# Patient Record
Sex: MALE | Race: WHITE | NOT HISPANIC OR LATINO | Employment: FULL TIME | URBAN - NONMETROPOLITAN AREA
[De-identification: names, ages, dates, MRNs, and addresses within clinical notes are randomized per-mention and may not be internally consistent; named-entity substitution may affect disease eponyms.]

---

## 2023-05-13 ENCOUNTER — HOSPITAL ENCOUNTER (INPATIENT)
Facility: HOSPITAL | Age: 58
LOS: 2 days | Discharge: HOME | DRG: 638 | End: 2023-05-15
Attending: INTERNAL MEDICINE | Admitting: INTERNAL MEDICINE
Payer: OTHER MISCELLANEOUS

## 2023-05-13 ENCOUNTER — APPOINTMENT (OUTPATIENT)
Dept: GENERAL RADIOLOGY | Facility: HOSPITAL | Age: 58
DRG: 638 | End: 2023-05-13
Payer: OTHER MISCELLANEOUS

## 2023-05-13 ENCOUNTER — APPOINTMENT (OUTPATIENT)
Dept: CT IMAGING | Facility: HOSPITAL | Age: 58
DRG: 638 | End: 2023-05-13
Payer: OTHER MISCELLANEOUS

## 2023-05-13 DIAGNOSIS — E16.2 HYPOGLYCEMIA: Primary | ICD-10-CM

## 2023-05-13 DIAGNOSIS — M86.9 OSTEOMYELITIS OF RIGHT ANKLE, UNSPECIFIED TYPE: ICD-10-CM

## 2023-05-13 LAB
ACETONE BLD QL: NEGATIVE
ALBUMIN SERPL-MCNC: 4.2 G/DL (ref 3.5–5.2)
ALBUMIN/GLOB SERPL: 1.2 G/DL
ALP SERPL-CCNC: 182 U/L (ref 39–117)
ALT SERPL W P-5'-P-CCNC: 19 U/L (ref 1–41)
AMPHET+METHAMPHET UR QL: NEGATIVE
AMPHETAMINES UR QL: NEGATIVE
ANION GAP SERPL CALCULATED.3IONS-SCNC: 14 MMOL/L (ref 5–15)
APAP SERPL-MCNC: <5 MCG/ML (ref 0–30)
ARTERIAL PATENCY WRIST A: ABNORMAL
AST SERPL-CCNC: 19 U/L (ref 1–40)
ATMOSPHERIC PRESS: 752 MMHG
BACTERIA UR QL AUTO: ABNORMAL /HPF
BARBITURATES UR QL SCN: NEGATIVE
BASE EXCESS BLDA CALC-SCNC: -5.7 MMOL/L (ref 0–2)
BASOPHILS # BLD AUTO: 0.05 10*3/MM3 (ref 0–0.2)
BASOPHILS NFR BLD AUTO: 0.5 % (ref 0–1.5)
BDY SITE: ABNORMAL
BENZODIAZ UR QL SCN: NEGATIVE
BILIRUB SERPL-MCNC: 0.4 MG/DL (ref 0–1.2)
BILIRUB UR QL STRIP: NEGATIVE
BODY TEMPERATURE: 37 C
BUN SERPL-MCNC: 39 MG/DL (ref 6–20)
BUN/CREAT SERPL: 34.8 (ref 7–25)
BUPRENORPHINE SERPL-MCNC: NEGATIVE NG/ML
CALCIUM SPEC-SCNC: 9.4 MG/DL (ref 8.6–10.5)
CANNABINOIDS SERPL QL: NEGATIVE
CHLORIDE SERPL-SCNC: 107 MMOL/L (ref 98–107)
CK SERPL-CCNC: 179 U/L (ref 20–200)
CLARITY UR: CLEAR
CO2 SERPL-SCNC: 21 MMOL/L (ref 22–29)
COCAINE UR QL: NEGATIVE
COLOR UR: YELLOW
CREAT SERPL-MCNC: 1.12 MG/DL (ref 0.76–1.27)
CRP SERPL-MCNC: 9.79 MG/DL (ref 0–0.5)
D-LACTATE SERPL-SCNC: 0.7 MMOL/L (ref 0.5–2)
DEPRECATED RDW RBC AUTO: 48.5 FL (ref 37–54)
EGFRCR SERPLBLD CKD-EPI 2021: 76.6 ML/MIN/1.73
EOSINOPHIL # BLD AUTO: 0.03 10*3/MM3 (ref 0–0.4)
EOSINOPHIL NFR BLD AUTO: 0.3 % (ref 0.3–6.2)
ERYTHROCYTE [DISTWIDTH] IN BLOOD BY AUTOMATED COUNT: 14.6 % (ref 12.3–15.4)
ERYTHROCYTE [SEDIMENTATION RATE] IN BLOOD: 55 MM/HR (ref 0–20)
ETHANOL UR QL: <0.01 %
FENTANYL UR-MCNC: NEGATIVE NG/ML
FLUAV RNA RESP QL NAA+PROBE: NOT DETECTED
FLUBV RNA RESP QL NAA+PROBE: NOT DETECTED
GLOBULIN UR ELPH-MCNC: 3.6 GM/DL
GLUCOSE BLDC GLUCOMTR-MCNC: 151 MG/DL (ref 70–130)
GLUCOSE BLDC GLUCOMTR-MCNC: 182 MG/DL (ref 70–130)
GLUCOSE BLDC GLUCOMTR-MCNC: 184 MG/DL (ref 70–130)
GLUCOSE BLDC GLUCOMTR-MCNC: 189 MG/DL (ref 70–130)
GLUCOSE BLDC GLUCOMTR-MCNC: 40 MG/DL (ref 70–130)
GLUCOSE SERPL-MCNC: 37 MG/DL (ref 65–99)
GLUCOSE UR STRIP-MCNC: ABNORMAL MG/DL
HBA1C MFR BLD: 5.3 % (ref 4.8–5.6)
HCO3 BLDA-SCNC: 19.8 MMOL/L (ref 20–26)
HCT VFR BLD AUTO: 29.2 % (ref 37.5–51)
HGB BLD-MCNC: 9.1 G/DL (ref 13–17.7)
HGB UR QL STRIP.AUTO: NEGATIVE
HYALINE CASTS UR QL AUTO: ABNORMAL /LPF
IMM GRANULOCYTES # BLD AUTO: 0.03 10*3/MM3 (ref 0–0.05)
IMM GRANULOCYTES NFR BLD AUTO: 0.3 % (ref 0–0.5)
KETONES UR QL STRIP: ABNORMAL
LEUKOCYTE ESTERASE UR QL STRIP.AUTO: NEGATIVE
LIPASE SERPL-CCNC: 20 U/L (ref 13–60)
LYMPHOCYTES # BLD AUTO: 1.16 10*3/MM3 (ref 0.7–3.1)
LYMPHOCYTES NFR BLD AUTO: 10.5 % (ref 19.6–45.3)
Lab: ABNORMAL
MAGNESIUM SERPL-MCNC: 2.3 MG/DL (ref 1.6–2.6)
MCH RBC QN AUTO: 28.3 PG (ref 26.6–33)
MCHC RBC AUTO-ENTMCNC: 31.2 G/DL (ref 31.5–35.7)
MCV RBC AUTO: 91 FL (ref 79–97)
METHADONE UR QL SCN: NEGATIVE
MODALITY: ABNORMAL
MONOCYTES # BLD AUTO: 0.74 10*3/MM3 (ref 0.1–0.9)
MONOCYTES NFR BLD AUTO: 6.7 % (ref 5–12)
NEUTROPHILS NFR BLD AUTO: 81.7 % (ref 42.7–76)
NEUTROPHILS NFR BLD AUTO: 9.09 10*3/MM3 (ref 1.7–7)
NITRITE UR QL STRIP: NEGATIVE
NRBC BLD AUTO-RTO: 0 /100 WBC (ref 0–0.2)
OPIATES UR QL: POSITIVE
OXYCODONE UR QL SCN: NEGATIVE
PCO2 BLDA: 37.8 MM HG (ref 35–45)
PCO2 TEMP ADJ BLD: 37.8 MM HG (ref 35–45)
PCP UR QL SCN: NEGATIVE
PH BLDA: 7.33 PH UNITS (ref 7.35–7.45)
PH UR STRIP.AUTO: <=5 [PH] (ref 5–8)
PH, TEMP CORRECTED: 7.33 PH UNITS (ref 7.35–7.45)
PLATELET # BLD AUTO: 270 10*3/MM3 (ref 140–450)
PMV BLD AUTO: 9.1 FL (ref 6–12)
PO2 BLDA: 88.4 MM HG (ref 83–108)
PO2 TEMP ADJ BLD: 88.4 MM HG (ref 83–108)
POTASSIUM SERPL-SCNC: 4.5 MMOL/L (ref 3.5–5.2)
PROCALCITONIN SERPL-MCNC: 0.06 NG/ML (ref 0–0.25)
PROPOXYPH UR QL: NEGATIVE
PROT SERPL-MCNC: 7.8 G/DL (ref 6–8.5)
PROT UR QL STRIP: ABNORMAL
RBC # BLD AUTO: 3.21 10*6/MM3 (ref 4.14–5.8)
RBC # UR STRIP: ABNORMAL /HPF
REF LAB TEST METHOD: ABNORMAL
RSV RNA NPH QL NAA+NON-PROBE: NOT DETECTED
SALICYLATES SERPL-MCNC: <0.3 MG/DL
SAO2 % BLDCOA: 94.7 % (ref 94–99)
SARS-COV-2 RNA RESP QL NAA+PROBE: NOT DETECTED
SODIUM SERPL-SCNC: 142 MMOL/L (ref 136–145)
SP GR UR STRIP: 1.03 (ref 1–1.03)
SQUAMOUS #/AREA URNS HPF: ABNORMAL /HPF
T4 FREE SERPL-MCNC: 1.06 NG/DL (ref 0.93–1.7)
TRICYCLICS UR QL SCN: NEGATIVE
TSH SERPL DL<=0.05 MIU/L-ACNC: 1.71 UIU/ML (ref 0.27–4.2)
UROBILINOGEN UR QL STRIP: ABNORMAL
VENTILATOR MODE: ABNORMAL
WBC # UR STRIP: ABNORMAL /HPF
WBC NRBC COR # BLD: 11.1 10*3/MM3 (ref 3.4–10.8)

## 2023-05-13 PROCEDURE — 80050 GENERAL HEALTH PANEL: CPT | Performed by: PHYSICIAN ASSISTANT

## 2023-05-13 PROCEDURE — 85652 RBC SED RATE AUTOMATED: CPT | Performed by: PHYSICIAN ASSISTANT

## 2023-05-13 PROCEDURE — 83036 HEMOGLOBIN GLYCOSYLATED A1C: CPT | Performed by: PHYSICIAN ASSISTANT

## 2023-05-13 PROCEDURE — 73630 X-RAY EXAM OF FOOT: CPT

## 2023-05-13 PROCEDURE — 99285 EMERGENCY DEPT VISIT HI MDM: CPT

## 2023-05-13 PROCEDURE — 80179 DRUG ASSAY SALICYLATE: CPT | Performed by: PHYSICIAN ASSISTANT

## 2023-05-13 PROCEDURE — 80307 DRUG TEST PRSMV CHEM ANLYZR: CPT | Performed by: PHYSICIAN ASSISTANT

## 2023-05-13 PROCEDURE — 71045 X-RAY EXAM CHEST 1 VIEW: CPT

## 2023-05-13 PROCEDURE — 84439 ASSAY OF FREE THYROXINE: CPT | Performed by: PHYSICIAN ASSISTANT

## 2023-05-13 PROCEDURE — 80143 DRUG ASSAY ACETAMINOPHEN: CPT | Performed by: PHYSICIAN ASSISTANT

## 2023-05-13 PROCEDURE — 36600 WITHDRAWAL OF ARTERIAL BLOOD: CPT

## 2023-05-13 PROCEDURE — 36415 COLL VENOUS BLD VENIPUNCTURE: CPT

## 2023-05-13 PROCEDURE — 82550 ASSAY OF CK (CPK): CPT | Performed by: PHYSICIAN ASSISTANT

## 2023-05-13 PROCEDURE — 83605 ASSAY OF LACTIC ACID: CPT | Performed by: PHYSICIAN ASSISTANT

## 2023-05-13 PROCEDURE — 87040 BLOOD CULTURE FOR BACTERIA: CPT | Performed by: PHYSICIAN ASSISTANT

## 2023-05-13 PROCEDURE — 93005 ELECTROCARDIOGRAM TRACING: CPT | Performed by: PHYSICIAN ASSISTANT

## 2023-05-13 PROCEDURE — 70450 CT HEAD/BRAIN W/O DYE: CPT

## 2023-05-13 PROCEDURE — 82948 REAGENT STRIP/BLOOD GLUCOSE: CPT

## 2023-05-13 PROCEDURE — 82009 KETONE BODYS QUAL: CPT | Performed by: PHYSICIAN ASSISTANT

## 2023-05-13 PROCEDURE — 82803 BLOOD GASES ANY COMBINATION: CPT

## 2023-05-13 PROCEDURE — 86140 C-REACTIVE PROTEIN: CPT | Performed by: PHYSICIAN ASSISTANT

## 2023-05-13 PROCEDURE — 83690 ASSAY OF LIPASE: CPT | Performed by: PHYSICIAN ASSISTANT

## 2023-05-13 PROCEDURE — 82077 ASSAY SPEC XCP UR&BREATH IA: CPT | Performed by: PHYSICIAN ASSISTANT

## 2023-05-13 PROCEDURE — 83735 ASSAY OF MAGNESIUM: CPT | Performed by: PHYSICIAN ASSISTANT

## 2023-05-13 PROCEDURE — 81001 URINALYSIS AUTO W/SCOPE: CPT | Performed by: PHYSICIAN ASSISTANT

## 2023-05-13 PROCEDURE — 87637 SARSCOV2&INF A&B&RSV AMP PRB: CPT | Performed by: PHYSICIAN ASSISTANT

## 2023-05-13 PROCEDURE — 84145 PROCALCITONIN (PCT): CPT | Performed by: PHYSICIAN ASSISTANT

## 2023-05-13 RX ORDER — DEXTROSE MONOHYDRATE 25 G/50ML
INJECTION, SOLUTION INTRAVENOUS
Status: COMPLETED
Start: 2023-05-13 | End: 2023-05-13

## 2023-05-13 RX ORDER — SODIUM CHLORIDE 0.9 % (FLUSH) 0.9 %
10 SYRINGE (ML) INJECTION AS NEEDED
Status: DISCONTINUED | OUTPATIENT
Start: 2023-05-13 | End: 2023-05-15 | Stop reason: HOSPADM

## 2023-05-13 RX ORDER — ONDANSETRON 2 MG/ML
4 INJECTION INTRAMUSCULAR; INTRAVENOUS ONCE
Status: DISCONTINUED | OUTPATIENT
Start: 2023-05-13 | End: 2023-05-15 | Stop reason: HOSPADM

## 2023-05-13 RX ADMIN — DEXTROSE MONOHYDRATE 50 ML: 25 INJECTION, SOLUTION INTRAVENOUS at 18:40

## 2023-05-13 NOTE — ED PROVIDER NOTES
"Subjective   History of Present Illness    Patient is a 58-year-old male presenting to ED with altered mental status.  PMH significant for non-insulin-dependent diabetes. Wife at bedside to provide additional history.  Wife states that at 4 PM yesterday patient had a sudden change in his mentation where he seemed very confused, zoning out, and had tremors.  Wife states that patient's children thought he may have accidentally overdosed on his Lortab as he has been taking more due to pain and discomfort with a right foot wound for which he is supposed to have surgery once insurance improves for a wound cleanout and likely amputation due to poorly controlled diabetes.  Wife states however that today she went and counted patient's Lortabs and noted that the appropriate amount of tablets was missing and she had low concern for an overdose but describes that all day today patient has been like a \"zombie just staring out\" acting as though \"he is still a little bit high.\"  Wife states that patient has also been complaining of a sudden headache for which she became concerned he might have a septic foot.  Wife notes that they have not checked patient's blood sugar all day today and she does not believe he is taking any other medications intentionally or on purpose at which time they present for further evaluation.  Patient is a poor historian as he states that his head hurts however he is unwilling to answer other questions.    Wife did state that patient sustained an ankle fracture due to [employer]. for which she was initially following with podiatrist Dr. Morrison.  Wife states that they were advised \"there was nothing left they could do and he just had to tolerate the pain until the leg developed complications so we went to get a second opinion.\"  Wife states that they have since been following with Dr. Reddy through the orthopedic [place] who reports that he is scheduled to have hardware removal on 6/13/2023.  Wife " states she is concerned because patient has been having increased pain to the foot and they are not sure if he will be able to wait until 6/13/2023 for intervention.    Records reviewed show patient last seen in the ED on 11/28/2014.  Patient with no outpatient visits since pain management on 8/2/2016 for lumbar postlaminectomy syndrome, lumbar radiculopathy, sacroiliac joint guided joint dysfunction, trochanteric bursitis, peripheral neuropathy pain.    Review of Systems   Constitutional: Negative.  Negative for fever.   HENT: Negative.    Eyes: Negative.    Respiratory: Negative.    Cardiovascular: Negative.    Gastrointestinal: Negative.  Negative for nausea and vomiting.   Genitourinary: Negative.    Musculoskeletal: Positive for arthralgias (Right foot) and joint swelling (Right foot).   Skin: Positive for wound (Right foot).   Allergic/Immunologic: Positive for immunocompromised state (DM).   Neurological: Negative.         Denies head injury   Psychiatric/Behavioral: Positive for confusion.   All other systems reviewed and are negative.      History reviewed. No pertinent past medical history.    No Known Allergies    History reviewed. No pertinent surgical history.    History reviewed. No pertinent family history.    Social History     Socioeconomic History   • Marital status:    Tobacco Use   • Smoking status: Never   • Smokeless tobacco: Never   Vaping Use   • Vaping Use: Never used   Substance and Sexual Activity   • Alcohol use: Never   • Drug use: Never   • Sexual activity: Defer           Objective   Physical Exam  Vitals and nursing note reviewed.   Constitutional:       General: He is in acute distress.      Appearance: Normal appearance. He is well-developed and well-groomed. He is not ill-appearing, toxic-appearing or diaphoretic.   HENT:      Head: Normocephalic and atraumatic.      Mouth/Throat:      Mouth: Mucous membranes are moist.      Pharynx: Oropharynx is clear.   Eyes:       Extraocular Movements: Extraocular movements intact.      Conjunctiva/sclera: Conjunctivae normal.      Pupils: Pupils are equal, round, and reactive to light.   Cardiovascular:      Rate and Rhythm: Regular rhythm. Tachycardia present.   Pulmonary:      Effort: Pulmonary effort is normal.      Breath sounds: Normal breath sounds.   Abdominal:      General: Bowel sounds are normal.      Palpations: Abdomen is soft.   Musculoskeletal:         General: Tenderness present.      Cervical back: Neck supple.      Comments: Orthopedic brace in place to the right lower extremity.  Left foot with very superficial abrasion on the top aspect however no evidence of overlying cellulitis or other acute injury/abnormalities.   Skin:     General: Skin is warm and dry.      Findings: Wound (Right foot) present.   Neurological:      General: No focal deficit present.      Mental Status: He is alert and oriented to person, place, and time.   Psychiatric:         Mood and Affect: Mood normal.         Behavior: Behavior normal. Behavior is cooperative.         Procedures           ED Course                                           Medical Decision Making  Hypoglycemia: acute illness or injury  Osteomyelitis of right ankle, unspecified type: acute illness or injury  Amount and/or Complexity of Data Reviewed  Independent Historian: spouse     Details: Wife  External Data Reviewed: labs, radiology and notes.  Labs: ordered. Decision-making details documented in ED Course.  Radiology: ordered. Decision-making details documented in ED Course.  ECG/medicine tests: ordered. Decision-making details documented in ED Course.  Discussion of management or test interpretation with external provider(s): Dr. Casper Nogueira (attending)  Dr. Chase (hospitalist)    Risk  Prescription drug management.  Decision regarding hospitalization.            Patient is a 58-year-old male presenting to ED with altered mental status.  PMH significant for  non-insulin-dependent diabetes.  Upon arrival to ED patient noted to have a POC BGL of 40 for which she was given an amp of D50 and subsequently was able to tolerate p.o. fluids and food and maintain his blood sugars in the 180s.  Lab work otherwise revealed Leukocytosis 11.1, H&H 9.1/29.2 with normal platelets and no further CBC abnormalities.  CMP with alk phos 182, BUN 39.  No further electrolyte disturbances, normal renal and hepatic function otherwise.  Thyroid hormones WNL.  Inflammatory markers elevated with CRP 9.79, sed rate 55 however normal lactic acid as well as normal procalcitonin.  ABG revealed acidotic pH 7.328 with HCO3 of 19.8 and no further acute abnormalities.  CK WNL at 179.  Salicylate and acetaminophen is negative.  Alcohol negative.  UDS positive for opiates consistent with patient's medication history and otherwise unremarkable.  Urinalysis with greater than thousand glucosurea as well as greater than 300 proteinuria but no evidence of infection.  COVID, influenza, RSV testing negative.  Further low concern for DKA with negative ketones.  Hemoglobin A1c 5.3.  Head CT without contrast showed: No acute intracranial process.  Chest x-ray showed: No radiographic evidence of acute cardiopulmonary process.  Right foot x-ray showed: Previous ankle fracture with open reduction internal fixation, radiographs concerning for osteomyelitis with septic hardware loosening.  Patient was started on IV vancomycin.  Throughout evaluation patient remained ANO x3 and hemodynamically stable.  Discussed with patient need for admission for further evaluation and treatment of his osteomyelitis as both wife and patient states he has not been on any antibiotics.  Discussed ability to trend blood sugars during admission for which they are amenable with no further questions, concerns, or needs.  Case discussed with Dr. Chase, hospitalist, who will kindly accept patient for admission under her services.    Final  diagnoses:   Hypoglycemia   Osteomyelitis of right ankle, unspecified type       ED Disposition  ED Disposition     ED Disposition   Decision to Admit    Condition   --    Comment   Level of Care: Med/Surg [1]   Diagnosis: Osteomyelitis of right foot [868692]   Admitting Physician: REDD VALDEZ [[id number]]   Certification: I Certify That Inpatient Hospital Services Are Medically Necessary For Greater Than 2 Midnights               No follow-up provider specified.       Medication List      No changes were made to your prescriptions during this visit.          Donn Lui PA-C  05/14/23 0039

## 2023-05-14 LAB
ALBUMIN SERPL-MCNC: 3.1 G/DL (ref 3.5–5.2)
ALBUMIN/GLOB SERPL: 1 G/DL
ALP SERPL-CCNC: 138 U/L (ref 39–117)
ALT SERPL W P-5'-P-CCNC: 15 U/L (ref 1–41)
ANION GAP SERPL CALCULATED.3IONS-SCNC: 13 MMOL/L (ref 5–15)
AST SERPL-CCNC: 13 U/L (ref 1–40)
BASOPHILS # BLD AUTO: 0.04 10*3/MM3 (ref 0–0.2)
BASOPHILS NFR BLD AUTO: 0.4 % (ref 0–1.5)
BILIRUB SERPL-MCNC: 0.2 MG/DL (ref 0–1.2)
BUN SERPL-MCNC: 37 MG/DL (ref 6–20)
BUN/CREAT SERPL: 40.7 (ref 7–25)
CALCIUM SPEC-SCNC: 8.2 MG/DL (ref 8.6–10.5)
CHLORIDE SERPL-SCNC: 109 MMOL/L (ref 98–107)
CO2 SERPL-SCNC: 20 MMOL/L (ref 22–29)
CREAT SERPL-MCNC: 0.91 MG/DL (ref 0.76–1.27)
DEPRECATED RDW RBC AUTO: 48.4 FL (ref 37–54)
EGFRCR SERPLBLD CKD-EPI 2021: 98.3 ML/MIN/1.73
EOSINOPHIL # BLD AUTO: 0.08 10*3/MM3 (ref 0–0.4)
EOSINOPHIL NFR BLD AUTO: 0.7 % (ref 0.3–6.2)
ERYTHROCYTE [DISTWIDTH] IN BLOOD BY AUTOMATED COUNT: 14.8 % (ref 12.3–15.4)
GLOBULIN UR ELPH-MCNC: 3 GM/DL
GLUCOSE BLDC GLUCOMTR-MCNC: 104 MG/DL (ref 70–130)
GLUCOSE BLDC GLUCOMTR-MCNC: 106 MG/DL (ref 70–130)
GLUCOSE BLDC GLUCOMTR-MCNC: 184 MG/DL (ref 70–130)
GLUCOSE BLDC GLUCOMTR-MCNC: 271 MG/DL (ref 70–130)
GLUCOSE BLDC GLUCOMTR-MCNC: 289 MG/DL (ref 70–130)
GLUCOSE SERPL-MCNC: 98 MG/DL (ref 65–99)
HCT VFR BLD AUTO: 27.7 % (ref 37.5–51)
HGB BLD-MCNC: 8.6 G/DL (ref 13–17.7)
IMM GRANULOCYTES # BLD AUTO: 0.05 10*3/MM3 (ref 0–0.05)
IMM GRANULOCYTES NFR BLD AUTO: 0.5 % (ref 0–0.5)
LYMPHOCYTES # BLD AUTO: 2.3 10*3/MM3 (ref 0.7–3.1)
LYMPHOCYTES NFR BLD AUTO: 21.3 % (ref 19.6–45.3)
MCH RBC QN AUTO: 27.7 PG (ref 26.6–33)
MCHC RBC AUTO-ENTMCNC: 31 G/DL (ref 31.5–35.7)
MCV RBC AUTO: 89.4 FL (ref 79–97)
MONOCYTES # BLD AUTO: 0.75 10*3/MM3 (ref 0.1–0.9)
MONOCYTES NFR BLD AUTO: 7 % (ref 5–12)
NEUTROPHILS NFR BLD AUTO: 7.57 10*3/MM3 (ref 1.7–7)
NEUTROPHILS NFR BLD AUTO: 70.1 % (ref 42.7–76)
NRBC BLD AUTO-RTO: 0 /100 WBC (ref 0–0.2)
PLATELET # BLD AUTO: 282 10*3/MM3 (ref 140–450)
PMV BLD AUTO: 9.3 FL (ref 6–12)
POTASSIUM SERPL-SCNC: 4.7 MMOL/L (ref 3.5–5.2)
PROT SERPL-MCNC: 6.1 G/DL (ref 6–8.5)
QT INTERVAL: 452 MS
QTC INTERVAL: 511 MS
RBC # BLD AUTO: 3.1 10*6/MM3 (ref 4.14–5.8)
SODIUM SERPL-SCNC: 142 MMOL/L (ref 136–145)
WBC NRBC COR # BLD: 10.79 10*3/MM3 (ref 3.4–10.8)

## 2023-05-14 PROCEDURE — 25010000002 VANCOMYCIN 10 G RECONSTITUTED SOLUTION: Performed by: PHYSICIAN ASSISTANT

## 2023-05-14 PROCEDURE — 25010000002 VANCOMYCIN 1 G RECONSTITUTED SOLUTION 1 EACH VIAL: Performed by: INTERNAL MEDICINE

## 2023-05-14 PROCEDURE — 80053 COMPREHEN METABOLIC PANEL: CPT | Performed by: INTERNAL MEDICINE

## 2023-05-14 PROCEDURE — 63710000001 INSULIN LISPRO (HUMAN) PER 5 UNITS: Performed by: INTERNAL MEDICINE

## 2023-05-14 PROCEDURE — 85025 COMPLETE CBC W/AUTO DIFF WBC: CPT | Performed by: INTERNAL MEDICINE

## 2023-05-14 PROCEDURE — 82948 REAGENT STRIP/BLOOD GLUCOSE: CPT

## 2023-05-14 PROCEDURE — 25010000002 ONDANSETRON PER 1 MG: Performed by: INTERNAL MEDICINE

## 2023-05-14 PROCEDURE — 25010000002 HYDROMORPHONE PER 4 MG: Performed by: INTERNAL MEDICINE

## 2023-05-14 RX ORDER — SODIUM CHLORIDE 9 MG/ML
40 INJECTION, SOLUTION INTRAVENOUS AS NEEDED
Status: DISCONTINUED | OUTPATIENT
Start: 2023-05-14 | End: 2023-05-15 | Stop reason: HOSPADM

## 2023-05-14 RX ORDER — NICOTINE POLACRILEX 4 MG
15 LOZENGE BUCCAL
Status: DISCONTINUED | OUTPATIENT
Start: 2023-05-14 | End: 2023-05-15 | Stop reason: HOSPADM

## 2023-05-14 RX ORDER — OXYCODONE HYDROCHLORIDE AND ACETAMINOPHEN 5; 325 MG/1; MG/1
1 TABLET ORAL EVERY 4 HOURS PRN
Status: DISCONTINUED | OUTPATIENT
Start: 2023-05-14 | End: 2023-05-14

## 2023-05-14 RX ORDER — ONDANSETRON 2 MG/ML
4 INJECTION INTRAMUSCULAR; INTRAVENOUS EVERY 6 HOURS PRN
Status: DISCONTINUED | OUTPATIENT
Start: 2023-05-14 | End: 2023-05-15 | Stop reason: HOSPADM

## 2023-05-14 RX ORDER — PREGABALIN 50 MG/1
50 CAPSULE ORAL 3 TIMES DAILY
COMMUNITY

## 2023-05-14 RX ORDER — PREGABALIN 50 MG/1
50 CAPSULE ORAL 3 TIMES DAILY
Status: DISCONTINUED | OUTPATIENT
Start: 2023-05-14 | End: 2023-05-15 | Stop reason: HOSPADM

## 2023-05-14 RX ORDER — ACETAMINOPHEN 325 MG/1
650 TABLET ORAL EVERY 4 HOURS PRN
Status: DISCONTINUED | OUTPATIENT
Start: 2023-05-14 | End: 2023-05-15 | Stop reason: HOSPADM

## 2023-05-14 RX ORDER — SODIUM CHLORIDE 0.9 % (FLUSH) 0.9 %
10 SYRINGE (ML) INJECTION EVERY 12 HOURS SCHEDULED
Status: DISCONTINUED | OUTPATIENT
Start: 2023-05-14 | End: 2023-05-15 | Stop reason: HOSPADM

## 2023-05-14 RX ORDER — DEXTROSE MONOHYDRATE 25 G/50ML
25 INJECTION, SOLUTION INTRAVENOUS
Status: DISCONTINUED | OUTPATIENT
Start: 2023-05-14 | End: 2023-05-15 | Stop reason: HOSPADM

## 2023-05-14 RX ORDER — HYDROCODONE BITARTRATE AND ACETAMINOPHEN 10; 325 MG/1; MG/1
1 TABLET ORAL EVERY 6 HOURS PRN
Status: DISCONTINUED | OUTPATIENT
Start: 2023-05-14 | End: 2023-05-15 | Stop reason: HOSPADM

## 2023-05-14 RX ORDER — HYDROMORPHONE HYDROCHLORIDE 1 MG/ML
0.5 INJECTION, SOLUTION INTRAMUSCULAR; INTRAVENOUS; SUBCUTANEOUS
Status: DISCONTINUED | OUTPATIENT
Start: 2023-05-14 | End: 2023-05-15 | Stop reason: HOSPADM

## 2023-05-14 RX ORDER — SODIUM CHLORIDE 0.9 % (FLUSH) 0.9 %
10 SYRINGE (ML) INJECTION AS NEEDED
Status: DISCONTINUED | OUTPATIENT
Start: 2023-05-14 | End: 2023-05-15 | Stop reason: HOSPADM

## 2023-05-14 RX ORDER — GLIMEPIRIDE 2 MG/1
2 TABLET ORAL 2 TIMES DAILY
COMMUNITY
End: 2023-05-15 | Stop reason: HOSPADM

## 2023-05-14 RX ORDER — INSULIN LISPRO 100 [IU]/ML
2-7 INJECTION, SOLUTION INTRAVENOUS; SUBCUTANEOUS
Status: DISCONTINUED | OUTPATIENT
Start: 2023-05-14 | End: 2023-05-15 | Stop reason: HOSPADM

## 2023-05-14 RX ORDER — HYDROCODONE BITARTRATE AND ACETAMINOPHEN 10; 325 MG/1; MG/1
1 TABLET ORAL EVERY 8 HOURS PRN
COMMUNITY

## 2023-05-14 RX ADMIN — VANCOMYCIN HYDROCHLORIDE 1000 MG: 1 INJECTION, POWDER, LYOPHILIZED, FOR SOLUTION INTRAVENOUS at 12:17

## 2023-05-14 RX ADMIN — Medication 10 ML: at 20:53

## 2023-05-14 RX ADMIN — ONDANSETRON 4 MG: 2 INJECTION INTRAMUSCULAR; INTRAVENOUS at 00:59

## 2023-05-14 RX ADMIN — Medication 10 ML: at 01:06

## 2023-05-14 RX ADMIN — OXYCODONE HYDROCHLORIDE AND ACETAMINOPHEN 1 TABLET: 5; 325 TABLET ORAL at 02:26

## 2023-05-14 RX ADMIN — HYDROMORPHONE HYDROCHLORIDE 0.5 MG: 1 INJECTION, SOLUTION INTRAMUSCULAR; INTRAVENOUS; SUBCUTANEOUS at 03:42

## 2023-05-14 RX ADMIN — HYDROMORPHONE HYDROCHLORIDE 0.5 MG: 1 INJECTION, SOLUTION INTRAMUSCULAR; INTRAVENOUS; SUBCUTANEOUS at 00:58

## 2023-05-14 RX ADMIN — HYDROCODONE BITARTRATE AND ACETAMINOPHEN 1 TABLET: 10; 325 TABLET ORAL at 10:07

## 2023-05-14 RX ADMIN — INSULIN LISPRO 2 UNITS: 100 INJECTION, SOLUTION INTRAVENOUS; SUBCUTANEOUS at 17:31

## 2023-05-14 RX ADMIN — PREGABALIN 50 MG: 50 CAPSULE ORAL at 20:53

## 2023-05-14 RX ADMIN — HYDROCODONE BITARTRATE AND ACETAMINOPHEN 1 TABLET: 10; 325 TABLET ORAL at 16:47

## 2023-05-14 RX ADMIN — INSULIN LISPRO 2 UNITS: 100 INJECTION, SOLUTION INTRAVENOUS; SUBCUTANEOUS at 12:18

## 2023-05-14 RX ADMIN — Medication 10 ML: at 09:03

## 2023-05-14 RX ADMIN — Medication 1750 MG: at 00:05

## 2023-05-14 NOTE — PROGRESS NOTES
"Pharmacy Dosing Service  Pharmacokinetics  Vancomycin Initial Evaluation  Assessment/Action/Plan:  Loading dose?: none  Current Order: Vancomycin 1000 mg IVPB every 12 hours  Current end date:05/19/2023  Levels: none  Additional antimicrobial agent(s): none    Vancomycin dosage initiated based on population pharmacokinetic parameters. Pharmacy will continue to follow daily and adjust dose accordingly.     Subjective:  Garrett Rodriguez is a 58 y.o. male with a Vancomycin \"Pharmacy to Dose\" consult for the treatment of bone and/or joint infection , day 1 of 5 of treatment.    AUC Model Data:  Loading dose: N/A  Regimen: 1000 mg IV every 12 hours.  Start time: 13:05 on 05/14/2023  Exposure target: AUC24 (range)400-600 mg/L.hr   AUC24,ss: 553 mg/L.hr  PAUC*: 82 %  Ctrough,ss: 18.2 mg/L  Pconc*: 41 %  Tox.: 14 %    Objective:  Ht: 180.3 cm (71\"); Wt: 86 kg (189 lb 9.6 oz)  Estimated Creatinine Clearance: 88.5 mL/min (by C-G formula based on SCr of 1.12 mg/dL).   Creatinine   Date Value Ref Range Status   05/13/2023 1.12 0.76 - 1.27 mg/dL Final      Lab Results   Component Value Date    WBC 11.10 (H) 05/13/2023      Baseline culture results:  Microbiology Results (last 10 days)       Procedure Component Value - Date/Time    COVID-19, FLU A/B, RSV PCR - Swab, Nasopharynx [[id number]]  (Normal) Collected: 05/13/23 1927    Lab Status: Final result Specimen: Swab from Nasopharynx Updated: 05/13/23 2017     COVID19 Not Detected     Influenza A PCR Not Detected     Influenza B PCR Not Detected     RSV, PCR Not Detected    Narrative:      Fact sheet for providers: https://www.fda.gov/media/502125/download    Fact sheet for patients: https://www.fda.gov/media/611260/download    Test performed by PCR.            Pawel Musa PharmD  05/14/23 00:52 CDT    "

## 2023-05-14 NOTE — PROGRESS NOTES
Admitted by Dr. Chase after midnight of May 13.  Chief complaint of presentation was confusion  Vitals:    05/14/23 1150   BP: 149/82   Pulse: 79   Resp: 18   Temp: 98.2 °F (36.8 °C)   SpO2: 94%     Hemodynamically stable  Had blood sugar as low as 37, severe glucosuria; A1c 5.30  Urine drug screen positive for opiates  Normocytic anemia  Mildly acidotic at 7.33, PaCO2 of 38, PaO2 of 88 on room air (acidotic-metabolic) negative acetone    Impression on admission includes:  Impression:  1.  Osteomyelitis of the right foot/ankle with septic hardware  2.  Elevated inflammatory markers   3.  Hypoglycemia in a noninsulin-dependent diabetic; presented with confusion likely metabolic encephalopathy from hypoglycemia  4.  Right lower extremity pain  5.  Anemia    Podiatry consulted  As needed hypoglycemic protocol  Oral location for diabetes on hold  Continue present management  insulin lispro, 2-7 Units, Subcutaneous, TID With Meals  ondansetron, 4 mg, Intravenous, Once  sodium chloride, 10 mL, Intravenous, Q12H  vancomycin, 1,000 mg, Intravenous, Q12H

## 2023-05-14 NOTE — PLAN OF CARE
Goal Outcome Evaluation:  Plan of Care Reviewed With: patient        Progress: no change  Outcome Evaluation: Patient tolerating PO pain medication for left foot/ankle infection/edema. SSI given as indicated. Alert and Oriented x3- SOLIS- up to BR, safety maintained. VSS at this time. Wife in room and supportive. Awaiting suregery information- resting comfortably at this time.

## 2023-05-14 NOTE — H&P
"    [facility] Medicine Services  HISTORY AND PHYSICAL    Date of Admission: 5/13/2023  Primary Care Physician: Provider, No Known    Subjective   Primary Historian: Patient    Chief Complaint: Confusion    History of Present Illness  58-year-old male who became confused yesterday around 4 PM.  His family thought that he was \"high.\"  From his pain medication.  Patient states that he was not, that he was taking it the way he was supposed to.  He ate a little bit and the family notes that he was better.  When he got back home he took his diabetic medication again, and became confused again.  It was later discovered that the patient was becoming hypoglycemic.  On arrival to the emergency department, the patient notes that his glucose was 40.  The patient also has been having some difficulty with his right lower extremity.  It appears that he has osteomyelitis with septic hardware.  He has seen Dr. Reddy, and has surgery scheduled next month, but after reviewing the x-ray, I am very concerned that the patient may need antibiotics and surgical procedure sooner than that.  The patient does complain of ankle pain and deformity.  He ambulates with an assist device and a brace.  He has no chest pain, no shortness of breath.  He has no acute bowel or kidney dysfunction.        Review of Systems   Otherwise complete ROS reviewed and negative except as mentioned in the HPI.    Past Medical History:   • Diabetes mellitus (HCC)   • Hypertension     Past Surgical History:  • Cardiac surgery   hole in heart as a child   • Back surgery   x2   • Fracture surgery   left arm   • Rotator cuff repair Right   x2    Right ankle surgery    Social History:  reports that he has never smoked. He has never used smokeless tobacco. He reports that he does not drink alcohol and does not use drugs.    Family History: None    Allergies:  No Known Allergies    Medications:  canagliflozin (INVOKANA) 100 MG TABS tablet  " " Take 100 mg by mouth every morning (before breakfast)   0       lisinopril (PRINIVIL;ZESTRIL) 10 MG tablet   Take 10 mg by mouth daily   0       glimepiride (AMARYL) 2 MG tablet   Take 2 mg by mouth every morning (before breakfast)   0       pregabalin (LYRICA) 75 MG capsule   Take 1 capsule by mouth 2 times daily for 14 days 28 capsule   0 08/02/2016     pregabalin (LYRICA) 75 MG capsule   Take 1 capsule by mouth 2 times daily 60 capsule   3 08/02/2016     HYDROcodone-acetaminophen (NORCO) 7.5-325 MG per tablet   Take 1 tablet by mouth every 6 hours as needed for Pain 120 tablet   0 08/02/2016     [employer] CREA   Apply 1-2 pumps to affected area 3-4 times per day 360 g   3 08/02/2016     cyclobenzaprine (FLEXERIL) 10 MG tablet   Take 1 tablet by mouth every 8 hours as needed for Muscle spasms 90 tablet   0 08/02/2016 08/12/2016       Prior to Admission medications    Not on File     I have utilized all available immediate resources to obtain, update, or review the patient's current medications (including all prescriptions, over-the-counter products, herbals, cannabis/cannabidiol products, and vitamin/mineral/dietary (nutritional) supplements).    Objective     Vital Signs: /70   Pulse 75   Temp 97.4 °F (36.3 °C) (Temporal)   Resp 18   Ht 182.9 cm (72\")   Wt 90.7 kg (200 lb)   SpO2 92%   BMI 27.12 kg/m²   Physical Exam  Vitals reviewed.   Constitutional:       Appearance: Normal appearance.   HENT:      Head: Normocephalic and atraumatic.      Right Ear: External ear normal.      Left Ear: External ear normal.      Nose: Nose normal.      Mouth/Throat:      Mouth: Mucous membranes are moist.      Pharynx: No oropharyngeal exudate.   Eyes:      General: No scleral icterus.     Conjunctiva/sclera: Conjunctivae normal.   Cardiovascular:      Rate and Rhythm: Normal rate and regular rhythm.      Heart sounds: Normal heart sounds.   Pulmonary:      Effort: Pulmonary effort is normal.      Breath sounds: " Normal breath sounds.   Abdominal:      General: Bowel sounds are normal.      Palpations: Abdomen is soft.   Musculoskeletal:         General: Swelling present. No tenderness.      Cervical back: Normal range of motion and neck supple.      Right lower leg: Edema present.      Comments: Right lower extremity ankle deformity   Skin:     General: Skin is warm and dry.   Neurological:      Mental Status: He is alert and oriented to person, place, and time.      Cranial Nerves: No cranial nerve deficit.   Psychiatric:         Mood and Affect: Mood normal.         Behavior: Behavior normal.        Results Reviewed:  Lab Results (last 24 hours)     Procedure Component Value Units Date/Time    POC Glucose Once [[id number]]  (Abnormal) Collected: 05/13/23 2248    Specimen: Blood Updated: 05/13/23 2259     Glucose 184 mg/dL      Comment: : hbany Moore HunterMeter ID: [id number]       POC Glucose Once [[id number]]  (Abnormal) Collected: 05/13/23 2128    Specimen: Blood Updated: 05/13/23 2139     Glucose 182 mg/dL      Comment: : hbany Mortonan HunterMeter ID: [id number]       POC Glucose Once [[id number]]  (Abnormal) Collected: 05/13/23 2026    Specimen: Blood Updated: 05/13/23 2037     Glucose 151 mg/dL      Comment: : elia Moore HunterMeter ID: [id number]       COVID-19, FLU A/B, RSV PCR - Swab, Nasopharynx [[id number]]  (Normal) Collected: 05/13/23 1927    Specimen: Swab from Nasopharynx Updated: 05/13/23 2017     COVID19 Not Detected     Influenza A PCR Not Detected     Influenza B PCR Not Detected     RSV, PCR Not Detected    Narrative:      Fact sheet for providers: https://www.fda.gov/media/617429/download    Fact sheet for patients: https://www.fda.gov/media/909860/download    Test performed by PCR.    Fentanyl, Urine - Urine, Clean Catch [[id number]]  (Normal) Collected: 05/13/23 1933    Specimen: Urine, Clean Catch Updated: 05/13/23 2008     Fentanyl, Urine Negative    Narrative:       Negative Threshold:      Fentanyl 5 ng/mL     The normal value for the drug tested is negative. This report includes final unconfirmed screening results to be used for medical treatment purposes only. Unconfirmed results must not be used for non-medical purposes such as employment or legal testing. Clinical consideration should be applied to any drug of abuse test, particularly when unconfirmed results are used.           Comprehensive Metabolic Panel [[id number]]  (Abnormal) Collected: 05/13/23 1845    Specimen: Blood Updated: 05/13/23 2007     Glucose 37 mg/dL      BUN 39 mg/dL      Creatinine 1.12 mg/dL      Sodium 142 mmol/L      Potassium 4.5 mmol/L      Chloride 107 mmol/L      CO2 21.0 mmol/L      Calcium 9.4 mg/dL      Total Protein 7.8 g/dL      Albumin 4.2 g/dL      ALT (SGPT) 19 U/L      AST (SGOT) 19 U/L      Alkaline Phosphatase 182 U/L      Total Bilirubin 0.4 mg/dL      Globulin 3.6 gm/dL      A/G Ratio 1.2 g/dL      BUN/Creatinine Ratio 34.8     Anion Gap 14.0 mmol/L      eGFR 76.6 mL/min/1.73     Narrative:      GFR Normal >60  Chronic Kidney Disease <60  Kidney Failure <15      Urine Drug Screen - Urine, Clean Catch [[id number]]  (Abnormal) Collected: 05/13/23 1933    Specimen: Urine, Clean Catch Updated: 05/13/23 2001     THC, Screen, Urine Negative     Phencyclidine (PCP), Urine Negative     Cocaine Screen, Urine Negative     Methamphetamine, Ur Negative     Opiate Screen Positive     Amphetamine Screen, Urine Negative     Benzodiazepine Screen, Urine Negative     Tricyclic Antidepressants Screen Negative     Methadone Screen, Urine Negative     Barbiturates Screen, Urine Negative     Oxycodone Screen, Urine Negative     Propoxyphene Screen Negative     Buprenorphine, Screen, Urine Negative    Narrative:      Cutoff For Drugs Screened:    Amphetamines               500 ng/ml  Barbiturates               200 ng/ml  Benzodiazepines            150 ng/ml  Cocaine                    150  ng/ml  Methadone                  200 ng/ml  Opiates                    100 ng/ml  Phencyclidine               25 ng/ml  THC                            50 ng/ml  Methamphetamine            500 ng/ml  Tricyclic Antidepressants  300 ng/ml  Oxycodone                  100 ng/ml  Propoxyphene               300 ng/ml  Buprenorphine               10 ng/ml    The normal value for all drugs tested is negative. This report includes unconfirmed screening results, with the cutoff values listed, to be used for medical treatment purposes only.  Unconfirmed results must not be used for non-medical purposes such as employment or legal testing.  Clinical consideration should be applied to any drug of abuse test, particularly when unconfirmed results are used.      Urinalysis, Microscopic Only - Urine, Clean Catch [[id number]]  (Abnormal) Collected: 05/13/23 1935    Specimen: Urine, Clean Catch Updated: 05/13/23 1955     RBC, UA 3-5 /HPF      WBC, UA 0-2 /HPF      Comment: Urine culture not indicated.        Bacteria, UA None Seen /HPF      Squamous Epithelial Cells, UA None Seen /HPF      Hyaline Casts, UA 3-6 /LPF      Methodology Automated Microscopy    Urinalysis With Culture If Indicated - Urine, Clean Catch [[id number]]  (Abnormal) Collected: 05/13/23 1935    Specimen: Urine, Clean Catch Updated: 05/13/23 1955     Color, UA Yellow     Appearance, UA Clear     pH, UA <=5.0     Specific Gravity, UA 1.027     Glucose, UA >=1000 mg/dL (3+)     Ketones, UA Trace     Bilirubin, UA Negative     Blood, UA Negative     Protein, UA >=300 mg/dL (3+)     Leuk Esterase, UA Negative     Nitrite, UA Negative     Urobilinogen, UA 1.0 E.U./dL    Narrative:      In absence of clinical symptoms, the presence of pyuria, bacteria, and/or nitrites on the urinalysis result does not correlate with infection.    POC Glucose Once [[id number]]  (Abnormal) Collected: 05/13/23 1929    Specimen: Blood Updated: 05/13/23 1940     Glucose 189 mg/dL      Comment:  ": [id number] Casper AngelaMeter ID: [id number]       TSH [[id number]]  (Normal) Collected: 05/13/23 1845    Specimen: Blood Updated: 05/13/23 1932     TSH 1.710 uIU/mL     Procalcitonin [[id number]]  (Normal) Collected: 05/13/23 1845    Specimen: Blood Updated: 05/13/23 1931     Procalcitonin 0.06 ng/mL     Narrative:      As a Marker for Sepsis (Non-Neonates):    1. <0.5 ng/mL represents a low risk of severe sepsis and/or septic shock.  2. >2 ng/mL represents a high risk of severe sepsis and/or septic shock.    As a Marker for Lower Respiratory Tract Infections that require antibiotic therapy:    PCT on Admission    Antibiotic Therapy       6-12 Hrs later    >0.5                Strongly Recommended  >0.25 - <0.5        Recommended   0.1 - 0.25          Discouraged              Remeasure/reassess PCT  <0.1                Strongly Discouraged     Remeasure/reassess PCT    As 28 day mortality risk marker: \"Change in Procalcitonin Result\" (>80% or <=80%) if Day 0 (or Day 1) and Day 4 values are available. Refer to http://www.[email or web address]-pct-calculator.com    Change in PCT <=80%  A decrease of PCT levels below or equal to 80% defines a positive change in PCT test result representing a higher risk for 28-day all-cause mortality of patients diagnosed with severe sepsis for septic shock.    Change in PCT >80%  A decrease of PCT levels of more than 80% defines a negative change in PCT result representing a lower risk for 28-day all-cause mortality of patients diagnosed with severe sepsis or septic shock.       T4, Free [[id number]]  (Normal) Collected: 05/13/23 1845    Specimen: Blood Updated: 05/13/23 1930     Free T4 1.06 ng/dL     Narrative:      Results may be falsely increased if patient taking Biotin.      Salicylate Level [[id number]]  (Normal) Collected: 05/13/23 1845    Specimen: Blood Updated: 05/13/23 1930     Salicylate <0.3 mg/dL     C-reactive Protein [[id number]]  (Abnormal) Collected: 05/13/23 1845    Specimen: " Blood Updated: 05/13/23 1929     C-Reactive Protein 9.79 mg/dL     Acetaminophen Level [[id number]]  (Normal) Collected: 05/13/23 1845    Specimen: Blood Updated: 05/13/23 1929     Acetaminophen <5.0 mcg/mL     CK [[id number]]  (Normal) Collected: 05/13/23 1845    Specimen: Blood Updated: 05/13/23 1927     Creatine Kinase 179 U/L     Magnesium [[id number]]  (Normal) Collected: 05/13/23 1845    Specimen: Blood Updated: 05/13/23 1924     Magnesium 2.3 mg/dL     Lipase [[id number]]  (Normal) Collected: 05/13/23 1845    Specimen: Blood Updated: 05/13/23 1922     Lipase 20 U/L     Ethanol [[id number]] Collected: 05/13/23 1845    Specimen: Blood Updated: 05/13/23 1922     Ethanol % <0.010 %     Narrative:      Not for legal purposes. Chain of Custody not followed.     Lactic Acid, Plasma [[id number]]  (Normal) Collected: 05/13/23 1845    Specimen: Blood Updated: 05/13/23 1915     Lactate 0.7 mmol/L     Hemoglobin A1c [[id number]]  (Normal) Collected: 05/13/23 1845    Specimen: Blood Updated: 05/13/23 1914     Hemoglobin A1C 5.30 %     Narrative:      Hemoglobin A1C Ranges:    Increased Risk for Diabetes  5.7% to 6.4%  Diabetes                     >= 6.5%  Diabetic Goal                < 7.0%    Ketone Bodies, Serum (Not performed at [facility]) [[id number]]  (Normal) Collected: 05/13/23 1845    Specimen: Blood Updated: 05/13/23 1909    Narrative:      The following orders were created for panel order Ketone Bodies, Serum (Not performed at [facility]).  Procedure                               Abnormality         Status                     ---------                               -----------         ------                     Acetone[[id number]]                      Normal              Final result                 Please view results for these tests on the individual orders.    Acetone [[id number]]  (Normal) Collected: 05/13/23 1845    Specimen: Blood Updated: 05/13/23 1909     Acetone Negative    Sedimentation Rate [[id number]]  (Abnormal)  Collected: 05/13/23 1845    Specimen: Blood Updated: 05/13/23 1908     Sed Rate 55 mm/hr     CBC & Differential [[id number]]  (Abnormal) Collected: 05/13/23 1845    Specimen: Blood Updated: 05/13/23 1901    Narrative:      The following orders were created for panel order CBC & Differential.  Procedure                               Abnormality         Status                     ---------                               -----------         ------                     CBC Auto Differential[[id number]]        Abnormal            Final result                 Please view results for these tests on the individual orders.    CBC Auto Differential [[id number]]  (Abnormal) Collected: 05/13/23 1845    Specimen: Blood Updated: 05/13/23 1901     WBC 11.10 10*3/mm3      RBC 3.21 10*6/mm3      Hemoglobin 9.1 g/dL      Hematocrit 29.2 %      MCV 91.0 fL      MCH 28.3 pg      MCHC 31.2 g/dL      RDW 14.6 %      RDW-SD 48.5 fl      MPV 9.1 fL      Platelets 270 10*3/mm3      Neutrophil % 81.7 %      Lymphocyte % 10.5 %      Monocyte % 6.7 %      Eosinophil % 0.3 %      Basophil % 0.5 %      Immature Grans % 0.3 %      Neutrophils, Absolute 9.09 10*3/mm3      Lymphocytes, Absolute 1.16 10*3/mm3      Monocytes, Absolute 0.74 10*3/mm3      Eosinophils, Absolute 0.03 10*3/mm3      Basophils, Absolute 0.05 10*3/mm3      Immature Grans, Absolute 0.03 10*3/mm3      nRBC 0.0 /100 WBC     Blood Culture - Blood, Wrist, Right [[id number]] Collected: 05/13/23 1845    Specimen: Blood from Wrist, Right Updated: 05/13/23 1901    Blood Culture - Blood, Arm, Left [[id number]] Collected: 05/13/23 1845    Specimen: Blood from Arm, Left Updated: 05/13/23 1901    Blood Gas, Arterial - [[id number]]  (Abnormal) Collected: 05/13/23 1854    Specimen: Arterial Blood Updated: 05/13/23 1852     Site Right Brachial     Lopez's Test N/A     pH, Arterial 7.328 pH units      Comment: 84 Value below reference range        pCO2, Arterial 37.8 mm Hg      pO2, Arterial 88.4 mm  Hg      HCO3, Arterial 19.8 mmol/L      Comment: 84 Value below reference range        Base Excess, Arterial -5.7 mmol/L      Comment: 84 Value below reference range        O2 Saturation, Arterial 94.7 %      Temperature 37.0 C      Barometric Pressure for Blood Gas 752 mmHg      Modality Room Air     Ventilator Mode NA     Collected by [id number]     Comment: Meter: [id number]     :  [id number]        pCO2, Temperature Corrected 37.8 mm Hg      pH, Temp Corrected 7.328 pH Units      pO2, Temperature Corrected 88.4 mm Hg     POC Glucose Once [[id number]]  (Abnormal) Collected: 05/13/23 1818    Specimen: Blood Updated: 05/13/23 1829     Glucose 40 mg/dL      Comment: : [id number] Evangelista ChannMeter ID: [id number]           Imaging Results (Last 24 Hours)     Procedure Component Value Units Date/Time    XR Foot 3+ View Right [[id number]] Collected: 05/13/23 1947     Updated: 05/13/23 1957    Narrative:      XR FOOT 3+ VW RIGHT- 5/13/2023 7:05 PM CDT     HISTORY: increasing pain, hx diabetic wound     COMPARISON: None      FINDINGS:     Frontal, lateral and oblique radiographs of the right foot were provided  for review.      No acute fracture or malalignment in the forefoot or midfoot. Previous  ankle fracture open reduction internal fixation. Notable soft tissue  swelling around the ankle and there is loosening of both the medial and  lateral sided hardware. There is also an exuberant periostitis  surrounding the distal fibula and tibia, all of which are concerning for  osteomyelitis and septic loosening.       Impression:      1. Previous ankle fracture with open reduction internal fixation.  Radiographs are concerning for osteomyelitis with septic hardware  loosening.  This report was finalized on 05/13/2023 19:54 by Dr Filiberto Rogers, .    XR Chest 1 View [[id number]] Collected: 05/13/23 1945     Updated: 05/13/23 1950    Narrative:      Frontal upright radiograph of the chest 5/13/2023 7:05 PM CDT      HISTORY: Altered metal status     COMPARISON: None.     FINDINGS:   The lungs are clear. The cardiomediastinal silhouette and pulmonary  vascularity are within normal limits.      Right shoulder rotator cuff arthropathy. No acute bony abnormality.       Impression:      1. No radiographic evidence of acute cardiopulmonary process.        This report was finalized on 05/13/2023 19:47 by Dr Filiberto Rogers, .    CT Head Without Contrast [[id number]] Collected: 05/13/23 1934     Updated: 05/13/23 1941    Narrative:      CT HEAD WO CONTRAST- 5/13/2023 7:02 PM CDT     HISTORY: sudden AMS at 1600 yesterday, headache       DOSE LENGTH PRODUCT: 679 mGy cm. Automated exposure control was also  utilized to decrease patient radiation dose.     Technique:   Axial CT of the brain without IV contrast. Sagittal and coronal  reformations are also provided for review. Soft tissue and bone kernels  are available for interpretation.     Comparison: None.     Findings:      There is no evidence of acute large vascular territory infarct. No  intra-axial or extra-axial hemorrhage. No visualized mass lesion or mass  effect. The ventricles, cortical sulci and basal cisterns are symmetric  and age appropriate.  Posterior fossa structures are unremarkable. The  scalp and calvarium are intact. Chronic paranasal sinus disease.       Impression:      Impression:    1. No acute intracranial process.  This report was finalized on 05/13/2023 19:38 by Dr Filiberto Rogers, .        I have personally reviewed and interpreted the radiology studies and ECG obtained at time of admission.     Assessment / Plan   Assessment:   Active Hospital Problems    Diagnosis    • **Osteomyelitis of right foot      Impression:  1.  Osteomyelitis of the right foot/ankle with septic hardware  2.  Elevated inflammatory markers   3.  Hypoglycemia in a noninsulin-dependent diabetic  4.  Right lower extremity pain  5.  Anemia    Treatment Plan  1.  Admit to the hospital  2.   Consult podiatry, Dr. Reddy  3.  Sliding scale insulin with Accu-Cheks   4.  Hold oral diabetic medications  5.  Pain control  6.  Vancomycin was started in the emergency department, will continue   7.  Follow-up labs in the morning      The patient will be admitted to my service here at [facility].  Primary team to take over in the morning    Medical Decision Making  Number and Complexity of problems: 4, complex  Differential Diagnosis: Altered mental status    Conditions and Status        Condition is improving.     [facility] Data  External documents reviewed: None  Cardiac tracing (EKG, telemetry) interpretation: None  Radiology interpretation: None  Labs reviewed: Reviewed  Any tests that were considered but not ordered: None     Decision rules/scores evaluated (example [id number], Wells, etc): None     Discussed with: Patient, wife, and son at bedside     Care Planning  Shared decision making: As above and ED staff  Code status and discussions: Full    Disposition  Social Determinants of Health that impact treatment or disposition: None  Estimated length of stay is 2 to 3 days.     I confirmed that the patient's advanced care plan is present, code status is documented, and a surrogate decision maker is listed in the patient's medical record.     The patient's surrogate decision maker is family.     The patient was seen and examined by me on 5/14/2023 at seen after midnight.    Electronically signed by Colin Patrick DO, 05/14/23, 00:41 CDT.

## 2023-05-15 VITALS
OXYGEN SATURATION: 95 % | HEIGHT: 71 IN | DIASTOLIC BLOOD PRESSURE: 72 MMHG | TEMPERATURE: 97.8 F | HEART RATE: 72 BPM | BODY MASS INDEX: 26.54 KG/M2 | SYSTOLIC BLOOD PRESSURE: 133 MMHG | WEIGHT: 189.6 LBS | RESPIRATION RATE: 18 BRPM

## 2023-05-15 PROBLEM — I10 HTN (HYPERTENSION): Status: ACTIVE | Noted: 2023-05-15

## 2023-05-15 PROBLEM — E16.2 HYPOGLYCEMIA: Status: ACTIVE | Noted: 2023-05-15

## 2023-05-15 PROBLEM — Z96.9 RETAINED ORTHOPEDIC HARDWARE: Status: ACTIVE | Noted: 2023-05-15

## 2023-05-15 PROBLEM — E11.9 DM2 (DIABETES MELLITUS, TYPE 2): Status: ACTIVE | Noted: 2023-05-15

## 2023-05-15 LAB
GLUCOSE BLDC GLUCOMTR-MCNC: 133 MG/DL (ref 70–130)
GLUCOSE BLDC GLUCOMTR-MCNC: 188 MG/DL (ref 70–130)
GLUCOSE BLDC GLUCOMTR-MCNC: 226 MG/DL (ref 70–130)
VANCOMYCIN TROUGH SERPL-MCNC: 17.7 MCG/ML (ref 5–20)

## 2023-05-15 PROCEDURE — 63710000001 INSULIN LISPRO (HUMAN) PER 5 UNITS: Performed by: INTERNAL MEDICINE

## 2023-05-15 PROCEDURE — 25010000002 VANCOMYCIN 1 G RECONSTITUTED SOLUTION 1 EACH VIAL: Performed by: INTERNAL MEDICINE

## 2023-05-15 PROCEDURE — 82948 REAGENT STRIP/BLOOD GLUCOSE: CPT

## 2023-05-15 PROCEDURE — 80202 ASSAY OF VANCOMYCIN: CPT | Performed by: INTERNAL MEDICINE

## 2023-05-15 RX ORDER — LISINOPRIL 5 MG/1
5 TABLET ORAL
Status: DISCONTINUED | OUTPATIENT
Start: 2023-05-15 | End: 2023-05-15 | Stop reason: HOSPADM

## 2023-05-15 RX ORDER — LISINOPRIL 5 MG/1
5 TABLET ORAL
Qty: 30 TABLET | Refills: 0 | Status: SHIPPED | OUTPATIENT
Start: 2023-05-15

## 2023-05-15 RX ADMIN — HYDROCODONE BITARTRATE AND ACETAMINOPHEN 1 TABLET: 10; 325 TABLET ORAL at 11:02

## 2023-05-15 RX ADMIN — INSULIN LISPRO 3 UNITS: 100 INJECTION, SOLUTION INTRAVENOUS; SUBCUTANEOUS at 12:25

## 2023-05-15 RX ADMIN — LISINOPRIL 5 MG: 5 TABLET ORAL at 19:48

## 2023-05-15 RX ADMIN — PREGABALIN 50 MG: 50 CAPSULE ORAL at 16:50

## 2023-05-15 RX ADMIN — Medication 10 ML: at 08:52

## 2023-05-15 RX ADMIN — HYDROCODONE BITARTRATE AND ACETAMINOPHEN 1 TABLET: 10; 325 TABLET ORAL at 16:49

## 2023-05-15 RX ADMIN — VANCOMYCIN HYDROCHLORIDE 1000 MG: 1 INJECTION, POWDER, LYOPHILIZED, FOR SOLUTION INTRAVENOUS at 12:24

## 2023-05-15 RX ADMIN — PREGABALIN 50 MG: 50 CAPSULE ORAL at 08:52

## 2023-05-15 RX ADMIN — HYDROCODONE BITARTRATE AND ACETAMINOPHEN 1 TABLET: 10; 325 TABLET ORAL at 05:17

## 2023-05-15 RX ADMIN — VANCOMYCIN HYDROCHLORIDE 1000 MG: 1 INJECTION, POWDER, LYOPHILIZED, FOR SOLUTION INTRAVENOUS at 00:37

## 2023-05-15 RX ADMIN — INSULIN LISPRO 2 UNITS: 100 INJECTION, SOLUTION INTRAVENOUS; SUBCUTANEOUS at 08:52

## 2023-05-15 NOTE — PAYOR COMM NOTE
"Garrett Coates (58 y.o. Male) [id number]  Admit  5/13  [facility] phone    Fax        Date of Birth   1965    Social Security Number       Address   [address] [place] [address]    Home Phone   [phone]    MRN   [id number]       Spiritism   [employer]    Marital Status                               Admission Date   5/13/23    Admission Type   Emergency    Admitting Provider   Moy Trujillo DO    Attending Provider   Moy Trujillo DO    Department, Room/Bed   [facility] 3A, 338/1       Discharge Date       Discharge Disposition       Discharge Destination                               Attending Provider: Moy Trujillo DO    Allergies: No Known Allergies    Isolation: None   Infection: None   Code Status: CPR    Ht: 180.3 cm (71\")   Wt: 86 kg (189 lb 9.6 oz)    Admission Cmt: None   Principal Problem: Osteomyelitis of right foot [M86.9]                 Active Insurance as of 5/13/2023     Primary Coverage     Payor Plan Insurance Group Employer/Plan Group    [email or web address] [id number] [id number]     Payor Plan Address Payor Plan Phone Number Payor Plan Fax Number Effective Dates    [address] [phone]  5/1/2022 - None Entered    [place] [id number]       Subscriber Name Subscriber Birth Date Member ID       GARRETT COATES 1965 [id number]                 Emergency Contacts      (Rel.) Home Phone Work Phone Mobile Phone    Cole,tammy (Spouse) -- -- [phone]               History & Physical      Colin Patrick DO at 05/14/23 0041              [facility] Medicine Services  HISTORY AND PHYSICAL    Date of Admission: 5/13/2023  Primary Care Physician: Provider, No Known    Subjective   Primary Historian: Patient    Chief Complaint: Confusion    History of Present Illness  58-year-old male who became confused yesterday around 4 PM.  His " "family thought that he was \"high.\"  From his pain medication.  Patient states that he was not, that he was taking it the way he was supposed to.  He ate a little bit and the family notes that he was better.  When he got back home he took his diabetic medication again, and became confused again.  It was later discovered that the patient was becoming hypoglycemic.  On arrival to the emergency department, the patient notes that his glucose was 40.  The patient also has been having some difficulty with his right lower extremity.  It appears that he has osteomyelitis with septic hardware.  He has seen Dr. Reddy, and has surgery scheduled next month, but after reviewing the x-ray, I am very concerned that the patient may need antibiotics and surgical procedure sooner than that.  The patient does complain of ankle pain and deformity.  He ambulates with an assist device and a brace.  He has no chest pain, no shortness of breath.  He has no acute bowel or kidney dysfunction.        Review of Systems   Otherwise complete ROS reviewed and negative except as mentioned in the HPI.    Past Medical History:   • Diabetes mellitus (HCC)   • Hypertension     Past Surgical History:  • Cardiac surgery   hole in heart as a child   • Back surgery   x2   • Fracture surgery   left arm   • Rotator cuff repair Right   x2    Right ankle surgery    Social History:  reports that he has never smoked. He has never used smokeless tobacco. He reports that he does not drink alcohol and does not use drugs.    Family History: None    Allergies:  No Known Allergies    Medications:  canagliflozin (INVOKANA) 100 MG TABS tablet   Take 100 mg by mouth every morning (before breakfast)   0       lisinopril (PRINIVIL;ZESTRIL) 10 MG tablet   Take 10 mg by mouth daily   0       glimepiride (AMARYL) 2 MG tablet   Take 2 mg by mouth every morning (before breakfast)   0       pregabalin (LYRICA) 75 MG capsule   Take 1 capsule by mouth 2 times daily for 14 days 28 " "capsule   0 08/02/2016     pregabalin (LYRICA) 75 MG capsule   Take 1 capsule by mouth 2 times daily 60 capsule   3 08/02/2016     HYDROcodone-acetaminophen (NORCO) 7.5-325 MG per tablet   Take 1 tablet by mouth every 6 hours as needed for Pain 120 tablet   0 08/02/2016     C[employer] CREA   Apply 1-2 pumps to affected area 3-4 times per day 360 g   3 08/02/2016     cyclobenzaprine (FLEXERIL) 10 MG tablet   Take 1 tablet by mouth every 8 hours as needed for Muscle spasms 90 tablet   0 08/02/2016 08/12/2016       Prior to Admission medications    Not on File     I have utilized all available immediate resources to obtain, update, or review the patient's current medications (including all prescriptions, over-the-counter products, herbals, cannabis/cannabidiol products, and vitamin/mineral/dietary (nutritional) supplements).    Objective     Vital Signs: /70   Pulse 75   Temp 97.4 °F (36.3 °C) (Temporal)   Resp 18   Ht 182.9 cm (72\")   Wt 90.7 kg (200 lb)   SpO2 92%   BMI 27.12 kg/m²   Physical Exam  Vitals reviewed.   Constitutional:       Appearance: Normal appearance.   HENT:      Head: Normocephalic and atraumatic.      Right Ear: External ear normal.      Left Ear: External ear normal.      Nose: Nose normal.      Mouth/Throat:      Mouth: Mucous membranes are moist.      Pharynx: No oropharyngeal exudate.   Eyes:      General: No scleral icterus.     Conjunctiva/sclera: Conjunctivae normal.   Cardiovascular:      Rate and Rhythm: Normal rate and regular rhythm.      Heart sounds: Normal heart sounds.   Pulmonary:      Effort: Pulmonary effort is normal.      Breath sounds: Normal breath sounds.   Abdominal:      General: Bowel sounds are normal.      Palpations: Abdomen is soft.   Musculoskeletal:         General: Swelling present. No tenderness.      Cervical back: Normal range of motion and neck supple.      Right lower leg: Edema present.      Comments: Right lower extremity ankle deformity "   Skin:     General: Skin is warm and dry.   Neurological:      Mental Status: He is alert and oriented to person, place, and time.      Cranial Nerves: No cranial nerve deficit.   Psychiatric:         Mood and Affect: Mood normal.         Behavior: Behavior normal.        Results Reviewed:  Lab Results (last 24 hours)     Procedure Component Value Units Date/Time    POC Glucose Once [[id number]]  (Abnormal) Collected: 05/13/23 2248    Specimen: Blood Updated: 05/13/23 2259     Glucose 184 mg/dL      Comment: : elia Moore HunterMeter ID: [id number]       POC Glucose Once [[id number]]  (Abnormal) Collected: 05/13/23 2128    Specimen: Blood Updated: 05/13/23 2139     Glucose 182 mg/dL      Comment: : elia Moore HunterMeter ID: [id number]       POC Glucose Once [[id number]]  (Abnormal) Collected: 05/13/23 2026    Specimen: Blood Updated: 05/13/23 2037     Glucose 151 mg/dL      Comment: : elia Moore HunterMeter ID: [id number]       COVID-19, FLU A/B, RSV PCR - Swab, Nasopharynx [[id number]]  (Normal) Collected: 05/13/23 1927    Specimen: Swab from Nasopharynx Updated: 05/13/23 2017     COVID19 Not Detected     Influenza A PCR Not Detected     Influenza B PCR Not Detected     RSV, PCR Not Detected    Narrative:      Fact sheet for providers: https://www.fda.gov/media/500521/download    Fact sheet for patients: https://www.fda.gov/media/320703/download    Test performed by PCR.    Fentanyl, Urine - Urine, Clean Catch [[id number]]  (Normal) Collected: 05/13/23 1933    Specimen: Urine, Clean Catch Updated: 05/13/23 2008     Fentanyl, Urine Negative    Narrative:      Negative Threshold:      Fentanyl 5 ng/mL     The normal value for the drug tested is negative. This report includes final unconfirmed screening results to be used for medical treatment purposes only. Unconfirmed results must not be used for non-medical purposes such as employment or legal testing. Clinical consideration  should be applied to any drug of abuse test, particularly when unconfirmed results are used.           Comprehensive Metabolic Panel [[id number]]  (Abnormal) Collected: 05/13/23 1845    Specimen: Blood Updated: 05/13/23 2007     Glucose 37 mg/dL      BUN 39 mg/dL      Creatinine 1.12 mg/dL      Sodium 142 mmol/L      Potassium 4.5 mmol/L      Chloride 107 mmol/L      CO2 21.0 mmol/L      Calcium 9.4 mg/dL      Total Protein 7.8 g/dL      Albumin 4.2 g/dL      ALT (SGPT) 19 U/L      AST (SGOT) 19 U/L      Alkaline Phosphatase 182 U/L      Total Bilirubin 0.4 mg/dL      Globulin 3.6 gm/dL      A/G Ratio 1.2 g/dL      BUN/Creatinine Ratio 34.8     Anion Gap 14.0 mmol/L      eGFR 76.6 mL/min/1.73     Narrative:      GFR Normal >60  Chronic Kidney Disease <60  Kidney Failure <15      Urine Drug Screen - Urine, Clean Catch [[id number]]  (Abnormal) Collected: 05/13/23 1933    Specimen: Urine, Clean Catch Updated: 05/13/23 2001     THC, Screen, Urine Negative     Phencyclidine (PCP), Urine Negative     Cocaine Screen, Urine Negative     Methamphetamine, Ur Negative     Opiate Screen Positive     Amphetamine Screen, Urine Negative     Benzodiazepine Screen, Urine Negative     Tricyclic Antidepressants Screen Negative     Methadone Screen, Urine Negative     Barbiturates Screen, Urine Negative     Oxycodone Screen, Urine Negative     Propoxyphene Screen Negative     Buprenorphine, Screen, Urine Negative    Narrative:      Cutoff For Drugs Screened:    Amphetamines               500 ng/ml  Barbiturates               200 ng/ml  Benzodiazepines            150 ng/ml  Cocaine                    150 ng/ml  Methadone                  200 ng/ml  Opiates                    100 ng/ml  Phencyclidine               25 ng/ml  THC                            50 ng/ml  Methamphetamine            500 ng/ml  Tricyclic Antidepressants  300 ng/ml  Oxycodone                  100 ng/ml  Propoxyphene               300 ng/ml  Buprenorphine                10 ng/ml    The normal value for all drugs tested is negative. This report includes unconfirmed screening results, with the cutoff values listed, to be used for medical treatment purposes only.  Unconfirmed results must not be used for non-medical purposes such as employment or legal testing.  Clinical consideration should be applied to any drug of abuse test, particularly when unconfirmed results are used.      Urinalysis, Microscopic Only - Urine, Clean Catch [[id number]]  (Abnormal) Collected: 05/13/23 1935    Specimen: Urine, Clean Catch Updated: 05/13/23 1955     RBC, UA 3-5 /HPF      WBC, UA 0-2 /HPF      Comment: Urine culture not indicated.        Bacteria, UA None Seen /HPF      Squamous Epithelial Cells, UA None Seen /HPF      Hyaline Casts, UA 3-6 /LPF      Methodology Automated Microscopy    Urinalysis With Culture If Indicated - Urine, Clean Catch [[id number]]  (Abnormal) Collected: 05/13/23 1935    Specimen: Urine, Clean Catch Updated: 05/13/23 1955     Color, UA Yellow     Appearance, UA Clear     pH, UA <=5.0     Specific Gravity, UA 1.027     Glucose, UA >=1000 mg/dL (3+)     Ketones, UA Trace     Bilirubin, UA Negative     Blood, UA Negative     Protein, UA >=300 mg/dL (3+)     Leuk Esterase, UA Negative     Nitrite, UA Negative     Urobilinogen, UA 1.0 E.U./dL    Narrative:      In absence of clinical symptoms, the presence of pyuria, bacteria, and/or nitrites on the urinalysis result does not correlate with infection.    POC Glucose Once [[id number]]  (Abnormal) Collected: 05/13/23 1929    Specimen: Blood Updated: 05/13/23 1940     Glucose 189 mg/dL      Comment: : [id number] Casper KiddaMeter ID: [id number]       TSH [[id number]]  (Normal) Collected: 05/13/23 1845    Specimen: Blood Updated: 05/13/23 1932     TSH 1.710 uIU/mL     Procalcitonin [[id number]]  (Normal) Collected: 05/13/23 1845    Specimen: Blood Updated: 05/13/23 1931     Procalcitonin 0.06 ng/mL     Narrative:      As a Marker  "for Sepsis (Non-Neonates):    1. <0.5 ng/mL represents a low risk of severe sepsis and/or septic shock.  2. >2 ng/mL represents a high risk of severe sepsis and/or septic shock.    As a Marker for Lower Respiratory Tract Infections that require antibiotic therapy:    PCT on Admission    Antibiotic Therapy       6-12 Hrs later    >0.5                Strongly Recommended  >0.25 - <0.5        Recommended   0.1 - 0.25          Discouraged              Remeasure/reassess PCT  <0.1                Strongly Discouraged     Remeasure/reassess PCT    As 28 day mortality risk marker: \"Change in Procalcitonin Result\" (>80% or <=80%) if Day 0 (or Day 1) and Day 4 values are available. Refer to http://www.[facility] [place]cal[place]    Change in PCT <=80%  A decrease of PCT levels below or equal to 80% defines a positive change in PCT test result representing a higher risk for 28-day all-cause mortality of patients diagnosed with severe sepsis for septic shock.    Change in PCT >80%  A decrease of PCT levels of more than 80% defines a negative change in PCT result representing a lower risk for 28-day all-cause mortality of patients diagnosed with severe sepsis or septic shock.       T4, Free [[id number]]  (Normal) Collected: 05/13/23 1845    Specimen: Blood Updated: 05/13/23 1930     Free T4 1.06 ng/dL     Narrative:      Results may be falsely increased if patient taking Biotin.      Salicylate Level [[id number]]  (Normal) Collected: 05/13/23 1845    Specimen: Blood Updated: 05/13/23 1930     Salicylate <0.3 mg/dL     C-reactive Protein [[id number]]  (Abnormal) Collected: 05/13/23 1845    Specimen: Blood Updated: 05/13/23 1929     C-Reactive Protein 9.79 mg/dL     Acetaminophen Level [[id number]]  (Normal) Collected: 05/13/23 1845    Specimen: Blood Updated: 05/13/23 1929     Acetaminophen <5.0 mcg/mL     CK [[id number]]  (Normal) Collected: 05/13/23 1845    Specimen: Blood Updated: 05/13/23 1927     Creatine Kinase 179 U/L     " Magnesium [[id number]]  (Normal) Collected: 05/13/23 1845    Specimen: Blood Updated: 05/13/23 1924     Magnesium 2.3 mg/dL     Lipase [[id number]]  (Normal) Collected: 05/13/23 1845    Specimen: Blood Updated: 05/13/23 1922     Lipase 20 U/L     Ethanol [[id number]] Collected: 05/13/23 1845    Specimen: Blood Updated: 05/13/23 1922     Ethanol % <0.010 %     Narrative:      Not for legal purposes. Chain of Custody not followed.     Lactic Acid, Plasma [[id number]]  (Normal) Collected: 05/13/23 1845    Specimen: Blood Updated: 05/13/23 1915     Lactate 0.7 mmol/L     Hemoglobin A1c [[id number]]  (Normal) Collected: 05/13/23 1845    Specimen: Blood Updated: 05/13/23 1914     Hemoglobin A1C 5.30 %     Narrative:      Hemoglobin A1C Ranges:    Increased Risk for Diabetes  5.7% to 6.4%  Diabetes                     >= 6.5%  Diabetic Goal                < 7.0%    Ketone Bodies, Serum (Not performed at [place]) [[id number]]  (Normal) Collected: 05/13/23 1845    Specimen: Blood Updated: 05/13/23 1909    Narrative:      The following orders were created for panel order Ketone Bodies, Serum (Not performed at [place]).  Procedure                               Abnormality         Status                     ---------                               -----------         ------                     Acetone[[id number]]                      Normal              Final result                 Please view results for these tests on the individual orders.    Acetone [[id number]]  (Normal) Collected: 05/13/23 1845    Specimen: Blood Updated: 05/13/23 1909     Acetone Negative    Sedimentation Rate [[id number]]  (Abnormal) Collected: 05/13/23 1845    Specimen: Blood Updated: 05/13/23 1908     Sed Rate 55 mm/hr     CBC & Differential [[id number]]  (Abnormal) Collected: 05/13/23 1845    Specimen: Blood Updated: 05/13/23 1901    Narrative:      The following orders were created for panel order CBC & Differential.  Procedure                                Abnormality         Status                     ---------                               -----------         ------                     CBC Auto Differential[[id number]]        Abnormal            Final result                 Please view results for these tests on the individual orders.    CBC Auto Differential [[id number]]  (Abnormal) Collected: 05/13/23 1845    Specimen: Blood Updated: 05/13/23 1901     WBC 11.10 10*3/mm3      RBC 3.21 10*6/mm3      Hemoglobin 9.1 g/dL      Hematocrit 29.2 %      MCV 91.0 fL      MCH 28.3 pg      MCHC 31.2 g/dL      RDW 14.6 %      RDW-SD 48.5 fl      MPV 9.1 fL      Platelets 270 10*3/mm3      Neutrophil % 81.7 %      Lymphocyte % 10.5 %      Monocyte % 6.7 %      Eosinophil % 0.3 %      Basophil % 0.5 %      Immature Grans % 0.3 %      Neutrophils, Absolute 9.09 10*3/mm3      Lymphocytes, Absolute 1.16 10*3/mm3      Monocytes, Absolute 0.74 10*3/mm3      Eosinophils, Absolute 0.03 10*3/mm3      Basophils, Absolute 0.05 10*3/mm3      Immature Grans, Absolute 0.03 10*3/mm3      nRBC 0.0 /100 WBC     Blood Culture - Blood, Wrist, Right [[id number]] Collected: 05/13/23 1845    Specimen: Blood from Wrist, Right Updated: 05/13/23 1901    Blood Culture - Blood, Arm, Left [[id number]] Collected: 05/13/23 1845    Specimen: Blood from Arm, Left Updated: 05/13/23 1901    Blood Gas, Arterial - [[id number]]  (Abnormal) Collected: 05/13/23 1854    Specimen: Arterial Blood Updated: 05/13/23 1852     Site Right Brachial     Lopez's Test N/A     pH, Arterial 7.328 pH units      Comment: 84 Value below reference range        pCO2, Arterial 37.8 mm Hg      pO2, Arterial 88.4 mm Hg      HCO3, Arterial 19.8 mmol/L      Comment: 84 Value below reference range        Base Excess, Arterial -5.7 mmol/L      Comment: 84 Value below reference range        O2 Saturation, Arterial 94.7 %      Temperature 37.0 C      Barometric Pressure for Blood Gas 752 mmHg      Modality Room Air     Ventilator Mode NA      Collected by [id number]     Comment: Meter: [id number]     :  [id number]        pCO2, Temperature Corrected 37.8 mm Hg      pH, Temp Corrected 7.328 pH Units      pO2, Temperature Corrected 88.4 mm Hg     POC Glucose Once [[id number]]  (Abnormal) Collected: 05/13/23 1818    Specimen: Blood Updated: 05/13/23 1829     Glucose 40 mg/dL      Comment: : [id number] Evangelista ChannMeter ID: [id number]           Imaging Results (Last 24 Hours)     Procedure Component Value Units Date/Time    XR Foot 3+ View Right [[id number]] Collected: 05/13/23 1947     Updated: 05/13/23 1957    Narrative:      XR FOOT 3+ VW RIGHT- 5/13/2023 7:05 PM CDT     HISTORY: increasing pain, hx diabetic wound     COMPARISON: None      FINDINGS:     Frontal, lateral and oblique radiographs of the right foot were provided  for review.      No acute fracture or malalignment in the forefoot or midfoot. Previous  ankle fracture open reduction internal fixation. Notable soft tissue  swelling around the ankle and there is loosening of both the medial and  lateral sided hardware. There is also an exuberant periostitis  surrounding the distal fibula and tibia, all of which are concerning for  osteomyelitis and septic loosening.       Impression:      1. Previous ankle fracture with open reduction internal fixation.  Radiographs are concerning for osteomyelitis with septic hardware  loosening.  This report was finalized on 05/13/2023 19:54 by Dr Filiberto Rogers, .    XR Chest 1 View [[id number]] Collected: 05/13/23 1945     Updated: 05/13/23 1950    Narrative:      Frontal upright radiograph of the chest 5/13/2023 7:05 PM CDT     HISTORY: Altered metal status     COMPARISON: None.     FINDINGS:   The lungs are clear. The cardiomediastinal silhouette and pulmonary  vascularity are within normal limits.      Right shoulder rotator cuff arthropathy. No acute bony abnormality.       Impression:      1. No radiographic evidence of acute cardiopulmonary  process.        This report was finalized on 05/13/2023 19:47 by Dr Filiberto Rogers, .    CT Head Without Contrast [[id number]] Collected: 05/13/23 1934     Updated: 05/13/23 1941    Narrative:      CT HEAD WO CONTRAST- 5/13/2023 7:02 PM CDT     HISTORY: sudden AMS at 1600 yesterday, headache       DOSE LENGTH PRODUCT: 679 mGy cm. Automated exposure control was also  utilized to decrease patient radiation dose.     Technique:   Axial CT of the brain without IV contrast. Sagittal and coronal  reformations are also provided for review. Soft tissue and bone kernels  are available for interpretation.     Comparison: None.     Findings:      There is no evidence of acute large vascular territory infarct. No  intra-axial or extra-axial hemorrhage. No visualized mass lesion or mass  effect. The ventricles, cortical sulci and basal cisterns are symmetric  and age appropriate.  Posterior fossa structures are unremarkable. The  scalp and calvarium are intact. Chronic paranasal sinus disease.       Impression:      Impression:    1. No acute intracranial process.  This report was finalized on 05/13/2023 19:38 by Dr Filiberto Rogers, .        I have personally reviewed and interpreted the radiology studies and ECG obtained at time of admission.     Assessment / Plan   Assessment:   Active Hospital Problems    Diagnosis    • **Osteomyelitis of right foot      Impression:  1.  Osteomyelitis of the right foot/ankle with septic hardware  2.  Elevated inflammatory markers   3.  Hypoglycemia in a noninsulin-dependent diabetic  4.  Right lower extremity pain  5.  Anemia    Treatment Plan  1.  Admit to the hospital  2.  Consult podiatry, Dr. Reddy  3.  Sliding scale insulin with Accu-Cheks   4.  Hold oral diabetic medications  5.  Pain control  6.  Vancomycin was started in the emergency department, will continue   7.  Follow-up labs in the morning      The patient will be admitted to my service here at [facility].  Primary team  to take over in the morning    Medical Decision Making  Number and Complexity of problems: 4, complex  Differential Diagnosis: Altered mental status    Conditions and Status        Condition is improving.     [facility] Data  External documents reviewed: None  Cardiac tracing (EKG, telemetry) interpretation: None  Radiology interpretation: None  Labs reviewed: Reviewed  Any tests that were considered but not ordered: None     Decision rules/scores evaluated (example [id number], Wells, etc): None     Discussed with: Patient, wife, and son at bedside     Care Planning  Shared decision making: As above and ED staff  Code status and discussions: Full    Disposition  Social Determinants of Health that impact treatment or disposition: None  Estimated length of stay is 2 to 3 days.     I confirmed that the patient's advanced care plan is present, code status is documented, and a surrogate decision maker is listed in the patient's medical record.     The patient's surrogate decision maker is family.     The patient was seen and examined by me on 5/14/2023 at seen after midnight.    Electronically signed by Colin Patrick DO, 05/14/23, 00:41 CDT.                Electronically signed by Colin Patrick DO at 05/14/23 0048          Emergency Department Notes      Donn Lui PA-C at 05/13/23 1832          Subjective   History of Present Illness    Patient is a 58-year-old male presenting to ED with altered mental status.  PMH significant for non-insulin-dependent diabetes. Wife at bedside to provide additional history.  Wife states that at 4 PM yesterday patient had a sudden change in his mentation where he seemed very confused, zoning out, and had tremors.  Wife states that patient's children thought he may have accidentally overdosed on his Lortab as he has been taking more due to pain and discomfort with a right foot wound for which he is supposed to have surgery once insurance improves for a wound cleanout and  "likely amputation due to poorly controlled diabetes.  Wife states however that today she went and counted patient's Lortabs and noted that the appropriate amount of tablets was missing and she had low concern for an overdose but describes that all day today patient has been like a \"zombie just staring out\" acting as though \"he is still a little bit high.\"  Wife states that patient has also been complaining of a sudden headache for which she became concerned he might have a septic foot.  Wife notes that they have not checked patient's blood sugar all day today and she does not believe he is taking any other medications intentionally or on purpose at which time they present for further evaluation.  Patient is a poor historian as he states that his head hurts however he is unwilling to answer other questions.    Wife did state that patient sustained an ankle fracture due to [employer]'s [employer]. for which she was initially following with podiatrist Dr. Morrison.  Wife states that they were advised \"there was nothing left they could do and he just had to tolerate the pain until the leg developed complications so we went to get a second opinion.\"  Wife states that they have since been following with Dr. Reddy through the orthopedic [facility] who reports that he is scheduled to have hardware removal on 6/13/2023.  Wife states she is concerned because patient has been having increased pain to the foot and they are not sure if he will be able to wait until 6/13/2023 for intervention.    Records reviewed show patient last seen in the ED on 11/28/2014.  Patient with no outpatient visits since pain management on 8/2/2016 for lumbar postlaminectomy syndrome, lumbar radiculopathy, sacroiliac joint guided joint dysfunction, trochanteric bursitis, peripheral neuropathy pain.    Review of Systems   Constitutional: Negative.  Negative for fever.   HENT: Negative.    Eyes: Negative.    Respiratory: Negative.    Cardiovascular: Negative.  "   Gastrointestinal: Negative.  Negative for nausea and vomiting.   Genitourinary: Negative.    Musculoskeletal: Positive for arthralgias (Right foot) and joint swelling (Right foot).   Skin: Positive for wound (Right foot).   Allergic/Immunologic: Positive for immunocompromised state (DM).   Neurological: Negative.         Denies head injury   Psychiatric/Behavioral: Positive for confusion.   All other systems reviewed and are negative.      History reviewed. No pertinent past medical history.    No Known Allergies    History reviewed. No pertinent surgical history.    History reviewed. No pertinent family history.    Social History     Socioeconomic History   • Marital status:    Tobacco Use   • Smoking status: Never   • Smokeless tobacco: Never   Vaping Use   • Vaping Use: Never used   Substance and Sexual Activity   • Alcohol use: Never   • Drug use: Never   • Sexual activity: Defer           Objective   Physical Exam  Vitals and nursing note reviewed.   Constitutional:       General: He is in acute distress.      Appearance: Normal appearance. He is well-developed and well-groomed. He is not ill-appearing, toxic-appearing or diaphoretic.   HENT:      Head: Normocephalic and atraumatic.      Mouth/Throat:      Mouth: Mucous membranes are moist.      Pharynx: Oropharynx is clear.   Eyes:      Extraocular Movements: Extraocular movements intact.      Conjunctiva/sclera: Conjunctivae normal.      Pupils: Pupils are equal, round, and reactive to light.   Cardiovascular:      Rate and Rhythm: Regular rhythm. Tachycardia present.   Pulmonary:      Effort: Pulmonary effort is normal.      Breath sounds: Normal breath sounds.   Abdominal:      General: Bowel sounds are normal.      Palpations: Abdomen is soft.   Musculoskeletal:         General: Tenderness present.      Cervical back: Neck supple.      Comments: Orthopedic brace in place to the right lower extremity.  Left foot with very superficial abrasion on  the top aspect however no evidence of overlying cellulitis or other acute injury/abnormalities.   Skin:     General: Skin is warm and dry.      Findings: Wound (Right foot) present.   Neurological:      General: No focal deficit present.      Mental Status: He is alert and oriented to person, place, and time.   Psychiatric:         Mood and Affect: Mood normal.         Behavior: Behavior normal. Behavior is cooperative.         Procedures          ED Course                                           Medical Decision Making  Hypoglycemia: acute illness or injury  Osteomyelitis of right ankle, unspecified type: acute illness or injury  Amount and/or Complexity of Data Reviewed  Independent Historian: spouse     Details: Wife  External Data Reviewed: labs, radiology and notes.  Labs: ordered. Decision-making details documented in ED Course.  Radiology: ordered. Decision-making details documented in ED Course.  ECG/medicine tests: ordered. Decision-making details documented in ED Course.  Discussion of management or test interpretation with external provider(s): Dr. Casper Nogueira (attending)  Dr. Chase (hospitalist)    Risk  Prescription drug management.  Decision regarding hospitalization.            Patient is a 58-year-old male presenting to ED with altered mental status.  PMH significant for non-insulin-dependent diabetes.  Upon arrival to ED patient noted to have a POC BGL of 40 for which she was given an amp of D50 and subsequently was able to tolerate p.o. fluids and food and maintain his blood sugars in the 180s.  Lab work otherwise revealed Leukocytosis 11.1, H&H 9.1/29.2 with normal platelets and no further CBC abnormalities.  CMP with alk phos 182, BUN 39.  No further electrolyte disturbances, normal renal and hepatic function otherwise.  Thyroid hormones WNL.  Inflammatory markers elevated with CRP 9.79, sed rate 55 however normal lactic acid as well as normal procalcitonin.  ABG revealed acidotic pH 7.328  with HCO3 of 19.8 and no further acute abnormalities.  CK WNL at 179.  Salicylate and acetaminophen is negative.  Alcohol negative.  UDS positive for opiates consistent with patient's medication history and otherwise unremarkable.  Urinalysis with greater than thousand glucosurea as well as greater than 300 proteinuria but no evidence of infection.  COVID, influenza, RSV testing negative.  Further low concern for DKA with negative ketones.  Hemoglobin A1c 5.3.  Head CT without contrast showed: No acute intracranial process.  Chest x-ray showed: No radiographic evidence of acute cardiopulmonary process.  Right foot x-ray showed: Previous ankle fracture with open reduction internal fixation, radiographs concerning for osteomyelitis with septic hardware loosening.  Patient was started on IV vancomycin.  Throughout evaluation patient remained ANO x3 and hemodynamically stable.  Discussed with patient need for admission for further evaluation and treatment of his osteomyelitis as both wife and patient states he has not been on any antibiotics.  Discussed ability to trend blood sugars during admission for which they are amenable with no further questions, concerns, or needs.  Case discussed with Dr. Chase, hospitalist, who will kindly accept patient for admission under her services.    Final diagnoses:   Hypoglycemia   Osteomyelitis of right ankle, unspecified type       ED Disposition  ED Disposition     ED Disposition   Decision to Admit    Condition   --    Comment   Level of Care: Med/Surg [1]   Diagnosis: Osteomyelitis of right foot [600044]   Admitting Physician: REDD VALDEZ [[id number]]   Certification: I Certify That Inpatient Hospital Services Are Medically Necessary For Greater Than 2 Midnights               No follow-up provider specified.       Medication List      No changes were made to your prescriptions during this visit.          Donn Lui PA-C  05/14/23 0039      Electronically signed by  Donn Lui PA-C at 05/14/23 0039       Vital Signs (last 2 days)     Date/Time Temp Temp src Pulse Resp BP Patient Position SpO2    05/15/23 0748 98.1 (36.7) Oral 79 18 160/85 Lying 95    05/15/23 0435 98.4 (36.9) Oral 75 16 153/72 Lying 95    05/14/23 2356 98.5 (36.9) Oral 78 16 143/76 Lying 96    05/14/23 1911 98.2 (36.8) Oral 79 18 145/73 Lying 96    05/14/23 1602 98.6 (37) Oral 81 18 142/75 Lying 94    05/14/23 1150 98.2 (36.8) Oral 79 18 149/82 Lying 94    05/14/23 0748 98.2 (36.8) Oral 79 18 156/83 Lying 97    05/14/23 0342 98.7 (37.1) Oral 82 18 119/58 Lying 93    05/14/23 0042 98.3 (36.8) Oral 86 18 172/84 Lying 95    05/14/23 0000 -- -- 75 -- 136/70 -- --    05/13/23 2345 -- -- 76 -- 141/69 -- 92    05/13/23 2330 -- -- 81 -- 161/88 -- 96    05/13/23 2315 -- -- 74 -- 137/72 -- 92    05/13/23 2300 -- -- 74 -- 139/73 -- 91    05/13/23 2245 -- -- 73 -- 131/68 -- 93    05/13/23 2230 -- -- 79 -- 148/78 -- 95    05/13/23 2215 -- -- 76 -- 144/56 -- 96    05/13/23 2200 -- -- 78 -- 141/75 -- 94    05/13/23 2145 -- -- 82 -- 150/81 -- 94    05/13/23 2130 -- -- 84 -- 138/82 -- 98    05/13/23 2115 -- -- 75 -- 137/77 -- 93    05/13/23 2100 -- -- 77 -- 151/82 -- 97    05/13/23 2045 -- -- 85 -- 154/87 -- 93    05/13/23 2031 -- -- 73 -- 148/78 -- --    05/13/23 2029 -- -- -- -- -- -- 97    05/13/23 2015 -- -- 72 -- 149/98 -- 95    05/13/23 1742 97.4 (36.3) Temporal 73 18 143/70 Sitting 96          Current Facility-Administered Medications   Medication Dose Route Frequency Provider Last Rate Last Admin   • acetaminophen (TYLENOL) tablet 650 mg  650 mg Oral Q4H PRN Colin Patrick, DO       • dextrose (D50W) (25 g/50 mL) IV injection 25 g  25 g Intravenous Q15 Min PRN Colin Patrick, DO       • dextrose (GLUTOSE) oral gel 15 g  15 g Oral Q15 Min PRN Colin Patrick, DO       • glucagon (human recombinant) (GLUCAGEN DIAGNOSTIC) injection 1 mg  1 mg Intramuscular Q15 Min PRN Colin Patrick,        •  HYDROcodone-acetaminophen (NORCO)  MG per tablet 1 tablet  1 tablet Oral Q6H PRN Owen Cruz MD   1 tablet at 05/15/23 1102   • HYDROmorphone (DILAUDID) injection 0.5 mg  0.5 mg Intravenous Q2H PRN Colin Patrick DO   0.5 mg at 05/14/23 0342   • Insulin Lispro (humaLOG) injection 2-7 Units  2-7 Units Subcutaneous TID With Meals Colin Patrick DO   2 Units at 05/15/23 0852   • ondansetron (ZOFRAN) injection 4 mg  4 mg Intravenous Once Donn Lui PA-C       • ondansetron (ZOFRAN) injection 4 mg  4 mg Intravenous Q6H PRN Colin Patrick DO   4 mg at 05/14/23 0059   • pregabalin (LYRICA) capsule 50 mg  50 mg Oral TID Owen Cruz MD   50 mg at 05/15/23 0852   • sodium chloride 0.9 % flush 10 mL  10 mL Intravenous PRN Donn Lui PA-C       • sodium chloride 0.9 % flush 10 mL  10 mL Intravenous Q12H Colin Patrick DO   10 mL at 05/15/23 0852   • sodium chloride 0.9 % flush 10 mL  10 mL Intravenous PRN Colin Patrick DO       • sodium chloride 0.9 % infusion 40 mL  40 mL Intravenous PRN Colin Patrick DO       • vancomycin (VANCOCIN) 1,000 mg in sodium chloride 0.9 % 250 mL IVPB-VTB  1,000 mg Intravenous Q12H Colin Patrick DO   1,000 mg at 05/15/23 0037        Physician Progress Notes (last 48 hours)      Owen Cruz MD at 05/14/23 1601          Admitted by Dr. Chase after midnight of May 13.  Chief complaint of presentation was confusion  Vitals:    05/14/23 1150   BP: 149/82   Pulse: 79   Resp: 18   Temp: 98.2 °F (36.8 °C)   SpO2: 94%     Hemodynamically stable  Had blood sugar as low as 37, severe glucosuria; A1c 5.30  Urine drug screen positive for opiates  Normocytic anemia  Mildly acidotic at 7.33, PaCO2 of 38, PaO2 of 88 on room air (acidotic-metabolic) negative acetone    Impression on admission includes:  Impression:  1.  Osteomyelitis of the right foot/ankle with septic hardware  2.  Elevated inflammatory markers   3.   Hypoglycemia in a noninsulin-dependent diabetic; presented with confusion likely metabolic encephalopathy from hypoglycemia  4.  Right lower extremity pain  5.  Anemia    Podiatry consulted  As needed hypoglycemic protocol  Oral location for diabetes on hold  Continue present management  insulin lispro, 2-7 Units, Subcutaneous, TID With Meals  ondansetron, 4 mg, Intravenous, Once  sodium chloride, 10 mL, Intravenous, Q12H  vancomycin, 1,000 mg, Intravenous, Q12H          Electronically signed by Owen Cruz MD at 05/14/23 1605        podietry consult pending  5/14 dilaudid iv x2 prn   zofran iv x1

## 2023-05-15 NOTE — PLAN OF CARE
Goal Outcome Evaluation:  Plan of Care Reviewed With: patient, spouse        Progress: improving  Outcome Evaluation: a/ox4, tele monitoring, RA, TID accuchecks, tolerating PO intake well, oob indt, small scab to L top of foot, oseto to RLE, pain controlled with PRN Norco, SL IV, podietry consult pending. potential for surgery to RLE, plans to DC home with family.

## 2023-05-15 NOTE — PAYOR COMM NOTE
"Cole Garrett (58 y.o. Male) [id number]   Admit 5/13    [facility] phone   Fax        Date of Birth   1965    Social Security Number       Address   [address] [place] [address]    Home Phone   [phone]    MRN   [id number]       Congregational   Buddhist    Marital Status                               Admission Date   5/13/23    Admission Type   Emergency    Admitting Provider   Moy Trujillo DO    Attending Provider   Moy Trujillo DO    Department, Room/Bed   [facility] 3A, 338/1       Discharge Date       Discharge Disposition       Discharge Destination                               Attending Provider: Moy Trujillo DO    Allergies: No Known Allergies    Isolation: None   Infection: None   Code Status: CPR    Ht: 180.3 cm (71\")   Wt: 86 kg (189 lb 9.6 oz)    Admission Cmt: None   Principal Problem: Osteomyelitis of right foot [M86.9]                 Active Insurance as of 5/13/2023     Primary Coverage     Payor Plan Insurance Group Employer/Plan Group    WORKERS COMPENSATION MISC WORKERS COMPENSATION      Coverage Address Coverage Phone Number Coverage Fax Number Effective Dates    [address] [phone]  2/4/2022 - None Entered    [place] [id number]       Subscriber Name Subscriber Birth Date Member ID       GARRETT COATES [birth year] [id number]           Secondary Coverage     Payor Plan Insurance Group Employer/Plan Group    [employer] HMO [id number]     Payor Plan Address Payor Plan Phone Number Payor Plan Fax Number Effective Dates    [address] [phone]  5/1/2022 - None Entered    [facility] [id number]       Subscriber Name Subscriber Birth Date Member ID       GARRETT COATES 1965 [id number]                 Emergency Contacts      (Rel.) Home Phone Work Phone Mobile Phone    Coledann (Spouse) -- -- [phone]               History & Physical    " "  Colin Patrick, DO at 05/14/23 0041              [facility] Medicine Services  HISTORY AND PHYSICAL    Date of Admission: 5/13/2023  Primary Care Physician: Provider, No Known    Subjective   Primary Historian: Patient    Chief Complaint: Confusion    History of Present Illness  58-year-old male who became confused yesterday around 4 PM.  His family thought that he was \"high.\"  From his pain medication.  Patient states that he was not, that he was taking it the way he was supposed to.  He ate a little bit and the family notes that he was better.  When he got back home he took his diabetic medication again, and became confused again.  It was later discovered that the patient was becoming hypoglycemic.  On arrival to the emergency department, the patient notes that his glucose was 40.  The patient also has been having some difficulty with his right lower extremity.  It appears that he has osteomyelitis with septic hardware.  He has seen Dr. Reddy, and has surgery scheduled next month, but after reviewing the x-ray, I am very concerned that the patient may need antibiotics and surgical procedure sooner than that.  The patient does complain of ankle pain and deformity.  He ambulates with an assist device and a brace.  He has no chest pain, no shortness of breath.  He has no acute bowel or kidney dysfunction.        Review of Systems   Otherwise complete ROS reviewed and negative except as mentioned in the HPI.    Past Medical History:   • Diabetes mellitus (HCC)   • Hypertension     Past Surgical History:  • Cardiac surgery   hole in heart as a child   • Back surgery   x2   • Fracture surgery   left arm   • Rotator cuff repair Right   x2    Right ankle surgery    Social History:  reports that he has never smoked. He has never used smokeless tobacco. He reports that he does not drink alcohol and does not use drugs.    Family History: None    Allergies:  No Known " "Allergies    Medications:  canagliflozin (INVOKANA) 100 MG TABS tablet   Take 100 mg by mouth every morning (before breakfast)   0       lisinopril (PRINIVIL;ZESTRIL) 10 MG tablet   Take 10 mg by mouth daily   0       glimepiride (AMARYL) 2 MG tablet   Take 2 mg by mouth every morning (before breakfast)   0       pregabalin (LYRICA) 75 MG capsule   Take 1 capsule by mouth 2 times daily for 14 days 28 capsule   0 08/02/2016     pregabalin (LYRICA) 75 MG capsule   Take 1 capsule by mouth 2 times daily 60 capsule   3 08/02/2016     HYDROcodone-acetaminophen (NORCO) 7.5-325 MG per tablet   Take 1 tablet by mouth every 6 hours as needed for Pain 120 tablet   0 08/02/2016     [employer] CREA   Apply 1-2 pumps to affected area 3-4 times per day 360 g   3 08/02/2016     cyclobenzaprine (FLEXERIL) 10 MG tablet   Take 1 tablet by mouth every 8 hours as needed for Muscle spasms 90 tablet   0 08/02/2016 08/12/2016       Prior to Admission medications    Not on File     I have utilized all available immediate resources to obtain, update, or review the patient's current medications (including all prescriptions, over-the-counter products, herbals, cannabis/cannabidiol products, and vitamin/mineral/dietary (nutritional) supplements).    Objective     Vital Signs: /70   Pulse 75   Temp 97.4 °F (36.3 °C) (Temporal)   Resp 18   Ht 182.9 cm (72\")   Wt 90.7 kg (200 lb)   SpO2 92%   BMI 27.12 kg/m²   Physical Exam  Vitals reviewed.   Constitutional:       Appearance: Normal appearance.   HENT:      Head: Normocephalic and atraumatic.      Right Ear: External ear normal.      Left Ear: External ear normal.      Nose: Nose normal.      Mouth/Throat:      Mouth: Mucous membranes are moist.      Pharynx: No oropharyngeal exudate.   Eyes:      General: No scleral icterus.     Conjunctiva/sclera: Conjunctivae normal.   Cardiovascular:      Rate and Rhythm: Normal rate and regular rhythm.      Heart sounds: Normal heart sounds. "   Pulmonary:      Effort: Pulmonary effort is normal.      Breath sounds: Normal breath sounds.   Abdominal:      General: Bowel sounds are normal.      Palpations: Abdomen is soft.   Musculoskeletal:         General: Swelling present. No tenderness.      Cervical back: Normal range of motion and neck supple.      Right lower leg: Edema present.      Comments: Right lower extremity ankle deformity   Skin:     General: Skin is warm and dry.   Neurological:      Mental Status: He is alert and oriented to person, place, and time.      Cranial Nerves: No cranial nerve deficit.   Psychiatric:         Mood and Affect: Mood normal.         Behavior: Behavior normal.        Results Reviewed:  Lab Results (last 24 hours)     Procedure Component Value Units Date/Time    POC Glucose Once [[id number]]  (Abnormal) Collected: 05/13/23 2248    Specimen: Blood Updated: 05/13/23 2259     Glucose 184 mg/dL      Comment: : elia Moore HunterMeter ID: [id number]       POC Glucose Once [[id number]]  (Abnormal) Collected: 05/13/23 2128    Specimen: Blood Updated: 05/13/23 2139     Glucose 182 mg/dL      Comment: : elia Moore HunterMeter ID: [id number]       POC Glucose Once [[id number]]  (Abnormal) Collected: 05/13/23 2026    Specimen: Blood Updated: 05/13/23 2037     Glucose 151 mg/dL      Comment: : elia Moore HunterMeter ID: [id number]       COVID-19, FLU A/B, RSV PCR - Swab, Nasopharynx [[id number]]  (Normal) Collected: 05/13/23 1927    Specimen: Swab from Nasopharynx Updated: 05/13/23 2017     COVID19 Not Detected     Influenza A PCR Not Detected     Influenza B PCR Not Detected     RSV, PCR Not Detected    Narrative:      Fact sheet for providers: https://www.fda.gov/media/541724/download    Fact sheet for patients: https://www.fda.gov/media/319236/download    Test performed by PCR.    Fentanyl, Urine - Urine, Clean Catch [[id number]]  (Normal) Collected: 05/13/23 1933    Specimen: Urine, Clean  Catch Updated: 05/13/23 2008     Fentanyl, Urine Negative    Narrative:      Negative Threshold:      Fentanyl 5 ng/mL     The normal value for the drug tested is negative. This report includes final unconfirmed screening results to be used for medical treatment purposes only. Unconfirmed results must not be used for non-medical purposes such as employment or legal testing. Clinical consideration should be applied to any drug of abuse test, particularly when unconfirmed results are used.           Comprehensive Metabolic Panel [[id number]]  (Abnormal) Collected: 05/13/23 1845    Specimen: Blood Updated: 05/13/23 2007     Glucose 37 mg/dL      BUN 39 mg/dL      Creatinine 1.12 mg/dL      Sodium 142 mmol/L      Potassium 4.5 mmol/L      Chloride 107 mmol/L      CO2 21.0 mmol/L      Calcium 9.4 mg/dL      Total Protein 7.8 g/dL      Albumin 4.2 g/dL      ALT (SGPT) 19 U/L      AST (SGOT) 19 U/L      Alkaline Phosphatase 182 U/L      Total Bilirubin 0.4 mg/dL      Globulin 3.6 gm/dL      A/G Ratio 1.2 g/dL      BUN/Creatinine Ratio 34.8     Anion Gap 14.0 mmol/L      eGFR 76.6 mL/min/1.73     Narrative:      GFR Normal >60  Chronic Kidney Disease <60  Kidney Failure <15      Urine Drug Screen - Urine, Clean Catch [[id number]]  (Abnormal) Collected: 05/13/23 1933    Specimen: Urine, Clean Catch Updated: 05/13/23 2001     THC, Screen, Urine Negative     Phencyclidine (PCP), Urine Negative     Cocaine Screen, Urine Negative     Methamphetamine, Ur Negative     Opiate Screen Positive     Amphetamine Screen, Urine Negative     Benzodiazepine Screen, Urine Negative     Tricyclic Antidepressants Screen Negative     Methadone Screen, Urine Negative     Barbiturates Screen, Urine Negative     Oxycodone Screen, Urine Negative     Propoxyphene Screen Negative     Buprenorphine, Screen, Urine Negative    Narrative:      Cutoff For Drugs Screened:    Amphetamines               500 ng/ml  Barbiturates               200  ng/ml  Benzodiazepines            150 ng/ml  Cocaine                    150 ng/ml  Methadone                  200 ng/ml  Opiates                    100 ng/ml  Phencyclidine               25 ng/ml  THC                            50 ng/ml  Methamphetamine            500 ng/ml  Tricyclic Antidepressants  300 ng/ml  Oxycodone                  100 ng/ml  Propoxyphene               300 ng/ml  Buprenorphine               10 ng/ml    The normal value for all drugs tested is negative. This report includes unconfirmed screening results, with the cutoff values listed, to be used for medical treatment purposes only.  Unconfirmed results must not be used for non-medical purposes such as employment or legal testing.  Clinical consideration should be applied to any drug of abuse test, particularly when unconfirmed results are used.      Urinalysis, Microscopic Only - Urine, Clean Catch [[id number]]  (Abnormal) Collected: 05/13/23 1935    Specimen: Urine, Clean Catch Updated: 05/13/23 1955     RBC, UA 3-5 /HPF      WBC, UA 0-2 /HPF      Comment: Urine culture not indicated.        Bacteria, UA None Seen /HPF      Squamous Epithelial Cells, UA None Seen /HPF      Hyaline Casts, UA 3-6 /LPF      Methodology Automated Microscopy    Urinalysis With Culture If Indicated - Urine, Clean Catch [[id number]]  (Abnormal) Collected: 05/13/23 1935    Specimen: Urine, Clean Catch Updated: 05/13/23 1955     Color, UA Yellow     Appearance, UA Clear     pH, UA <=5.0     Specific Gravity, UA 1.027     Glucose, UA >=1000 mg/dL (3+)     Ketones, UA Trace     Bilirubin, UA Negative     Blood, UA Negative     Protein, UA >=300 mg/dL (3+)     Leuk Esterase, UA Negative     Nitrite, UA Negative     Urobilinogen, UA 1.0 E.U./dL    Narrative:      In absence of clinical symptoms, the presence of pyuria, bacteria, and/or nitrites on the urinalysis result does not correlate with infection.    POC Glucose Once [[id number]]  (Abnormal) Collected: 05/13/23 1929     "Specimen: Blood Updated: 05/13/23 1940     Glucose 189 mg/dL      Comment: : [id number] Casper AngelaMeter ID: [id number]       TSH [[id number]]  (Normal) Collected: 05/13/23 1845    Specimen: Blood Updated: 05/13/23 1932     TSH 1.710 uIU/mL     Procalcitonin [[id number]]  (Normal) Collected: 05/13/23 1845    Specimen: Blood Updated: 05/13/23 1931     Procalcitonin 0.06 ng/mL     Narrative:      As a Marker for Sepsis (Non-Neonates):    1. <0.5 ng/mL represents a low risk of severe sepsis and/or septic shock.  2. >2 ng/mL represents a high risk of severe sepsis and/or septic shock.    As a Marker for Lower Respiratory Tract Infections that require antibiotic therapy:    PCT on Admission    Antibiotic Therapy       6-12 Hrs later    >0.5                Strongly Recommended  >0.25 - <0.5        Recommended   0.1 - 0.25          Discouraged              Remeasure/reassess PCT  <0.1                Strongly Discouraged     Remeasure/reassess PCT    As 28 day mortality risk marker: \"Change in Procalcitonin Result\" (>80% or <=80%) if Day 0 (or Day 1) and Day 4 values are available. Refer to http://www.[facility]-pct-calculator.com    Change in PCT <=80%  A decrease of PCT levels below or equal to 80% defines a positive change in PCT test result representing a higher risk for 28-day all-cause mortality of patients diagnosed with severe sepsis for septic shock.    Change in PCT >80%  A decrease of PCT levels of more than 80% defines a negative change in PCT result representing a lower risk for 28-day all-cause mortality of patients diagnosed with severe sepsis or septic shock.       T4, Free [[id number]]  (Normal) Collected: 05/13/23 1845    Specimen: Blood Updated: 05/13/23 1930     Free T4 1.06 ng/dL     Narrative:      Results may be falsely increased if patient taking Biotin.      Salicylate Level [[id number]]  (Normal) Collected: 05/13/23 1845    Specimen: Blood Updated: 05/13/23 1930     Salicylate <0.3 mg/dL     " C-reactive Protein [[id number]]  (Abnormal) Collected: 05/13/23 1845    Specimen: Blood Updated: 05/13/23 1929     C-Reactive Protein 9.79 mg/dL     Acetaminophen Level [[id number]]  (Normal) Collected: 05/13/23 1845    Specimen: Blood Updated: 05/13/23 1929     Acetaminophen <5.0 mcg/mL     CK [[id number]]  (Normal) Collected: 05/13/23 1845    Specimen: Blood Updated: 05/13/23 1927     Creatine Kinase 179 U/L     Magnesium [[id number]]  (Normal) Collected: 05/13/23 1845    Specimen: Blood Updated: 05/13/23 1924     Magnesium 2.3 mg/dL     Lipase [[id number]]  (Normal) Collected: 05/13/23 1845    Specimen: Blood Updated: 05/13/23 1922     Lipase 20 U/L     Ethanol [[id number]] Collected: 05/13/23 1845    Specimen: Blood Updated: 05/13/23 1922     Ethanol % <0.010 %     Narrative:      Not for legal purposes. Chain of Custody not followed.     Lactic Acid, Plasma [[id number]]  (Normal) Collected: 05/13/23 1845    Specimen: Blood Updated: 05/13/23 1915     Lactate 0.7 mmol/L     Hemoglobin A1c [[id number]]  (Normal) Collected: 05/13/23 1845    Specimen: Blood Updated: 05/13/23 1914     Hemoglobin A1C 5.30 %     Narrative:      Hemoglobin A1C Ranges:    Increased Risk for Diabetes  5.7% to 6.4%  Diabetes                     >= 6.5%  Diabetic Goal                < 7.0%    Ketone Bodies, Serum (Not performed at [place]) [[id number]]  (Normal) Collected: 05/13/23 1845    Specimen: Blood Updated: 05/13/23 1909    Narrative:      The following orders were created for panel order Ketone Bodies, Serum (Not performed at [place]).  Procedure                               Abnormality         Status                     ---------                               -----------         ------                     Acetone[[id number]]                      Normal              Final result                 Please view results for these tests on the individual orders.    Acetone [[id number]]  (Normal) Collected: 05/13/23 1845    Specimen: Blood Updated:  05/13/23 1909     Acetone Negative    Sedimentation Rate [[id number]]  (Abnormal) Collected: 05/13/23 1845    Specimen: Blood Updated: 05/13/23 1908     Sed Rate 55 mm/hr     CBC & Differential [[id number]]  (Abnormal) Collected: 05/13/23 1845    Specimen: Blood Updated: 05/13/23 1901    Narrative:      The following orders were created for panel order CBC & Differential.  Procedure                               Abnormality         Status                     ---------                               -----------         ------                     CBC Auto Differential[[id number]]        Abnormal            Final result                 Please view results for these tests on the individual orders.    CBC Auto Differential [[id number]]  (Abnormal) Collected: 05/13/23 1845    Specimen: Blood Updated: 05/13/23 1901     WBC 11.10 10*3/mm3      RBC 3.21 10*6/mm3      Hemoglobin 9.1 g/dL      Hematocrit 29.2 %      MCV 91.0 fL      MCH 28.3 pg      MCHC 31.2 g/dL      RDW 14.6 %      RDW-SD 48.5 fl      MPV 9.1 fL      Platelets 270 10*3/mm3      Neutrophil % 81.7 %      Lymphocyte % 10.5 %      Monocyte % 6.7 %      Eosinophil % 0.3 %      Basophil % 0.5 %      Immature Grans % 0.3 %      Neutrophils, Absolute 9.09 10*3/mm3      Lymphocytes, Absolute 1.16 10*3/mm3      Monocytes, Absolute 0.74 10*3/mm3      Eosinophils, Absolute 0.03 10*3/mm3      Basophils, Absolute 0.05 10*3/mm3      Immature Grans, Absolute 0.03 10*3/mm3      nRBC 0.0 /100 WBC     Blood Culture - Blood, Wrist, Right [[id number]] Collected: 05/13/23 1845    Specimen: Blood from Wrist, Right Updated: 05/13/23 1901    Blood Culture - Blood, Arm, Left [[id number]] Collected: 05/13/23 1845    Specimen: Blood from Arm, Left Updated: 05/13/23 1901    Blood Gas, Arterial - [[id number]]  (Abnormal) Collected: 05/13/23 1854    Specimen: Arterial Blood Updated: 05/13/23 1852     Site Right Brachial     Lopez's Test N/A     pH, Arterial 7.328 pH units      Comment: 84 Value  below reference range        pCO2, Arterial 37.8 mm Hg      pO2, Arterial 88.4 mm Hg      HCO3, Arterial 19.8 mmol/L      Comment: 84 Value below reference range        Base Excess, Arterial -5.7 mmol/L      Comment: 84 Value below reference range        O2 Saturation, Arterial 94.7 %      Temperature 37.0 C      Barometric Pressure for Blood Gas 752 mmHg      Modality Room Air     Ventilator Mode NA     Collected by [id number]     Comment: Meter: [id number]     :  [id number]        pCO2, Temperature Corrected 37.8 mm Hg      pH, Temp Corrected 7.328 pH Units      pO2, Temperature Corrected 88.4 mm Hg     POC Glucose Once [[id number]]  (Abnormal) Collected: 05/13/23 1818    Specimen: Blood Updated: 05/13/23 1829     Glucose 40 mg/dL      Comment: : [id number] Evangelista ChannMeter ID: [id number]           Imaging Results (Last 24 Hours)     Procedure Component Value Units Date/Time    XR Foot 3+ View Right [[id number]] Collected: 05/13/23 1947     Updated: 05/13/23 1957    Narrative:      XR FOOT 3+ VW RIGHT- 5/13/2023 7:05 PM CDT     HISTORY: increasing pain, hx diabetic wound     COMPARISON: None      FINDINGS:     Frontal, lateral and oblique radiographs of the right foot were provided  for review.      No acute fracture or malalignment in the forefoot or midfoot. Previous  ankle fracture open reduction internal fixation. Notable soft tissue  swelling around the ankle and there is loosening of both the medial and  lateral sided hardware. There is also an exuberant periostitis  surrounding the distal fibula and tibia, all of which are concerning for  osteomyelitis and septic loosening.       Impression:      1. Previous ankle fracture with open reduction internal fixation.  Radiographs are concerning for osteomyelitis with septic hardware  loosening.  This report was finalized on 05/13/2023 19:54 by Dr Filiberto Rogers, .    XR Chest 1 View [[id number]] Collected: 05/13/23 1945     Updated: 05/13/23 1950     Narrative:      Frontal upright radiograph of the chest 5/13/2023 7:05 PM CDT     HISTORY: Altered metal status     COMPARISON: None.     FINDINGS:   The lungs are clear. The cardiomediastinal silhouette and pulmonary  vascularity are within normal limits.      Right shoulder rotator cuff arthropathy. No acute bony abnormality.       Impression:      1. No radiographic evidence of acute cardiopulmonary process.        This report was finalized on 05/13/2023 19:47 by Dr Filiberto Rogers, .    CT Head Without Contrast [[id number]] Collected: 05/13/23 1934     Updated: 05/13/23 1941    Narrative:      CT HEAD WO CONTRAST- 5/13/2023 7:02 PM CDT     HISTORY: sudden AMS at 1600 yesterday, headache       DOSE LENGTH PRODUCT: 679 mGy cm. Automated exposure control was also  utilized to decrease patient radiation dose.     Technique:   Axial CT of the brain without IV contrast. Sagittal and coronal  reformations are also provided for review. Soft tissue and bone kernels  are available for interpretation.     Comparison: None.     Findings:      There is no evidence of acute large vascular territory infarct. No  intra-axial or extra-axial hemorrhage. No visualized mass lesion or mass  effect. The ventricles, cortical sulci and basal cisterns are symmetric  and age appropriate.  Posterior fossa structures are unremarkable. The  scalp and calvarium are intact. Chronic paranasal sinus disease.       Impression:      Impression:    1. No acute intracranial process.  This report was finalized on 05/13/2023 19:38 by Dr Filiberto Rogers, .        I have personally reviewed and interpreted the radiology studies and ECG obtained at time of admission.     Assessment / Plan   Assessment:   Active Hospital Problems    Diagnosis    • **Osteomyelitis of right foot      Impression:  1.  Osteomyelitis of the right foot/ankle with septic hardware  2.  Elevated inflammatory markers   3.  Hypoglycemia in a noninsulin-dependent diabetic  4.  Right  lower extremity pain  5.  Anemia    Treatment Plan  1.  Admit to the hospital  2.  Consult podiatry, Dr. Reddy  3.  Sliding scale insulin with Accu-Cheks   4.  Hold oral diabetic medications  5.  Pain control  6.  Vancomycin was started in the emergency department, will continue   7.  Follow-up labs in the morning      The patient will be admitted to my service here at [facility].  Primary team to take over in the morning    Medical Decision Making  Number and Complexity of problems: 4, complex  Differential Diagnosis: Altered mental status    Conditions and Status        Condition is improving.     [facility] Data  External documents reviewed: None  Cardiac tracing (EKG, telemetry) interpretation: None  Radiology interpretation: None  Labs reviewed: Reviewed  Any tests that were considered but not ordered: None     Decision rules/scores evaluated (example [id number], [place], etc): None     Discussed with: Patient, wife, and son at bedside     Care Planning  Shared decision making: As above and ED staff  Code status and discussions: Full    Disposition  Social Determinants of Health that impact treatment or disposition: None  Estimated length of stay is 2 to 3 days.     I confirmed that the patient's advanced care plan is present, code status is documented, and a surrogate decision maker is listed in the patient's medical record.     The patient's surrogate decision maker is family.     The patient was seen and examined by me on 5/14/2023 at seen after midnight.    Electronically signed by Colin Patrick DO, 05/14/23, 00:41 CDT.                Electronically signed by Colin Patrick DO at 05/14/23 0048          Emergency Department Notes      Donn Lui PA-C at 05/13/23 1832          Subjective   History of Present Illness    Patient is a 58-year-old male presenting to ED with altered mental status.  PMH significant for non-insulin-dependent diabetes. Wife at bedside to provide additional  "history.  Wife states that at 4 PM yesterday patient had a sudden change in his mentation where he seemed very confused, zoning out, and had tremors.  Wife states that patient's children thought he may have accidentally overdosed on his Lortab as he has been taking more due to pain and discomfort with a right foot wound for which he is supposed to have surgery once insurance improves for a wound cleanout and likely amputation due to poorly controlled diabetes.  Wife states however that today she went and counted patient's Lortabs and noted that the appropriate amount of tablets was missing and she had low concern for an overdose but describes that all day today patient has been like a \"zombie just staring out\" acting as though \"he is still a little bit high.\"  Wife states that patient has also been complaining of a sudden headache for which she became concerned he might have a septic foot.  Wife notes that they have not checked patient's blood sugar all day today and she does not believe he is taking any other medications intentionally or on purpose at which time they present for further evaluation.  Patient is a poor historian as he states that his head hurts however he is unwilling to answer other questions.    Wife did state that patient sustained an ankle fracture due to Workmen's Comp. for which she was initially following with podiatrist Dr. Morrison.  Wife states that they were advised \"there was nothing left they could do and he just had to tolerate the pain until the leg developed complications so we went to get a second opinion.\"  Wife states that they have since been following with Dr. Reddy through the orthopedic [place] who reports that he is scheduled to have hardware removal on 6/13/2023.  Wife states she is concerned because patient has been having increased pain to the foot and they are not sure if he will be able to wait until 6/13/2023 for intervention.    Records reviewed show patient last seen " in the ED on 11/28/2014.  Patient with no outpatient visits since pain management on 8/2/2016 for lumbar postlaminectomy syndrome, lumbar radiculopathy, sacroiliac joint guided joint dysfunction, trochanteric bursitis, peripheral neuropathy pain.    Review of Systems   Constitutional: Negative.  Negative for fever.   HENT: Negative.    Eyes: Negative.    Respiratory: Negative.    Cardiovascular: Negative.    Gastrointestinal: Negative.  Negative for nausea and vomiting.   Genitourinary: Negative.    Musculoskeletal: Positive for arthralgias (Right foot) and joint swelling (Right foot).   Skin: Positive for wound (Right foot).   Allergic/Immunologic: Positive for immunocompromised state (DM).   Neurological: Negative.         Denies head injury   Psychiatric/Behavioral: Positive for confusion.   All other systems reviewed and are negative.      History reviewed. No pertinent past medical history.    No Known Allergies    History reviewed. No pertinent surgical history.    History reviewed. No pertinent family history.    Social History     Socioeconomic History   • Marital status:    Tobacco Use   • Smoking status: Never   • Smokeless tobacco: Never   Vaping Use   • Vaping Use: Never used   Substance and Sexual Activity   • Alcohol use: Never   • Drug use: Never   • Sexual activity: Defer           Objective   Physical Exam  Vitals and nursing note reviewed.   Constitutional:       General: He is in acute distress.      Appearance: Normal appearance. He is well-developed and well-groomed. He is not ill-appearing, toxic-appearing or diaphoretic.   HENT:      Head: Normocephalic and atraumatic.      Mouth/Throat:      Mouth: Mucous membranes are moist.      Pharynx: Oropharynx is clear.   Eyes:      Extraocular Movements: Extraocular movements intact.      Conjunctiva/sclera: Conjunctivae normal.      Pupils: Pupils are equal, round, and reactive to light.   Cardiovascular:      Rate and Rhythm: Regular rhythm.  Tachycardia present.   Pulmonary:      Effort: Pulmonary effort is normal.      Breath sounds: Normal breath sounds.   Abdominal:      General: Bowel sounds are normal.      Palpations: Abdomen is soft.   Musculoskeletal:         General: Tenderness present.      Cervical back: Neck supple.      Comments: Orthopedic brace in place to the right lower extremity.  Left foot with very superficial abrasion on the top aspect however no evidence of overlying cellulitis or other acute injury/abnormalities.   Skin:     General: Skin is warm and dry.      Findings: Wound (Right foot) present.   Neurological:      General: No focal deficit present.      Mental Status: He is alert and oriented to person, place, and time.   Psychiatric:         Mood and Affect: Mood normal.         Behavior: Behavior normal. Behavior is cooperative.         Procedures          ED Course                                           Medical Decision Making  Hypoglycemia: acute illness or injury  Osteomyelitis of right ankle, unspecified type: acute illness or injury  Amount and/or Complexity of Data Reviewed  Independent Historian: spouse     Details: Wife  External Data Reviewed: labs, radiology and notes.  Labs: ordered. Decision-making details documented in ED Course.  Radiology: ordered. Decision-making details documented in ED Course.  ECG/medicine tests: ordered. Decision-making details documented in ED Course.  Discussion of management or test interpretation with external provider(s): Dr. Casper Nogueira (attending)  Dr. Chase (hospitalist)    Risk  Prescription drug management.  Decision regarding hospitalization.            Patient is a 58-year-old male presenting to ED with altered mental status.  PMH significant for non-insulin-dependent diabetes.  Upon arrival to ED patient noted to have a POC BGL of 40 for which she was given an amp of D50 and subsequently was able to tolerate p.o. fluids and food and maintain his blood sugars in the  180s.  Lab work otherwise revealed Leukocytosis 11.1, H&H 9.1/29.2 with normal platelets and no further CBC abnormalities.  CMP with alk phos 182, BUN 39.  No further electrolyte disturbances, normal renal and hepatic function otherwise.  Thyroid hormones WNL.  Inflammatory markers elevated with CRP 9.79, sed rate 55 however normal lactic acid as well as normal procalcitonin.  ABG revealed acidotic pH 7.328 with HCO3 of 19.8 and no further acute abnormalities.  CK WNL at 179.  Salicylate and acetaminophen is negative.  Alcohol negative.  UDS positive for opiates consistent with patient's medication history and otherwise unremarkable.  Urinalysis with greater than thousand glucosurea as well as greater than 300 proteinuria but no evidence of infection.  COVID, influenza, RSV testing negative.  Further low concern for DKA with negative ketones.  Hemoglobin A1c 5.3.  Head CT without contrast showed: No acute intracranial process.  Chest x-ray showed: No radiographic evidence of acute cardiopulmonary process.  Right foot x-ray showed: Previous ankle fracture with open reduction internal fixation, radiographs concerning for osteomyelitis with septic hardware loosening.  Patient was started on IV vancomycin.  Throughout evaluation patient remained ANO x3 and hemodynamically stable.  Discussed with patient need for admission for further evaluation and treatment of his osteomyelitis as both wife and patient states he has not been on any antibiotics.  Discussed ability to trend blood sugars during admission for which they are amenable with no further questions, concerns, or needs.  Case discussed with Dr. Chase, hospitalist, who will kindly accept patient for admission under her services.    Final diagnoses:   Hypoglycemia   Osteomyelitis of right ankle, unspecified type       ED Disposition  ED Disposition     ED Disposition   Decision to Admit    Condition   --    Comment   Level of Care: Med/Surg [1]   Diagnosis:  Osteomyelitis of right foot [050491]   Admitting Physician: REDD PATRICK [[id number]]   Certification: I Certify That Inpatient Hospital Services Are Medically Necessary For Greater Than 2 Midnights               No follow-up provider specified.       Medication List      No changes were made to your prescriptions during this visit.          Donn Lui PA-C  05/14/23 0039      Electronically signed by Donn Lui PA-C at 05/14/23 0039         Current Facility-Administered Medications   Medication Dose Route Frequency Provider Last Rate Last Admin   • acetaminophen (TYLENOL) tablet 650 mg  650 mg Oral Q4H PRN Redd Patrick DO       • dextrose (D50W) (25 g/50 mL) IV injection 25 g  25 g Intravenous Q15 Min PRN Redd Patrick DO       • dextrose (GLUTOSE) oral gel 15 g  15 g Oral Q15 Min PRN Redd Patrick DO       • glucagon (human recombinant) (GLUCAGEN DIAGNOSTIC) injection 1 mg  1 mg Intramuscular Q15 Min PRN Redd Patrick DO       • HYDROcodone-acetaminophen (NORCO)  MG per tablet 1 tablet  1 tablet Oral Q6H PRN Owen Cruz MD   1 tablet at 05/15/23 1102   • HYDROmorphone (DILAUDID) injection 0.5 mg  0.5 mg Intravenous Q2H PRN Redd Patrick DO   0.5 mg at 05/14/23 0342   • Insulin Lispro (humaLOG) injection 2-7 Units  2-7 Units Subcutaneous TID With Meals Redd Patrick DO   3 Units at 05/15/23 1225   • ondansetron (ZOFRAN) injection 4 mg  4 mg Intravenous Once Donn Lui PA-C       • ondansetron (ZOFRAN) injection 4 mg  4 mg Intravenous Q6H PRN Redd Patrick DO   4 mg at 05/14/23 0059   • pregabalin (LYRICA) capsule 50 mg  50 mg Oral TID Owen Cruz MD   50 mg at 05/15/23 0852   • sodium chloride 0.9 % flush 10 mL  10 mL Intravenous PRN Donn Lui PA-C       • sodium chloride 0.9 % flush 10 mL  10 mL Intravenous Q12H Redd Patrick DO   10 mL at 05/15/23 0852   • sodium chloride 0.9 % flush 10 mL  10 mL  Intravenous PRN Colin Patrick DO       • sodium chloride 0.9 % infusion 40 mL  40 mL Intravenous PRN Colin Patrick DO       • vancomycin (VANCOCIN) 1,000 mg in sodium chloride 0.9 % 250 mL IVPB-VTB  1,000 mg Intravenous Q12H Colin Patrick DO   1,000 mg at 05/15/23 1224        Physician Progress Notes (last 48 hours)      Owen Cruz MD at 05/14/23 1601          Admitted by Dr. Chase after midnight of May 13.  Chief complaint of presentation was confusion  Vitals:    05/14/23 1150   BP: 149/82   Pulse: 79   Resp: 18   Temp: 98.2 °F (36.8 °C)   SpO2: 94%     Hemodynamically stable  Had blood sugar as low as 37, severe glucosuria; A1c 5.30  Urine drug screen positive for opiates  Normocytic anemia  Mildly acidotic at 7.33, PaCO2 of 38, PaO2 of 88 on room air (acidotic-metabolic) negative acetone    Impression on admission includes:  Impression:  1.  Osteomyelitis of the right foot/ankle with septic hardware  2.  Elevated inflammatory markers   3.  Hypoglycemia in a noninsulin-dependent diabetic; presented with confusion likely metabolic encephalopathy from hypoglycemia  4.  Right lower extremity pain  5.  Anemia    Podiatry consulted  As needed hypoglycemic protocol  Oral location for diabetes on hold  Continue present management  insulin lispro, 2-7 Units, Subcutaneous, TID With Meals  ondansetron, 4 mg, Intravenous, Once  sodium chloride, 10 mL, Intravenous, Q12H  vancomycin, 1,000 mg, Intravenous, Q12H          Electronically signed by Owen Cruz MD at 05/14/23 1605          Consult Notes (last 48 hours)      Rubi Shelby APRN at 05/15/23 1213      Consult Orders    1. Inpatient Podiatry Consult [[id number]] ordered by Colin Patrick DO at 05/14/23 0041               [facility]     Referring Provider: No ref. provider found    Reason for Consultation:     Patient Care Team:  Provider, No Known as PCP -  General      Subjective .     Chief complaint/History of present illness:     Patient is a 58-year-old male who was admitted through the emergency room service due to low blood glucose.  He was noted to have a glucose of 40.  He is a type II diabetic.  He states he has never had issues with hypoglycemia in the past.  He states that he began to have seizures at home.  His wife thought that he had taken too much pain medication.    He did have x-rays of the foot that did reveal hardware loosening concern for osteomyelitis.  He was admitted due to his x-ray changes  and for IV antibiotic therapy.  He is currently on IV vancomycin therapy.    He does have a longstanding history of right lower extremity pain and trauma due to injury that occurred on February 4, 2022.  He did have an injury where he slipped off a porch due to ice.  Did result in ankle fracture dislocation treated with open reduction internal fixation.  Ankle fracture reduction did fail and progressed to ankle valgus deformity.  Therefore an ankle arthrodesis was then performed.  This did result in nonunion.  He relates continued pain, swelling, discomfort, and difficulty weightbearing.  He was recently seen by Dr. Reddy in the office on 5/1/2023.  At that time x-rays were reviewed.  At that time they did discuss below the knee amputation versus ankle and subtalar joint arthrodesis with removal hardware.  This is a workers comp injury.  We are currently waiting on Worker's Comp. approval for this surgical procedure.    Patient is seen at the bedside.  His wife is at the bedside.  He denies any current fever, chills, nausea, vomiting, shortness of breath.  He denies any increase in pain to the lower extremity.  Denies any wounds or open areas.  Denies any increase in pain.  He does state he does have chronic pain to the lower extremity.  He states that this is controlled with oral pain medication.    White blood cell count on admission 11.1, blood cultures  negative, CRP 9.7, sed rate 55, hemoglobin A1c 5.3.    Review of Systems  Review of Systems   Constitutional: Negative for appetite change, chills, diaphoresis, fatigue and fever.   HENT: Negative.    Eyes: Negative.    Respiratory: Negative.    Cardiovascular: Negative.    Gastrointestinal: Negative.    Endocrine: Negative.    Genitourinary: Negative.    Musculoskeletal: Positive for gait problem and joint swelling.   Skin: Negative.    Allergic/Immunologic: Negative.    Hematological: Negative.    Psychiatric/Behavioral: Negative.        History  History reviewed. No pertinent past medical history., History reviewed. No pertinent surgical history., History reviewed. No pertinent family history.,   Social History     Tobacco Use   • Smoking status: Never   • Smokeless tobacco: Never   Vaping Use   • Vaping Use: Never used   Substance Use Topics   • Alcohol use: Never   • Drug use: Never   ,   Medications Prior to Admission   Medication Sig Dispense Refill Last Dose   • dapagliflozin Propanediol 10 MG tablet Take 10 mg by mouth Daily.   5/13/2023   • glimepiride (AMARYL) 2 MG tablet Take 1 tablet by mouth 2 (Two) Times a Day.   5/13/2023   • HYDROcodone-acetaminophen (NORCO)  MG per tablet Take 1 tablet by mouth Every 8 (Eight) Hours As Needed for Moderate Pain.   5/13/2023   • metFORMIN (GLUCOPHAGE) 1000 MG tablet Take 1 tablet by mouth 2 (Two) Times a Day With Meals.   5/13/2023   • pregabalin (LYRICA) 50 MG capsule Take 1 capsule by mouth 3 (Three) Times a Day.   5/13/2023    and Allergies:  Patient has no known allergies.    Objective     Vital Signs   Temp:  [98.1 °F (36.7 °C)-98.6 °F (37 °C)] 98.1 °F (36.7 °C)  Heart Rate:  [75-81] 79  Resp:  [16-18] 18  BP: (142-160)/(72-85) 160/85    Physical Exam:  Physical Exam  Vitals and nursing note reviewed.   Constitutional:       Appearance: Normal appearance.   HENT:      Head: Normocephalic and atraumatic.   Eyes:      Extraocular Movements: Extraocular  movements intact.      Pupils: Pupils are equal, round, and reactive to light.   Cardiovascular:      Pulses: Normal pulses.   Pulmonary:      Effort: Pulmonary effort is normal.   Neurological:      General: No focal deficit present.      Mental Status: He is alert.   Psychiatric:         Mood and Affect: Mood normal.     Ankle  in valgus deformity. Edema at the level of the ankle. Deformity noted at the ankle.  Dorsalis pedis posterior pulses 2+.  Negative Homans' sign, right.    Adequate sharp dull and proprioception.  No open lesions no increasing warmth.  Gait examination antalgic.    Results Review:  Lab Results (last 24 hours)     Procedure Component Value Units Date/Time    POC Glucose Once [[id number]]  (Abnormal) Collected: 05/15/23 1120    Specimen: Blood Updated: 05/15/23 1131     Glucose 226 mg/dL      Comment: : [id number] Janet CisnerosyMeter ID: [id number]       Vancomycin, Trough Please draw 30-60 minutes prior to 1200 dose. [[id number]]  (Normal) Collected: 05/15/23 1043    Specimen: Blood Updated: 05/15/23 1108     Vancomycin Trough 17.70 mcg/mL     POC Glucose Once [[id number]]  (Abnormal) Collected: 05/15/23 0727    Specimen: Blood Updated: 05/15/23 0744     Glucose 188 mg/dL      Comment: : [id number] Tyrone MorganMeter ID: [id number]       Blood Culture - Blood, Arm, Left [[id number]]  (Normal) Collected: 05/13/23 1845    Specimen: Blood from Arm, Left Updated: 05/14/23 1915     Blood Culture No growth at 24 hours    Blood Culture - Blood, Wrist, Right [[id number]]  (Normal) Collected: 05/13/23 1845    Specimen: Blood from Wrist, Right Updated: 05/14/23 1915     Blood Culture No growth at 24 hours    POC Glucose Once [[id number]]  (Abnormal) Collected: 05/14/23 1647    Specimen: Blood Updated: 05/14/23 1659     Glucose 184 mg/dL      Comment: : [id number] Babatunde Knpap ID: [id number]       POC Glucose Once [[id number]]  (Abnormal) Collected: 05/14/23 1307    Specimen: Blood Updated:  05/14/23 1318     Glucose 271 mg/dL      Comment: : [id number] Mian Christianson ID: [id number]               Assessment & Plan       Nonunion after arthrodesis    Retained orthopedic hardware    Chronic right ankle pain    History of trauma injury February 4, 2022 involving ankle fracture, right    Patient's condition was discussed.  His x-rays were reviewed.  Treatment options reviewed.  Patient's case reviewed with Dr. Reddy.  Patients recent x-rays reviewed.  Patient clinically has no evidence of infection.  There is no open wound, no increasing warmth.  Edema and deformity are due to patients history of nonunion.  X-ray changes consistent with chronic micro motion due to ankle nonunion.  Dr. Reddy to follow on outpatient basis.  Awaiting workers comp to approve planned surgical procedure.  Ok from Dr. Reddy standpoint to discharge and follow-up on outpatient basis in regards to his ankle.     We have previously discussed below the knee amputation as primary treatment allowing him to proceed with the prosthesis and return to relatively normal life in 3 to 6 months.  We also discussed removal hardware and ankle and subtalar joint arthrodesis.  Risk and benefits were discussed and reviewed.  We did discuss intraoperatively if there was any evidence of infection or significant deterioration of the talus debriding nonviable bone and placing a cement spacer with an external fixator. This would  then require return back to the OR for a second procedure with plan for custom cage packed with bone graft with intramedullary fixation.  Risk benefits were discussed and reviewed.  Does understand that there is still a chance for nonunion with continued pain, infection need for further surgery including below the knee amputation.        MEGHANN Brunson  05/15/23  12:13 CDT      Electronically signed by Rubi Shelby APRN at 05/15/23 0887

## 2023-05-15 NOTE — CONSULTS
[facility] [place] [facility]     Referring Provider: No ref. provider found    Reason for Consultation:     Patient Care Team:  Provider, No Known as PCP - General      Subjective .     Chief complaint/History of present illness:     Patient is a 58-year-old male who was admitted through the emergency room service due to low blood glucose.  He was noted to have a glucose of 40.  He is a type II diabetic.  He states he has never had issues with hypoglycemia in the past.  He states that he began to have seizures at home.  His wife thought that he had taken too much pain medication.    He did have x-rays of the foot that did reveal hardware loosening concern for osteomyelitis.  He was admitted due to his x-ray changes  and for IV antibiotic therapy.  He is currently on IV vancomycin therapy.    He does have a longstanding history of right lower extremity pain and trauma due to injury that occurred on February 4, 2022.  He did have an injury where he slipped off a porch due to ice.  Did result in ankle fracture dislocation treated with open reduction internal fixation.  Ankle fracture reduction did fail and progressed to ankle valgus deformity.  Therefore an ankle arthrodesis was then performed.  This did result in nonunion.  He relates continued pain, swelling, discomfort, and difficulty weightbearing.  He was recently seen by Dr. Reddy in the office on 5/1/2023.  At that time x-rays were reviewed.  At that time they did discuss below the knee amputation versus ankle and subtalar joint arthrodesis with removal hardware.  This is a workers comp injury.  We are currently waiting on Worker's Comp. approval for this surgical procedure.    Patient is seen at the bedside.  His wife is at the bedside.  He denies any current fever, chills, nausea, vomiting, shortness of breath.  He denies any increase in pain to the lower extremity.  Denies any wounds or open areas.  Denies any increase in pain.  He does state he  does have chronic pain to the lower extremity.  He states that this is controlled with oral pain medication.    White blood cell count on admission 11.1, blood cultures negative, CRP 9.7, sed rate 55, hemoglobin A1c 5.3.    Review of Systems  Review of Systems   Constitutional: Negative for appetite change, chills, diaphoresis, fatigue and fever.   HENT: Negative.    Eyes: Negative.    Respiratory: Negative.    Cardiovascular: Negative.    Gastrointestinal: Negative.    Endocrine: Negative.    Genitourinary: Negative.    Musculoskeletal: Positive for gait problem and joint swelling.   Skin: Negative.    Allergic/Immunologic: Negative.    Hematological: Negative.    Psychiatric/Behavioral: Negative.        History  History reviewed. No pertinent past medical history., History reviewed. No pertinent surgical history., History reviewed. No pertinent family history.,   Social History     Tobacco Use   • Smoking status: Never   • Smokeless tobacco: Never   Vaping Use   • Vaping Use: Never used   Substance Use Topics   • Alcohol use: Never   • Drug use: Never   ,   Medications Prior to Admission   Medication Sig Dispense Refill Last Dose   • dapagliflozin Propanediol 10 MG tablet Take 10 mg by mouth Daily.   5/13/2023   • glimepiride (AMARYL) 2 MG tablet Take 1 tablet by mouth 2 (Two) Times a Day.   5/13/2023   • HYDROcodone-acetaminophen (NORCO)  MG per tablet Take 1 tablet by mouth Every 8 (Eight) Hours As Needed for Moderate Pain.   5/13/2023   • metFORMIN (GLUCOPHAGE) 1000 MG tablet Take 1 tablet by mouth 2 (Two) Times a Day With Meals.   5/13/2023   • pregabalin (LYRICA) 50 MG capsule Take 1 capsule by mouth 3 (Three) Times a Day.   5/13/2023    and Allergies:  Patient has no known allergies.    Objective     Vital Signs   Temp:  [98.1 °F (36.7 °C)-98.6 °F (37 °C)] 98.1 °F (36.7 °C)  Heart Rate:  [75-81] 79  Resp:  [16-18] 18  BP: (142-160)/(72-85) 160/85    Physical Exam:  Physical Exam  Vitals and nursing  note reviewed.   Constitutional:       Appearance: Normal appearance.   HENT:      Head: Normocephalic and atraumatic.   Eyes:      Extraocular Movements: Extraocular movements intact.      Pupils: Pupils are equal, round, and reactive to light.   Cardiovascular:      Pulses: Normal pulses.   Pulmonary:      Effort: Pulmonary effort is normal.   Neurological:      General: No focal deficit present.      Mental Status: He is alert.   Psychiatric:         Mood and Affect: Mood normal.     Ankle  in valgus deformity. Edema at the level of the ankle. Deformity noted at the ankle.  Dorsalis pedis posterior pulses 2+.  Negative Homans' sign, right.    Adequate sharp dull and proprioception.  No open lesions no increasing warmth.  Gait examination antalgic.    Results Review:  Lab Results (last 24 hours)     Procedure Component Value Units Date/Time    POC Glucose Once [[id number]]  (Abnormal) Collected: 05/15/23 1120    Specimen: Blood Updated: 05/15/23 1131     Glucose 226 mg/dL      Comment: : [id number] Janet Shift NetworkyMeter ID: [id number]       Vancomycin, Trough Please draw 30-60 minutes prior to 1200 dose. [[id number]]  (Normal) Collected: 05/15/23 1043    Specimen: Blood Updated: 05/15/23 1108     Vancomycin Trough 17.70 mcg/mL     POC Glucose Once [[id number]]  (Abnormal) Collected: 05/15/23 0727    Specimen: Blood Updated: 05/15/23 0744     Glucose 188 mg/dL      Comment: : [id number] Tyrone MorganMeter ID: [id number]       Blood Culture - Blood, Arm, Left [[id number]]  (Normal) Collected: 05/13/23 1845    Specimen: Blood from Arm, Left Updated: 05/14/23 1915     Blood Culture No growth at 24 hours    Blood Culture - Blood, Wrist, Right [[id number]]  (Normal) Collected: 05/13/23 1845    Specimen: Blood from Wrist, Right Updated: 05/14/23 1915     Blood Culture No growth at 24 hours    POC Glucose Once [[id number]]  (Abnormal) Collected: 05/14/23 1647    Specimen: Blood Updated: 05/14/23 1659     Glucose 184 mg/dL       Comment: : [id number] Babatunde Knapp ID: [id number]       POC Glucose Once [[id number]]  (Abnormal) Collected: 05/14/23 1307    Specimen: Blood Updated: 05/14/23 1318     Glucose 271 mg/dL      Comment: : [id number] Mian Christianson ID: [id number]               Assessment & Plan       Nonunion after arthrodesis    Retained orthopedic hardware    Chronic right ankle pain    History of trauma injury February 4, 2022 involving ankle fracture, right    Patient's condition was discussed.  His x-rays were reviewed.  Treatment options reviewed.  Patient's case reviewed with Dr. Reddy.  Patients recent x-rays reviewed.  Patient clinically has no evidence of infection.  There is no open wound, no increasing warmth.  Edema and deformity are due to patients history of nonunion.  X-ray changes consistent with chronic micro motion due to ankle nonunion.  Dr. Reddy to follow on outpatient basis.  Awaiting workers comp to approve planned surgical procedure.  Ok from Dr. Reddy standpoint to discharge and follow-up on outpatient basis in regards to his ankle.     We have previously discussed below the knee amputation as primary treatment allowing him to proceed with the prosthesis and return to relatively normal life in 3 to 6 months.  We also discussed removal hardware and ankle and subtalar joint arthrodesis.  Risk and benefits were discussed and reviewed.  We did discuss intraoperatively if there was any evidence of infection or significant deterioration of the talus debriding nonviable bone and placing a cement spacer with an external fixator. This would  then require return back to the OR for a second procedure with plan for custom cage packed with bone graft with intramedullary fixation.  Risk benefits were discussed and reviewed.  Does understand that there is still a chance for nonunion with continued pain, infection need for further surgery including below the knee amputation.        Rubi Shelby,  MEGHANN  05/15/23  12:13 CDT

## 2023-05-15 NOTE — DISCHARGE SUMMARY
[facility]  DISCHARGE SUMMARY       Date of Admission: 5/13/2023  Date of Discharge:  5/15/2023  Primary Care Physician: Provider, No Known    Presenting Problem/History of Present Illness:  Confusion    Final Discharge Diagnoses:  Active Hospital Problems    Diagnosis    • **Hypoglycemia    • DM2 (diabetes mellitus, type 2)    • HTN (hypertension)    • Retained orthopedic hardware        Consults:   #1 Dr. Reddy, podiatry     Procedures Performed: none    Pertinent Test Results:       Imaging Results (All)     Procedure Component Value Units Date/Time    XR Foot 3+ View Right [[id number]] Collected: 05/13/23 1947     Updated: 05/13/23 1957    Narrative:      XR FOOT 3+ VW RIGHT- 5/13/2023 7:05 PM CDT     HISTORY: increasing pain, hx diabetic wound     COMPARISON: None      FINDINGS:     Frontal, lateral and oblique radiographs of the right foot were provided  for review.      No acute fracture or malalignment in the forefoot or midfoot. Previous  ankle fracture open reduction internal fixation. Notable soft tissue  swelling around the ankle and there is loosening of both the medial and  lateral sided hardware. There is also an exuberant periostitis  surrounding the distal fibula and tibia, all of which are concerning for  osteomyelitis and septic loosening.       Impression:      1. Previous ankle fracture with open reduction internal fixation.  Radiographs are concerning for osteomyelitis with septic hardware  loosening.  This report was finalized on 05/13/2023 19:54 by Dr Filiberto Rogers, .    XR Chest 1 View [[id number]] Collected: 05/13/23 1945     Updated: 05/13/23 1950    Narrative:      Frontal upright radiograph of the chest 5/13/2023 7:05 PM CDT     HISTORY: Altered metal status     COMPARISON: None.     FINDINGS:   The lungs are clear. The cardiomediastinal silhouette and pulmonary  vascularity are within normal limits.      Right shoulder rotator cuff  arthropathy. No acute bony abnormality.       Impression:      1. No radiographic evidence of acute cardiopulmonary process.        This report was finalized on 05/13/2023 19:47 by Dr Filiberto Rogers, .    CT Head Without Contrast [[id number]] Collected: 05/13/23 1934     Updated: 05/13/23 1941    Narrative:      CT HEAD WO CONTRAST- 5/13/2023 7:02 PM CDT     HISTORY: sudden AMS at 1600 yesterday, headache       DOSE LENGTH PRODUCT: 679 mGy cm. Automated exposure control was also  utilized to decrease patient radiation dose.     Technique:   Axial CT of the brain without IV contrast. Sagittal and coronal  reformations are also provided for review. Soft tissue and bone kernels  are available for interpretation.     Comparison: None.     Findings:      There is no evidence of acute large vascular territory infarct. No  intra-axial or extra-axial hemorrhage. No visualized mass lesion or mass  effect. The ventricles, cortical sulci and basal cisterns are symmetric  and age appropriate.  Posterior fossa structures are unremarkable. The  scalp and calvarium are intact. Chronic paranasal sinus disease.       Impression:      Impression:    1. No acute intracranial process.  This report was finalized on 05/13/2023 19:38 by Dr Filiberto Rogers, .        LAB RESULTS:      Lab 05/14/23  0324 05/13/23  1845   WBC 10.79 11.10*   HEMOGLOBIN 8.6* 9.1*   HEMATOCRIT 27.7* 29.2*   PLATELETS 282 270   NEUTROS ABS 7.57* 9.09*   IMMATURE GRANS (ABS) 0.05 0.03   LYMPHS ABS 2.30 1.16   MONOS ABS 0.75 0.74   EOS ABS 0.08 0.03   MCV 89.4 91.0   SED RATE  --  55*   CRP  --  9.79*   PROCALCITONIN  --  0.06   LACTATE  --  0.7         Lab 05/14/23  0324 05/13/23  1845   SODIUM 142 142   POTASSIUM 4.7 4.5   CHLORIDE 109* 107   CO2 20.0* 21.0*   ANION GAP 13.0 14.0   BUN 37* 39*   CREATININE 0.91 1.12   EGFR 98.3 76.6   GLUCOSE 98 37*   CALCIUM 8.2* 9.4   MAGNESIUM  --  2.3   HEMOGLOBIN A1C  --  5.30   TSH  --  1.710         Lab 05/14/23 0324  05/13/23 1845   TOTAL PROTEIN 6.1 7.8   ALBUMIN 3.1* 4.2   GLOBULIN 3.0 3.6   ALT (SGPT) 15 19   AST (SGOT) 13 19   BILIRUBIN 0.2 0.4   ALK PHOS 138* 182*   LIPASE  --  20                     Lab 05/13/23  1854   PH, ARTERIAL 7.328*   PCO2, ARTERIAL 37.8   PO2 ART 88.4   O2 SATURATION ART 94.7   HCO3 ART 19.8*   BASE EXCESS ART -5.7*     Brief Urine Lab Results  (Last result in the past 365 days)      Color   Clarity   Blood   Leuk Est   Nitrite   Protein   CREAT   Urine HCG        05/13/23 1935 Yellow   Clear   Negative   Negative   Negative   >=300 mg/dL (3+)               Microbiology Results (last 10 days)     Procedure Component Value - Date/Time    COVID-19, FLU A/B, RSV PCR - Swab, Nasopharynx [[id number]]  (Normal) Collected: 05/13/23 1927    Lab Status: Final result Specimen: Swab from Nasopharynx Updated: 05/13/23 2017     COVID19 Not Detected     Influenza A PCR Not Detected     Influenza B PCR Not Detected     RSV, PCR Not Detected    Narrative:      Fact sheet for providers: https://www.fda.gov/media/293664/download    Fact sheet for patients: https://www.fda.gov/media/268749/download    Test performed by PCR.    Blood Culture - Blood, Arm, Left [[id number]]  (Normal) Collected: 05/13/23 1845    Lab Status: Preliminary result Specimen: Blood from Arm, Left Updated: 05/14/23 1915     Blood Culture No growth at 24 hours    Blood Culture - Blood, Wrist, Right [[id number]]  (Normal) Collected: 05/13/23 1845    Lab Status: Preliminary result Specimen: Blood from Wrist, Right Updated: 05/14/23 1915     Blood Culture No growth at 24 hours          Hospital Course:   Patient is a 58-year-old male with a history of hypertension and diabetes currently on 3 oral diabetes medications and not on blood pressure meds.  He also has a history of right ankle injury post surgical intervention which has failed with malunion of hardware.  He follows with podiatry.  He presents to the ER on 5/14 for confusion.  Initially  "family thought maybe he was acting off from his pain medications but then they found him to be hypoglycemic with a sugar of 40.  He ate got little better but then became confused again.  Presented here to the ER but still hypoglycemic.  Metabolically there is no signs of significant infection but there was some concern on imaging of his ankle and out the hardware potentially had infection or bone infection.  He was admitted to the hospital overnight for observation and started on some antibiotics.  Patient has not had any further hypoglycemia.  He states he does not typically have problems with this at home but also admits he does not keep a close eye on his sugar.  Of note his A1c was only 5.3.  Medically he has done well and recovered from the hypoglycemia standpoint.  His blood pressure has been running a little bit elevated in the 150s here.  He states he used to be on meds but no longer is taking any.  He is agreeable to starting a low-dose lisinopril and following up with his primary doctor for this.  We will give him lisinopril 5.  Otherwise he was seen by podiatry today who feel all of his imaging is consistent with known malunion and failure of his hardware.  There is no suspect infection or osteomyelitis at this time.  From a podiatry/orthopedic standpoint has been cleared for discharge home with outpatient follow-up.  He is ready on the schedule for intervention [facility]  We will discharge him home with follow-up by PCP and orthopedics.      Physical Exam on Discharge:  /81 (BP Location: Right arm, Patient Position: Lying)   Pulse 81   Temp 97.6 °F (36.4 °C) (Oral)   Resp 16   Ht 180.3 cm (71\")   Wt 86 kg (189 lb 9.6 oz)   SpO2 96%   BMI 26.44 kg/m²   Physical Exam  GEN: Awake, alert, interactive, in NAD  HEENT: PERRLA, EOMI, Anicteric, Trachea midline  Lungs: no wheezing/rales/rhonchi  Heart: RRR, +S1/s2, no rub  ABD: soft, nt/nd, +BS, no " guarding/rebound  Extremities: R ankle deformity,  No pitting edema  Skin: healed incisions R ankle, no erythema, no open wound, no drainage  Neuro: AAOx3, no focal deficits  Psych: normal mood & affect      Condition on Discharge: stable/improved    Discharge Disposition:  Home or Self Care    Discharge Medications:     Discharge Medications      New Medications      Instructions Start Date   lisinopril 5 MG tablet  Commonly known as: PRINIVIL,ZESTRIL   5 mg, Oral, Every 24 Hours Scheduled         Continue These Medications      Instructions Start Date   HYDROcodone-acetaminophen  MG per tablet  Commonly known as: NORCO   1 tablet, Oral, Every 8 Hours PRN      metFORMIN 1000 MG tablet  Commonly known as: GLUCOPHAGE   1,000 mg, Oral, 2 Times Daily With Meals      pregabalin 50 MG capsule  Commonly known as: LYRICA   50 mg, Oral, 3 Times Daily         Stop These Medications    dapagliflozin Propanediol 10 MG tablet     glimepiride 2 MG tablet  Commonly known as: AMARYL            Discharge Diet:    Dietary Orders (From admission, onward)     Start     Ordered    05/14/23 0040  Diet: Cardiac Diets, Diabetic Diets; Healthy Heart (2-3 Na+); Consistent Carbohydrate; Texture: Regular Texture (IDDSI 7); Fluid Consistency: Thin (IDDSI 0)  Diet Effective Now        References:    Diet Order [place]   Question Answer Comment   Diets: Cardiac Diets    Diets: Diabetic Diets    Cardiac Diet: Healthy Heart (2-3 Na+)    Diabetic Diet: Consistent Carbohydrate    Texture: Regular Texture (IDDSI 7)    Fluid Consistency: Thin (IDDSI 0)        05/14/23 0041                  Activity at Discharge:    As prior    Follow-up Appointments:   No future appointments.    Test Results Pending at Discharge: none    Electronically signed by Moy Trujillo DO, 05/15/23, 18:28 CDT.    Time: 35 minutes.

## 2023-05-15 NOTE — PLAN OF CARE
Goal Outcome Evaluation:  Plan of Care Reviewed With: patient        Progress: improving  Outcome Evaluation: Pt has had no c/o pain this shift.  Scheduled lyrica given.  Pt sleeping between care.  Right ankle much larger than left.  PPP.  Wound on anterior left foot RIKKI.  Pt self turning.

## 2023-05-16 NOTE — PAYOR COMM NOTE
"REF:    [id number]     [facility]  [place],  [phone]  OR  FAX   [phone]    Garrett Coates (58 y.o. Male)       Date of Birth   1965    Social Security Number       Address   [address] [place] [address]    Home Phone   [phone]    MRN   [id number]       Jainism   Synagogue    Marital Status                               Admission Date   5/13/23    Admission Type   Emergency    Admitting Provider   Moy Trujillo DO    Attending Provider       Department, Room/Bed   [facility] 3A, 338/1       Discharge Date   5/15/2023    Discharge Disposition   Home or Self Care    Discharge Destination                                 Attending Provider: (none)   Allergies: No Known Allergies    Isolation: None   Infection: None   Code Status: Prior    Ht: 180.3 cm (71\")   Wt: 86 kg (189 lb 9.6 oz)    Admission Cmt: None   Principal Problem: Hypoglycemia [E16.2]                   Active Insurance as of 5/13/2023       Primary Coverage       Payor Plan Insurance Group Employer/Plan Group    WORKERS COMPENSATION MISC WORKERS COMPENSATION        Coverage Address Coverage Phone Number Coverage Fax Number Effective Dates    [address] [phone]  2/4/2022 - None Entered    [place] [address]         Subscriber Name Subscriber Birth Date Member ID       GARRETT COATES [birth year] [id number]               Secondary Coverage       Payor Plan Insurance Group Employer/Plan Group    [employer] HMO [id number]       Payor Plan Address Payor Plan Phone Number Payor Plan Fax Number Effective Dates    [address] [phone]  5/1/2022 - None Entered    [facility] [id number]         Subscriber Name Subscriber Birth Date Member ID       JAXONGARRETT 1965 [id number]                     Emergency Contacts        (Rel.) Home Phone Work Phone Mobile Phone    Jaxondann (Spouse) -- -- [phone]                 Discharge Summary    "     Moy Trujillo, DO at 05/15/23 1828                [facility]  DISCHARGE SUMMARY       Date of Admission: 5/13/2023  Date of Discharge:  5/15/2023  Primary Care Physician: Provider, No Known    Presenting Problem/History of Present Illness:  Confusion    Final Discharge Diagnoses:  Active Hospital Problems    Diagnosis     **Hypoglycemia     DM2 (diabetes mellitus, type 2)     HTN (hypertension)     Retained orthopedic hardware        Consults:   #1 Dr. Reddy, podiatry     Procedures Performed: none    Pertinent Test Results:       Imaging Results (All)       Procedure Component Value Units Date/Time    XR Foot 3+ View Right [[id number]] Collected: 05/13/23 1947     Updated: 05/13/23 1957    Narrative:      XR FOOT 3+ VW RIGHT- 5/13/2023 7:05 PM CDT     HISTORY: increasing pain, hx diabetic wound     COMPARISON: None      FINDINGS:     Frontal, lateral and oblique radiographs of the right foot were provided  for review.      No acute fracture or malalignment in the forefoot or midfoot. Previous  ankle fracture open reduction internal fixation. Notable soft tissue  swelling around the ankle and there is loosening of both the medial and  lateral sided hardware. There is also an exuberant periostitis  surrounding the distal fibula and tibia, all of which are concerning for  osteomyelitis and septic loosening.       Impression:      1. Previous ankle fracture with open reduction internal fixation.  Radiographs are concerning for osteomyelitis with septic hardware  loosening.  This report was finalized on 05/13/2023 19:54 by Dr Filiberto Rogers, .    XR Chest 1 View [[id number]] Collected: 05/13/23 1945     Updated: 05/13/23 1950    Narrative:      Frontal upright radiograph of the chest 5/13/2023 7:05 PM CDT     HISTORY: Altered metal status     COMPARISON: None.     FINDINGS:   The lungs are clear. The cardiomediastinal silhouette and pulmonary  vascularity are within  normal limits.      Right shoulder rotator cuff arthropathy. No acute bony abnormality.       Impression:      1. No radiographic evidence of acute cardiopulmonary process.        This report was finalized on 05/13/2023 19:47 by Dr Filiberto Rogers, .    CT Head Without Contrast [[id number]] Collected: 05/13/23 1934     Updated: 05/13/23 1941    Narrative:      CT HEAD WO CONTRAST- 5/13/2023 7:02 PM CDT     HISTORY: sudden AMS at 1600 yesterday, headache       DOSE LENGTH PRODUCT: 679 mGy cm. Automated exposure control was also  utilized to decrease patient radiation dose.     Technique:   Axial CT of the brain without IV contrast. Sagittal and coronal  reformations are also provided for review. Soft tissue and bone kernels  are available for interpretation.     Comparison: None.     Findings:      There is no evidence of acute large vascular territory infarct. No  intra-axial or extra-axial hemorrhage. No visualized mass lesion or mass  effect. The ventricles, cortical sulci and basal cisterns are symmetric  and age appropriate.  Posterior fossa structures are unremarkable. The  scalp and calvarium are intact. Chronic paranasal sinus disease.       Impression:      Impression:    1. No acute intracranial process.  This report was finalized on 05/13/2023 19:38 by Dr Filiberto Rogers, .          LAB RESULTS:      Lab 05/14/23  0324 05/13/23  1845   WBC 10.79 11.10*   HEMOGLOBIN 8.6* 9.1*   HEMATOCRIT 27.7* 29.2*   PLATELETS 282 270   NEUTROS ABS 7.57* 9.09*   IMMATURE GRANS (ABS) 0.05 0.03   LYMPHS ABS 2.30 1.16   MONOS ABS 0.75 0.74   EOS ABS 0.08 0.03   MCV 89.4 91.0   SED RATE  --  55*   CRP  --  9.79*   PROCALCITONIN  --  0.06   LACTATE  --  0.7         Lab 05/14/23  0324 05/13/23  1845   SODIUM 142 142   POTASSIUM 4.7 4.5   CHLORIDE 109* 107   CO2 20.0* 21.0*   ANION GAP 13.0 14.0   BUN 37* 39*   CREATININE 0.91 1.12   EGFR 98.3 76.6   GLUCOSE 98 37*   CALCIUM 8.2* 9.4   MAGNESIUM  --  2.3   HEMOGLOBIN A1C  --  5.30    TSH  --  1.710         Lab 05/14/23  0324 05/13/23 1845   TOTAL PROTEIN 6.1 7.8   ALBUMIN 3.1* 4.2   GLOBULIN 3.0 3.6   ALT (SGPT) 15 19   AST (SGOT) 13 19   BILIRUBIN 0.2 0.4   ALK PHOS 138* 182*   LIPASE  --  20                     Lab 05/13/23  1854   PH, ARTERIAL 7.328*   PCO2, ARTERIAL 37.8   PO2 ART 88.4   O2 SATURATION ART 94.7   HCO3 ART 19.8*   BASE EXCESS ART -5.7*     Brief Urine Lab Results  (Last result in the past 365 days)        Color   Clarity   Blood   Leuk Est   Nitrite   Protein   CREAT   Urine HCG        05/13/23 1935 Yellow   Clear   Negative   Negative   Negative   >=300 mg/dL (3+)                 Microbiology Results (last 10 days)       Procedure Component Value - Date/Time    COVID-19, FLU A/B, RSV PCR - Swab, Nasopharynx [[id number]]  (Normal) Collected: 05/13/23 1927    Lab Status: Final result Specimen: Swab from Nasopharynx Updated: 05/13/23 2017     COVID19 Not Detected     Influenza A PCR Not Detected     Influenza B PCR Not Detected     RSV, PCR Not Detected    Narrative:      Fact sheet for providers: https://www.fda.gov/media/412533/download    Fact sheet for patients: https://www.fda.gov/media/749251/download    Test performed by PCR.    Blood Culture - Blood, Arm, Left [[id number]]  (Normal) Collected: 05/13/23 1845    Lab Status: Preliminary result Specimen: Blood from Arm, Left Updated: 05/14/23 1915     Blood Culture No growth at 24 hours    Blood Culture - Blood, Wrist, Right [[id number]]  (Normal) Collected: 05/13/23 1845    Lab Status: Preliminary result Specimen: Blood from Wrist, Right Updated: 05/14/23 1915     Blood Culture No growth at 24 hours            Hospital Course:   Patient is a 58-year-old male with a history of hypertension and diabetes currently on 3 oral diabetes medications and not on blood pressure meds.  He also has a history of right ankle injury post surgical intervention which has failed with malunion of hardware.  He follows with podiatry.  He  "presents to the ER on 5/14 for confusion.  Initially family thought maybe he was acting off from his pain medications but then they found him to be hypoglycemic with a sugar of 40.  He ate got little better but then became confused again.  Presented here to the ER but still hypoglycemic.  Metabolically there is no signs of significant infection but there was some concern on imaging of his ankle and out the hardware potentially had infection or bone infection.  He was admitted to the hospital overnight for observation and started on some antibiotics.  Patient has not had any further hypoglycemia.  He states he does not typically have problems with this at home but also admits he does not keep a close eye on his sugar.  Of note his A1c was only 5.3.  Medically he has done well and recovered from the hypoglycemia standpoint.  His blood pressure has been running a little bit elevated in the 150s here.  He states he used to be on meds but no longer is taking any.  He is agreeable to starting a low-dose lisinopril and following up with his primary doctor for this.  We will give him lisinopril 5.  Otherwise he was seen by podiatry today who feel all of his imaging is consistent with known malunion and failure of his hardware.  There is no suspect infection or osteomyelitis at this time.  From a podiatry/orthopedic standpoint has been cleared for discharge home with outpatient follow-up.  He is ready on the schedule for intervention once [facility].  We will discharge him home with follow-up by PCP and orthopedics.      Physical Exam on Discharge:  /81 (BP Location: Right arm, Patient Position: Lying)   Pulse 81   Temp 97.6 °F (36.4 °C) (Oral)   Resp 16   Ht 180.3 cm (71\")   Wt 86 kg (189 lb 9.6 oz)   SpO2 96%   BMI 26.44 kg/m²   Physical Exam  GEN: Awake, alert, interactive, in NAD  HEENT: PERRLA, EOMI, Anicteric, Trachea midline  Lungs: no wheezing/rales/rhonchi  Heart: RRR, +S1/s2, no " rub  ABD: soft, nt/nd, +BS, no guarding/rebound  Extremities: R ankle deformity,  No pitting edema  Skin: healed incisions R ankle, no erythema, no open wound, no drainage  Neuro: AAOx3, no focal deficits  Psych: normal mood & affect      Condition on Discharge: stable/improved    Discharge Disposition:  Home or Self Care    Discharge Medications:     Discharge Medications        New Medications        Instructions Start Date   lisinopril 5 MG tablet  Commonly known as: PRINIVIL,ZESTRIL   5 mg, Oral, Every 24 Hours Scheduled             Continue These Medications        Instructions Start Date   HYDROcodone-acetaminophen  MG per tablet  Commonly known as: NORCO   1 tablet, Oral, Every 8 Hours PRN      metFORMIN 1000 MG tablet  Commonly known as: GLUCOPHAGE   1,000 mg, Oral, 2 Times Daily With Meals      pregabalin 50 MG capsule  Commonly known as: LYRICA   50 mg, Oral, 3 Times Daily             Stop These Medications      dapagliflozin Propanediol 10 MG tablet     glimepiride 2 MG tablet  Commonly known as: AMARYL              Discharge Diet:    Dietary Orders (From admission, onward)       Start     Ordered    05/14/23 0040  Diet: Cardiac Diets, Diabetic Diets; Healthy Heart (2-3 Na+); Consistent Carbohydrate; Texture: Regular Texture (IDDSI 7); Fluid Consistency: Thin (IDDSI 0)  Diet Effective Now        References:    Diet Order [place]   Question Answer Comment   Diets: Cardiac Diets    Diets: Diabetic Diets    Cardiac Diet: Healthy Heart (2-3 Na+)    Diabetic Diet: Consistent Carbohydrate    Texture: Regular Texture (IDDSI 7)    Fluid Consistency: Thin (IDDSI 0)        05/14/23 0041                      Activity at Discharge:    As prior    Follow-up Appointments:   No future appointments.    Test Results Pending at Discharge: none    Electronically signed by Moy Trujillo DO, 05/15/23, 18:28 CDT.    Time: 35 minutes.           Electronically signed by Moy Trujillo DO at 05/15/23 7250        Discharge Order (From admission, onward)       Start     Ordered    05/15/23 1823  Discharge patient  Once        Expected Discharge Date: 05/15/23    Discharge Disposition: Home or Self Care    Physician of Record for Attribution - Please select from Treatment Team: TERESA CLAYTON [[id number]]    Review needed by CMO to determine Physician of Record: No       Question Answer Comment   Physician of Record for Attribution - Please select from Treatment Team TERESA CLAYTON    Review needed by CMO to determine Physician of Record No        05/15/23 1827

## 2023-05-16 NOTE — PAYOR COMM NOTE
"REF:     claim [id number]     [facility]  [place],   [phone]  OR  FAX   [phone]    Garrett Coates (58 y.o. Male)       Date of Birth   1965    Social Security Number       Address   [address] [place] [address]    Home Phone   [phone]    MRN   [id number]       Hoahaoism   Adventism    Marital Status                               Admission Date   5/13/23    Admission Type   Emergency    Admitting Provider   Moy Trujillo DO    Attending Provider       Department, Room/Bed   [facility] 3A, 338/1       Discharge Date   5/15/2023    Discharge Disposition   Home or Self Care    Discharge Destination                                 Attending Provider: (none)   Allergies: No Known Allergies    Isolation: None   Infection: None   Code Status: Prior    Ht: 180.3 cm (71\")   Wt: 86 kg (189 lb 9.6 oz)    Admission Cmt: None   Principal Problem: Hypoglycemia [E16.2]                   Active Insurance as of 5/13/2023       Primary Coverage       Payor Plan Insurance Group Employer/Plan Group    WORKERS COMPENSATION MISC WORKERS COMPENSATION        Coverage Address Coverage Phone Number Coverage Fax Number Effective Dates    [address] [phone]  2/4/2022 - None Entered    [place] [id number]         Subscriber Name Subscriber Birth Date Member ID       GARRETT COATES [birth year] [id number]               Secondary Coverage       Payor Plan Insurance Group Employer/Plan Group    [employer] HMO [id number]       Payor Plan Address Payor Plan Phone Number Payor Plan Fax Number Effective Dates    [phone]  5/1/2022 - None Entered    [facility] [id number]         Subscriber Name Subscriber Birth Date Member ID       JAXONGARRETT 1965 [id number]                     Emergency Contacts        (Rel.) Home Phone Work Phone Mobile Phone    dann Coates (Spouse) -- -- [phone]                 Discharge " Summary        Moy Trujillo, DO at 05/15/23 1828                [facility] Medicine Services  DISCHARGE SUMMARY       Date of Admission: 5/13/2023  Date of Discharge:  5/15/2023  Primary Care Physician: Provider, No Known    Presenting Problem/History of Present Illness:  Confusion    Final Discharge Diagnoses:  Active Hospital Problems    Diagnosis     **Hypoglycemia     DM2 (diabetes mellitus, type 2)     HTN (hypertension)     Retained orthopedic hardware        Consults:   #1 Dr. Reddy, podiatry     Procedures Performed: none    Pertinent Test Results:       Imaging Results (All)       Procedure Component Value Units Date/Time    XR Foot 3+ View Right [[id number]] Collected: 05/13/23 1947     Updated: 05/13/23 1957    Narrative:      XR FOOT 3+ VW RIGHT- 5/13/2023 7:05 PM CDT     HISTORY: increasing pain, hx diabetic wound     COMPARISON: None      FINDINGS:     Frontal, lateral and oblique radiographs of the right foot were provided  for review.      No acute fracture or malalignment in the forefoot or midfoot. Previous  ankle fracture open reduction internal fixation. Notable soft tissue  swelling around the ankle and there is loosening of both the medial and  lateral sided hardware. There is also an exuberant periostitis  surrounding the distal fibula and tibia, all of which are concerning for  osteomyelitis and septic loosening.       Impression:      1. Previous ankle fracture with open reduction internal fixation.  Radiographs are concerning for osteomyelitis with septic hardware  loosening.  This report was finalized on 05/13/2023 19:54 by Dr Filiberto Rogers, .    XR Chest 1 View [[id number]] Collected: 05/13/23 1945     Updated: 05/13/23 1950    Narrative:      Frontal upright radiograph of the chest 5/13/2023 7:05 PM CDT     HISTORY: Altered metal status     COMPARISON: None.     FINDINGS:   The lungs are clear. The cardiomediastinal silhouette and pulmonary  vascularity  are within normal limits.      Right shoulder rotator cuff arthropathy. No acute bony abnormality.       Impression:      1. No radiographic evidence of acute cardiopulmonary process.        This report was finalized on 05/13/2023 19:47 by Dr Filiberto Rogers, .    CT Head Without Contrast [[id number]] Collected: 05/13/23 1934     Updated: 05/13/23 1941    Narrative:      CT HEAD WO CONTRAST- 5/13/2023 7:02 PM CDT     HISTORY: sudden AMS at 1600 yesterday, headache       DOSE LENGTH PRODUCT: 679 mGy cm. Automated exposure control was also  utilized to decrease patient radiation dose.     Technique:   Axial CT of the brain without IV contrast. Sagittal and coronal  reformations are also provided for review. Soft tissue and bone kernels  are available for interpretation.     Comparison: None.     Findings:      There is no evidence of acute large vascular territory infarct. No  intra-axial or extra-axial hemorrhage. No visualized mass lesion or mass  effect. The ventricles, cortical sulci and basal cisterns are symmetric  and age appropriate.  Posterior fossa structures are unremarkable. The  scalp and calvarium are intact. Chronic paranasal sinus disease.       Impression:      Impression:    1. No acute intracranial process.  This report was finalized on 05/13/2023 19:38 by Dr Filiberto Roegrs, .          LAB RESULTS:      Lab 05/14/23  0324 05/13/23  1845   WBC 10.79 11.10*   HEMOGLOBIN 8.6* 9.1*   HEMATOCRIT 27.7* 29.2*   PLATELETS 282 270   NEUTROS ABS 7.57* 9.09*   IMMATURE GRANS (ABS) 0.05 0.03   LYMPHS ABS 2.30 1.16   MONOS ABS 0.75 0.74   EOS ABS 0.08 0.03   MCV 89.4 91.0   SED RATE  --  55*   CRP  --  9.79*   PROCALCITONIN  --  0.06   LACTATE  --  0.7         Lab 05/14/23  0324 05/13/23  1845   SODIUM 142 142   POTASSIUM 4.7 4.5   CHLORIDE 109* 107   CO2 20.0* 21.0*   ANION GAP 13.0 14.0   BUN 37* 39*   CREATININE 0.91 1.12   EGFR 98.3 76.6   GLUCOSE 98 37*   CALCIUM 8.2* 9.4   MAGNESIUM  --  2.3   HEMOGLOBIN A1C   --  5.30   TSH  --  1.710         Lab 05/14/23  0324 05/13/23 1845   TOTAL PROTEIN 6.1 7.8   ALBUMIN 3.1* 4.2   GLOBULIN 3.0 3.6   ALT (SGPT) 15 19   AST (SGOT) 13 19   BILIRUBIN 0.2 0.4   ALK PHOS 138* 182*   LIPASE  --  20                     Lab 05/13/23  1854   PH, ARTERIAL 7.328*   PCO2, ARTERIAL 37.8   PO2 ART 88.4   O2 SATURATION ART 94.7   HCO3 ART 19.8*   BASE EXCESS ART -5.7*     Brief Urine Lab Results  (Last result in the past 365 days)        Color   Clarity   Blood   Leuk Est   Nitrite   Protein   CREAT   Urine HCG        05/13/23 1935 Yellow   Clear   Negative   Negative   Negative   >=300 mg/dL (3+)                 Microbiology Results (last 10 days)       Procedure Component Value - Date/Time    COVID-19, FLU A/B, RSV PCR - Swab, Nasopharynx [[id number]]  (Normal) Collected: 05/13/23 1927    Lab Status: Final result Specimen: Swab from Nasopharynx Updated: 05/13/23 2017     COVID19 Not Detected     Influenza A PCR Not Detected     Influenza B PCR Not Detected     RSV, PCR Not Detected    Narrative:      Fact sheet for providers: https://www.fda.gov/media/311490/download    Fact sheet for patients: https://www.fda.gov/media/049923/download    Test performed by PCR.    Blood Culture - Blood, Arm, Left [[id number]]  (Normal) Collected: 05/13/23 1845    Lab Status: Preliminary result Specimen: Blood from Arm, Left Updated: 05/14/23 1915     Blood Culture No growth at 24 hours    Blood Culture - Blood, Wrist, Right [[id number]]  (Normal) Collected: 05/13/23 1845    Lab Status: Preliminary result Specimen: Blood from Wrist, Right Updated: 05/14/23 1915     Blood Culture No growth at 24 hours            Hospital Course:   Patient is a 58-year-old male with a history of hypertension and diabetes currently on 3 oral diabetes medications and not on blood pressure meds.  He also has a history of right ankle injury post surgical intervention which has failed with malunion of hardware.  He follows with podiatry.   "He presents to the ER on 5/14 for confusion.  Initially family thought maybe he was acting off from his pain medications but then they found him to be hypoglycemic with a sugar of 40.  He ate got little better but then became confused again.  Presented here to the ER but still hypoglycemic.  Metabolically there is no signs of significant infection but there was some concern on imaging of his ankle and out the hardware potentially had infection or bone infection.  He was admitted to the hospital overnight for observation and started on some antibiotics.  Patient has not had any further hypoglycemia.  He states he does not typically have problems with this at home but also admits he does not keep a close eye on his sugar.  Of note his A1c was only 5.3.  Medically he has done well and recovered from the hypoglycemia standpoint.  His blood pressure has been running a little bit elevated in the 150s here.  He states he used to be on meds but no longer is taking any.  He is agreeable to starting a low-dose lisinopril and following up with his primary doctor for this.  We will give him lisinopril 5.  Otherwise he was seen by podiatry today who feel all of his imaging is consistent with known malunion and failure of his hardware.  There is no suspect infection or osteomyelitis at this time.  From a podiatry/orthopedic standpoint has been cleared for discharge home with outpatient follow-up.  He is ready on the schedule for intervention once approved by Workmen's Comp.  We will discharge him home with follow-up by PCP and orthopedics.      Physical Exam on Discharge:  /81 (BP Location: Right arm, Patient Position: Lying)   Pulse 81   Temp 97.6 °F (36.4 °C) (Oral)   Resp 16   Ht 180.3 cm (71\")   Wt 86 kg (189 lb 9.6 oz)   SpO2 96%   BMI 26.44 kg/m²   Physical Exam  GEN: Awake, alert, interactive, in NAD  HEENT: PERRLA, EOMI, Anicteric, Trachea midline  Lungs: no wheezing/rales/rhonchi  Heart: RRR, +S1/s2, no " rub  ABD: soft, nt/nd, +BS, no guarding/rebound  Extremities: R ankle deformity,  No pitting edema  Skin: healed incisions R ankle, no erythema, no open wound, no drainage  Neuro: AAOx3, no focal deficits  Psych: normal mood & affect      Condition on Discharge: stable/improved    Discharge Disposition:  Home or Self Care    Discharge Medications:     Discharge Medications        New Medications        Instructions Start Date   lisinopril 5 MG tablet  Commonly known as: PRINIVIL,ZESTRIL   5 mg, Oral, Every 24 Hours Scheduled             Continue These Medications        Instructions Start Date   HYDROcodone-acetaminophen  MG per tablet  Commonly known as: NORCO   1 tablet, Oral, Every 8 Hours PRN      metFORMIN 1000 MG tablet  Commonly known as: GLUCOPHAGE   1,000 mg, Oral, 2 Times Daily With Meals      pregabalin 50 MG capsule  Commonly known as: LYRICA   50 mg, Oral, 3 Times Daily             Stop These Medications      dapagliflozin Propanediol 10 MG tablet     glimepiride 2 MG tablet  Commonly known as: AMARYL              Discharge Diet:    Dietary Orders (From admission, onward)       Start     Ordered    05/14/23 0040  Diet: Cardiac Diets, Diabetic Diets; Healthy Heart (2-3 Na+); Consistent Carbohydrate; Texture: Regular Texture (IDDSI 7); Fluid Consistency: Thin (IDDSI 0)  Diet Effective Now        References:    Diet Order [facility]   Question Answer Comment   Diets: Cardiac Diets    Diets: Diabetic Diets    Cardiac Diet: Healthy Heart (2-3 Na+)    Diabetic Diet: Consistent Carbohydrate    Texture: Regular Texture (IDDSI 7)    Fluid Consistency: Thin (IDDSI 0)        05/14/23 0041                      Activity at Discharge:    As prior    Follow-up Appointments:   No future appointments.    Test Results Pending at Discharge: none    Electronically signed by Moy Trujillo DO, 05/15/23, 18:28 CDT.    Time: 35 minutes.           Electronically signed by Moy Trujillo DO at 05/15/23 6349        Discharge Order (From admission, onward)       Start     Ordered    05/15/23 1823  Discharge patient  Once        Expected Discharge Date: 05/15/23    Discharge Disposition: Home or Self Care    Physician of Record for Attribution - Please select from Treatment Team: TERESA CLAYTON [[id number]]    Review needed by CMO to determine Physician of Record: No       Question Answer Comment   Physician of Record for Attribution - Please select from Treatment Team TERESA CLAYTON    Review needed by CMO to determine Physician of Record No        05/15/23 1827

## 2023-05-18 LAB
BACTERIA SPEC AEROBE CULT: NORMAL
BACTERIA SPEC AEROBE CULT: NORMAL

## 2024-03-11 ENCOUNTER — APPOINTMENT (OUTPATIENT)
Dept: CARDIOLOGY | Facility: HOSPITAL | Age: 59
DRG: 641 | End: 2024-03-11
Payer: COMMERCIAL

## 2024-03-11 ENCOUNTER — APPOINTMENT (OUTPATIENT)
Dept: GENERAL RADIOLOGY | Facility: HOSPITAL | Age: 59
DRG: 641 | End: 2024-03-11
Payer: COMMERCIAL

## 2024-03-11 ENCOUNTER — APPOINTMENT (OUTPATIENT)
Dept: CT IMAGING | Facility: HOSPITAL | Age: 59
DRG: 641 | End: 2024-03-11
Payer: COMMERCIAL

## 2024-03-11 ENCOUNTER — HOSPITAL ENCOUNTER (INPATIENT)
Facility: HOSPITAL | Age: 59
LOS: 4 days | Discharge: HOME | DRG: 641 | End: 2024-03-15
Attending: FAMILY MEDICINE | Admitting: FAMILY MEDICINE
Payer: COMMERCIAL

## 2024-03-11 DIAGNOSIS — E87.70 HYPERVOLEMIA, UNSPECIFIED HYPERVOLEMIA TYPE: Primary | ICD-10-CM

## 2024-03-11 DIAGNOSIS — I48.0 PAF (PAROXYSMAL ATRIAL FIBRILLATION): ICD-10-CM

## 2024-03-11 DIAGNOSIS — I48.91 RATE CONTROLLED ATRIAL FIBRILLATION: ICD-10-CM

## 2024-03-11 DIAGNOSIS — R91.1 LEFT LOWER LOBE PULMONARY NODULE: ICD-10-CM

## 2024-03-11 DIAGNOSIS — R18.8 OTHER ASCITES: ICD-10-CM

## 2024-03-11 PROBLEM — R53.1 WEAKNESS: Status: ACTIVE | Noted: 2024-03-11

## 2024-03-11 PROBLEM — N50.89 SCROTAL EDEMA: Status: ACTIVE | Noted: 2024-03-11

## 2024-03-11 PROBLEM — D64.9 SYMPTOMATIC ANEMIA: Status: ACTIVE | Noted: 2024-03-11

## 2024-03-11 PROBLEM — R26.81 GAIT INSTABILITY: Status: ACTIVE | Noted: 2024-03-11

## 2024-03-11 PROBLEM — N17.9 AKI (ACUTE KIDNEY INJURY): Status: ACTIVE | Noted: 2024-03-11

## 2024-03-11 PROBLEM — E11.65 TYPE 2 DIABETES MELLITUS WITH HYPERGLYCEMIA, WITHOUT LONG-TERM CURRENT USE OF INSULIN: Status: ACTIVE | Noted: 2023-05-15

## 2024-03-11 PROBLEM — D50.9 IRON DEFICIENCY ANEMIA: Status: ACTIVE | Noted: 2024-03-11

## 2024-03-11 PROBLEM — R60.1 ANASARCA: Status: ACTIVE | Noted: 2024-03-11

## 2024-03-11 PROBLEM — R79.89 ELEVATED BRAIN NATRIURETIC PEPTIDE (BNP) LEVEL: Status: ACTIVE | Noted: 2024-03-11

## 2024-03-11 LAB
ALBUMIN SERPL-MCNC: 3.2 G/DL (ref 3.5–5.2)
ALBUMIN/GLOB SERPL: 1 G/DL
ALP SERPL-CCNC: 192 U/L (ref 39–117)
ALT SERPL W P-5'-P-CCNC: 24 U/L (ref 1–41)
ANION GAP SERPL CALCULATED.3IONS-SCNC: 10 MMOL/L (ref 5–15)
AST SERPL-CCNC: 32 U/L (ref 1–40)
BASOPHILS # BLD AUTO: 0.05 10*3/MM3 (ref 0–0.2)
BASOPHILS NFR BLD AUTO: 0.5 % (ref 0–1.5)
BH CV ECHO LEFT VENTRICLE GLOBAL LONGITUDINAL STRAIN: -8.2 %
BH CV ECHO MEAS - AO MAX PG: 5.5 MMHG
BH CV ECHO MEAS - AO MEAN PG: 3 MMHG
BH CV ECHO MEAS - AO V2 MAX: 117 CM/SEC
BH CV ECHO MEAS - AO V2 VTI: 23.7 CM
BH CV ECHO MEAS - AVA(I,D): 3.1 CM2
BH CV ECHO MEAS - EDV(CUBED): 103.8 ML
BH CV ECHO MEAS - EDV(MOD-SP4): 82.4 ML
BH CV ECHO MEAS - EF(MOD-SP4): 63.8 %
BH CV ECHO MEAS - ESV(MOD-SP4): 29.8 ML
BH CV ECHO MEAS - IVS/LVPW: 1.25 CM
BH CV ECHO MEAS - IVSD: 1.5 CM
BH CV ECHO MEAS - LA DIMENSION: 5.1 CM
BH CV ECHO MEAS - LAT PEAK E' VEL: 17.9 CM/SEC
BH CV ECHO MEAS - LV MASS(C)D: 251.4 GRAMS
BH CV ECHO MEAS - LV MAX PG: 3.3 MMHG
BH CV ECHO MEAS - LV MEAN PG: 2 MMHG
BH CV ECHO MEAS - LV V1 MAX: 90.6 CM/SEC
BH CV ECHO MEAS - LV V1 VTI: 19.4 CM
BH CV ECHO MEAS - LVIDD: 4.7 CM
BH CV ECHO MEAS - LVOT AREA: 3.8 CM2
BH CV ECHO MEAS - LVOT DIAM: 2.2 CM
BH CV ECHO MEAS - LVPWD: 1.2 CM
BH CV ECHO MEAS - MED PEAK E' VEL: 9.7 CM/SEC
BH CV ECHO MEAS - MR MAX PG: 29.4 MMHG
BH CV ECHO MEAS - MR MAX VEL: 271 CM/SEC
BH CV ECHO MEAS - MV A MAX VEL: 70.6 CM/SEC
BH CV ECHO MEAS - MV DEC SLOPE: 465 CM/SEC2
BH CV ECHO MEAS - MV E MAX VEL: 90.9 CM/SEC
BH CV ECHO MEAS - MV E/A: 1.29
BH CV ECHO MEAS - MV P1/2T: 69.3 MSEC
BH CV ECHO MEAS - MVA(P1/2T): 3.2 CM2
BH CV ECHO MEAS - PA V2 MAX: 136 CM/SEC
BH CV ECHO MEAS - RAP SYSTOLE: 10 MMHG
BH CV ECHO MEAS - RV MAX PG: 4.8 MMHG
BH CV ECHO MEAS - RV V1 MAX: 109 CM/SEC
BH CV ECHO MEAS - RVDD: 3.3 CM
BH CV ECHO MEAS - RVSP: 51.5 MMHG
BH CV ECHO MEAS - SV(LVOT): 73.7 ML
BH CV ECHO MEAS - SV(MOD-SP4): 52.6 ML
BH CV ECHO MEAS - TR MAX PG: 41.5 MMHG
BH CV ECHO MEAS - TR MAX VEL: 322 CM/SEC
BH CV ECHO MEASUREMENTS AVERAGE E/E' RATIO: 6.59
BH CV XLRA - RV BASE: 4.5 CM
BILIRUB SERPL-MCNC: 0.5 MG/DL (ref 0–1.2)
BUN SERPL-MCNC: 44 MG/DL (ref 6–20)
BUN/CREAT SERPL: 32.8 (ref 7–25)
CALCIUM SPEC-SCNC: 8.3 MG/DL (ref 8.6–10.5)
CHLORIDE SERPL-SCNC: 104 MMOL/L (ref 98–107)
CO2 SERPL-SCNC: 29 MMOL/L (ref 22–29)
CREAT SERPL-MCNC: 1.34 MG/DL (ref 0.76–1.27)
D DIMER PPP FEU-MCNC: 10.22 MCGFEU/ML (ref 0–0.58)
DEPRECATED RDW RBC AUTO: 57.7 FL (ref 37–54)
EGFRCR SERPLBLD CKD-EPI 2021: 61.4 ML/MIN/1.73
EOSINOPHIL # BLD AUTO: 0.08 10*3/MM3 (ref 0–0.4)
EOSINOPHIL NFR BLD AUTO: 0.9 % (ref 0.3–6.2)
ERYTHROCYTE [DISTWIDTH] IN BLOOD BY AUTOMATED COUNT: 17.6 % (ref 12.3–15.4)
FERRITIN SERPL-MCNC: 74.74 NG/ML (ref 30–400)
GEN 5 2HR TROPONIN T REFLEX: 121 NG/L
GLOBULIN UR ELPH-MCNC: 3.3 GM/DL
GLUCOSE BLDC GLUCOMTR-MCNC: 125 MG/DL (ref 70–130)
GLUCOSE BLDC GLUCOMTR-MCNC: 216 MG/DL (ref 70–130)
GLUCOSE SERPL-MCNC: 146 MG/DL (ref 65–99)
HCT VFR BLD AUTO: 28.8 % (ref 37.5–51)
HGB BLD-MCNC: 8.6 G/DL (ref 13–17.7)
IMM GRANULOCYTES # BLD AUTO: 0.03 10*3/MM3 (ref 0–0.05)
IMM GRANULOCYTES NFR BLD AUTO: 0.3 % (ref 0–0.5)
IRON 24H UR-MRATE: 20 MCG/DL (ref 59–158)
IRON SATN MFR SERPL: 6 % (ref 20–50)
LEFT ATRIUM VOLUME INDEX: 50.5 ML/M2
LEFT ATRIUM VOLUME: 112 ML
LYMPHOCYTES # BLD AUTO: 1.79 10*3/MM3 (ref 0.7–3.1)
LYMPHOCYTES NFR BLD AUTO: 19.6 % (ref 19.6–45.3)
MAGNESIUM SERPL-MCNC: 1.8 MG/DL (ref 1.6–2.6)
MCH RBC QN AUTO: 26.6 PG (ref 26.6–33)
MCHC RBC AUTO-ENTMCNC: 29.9 G/DL (ref 31.5–35.7)
MCV RBC AUTO: 89.2 FL (ref 79–97)
MONOCYTES # BLD AUTO: 0.8 10*3/MM3 (ref 0.1–0.9)
MONOCYTES NFR BLD AUTO: 8.8 % (ref 5–12)
NEUTROPHILS NFR BLD AUTO: 6.37 10*3/MM3 (ref 1.7–7)
NEUTROPHILS NFR BLD AUTO: 69.9 % (ref 42.7–76)
NRBC BLD AUTO-RTO: 0 /100 WBC (ref 0–0.2)
NT-PROBNP SERPL-MCNC: 4906 PG/ML (ref 0–900)
PLATELET # BLD AUTO: 268 10*3/MM3 (ref 140–450)
PMV BLD AUTO: 9.5 FL (ref 6–12)
POTASSIUM SERPL-SCNC: 4.3 MMOL/L (ref 3.5–5.2)
PROT SERPL-MCNC: 6.5 G/DL (ref 6–8.5)
RBC # BLD AUTO: 3.23 10*6/MM3 (ref 4.14–5.8)
RETICS # AUTO: 0.06 10*6/MM3 (ref 0.02–0.13)
RETICS/RBC NFR AUTO: 1.92 % (ref 0.7–1.9)
SODIUM SERPL-SCNC: 143 MMOL/L (ref 136–145)
T3FREE SERPL-MCNC: 1.7 PG/ML (ref 2–4.4)
T4 FREE SERPL-MCNC: 0.83 NG/DL (ref 0.93–1.7)
TIBC SERPL-MCNC: 340 MCG/DL (ref 298–536)
TRANSFERRIN SERPL-MCNC: 228 MG/DL (ref 200–360)
TROPONIN T DELTA: -32 NG/L
TROPONIN T SERPL HS-MCNC: 153 NG/L
TSH SERPL DL<=0.05 MIU/L-ACNC: 1.89 UIU/ML (ref 0.27–4.2)
WBC NRBC COR # BLD AUTO: 9.12 10*3/MM3 (ref 3.4–10.8)

## 2024-03-11 PROCEDURE — 63710000001 INSULIN LISPRO (HUMAN) PER 5 UNITS: Performed by: NURSE PRACTITIONER

## 2024-03-11 PROCEDURE — 84466 ASSAY OF TRANSFERRIN: CPT | Performed by: NURSE PRACTITIONER

## 2024-03-11 PROCEDURE — 85379 FIBRIN DEGRADATION QUANT: CPT | Performed by: PHYSICIAN ASSISTANT

## 2024-03-11 PROCEDURE — 82948 REAGENT STRIP/BLOOD GLUCOSE: CPT

## 2024-03-11 PROCEDURE — 71275 CT ANGIOGRAPHY CHEST: CPT

## 2024-03-11 PROCEDURE — 84484 ASSAY OF TROPONIN QUANT: CPT | Performed by: PHYSICIAN ASSISTANT

## 2024-03-11 PROCEDURE — 84439 ASSAY OF FREE THYROXINE: CPT | Performed by: PHYSICIAN ASSISTANT

## 2024-03-11 PROCEDURE — 93010 ELECTROCARDIOGRAM REPORT: CPT | Performed by: HOSPITALIST

## 2024-03-11 PROCEDURE — 25010000002 BUMETANIDE PER 0.5 MG: Performed by: NURSE PRACTITIONER

## 2024-03-11 PROCEDURE — 83540 ASSAY OF IRON: CPT | Performed by: NURSE PRACTITIONER

## 2024-03-11 PROCEDURE — 36415 COLL VENOUS BLD VENIPUNCTURE: CPT

## 2024-03-11 PROCEDURE — 25010000002 BUMETANIDE PER 0.5 MG: Performed by: PHYSICIAN ASSISTANT

## 2024-03-11 PROCEDURE — 71045 X-RAY EXAM CHEST 1 VIEW: CPT

## 2024-03-11 PROCEDURE — 93005 ELECTROCARDIOGRAM TRACING: CPT | Performed by: PHYSICIAN ASSISTANT

## 2024-03-11 PROCEDURE — 25010000002 ENOXAPARIN PER 10 MG: Performed by: NURSE PRACTITIONER

## 2024-03-11 PROCEDURE — 25510000001 IOPAMIDOL PER 1 ML: Performed by: PHYSICIAN ASSISTANT

## 2024-03-11 PROCEDURE — 85045 AUTOMATED RETICULOCYTE COUNT: CPT | Performed by: NURSE PRACTITIONER

## 2024-03-11 PROCEDURE — 84481 FREE ASSAY (FT-3): CPT | Performed by: PHYSICIAN ASSISTANT

## 2024-03-11 PROCEDURE — 80050 GENERAL HEALTH PANEL: CPT | Performed by: PHYSICIAN ASSISTANT

## 2024-03-11 PROCEDURE — 83880 ASSAY OF NATRIURETIC PEPTIDE: CPT | Performed by: PHYSICIAN ASSISTANT

## 2024-03-11 PROCEDURE — 99285 EMERGENCY DEPT VISIT HI MDM: CPT

## 2024-03-11 PROCEDURE — 82728 ASSAY OF FERRITIN: CPT | Performed by: NURSE PRACTITIONER

## 2024-03-11 PROCEDURE — 93356 MYOCRD STRAIN IMG SPCKL TRCK: CPT | Performed by: INTERNAL MEDICINE

## 2024-03-11 PROCEDURE — 25810000003 SODIUM CHLORIDE 0.9 % SOLUTION 250 ML FLEX CONT: Performed by: NURSE PRACTITIONER

## 2024-03-11 PROCEDURE — 25010000002 NA FERRIC GLUC CPLX PER 12.5 MG: Performed by: NURSE PRACTITIONER

## 2024-03-11 PROCEDURE — 93356 MYOCRD STRAIN IMG SPCKL TRCK: CPT

## 2024-03-11 PROCEDURE — 83735 ASSAY OF MAGNESIUM: CPT | Performed by: PHYSICIAN ASSISTANT

## 2024-03-11 PROCEDURE — 93306 TTE W/DOPPLER COMPLETE: CPT

## 2024-03-11 PROCEDURE — 93306 TTE W/DOPPLER COMPLETE: CPT | Performed by: INTERNAL MEDICINE

## 2024-03-11 RX ORDER — NITROGLYCERIN 0.4 MG/1
0.4 TABLET SUBLINGUAL
Status: DISCONTINUED | OUTPATIENT
Start: 2024-03-11 | End: 2024-03-15 | Stop reason: HOSPADM

## 2024-03-11 RX ORDER — ACETAMINOPHEN 160 MG/5ML
650 SOLUTION ORAL EVERY 4 HOURS PRN
Status: DISCONTINUED | OUTPATIENT
Start: 2024-03-11 | End: 2024-03-15 | Stop reason: HOSPADM

## 2024-03-11 RX ORDER — NICOTINE POLACRILEX 4 MG
15 LOZENGE BUCCAL
Status: DISCONTINUED | OUTPATIENT
Start: 2024-03-11 | End: 2024-03-15 | Stop reason: HOSPADM

## 2024-03-11 RX ORDER — SODIUM CHLORIDE 0.9 % (FLUSH) 0.9 %
10 SYRINGE (ML) INJECTION EVERY 12 HOURS SCHEDULED
Status: DISCONTINUED | OUTPATIENT
Start: 2024-03-11 | End: 2024-03-15 | Stop reason: HOSPADM

## 2024-03-11 RX ORDER — BISACODYL 10 MG
10 SUPPOSITORY, RECTAL RECTAL DAILY PRN
Status: DISCONTINUED | OUTPATIENT
Start: 2024-03-11 | End: 2024-03-15 | Stop reason: HOSPADM

## 2024-03-11 RX ORDER — SODIUM CHLORIDE 0.9 % (FLUSH) 0.9 %
10 SYRINGE (ML) INJECTION AS NEEDED
Status: DISCONTINUED | OUTPATIENT
Start: 2024-03-11 | End: 2024-03-14 | Stop reason: SDUPTHER

## 2024-03-11 RX ORDER — AMOXICILLIN 250 MG
2 CAPSULE ORAL 2 TIMES DAILY PRN
Status: DISCONTINUED | OUTPATIENT
Start: 2024-03-11 | End: 2024-03-15 | Stop reason: HOSPADM

## 2024-03-11 RX ORDER — BISACODYL 5 MG/1
5 TABLET, DELAYED RELEASE ORAL DAILY PRN
Status: DISCONTINUED | OUTPATIENT
Start: 2024-03-11 | End: 2024-03-15 | Stop reason: HOSPADM

## 2024-03-11 RX ORDER — ACETAMINOPHEN 650 MG/1
650 SUPPOSITORY RECTAL EVERY 4 HOURS PRN
Status: DISCONTINUED | OUTPATIENT
Start: 2024-03-11 | End: 2024-03-15 | Stop reason: HOSPADM

## 2024-03-11 RX ORDER — SODIUM CHLORIDE 9 MG/ML
40 INJECTION, SOLUTION INTRAVENOUS AS NEEDED
Status: DISCONTINUED | OUTPATIENT
Start: 2024-03-11 | End: 2024-03-15 | Stop reason: HOSPADM

## 2024-03-11 RX ORDER — ACETAMINOPHEN 325 MG/1
650 TABLET ORAL EVERY 4 HOURS PRN
Status: DISCONTINUED | OUTPATIENT
Start: 2024-03-11 | End: 2024-03-15 | Stop reason: HOSPADM

## 2024-03-11 RX ORDER — BUMETANIDE 0.25 MG/ML
0.5 INJECTION INTRAMUSCULAR; INTRAVENOUS ONCE
Status: DISCONTINUED | OUTPATIENT
Start: 2024-03-11 | End: 2024-03-11

## 2024-03-11 RX ORDER — ONDANSETRON 2 MG/ML
4 INJECTION INTRAMUSCULAR; INTRAVENOUS EVERY 6 HOURS PRN
Status: DISCONTINUED | OUTPATIENT
Start: 2024-03-11 | End: 2024-03-15 | Stop reason: HOSPADM

## 2024-03-11 RX ORDER — HYDROCODONE BITARTRATE AND ACETAMINOPHEN 10; 325 MG/1; MG/1
1 TABLET ORAL EVERY 8 HOURS PRN
Status: DISCONTINUED | OUTPATIENT
Start: 2024-03-11 | End: 2024-03-15 | Stop reason: HOSPADM

## 2024-03-11 RX ORDER — ONDANSETRON 4 MG/1
4 TABLET, ORALLY DISINTEGRATING ORAL EVERY 6 HOURS PRN
Status: DISCONTINUED | OUTPATIENT
Start: 2024-03-11 | End: 2024-03-15 | Stop reason: HOSPADM

## 2024-03-11 RX ORDER — ENOXAPARIN SODIUM 100 MG/ML
1 INJECTION SUBCUTANEOUS EVERY 12 HOURS SCHEDULED
Status: DISCONTINUED | OUTPATIENT
Start: 2024-03-11 | End: 2024-03-13

## 2024-03-11 RX ORDER — HYDROCODONE BITARTRATE AND ACETAMINOPHEN 10; 325 MG/1; MG/1
1 TABLET ORAL EVERY 8 HOURS PRN
COMMUNITY

## 2024-03-11 RX ORDER — BUMETANIDE 0.25 MG/ML
1 INJECTION INTRAMUSCULAR; INTRAVENOUS ONCE
Status: COMPLETED | OUTPATIENT
Start: 2024-03-11 | End: 2024-03-11

## 2024-03-11 RX ORDER — IBUPROFEN 600 MG/1
1 TABLET ORAL
Status: DISCONTINUED | OUTPATIENT
Start: 2024-03-11 | End: 2024-03-15 | Stop reason: HOSPADM

## 2024-03-11 RX ORDER — DEXTROSE MONOHYDRATE 25 G/50ML
25 INJECTION, SOLUTION INTRAVENOUS
Status: DISCONTINUED | OUTPATIENT
Start: 2024-03-11 | End: 2024-03-15 | Stop reason: HOSPADM

## 2024-03-11 RX ORDER — SODIUM CHLORIDE 0.9 % (FLUSH) 0.9 %
10 SYRINGE (ML) INJECTION AS NEEDED
Status: DISCONTINUED | OUTPATIENT
Start: 2024-03-11 | End: 2024-03-15 | Stop reason: HOSPADM

## 2024-03-11 RX ORDER — INSULIN LISPRO 100 [IU]/ML
2-7 INJECTION, SOLUTION INTRAVENOUS; SUBCUTANEOUS
Status: DISCONTINUED | OUTPATIENT
Start: 2024-03-11 | End: 2024-03-15 | Stop reason: HOSPADM

## 2024-03-11 RX ORDER — POLYETHYLENE GLYCOL 3350 17 G/17G
17 POWDER, FOR SOLUTION ORAL DAILY PRN
Status: DISCONTINUED | OUTPATIENT
Start: 2024-03-11 | End: 2024-03-15 | Stop reason: HOSPADM

## 2024-03-11 RX ORDER — PREGABALIN 50 MG/1
50 CAPSULE ORAL 3 TIMES DAILY
Status: DISCONTINUED | OUTPATIENT
Start: 2024-03-11 | End: 2024-03-15 | Stop reason: HOSPADM

## 2024-03-11 RX ORDER — FUROSEMIDE 40 MG/1
40 TABLET ORAL DAILY
COMMUNITY
End: 2024-03-15 | Stop reason: HOSPADM

## 2024-03-11 RX ORDER — LISINOPRIL 5 MG/1
5 TABLET ORAL
Status: DISCONTINUED | OUTPATIENT
Start: 2024-03-11 | End: 2024-03-14

## 2024-03-11 RX ADMIN — LISINOPRIL 5 MG: 5 TABLET ORAL at 18:37

## 2024-03-11 RX ADMIN — IOPAMIDOL 100 ML: 755 INJECTION, SOLUTION INTRAVENOUS at 13:39

## 2024-03-11 RX ADMIN — PREGABALIN 50 MG: 50 CAPSULE ORAL at 21:30

## 2024-03-11 RX ADMIN — HYDROCODONE BITARTRATE AND ACETAMINOPHEN 1 TABLET: 10; 325 TABLET ORAL at 21:30

## 2024-03-11 RX ADMIN — BUMETANIDE 0.5 MG/HR: 0.25 INJECTION INTRAMUSCULAR; INTRAVENOUS at 18:37

## 2024-03-11 RX ADMIN — ENOXAPARIN SODIUM 100 MG: 100 INJECTION SUBCUTANEOUS at 18:37

## 2024-03-11 RX ADMIN — Medication 10 ML: at 21:31

## 2024-03-11 RX ADMIN — SODIUM CHLORIDE 250 MG: 9 INJECTION, SOLUTION INTRAVENOUS at 22:33

## 2024-03-11 RX ADMIN — INSULIN LISPRO 3 UNITS: 100 INJECTION, SOLUTION INTRAVENOUS; SUBCUTANEOUS at 21:30

## 2024-03-11 RX ADMIN — BUMETANIDE 1 MG: 0.25 INJECTION INTRAMUSCULAR; INTRAVENOUS at 15:05

## 2024-03-11 NOTE — PROGRESS NOTES
"Pharmacy Dosing Service  Anticoagulant  Enoxaparin    Assessment/Action/Plan:  Start Enoxaparin 1 mg/kg SQ every 12 hours for atrial fibrillation. Pharmacy will continue to monitor daily and adjust accordingly.     Subjective:  Garrett Rodriguez is a 59 y.o. male on Enoxaparin 100 mg SQ every 12 hours for indication of atrial fibrillation.  Objective:  [Ht: 177.8 cm (70\"); Wt: 102 kg (224 lb); BMI: Body mass index is 32.14 kg/m².]  Estimated Creatinine Clearance: 71.9 mL/min (A) (by C-G formula based on SCr of 1.34 mg/dL (H)). No results found for: \"DDIMER\" No results found for: \"INR\", \"PROTIME\"   Lab Results   Component Value Date    HGB 8.6 (L) 03/11/2024    HGB 9.8 (L) 06/30/2023    HGB 8.6 (L) 05/14/2023      Lab Results   Component Value Date     03/11/2024     06/30/2023     05/14/2023       Mihaela Pagan, PharmD  03/11/24 16:59 CDT     "

## 2024-03-11 NOTE — ED PROVIDER NOTES
Subjective   History of Present Illness    Patient is a pleasant 59-year-old gentleman who presents to ED with wife.  Both are historians.  Chief complaint is worsening shortness of breath and significant swelling.    Patient does have documented history diabetes mellitus type 2, hypertension, patient believes renal insufficiency, chronic back pain, chronic right lower extremity pain postop.    Patient noted for 6 weeks, he had increased swelling to first his right lower extremity than left lower extremity progressing into his scrotum and then abdomen.  Wife noted in his upper extremities as well.  He already had swelling in his right lower extremity around his ankle after he had 5 ankle surgeries with the last being in June 2023.  He continues to wear brace.  But the swelling has worsened and his extremities are started feel hard.  With the wife noted he had what looks like a tie around his abdomen, the patient sought medical attention with his primary care provider, Dr. Andersen.  They describe completing a chest x-ray which did show fluid in his lungs.  He was prescribed Lasix 40 mg daily and had laboratory data completed.  They believe he said that he had stage I kidney issues but it was significant enough to cause all the swelling.  He is scheduled for some type of cardiac evaluation on March 18 as well as a colonoscopy for this on March 13.  But with the continued swelling, he came to the ER to be further evaluated.    Patient believes he is gained about 50 to 60 pounds in the past 6 weeks when this began but since he started Lasix, he think he is dropped about 10 pounds.  He denies any associated chest pain, pressure, or tightness.  He denies any palpitations.  He had noticed worsening exertional shortness of breath.  He normally sleeps with 3 pillows already and that has been unchanged since the edema began.  He attempted to lay flat but could not lay flat and felt smothered.    Patient denies any cough or  palpitations.  He denies any fever.  He denies any known liver or kidney issues.  He denies any use of NSAIDs.  He denies any changes in medications except for the Lasix in the past few months.    Review of Systems   Constitutional:  Positive for activity change.   HENT: Negative.     Respiratory:  Positive for shortness of breath. Negative for stridor.    Cardiovascular:  Positive for leg swelling. Negative for chest pain and palpitations.   Gastrointestinal: Negative.  Negative for abdominal pain.   Genitourinary:  Positive for penile swelling and scrotal swelling. Negative for decreased urine volume, hematuria, penile discharge, penile pain and testicular pain.   Musculoskeletal: Negative.    Neurological: Negative.    Psychiatric/Behavioral: Negative.     All other systems reviewed and are negative.      Past Medical History:   Diagnosis Date    Diabetes mellitus        No Known Allergies    Past Surgical History:   Procedure Laterality Date    ANKLE FUSION Right 7/11/2023    Procedure: ANKLE ARTHRODESIS;  Surgeon: Shahbaz Reddy DPM;  Location:  [facility];  Service: Podiatry;  Laterality: Right;    BACK SURGERY      CARDIAC SURGERY      EXTERNAL FIXATION ANKLE FRACTURE Right 7/11/2023    Procedure: POSSIBLE EXTERNAL FIXATION, RIGHT ANKLE;  Surgeon: Shahbaz Reddy DPM;  Location:  [facility];  Service: Podiatry;  Laterality: Right;    HARDWARE REMOVAL Right 7/11/2023    Procedure: REMOVAL OF HARDWARE, DEEP; ANKLE ARTHRODESIS; SUBTALAR ARTHRODESIS; POSSIBLE EXTERNAL FIXATION, RIGHT ANKLE;  Surgeon: Shahbaz Reddy DPM;  Location:  [facility];  Service: Podiatry;  Laterality: Right;    PATENT FORAMEN OVALE CLOSURE      ROTATOR CUFF REPAIR Right     SUBTALAR ARTHRODESIS Right 7/11/2023    Procedure: SUBTALAR ARTHRODESIS;  Surgeon: Shahbaz Reddy DPM;  Location:  [facility];  Service: Podiatry;  Laterality: Right;       History reviewed. No pertinent family history.    Social History     Socioeconomic  "History    Marital status:    Tobacco Use    Smoking status: Never    Smokeless tobacco: Never   Vaping Use    Vaping status: Never Used   Substance and Sexual Activity    Alcohol use: Never    Drug use: Never    Sexual activity: Defer       Prior to Admission medications    Medication Sig Start Date End Date Taking? Authorizing Provider   docusate sodium (COLACE) 100 MG capsule Take 1 capsule by mouth 2 (Two) Times a Day. 7/11/23   Shahbaz Reddy DPM   furosemide (LASIX) 40 MG tablet Take 1 tablet by mouth 2 (Two) Times a Day.    ProviderLula MD   lisinopril (PRINIVIL,ZESTRIL) 5 MG tablet Take 1 tablet by mouth Daily. 5/15/23   Moy Trujillo,    metFORMIN (GLUCOPHAGE) 1000 MG tablet Take 1 tablet by mouth 2 (Two) Times a Day With Meals.    ProviderLula MD   naloxone (NARCAN) 4 MG/0.1ML nasal spray Call 911. Don't prime. [place] in 1 nostril for overdose. Repeat in 2-3 minutes in other nostril if no or minimal breathing/responsiveness. 7/11/23   Shahbaz Reddy DPM   ondansetron (Zofran) 4 MG tablet Take 1 tablet by mouth Every 4 (Four) Hours. 7/11/23   Shahbaz Reddy DPM   oxyCODONE-acetaminophen (PERCOCET)  MG per tablet Take 1 tablet by mouth Every 4 (Four) Hours As Needed for Moderate Pain. Take one tablet every four hours first 24 hours 7/11/23   Shahbaz Reddy DPM   pregabalin (LYRICA) 50 MG capsule Take 1 capsule by mouth 3 (Three) Times a Day.    ProviderLula MD       Medications   sodium chloride 0.9 % flush 10 mL (has no administration in time range)   bumetanide (BUMEX) injection 1 mg (1 mg Intravenous Given 3/11/24 1505)   iopamidol (ISOVUE-370) 76 % injection 100 mL (100 mL Intravenous Given 3/11/24 1339)       /88   Pulse 79   Temp 97.6 °F (36.4 °C) (Oral)   Resp 20   Ht 177.8 cm (70\")   Wt 102 kg (224 lb)   SpO2 96%   BMI 32.14 kg/m²       Objective   Physical Exam  Constitutional:       Appearance: He is well-developed.   HENT:    "   Head: Normocephalic and atraumatic.      Right Ear: External ear normal.      Left Ear: External ear normal.      Nose: Nose normal.   Eyes:      Conjunctiva/sclera: Conjunctivae normal.      Pupils: Pupils are equal, round, and reactive to light.   Neck:      Trachea: No tracheal deviation.   Cardiovascular:      Rate and Rhythm: Normal rate. Rhythm irregular.      Heart sounds: Normal heart sounds. No murmur heard.     No friction rub. No gallop.   Pulmonary:      Effort: Pulmonary effort is normal. No respiratory distress.      Breath sounds: Normal breath sounds. No wheezing or rales.   Chest:      Chest wall: No tenderness.   Abdominal:      General: Bowel sounds are normal. There is no distension.      Palpations: Abdomen is soft. There is no mass.      Tenderness: There is no abdominal tenderness. There is no guarding or rebound.   Musculoskeletal:         General: Swelling and tenderness present. No deformity. Normal range of motion.      Cervical back: Normal range of motion and neck supple.      Right upper leg: Swelling and edema present.      Left upper leg: Swelling and edema present.      Right knee: Swelling present.      Left knee: Swelling present.      Right lower leg: Swelling present. 4+ Pitting Edema present.      Left lower leg: Swelling present. Pitting Edema present.      Right ankle: Swelling present. Normal pulse.      Left ankle: Swelling present. Normal pulse.      Comments: 3-4+ pitting edema all the way up into his abdomen   Skin:     General: Skin is warm and dry.      Capillary Refill: Capillary refill takes less than 2 seconds.      Coloration: Skin is not pale.      Findings: No erythema or rash.   Neurological:      General: No focal deficit present.      Mental Status: He is alert and oriented to person, place, and time.      Gait: Gait is intact.   Psychiatric:         Mood and Affect: Mood normal.         Behavior: Behavior normal.         Thought Content: Thought content  normal.         Judgment: Judgment normal.         Procedures         Lab Results (last 24 hours)       Procedure Component Value Units Date/Time    CBC & Differential [[id number]]  (Abnormal) Collected: 03/11/24 1147    Specimen: Blood Updated: 03/11/24 1156    Narrative:      The following orders were created for panel order CBC & Differential.  Procedure                               Abnormality         Status                     ---------                               -----------         ------                     CBC Auto Differential[[id number]]        Abnormal            Final result                 Please view results for these tests on the individual orders.    Comprehensive Metabolic Panel [[id number]]  (Abnormal) Collected: 03/11/24 1147    Specimen: Blood Updated: 03/11/24 1219     Glucose 146 mg/dL      BUN 44 mg/dL      Creatinine 1.34 mg/dL      Sodium 143 mmol/L      Potassium 4.3 mmol/L      Chloride 104 mmol/L      CO2 29.0 mmol/L      Calcium 8.3 mg/dL      Total Protein 6.5 g/dL      Albumin 3.2 g/dL      ALT (SGPT) 24 U/L      AST (SGOT) 32 U/L      Alkaline Phosphatase 192 U/L      Total Bilirubin 0.5 mg/dL      Globulin 3.3 gm/dL      A/G Ratio 1.0 g/dL      BUN/Creatinine Ratio 32.8     Anion Gap 10.0 mmol/L      eGFR 61.4 mL/min/1.73     Narrative:      GFR Normal >60  Chronic Kidney Disease <60  Kidney Failure <15      High Sensitivity Troponin T [[id number]]  (Abnormal) Collected: 03/11/24 1147    Specimen: Blood Updated: 03/11/24 1222     HS Troponin T 153 ng/L     Narrative:      High Sensitive Troponin T Reference Range:  <14.0 ng/L- Negative Female for AMI  <22.0 ng/L- Negative Male for AMI  >=14 - Abnormal Female indicating possible myocardial injury.  >=22 - Abnormal Male indicating possible myocardial injury.   Clinicians would have to utilize clinical acumen, EKG, Troponin, and serial changes to determine if it is an Acute Myocardial Infarction or myocardial injury due to an underlying  "chronic condition.         BNP [[id number]]  (Abnormal) Collected: 03/11/24 1147    Specimen: Blood Updated: 03/11/24 1215     proBNP 4,906.0 pg/mL     Narrative:      This assay is used as an aid in the diagnosis of individuals suspected of having heart failure. It can be used as an aid in the diagnosis of acute decompensated heart failure (ADHF) in patients presenting with signs and symptoms of ADHF to the emergency department (ED). In addition, NT-proBNP of <300 pg/mL indicates ADHF is not likely.    Age Range Result Interpretation  NT-proBNP Concentration (pg/mL:      <50             Positive            >450                   Gray                 300-450                    Negative             <300    50-75           Positive            >900                  Gray                300-900                  Negative            <300      >75             Positive            >1800                  Gray                300-1800                  Negative            <300    D-dimer, Quantitative [[id number]]  (Abnormal) Collected: 03/11/24 1147    Specimen: Blood Updated: 03/11/24 1218     D-Dimer, Quantitative 10.22 MCGFEU/mL     Narrative:      According to the assay 's published package insert, a normal (<0.50 MCGFEU/mL) D-dimer result in conjunction with a non-high clinical probability assessment, excludes deep vein thrombosis (DVT) and pulmonary embolism (PE) with high sensitivity.    D-dimer values increase with age and this can make VTE exclusion of an older population difficult. To address this, the [employer], based on best available evidence and recent guidelines, recommends that clinicians use age-adjusted D-dimer thresholds in patients greater than 50 years of age with: a) a low probability of PE who do not meet all Pulmonary Embolism Rule Out Criteria, or b) in those with intermediate probability of PE.   The formula for an age-adjusted D-dimer cut-off is \"age/100\".  For example, a " 60 year old patient would have an age-adjusted cut-off of 0.60 MCGFEU/mL and an 80 year old 0.80 MCGFEU/mL.    TSH [[id number]]  (Normal) Collected: 03/11/24 1147    Specimen: Blood Updated: 03/11/24 1222     TSH 1.890 uIU/mL     T4, Free [[id number]]  (Abnormal) Collected: 03/11/24 1147    Specimen: Blood Updated: 03/11/24 1222     Free T4 0.83 ng/dL     Narrative:      Results may be falsely increased if patient taking Biotin.      T3, Free [[id number]] Collected: 03/11/24 1147    Specimen: Blood Updated: 03/11/24 1153    Magnesium [[id number]]  (Normal) Collected: 03/11/24 1147    Specimen: Blood Updated: 03/11/24 1214     Magnesium 1.8 mg/dL     CBC Auto Differential [[id number]]  (Abnormal) Collected: 03/11/24 1147    Specimen: Blood Updated: 03/11/24 1156     WBC 9.12 10*3/mm3      RBC 3.23 10*6/mm3      Hemoglobin 8.6 g/dL      Hematocrit 28.8 %      MCV 89.2 fL      MCH 26.6 pg      MCHC 29.9 g/dL      RDW 17.6 %      RDW-SD 57.7 fl      MPV 9.5 fL      Platelets 268 10*3/mm3      Neutrophil % 69.9 %      Lymphocyte % 19.6 %      Monocyte % 8.8 %      Eosinophil % 0.9 %      Basophil % 0.5 %      Immature Grans % 0.3 %      Neutrophils, Absolute 6.37 10*3/mm3      Lymphocytes, Absolute 1.79 10*3/mm3      Monocytes, Absolute 0.80 10*3/mm3      Eosinophils, Absolute 0.08 10*3/mm3      Basophils, Absolute 0.05 10*3/mm3      Immature Grans, Absolute 0.03 10*3/mm3      nRBC 0.0 /100 WBC     High Sensitivity Troponin T 2Hr [[id number]]  (Abnormal) Collected: 03/11/24 1438    Specimen: Blood Updated: 03/11/24 1516     HS Troponin T 121 ng/L      Troponin T Delta -32 ng/L     Narrative:      High Sensitive Troponin T Reference Range:  <14.0 ng/L- Negative Female for AMI  <22.0 ng/L- Negative Male for AMI  >=14 - Abnormal Female indicating possible myocardial injury.  >=22 - Abnormal Male indicating possible myocardial injury.   Clinicians would have to utilize clinical acumen, EKG, Troponin, and serial changes to  determine if it is an Acute Myocardial Infarction or myocardial injury due to an underlying chronic condition.                 CT Angiogram Chest    Result Date: 3/11/2024  Narrative: EXAMINATION: CT ANGIOGRAM CHEST-   3/11/2024 12:34 PM  HISTORY: sob  In order to have a CT radiation dose as low as reasonably achievable Automated Exposure Control was utilized for adjustment of the mA and/or KV according to patient size.  Total DLP = 254.25 mGy.cm  CT angiography of the chest performed after intravenous contrast enhancement.  The images are acquired in axial plane and subsequent 2D reconstruction in coronal and sagittal planes and 3D maximum intensity projection reconstruction.  There is no previous similar study for comparison. The correlation made with chest radiograph obtained earlier today.  There is normal opacification of the pulmonary arteries and branches bilaterally. There are no filling defects in the opacified pulmonary arterial bed.  RV/LV ratio is 40 years/48 which may suggest a mild right heart strain.  Atheromatous change of thoracic aorta seen. Moderate ectasia of the sinus of Valsalva is noted which measures 4.1 cm. The ascending aorta measures 3.5 cm in maximum diameter. No dissection.  Severe atheromatous changes of coronary arteries are noted. There is evidence of prior CABG.  There is no evidence of mediastinal or hilar mass or lymphadenopathy.  Limited visualized soft tissue of the neck are unremarkable.  There is no axillary there are nonspecific moderately enlarged axillary lymph nodes, left larger than the right. The largest lymph node in the left axilla, image #46 and series 6, measures 1.1 cm in short axis.  There are atelectatic changes in bilateral lower lobar posterior segment consolidation with adjacent small bibasilar pleural effusion, left more than the right.  There is no evidence of infiltrate.  There is a tiny nodule in the left lower lobe, image #109 and series 7, measuring 3 mm  in maximum dimension.  The central airway is patent. No intrinsic abnormality.  There is moderate circumferential thickening of the esophagus more pronounced in the distal esophagus.  The limited visualized abdomen is unremarkable.  There is diffuse subcutaneous fat infiltration/edema suggesting fluid overload/anasarca.  The images reviewed in bone window show no acute bony abnormality. Chronic degenerative changes of the thoracic spine are seen.      Impression: 1. No evidence of pulmonary embolism. No aortic aneurysm or dissection. 2. Severe atheromatous changes of coronary arteries. A prior CABG. 3. Small bibasilar pleural effusion left more than the right. Atelectatic changes in the lower lungs. No acute infiltrate. 4. Edema of the chest and abdominal wall suggesting fluid overload/anasarca. 5. Nonspecific left axillary lymphadenopathy. The etiology and clinical significance is not certain. 6. A 3 mm nodule in the left lower lobe may represent a small noncalcified granuloma. However, since this is a baseline study, a follow-up examination in 6 months is recommended to ensure stability or resolution.              This report was signed and finalized on 3/11/2024 2:01 PM by Dr. Wendy Hicks MD.      XR Chest 1 View    Result Date: 3/11/2024  Narrative: EXAM: XR CHEST 1 VW- 3/11/2024 11:05 AM  HISTORY: Shortness of breath.  COMPARISON: 5/13/2023.  TECHNIQUE: Single frontal radiograph of the chest was obtained.  FINDINGS:  Support Devices: None.  Cardiac and Mediastinal Silhouettes: Normal.  Lungs/Pleura: No focal consolidation. No sizable pleural effusion. No visible pneumothorax.  Osseous structures: No acute osseous finding.  Other: None.      Impression:  No acute cardiopulmonary abnormality.    This report was signed and finalized on 3/11/2024 12:18 PM by Law Izquierdo.       ED Course  ED Course as of 03/11/24 1600   Mon Mar 11, 2024   [id number] I did speak with Dr. Guevara, hospitalist on-call, who is  gracious to admit the patient under their services. [TK]      ED Course User Index  [TK] Arlin Lyons PA          MDM     Amount and/or Complexity of Data Reviewed  Decide to obtain previous medical records or to obtain history from someone other than the patient: yes        Final diagnoses:   Hypervolemia, unspecified hypervolemia type   Other ascites   Rate controlled atrial fibrillation   Left lower lobe pulmonary nodule     Disposition: Patient will be admitted under hospitalist services.     Arlin Lyons PA  03/11/24 1600

## 2024-03-11 NOTE — H&P
[facility] Medicine Services  HISTORY AND PHYSICAL    Date of Admission: 3/11/2024  Primary Care Physician: Mehul Andersen MD    Subjective   Primary Historian: Patient and his wife Gwen    Chief Complaint: Shortness of breath, edema    History of Present Illness  Garrett Rodriguez is a 59-year-old male with a history of chronic pain in the right foot, diabetes mellitus insulin dependence, hypertension, repair of PFO in childhood.  Ankle fusion in 7/2023 with chronic pain.  Patient presented today after an outpatient appointment with Dr. Reddy for reevaluation of his right ankle.  Apparently the PA taking care of him saw his edema and immediately told him to present to the ED.    Patient states that for approximately 6 weeks he has noticed increased swelling which began in his right lower extremity then to his left lower extremity progressing to his scrotum and then abdomen and now up to the middle of his flank.  Patient states in addition to the weight he has had increasing shortness of breath, orthopnea, and chest tightness.  He states that for the last weeks he has had to use a walker because of extreme shortness of breath.  Patient states he has gained approximately 50 to 60 pounds over this period.  The patient initially went to his PCP Dr. Andersen, approximately 2 weeks ago.  Who prescribed him Lasix 40 mg p.o. twice daily with minimal results.  The patient states he lost approximately 10 pounds with the Lasix but no significant change in his shortness of breath.  Patient denies any palpitations, pressure, diaphoresis, nausea or vomiting, cough, respiratory issues, or dizziness.  He is currently scheduled for a (cardiac evaluation) March 18 as well as a colonoscopy on March 13.  On admission to the ED patient presented in A-fib without RVR, patient additionally states no history of arrhythmia to his knowledge.  ETA revealed severe atheromatous changes of coronary artery,  small bibasilar pleural effusions L>R, with significant edema of the chest and abdominal wall suggesting anasarca, nonspecific left axillary lymphadenopathy.    Upon assessment patient is sitting up in bed with his wife, able to communicate without shortness of breath.  He is hypertensive at 159/87, oxygen saturation of 94% on room air, and heart rate of 80 with a rhythm of atrial fibrillation.  Patient has 4+ edema from the middle aspect of bilateral flank all the way down to the bottom of his feet.  No weeping present, chronic wound to the right ankle which is healed, Doppler pulse present.  Initiated patient on Bumex drip 0.5 mg/hour, with stat echocardiogram if possible.  The other patient complaint is a chronic headache which he has had since his LASEK surgery  approximately 1 months ago.  Patient was told to notify staff if the headache became worse otherwise for him to follow-up with his ophthalmologist postdischarge.  These are to be admitted while patient is being held in the ED, cardiology and Heart failure navigator consult initiated.  Patient will be for further evaluation and treatment.      Review of Systems   Otherwise complete ROS reviewed and negative except as mentioned in the HPI.    Past Medical History:   Past Medical History:   Diagnosis Date    Chronic pain in right foot     Diabetes mellitus     Hypertension     PFO (patent foramen ovale)     RP at age 6     Past Surgical History:  Past Surgical History:   Procedure Laterality Date    ANKLE FUSION Right 7/11/2023    Procedure: ANKLE ARTHRODESIS;  Surgeon: Shahbaz Reddy DPM;  Location:  [facility] OR;  Service: Podiatry;  Laterality: Right;    BACK SURGERY      CARDIAC SURGERY      EXTERNAL FIXATION ANKLE FRACTURE Right 7/11/2023    Procedure: POSSIBLE EXTERNAL FIXATION, RIGHT ANKLE;  Surgeon: Shahbaz Reddy DPM;  Location:  [facility] OR;  Service: Podiatry;  Laterality: Right;    HARDWARE REMOVAL Right 7/11/2023    Procedure: REMOVAL OF  HARDWARE, DEEP; ANKLE ARTHRODESIS; SUBTALAR ARTHRODESIS; POSSIBLE EXTERNAL FIXATION, RIGHT ANKLE;  Surgeon: Shahbaz Reddy DPM;  Location:  [facility];  Service: Podiatry;  Laterality: Right;    PATENT FORAMEN OVALE CLOSURE      ROTATOR CUFF REPAIR Right     SUBTALAR ARTHRODESIS Right 7/11/2023    Procedure: SUBTALAR ARTHRODESIS;  Surgeon: Shahbaz Reddy DPM;  Location:  [facility];  Service: Podiatry;  Laterality: Right;     Social History:  reports that he has never smoked. He has never used smokeless tobacco. He reports that he does not drink alcohol and does not use drugs.    Family History: family history is not on file.     Allergies:  No Known Allergies    Medications:  No current facility-administered medications on file prior to encounter.     Current Outpatient Medications on File Prior to Encounter   Medication Sig Dispense Refill    furosemide (LASIX) 40 MG tablet Take 1 tablet by mouth 2 (Two) Times a Day.      HYDROcodone-acetaminophen (NORCO)  MG per tablet Take 1 tablet by mouth Every 8 (Eight) Hours As Needed for Moderate Pain.      lisinopril (PRINIVIL,ZESTRIL) 5 MG tablet Take 1 tablet by mouth Daily. 30 tablet 0    metFORMIN (GLUCOPHAGE) 1000 MG tablet Take 1 tablet by mouth 2 (Two) Times a Day With Meals.      naloxone (NARCAN) 4 MG/0.1ML nasal spray Call 911. Don't prime. [place] in 1 nostril for overdose. Repeat in 2-3 minutes in other nostril if no or minimal breathing/responsiveness. 2 each 0    pregabalin (LYRICA) 50 MG capsule Take 1 capsule by mouth 3 (Three) Times a Day.        I have utilized all available immediate resources to obtain, update, or review the patient's current medications (including all prescriptions, over-the-counter products, herbals, cannabis/cannabidiol products, and vitamin/mineral/dietary (nutritional) supplements).    Objective     Vital Signs: /92 (BP Location: Right arm, Patient Position: Lying)   Pulse 75   Temp 98 °F (36.7 °C) (Oral)   Resp 20  "  Ht 177.8 cm (70\")   Wt 104 kg (230 lb 4.8 oz)   SpO2 95%   BMI 33.04 kg/m²   Physical Exam  Constitutional:       Appearance: He is obese. He is ill-appearing.      Comments: Appears older than stated age.   HENT:      Head: Normocephalic and atraumatic.      Nose: No congestion or rhinorrhea.      Mouth/Throat:      Mouth: Mucous membranes are moist.      Pharynx: Oropharynx is clear.   Eyes:      Extraocular Movements: Extraocular movements intact.      Pupils: Pupils are equal, round, and reactive to light.   Cardiovascular:      Rate and Rhythm: Normal rate. Rhythm irregular.      Pulses: Decreased pulses.           Dorsalis pedis pulses are 1+ on the right side and 1+ on the left side.      Heart sounds:      No friction rub.      Comments: Extreme edema 4+, BLE, thighs, abdomen, flank, and chest wall.  Pulmonary:      Effort: Accessory muscle usage present. No respiratory distress.      Breath sounds: Examination of the right-upper field reveals rhonchi. Examination of the left-upper field reveals rhonchi. Examination of the right-middle field reveals rhonchi. Examination of the left-middle field reveals rhonchi. Examination of the right-lower field reveals decreased breath sounds. Examination of the left-lower field reveals decreased breath sounds. Decreased breath sounds present.   Musculoskeletal:      Cervical back: Normal range of motion and neck supple.      Right lower le+ Edema present.      Left lower le+ Edema present.   Skin:     General: Skin is cool.      Coloration: Skin is pale.      Findings: Erythema present.   Neurological:      General: No focal deficit present.      Mental Status: He is alert and oriented to person, place, and time.   Psychiatric:         Mood and Affect: Mood normal.         Behavior: Behavior normal.        Results Reviewed:  Lab Results (last 24 hours)       Procedure Component Value Units Date/Time    High Sensitivity Troponin T 2Hr [[id number]]  (Abnormal) " Collected: 03/11/24 1438    Specimen: Blood Updated: 03/11/24 1516     HS Troponin T 121 ng/L      Troponin T Delta -32 ng/L     T4, Free [[id number]]  (Abnormal) Collected: 03/11/24 1147    Specimen: Blood Updated: 03/11/24 1222     Free T4 0.83 ng/dL     Narrative:      Results may be falsely increased if patient taking Biotin.      TSH [[id number]]  (Normal) Collected: 03/11/24 1147    Specimen: Blood Updated: 03/11/24 1222     TSH 1.890 uIU/mL     High Sensitivity Troponin T [[id number]]  (Abnormal) Collected: 03/11/24 1147    Specimen: Blood Updated: 03/11/24 1222     HS Troponin T 153 ng/L     Comprehensive Metabolic Panel [[id number]]  (Abnormal) Collected: 03/11/24 1147    Specimen: Blood Updated: 03/11/24 1219     Glucose 146 mg/dL      BUN 44 mg/dL      Creatinine 1.34 mg/dL      Sodium 143 mmol/L      Potassium 4.3 mmol/L      Chloride 104 mmol/L      CO2 29.0 mmol/L      Calcium 8.3 mg/dL      Total Protein 6.5 g/dL      Albumin 3.2 g/dL      ALT (SGPT) 24 U/L      AST (SGOT) 32 U/L      Alkaline Phosphatase 192 U/L      Total Bilirubin 0.5 mg/dL      Globulin 3.3 gm/dL      A/G Ratio 1.0 g/dL      BUN/Creatinine Ratio 32.8     Anion Gap 10.0 mmol/L      eGFR 61.4 mL/min/1.73     Narrative:      GFR Normal >60  Chronic Kidney Disease <60  Kidney Failure <15      D-dimer, Quantitative [[id number]]  (Abnormal) Collected: 03/11/24 1147    Specimen: Blood Updated: 03/11/24 1218     D-Dimer, Quantitative 10.22 MCGFEU/mL     Narrative:      BNP [[id number]]  (Abnormal) Collected: 03/11/24 1147    Specimen: Blood Updated: 03/11/24 1215     proBNP 4,906.0 pg/mL     Narrative:       Magnesium 1.8 mg/dL     CBC & Differential [[id number]]  (Abnormal) Collected: 03/11/24 1147    Specimen: Blood Updated: 03/11/24 1156    Narrative:      CBC Auto Differential [[id number]]  (Abnormal) Collected: 03/11/24 1147    Specimen: Blood Updated: 03/11/24 1156     WBC 9.12 10*3/mm3      RBC 3.23 10*6/mm3      Hemoglobin 8.6 g/dL       Hematocrit 28.8 %      MCV 89.2 fL      MCH 26.6 pg      MCHC 29.9 g/dL      RDW 17.6 %      RDW-SD 57.7 fl      MPV 9.5 fL      Platelets 268 10*3/mm3      Neutrophil % 69.9 %      Lymphocyte % 19.6 %      Monocyte % 8.8 %      Eosinophil % 0.9 %      Basophil % 0.5 %      Immature Grans % 0.3 %      Neutrophils, Absolute 6.37 10*3/mm3      Lymphocytes, Absolute 1.79 10*3/mm3      Monocytes, Absolute 0.80 10*3/mm3      Eosinophils, Absolute 0.08 10*3/mm3      Basophils, Absolute 0.05 10*3/mm3      Immature Grans, Absolute 0.03 10*3/mm3      nRBC 0.0 /100 WBC     T3, Free [[id number]] Collected: 03/11/24 1147    Specimen: Blood Updated: 03/11/24 1153          Imaging Results (Last 24 Hours)       Procedure Component Value Units Date/Time    CT Angiogram Chest [[id number]] Collected: 03/11/24 1346     Updated: 03/11/24 1404    Narrative:      EXAMINATION: CT ANGIOGRAM CHEST-      3/11/2024 12:34 PM     HISTORY: sob     In order to have a CT radiation dose as low as reasonably achievable  Automated Exposure Control was utilized for adjustment of the mA and/or  KV according to patient size.     Total DLP = 254.25 mGy.cm     CT angiography of the chest performed after intravenous contrast  enhancement.     The images are acquired in axial plane and subsequent 2D reconstruction  in coronal and sagittal planes and 3D maximum intensity projection  reconstruction.     There is no previous similar study for comparison. The correlation made  with chest radiograph obtained earlier today.     There is normal opacification of the pulmonary arteries and branches  bilaterally. There are no filling defects in the opacified pulmonary  arterial bed.     RV/LV ratio is 40 years/48 which may suggest a mild right heart strain.     Atheromatous change of thoracic aorta seen. Moderate ectasia of the  sinus of Valsalva is noted which measures 4.1 cm. The ascending aorta  measures 3.5 cm in maximum diameter. No dissection.     Severe  atheromatous changes of coronary arteries are noted. There is  evidence of prior CABG.     There is no evidence of mediastinal or hilar mass or lymphadenopathy.     Limited visualized soft tissue of the neck are unremarkable.     There is no axillary there are nonspecific moderately enlarged axillary  lymph nodes, left larger than the right. The largest lymph node in the  left axilla, image #46 and series 6, measures 1.1 cm in short axis.     There are atelectatic changes in bilateral lower lobar posterior segment  consolidation with adjacent small bibasilar pleural effusion, left more  than the right.     There is no evidence of infiltrate.     There is a tiny nodule in the left lower lobe, image #109 and series 7,  measuring 3 mm in maximum dimension.     The central airway is patent. No intrinsic abnormality.     There is moderate circumferential thickening of the esophagus more  pronounced in the distal esophagus.     The limited visualized abdomen is unremarkable.     There is diffuse subcutaneous fat infiltration/edema suggesting fluid  overload/anasarca.     The images reviewed in bone window show no acute bony abnormality.  Chronic degenerative changes of the thoracic spine are seen.       Impression:      1. No evidence of pulmonary embolism. No aortic aneurysm or dissection.  2. Severe atheromatous changes of coronary arteries. A prior CABG.  3. Small bibasilar pleural effusion left more than the right.  Atelectatic changes in the lower lungs. No acute infiltrate.  4. Edema of the chest and abdominal wall suggesting fluid  overload/anasarca.  5. Nonspecific left axillary lymphadenopathy. The etiology and clinical  significance is not certain.  6. A 3 mm nodule in the left lower lobe may represent a small  noncalcified granuloma. However, since this is a baseline study, a  follow-up examination in 6 months is recommended to ensure stability or  resolution.                                         This report  was signed and finalized on 3/11/2024 2:01 PM by Dr. Wendy Hicks MD.       XR Chest 1 View [[id number]] Collected: 03/11/24 1217     Updated: 03/11/24 1221    Narrative:      EXAM: XR CHEST 1 VW- 3/11/2024 11:05 AM     HISTORY: Shortness of breath.     COMPARISON: 5/13/2023.     TECHNIQUE: Single frontal radiograph of the chest was obtained.     FINDINGS:     Support Devices: None.     Cardiac and Mediastinal Silhouettes: Normal.     Lungs/Pleura: No focal consolidation. No sizable pleural effusion. No  visible pneumothorax.     Osseous structures: No acute osseous finding.     Other: None.       Impression:         No acute cardiopulmonary abnormality.           This report was signed and finalized on 3/11/2024 12:18 PM by Law Izquierdo.                 Assessment / Plan   Assessment:   Active Hospital Problems    Diagnosis     **Hypervolemia     Anasarca     Iron deficiency anemia     New onset a-fib     Scrotal edema     Weakness     Gait instability     TARSHA (acute kidney injury)     Elevated brain natriuretic peptide (BNP) level     Type 2 diabetes mellitus with hyperglycemia, without long-term current use of insulin     HTN (hypertension)        Treatment Plan  1.The patient will be admitted to Dr. Cosme's service here at [facility].   2.  Hypervolemia/probable heart failure-heart failure pathway initiated, stat echocardiogram, Bumex IV 0.5 mg/hr, cardiology consult.  Daily weights, strict I/O. ReDs vest ordred  3.  Iron deficiency anemia-anemia studies initiated, occult blood stool if possible, patient's iron level was 20 and a saturation of 6, replaced iron, monitoring of daily CBC.  4.  Anasarca/scrotal edema-close monitoring of urine output, elevation of scrotum and bilateral lower extremities at all times.  Monitor for skin breakdown.  5.  Weakness/gait instability-up with assistance, PT/ OT consult, patient to wear right ankle brace at all times when ambulating.  6.  TARSHA-strict  intake and output, electrolyte protocol in place, daily BMP.  7.  Diabetes mellitus type 2 without insulin dependence-A1c in process, sliding scale action insulin initiated, metformin on hold.  Accu-Cheks before meals and at bedtime.  8.  Primary hypertension-resumption of home lisinopril, vital signs every 4.  9.  New onset atrial fibrillation-currently patient is rate controlled and has been since admission.  As needed EKG for any change or transition to RVR.  Nursing to contact MD for any elevation in heart rate.  Lovenox full dose per pharmacy dosing due to TARSHA.  10.  Home medications reviewed and resumed as appropriate.  11.  Labs in a.m.       Medical Decision Making  Number and Complexity of problems: 12  Differential Diagnosis: None    Conditions and Status        Condition is unchanged.     [facility] Data  External documents reviewed: None available  Cardiac tracing (EKG, telemetry) interpretation: Reviewed  Radiology interpretation: Reviewed  Labs reviewed: Reviewed  Any tests that were considered but not ordered: None     Decision rules/scores evaluated (example [id number], [place], etc): [id number] points     Discussed with: Dr. Cosme, patient, and his wife Gwen     Care Planning  Shared decision making: Ba, patient, and his wife Gwen  Code status and discussions: Full code per patient    Disposition  Social Determinants of Health that impact treatment or disposition:  consult for possible home health needs, and possible financial constraints with initiation of heart failure medications.  Estimated length of stay is 2+ days.     I confirmed that the patient's advanced care plan is present, code status is documented, and a surrogate decision maker is listed in the patient's medical record.     The patient's surrogate decision maker is iwona Hidalgo.     The patient was seen and examined by me on 3/11/2024 at 5:30 PM.    Electronically signed by MEGHANN Parrish, 03/11/24, 17:53  CDT.

## 2024-03-12 ENCOUNTER — APPOINTMENT (OUTPATIENT)
Dept: NUCLEAR MEDICINE | Facility: HOSPITAL | Age: 59
DRG: 641 | End: 2024-03-12
Payer: COMMERCIAL

## 2024-03-12 ENCOUNTER — APPOINTMENT (OUTPATIENT)
Dept: ULTRASOUND IMAGING | Facility: HOSPITAL | Age: 59
DRG: 641 | End: 2024-03-12
Payer: COMMERCIAL

## 2024-03-12 PROBLEM — I27.20 PULMONARY HTN: Status: ACTIVE | Noted: 2024-03-12

## 2024-03-12 LAB
ABSOLUTE LUNG FLUID CONTENT: 38 % (ref 20–35)
ANION GAP SERPL CALCULATED.3IONS-SCNC: 9 MMOL/L (ref 5–15)
ANION GAP SERPL CALCULATED.3IONS-SCNC: 9 MMOL/L (ref 5–15)
BACTERIA UR QL AUTO: NORMAL /HPF
BILIRUB UR QL STRIP: NEGATIVE
BUN SERPL-MCNC: 38 MG/DL (ref 6–20)
BUN SERPL-MCNC: 40 MG/DL (ref 6–20)
BUN/CREAT SERPL: 28.4 (ref 7–25)
BUN/CREAT SERPL: 29 (ref 7–25)
CALCIUM SPEC-SCNC: 8.2 MG/DL (ref 8.6–10.5)
CALCIUM SPEC-SCNC: 8.4 MG/DL (ref 8.6–10.5)
CHLORIDE SERPL-SCNC: 101 MMOL/L (ref 98–107)
CHLORIDE SERPL-SCNC: 103 MMOL/L (ref 98–107)
CHOLEST SERPL-MCNC: 180 MG/DL (ref 0–200)
CLARITY UR: CLEAR
CO2 SERPL-SCNC: 31 MMOL/L (ref 22–29)
CO2 SERPL-SCNC: 31 MMOL/L (ref 22–29)
COLOR UR: YELLOW
CREAT SERPL-MCNC: 1.31 MG/DL (ref 0.76–1.27)
CREAT SERPL-MCNC: 1.41 MG/DL (ref 0.76–1.27)
EGFRCR SERPLBLD CKD-EPI 2021: 57.8 ML/MIN/1.73
EGFRCR SERPLBLD CKD-EPI 2021: 63.1 ML/MIN/1.73
GLUCOSE BLDC GLUCOMTR-MCNC: 134 MG/DL (ref 70–130)
GLUCOSE BLDC GLUCOMTR-MCNC: 246 MG/DL (ref 70–130)
GLUCOSE BLDC GLUCOMTR-MCNC: 284 MG/DL (ref 70–130)
GLUCOSE BLDC GLUCOMTR-MCNC: 290 MG/DL (ref 70–130)
GLUCOSE SERPL-MCNC: 170 MG/DL (ref 65–99)
GLUCOSE SERPL-MCNC: 226 MG/DL (ref 65–99)
GLUCOSE UR STRIP-MCNC: NEGATIVE MG/DL
HBA1C MFR BLD: 6.7 % (ref 4.8–5.6)
HDLC SERPL-MCNC: 54 MG/DL (ref 40–60)
HGB UR QL STRIP.AUTO: ABNORMAL
KETONES UR QL STRIP: NEGATIVE
LDLC SERPL CALC-MCNC: 109 MG/DL (ref 0–100)
LDLC/HDLC SERPL: 2 {RATIO}
LEUKOCYTE ESTERASE UR QL STRIP.AUTO: NEGATIVE
NITRITE UR QL STRIP: NEGATIVE
PH UR STRIP.AUTO: 6.5 [PH] (ref 5–8)
PHOSPHATE SERPL-MCNC: 3.8 MG/DL (ref 2.5–4.5)
POTASSIUM SERPL-SCNC: 3.8 MMOL/L (ref 3.5–5.2)
POTASSIUM SERPL-SCNC: 4 MMOL/L (ref 3.5–5.2)
PROT UR QL STRIP: ABNORMAL
QT INTERVAL: 416 MS
QT INTERVAL: 428 MS
QT INTERVAL: 440 MS
QTC INTERVAL: 488 MS
QTC INTERVAL: 493 MS
QTC INTERVAL: 501 MS
RBC # UR STRIP: NORMAL /HPF
REF LAB TEST METHOD: NORMAL
SODIUM SERPL-SCNC: 141 MMOL/L (ref 136–145)
SODIUM SERPL-SCNC: 143 MMOL/L (ref 136–145)
SP GR UR STRIP: 1.01 (ref 1–1.03)
SQUAMOUS #/AREA URNS HPF: NORMAL /HPF
TRIGL SERPL-MCNC: 91 MG/DL (ref 0–150)
UROBILINOGEN UR QL STRIP: ABNORMAL
VLDLC SERPL-MCNC: 17 MG/DL (ref 5–40)
WBC # UR STRIP: NORMAL /HPF

## 2024-03-12 PROCEDURE — 0 TECHNETIUM ALBUMIN AGGREGATED: Performed by: FAMILY MEDICINE

## 2024-03-12 PROCEDURE — 93970 EXTREMITY STUDY: CPT | Performed by: SURGERY

## 2024-03-12 PROCEDURE — 80061 LIPID PANEL: CPT | Performed by: NURSE PRACTITIONER

## 2024-03-12 PROCEDURE — 80048 BASIC METABOLIC PNL TOTAL CA: CPT | Performed by: NURSE PRACTITIONER

## 2024-03-12 PROCEDURE — 99222 1ST HOSP IP/OBS MODERATE 55: CPT | Performed by: INTERNAL MEDICINE

## 2024-03-12 PROCEDURE — 25010000002 ENOXAPARIN PER 10 MG: Performed by: NURSE PRACTITIONER

## 2024-03-12 PROCEDURE — 93005 ELECTROCARDIOGRAM TRACING: CPT | Performed by: NURSE PRACTITIONER

## 2024-03-12 PROCEDURE — 63710000001 INSULIN LISPRO (HUMAN) PER 5 UNITS: Performed by: NURSE PRACTITIONER

## 2024-03-12 PROCEDURE — A9540 TC99M MAA: HCPCS | Performed by: FAMILY MEDICINE

## 2024-03-12 PROCEDURE — 81001 URINALYSIS AUTO W/SCOPE: CPT | Performed by: NURSE PRACTITIONER

## 2024-03-12 PROCEDURE — 93010 ELECTROCARDIOGRAM REPORT: CPT | Performed by: HOSPITALIST

## 2024-03-12 PROCEDURE — 94726 PLETHYSMOGRAPHY LUNG VOLUMES: CPT | Performed by: HOSPITALIST

## 2024-03-12 PROCEDURE — 93970 EXTREMITY STUDY: CPT

## 2024-03-12 PROCEDURE — 78580 LUNG PERFUSION IMAGING: CPT

## 2024-03-12 PROCEDURE — 25810000003 SODIUM CHLORIDE 0.9 % SOLUTION 250 ML FLEX CONT: Performed by: NURSE PRACTITIONER

## 2024-03-12 PROCEDURE — 84100 ASSAY OF PHOSPHORUS: CPT | Performed by: NURSE PRACTITIONER

## 2024-03-12 PROCEDURE — 83036 HEMOGLOBIN GLYCOSYLATED A1C: CPT | Performed by: NURSE PRACTITIONER

## 2024-03-12 PROCEDURE — 80048 BASIC METABOLIC PNL TOTAL CA: CPT | Performed by: FAMILY MEDICINE

## 2024-03-12 PROCEDURE — 25010000002 NA FERRIC GLUC CPLX PER 12.5 MG: Performed by: NURSE PRACTITIONER

## 2024-03-12 PROCEDURE — 97165 OT EVAL LOW COMPLEX 30 MIN: CPT | Performed by: OCCUPATIONAL THERAPIST

## 2024-03-12 PROCEDURE — 36415 COLL VENOUS BLD VENIPUNCTURE: CPT | Performed by: NURSE PRACTITIONER

## 2024-03-12 PROCEDURE — 82948 REAGENT STRIP/BLOOD GLUCOSE: CPT

## 2024-03-12 PROCEDURE — 97161 PT EVAL LOW COMPLEX 20 MIN: CPT

## 2024-03-12 RX ORDER — POTASSIUM CHLORIDE 750 MG/1
20 CAPSULE, EXTENDED RELEASE ORAL ONCE
Status: COMPLETED | OUTPATIENT
Start: 2024-03-12 | End: 2024-03-12

## 2024-03-12 RX ADMIN — Medication 10 ML: at 20:52

## 2024-03-12 RX ADMIN — LISINOPRIL 5 MG: 5 TABLET ORAL at 09:20

## 2024-03-12 RX ADMIN — Medication 10 ML: at 09:21

## 2024-03-12 RX ADMIN — INSULIN LISPRO 4 UNITS: 100 INJECTION, SOLUTION INTRAVENOUS; SUBCUTANEOUS at 17:36

## 2024-03-12 RX ADMIN — INSULIN LISPRO 3 UNITS: 100 INJECTION, SOLUTION INTRAVENOUS; SUBCUTANEOUS at 11:22

## 2024-03-12 RX ADMIN — ENOXAPARIN SODIUM 100 MG: 100 INJECTION SUBCUTANEOUS at 17:36

## 2024-03-12 RX ADMIN — HYDROCODONE BITARTRATE AND ACETAMINOPHEN 1 TABLET: 10; 325 TABLET ORAL at 18:03

## 2024-03-12 RX ADMIN — POTASSIUM CHLORIDE 20 MEQ: 10 CAPSULE, COATED, EXTENDED RELEASE ORAL at 18:55

## 2024-03-12 RX ADMIN — PREGABALIN 50 MG: 50 CAPSULE ORAL at 09:20

## 2024-03-12 RX ADMIN — KIT FOR THE PREPARATION OF TECHNETIUM TC 99M ALBUMIN AGGREGATED 1 DOSE: 2.5 INJECTION, POWDER, FOR SOLUTION INTRAVENOUS at 12:02

## 2024-03-12 RX ADMIN — SODIUM CHLORIDE 250 MG: 9 INJECTION, SOLUTION INTRAVENOUS at 17:36

## 2024-03-12 RX ADMIN — INSULIN LISPRO 4 UNITS: 100 INJECTION, SOLUTION INTRAVENOUS; SUBCUTANEOUS at 20:51

## 2024-03-12 RX ADMIN — ENOXAPARIN SODIUM 100 MG: 100 INJECTION SUBCUTANEOUS at 05:42

## 2024-03-12 RX ADMIN — PREGABALIN 50 MG: 50 CAPSULE ORAL at 15:29

## 2024-03-12 RX ADMIN — PREGABALIN 50 MG: 50 CAPSULE ORAL at 20:52

## 2024-03-12 NOTE — CONSULTS
LOS: 1 day   Patient Care Team:  Mehul Andersen MD as PCP - General (Family Medicine)    Chief Complaint: Weight gain     Subjective    Garrett Rodriguez is a 59 y.o. male who is being seen in consultation.  Patient has presented with complaints of predominantly weight    Denies any chest pain palpitation presyncope syncope  No orthopnea  No paroxysmal nocturnal dyspnea  Echocardiogram as referenced below  Elevated D-dimer with no CT evidence of pulmonary embolism  He is diuresed well and currently on IV Bumex drip  Feels much better  Hemodynamically stable  Overnight has rested well  Labs as referenced below  Troponin was elevated to 153 with repeat down to 121    Telemetry: no malignant arrhythmia. No significant pauses.    Review of Systems   Constitutional: No chills   Has fatigue   No fever.   HENT: Negative.    Eyes: Negative.    Respiratory: Negative for cough,   No chest wall soreness,   Shortness of breath,   no wheezing, no stridor.    Cardiovascular: As above  Gastrointestinal: Negative for abdominal distention,  No abdominal pain,   No blood in stool,   No constipation,   No diarrhea,   No nausea   No vomiting.   Endocrine: Negative.    Genitourinary: Negative for difficulty urinating, dysuria, flank pain and hematuria.   Musculoskeletal: Negative.    Skin: Negative for rash and wound.   Allergic/Immunologic: Negative.    Neurological: Negative for dizziness, syncope, weakness,   No light-headedness  No  headaches.   Hematological: Does not bruise/bleed easily.   Psychiatric/Behavioral: Negative for agitation or behavioral problems,   No confusion,   the patient is  nervous/anxious.       History:   Past Medical History:   Diagnosis Date    Chronic pain in right foot     Diabetes mellitus     Hypertension     PFO (patent foramen ovale)     RP at age 6     Past Surgical History:   Procedure Laterality Date    ANKLE FUSION Right 7/11/2023    Procedure: ANKLE ARTHRODESIS;  Surgeon: Shahbaz Reddy  RONY PRAKASH;  Location:  [facility];  Service: Podiatry;  Laterality: Right;    BACK SURGERY      CARDIAC SURGERY      EXTERNAL FIXATION ANKLE FRACTURE Right 7/11/2023    Procedure: POSSIBLE EXTERNAL FIXATION, RIGHT ANKLE;  Surgeon: Shahbaz Reddy DPM;  Location:  [facility];  Service: Podiatry;  Laterality: Right;    HARDWARE REMOVAL Right 7/11/2023    Procedure: REMOVAL OF HARDWARE, DEEP; ANKLE ARTHRODESIS; SUBTALAR ARTHRODESIS; POSSIBLE EXTERNAL FIXATION, RIGHT ANKLE;  Surgeon: Shahbaz Reddy DPM;  Location:  [facility];  Service: Podiatry;  Laterality: Right;    PATENT FORAMEN OVALE CLOSURE      ROTATOR CUFF REPAIR Right     SUBTALAR ARTHRODESIS Right 7/11/2023    Procedure: SUBTALAR ARTHRODESIS;  Surgeon: Shahbaz Reddy DPM;  Location:  [facility];  Service: Podiatry;  Laterality: Right;     Social History     Socioeconomic History    Marital status:    Tobacco Use    Smoking status: Never    Smokeless tobacco: Never   Vaping Use    Vaping status: Never Used   Substance and Sexual Activity    Alcohol use: Never    Drug use: Never    Sexual activity: Defer     History reviewed. No pertinent family history.    Labs:  WBC WBC   Date Value Ref Range Status   03/11/2024 9.12 3.40 - 10.80 10*3/mm3 Final      HGB Hemoglobin   Date Value Ref Range Status   03/11/2024 8.6 (L) 13.0 - 17.7 g/dL Final      HCT Hematocrit   Date Value Ref Range Status   03/11/2024 28.8 (L) 37.5 - 51.0 % Final      Platelets Platelets   Date Value Ref Range Status   03/11/2024 268 140 - 450 10*3/mm3 Final      MCV MCV   Date Value Ref Range Status   03/11/2024 89.2 79.0 - 97.0 fL Final        Results from last 7 days   Lab Units 03/12/24  0153 03/11/24  1147   SODIUM mmol/L 143 143   POTASSIUM mmol/L 4.0 4.3   CHLORIDE mmol/L 103 104   CO2 mmol/L 31.0* 29.0   BUN mg/dL 40* 44*   CREATININE mg/dL 1.41* 1.34*   CALCIUM mg/dL 8.2* 8.3*   BILIRUBIN mg/dL  --  0.5   ALK PHOS U/L  --  192*   ALT (SGPT) U/L  --  24   AST (SGOT) U/L  --  32  "  GLUCOSE mg/dL 170* 146*     Lab Results   Component Value Date    CKTOTAL 179 05/13/2023    TROPONINT 121 (C) 03/11/2024     PT/INR:  No results found for: \"PROTIME\"/No results found for: \"INR\"    Imaging Results (Last 72 Hours)       Procedure Component Value Units Date/Time    CT Angiogram Chest [[id number]] Collected: 03/11/24 1346     Updated: 03/11/24 1404    Narrative:      EXAMINATION: CT ANGIOGRAM CHEST-      3/11/2024 12:34 PM     HISTORY: sob     In order to have a CT radiation dose as low as reasonably achievable  Automated Exposure Control was utilized for adjustment of the mA and/or  KV according to patient size.     Total DLP = 254.25 mGy.cm     CT angiography of the chest performed after intravenous contrast  enhancement.     The images are acquired in axial plane and subsequent 2D reconstruction  in coronal and sagittal planes and 3D maximum intensity projection  reconstruction.     There is no previous similar study for comparison. The correlation made  with chest radiograph obtained earlier today.     There is normal opacification of the pulmonary arteries and branches  bilaterally. There are no filling defects in the opacified pulmonary  arterial bed.     RV/LV ratio is 40 years/48 which may suggest a mild right heart strain.     Atheromatous change of thoracic aorta seen. Moderate ectasia of the  sinus of Valsalva is noted which measures 4.1 cm. The ascending aorta  measures 3.5 cm in maximum diameter. No dissection.     Severe atheromatous changes of coronary arteries are noted. There is  evidence of prior CABG.     There is no evidence of mediastinal or hilar mass or lymphadenopathy.     Limited visualized soft tissue of the neck are unremarkable.     There is no axillary there are nonspecific moderately enlarged axillary  lymph nodes, left larger than the right. The largest lymph node in the  left axilla, image #46 and series 6, measures 1.1 cm in short axis.     There are atelectatic changes " in bilateral lower lobar posterior segment  consolidation with adjacent small bibasilar pleural effusion, left more  than the right.     There is no evidence of infiltrate.     There is a tiny nodule in the left lower lobe, image #109 and series 7,  measuring 3 mm in maximum dimension.     The central airway is patent. No intrinsic abnormality.     There is moderate circumferential thickening of the esophagus more  pronounced in the distal esophagus.     The limited visualized abdomen is unremarkable.     There is diffuse subcutaneous fat infiltration/edema suggesting fluid  overload/anasarca.     The images reviewed in bone window show no acute bony abnormality.  Chronic degenerative changes of the thoracic spine are seen.       Impression:      1. No evidence of pulmonary embolism. No aortic aneurysm or dissection.  2. Severe atheromatous changes of coronary arteries. A prior CABG.  3. Small bibasilar pleural effusion left more than the right.  Atelectatic changes in the lower lungs. No acute infiltrate.  4. Edema of the chest and abdominal wall suggesting fluid  overload/anasarca.  5. Nonspecific left axillary lymphadenopathy. The etiology and clinical  significance is not certain.  6. A 3 mm nodule in the left lower lobe may represent a small  noncalcified granuloma. However, since this is a baseline study, a  follow-up examination in 6 months is recommended to ensure stability or  resolution.                                         This report was signed and finalized on 3/11/2024 2:01 PM by Dr. Wendy Hicks MD.       XR Chest 1 View [[id number]] Collected: 03/11/24 1217     Updated: 03/11/24 1221    Narrative:      EXAM: XR CHEST 1 VW- 3/11/2024 11:05 AM     HISTORY: Shortness of breath.     COMPARISON: 5/13/2023.     TECHNIQUE: Single frontal radiograph of the chest was obtained.     FINDINGS:     Support Devices: None.     Cardiac and Mediastinal Silhouettes: Normal.     Lungs/Pleura: No focal  consolidation. No sizable pleural effusion. No  visible pneumothorax.     Osseous structures: No acute osseous finding.     Other: None.       Impression:         No acute cardiopulmonary abnormality.           This report was signed and finalized on 3/11/2024 12:18 PM by Law Izquierdo.               Objective     No Known Allergies    Medication Review: Performed  Current Facility-Administered Medications   Medication Dose Route Frequency Provider Last Rate Last Admin    acetaminophen (TYLENOL) tablet 650 mg  650 mg Oral Q4H PRN Roz Salinas APRN        Or    acetaminophen (TYLENOL) 160 MG/5ML oral solution 650 mg  650 mg Oral Q4H PRN Roz Salinas APRN        Or    acetaminophen (TYLENOL) suppository 650 mg  650 mg Rectal Q4H PRN Roz Salinas APRN        sennosides-docusate (PERICOLACE) 8.6-50 MG per tablet 2 tablet  2 tablet Oral BID PRN Roz Salinas APRN        And    polyethylene glycol (MIRALAX) packet 17 g  17 g Oral Daily PRN Roz Salinas APRN        And    bisacodyl (DULCOLAX) EC tablet 5 mg  5 mg Oral Daily PRN Roz Salinas APRN        And    bisacodyl (DULCOLAX) suppository 10 mg  10 mg Rectal Daily PRN Roz Salinas APRN        bumetanide (BUMEX) 25 mg/100mL (0.25 mg/mL) infusion  0.5 mg/hr Intravenous Continuous Roz Salinas APRN 2 mL/hr at 03/11/24 1837 0.5 mg/hr at 03/11/24 1837    dextrose (D50W) (25 g/50 mL) IV injection 25 g  25 g Intravenous Q15 Min PRN Roz Salinas APRN        dextrose (GLUTOSE) oral gel 15 g  15 g Oral Q15 Min PRN Roz Salinas APRN        Enoxaparin Sodium (LOVENOX) syringe 100 mg  1 mg/kg Subcutaneous Q12H Roz Salinas APRN   100 mg at 03/12/24 0542    ferric gluconate (FERRLECIT) 250 mg in sodium chloride 0.9 % 250 mL IVPB  250 mg Intravenous Q24H Roz Salinas APRN 125 mL/hr at 03/11/24 2233 250 mg at 03/11/24 2233    glucagon (GLUCAGEN) injection 1 mg  1 mg Intramuscular Q15 Min PRN Roz Salinas,  "APROLGA        HYDROcodone-acetaminophen (NORCO)  MG per tablet 1 tablet  1 tablet Oral Q8H PRN Roz Salinas APRN   1 tablet at 03/11/24 2130    Insulin Lispro (humaLOG) injection 2-7 Units  2-7 Units Subcutaneous 4x Daily AC & at Bedtime Roz Salinas APRN   3 Units at 03/11/24 2130    lisinopril (PRINIVIL,ZESTRIL) tablet 5 mg  5 mg Oral Q24H Roz Salinas APRN   5 mg at 03/11/24 1837    Magnesium Cardiology Dose Replacement - Follow Nurse / BPA Driven Protocol   Does not apply PRN Roz Salinas APRN        nitroglycerin (NITROSTAT) SL tablet 0.4 mg  0.4 mg Sublingual Q5 Min PRN Roz Salinas APRN        ondansetron ODT (ZOFRAN-ODT) disintegrating tablet 4 mg  4 mg Oral Q6H PRN Roz Salinas APRN        Or    ondansetron (ZOFRAN) injection 4 mg  4 mg Intravenous Q6H PRN Roz Salinas APRN        Pharmacy to Dose enoxaparin (LOVENOX)   Does not apply Continuous PRN Roz Salinas APRN        Potassium Replacement - Follow Nurse / BPA Driven Protocol   Does not apply PRN Roz Salinas APRN        pregabalin (LYRICA) capsule 50 mg  50 mg Oral TID Roz Salinas APRN   50 mg at 03/11/24 2130    sodium chloride 0.9 % flush 10 mL  10 mL Intravenous PRN Arlin Lyons PA        sodium chloride 0.9 % flush 10 mL  10 mL Intravenous Q12H Roz Salinas APRN   10 mL at 03/11/24 2131    sodium chloride 0.9 % flush 10 mL  10 mL Intravenous PRN Roz Salinas APRN        sodium chloride 0.9 % infusion 40 mL  40 mL Intravenous PRN Roz Salinas APRN           Vital Sign Min/Max for last 24 hours  Temp  Min: 97.3 °F (36.3 °C)  Max: 98.5 °F (36.9 °C)   BP  Min: 126/68  Max: 161/88   Pulse  Min: 71  Max: 86   Resp  Min: 18  Max: 20   SpO2  Min: 90 %  Max: 96 %   No data recorded   Weight  Min: 97 kg (213 lb 12.8 oz)  Max: 104 kg (230 lb 4.8 oz)     Flowsheet Rows      Flowsheet Row First Filed Value   Admission Height 177.8 cm (70\") Documented at 03/11/2024 " 1118   Admission Weight 102 kg (224 lb) Documented at 03/11/2024 1118            Results for orders placed during the hospital encounter of 03/11/24    Adult Transthoracic Echo Complete w/ Color, Spectral and Contrast if Necessary Per Protocol    Interpretation Summary    Left ventricular ejection fraction appears to be 56 - 60%.    Left ventricular wall thickness is consistent with mild concentric hypertrophy.    The left atrial cavity is moderately dilated.    Left atrial volume is severely increased.    Moderate pulmonary hypertension is present.    Apical RV free wall hypokinesis      Physical Exam:    General Appearance: Awake, alert, in no acute distress  Eyes: Pupils equal and reactive    Ears: Appear intact with no abnormalities noted  Nose: Nares normal, no drainage  Neck: supple, trachea midline, no carotid bruit and no JVD  Back: no kyphosis present,    Lungs: respirations regular, respirations even and respirations unlabored  Heart: normal S1, S2, no significant murmurs   No gallops or rubs  no rub and no click  Abdomen: normal bowel sounds, no tenderness   Skin: no bleeding, bruising or rash  Extremities: no cyanosis, 2+ lower extremity pitting edema  Psychiatric/Behavioral: Negative for agitation, behavioral problems, confusion, the patient does  appear to be nervous/anxious.       Results Review:   I reviewed the patient's new clinical results.  I reviewed the patient's new imaging results and agree with the interpretation.  I reviewed the patient's other test results and agree with the interpretation  I personally viewed and interpreted the patient's EKG/Telemetry data    Discussed with patient  Updated patient regarding any new or relevant abnormalities on review of records or any new findings on physical exam.   Mentioned to patient about purpose of visit and desirable health short and long term goals and objectives.     Reviewed available prior notes, consults, prior visits, laboratory findings,  radiology and cardiology relevant reports.   Updated chart as applicable.   I have reviewed the patient's medical history in detail and updated the computerized patient record as relevant.          Assessment & Plan       Hypervolemia    Type 2 diabetes mellitus with hyperglycemia, without long-term current use of insulin    HTN (hypertension)    Anasarca    Iron deficiency anemia    New onset a-fib    Scrotal edema    Weakness    Gait instability    TARSHA (acute kidney injury)    Elevated brain natriuretic peptide (BNP) level  2+ lower extremity pitting edema    Plan    Care plan discussed with patient along with the results of echocardiogram  Recommend VQ scan  In future will require nuclear perfusion scan which can be done 2 days after VQ scan  Monitor kidney functions  Currently on diuresis and close attention to renal function and electrolytes monitor  Telemetry  Deep vein thrombosis prophylaxis/precautions  Appropriate diet, fluid, sodium, caffeine, stimulants intake   Questions were encouraged, asked and answered to the patient's  understanding and satisfaction.  Compliance to diet and medications       Rigo Kathlene MD  03/12/24  08:48 CDT    EMR Dragon/Transcription was used to dictate part of this note

## 2024-03-12 NOTE — PLAN OF CARE
Goal Outcome Evaluation:  Plan of Care Reviewed With: patient        Progress: improving  Outcome Evaluation: OT joanna complete. Pt. is AxO x 4 & pleasant.  Mr. Rodriguez reports diffuse edema that is improving and that he has been up ad mumtaz to the toilet.  His spouse is present and she assisted with ankle brace 2' increased swelling.  The pt was able to mobilize in room at S. He endorses leg length disrepancy & it is evident with his gait pattern. OTR offered rwx for fxl mob to reduce imbalance & promote safety during fxl mob.  NO further OT Tx required at this time.  Pt feels his is at his fxl baseline with ADLs. OTR edu'd in edema mgmt, falls risk safety, & benefits of activity.  Pt confirms edu.      Anticipated Discharge Disposition (OT): home with assist

## 2024-03-12 NOTE — PLAN OF CARE
Goal Outcome Evaluation:           Progress: improving  Outcome Evaluation: Pt came from 3A to 4B yesterday evening. Pt on bumex drip going 2ml/hr. has had good urine output. pt has lost 6lbs this shift. pt got pain pill for chronic ankle pain. Pt has 3+ edema. sinus 75-82.

## 2024-03-12 NOTE — PLAN OF CARE
Goal Outcome Evaluation:  Plan of Care Reviewed With: patient        Progress: no change   Pt admitted to 3A from ED this shift. Pt a/ox4. BLE kept elevated- see orders. Strict I/O monitoring per orders. Room air. Tele on. Accu checks ACHS. SOLIS PPP. Safety maintained. Call light within reach

## 2024-03-12 NOTE — CONSULTS
"Inpatient Nutrition Consult: CHF Education  Patient Name:  Garrett Rodriguez  YOB: 1965  MRN: [id number]  Admit Date:  3/11/2024  Assessment Date:  3/12/2024     Reason for Assessment       Row Name 03/12/24 1559          Reason for Assessment    Reason For Assessment physician consult     Diagnosis cardiac disease  new dx CHF     Identified At Risk by Screening Criteria need for education                    Nutrition/Diet History       Row Name 03/12/24 1550          Nutrition/Diet History    Typical Intake (Food/Fluid/EN/PN) Pt and pt spouse in room. Pt new dx of CHF and with PMH DM. Pt and RD reviewed A1c. Pt does not check blood sugar at home, takes Metformin per PCP orders, and has A1c checked on a regular basis. Pt reported hx of having hypoglycemia and A1c as low as 5.6%. Pt has observed strict DM and CHO restrictions for about 2 years to 'save his foot' and reports no issues with diet compliance. RD encouraged pt to consider less restrictions and complete omission of CHO-containing food groups as this has lead to burnout of foods and consuming other items high in sodium. Introduced low sodium MNT and encouraged learning curve to include 1) identification of items that contain sodium in current meal pattern, and 2) understanding rationale for low sodium MNT. Pt was provided with handout \"Heart Healthy Consistent Carbohydrate Nutrition Therapy\" and inpatient menu. Reviewed current inpatient meal options and encouraged learning with focus on reducing sodium and consistent CHO intake. Recommended pt and pt spouse to follow RD in an outpatient setting. Will monitor while inpatient.     Food Preferences Avoided carbohydrates, except for keto bread, low CHO pasta, and canned beans. Snacks on salted peanuts, cottage cheese.     Food Intolerance(s) None          Labs/Tests/Procedures/Meds       Row Name 03/12/24 1626          Labs/Procedures/Meds    Lab Results Comments A1c 6.7%, BNP 4906, glu " 125-246         Problem/Interventions:   Problem 1       Row Name 03/12/24 1628          Nutrition Diagnoses Problem 1    Problem 1 Knowledge Deficit     Etiology (related to) Medical Diagnosis     Cardiac CHF     Signs/Symptoms (evidenced by) Reported  Information Deficit;Demonstrated Information Deficit                          Intervention Goal       Row Name 03/12/24 1628          Intervention Goal    General Provide information regarding MNT for treatment/condition;Improved nutrition related lab(s)                    Nutrition Intervention       Row Name 03/12/24 1628          Nutrition Intervention    RD/Tech Action Follow Tx progress;Care plan reviewd                      Education/Evaluation       Row Name 03/12/24 1628          Education    Education Provided education regarding;Education topics     Provided education regarding Diet rationale;Nutrition related factor;Avoidance of associated complications;Avoidance/improvement of symptoms;Key food habit change     Education Topics Cardiac heart health;Cardiac diabetic;CHF;Diabetes        Monitor/Evaluation    Monitor Per protocol     Education Follow-up Reinforce PRN                     Electronically signed by:  Sho Courtney RDN, LD  03/12/24 16:30 CDT

## 2024-03-12 NOTE — PLAN OF CARE
Goal Outcome Evaluation:  Plan of Care Reviewed With: (P) patient        Progress: (P) no change  Outcome Evaluation: (P) PT eval complete. Pt in fowlers position with LE elevated on arrival. Pt is A&Ox4 and reports no current pain and reports PLOF as ind with all activities. Pt has been seeing outpatient PT and states he is supposed to start aquatic PT for R ankle; however, LE edema and new medical findings are being prioritized. Pt has plans to return to outpatient PT once released from hospital. Today, pt performs supine to sit with modified independence. In seated position, there is a notable leg length discrepency (R leg shorter v L) and swelling (R>L) Pt performs sit to stand and ambulated 48ft within room. Gait deviations include decreased stride length and decreased speed, increased BEN, and decreased R heelstrike and toe off. Pt reports this has been baseline since ankle surgery. Pt does have RW in room but reports he has not been utilizing it to ambulate to and from bathroom. Pt reports he will utilize RW with longer distance ambulation. Skilled therapy not necessary at this setting at this time. Recommended DC home with assist and continued outpatient PT for R ankle once medically stable. PT to sign off.      Anticipated Discharge Disposition (PT): (P) home with assist, home with outpatient therapy services

## 2024-03-12 NOTE — PAYOR COMM NOTE
"3/12/24 [facility] [phone]  [phone]      ER ADMIT TO INPATIENT ON 3/11/24. FAXING FOR INPATIENT REVIEW.                    Jennifer Garrett Shirley (59 y.o. Male)       Date of Birth   1965    Social Security Number       Address   [facility] [address] [place] [address]    Home Phone   [phone]    MRN   [id number]       Yazdanism   Holiness    Marital Status                               Admission Date   3/11/24    Admission Type   Emergency    Admitting Provider   Ba Cosme DO    Attending Provider   Ba Cosme DO    Department, Room/Bed   [address], 432/1       Discharge Date       Discharge Disposition       Discharge Destination                                 Attending Provider: Ba Cosme DO    Allergies: No Known Allergies    Isolation: None   Infection: None   Code Status: CPR    Ht: 177.8 cm (70\")   Wt: 97 kg (213 lb 12.8 oz)    Admission Cmt: None   Principal Problem: Hypervolemia [E87.70]                   Active Insurance as of 3/11/2024       Primary Coverage       Payor Plan Insurance Group Employer/Plan Group    [employer] HMO [id number]       Payor Plan Address Payor Plan Phone Number Payor Plan Fax Number Effective Dates    [address] [phone]  6/1/2022 - None Entered    [place] [address]         Subscriber Name Subscriber Birth Date Member ID       GARRETT COATES 1965 [id number]                     Emergency Contacts        (Rel.) Home Phone Work Phone Mobile Phone    JenniferGwen (Spouse) -- -- [phone]             [facility] Encounter Date/Time: 3/11/2024 [facility]   Hospital Account: [id number]    MRN: [id number]   Patient:  Garrett Coates   Contact Serial #: [id number]   SSN:          ENCOUNTER             Patient Class: Inpatient   Unit: [address] Service: Medicine     Bed: 432/1 "   Admitting Provider: Ba Cosme DO   Referring Physician:     Attending Provider: Ba Cosme DO   Adm Diagnosis: Hypervolemia [E87.70]               PATIENT             Name: Garrett Coates : 1965 (59 yrs)   Address: [address] [place] [address] Sex: Male   City: [place] [address]   County: [facility]   Marital Status:  Ethnicity: NOT                                                                         Race: WHITE   Primary Care Provider: Mehul Andersen MD Patients Phone: Home Phone: [phone]           EMERGENCY CONTACT   Contact Name Legal Guardian? Relationship to Patient Home Phone Work Phone Mobile Phone   1. Gwen Coates  2. *No Contact Specified* No    Spouse              [phone]      GUARANTOR             Guarantor: Garrett Coates     : 1965   Address: [address] [place] [address] Sex: [place] [address]     Relation to Patient: Self       Home Phone: [phone]   Guarantor ID: [id number]       Work Phone:     GUARANTOR EMPLOYER   Employer: [employer]         Status: FULL TIME   COVERAGE          PRIMARY INSURANCE   Payor: [employer] Plan: ANTHEM PATHWAY HMO   Group Number: [id number] Insurance Type: INDEMNITY   Subscriber Name: GARRETT COATES AN* Subscriber : 1965   Subscriber ID: [id number] Coverage Address: [facility] [address]  [place] [address]   Pat. Rel. to Subscriber: Self Coverage Phone: [phone]   SECONDARY INSURANCE   Payor: N/A Plan: N/A   Group Number:   Insurance Type:     Subscriber Name:   Subscriber :     Subscriber ID:   Coverage Address:     Pat. Rel. to Subscriber:   Coverage Phone:        Contact Serial # ([id number])         2024    Chart ID ([id number])         Arlin Lyons PA   Physician Assistant  Emergency Medicine     ED Provider Notes      Attested     Date of Service: 24 1136  Creation Time: 24 113     Attested           Attestation  signed by Christophe Musa Jr., MD at 03/11/24 1604           SUPERVISE: For this patient encounter, I reviewed the APC's documentation, treatment plan, and medical decision making.  Christophe Musa Jr, MD 3/11/2024 16:03 CDT                                   Expand All Collapse All       Subjective  History of Present Illness     Patient is a pleasant 59-year-old gentleman who presents to ED with wife.  Both are historians.  Chief complaint is worsening shortness of breath and significant swelling.     Patient does have documented history diabetes mellitus type 2, hypertension, patient believes renal insufficiency, chronic back pain, chronic right lower extremity pain postop.     Patient noted for 6 weeks, he had increased swelling to first his right lower extremity than left lower extremity progressing into his scrotum and then abdomen.  Wife noted in his upper extremities as well.  He already had swelling in his right lower extremity around his ankle after he had 5 ankle surgeries with the last being in June 2023.  He continues to wear brace.  But the swelling has worsened and his extremities are started feel hard.  With the wife noted he had what looks like a tie around his abdomen, the patient sought medical attention with his primary care provider, Dr. Andersen.  They describe completing a chest x-ray which did show fluid in his lungs.  He was prescribed Lasix 40 mg daily and had laboratory data completed.  They believe he said that he had stage I kidney issues but it was significant enough to cause all the swelling.  He is scheduled for some type of cardiac evaluation on March 18 as well as a colonoscopy for this on March 13.  But with the continued swelling, he came to the ER to be further evaluated.     Patient believes he is gained about 50 to 60 pounds in the past 6 weeks when this began but since he started Lasix, he think he is dropped about 10 pounds.  He denies any associated chest pain, pressure, or  tightness.  He denies any palpitations.  He had noticed worsening exertional shortness of breath.  He normally sleeps with 3 pillows already and that has been unchanged since the edema began.  He attempted to lay flat but could not lay flat and felt smothered.     Patient denies any cough or palpitations.  He denies any fever.  He denies any known liver or kidney issues.  He denies any use of NSAIDs.  He denies any changes in medications except for the Lasix in the past few months.     Review of Systems   Constitutional:  Positive for activity change.   HENT: Negative.     Respiratory:  Positive for shortness of breath. Negative for stridor.    Cardiovascular:  Positive for leg swelling. Negative for chest pain and palpitations.   Gastrointestinal: Negative.  Negative for abdominal pain.   Genitourinary:  Positive for penile swelling and scrotal swelling. Negative for decreased urine volume, hematuria, penile discharge, penile pain and testicular pain.   Musculoskeletal: Negative.    Neurological: Negative.    Psychiatric/Behavioral: Negative.     All other systems reviewed and are negative.        Medical History        Past Medical History:   Diagnosis Date    Diabetes mellitus              Allergies   No Known Allergies        Surgical History         Past Surgical History:   Procedure Laterality Date    ANKLE FUSION Right 7/11/2023     Procedure: ANKLE ARTHRODESIS;  Surgeon: Shahbaz Reddy DPM;  Location: [facility] OR;  Service: Podiatry;  Laterality: Right;    BACK SURGERY        CARDIAC SURGERY        EXTERNAL FIXATION ANKLE FRACTURE Right 7/11/2023     Procedure: POSSIBLE EXTERNAL FIXATION, RIGHT ANKLE;  Surgeon: Shahbaz Reddy DPM;  Location:  [facility] OR;  Service: Podiatry;  Laterality: Right;    HARDWARE REMOVAL Right 7/11/2023     Procedure: REMOVAL OF HARDWARE, DEEP; ANKLE ARTHRODESIS; SUBTALAR ARTHRODESIS; POSSIBLE EXTERNAL FIXATION, RIGHT ANKLE;  Surgeon: Shahbaz Reddy DPM;  Location:  [facility]  OR;  Service: Podiatry;  Laterality: Right;    PATENT FORAMEN OVALE CLOSURE        ROTATOR CUFF REPAIR Right      SUBTALAR ARTHRODESIS Right 7/11/2023     Procedure: SUBTALAR ARTHRODESIS;  Surgeon: Shahbaz Reddy DPM;  Location: [facility] OR;  Service: Podiatry;  Laterality: Right;            History reviewed. No pertinent family history.     Social History   Social History           Socioeconomic History    Marital status:    Tobacco Use    Smoking status: Never    Smokeless tobacco: Never   Vaping Use    Vaping status: Never Used   Substance and Sexual Activity    Alcohol use: Never    Drug use: Never    Sexual activity: Defer                    Prior to Admission medications    Medication Sig Start Date End Date Taking? Authorizing Provider   docusate sodium (COLACE) 100 MG capsule Take 1 capsule by mouth 2 (Two) Times a Day. 7/11/23     Shahbaz Reddy DPM   furosemide (LASIX) 40 MG tablet Take 1 tablet by mouth 2 (Two) Times a Day.       Provider, MD Lula   lisinopril (PRINIVIL,ZESTRIL) 5 MG tablet Take 1 tablet by mouth Daily. 5/15/23     Moy Trujillo DO   metFORMIN (GLUCOPHAGE) 1000 MG tablet Take 1 tablet by mouth 2 (Two) Times a Day With Meals.       Provider, MD Lula   naloxone (NARCAN) 4 MG/0.1ML nasal spray Call 911. Don't prime. [place] in 1 nostril for overdose. Repeat in 2-3 minutes in other nostril if no or minimal breathing/responsiveness. 7/11/23     Shahbaz Reddy DPM   ondansetron (Zofran) 4 MG tablet Take 1 tablet by mouth Every 4 (Four) Hours. 7/11/23     Shahbaz Reddy DPM   oxyCODONE-acetaminophen (PERCOCET)  MG per tablet Take 1 tablet by mouth Every 4 (Four) Hours As Needed for Moderate Pain. Take one tablet every four hours first 24 hours 7/11/23     Shahbaz Reddy DPM   pregabalin (LYRICA) 50 MG capsule Take 1 capsule by mouth 3 (Three) Times a Day.       ProviderLula MD         Medications   sodium chloride 0.9 % flush 10 mL (has no  "administration in time range)   bumetanide (BUMEX) injection 1 mg (1 mg Intravenous Given 3/11/24 1505)   iopamidol (ISOVUE-370) 76 % injection 100 mL (100 mL Intravenous Given 3/11/24 1339)         /88   Pulse 79   Temp 97.6 °F (36.4 °C) (Oral)   Resp 20   Ht 177.8 cm (70\")   Wt 102 kg (224 lb)   SpO2 96%   BMI 32.14 kg/m²               Objective[]Expand by Default  Physical Exam  Constitutional:       Appearance: He is well-developed.   HENT:      Head: Normocephalic and atraumatic.      Right Ear: External ear normal.      Left Ear: External ear normal.      Nose: Nose normal.   Eyes:      Conjunctiva/sclera: Conjunctivae normal.      Pupils: Pupils are equal, round, and reactive to light.   Neck:      Trachea: No tracheal deviation.   Cardiovascular:      Rate and Rhythm: Normal rate. Rhythm irregular.      Heart sounds: Normal heart sounds. No murmur heard.     No friction rub. No gallop.   Pulmonary:      Effort: Pulmonary effort is normal. No respiratory distress.      Breath sounds: Normal breath sounds. No wheezing or rales.   Chest:      Chest wall: No tenderness.   Abdominal:      General: Bowel sounds are normal. There is no distension.      Palpations: Abdomen is soft. There is no mass.      Tenderness: There is no abdominal tenderness. There is no guarding or rebound.   Musculoskeletal:         General: Swelling and tenderness present. No deformity. Normal range of motion.      Cervical back: Normal range of motion and neck supple.      Right upper leg: Swelling and edema present.      Left upper leg: Swelling and edema present.      Right knee: Swelling present.      Left knee: Swelling present.      Right lower leg: Swelling present. 4+ Pitting Edema present.      Left lower leg: Swelling present. Pitting Edema present.      Right ankle: Swelling present. Normal pulse.      Left ankle: Swelling present. Normal pulse.      Comments: 3-4+ pitting edema all the way up into his abdomen "   Skin:     General: Skin is warm and dry.      Capillary Refill: Capillary refill takes less than 2 seconds.      Coloration: Skin is not pale.      Findings: No erythema or rash.   Neurological:      General: No focal deficit present.      Mental Status: He is alert and oriented to person, place, and time.      Gait: Gait is intact.   Psychiatric:         Mood and Affect: Mood normal.         Behavior: Behavior normal.         Thought Content: Thought content normal.         Judgment: Judgment normal.            Procedures              Lab Results (last 24 hours)         Procedure Component Value Units Date/Time     CBC & Differential [[id number]]  (Abnormal) Collected: 03/11/24 1147     Specimen: Blood Updated: 03/11/24 1156     Narrative:       The following orders were created for panel order CBC & Differential.  Procedure                               Abnormality         Status                     ---------                               -----------         ------                     CBC Auto Differential[[id number]]        Abnormal            Final result                  Please view results for these tests on the individual orders.     Comprehensive Metabolic Panel [[id number]]  (Abnormal) Collected: 03/11/24 1147     Specimen: Blood Updated: 03/11/24 1219       Glucose 146 mg/dL         BUN 44 mg/dL         Creatinine 1.34 mg/dL         Sodium 143 mmol/L         Potassium 4.3 mmol/L         Chloride 104 mmol/L         CO2 29.0 mmol/L         Calcium 8.3 mg/dL         Total Protein 6.5 g/dL         Albumin 3.2 g/dL         ALT (SGPT) 24 U/L         AST (SGOT) 32 U/L         Alkaline Phosphatase 192 U/L         Total Bilirubin 0.5 mg/dL         Globulin 3.3 gm/dL         A/G Ratio 1.0 g/dL         BUN/Creatinine Ratio 32.8       Anion Gap 10.0 mmol/L         eGFR 61.4 mL/min/1.73       Narrative:       GFR Normal >60  Chronic Kidney Disease <60  Kidney Failure <15        High Sensitivity Troponin T [[id number]]   (Abnormal) Collected: 03/11/24 1147     Specimen: Blood Updated: 03/11/24 1222       HS Troponin T 153 ng/L       Narrative:       High Sensitive Troponin T Reference Range:  <14.0 ng/L- Negative Female for AMI  <22.0 ng/L- Negative Male for AMI  >=14 - Abnormal Female indicating possible myocardial injury.  >=22 - Abnormal Male indicating possible myocardial injury.   Clinicians would have to utilize clinical acumen, EKG, Troponin, and serial changes to determine if it is an Acute Myocardial Infarction or myocardial injury due to an underlying chronic condition.           BNP [[id number]]  (Abnormal) Collected: 03/11/24 1147     Specimen: Blood Updated: 03/11/24 1215       proBNP 4,906.0 pg/mL       Narrative:       This assay is used as an aid in the diagnosis of individuals suspected of having heart failure. It can be used as an aid in the diagnosis of acute decompensated heart failure (ADHF) in patients presenting with signs and symptoms of ADHF to the emergency department (ED). In addition, NT-proBNP of <300 pg/mL indicates ADHF is not likely.     Age Range         Result Interpretation  NT-proBNP Concentration (pg/mL:        <50             Positive            >450                         Castillo                           300-450                           Negative               <300     50-75           Positive            >900                  Gray                300-900                  Negative            <300        >75             Positive            >1800                  Gray                300-1800                  Negative            <300     D-dimer, Quantitative [[id number]]  (Abnormal) Collected: 03/11/24 1147     Specimen: Blood Updated: 03/11/24 1218       D-Dimer, Quantitative 10.22 MCGFEU/mL       Narrative:       According to the assay 's published package insert, a normal (<0.50 MCGFEU/mL) D-dimer result in conjunction with a non-high clinical probability assessment, excludes deep vein  "thrombosis (DVT) and pulmonary embolism (PE) with high sensitivity.     D-dimer values increase with age and this can make VTE exclusion of an older population difficult. To address this, the American College of Physicians, based on best available evidence and recent guidelines, recommends that clinicians use age-adjusted D-dimer thresholds in patients greater than 50 years of age with: a) a low probability of PE who do not meet all Pulmonary Embolism Rule Out Criteria, or b) in those with intermediate probability of PE.   The formula for an age-adjusted D-dimer cut-off is \"age/100\".  For example, a 60 year old patient would have an age-adjusted cut-off of 0.60 MCGFEU/mL and an 80 year old 0.80 MCGFEU/mL.     TSH [[id number]]  (Normal) Collected: 03/11/24 1147     Specimen: Blood Updated: 03/11/24 1222       TSH 1.890 uIU/mL       T4, Free [[id number]]  (Abnormal) Collected: 03/11/24 1147     Specimen: Blood Updated: 03/11/24 1222       Free T4 0.83 ng/dL       Narrative:       Results may be falsely increased if patient taking Biotin.        T3, Free [[id number]] Collected: 03/11/24 1147     Specimen: Blood Updated: 03/11/24 1153     Magnesium [[id number]]  (Normal) Collected: 03/11/24 1147     Specimen: Blood Updated: 03/11/24 1214       Magnesium 1.8 mg/dL       CBC Auto Differential [[id number]]  (Abnormal) Collected: 03/11/24 1147     Specimen: Blood Updated: 03/11/24 1156       WBC 9.12 10*3/mm3         RBC 3.23 10*6/mm3         Hemoglobin 8.6 g/dL         Hematocrit 28.8 %         MCV 89.2 fL         MCH 26.6 pg         MCHC 29.9 g/dL         RDW 17.6 %         RDW-SD 57.7 fl         MPV 9.5 fL         Platelets 268 10*3/mm3         Neutrophil % 69.9 %         Lymphocyte % 19.6 %         Monocyte % 8.8 %         Eosinophil % 0.9 %         Basophil % 0.5 %         Immature Grans % 0.3 %         Neutrophils, Absolute 6.37 10*3/mm3         Lymphocytes, Absolute 1.79 10*3/mm3         Monocytes, Absolute 0.80 10*3/mm3  "        Eosinophils, Absolute 0.08 10*3/mm3         Basophils, Absolute 0.05 10*3/mm3         Immature Grans, Absolute 0.03 10*3/mm3         nRBC 0.0 /100 WBC       High Sensitivity Troponin T 2Hr [[id number]]  (Abnormal) Collected: 03/11/24 1438     Specimen: Blood Updated: 03/11/24 1516       HS Troponin T 121 ng/L         Troponin T Delta -32 ng/L       Narrative:       High Sensitive Troponin T Reference Range:  <14.0 ng/L- Negative Female for AMI  <22.0 ng/L- Negative Male for AMI  >=14 - Abnormal Female indicating possible myocardial injury.  >=22 - Abnormal Male indicating possible myocardial injury.   Clinicians would have to utilize clinical acumen, EKG, Troponin, and serial changes to determine if it is an Acute Myocardial Infarction or myocardial injury due to an underlying chronic condition.                      CT Angiogram Chest     Result Date: 3/11/2024  Narrative: EXAMINATION: CT ANGIOGRAM CHEST-   3/11/2024 12:34 PM  HISTORY: sob  In order to have a CT radiation dose as low as reasonably achievable Automated Exposure Control was utilized for adjustment of the mA and/or KV according to patient size.  Total DLP = 254.25 mGy.cm  CT angiography of the chest performed after intravenous contrast enhancement.  The images are acquired in axial plane and subsequent 2D reconstruction in coronal and sagittal planes and 3D maximum intensity projection reconstruction.  There is no previous similar study for comparison. The correlation made with chest radiograph obtained earlier today.  There is normal opacification of the pulmonary arteries and branches bilaterally. There are no filling defects in the opacified pulmonary arterial bed.  RV/LV ratio is 40 years/48 which may suggest a mild right heart strain.  Atheromatous change of thoracic aorta seen. Moderate ectasia of the sinus of Valsalva is noted which measures 4.1 cm. The ascending aorta measures 3.5 cm in maximum diameter. No dissection.  Severe  atheromatous changes of coronary arteries are noted. There is evidence of prior CABG.  There is no evidence of mediastinal or hilar mass or lymphadenopathy.  Limited visualized soft tissue of the neck are unremarkable.  There is no axillary there are nonspecific moderately enlarged axillary lymph nodes, left larger than the right. The largest lymph node in the left axilla, image #46 and series 6, measures 1.1 cm in short axis.  There are atelectatic changes in bilateral lower lobar posterior segment consolidation with adjacent small bibasilar pleural effusion, left more than the right.  There is no evidence of infiltrate.  There is a tiny nodule in the left lower lobe, image #109 and series 7, measuring 3 mm in maximum dimension.  The central airway is patent. No intrinsic abnormality.  There is moderate circumferential thickening of the esophagus more pronounced in the distal esophagus.  The limited visualized abdomen is unremarkable.  There is diffuse subcutaneous fat infiltration/edema suggesting fluid overload/anasarca.  The images reviewed in bone window show no acute bony abnormality. Chronic degenerative changes of the thoracic spine are seen.       Impression: 1. No evidence of pulmonary embolism. No aortic aneurysm or dissection. 2. Severe atheromatous changes of coronary arteries. A prior CABG. 3. Small bibasilar pleural effusion left more than the right. Atelectatic changes in the lower lungs. No acute infiltrate. 4. Edema of the chest and abdominal wall suggesting fluid overload/anasarca. 5. Nonspecific left axillary lymphadenopathy. The etiology and clinical significance is not certain. 6. A 3 mm nodule in the left lower lobe may represent a small noncalcified granuloma. However, since this is a baseline study, a follow-up examination in 6 months is recommended to ensure stability or resolution.              This report was signed and finalized on 3/11/2024 2:01 PM by Dr. Wendy Hicks MD.       XR  Chest 1 View     Result Date: 3/11/2024  Narrative: EXAM: XR CHEST 1 VW- 3/11/2024 11:05 AM  HISTORY: Shortness of breath.  COMPARISON: 5/13/2023.  TECHNIQUE: Single frontal radiograph of the chest was obtained.  FINDINGS:  Support Devices: None.  Cardiac and Mediastinal Silhouettes: Normal.  Lungs/Pleura: No focal consolidation. No sizable pleural effusion. No visible pneumothorax.  Osseous structures: No acute osseous finding.  Other: None.       Impression:  No acute cardiopulmonary abnormality.    This report was signed and finalized on 3/11/2024 12:18 PM by Law Izquierdo.        ED Course      ED Course as of 03/11/24 1600   Mon Mar 11, 2024   1559 I did speak with Dr. Guevara, hospitalist on-call, who is gracious to admit the patient under their services. [TK]       ED Course User Index  [TK] Arlin Lyons PA            MDM     Amount and/or Complexity of Data Reviewed  Decide to obtain previous medical records or to obtain history from someone other than the patient: yes           Final diagnoses:   Hypervolemia, unspecified hypervolemia type   Other ascites   Rate controlled atrial fibrillation   Left lower lobe pulmonary nodule      Disposition: Patient will be admitted under hospitalist services.     Arlin Lyons PA  03/11/24 1600               Cosigned by: Christophe Musa Jr., MD at 03/11/24 1604         Roz Salinas APRN   Nurse Practitioner  Hospitalist     H&P      Attested     Date of Service: 03/11/24 1632  Creation Time: 03/11/24 1632     Attested           Attestation signed by Ba Cosme DO at 03/11/24 1841     This visit was performed by both a physician and an APC. I personally evaluated and examined the patient. I performed all aspects of the MDM as documented.     Admission treatment plan developed in conjunction with MEGHANN.     H irregular rate and rhythm  L a few basilar rales noted bilaterally  ABD benign     Electronically signed by Ba TADEO  DO Ba, 3/11/2024, 18:41 CDT.                  Expand All Collapse All         [facility] Medicine Services  HISTORY AND PHYSICAL     Date of Admission: 3/11/2024  Primary Care Physician: Mehul Andersen MD     Subjective   Primary Historian: Patient and his wife Gwen     Chief Complaint: Shortness of breath, edema     History of Present Illness  Garrett Rodriguez is a 59-year-old male with a history of chronic pain in the right foot, diabetes mellitus insulin dependence, hypertension, repair of PFO in childhood.  Ankle fusion in 7/2023 with chronic pain.  Patient presented today after an outpatient appointment with Dr. Reddy for reevaluation of his right ankle.  Apparently the PA taking care of him saw his edema and immediately told him to present to the ED.    Patient states that for approximately 6 weeks he has noticed increased swelling which began in his right lower extremity then to his left lower extremity progressing to his scrotum and then abdomen and now up to the middle of his flank.  Patient states in addition to the weight he has had increasing shortness of breath, orthopnea, and chest tightness.  He states that for the last weeks he has had to use a walker because of extreme shortness of breath.  Patient states he has gained approximately 50 to 60 pounds over this period.  The patient initially went to his PCP Dr. Andersen, approximately 2 weeks ago.  Who prescribed him Lasix 40 mg p.o. twice daily with minimal results.  The patient states he lost approximately 10 pounds with the Lasix but no significant change in his shortness of breath.  Patient denies any palpitations, pressure, diaphoresis, nausea or vomiting, cough, respiratory issues, or dizziness.  He is currently scheduled for a (cardiac evaluation) March 18 as well as a colonoscopy on March 13.  On admission to the ED patient presented in A-fib without RVR, patient additionally states no history of  arrhythmia to his knowledge.  ETA revealed severe atheromatous changes of coronary artery, small bibasilar pleural effusions L>R, with significant edema of the chest and abdominal wall suggesting anasarca, nonspecific left axillary lymphadenopathy.     Upon assessment patient is sitting up in bed with his wife, able to communicate without shortness of breath.  He is hypertensive at 159/87, oxygen saturation of 94% on room air, and heart rate of 80 with a rhythm of atrial fibrillation.  Patient has 4+ edema from the middle aspect of bilateral flank all the way down to the bottom of his feet.  No weeping present, chronic wound to the right ankle which is healed, Doppler pulse present.  Initiated patient on Bumex drip 0.5 mg/hour, with stat echocardiogram if possible.  The other patient complaint is a chronic headache which he has had since his LASEK surgery  approximately 1 months ago.  Patient was told to notify staff if the headache became worse otherwise for him to follow-up with his ophthalmologist postdischarge.  These are to be admitted while patient is being held in the ED, cardiology and Heart failure navigator consult initiated.  Patient will be for further evaluation and treatment.        Review of Systems   Otherwise complete ROS reviewed and negative except as mentioned in the HPI.     Past Medical History:   Medical History[]Expand by Default        Past Medical History:   Diagnosis Date    Chronic pain in right foot      Diabetes mellitus      Hypertension      PFO (patent foramen ovale)       RP at age 6         Past Surgical History:  Surgical History         Past Surgical History:   Procedure Laterality Date    ANKLE FUSION Right 7/11/2023     Procedure: ANKLE ARTHRODESIS;  Surgeon: Shahbaz Reddy DPM;  Location: [facility] OR;  Service: Podiatry;  Laterality: Right;    BACK SURGERY        CARDIAC SURGERY        EXTERNAL FIXATION ANKLE FRACTURE Right 7/11/2023     Procedure: POSSIBLE EXTERNAL  FIXATION, RIGHT ANKLE;  Surgeon: Shahbaz Reddy DPM;  Location:  [facility] OR;  Service: Podiatry;  Laterality: Right;    HARDWARE REMOVAL Right 7/11/2023     Procedure: REMOVAL OF HARDWARE, DEEP; ANKLE ARTHRODESIS; SUBTALAR ARTHRODESIS; POSSIBLE EXTERNAL FIXATION, RIGHT ANKLE;  Surgeon: Shahbaz Reddy DPM;  Location:  [facility] OR;  Service: Podiatry;  Laterality: Right;    PATENT FORAMEN OVALE CLOSURE        ROTATOR CUFF REPAIR Right      SUBTALAR ARTHRODESIS Right 7/11/2023     Procedure: SUBTALAR ARTHRODESIS;  Surgeon: Shahbaz Reddy DPM;  Location:  [facility] OR;  Service: Podiatry;  Laterality: Right;         Social History:  reports that he has never smoked. He has never used smokeless tobacco. He reports that he does not drink alcohol and does not use drugs.     Family History: family history is not on file.     Allergies:  Allergies   No Known Allergies        Medications:  No current facility-administered medications on file prior to encounter.             Current Outpatient Medications on File Prior to Encounter   Medication Sig Dispense Refill    furosemide (LASIX) 40 MG tablet Take 1 tablet by mouth 2 (Two) Times a Day.        HYDROcodone-acetaminophen (NORCO)  MG per tablet Take 1 tablet by mouth Every 8 (Eight) Hours As Needed for Moderate Pain.        lisinopril (PRINIVIL,ZESTRIL) 5 MG tablet Take 1 tablet by mouth Daily. 30 tablet 0    metFORMIN (GLUCOPHAGE) 1000 MG tablet Take 1 tablet by mouth 2 (Two) Times a Day With Meals.        naloxone (NARCAN) 4 MG/0.1ML nasal spray Call 911. Don't prime. [place] in 1 nostril for overdose. Repeat in 2-3 minutes in other nostril if no or minimal breathing/responsiveness. 2 each 0    pregabalin (LYRICA) 50 MG capsule Take 1 capsule by mouth 3 (Three) Times a Day.          I have utilized all available immediate resources to obtain, update, or review the patient's current medications (including all prescriptions, over-the-counter products, herbals,  "cannabis/cannabidiol products, and vitamin/mineral/dietary (nutritional) supplements).     Objective      Vital Signs: /92 (BP Location: Right arm, Patient Position: Lying)   Pulse 75   Temp 98 °F (36.7 °C) (Oral)   Resp 20   Ht 177.8 cm (70\")   Wt 104 kg (230 lb 4.8 oz)   SpO2 95%   BMI 33.04 kg/m²   Physical Exam  Constitutional:       Appearance: He is obese. He is ill-appearing.      Comments: Appears older than stated age.   HENT:      Head: Normocephalic and atraumatic.      Nose: No congestion or rhinorrhea.      Mouth/Throat:      Mouth: Mucous membranes are moist.      Pharynx: Oropharynx is clear.   Eyes:      Extraocular Movements: Extraocular movements intact.      Pupils: Pupils are equal, round, and reactive to light.   Cardiovascular:      Rate and Rhythm: Normal rate. Rhythm irregular.      Pulses: Decreased pulses.           Dorsalis pedis pulses are 1+ on the right side and 1+ on the left side.      Heart sounds:      No friction rub.      Comments: Extreme edema 4+, BLE, thighs, abdomen, flank, and chest wall.  Pulmonary:      Effort: Accessory muscle usage present. No respiratory distress.      Breath sounds: Examination of the right-upper field reveals rhonchi. Examination of the left-upper field reveals rhonchi. Examination of the right-middle field reveals rhonchi. Examination of the left-middle field reveals rhonchi. Examination of the right-lower field reveals decreased breath sounds. Examination of the left-lower field reveals decreased breath sounds. Decreased breath sounds present.   Musculoskeletal:      Cervical back: Normal range of motion and neck supple.      Right lower le+ Edema present.      Left lower le+ Edema present.   Skin:     General: Skin is cool.      Coloration: Skin is pale.      Findings: Erythema present.   Neurological:      General: No focal deficit present.      Mental Status: He is alert and oriented to person, place, and time.   Psychiatric:  "        Mood and Affect: Mood normal.         Behavior: Behavior normal.         Results Reviewed:  Lab Results (last 24 hours)         Procedure Component Value Units Date/Time     High Sensitivity Troponin T 2Hr [[id number]]  (Abnormal) Collected: 03/11/24 1438     Specimen: Blood Updated: 03/11/24 1516       HS Troponin T 121 ng/L         Troponin T Delta -32 ng/L       T4, Free [[id number]]  (Abnormal) Collected: 03/11/24 1147     Specimen: Blood Updated: 03/11/24 1222       Free T4 0.83 ng/dL       Narrative:       Results may be falsely increased if patient taking Biotin.        TSH [[id number]]  (Normal) Collected: 03/11/24 1147     Specimen: Blood Updated: 03/11/24 1222       TSH 1.890 uIU/mL       High Sensitivity Troponin T [[id number]]  (Abnormal) Collected: 03/11/24 1147     Specimen: Blood Updated: 03/11/24 1222       HS Troponin T 153 ng/L       Comprehensive Metabolic Panel [[id number]]  (Abnormal) Collected: 03/11/24 1147     Specimen: Blood Updated: 03/11/24 1219       Glucose 146 mg/dL         BUN 44 mg/dL         Creatinine 1.34 mg/dL         Sodium 143 mmol/L         Potassium 4.3 mmol/L         Chloride 104 mmol/L         CO2 29.0 mmol/L         Calcium 8.3 mg/dL         Total Protein 6.5 g/dL         Albumin 3.2 g/dL         ALT (SGPT) 24 U/L         AST (SGOT) 32 U/L         Alkaline Phosphatase 192 U/L         Total Bilirubin 0.5 mg/dL         Globulin 3.3 gm/dL         A/G Ratio 1.0 g/dL         BUN/Creatinine Ratio 32.8       Anion Gap 10.0 mmol/L         eGFR 61.4 mL/min/1.73       Narrative:       GFR Normal >60  Chronic Kidney Disease <60  Kidney Failure <15        D-dimer, Quantitative [[id number]]  (Abnormal) Collected: 03/11/24 1147     Specimen: Blood Updated: 03/11/24 1218       D-Dimer, Quantitative 10.22 MCGFEU/mL       Narrative:       BNP [[id number]]  (Abnormal) Collected: 03/11/24 1147     Specimen: Blood Updated: 03/11/24 1215       proBNP 4,906.0 pg/mL       Narrative:          Magnesium 1.8 mg/dL       CBC & Differential [[id number]]  (Abnormal) Collected: 03/11/24 1147     Specimen: Blood Updated: 03/11/24 1156     Narrative:       CBC Auto Differential [[id number]]  (Abnormal) Collected: 03/11/24 1147     Specimen: Blood Updated: 03/11/24 1156       WBC 9.12 10*3/mm3         RBC 3.23 10*6/mm3         Hemoglobin 8.6 g/dL         Hematocrit 28.8 %         MCV 89.2 fL         MCH 26.6 pg         MCHC 29.9 g/dL         RDW 17.6 %         RDW-SD 57.7 fl         MPV 9.5 fL         Platelets 268 10*3/mm3         Neutrophil % 69.9 %         Lymphocyte % 19.6 %         Monocyte % 8.8 %         Eosinophil % 0.9 %         Basophil % 0.5 %         Immature Grans % 0.3 %         Neutrophils, Absolute 6.37 10*3/mm3         Lymphocytes, Absolute 1.79 10*3/mm3         Monocytes, Absolute 0.80 10*3/mm3         Eosinophils, Absolute 0.08 10*3/mm3         Basophils, Absolute 0.05 10*3/mm3         Immature Grans, Absolute 0.03 10*3/mm3         nRBC 0.0 /100 WBC       T3, Free [[id number]] Collected: 03/11/24 1147     Specimen: Blood Updated: 03/11/24 1153             Imaging Results (Last 24 Hours)         Procedure Component Value Units Date/Time     CT Angiogram Chest [[id number]] Collected: 03/11/24 1346       Updated: 03/11/24 1404     Narrative:       EXAMINATION: CT ANGIOGRAM CHEST-      3/11/2024 12:34 PM     HISTORY: sob     In order to have a CT radiation dose as low as reasonably achievable  Automated Exposure Control was utilized for adjustment of the mA and/or  KV according to patient size.     Total DLP = 254.25 mGy.cm     CT angiography of the chest performed after intravenous contrast  enhancement.     The images are acquired in axial plane and subsequent 2D reconstruction  in coronal and sagittal planes and 3D maximum intensity projection  reconstruction.     There is no previous similar study for comparison. The correlation made  with chest radiograph obtained earlier today.     There is  normal opacification of the pulmonary arteries and branches  bilaterally. There are no filling defects in the opacified pulmonary  arterial bed.     RV/LV ratio is 40 years/48 which may suggest a mild right heart strain.     Atheromatous change of thoracic aorta seen. Moderate ectasia of the  sinus of Valsalva is noted which measures 4.1 cm. The ascending aorta  measures 3.5 cm in maximum diameter. No dissection.     Severe atheromatous changes of coronary arteries are noted. There is  evidence of prior CABG.     There is no evidence of mediastinal or hilar mass or lymphadenopathy.     Limited visualized soft tissue of the neck are unremarkable.     There is no axillary there are nonspecific moderately enlarged axillary  lymph nodes, left larger than the right. The largest lymph node in the  left axilla, image #46 and series 6, measures 1.1 cm in short axis.     There are atelectatic changes in bilateral lower lobar posterior segment  consolidation with adjacent small bibasilar pleural effusion, left more  than the right.     There is no evidence of infiltrate.     There is a tiny nodule in the left lower lobe, image #109 and series 7,  measuring 3 mm in maximum dimension.     The central airway is patent. No intrinsic abnormality.     There is moderate circumferential thickening of the esophagus more  pronounced in the distal esophagus.     The limited visualized abdomen is unremarkable.     There is diffuse subcutaneous fat infiltration/edema suggesting fluid  overload/anasarca.     The images reviewed in bone window show no acute bony abnormality.  Chronic degenerative changes of the thoracic spine are seen.        Impression:       1. No evidence of pulmonary embolism. No aortic aneurysm or dissection.  2. Severe atheromatous changes of coronary arteries. A prior CABG.  3. Small bibasilar pleural effusion left more than the right.  Atelectatic changes in the lower lungs. No acute infiltrate.  4. Edema of the  chest and abdominal wall suggesting fluid  overload/anasarca.  5. Nonspecific left axillary lymphadenopathy. The etiology and clinical  significance is not certain.  6. A 3 mm nodule in the left lower lobe may represent a small  noncalcified granuloma. However, since this is a baseline study, a  follow-up examination in 6 months is recommended to ensure stability or  resolution.                                         This report was signed and finalized on 3/11/2024 2:01 PM by Dr. Wendy Hicks MD.        XR Chest 1 View [[id number]] Collected: 03/11/24 1217       Updated: 03/11/24 1221     Narrative:       EXAM: XR CHEST 1 VW- 3/11/2024 11:05 AM     HISTORY: Shortness of breath.     COMPARISON: 5/13/2023.     TECHNIQUE: Single frontal radiograph of the chest was obtained.     FINDINGS:     Support Devices: None.     Cardiac and Mediastinal Silhouettes: Normal.     Lungs/Pleura: No focal consolidation. No sizable pleural effusion. No  visible pneumothorax.     Osseous structures: No acute osseous finding.     Other: None.        Impression:          No acute cardiopulmonary abnormality.           This report was signed and finalized on 3/11/2024 12:18 PM by Law Izquierdo.                      Assessment / Plan   Assessment:        Active Hospital Problems     Diagnosis      **Hypervolemia      Anasarca      Iron deficiency anemia      New onset a-fib      Scrotal edema      Weakness      Gait instability      TARSHA (acute kidney injury)      Elevated brain natriuretic peptide (BNP) level      Type 2 diabetes mellitus with hyperglycemia, without long-term current use of insulin      HTN (hypertension)           Treatment Plan  1.The patient will be admitted to Dr. Cosme's service here at [facility].   2.  Hypervolemia/probable heart failure-heart failure pathway initiated, stat echocardiogram, Bumex IV 0.5 mg/hr, cardiology consult.  Daily weights, strict I/O. ReDs vest ordred  3.  Iron deficiency  anemia-anemia studies initiated, occult blood stool if possible, patient's iron level was 20 and a saturation of 6, replaced iron, monitoring of daily CBC.  4.  Anasarca/scrotal edema-close monitoring of urine output, elevation of scrotum and bilateral lower extremities at all times.  Monitor for skin breakdown.  5.  Weakness/gait instability-up with assistance, PT/ OT consult, patient to wear right ankle brace at all times when ambulating.  6.  TARSHA-strict intake and output, electrolyte protocol in place, daily BMP.  7.  Diabetes mellitus type 2 without insulin dependence-A1c in process, sliding scale action insulin initiated, metformin on hold.  Accu-Cheks before meals and at bedtime.  8.  Primary hypertension-resumption of home lisinopril, vital signs every 4.  9.  New onset atrial fibrillation-currently patient is rate controlled and has been since admission.  As needed EKG for any change or transition to RVR.  Nursing to contact MD for any elevation in heart rate.  Lovenox full dose per pharmacy dosing due to TARSHA.  10.  Home medications reviewed and resumed as appropriate.  11.  Labs in a.m.         Medical Decision Making  Number and Complexity of problems: 12  Differential Diagnosis: None     Conditions and Status        Condition is unchanged.     [facility] Data  External documents reviewed: None available  Cardiac tracing (EKG, telemetry) interpretation: Reviewed  Radiology interpretation: Reviewed  Labs reviewed: Reviewed  Any tests that were considered but not ordered: None     Decision rules/scores evaluated (example [id number], Wells, etc): [id number] points     Discussed with: Dr. Cosme, patient, and his wife Gwen     Care Planning  Shared decision making: Ba, patient, and his wife Gwen  Code status and discussions: Full code per patient     Disposition  Social Determinants of Health that impact treatment or disposition:  consult for possible home health needs, and possible  financial constraints with initiation of heart failure medications.  Estimated length of stay is 2+ days.      I confirmed that the patient's advanced care plan is present, code status is documented, and a surrogate decision maker is listed in the patient's medical record.      The patient's surrogate decision maker is wife Gwen.      The patient was seen and examined by me on 3/11/2024 at 5:30 PM.     Electronically signed by MEGHANN Parrish, 03/11/24, 17:53 CDT.                           Cosigned by: Ba Cosme DO at 03/11/24 1841          Rigo Kathleen MD   Physician  Cardiology     Consults      Signed     Date of Service: 03/12/24 0848  Creation Time: 03/12/24 0848  Consult Orders   Inpatient Cardiology Consult [[id number]] ordered by Roz Salinas APRN at 03/11/24 1721          Signed       Expand All Collapse All        LOS: 1 day   Patient Care Team:  Mehul Andersen MD as PCP - General (Family Medicine)     Chief Complaint: Weight gain     Subjective     Garrett Rodriguez is a 59 y.o. male who is being seen in consultation.  Patient has presented with complaints of predominantly weight     Denies any chest pain palpitation presyncope syncope  No orthopnea  No paroxysmal nocturnal dyspnea  Echocardiogram as referenced below  Elevated D-dimer with no CT evidence of pulmonary embolism  He is diuresed well and currently on IV Bumex drip  Feels much better  Hemodynamically stable  Overnight has rested well  Labs as referenced below  Troponin was elevated to 153 with repeat down to 121     Telemetry: no malignant arrhythmia. No significant pauses.     Review of Systems   Constitutional: No chills   Has fatigue   No fever.   HENT: Negative.    Eyes: Negative.    Respiratory: Negative for cough,   No chest wall soreness,   Shortness of breath,   no wheezing, no stridor.    Cardiovascular: As above  Gastrointestinal: Negative for abdominal distention,  No abdominal pain,   No blood in  stool,   No constipation,   No diarrhea,   No nausea   No vomiting.   Endocrine: Negative.    Genitourinary: Negative for difficulty urinating, dysuria, flank pain and hematuria.   Musculoskeletal: Negative.    Skin: Negative for rash and wound.   Allergic/Immunologic: Negative.    Neurological: Negative for dizziness, syncope, weakness,   No light-headedness  No  headaches.   Hematological: Does not bruise/bleed easily.   Psychiatric/Behavioral: Negative for agitation or behavioral problems,   No confusion,   the patient is  nervous/anxious.        History:   Medical History        Past Medical History:   Diagnosis Date    Chronic pain in right foot      Diabetes mellitus      Hypertension      PFO (patent foramen ovale)       RP at age 6         Surgical History         Past Surgical History:   Procedure Laterality Date    ANKLE FUSION Right 7/11/2023     Procedure: ANKLE ARTHRODESIS;  Surgeon: Shahbaz Reddy DPM;  Location:  [facility] OR;  Service: Podiatry;  Laterality: Right;    BACK SURGERY        CARDIAC SURGERY        EXTERNAL FIXATION ANKLE FRACTURE Right 7/11/2023     Procedure: POSSIBLE EXTERNAL FIXATION, RIGHT ANKLE;  Surgeon: Shahbaz Reddy DPM;  Location:  [facility] OR;  Service: Podiatry;  Laterality: Right;    HARDWARE REMOVAL Right 7/11/2023     Procedure: REMOVAL OF HARDWARE, DEEP; ANKLE ARTHRODESIS; SUBTALAR ARTHRODESIS; POSSIBLE EXTERNAL FIXATION, RIGHT ANKLE;  Surgeon: Shahbaz Reddy DPM;  Location:  [facility] OR;  Service: Podiatry;  Laterality: Right;    PATENT FORAMEN OVALE CLOSURE        ROTATOR CUFF REPAIR Right      SUBTALAR ARTHRODESIS Right 7/11/2023     Procedure: SUBTALAR ARTHRODESIS;  Surgeon: Shahbaz Reddy DPM;  Location:  [facility] OR;  Service: Podiatry;  Laterality: Right;         Social History   Social History           Socioeconomic History    Marital status:    Tobacco Use    Smoking status: Never    Smokeless tobacco: Never   Vaping Use    Vaping status: Never Used  "  Substance and Sexual Activity    Alcohol use: Never    Drug use: Never    Sexual activity: Defer         History reviewed. No pertinent family history.     Labs:  WBC       WBC   Date Value Ref Range Status   03/11/2024 9.12 3.40 - 10.80 10*3/mm3 Final      HGB       Hemoglobin   Date Value Ref Range Status   03/11/2024 8.6 (L) 13.0 - 17.7 g/dL Final      HCT       Hematocrit   Date Value Ref Range Status   03/11/2024 28.8 (L) 37.5 - 51.0 % Final      Platelets       Platelets   Date Value Ref Range Status   03/11/2024 268 140 - 450 10*3/mm3 Final      MCV       MCV   Date Value Ref Range Status   03/11/2024 89.2 79.0 - 97.0 fL Final               Results from last 7 days   Lab Units 03/12/24  0153 03/11/24  1147   SODIUM mmol/L 143 143   POTASSIUM mmol/L 4.0 4.3   CHLORIDE mmol/L 103 104   CO2 mmol/L 31.0* 29.0   BUN mg/dL 40* 44*   CREATININE mg/dL 1.41* 1.34*   CALCIUM mg/dL 8.2* 8.3*   BILIRUBIN mg/dL  --  0.5   ALK PHOS U/L  --  192*   ALT (SGPT) U/L  --  24   AST (SGOT) U/L  --  32   GLUCOSE mg/dL 170* 146*            Lab Results   Component Value Date     CKTOTAL 179 05/13/2023     TROPONINT 121 (C) 03/11/2024      PT/INR:  No results found for: \"PROTIME\"/No results found for: \"INR\"     Imaging Results (Last 72 Hours)         Procedure Component Value Units Date/Time     CT Angiogram Chest [[id number]] Collected: 03/11/24 1346       Updated: 03/11/24 1404     Narrative:       EXAMINATION: CT ANGIOGRAM CHEST-      3/11/2024 12:34 PM     HISTORY: sob     In order to have a CT radiation dose as low as reasonably achievable  Automated Exposure Control was utilized for adjustment of the mA and/or  KV according to patient size.     Total DLP = 254.25 mGy.cm     CT angiography of the chest performed after intravenous contrast  enhancement.     The images are acquired in axial plane and subsequent 2D reconstruction  in coronal and sagittal planes and 3D maximum intensity projection  reconstruction.     There is no " previous similar study for comparison. The correlation made  with chest radiograph obtained earlier today.     There is normal opacification of the pulmonary arteries and branches  bilaterally. There are no filling defects in the opacified pulmonary  arterial bed.     RV/LV ratio is 40 years/48 which may suggest a mild right heart strain.     Atheromatous change of thoracic aorta seen. Moderate ectasia of the  sinus of Valsalva is noted which measures 4.1 cm. The ascending aorta  measures 3.5 cm in maximum diameter. No dissection.     Severe atheromatous changes of coronary arteries are noted. There is  evidence of prior CABG.     There is no evidence of mediastinal or hilar mass or lymphadenopathy.     Limited visualized soft tissue of the neck are unremarkable.     There is no axillary there are nonspecific moderately enlarged axillary  lymph nodes, left larger than the right. The largest lymph node in the  left axilla, image #46 and series 6, measures 1.1 cm in short axis.     There are atelectatic changes in bilateral lower lobar posterior segment  consolidation with adjacent small bibasilar pleural effusion, left more  than the right.     There is no evidence of infiltrate.     There is a tiny nodule in the left lower lobe, image #109 and series 7,  measuring 3 mm in maximum dimension.     The central airway is patent. No intrinsic abnormality.     There is moderate circumferential thickening of the esophagus more  pronounced in the distal esophagus.     The limited visualized abdomen is unremarkable.     There is diffuse subcutaneous fat infiltration/edema suggesting fluid  overload/anasarca.     The images reviewed in bone window show no acute bony abnormality.  Chronic degenerative changes of the thoracic spine are seen.        Impression:       1. No evidence of pulmonary embolism. No aortic aneurysm or dissection.  2. Severe atheromatous changes of coronary arteries. A prior CABG.  3. Small bibasilar  pleural effusion left more than the right.  Atelectatic changes in the lower lungs. No acute infiltrate.  4. Edema of the chest and abdominal wall suggesting fluid  overload/anasarca.  5. Nonspecific left axillary lymphadenopathy. The etiology and clinical  significance is not certain.  6. A 3 mm nodule in the left lower lobe may represent a small  noncalcified granuloma. However, since this is a baseline study, a  follow-up examination in 6 months is recommended to ensure stability or  resolution.                                         This report was signed and finalized on 3/11/2024 2:01 PM by Dr. Wendy Hicks MD.        XR Chest 1 View [[id number]] Collected: 03/11/24 1217       Updated: 03/11/24 1221     Narrative:       EXAM: XR CHEST 1 VW- 3/11/2024 11:05 AM     HISTORY: Shortness of breath.     COMPARISON: 5/13/2023.     TECHNIQUE: Single frontal radiograph of the chest was obtained.     FINDINGS:     Support Devices: None.     Cardiac and Mediastinal Silhouettes: Normal.     Lungs/Pleura: No focal consolidation. No sizable pleural effusion. No  visible pneumothorax.     Osseous structures: No acute osseous finding.     Other: None.        Impression:          No acute cardiopulmonary abnormality.           This report was signed and finalized on 3/11/2024 12:18 PM by Law Izquierdo.                      Objective[]Expand by Default  Allergies   No Known Allergies        Medication Review: Performed  Current Medications             Current Facility-Administered Medications   Medication Dose Route Frequency Provider Last Rate Last Admin    acetaminophen (TYLENOL) tablet 650 mg  650 mg Oral Q4H PRN Roz Salinas APRN         Or    acetaminophen (TYLENOL) 160 MG/5ML oral solution 650 mg  650 mg Oral Q4H PRN Roz Salinas APRN         Or    acetaminophen (TYLENOL) suppository 650 mg  650 mg Rectal Q4H PRN Roz Salinas APRN        sennosides-docusate (PERICOLACE) 8.6-50 MG per tablet 2  tablet  2 tablet Oral BID PRN Roz Salinas APRN         And    polyethylene glycol (MIRALAX) packet 17 g  17 g Oral Daily PRN Roz Salinas APRN         And    bisacodyl (DULCOLAX) EC tablet 5 mg  5 mg Oral Daily PRN Roz Salinas APRN         And    bisacodyl (DULCOLAX) suppository 10 mg  10 mg Rectal Daily PRN Roz Salinas APRN        bumetanide (BUMEX) 25 mg/100mL (0.25 mg/mL) infusion  0.5 mg/hr Intravenous Continuous Roz Salinas APRN 2 mL/hr at 03/11/24 1837 0.5 mg/hr at 03/11/24 1837    dextrose (D50W) (25 g/50 mL) IV injection 25 g  25 g Intravenous Q15 Min PRN Roz Salinas APRN        dextrose (GLUTOSE) oral gel 15 g  15 g Oral Q15 Min PRN Roz Salinas APRN        Enoxaparin Sodium (LOVENOX) syringe 100 mg  1 mg/kg Subcutaneous Q12H Roz Salinas APRN   100 mg at 03/12/24 0542    ferric gluconate (FERRLECIT) 250 mg in sodium chloride 0.9 % 250 mL IVPB  250 mg Intravenous Q24H Roz Salinas APRN 125 mL/hr at 03/11/24 2233 250 mg at 03/11/24 2233    glucagon (GLUCAGEN) injection 1 mg  1 mg Intramuscular Q15 Min PRN Roz Salinas APRN        HYDROcodone-acetaminophen (NORCO)  MG per tablet 1 tablet  1 tablet Oral Q8H PRN Roz Salinas APRN   1 tablet at 03/11/24 2130    Insulin Lispro (humaLOG) injection 2-7 Units  2-7 Units Subcutaneous 4x Daily AC & at Bedtime Roz Salinas APRN   3 Units at 03/11/24 2130    lisinopril (PRINIVIL,ZESTRIL) tablet 5 mg  5 mg Oral Q24H Roz Salinas APRN   5 mg at 03/11/24 1837    Magnesium Cardiology Dose Replacement - Follow Nurse / BPA Driven Protocol   Does not apply PRN Roz Salinas APRN        nitroglycerin (NITROSTAT) SL tablet 0.4 mg  0.4 mg Sublingual Q5 Min PRN Roz Salinas, APRN        ondansetron ODT (ZOFRAN-ODT) disintegrating tablet 4 mg  4 mg Oral Q6H PRN Roz Salinas, MEGHANN         Or    ondansetron (ZOFRAN) injection 4 mg  4 mg Intravenous Q6H PRN Roz Salinas, APRN    "     Pharmacy to Dose enoxaparin (LOVENOX)   Does not apply Continuous PRN Roz Salinas APRN        Potassium Replacement - Follow Nurse / BPA Driven Protocol   Does not apply PRN Roz Salinas APRN        pregabalin (LYRICA) capsule 50 mg  50 mg Oral TID Roz Salinas APRN   50 mg at 03/11/24 2130    sodium chloride 0.9 % flush 10 mL  10 mL Intravenous PRN Arlin Lyons, PA        sodium chloride 0.9 % flush 10 mL  10 mL Intravenous Q12H Roz Salinas APRN   10 mL at 03/11/24 2131    sodium chloride 0.9 % flush 10 mL  10 mL Intravenous PRN Roz Salinas APRN        sodium chloride 0.9 % infusion 40 mL  40 mL Intravenous PRN Roz Salinas APRN                Vital Sign Min/Max for last 24 hours  Temp  Min: 97.3 °F (36.3 °C)  Max: 98.5 °F (36.9 °C)   BP  Min: 126/68  Max: 161/88   Pulse  Min: 71  Max: 86   Resp  Min: 18  Max: 20   SpO2  Min: 90 %  Max: 96 %   No data recorded   Weight  Min: 97 kg (213 lb 12.8 oz)  Max: 104 kg (230 lb 4.8 oz)      Flowsheet Rows       Flowsheet Row First Filed Value   Admission Height 177.8 cm (70\") Documented at 03/11/2024 1118   Admission Weight 102 kg (224 lb) Documented at 03/11/2024 1118                Results for orders placed during the hospital encounter of 03/11/24     Adult Transthoracic Echo Complete w/ Color, Spectral and Contrast if Necessary Per Protocol     Interpretation Summary    Left ventricular ejection fraction appears to be 56 - 60%.    Left ventricular wall thickness is consistent with mild concentric hypertrophy.    The left atrial cavity is moderately dilated.    Left atrial volume is severely increased.    Moderate pulmonary hypertension is present.    Apical RV free wall hypokinesis        Physical Exam:     General Appearance: Awake, alert, in no acute distress  Eyes: Pupils equal and reactive    Ears: Appear intact with no abnormalities noted  Nose: Nares normal, no drainage  Neck: supple, trachea midline, no " carotid bruit and no JVD  Back: no kyphosis present,    Lungs: respirations regular, respirations even and respirations unlabored  Heart: normal S1, S2, no significant murmurs   No gallops or rubs  no rub and no click  Abdomen: normal bowel sounds, no tenderness   Skin: no bleeding, bruising or rash  Extremities: no cyanosis, 2+ lower extremity pitting edema  Psychiatric/Behavioral: Negative for agitation, behavioral problems, confusion, the patient does  appear to be nervous/anxious.        Results Review:   I reviewed the patient's new clinical results.  I reviewed the patient's new imaging results and agree with the interpretation.  I reviewed the patient's other test results and agree with the interpretation  I personally viewed and interpreted the patient's EKG/Telemetry data     Discussed with patient  Updated patient regarding any new or relevant abnormalities on review of records or any new findings on physical exam.   Mentioned to patient about purpose of visit and desirable health short and long term goals and objectives.      Reviewed available prior notes, consults, prior visits, laboratory findings, radiology and cardiology relevant reports.   Updated chart as applicable.   I have reviewed the patient's medical history in detail and updated the computerized patient record as relevant.                   Assessment & Plan    Hypervolemia    Type 2 diabetes mellitus with hyperglycemia, without long-term current use of insulin    HTN (hypertension)    Anasarca    Iron deficiency anemia    New onset a-fib    Scrotal edema    Weakness    Gait instability    TARSHA (acute kidney injury)    Elevated brain natriuretic peptide (BNP) level  2+ lower extremity pitting edema     Plan     Care plan discussed with patient along with the results of echocardiogram  Recommend VQ scan  In future will require nuclear perfusion scan which can be done 2 days after VQ scan  Monitor kidney functions  Currently on diuresis and  close attention to renal function and electrolytes monitor  Telemetry  Deep vein thrombosis prophylaxis/precautions  Appropriate diet, fluid, sodium, caffeine, stimulants intake   Questions were encouraged, asked and answered to the patient's  understanding and satisfaction.  Compliance to diet and medications         Rigo Kathleen MD  03/12/24  08:48 CDT     EMR Dragon/Transcription was used to dictate part of this note                     bolic Panel  Order: [id number]  Status: Final result       Visible to patient: No (not released)       Next appt: Today at 12:00 PM in Radiology (PAD NM SPECT)    Specimen Information: Blood   0 Result Notes            Component  Ref Range & Units 01:53  (3/12/24) 1 d ago  (3/11/24) 1 d ago  (3/11/24) 1 d ago  (3/11/24) 8 mo ago  (7/11/23) 8 mo ago  (7/11/23) 8 mo ago  (7/11/23)   Glucose  65 - 99 mg/dL 170 High  216 High  R,  R,  High  157 High  R,  High  123 R, CM   BUN  6 - 20 mg/dL 40 High    44 High   42 High     Creatinine  0.76 - 1.27 mg/dL 1.41 High    1.34 High   1.12    Sodium  136 - 145 mmol/L 143   143  140    Potassium  3.5 - 5.2 mmol/L 4.0   4.3  5.3 High     Chloride  98 - 107 mmol/L 103   104  107    CO2  22.0 - 29.0 mmol/L 31.0 High    29.0  23.0    Calcium  8.6 - 10.5 mg/dL 8.2 Low    8.3 Low   8.7    BUN/Creatinine Ratio  7.0 - 25.0 28.4 High    32.8 High   37.5 High     Anion Gap  5.0 - 15.0 mmol/L 9.0   10.0  10.0    eGFR  >60.0 mL/min/1.73 57.                    BNP  Order: [id number]  Status: Final result       Visible to patient: No (not released)       Next appt: Today at 12:00 PM in Radiology (PAD NM SPECT)    Specimen Information: Blood   0 Result Notes      Component  Ref Range & Units 1 d ago   proBNP  0.0 - 900.0 pg/mL 4,906.0 High                 Status: Final result       Visible to patient: No (not released)       Next appt: Today at 12:00 PM in Radiology (PAD NM SPECT)    Specimen Information: Blood   0 Result Notes       Component  Ref Range & Units 1 d ago   Iron  59 - 158 mcg/dL 20 Low    Iron Saturation (TSAT)  20 - 50 % 6 Low               Current Facility-Administered Medications   Medication Dose Route Frequency Provider Last Rate Last Admin    acetaminophen (TYLENOL) tablet 650 mg  650 mg Oral Q4H PRN Roz Salinas APRN        Or    acetaminophen (TYLENOL) 160 MG/5ML oral solution 650 mg  650 mg Oral Q4H PRN Roz Salinas APRN        Or    acetaminophen (TYLENOL) suppository 650 mg  650 mg Rectal Q4H PRN Roz Salinas APRN        sennosides-docusate (PERICOLACE) 8.6-50 MG per tablet 2 tablet  2 tablet Oral BID PRN Roz Salinas APRN        And    polyethylene glycol (MIRALAX) packet 17 g  17 g Oral Daily PRN Roz Salinas APRN        And    bisacodyl (DULCOLAX) EC tablet 5 mg  5 mg Oral Daily PRN Roz Salinas APRN        And    bisacodyl (DULCOLAX) suppository 10 mg  10 mg Rectal Daily PRN Roz Salinas APRN        bumetanide (BUMEX) 25 mg/100mL (0.25 mg/mL) infusion  0.5 mg/hr Intravenous Continuous Roz Salinas APRN 2 mL/hr at 03/11/24 1837 0.5 mg/hr at 03/11/24 1837    dextrose (D50W) (25 g/50 mL) IV injection 25 g  25 g Intravenous Q15 Min PRN Roz Salinas APRN        dextrose (GLUTOSE) oral gel 15 g  15 g Oral Q15 Min PRN Roz Salinas APRN        Enoxaparin Sodium (LOVENOX) syringe 100 mg  1 mg/kg Subcutaneous Q12H Roz Salinas APRN   100 mg at 03/12/24 0542    ferric gluconate (FERRLECIT) 250 mg in sodium chloride 0.9 % 250 mL IVPB  250 mg Intravenous Q24H Roz Salinas APRN 125 mL/hr at 03/11/24 2233 250 mg at 03/11/24 2233    glucagon (GLUCAGEN) injection 1 mg  1 mg Intramuscular Q15 Min PRN Roz Salinas APRN        HYDROcodone-acetaminophen (NORCO)  MG per tablet 1 tablet  1 tablet Oral Q8H PRN Roz Salinas, APRN   1 tablet at 03/11/24 2130    Insulin Lispro (humaLOG) injection 2-7 Units  2-7 Units Subcutaneous 4x Daily AC & at  Bedtime Roz Salinas APRN   3 Units at 03/12/24 1122    lisinopril (PRINIVIL,ZESTRIL) tablet 5 mg  5 mg Oral Q24H Roz Salinas APRN   5 mg at 03/12/24 0920    Magnesium Cardiology Dose Replacement - Follow Nurse / BPA Driven Protocol   Does not apply PRN Roz Salinas APRN        nitroglycerin (NITROSTAT) SL tablet 0.4 mg  0.4 mg Sublingual Q5 Min PRN Roz Salinas APRN        ondansetron ODT (ZOFRAN-ODT) disintegrating tablet 4 mg  4 mg Oral Q6H PRN Roz Salinas APRN        Or    ondansetron (ZOFRAN) injection 4 mg  4 mg Intravenous Q6H PRN Roz Salinas APRN        Pharmacy to Dose enoxaparin (LOVENOX)   Does not apply Continuous PRN Roz Salinas APRN        Potassium Replacement - Follow Nurse / BPA Driven Protocol   Does not apply PRN Roz Salinas APRN        pregabalin (LYRICA) capsule 50 mg  50 mg Oral TID Roz Salinas APRN   50 mg at 03/12/24 0920    sodium chloride 0.9 % flush 10 mL  10 mL Intravenous PRN Arlin Lyons, PA        sodium chloride 0.9 % flush 10 mL  10 mL Intravenous Q12H Roz Salinas APRN   10 mL at 03/12/24 0921    sodium chloride 0.9 % flush 10 mL  10 mL Intravenous PRN Roz Salinas APRN        sodium chloride 0.9 % infusion 40 mL  40 mL Intravenous PRN Roz Salinas APRN

## 2024-03-12 NOTE — PROGRESS NOTES
[facility] Medicine Services  INPATIENT PROGRESS NOTE    Patient Name: Garrett Rodriguez  Date of Admission: 3/11/2024  Today's Date: 03/12/24  Length of Stay: 1  Primary Care Physician: Mehul Andersen MD    Subjective   Chief Complaint: Shortness of breath, edema  HPI     The patient's shortness of breath is significantly improved with aggressive diuresis.  Lower extremity edema has also improved.  Body wall edema is better as well.  He has had 6100 cc out greater than in so far.  Echocardiogram reveals pulmonary hypertension with no evidence of left heart failure and no diastolic dysfunction.  I suspect untreated sleep apnea is the origin of his moderate-severe pulmonary hypertension.  I have recommended an outpatient sleep study be performed after discharge.  CT angiogram of the chest shows no evidence of pulmonary embolus.  Bilateral venous Dopplers show no DVT.  BMP today shows creatinine improved to 1.31 with BUN 38 and glucose 226.  Hemoglobin A1c 6.7%.  Urinalysis is unremarkable.  Lipid panel was also unremarkable except .    Review of Systems   All pertinent negatives and positives are as above. All other systems have been reviewed and are negative unless otherwise stated.     Objective    Temp:  [97.3 °F (36.3 °C)-98.5 °F (36.9 °C)] 98.5 °F (36.9 °C)  Heart Rate:  [71-89] 89  Resp:  [18-20] 18  BP: (139-160)/(68-92) 157/68  Physical Exam  Constitutional:       Appearance: Normal appearance. He is normal weight.   HENT:      Head: Normocephalic and atraumatic.      Right Ear: External ear normal.      Left Ear: External ear normal.      Nose: Nose normal.      Mouth/Throat:      Mouth: Mucous membranes are moist.      Pharynx: Oropharynx is clear.   Eyes:      General: No scleral icterus.     Conjunctiva/sclera: Conjunctivae normal.   Cardiovascular:      Rate and Rhythm: Normal rate and regular rhythm.      Pulses: Normal pulses.      Heart sounds:  "Normal heart sounds. No murmur heard.  Pulmonary:      Effort: Pulmonary effort is normal.      Breath sounds: Decreased breath sounds present. No rhonchi or rales.   Abdominal:      General: Bowel sounds are normal.      Palpations: Abdomen is soft. There is no mass.      Tenderness: There is no abdominal tenderness.      Comments: Lower abdominal wall edema noted.   Musculoskeletal:         General: Swelling present. Normal range of motion.      Right lower leg: Edema (2/4) present.      Left lower leg: Edema (2/4) present.   Skin:     General: Skin is warm and dry.      Coloration: Skin is not pale.   Neurological:      General: No focal deficit present.      Mental Status: He is alert and oriented to person, place, and time. Mental status is at baseline.      Cranial Nerves: No cranial nerve deficit.   Psychiatric:         Mood and Affect: Mood normal.         Judgment: Judgment normal.       Results Review:  I have reviewed the labs, radiology results, and diagnostic studies.    Laboratory Data:   Results from last 7 days   Lab Units 03/11/24  1147   WBC 10*3/mm3 9.12   HEMOGLOBIN g/dL 8.6*   HEMATOCRIT % 28.8*   PLATELETS 10*3/mm3 268        Results from last 7 days   Lab Units 03/12/24  1557 03/12/24  0153 03/11/24  1147   SODIUM mmol/L 141 143 143   POTASSIUM mmol/L 3.8 4.0 4.3   CHLORIDE mmol/L 101 103 104   CO2 mmol/L 31.0* 31.0* 29.0   BUN mg/dL 38* 40* 44*   CREATININE mg/dL 1.31* 1.41* 1.34*   CALCIUM mg/dL 8.4* 8.2* 8.3*   BILIRUBIN mg/dL  --   --  0.5   ALK PHOS U/L  --   --  192*   ALT (SGPT) U/L  --   --  24   AST (SGOT) U/L  --   --  32   GLUCOSE mg/dL 226* 170* 146*       Culture Data:   No results found for: \"BLOODCX\", \"URINECX\", \"WOUNDCX\", \"MRSACX\", \"RESPCX\", \"STOOLCX\"    Radiology Data:   Imaging Results (Last 24 Hours)       Procedure Component Value Units Date/Time    US Venous Doppler Lower Extremity Bilateral (duplex) [[id number]] Collected: 03/12/24 1509     Updated: 03/12/24 1512    " Narrative:      History: Swelling       Impression:      Impression: There is no evidence of deep venous thrombosis or  superficial thrombophlebitis of right or left lower extremities.     Comments: Bilateral lower extremity venous duplex exam was performed  using color Doppler flow, Doppler waveform analysis, and grayscale  imaging, with and without compression. There is no evidence of deep  venous thrombosis in the common femoral, superficial femoral, popliteal,  peroneal, anterior tibial, and posterior tibial veins bilaterally. No  thrombus is identified in the saphenofemoral junctions and greater  saphenous veins bilaterally.            This report was signed and finalized on 3/12/2024 3:09 PM by Dr. Ricardo Hawk MD.       NM Lung Scan Perfusion Particulate [[id number]] Collected: 03/12/24 1239     Updated: 03/12/24 1245    Narrative:      EXAMINATION: NM LUNG SCAN PERFUSION PARTICULATE- 3/12/2024 12:39 PM     HISTORY: suspect pulmonary embolis, negative CTA, pulmonary HTN;  E87.70-Fluid overload, unspecified; R18.8-Other ascites;  I48.91-Unspecified atrial fibrillation; R91.1-Solitary pulmonary nodule.     Dose: 5.4 mCi technetium 99m MAA intravenously.     REPORT: Perfusion-only scintigraphic images of the lungs were obtained  in the anterior, posterior and oblique dimensions following  administration of the radiotracer.     COMPARISON: CT angio chest 3/11/2024.     Distribution of activity within the lungs is homogeneous, there are no  focal segmental or subsegmental defects.       Impression:      No evidence of pulmonary thromboembolic disease, low  probability perfusion lung scan.     This report was signed and finalized on 3/12/2024 12:42 PM by Dr. Brandin Seymour MD.               I have reviewed the patient's current medications.     Assessment/Plan   Assessment  Active Hospital Problems    Diagnosis     **Hypervolemia     Pulmonary HTN     Anasarca     Iron deficiency anemia     New onset a-fib      Scrotal edema     Weakness     Gait instability     Elevated brain natriuretic peptide (BNP) level     Type 2 diabetes mellitus with hyperglycemia, without long-term current use of insulin     HTN (hypertension)        Treatment Plan  Continue Bumex drip  Continue serial BMP values  Discussed the need for outpatient sleep study with the patient and his wife    Medical Decision Making  Number and Complexity of problems:   Pulmonary hypertension, chronic, high complexity  Anasarca, chronic, high complexity  Atrial fibrillation, chronic, moderate complexity  T2DM, chronic, moderate complexity  Essential hypertension, chronic complexity    Differential Diagnosis: None others considered    Conditions and Status        Condition is improving.     [facility] Data  External documents reviewed:.  Care Everywhere documentation  Cardiac tracing (EKG, telemetry) interpretation: See HPI  Radiology interpretation: See HPI  Labs reviewed: See HPI  Any tests that were considered but not ordered: None     Decision rules/scores evaluated (example [id number], Wells, etc): [id number]     Discussed with: The patient and his wife     Care Planning  Shared decision making: The patient and his wife  Code status and discussions: Full code    Disposition  Social Determinants of Health that impact treatment or disposition: None noted  I expect the patient to be discharged to home in 3-4 days.     Electronically signed by Ba Cosme DO, 03/12/24, 17:11 CDT.

## 2024-03-12 NOTE — THERAPY DISCHARGE NOTE
Patient Name: Garrett Rodriguez  : 1965    MRN: [id number]                              Today's Date: 3/12/2024       Admit Date: 3/11/2024    Visit Dx:     ICD-10-CM ICD-9-CM   1. Hypervolemia, unspecified hypervolemia type  E87.70 276.69   2. Other ascites  R18.8 789.59   3. Rate controlled atrial fibrillation  I48.91 427.31   4. Left lower lobe pulmonary nodule  R91.1 793.11     Patient Active Problem List   Diagnosis    Type 2 diabetes mellitus with hyperglycemia, without long-term current use of insulin    HTN (hypertension)    Hypoglycemia    Retained orthopedic hardware    Hypervolemia    Anasarca    Iron deficiency anemia    New onset a-fib    Scrotal edema    Weakness    Gait instability    Elevated brain natriuretic peptide (BNP) level    Pulmonary HTN     Past Medical History:   Diagnosis Date    Chronic pain in right foot     Diabetes mellitus     Hypertension     PFO (patent foramen ovale)     RP at age 6     Past Surgical History:   Procedure Laterality Date    ANKLE FUSION Right 2023    Procedure: ANKLE ARTHRODESIS;  Surgeon: Shahbaz Reddy DPM;  Location:  [facility];  Service: Podiatry;  Laterality: Right;    BACK SURGERY      CARDIAC SURGERY      EXTERNAL FIXATION ANKLE FRACTURE Right 2023    Procedure: POSSIBLE EXTERNAL FIXATION, RIGHT ANKLE;  Surgeon: Shahbaz Reddy DPM;  Location:  [facility];  Service: Podiatry;  Laterality: Right;    HARDWARE REMOVAL Right 2023    Procedure: REMOVAL OF HARDWARE, DEEP; ANKLE ARTHRODESIS; SUBTALAR ARTHRODESIS; POSSIBLE EXTERNAL FIXATION, RIGHT ANKLE;  Surgeon: Shahbaz Reddy DPM;  Location:  [facility];  Service: Podiatry;  Laterality: Right;    PATENT FORAMEN OVALE CLOSURE      ROTATOR CUFF REPAIR Right     SUBTALAR ARTHRODESIS Right 2023    Procedure: SUBTALAR ARTHRODESIS;  Surgeon: Shahbaz Reddy DPM;  Location:  [facility];  Service: Podiatry;  Laterality: Right;      General Information       Row Name 24 [id number]           Physical Therapy Time and Intention    Document Type evaluation  Pt admit with SOA and significant edema. Medical dx of hypervolemia. SUggestive anasarca, nonspecific L axillary lymphadenopathy. PMH of chronic pain in R foot (sp surgery and hardware placement)DM, HTN, and childhood PFO repair.  -DONTE     Mode of Treatment physical therapy  -DONTE (r) JT (t) DONTE (c)       Row Name 03/12/24 1302          General Information    Patient Profile Reviewed yes  -DONTE (r) JT (t) DONTE (c)     Prior Level of Function independent:;all household mobility;community mobility  -DOTNE (r) JT (t) DONTE (c)     Existing Precautions/Restrictions fall  -DONTE (r) JT (t) DONTE (c)     Barriers to Rehab medically complex;physical barrier  -DONTE (r) JT (t) DONTE (c)       Row Name 03/12/24 1302          Living Environment    People in Home spouse;child(mateusz), dependent  foster children  -DONTE (r) JT (t) DONTE (c)       Row Name 03/12/24 1302          Home Main Entrance    Number of Stairs, Main Entrance other (see comments)  ramp  -DONTE (r) JT (t) DONTE (c)     Stair Railings, Main Entrance railings on both sides of stairs  -DONTE (r) JT (t) DONTE (c)       Row Name 03/12/24 1302          Stairs Within Home, Primary    Number of Stairs, Within Home, Primary none  -DONTE (r) JT (t) DONTE (c)     Stair Railings, Within Home, Primary none  -DONTE (r) JT (t) DONTE (c)       Row Name 03/12/24 1302          Cognition    Orientation Status (Cognition) oriented x 4  -DONTE (r) JT (t) DONTE (c)       Row Name 03/12/24 1302          Safety Issues, Functional Mobility    Safety Issues Affecting Function (Mobility) friction/shear risk  -DONTE (r) JT (t) DONTE (c)     Impairments Affecting Function (Mobility) range of motion (ROM);balance;other (see comments)  leg length discrepancy  -DONTE (r) JT (t) DONTE (c)               User Key  (r) = Recorded By, (t) = Taken By, (c) = Cosigned By      Initials Name Provider Type    Salvatore Barnes, PT DPT Physical Therapist    Anna Staples, PT Student PT Student                    Mobility       [facility] Name 03/12/24 1302          Bed Mobility    Bed Mobility supine-sit;sit-supine  -DONTE (r) JT (t) DONTE (c)     Supine-Sit [place] (Bed Mobility) modified independence  -DONTE (r) JT (t) DONTE (c)     Sit-Supine [place] (Bed Mobility) modified independence  -DONTE (r) JT (t) DONTE (c)     Assistive Device (Bed Mobility) head of bed elevated  -DONTE (r) JT (t) DONTE (c)       Row Name 03/12/24 1302          Sit-Stand Transfer    Sit-Stand [place] (Transfers) supervision  -DONTE (r) JT (t) DONTE (c)       Row Name 03/12/24 1302          Gait/Stairs (Locomotion)    Gait/Stairs Locomotion gait/ambulation independence;distance ambulated;gait deviations  -DONTE (r) JT (t) DONTE (c)     [place] Level (Gait) standby assist  -DONTE (r) JT (t) DONTE (c)     Distance in Feet (Gait) 48  -DONTE (r) JT (t) DONTE (c)     Deviations/Abnormal Patterns (Gait) base of support, wide;gait speed decreased;stride length decreased  -DONTE (r) JT (t) DONTE (c)               User Key  (r) = Recorded By, (t) = Taken By, (c) = Cosigned By      Initials Name Provider Type    Salvatore Barnes, PT DPT Physical Therapist    Anna Staples PT Student PT Student                   Obj/Interventions       Row Name 03/12/24 1302          Range of Motion Comprehensive    Comment, General Range of Motion RLE ankle ROM limited d/t 5 ankle surgeries and hardware in place  -DONTE (r) JT (t) DONTE (c)       Row Name 03/12/24 1302          Strength Comprehensive (MMT)    General Manual Muscle Testing (MMT) Assessment no strength deficits identified  -DONTE (r) JT (t) DONTE (c)     Comment, General Manual Muscle Testing (MMT) Assessment BLE strength assessed functionally  -DONTE (r) JT (t) DONTE (c)       Row Name 03/12/24 1302          Balance    Balance Assessment sitting static balance;sitting dynamic balance;standing static balance;standing dynamic balance  -DONTE (r) JT (t) DONTE (c)     Static Sitting Balance independent  -DONTE (r) JT (t) DONTE (c)     Dynamic Sitting  Balance independent  -DONTE (r) JT (t) DONTE (c)     Position, Sitting Balance unsupported  -DONTE (r) JT (t) DONTE (c)     Static Standing Balance supervision  -DONTE (r) JT (t) DONTE (c)     Dynamic Standing Balance supervision  -DONTE (r) JT (t) DONTE (c)     Position/Device Used, Standing Balance unsupported  -DONTE (r) JT (t) DONTE (c)       Row Name 03/12/24 1302          Sensory Assessment (Somatosensory)    Sensory Assessment (Somatosensory) LE sensation intact  -DONTE (r) JT (t) DONTE (c)               User Key  (r) = Recorded By, (t) = Taken By, (c) = Cosigned By      Initials Name Provider Type    Salvatore Barnes, PT DPT Physical Therapist    Anna Staples, PT Student PT Student                   Goals/Plan    No documentation.                  Clinical Impression       Row Name 03/12/24 1302          Pain    Pretreatment Pain Rating 0/10 - no pain  -DONTE (r) JT (t) DONTE (c)     Posttreatment Pain Rating 0/10 - no pain  -DONTE (r) JT (t) DONTE (c)       Row Name 03/12/24 1302          Plan of Care Review    Plan of Care Reviewed With patient  -DONTE (r) JT (t) DONTE (c)     Progress no change  -DONTE (r) JT (t) DONTE (c)     Outcome Evaluation PT eval complete. Pt in fowlers position with LE elevated on arrival. Pt is A&Ox4 and reports no current pain and reports PLOF as ind with all activities. Pt has been seeing outpatient PT and states he is supposed to start aquatic PT for R ankle; however, LE edema and new medical findings are being prioritized. Pt has plans to return to outpatient PT once released from hospital. Today, pt performs supine to sit with modified independence. In seated position, there is a notable leg length discrepency (R leg shorter v L) and swelling (R>L) Pt performs sit to stand and ambulated 48ft within room. Gait deviations include decreased stride length and decreased speed, increased BEN, and decreased R heelstrike and toe off. Pt reports this has been baseline since ankle surgery. Pt does have RW in room but reports he has  not been utilizing it to ambulate to and from bathroom. Pt reports he will utilize RW with longer distance ambulation. Skilled therapy not necessary at this setting at this time. Recommended DC home with assist and continued outpatient PT for R ankle once medically stable. PT to sign off.  -DONTE (r) JT (t) DONTE (c)       Row Name 03/12/24 1302          Therapy Assessment/Plan (PT)    Patient/Family Therapy Goals Statement (PT) none stated  -DONTE (r) JT (t) DONTE (c)     Criteria for Skilled Interventions Met (PT) no;no problems identified which require skilled intervention;does not meet criteria for skilled intervention  -DONTE     Therapy Frequency (PT) evaluation only  -DONTE (r) JT (t) DONTE (c)       Row Name 03/12/24 1302          Vital Signs    O2 Delivery Pre Treatment room air  -DONTE (r) JT (t) DONTE (c)     O2 Delivery Intra Treatment room air  -DONTE (r) JT (t) DONTE (c)     O2 Delivery Post Treatment room air  -DONTE (r) JT (t) DONTE (c)     Pre Patient Position Supine  -DONTE (r) JT (t) DONTE (c)     Intra Patient Position Standing  -DONTE (r) JT (t) DONTE (c)     Post Patient Position Supine  -DONTE (r) JT (t) DONTE (c)       Row Name 03/12/24 1302          Positioning and Restraints    Pre-Treatment Position in bed  -DONTE (r) JT (t) DONTE (c)     Post Treatment Position bed  -DONTE (r) JT (t) DONTE (c)     In Bed fowlers;call light within reach;encouraged to call for assist;side rails up x2;with family/caregiver;legs elevated  -DONTE (r) JT (t) DONTE (c)               User Key  (r) = Recorded By, (t) = Taken By, (c) = Cosigned By      Initials Name Provider Type    Salvatore Barnes, PT DPT Physical Therapist    Anna Staples, CARIDAD Student PT Student                   Outcome Measures       Row Name 03/12/24 1302 03/12/24 0741       How much help from another person do you currently need...    Turning from your back to your side while in flat bed without using bedrails? 4  -DONTE (r) JT (t) DONTE (c) 3  -MW    Moving from lying on back to sitting on the side of a flat bed  without bedrails? 4  -DONTE (r) JT (t) DONTE (c) 3  -MW    Moving to and from a bed to a chair (including a wheelchair)? 4  -DONTE (r) JT (t) DONTE (c) 3  -MW    Standing up from a chair using your arms (e.g., wheelchair, bedside chair)? 4  -DONTE (r) JT (t) DONTE (c) 3  -MW    Climbing 3-5 steps with a railing? 3  -DONTE (r) JT (t) DONTE (c) 3  -MW    To walk in hospital room? 4  -DONTE (r) JT (t) DONTE (c) 3  -MW    AM-PAC 6 Clicks Score (PT) 23  -DONTE (r) JT (t) 18  -MW    Highest Level of Mobility Goal 7 --> Walk 25 feet or more  -DONTE (r) JT (t) 6 --> Walk 10 steps or more  -MW      Row Name 03/12/24 1302 03/12/24 0754       Functional Assessment    Outcome Measure Options AM-PAC 6 Clicks Basic Mobility (PT)  -DONTE (r) JT (t) DONTE (c) AM-PAC 6 Clicks Daily Activity (OT)  -              User Key  (r) = Recorded By, (t) = Taken By, (c) = Cosigned By      Initials Name Provider Type     Holly Holman, OTR/L Occupational Therapist    Salvatore Barnes, PT DPT Physical Therapist    Anna Staples, PT Student PT Student    Mckenna Palencia, RN Registered Nurse                  Physical Therapy Education       Title: PT OT SLP Therapies (Resolved)       Topic: Physical Therapy (Resolved)       Point: Mobility training (Resolved)       Learning Progress Summary             Patient Acceptance, E, VU by JT at 3/12/2024 1302    Comment: Pt educated on PT purpose and POC/DC. Pt educated on ways to decrease fall risk, importance of ambulation during hospital stay, and use of AD for balance when necessary                         Point: Home exercise program (Resolved)       Learner Progress:  Not documented in this visit.              Point: Body mechanics (Resolved)       Learner Progress:  Not documented in this visit.              Point: Precautions (Resolved)       Learning Progress Summary             Patient Acceptance, E, VU by JT at 3/12/2024 1302    Comment: Pt educated on PT purpose and POC/DC. Pt educated on ways to decrease fall risk,  importance of ambulation during hospital stay, and use of AD for balance when necessary                                         User Key       Initials Effective Dates Name Provider Type Discipline    JT 11/30/23 -  Anna Javier PT Student PT Student PT                  PT Recommendation and Plan     Plan of Care Reviewed With: patient  Progress: no change  Outcome Evaluation: PT eval complete. Pt in fowlers position with LE elevated on arrival. Pt is A&Ox4 and reports no current pain and reports PLOF as ind with all activities. Pt has been seeing outpatient PT and states he is supposed to start aquatic PT for R ankle; however, LE edema and new medical findings are being prioritized. Pt has plans to return to outpatient PT once released from hospital. Today, pt performs supine to sit with modified independence. In seated position, there is a notable leg length discrepency (R leg shorter v L) and swelling (R>L) Pt performs sit to stand and ambulated 48ft within room. Gait deviations include decreased stride length and decreased speed, increased BEN, and decreased R heelstrike and toe off. Pt reports this has been baseline since ankle surgery. Pt does have RW in room but reports he has not been utilizing it to ambulate to and from bathroom. Pt reports he will utilize RW with longer distance ambulation. Skilled therapy not necessary at this setting at this time. Recommended DC home with assist and continued outpatient PT for R ankle once medically stable. PT to sign off.     Time Calculation:         PT Charges       Row Name 03/12/24 1302             Time Calculation    Start Time 1302  -DONTE (r) JT (t) DONTE (c)      Stop Time 1336  -DONTE (r) JT (t) DONTE (c)      Time Calculation (min) 34 min  -DONTE (r) JT (t)      PT Received On 03/12/24  -DONTE (r) JT (t) DONTE (c)                User Key  (r) = Recorded By, (t) = Taken By, (c) = Cosigned By      Initials Name Provider Type    Salvatore Barnes, PT DPT Physical Therapist     Anna Staples, PT Student PT Student                      PT G-Codes  Outcome Measure Options: AM-PAC 6 Clicks Basic Mobility (PT)  AM-PAC 6 Clicks Score (PT): 23  AM-PAC 6 Clicks Score (OT): 23    PT Discharge Summary  Anticipated Discharge Disposition (PT): home with assist, home with outpatient therapy services    Anna Javier, PT Student  3/12/2024

## 2024-03-12 NOTE — NURSING NOTE
[facility]    Date: 03/12/24     Vitals:    03/12/24 0900   BP: 157/68   Pulse: 89   Resp: 18   Temp: 98.5 °F (36.9 °C)   SpO2: 93%        Indication:  Heart Failure    Procedure:  ReDS device sensor unit applied to right side of chest and right side of back.  Appropriate positioning confirmed based off of the unit's calculation.  Chest measurement obtained with the chest size ruler.  Measurement session performed over 45 seconds.      Results: ReDS Value=38    Interpretation:  36-41 is borderline elevated and consistent with possible excess pulmonary fluid and hypervolemia    Nina Schneider RN 03/12/24 15:30 CDT

## 2024-03-12 NOTE — THERAPY DISCHARGE NOTE
Acute Care - Occupational Therapy Discharge  [facility]    Patient Name: Garrett Rodriguez  : 1965    MRN: [id number]                              Today's Date: 3/12/2024       Admit Date: 3/11/2024    Visit Dx:     ICD-10-CM ICD-9-CM   1. Hypervolemia, unspecified hypervolemia type  E87.70 276.69   2. Other ascites  R18.8 789.59   3. Rate controlled atrial fibrillation  I48.91 427.31   4. Left lower lobe pulmonary nodule  R91.1 793.11     Patient Active Problem List   Diagnosis    Type 2 diabetes mellitus with hyperglycemia, without long-term current use of insulin    HTN (hypertension)    Hypoglycemia    Retained orthopedic hardware    Hypervolemia    Anasarca    Iron deficiency anemia    New onset a-fib    Scrotal edema    Weakness    Gait instability    TARSHA (acute kidney injury)    Elevated brain natriuretic peptide (BNP) level     Past Medical History:   Diagnosis Date    Chronic pain in right foot     Diabetes mellitus     Hypertension     PFO (patent foramen ovale)     RP at age 6     Past Surgical History:   Procedure Laterality Date    ANKLE FUSION Right 2023    Procedure: ANKLE ARTHRODESIS;  Surgeon: Shahbaz Reddy DPM;  Location: [facility] OR;  Service: Podiatry;  Laterality: Right;    BACK SURGERY      CARDIAC SURGERY      EXTERNAL FIXATION ANKLE FRACTURE Right 2023    Procedure: POSSIBLE EXTERNAL FIXATION, RIGHT ANKLE;  Surgeon: Shahbaz Reddy DPM;  Location: [facility] OR;  Service: Podiatry;  Laterality: Right;    HARDWARE REMOVAL Right 2023    Procedure: REMOVAL OF HARDWARE, DEEP; ANKLE ARTHRODESIS; SUBTALAR ARTHRODESIS; POSSIBLE EXTERNAL FIXATION, RIGHT ANKLE;  Surgeon: Shahbaz Reddy DPM;  Location: [facility] OR;  Service: Podiatry;  Laterality: Right;    PATENT FORAMEN OVALE CLOSURE      ROTATOR CUFF REPAIR Right     SUBTALAR ARTHRODESIS Right 2023    Procedure: SUBTALAR ARTHRODESIS;  Surgeon: Shahbaz Reddy DPM;  Location:  [facility] OR;  Service: Podiatry;   Laterality: Right;      General Information       Row Name 03/12/24 0755          OT Time and Intention    Document Type evaluation  -     Mode of Treatment occupational therapy  -       Row Name 03/12/24 0755          General Information    Patient Profile Reviewed yes  -     Prior Level of Function independent:;ADL's;all household mobility;community mobility  -     Existing Precautions/Restrictions fall  -     Barriers to Rehab medically complex  -       Row Name 03/12/24 0755          Occupational Profile    Reason for Services/Referral (Occupational Profile) Hypervolemia/probable heart failure, Anasarca  -     Occupational History/Life Experiences (Occupational Profile) Ankle fusion in 7/2023 with chronic pain  -       Row Name 03/12/24 0755          Living Environment    People in Home spouse  -       Row Name 03/12/24 0755          Home Main Entrance    Number of Stairs, Main Entrance none  -     Stair Railings, Main Entrance none  -       Row Name 03/12/24 0755          Stairs Within Home, Primary    Number of Stairs, Within Home, Primary none  -     Stair Railings, Within Home, Primary none  -       Row Name 03/12/24 0755          Cognition    Orientation Status (Cognition) oriented x 4  -       Row Name 03/12/24 0755          Safety Issues, Functional Mobility    Safety Issues Affecting Function (Mobility) friction/shear risk  -     Impairments Affecting Function (Mobility) balance  -               User Key  (r) = Recorded By, (t) = Taken By, (c) = Cosigned By      Initials Name Provider Type     Holly Holman, OTR/L Occupational Therapist                   Mobility/ADL's       Row Name 03/12/24 0755          Bed Mobility    Bed Mobility supine-sit  -     Assistive Device (Bed Mobility) head of bed elevated  -       Row Name 03/12/24 0755          Transfers    Transfers sit-stand transfer;stand-sit transfer  -       Row Name 03/12/24 0755          Sit-Stand Transfer     Sit-Stand Ocean (Transfers) supervision  -       Row Name 03/12/24 0755          Stand-Sit Transfer    Stand-Sit Ocean (Transfers) supervision  -[facility] Name 03/12/24 0755          Functional Mobility    Functional Mobility- Ind. Level supervision required  -     Functional Mobility- Comment offered pt rwx to use PRN in times of increased pain at ankle  -[facility] Name 03/12/24 0755          Activities of Daily Living    BADL Assessment/Intervention lower body dressing;upper body dressing;toileting  -[facility] Name 03/12/24 0755          Lower Body Dressing Assessment/Training    Ocean Level (Lower Body Dressing) don;shoes/slippers;doff;set up  -     Position (Lower Body Dressing) edge of bed sitting  -[facility] Name 03/12/24 0755          Upper Body Dressing Assessment/Training    [place] Level (Upper Body Dressing) set up;upper body dressing skills;don  -     Comment, (Upper Body Dressing) Hospital gown  -[facility] Name 03/12/24 0755          Toileting Assessment/Training    Comment, (Toileting) pt reports toileting self PRN  -               User Key  (r) = Recorded By, (t) = Taken By, (c) = Cosigned By      Initials Name Provider Type     Holly Holman, OTR/L Occupational Therapist                   Obj/Interventions       [facility] Name 03/12/24 0755          Sensory Assessment (Somatosensory)    Sensory Assessment (Somatosensory) UE sensation intact  -[facility] Name 03/12/24 0755          Vision Assessment/Intervention    Visual Impairment/Limitations corrective lenses full-time  -[facility] Name 03/12/24 0755          Range of Motion Comprehensive    General Range of Motion no range of motion deficits identified  -[facility] Name 03/12/24 0755          Strength Comprehensive (MMT)    General Manual Muscle Testing (MMT) Assessment no strength deficits identified  -[facility] Name 03/12/24 0755          Balance    Balance Assessment sitting static  balance;sitting dynamic balance;sit to stand dynamic balance;standing static balance;standing dynamic balance  -     Static Sitting Balance independent  -CH     Dynamic Sitting Balance independent  -     Position, Sitting Balance unsupported  -CH     Sit to Stand Dynamic Balance supervision  -CH     Static Standing Balance supervision  -CH     Dynamic Standing Balance supervision  -CH     Position/Device Used, Standing Balance unsupported  -               User Key  (r) = Recorded By, (t) = Taken By, (c) = Cosigned By      Initials Name Provider Type     Holly Holman, OTR/L Occupational Therapist                   Goals/Plan    No documentation.                  Clinical Impression       Row Name 03/12/24 0755          Pain Assessment    Additional Documentation Pain Scale: FACES Pre/Post-Treatment (Group)  -       Row Name 03/12/24 0755          Pain Scale: FACES Pre/Post-Treatment    Pain: FACES Scale, Pretreatment 0-->no hurt  -     Posttreatment Pain Rating 2-->hurts little bit  -     Pain Location - Side/Orientation Right  -     Pain Location - ankle  -       Row Name 03/12/24 0755          Plan of Care Review    Plan of Care Reviewed With patient  -     Progress improving  -     Outcome Evaluation OT eval complete. Pt. is AxO x 4 & pleasant.  Mr. Rodriguez reports diffuse edema that is improving and that he has been up ad mumtaz to the toilet.  His spouse is present and she assisted with ankle brace 2' increased swelling.  The pt was able to mobilize in room at S. He endorses leg length disrepancy & it is evident with his gait pattern. OTR offered rwx for fxl mob to reduce imbalance & promote safety during fxl mob.  NO further OT Tx required at this time.  Pt feels his is at his fxl baseline with ADLs. OTR edu'd in edema mgmt, falls risk safety, & benefits of activity.  Pt confirms edu.  -CH       Row Name 03/12/24 0755          Therapy Assessment/Plan (OT)    Patient/Family Therapy Goal  Statement (OT) reduce swelling  -     Criteria for Skilled Therapeutic Interventions Met (OT) no;skilled treatment is necessary  -     Therapy Frequency (OT) evaluation only  -       Row Name 03/12/24 0755          Therapy Plan Review/Discharge Plan (OT)    Anticipated Discharge Disposition (OT) home with assist  -       Row Name 03/12/24 0755          Positioning and Restraints    Pre-Treatment Position in bed  -     Post Treatment Position bed  -     In Bed fowlers;call light within reach;encouraged to call for assist;side rails up x2;legs elevated;with family/caregiver;[place] [facility] [place]  -               User Key  (r) = Recorded By, (t) = Taken By, (c) = Cosigned By      Initials Name Provider Type     Holly Holman, OTR/L Occupational Therapist                   Outcome Measures       Row Name 03/12/24 0755          How much help from another is currently needed...    Putting on and taking off regular lower body clothing? 3  -CH     Bathing (including washing, rinsing, and drying) 4  -CH     Toileting (which includes using toilet bed pan or urinal) 4  -CH     Putting on and taking off regular upper body clothing 4  -CH     Taking care of personal grooming (such as brushing teeth) 4  -CH     Eating meals 4  -CH     AM-PAC 6 Clicks Score (OT) 23  -CH       Row Name 03/12/24 2829          How much help from another person do you currently need...    Turning from your back to your side while in flat bed without using bedrails? 3  -MW     Moving from lying on back to sitting on the side of a flat bed without bedrails? 3  -MW     Moving to and from a bed to a chair (including a wheelchair)? 3  -MW     Standing up from a chair using your arms (e.g., wheelchair, bedside chair)? 3  -MW     Climbing 3-5 steps with a railing? 3  -MW     To walk in hospital room? 3  -MW     AM-PAC 6 Clicks Score (PT) 18  -MW     Highest Level of Mobility Goal 6 --> Walk 10 steps or more  -MW       Row Name 03/12/24 0750           Functional Assessment    Outcome Measure Options AM-PAC 6 Clicks Daily Activity (OT)  -               User Key  (r) = Recorded By, (t) = Taken By, (c) = Cosigned By      Initials Name Provider Type    [place]olly [place], OTR/L Occupational Therapist    Mckenna Palencia RN Registered Nurse                    OT Recommendation and Plan  Therapy Frequency (OT): evaluation only  Plan of Care Review  Plan of Care Reviewed With: patient  Progress: improving  Outcome Evaluation: OT eval complete. Pt. is AxO x 4 & pleasant.  Mr. Rodriguez reports diffuse edema that is improving and that he has been up ad mumtaz to the toilet.  His spouse is present and she assisted with ankle brace 2' increased swelling.  The pt was able to mobilize in room at S. He endorses leg length disrepancy & it is evident with his gait pattern. OTR offered rwx for fxl mob to reduce imbalance & promote safety during fxl mob.  NO further OT Tx required at this time.  Pt feels his is at his fxl baseline with ADLs. OTR edu'd in edema mgmt, falls risk safety, & benefits of activity.  Pt confirms edu.  Plan of Care Reviewed With: patient  Outcome Evaluation: OT eval complete. Pt. is AxO x 4 & pleasant.  Mr. Rodriguez reports diffuse edema that is improving and that he has been up ad mumtaz to the toilet.  His spouse is present and she assisted with ankle brace 2' increased swelling.  The pt was able to mobilize in room at S. He endorses leg length disrepancy & it is evident with his gait pattern. OTR offered rwx for fxl mob to reduce imbalance & promote safety during fxl mob.  NO further OT Tx required at this time.  Pt feels his is at his fxl baseline with ADLs. OTR edu'd in edema mgmt, falls risk safety, & benefits of activity.  Pt confirms edu.     Time Calculation:         Time Calculation- OT       Row Name 03/12/24 0859             Time Calculation- OT    OT Start Time 0755  -      OT Stop Time 0850  -      OT Time Calculation (min) 55 min  -       OT Received On 03/12/24  -CH         Untimed Charges    OT Eval/Re-eval Minutes 55  -CH         Total Minutes    Untimed Charges Total Minutes 55  -CH       Total Minutes 55  -CH                User Key  (r) = Recorded By, (t) = Taken By, (c) = Cosigned By      Initials Name Provider Type    CH Holly Holman OTR/L Occupational Therapist                  Therapy Charges for Today       Code Description Service Date Service Provider Modifiers Qty    [id number]  OT EVAL LOW COMPLEXITY 4 3/12/2024 Holly Holman OTR/L GO 1               OT Discharge Summary  Anticipated Discharge Disposition (OT): home with assist  Reason for Discharge: At baseline function  Outcomes Achieved: Refer to plan of care for updates on goals achieved  Discharge Destination: Home with assist    ZARINA Chacon/REBECA  3/12/2024

## 2024-03-13 LAB
ANION GAP SERPL CALCULATED.3IONS-SCNC: 7 MMOL/L (ref 5–15)
BUN SERPL-MCNC: 35 MG/DL (ref 6–20)
BUN/CREAT SERPL: 28 (ref 7–25)
CALCIUM SPEC-SCNC: 8.5 MG/DL (ref 8.6–10.5)
CHLORIDE SERPL-SCNC: 102 MMOL/L (ref 98–107)
CO2 SERPL-SCNC: 35 MMOL/L (ref 22–29)
CREAT SERPL-MCNC: 1.25 MG/DL (ref 0.76–1.27)
EGFRCR SERPLBLD CKD-EPI 2021: 66.7 ML/MIN/1.73
GLUCOSE BLDC GLUCOMTR-MCNC: 115 MG/DL (ref 70–130)
GLUCOSE BLDC GLUCOMTR-MCNC: 210 MG/DL (ref 70–130)
GLUCOSE BLDC GLUCOMTR-MCNC: 212 MG/DL (ref 70–130)
GLUCOSE BLDC GLUCOMTR-MCNC: 225 MG/DL (ref 70–130)
GLUCOSE SERPL-MCNC: 94 MG/DL (ref 65–99)
POTASSIUM SERPL-SCNC: 3.7 MMOL/L (ref 3.5–5.2)
SODIUM SERPL-SCNC: 144 MMOL/L (ref 136–145)

## 2024-03-13 PROCEDURE — 25810000003 SODIUM CHLORIDE 0.9 % SOLUTION 250 ML FLEX CONT: Performed by: NURSE PRACTITIONER

## 2024-03-13 PROCEDURE — 25010000002 ENOXAPARIN PER 10 MG: Performed by: FAMILY MEDICINE

## 2024-03-13 PROCEDURE — 25010000002 ENOXAPARIN PER 10 MG: Performed by: NURSE PRACTITIONER

## 2024-03-13 PROCEDURE — 63710000001 INSULIN LISPRO (HUMAN) PER 5 UNITS: Performed by: NURSE PRACTITIONER

## 2024-03-13 PROCEDURE — 82948 REAGENT STRIP/BLOOD GLUCOSE: CPT

## 2024-03-13 PROCEDURE — 99232 SBSQ HOSP IP/OBS MODERATE 35: CPT | Performed by: INTERNAL MEDICINE

## 2024-03-13 PROCEDURE — 25010000002 NA FERRIC GLUC CPLX PER 12.5 MG: Performed by: NURSE PRACTITIONER

## 2024-03-13 PROCEDURE — 80048 BASIC METABOLIC PNL TOTAL CA: CPT | Performed by: FAMILY MEDICINE

## 2024-03-13 RX ORDER — ENOXAPARIN SODIUM 100 MG/ML
1 INJECTION SUBCUTANEOUS EVERY 12 HOURS SCHEDULED
Status: DISCONTINUED | OUTPATIENT
Start: 2024-03-13 | End: 2024-03-15 | Stop reason: HOSPADM

## 2024-03-13 RX ORDER — FUROSEMIDE 40 MG/1
40 TABLET ORAL DAILY
Status: DISCONTINUED | OUTPATIENT
Start: 2024-03-14 | End: 2024-03-15 | Stop reason: HOSPADM

## 2024-03-13 RX ADMIN — HYDROCODONE BITARTRATE AND ACETAMINOPHEN 1 TABLET: 10; 325 TABLET ORAL at 18:40

## 2024-03-13 RX ADMIN — SODIUM CHLORIDE 250 MG: 9 INJECTION, SOLUTION INTRAVENOUS at 17:30

## 2024-03-13 RX ADMIN — PREGABALIN 50 MG: 50 CAPSULE ORAL at 09:01

## 2024-03-13 RX ADMIN — ENOXAPARIN SODIUM 90 MG: 100 INJECTION SUBCUTANEOUS at 17:29

## 2024-03-13 RX ADMIN — Medication 10 ML: at 21:00

## 2024-03-13 RX ADMIN — PREGABALIN 50 MG: 50 CAPSULE ORAL at 20:59

## 2024-03-13 RX ADMIN — LISINOPRIL 5 MG: 5 TABLET ORAL at 09:45

## 2024-03-13 RX ADMIN — HYDROCODONE BITARTRATE AND ACETAMINOPHEN 1 TABLET: 10; 325 TABLET ORAL at 09:01

## 2024-03-13 RX ADMIN — INSULIN LISPRO 3 UNITS: 100 INJECTION, SOLUTION INTRAVENOUS; SUBCUTANEOUS at 12:38

## 2024-03-13 RX ADMIN — PREGABALIN 50 MG: 50 CAPSULE ORAL at 15:46

## 2024-03-13 RX ADMIN — INSULIN LISPRO 3 UNITS: 100 INJECTION, SOLUTION INTRAVENOUS; SUBCUTANEOUS at 17:29

## 2024-03-13 RX ADMIN — INSULIN LISPRO 3 UNITS: 100 INJECTION, SOLUTION INTRAVENOUS; SUBCUTANEOUS at 20:59

## 2024-03-13 RX ADMIN — ENOXAPARIN SODIUM 100 MG: 100 INJECTION SUBCUTANEOUS at 05:14

## 2024-03-13 NOTE — PROGRESS NOTES
[facility] Medicine Services  INPATIENT PROGRESS NOTE    Patient Name: Garrett Rodriguez  Date of Admission: 3/11/2024  Today's Date: 03/13/24  Length of Stay: 2  Primary Care Physician: Mehul Andersen MD    Subjective   Chief Complaint: Feeling better  HPI     The patient's edema is improving significantly.  9200 cc out greater than in so far with aggressive diuresis.  Plan to discontinue Bumex drip in the a.m. and will transition him over to oral Lasix 40 mg p.o. daily thereafter.  After a 48-hour washout period post VQ scan, the patient will be scheduled for  a nuclear stress test tomorrow after 12 noon.  Will continue to closely monitor BMP.  BMP is unremarkable this morning.    Review of Systems   All pertinent negatives and positives are as above. All other systems have been reviewed and are negative unless otherwise stated.     Objective    Temp:  [97.4 °F (36.3 °C)-98.5 °F (36.9 °C)] 97.9 °F (36.6 °C)  Heart Rate:  [75-85] 77  Resp:  [16-20] 16  BP: (152-167)/(82-90) 156/90  Physical Exam  Constitutional:       Appearance: Normal appearance. He is normal weight.   HENT:      Head: Normocephalic and atraumatic.      Right Ear: External ear normal.      Left Ear: External ear normal.      Nose: Nose normal.      Mouth/Throat:      Mouth: Mucous membranes are moist.      Pharynx: Oropharynx is clear.   Eyes:      General: No scleral icterus.     Conjunctiva/sclera: Conjunctivae normal.   Cardiovascular:      Rate and Rhythm: Normal rate and regular rhythm.      Pulses: Normal pulses.      Heart sounds: Normal heart sounds. No murmur heard.  Pulmonary:      Effort: Pulmonary effort is normal.      Breath sounds: Decreased breath sounds present. No rhonchi or rales.   Abdominal:      General: Bowel sounds are normal.      Palpations: Abdomen is soft. There is no mass.      Tenderness: There is no abdominal tenderness.      Comments: Lower abdominal wall edema noted.  "  Musculoskeletal:         General: Swelling present. Normal range of motion.      Right lower leg: Edema (1/4) present.      Left lower leg: Edema (1/4) present.   Skin:     General: Skin is warm and dry.      Coloration: Skin is not pale.   Neurological:      General: No focal deficit present.      Mental Status: He is alert and oriented to person, place, and time. Mental status is at baseline.      Cranial Nerves: No cranial nerve deficit.   Psychiatric:         Mood and Affect: Mood normal.         Judgment: Judgment normal.     Results Review:  I have reviewed the labs, radiology results, and diagnostic studies.    Laboratory Data:   Results from last 7 days   Lab Units 03/11/24  1147   WBC 10*3/mm3 9.12   HEMOGLOBIN g/dL 8.6*   HEMATOCRIT % 28.8*   PLATELETS 10*3/mm3 268        Results from last 7 days   Lab Units 03/13/24  0207 03/12/24  1557 03/12/24  0153 03/11/24  1147   SODIUM mmol/L 144 141 143 143   POTASSIUM mmol/L 3.7 3.8 4.0 4.3   CHLORIDE mmol/L 102 101 103 104   CO2 mmol/L 35.0* 31.0* 31.0* 29.0   BUN mg/dL 35* 38* 40* 44*   CREATININE mg/dL 1.25 1.31* 1.41* 1.34*   CALCIUM mg/dL 8.5* 8.4* 8.2* 8.3*   BILIRUBIN mg/dL  --   --   --  0.5   ALK PHOS U/L  --   --   --  192*   ALT (SGPT) U/L  --   --   --  24   AST (SGOT) U/L  --   --   --  32   GLUCOSE mg/dL 94 226* 170* 146*       Culture Data:   No results found for: \"BLOODCX\", \"URINECX\", \"WOUNDCX\", \"MRSACX\", \"RESPCX\", \"STOOLCX\"    Radiology Data:   Imaging Results (Last 24 Hours)       ** No results found for the last 24 hours. **            I have reviewed the patient's current medications.     Assessment/Plan   Assessment  Active Hospital Problems    Diagnosis     **Hypervolemia     Pulmonary HTN     Anasarca     Iron deficiency anemia     New onset a-fib     Scrotal edema     Weakness     Gait instability     Elevated brain natriuretic peptide (BNP) level     Type 2 diabetes mellitus with hyperglycemia, without long-term current use of insulin  "    HTN (hypertension)        Treatment Plan  Discontinue Bumex drip in a.m.  Lasix 40 mg p.o. every morning starting tomorrow  Nuclear stress test tomorrow afternoon    Medical Decision Making  Number and Complexity of problems:   Pulmonary hypertension, chronic, high complexity  Anasarca, chronic, high complexity  Atrial fibrillation, chronic, moderate complexity  T2DM, chronic, moderate complexity  Essential hypertension, chronic complexity     Differential Diagnosis: None others considered     Conditions and Status        Condition is improving.     [facility] Data  External documents reviewed:.  Care Everywhere documentation  Cardiac tracing (EKG, telemetry) interpretation: See HPI  Radiology interpretation: See HPI  Labs reviewed: See HPI  Any tests that were considered but not ordered: None     Decision rules/scores evaluated (example [id number], Wells, etc): [id number]     Discussed with: The patient and his wife     Care Planning  Shared decision making: The patient and his wife  Code status and discussions: Full code     Disposition  Social Determinants of Health that impact treatment or disposition: None noted  I expect the patient to be discharged to home in 2 days.     Electronically signed by Ba Cosme DO, 03/13/24, 17:26 CDT.

## 2024-03-13 NOTE — PROGRESS NOTES
LOS: 2 days   Patient Care Team:  Mehul Andersen MD as PCP - General (Family Medicine)    Chief Complaint: Shortness of breath     Subjective    Garrett Rodriguez is a 59 y.o. male who is being seen in follow-up  Overnight feels better  Diuresing well  No chest pain  No palpitation  No presyncope  No syncope  No orthopnea  No paroxysmal nocturnal dyspnea  DVT study is negative  VQ scan is negative  Leg swelling is improved  Resting better  Latest labs as referenced below    Telemetry: no malignant arrhythmia. No significant pauses.    Review of Systems   Constitutional: No chills   Has fatigue   No fever.   HENT: Negative.    Eyes: Negative.    Respiratory: Negative for cough,   No chest wall soreness,   Shortness of breath,   no wheezing, no stridor.    Cardiovascular: As above  Gastrointestinal: Negative for abdominal distention,  No abdominal pain,   No blood in stool,   No constipation,   No diarrhea,   No nausea   No vomiting.   Endocrine: Negative.    Genitourinary: Negative for difficulty urinating, dysuria, flank pain and hematuria.   Musculoskeletal: Negative.    Skin: Negative for rash and wound.   Allergic/Immunologic: Negative.    Neurological: Negative for dizziness, syncope, weakness,   No light-headedness  No  headaches.   Hematological: Does not bruise/bleed easily.   Psychiatric/Behavioral: Negative for agitation or behavioral problems,   No confusion,   the patient is  nervous/anxious.       History:   Past Medical History:   Diagnosis Date    Chronic pain in right foot     Diabetes mellitus     Hypertension     PFO (patent foramen ovale)     RP at age 6     Past Surgical History:   Procedure Laterality Date    ANKLE FUSION Right 7/11/2023    Procedure: ANKLE ARTHRODESIS;  Surgeon: Shahbaz Reddy DPM;  Location: [facility];  Service: Podiatry;  Laterality: Right;    BACK SURGERY      CARDIAC SURGERY      EXTERNAL FIXATION ANKLE FRACTURE Right 7/11/2023    Procedure: POSSIBLE EXTERNAL  FIXATION, RIGHT ANKLE;  Surgeon: Shahbaz Reddy DPM;  Location:  [facility];  Service: Podiatry;  Laterality: Right;    HARDWARE REMOVAL Right 7/11/2023    Procedure: REMOVAL OF HARDWARE, DEEP; ANKLE ARTHRODESIS; SUBTALAR ARTHRODESIS; POSSIBLE EXTERNAL FIXATION, RIGHT ANKLE;  Surgeon: Shahbaz Reddy DPM;  Location:  [facility];  Service: Podiatry;  Laterality: Right;    PATENT FORAMEN OVALE CLOSURE      ROTATOR CUFF REPAIR Right     SUBTALAR ARTHRODESIS Right 7/11/2023    Procedure: SUBTALAR ARTHRODESIS;  Surgeon: Shahbaz Reddy DPM;  Location:  [facility];  Service: Podiatry;  Laterality: Right;     Social History     Socioeconomic History    Marital status:    Tobacco Use    Smoking status: Never    Smokeless tobacco: Never   Vaping Use    Vaping status: Never Used   Substance and Sexual Activity    Alcohol use: Never    Drug use: Never    Sexual activity: Defer     History reviewed. No pertinent family history.    Labs:  WBC WBC   Date Value Ref Range Status   03/11/2024 9.12 3.40 - 10.80 10*3/mm3 Final      HGB Hemoglobin   Date Value Ref Range Status   03/11/2024 8.6 (L) 13.0 - 17.7 g/dL Final      HCT Hematocrit   Date Value Ref Range Status   03/11/2024 28.8 (L) 37.5 - 51.0 % Final      Platelets Platelets   Date Value Ref Range Status   03/11/2024 268 140 - 450 10*3/mm3 Final      MCV MCV   Date Value Ref Range Status   03/11/2024 89.2 79.0 - 97.0 fL Final        Results from last 7 days   Lab Units 03/13/24  0207 03/12/24  1557 03/12/24  0153 03/11/24  1147   SODIUM mmol/L 144 141 143 143   POTASSIUM mmol/L 3.7 3.8 4.0 4.3   CHLORIDE mmol/L 102 101 103 104   CO2 mmol/L 35.0* 31.0* 31.0* 29.0   BUN mg/dL 35* 38* 40* 44*   CREATININE mg/dL 1.25 1.31* 1.41* 1.34*   CALCIUM mg/dL 8.5* 8.4* 8.2* 8.3*   BILIRUBIN mg/dL  --   --   --  0.5   ALK PHOS U/L  --   --   --  192*   ALT (SGPT) U/L  --   --   --  24   AST (SGOT) U/L  --   --   --  32   GLUCOSE mg/dL 94 226* 170* 146*     Lab Results  "  Component Value Date    CKTOTAL 179 05/13/2023    TROPONINT 121 (C) 03/11/2024     PT/INR:  No results found for: \"PROTIME\"/No results found for: \"INR\"    Imaging Results (Last 72 Hours)       Procedure Component Value Units Date/Time    US Venous Doppler Lower Extremity Bilateral (duplex) [[id number]] Collected: 03/12/24 1509     Updated: 03/12/24 1512    Narrative:      History: Swelling       Impression:      Impression: There is no evidence of deep venous thrombosis or  superficial thrombophlebitis of right or left lower extremities.     Comments: Bilateral lower extremity venous duplex exam was performed  using color Doppler flow, Doppler waveform analysis, and grayscale  imaging, with and without compression. There is no evidence of deep  venous thrombosis in the common femoral, superficial femoral, popliteal,  peroneal, anterior tibial, and posterior tibial veins bilaterally. No  thrombus is identified in the saphenofemoral junctions and greater  saphenous veins bilaterally.            This report was signed and finalized on 3/12/2024 3:09 PM by Dr. Ricardo Hawk MD.       NM Lung Scan Perfusion Particulate [[id number]] Collected: 03/12/24 1239     Updated: 03/12/24 1245    Narrative:      EXAMINATION: NM LUNG SCAN PERFUSION PARTICULATE- 3/12/2024 12:39 PM     HISTORY: suspect pulmonary embolis, negative CTA, pulmonary HTN;  E87.70-Fluid overload, unspecified; R18.8-Other ascites;  I48.91-Unspecified atrial fibrillation; R91.1-Solitary pulmonary nodule.     Dose: 5.4 mCi technetium 99m MAA intravenously.     REPORT: Perfusion-only scintigraphic images of the lungs were obtained  in the anterior, posterior and oblique dimensions following  administration of the radiotracer.     COMPARISON: CT angio chest 3/11/2024.     Distribution of activity within the lungs is homogeneous, there are no  focal segmental or subsegmental defects.       Impression:      No evidence of pulmonary thromboembolic disease, " low  probability perfusion lung scan.     This report was signed and finalized on 3/12/2024 12:42 PM by Dr. Brandin Seymour MD.       CT Angiogram Chest [[id number]] Collected: 03/11/24 1346     Updated: 03/11/24 1404    Narrative:      EXAMINATION: CT ANGIOGRAM CHEST-      3/11/2024 12:34 PM     HISTORY: sob     In order to have a CT radiation dose as low as reasonably achievable  Automated Exposure Control was utilized for adjustment of the mA and/or  KV according to patient size.     Total DLP = 254.25 mGy.cm     CT angiography of the chest performed after intravenous contrast  enhancement.     The images are acquired in axial plane and subsequent 2D reconstruction  in coronal and sagittal planes and 3D maximum intensity projection  reconstruction.     There is no previous similar study for comparison. The correlation made  with chest radiograph obtained earlier today.     There is normal opacification of the pulmonary arteries and branches  bilaterally. There are no filling defects in the opacified pulmonary  arterial bed.     RV/LV ratio is 40 years/48 which may suggest a mild right heart strain.     Atheromatous change of thoracic aorta seen. Moderate ectasia of the  sinus of Valsalva is noted which measures 4.1 cm. The ascending aorta  measures 3.5 cm in maximum diameter. No dissection.     Severe atheromatous changes of coronary arteries are noted. There is  evidence of prior CABG.     There is no evidence of mediastinal or hilar mass or lymphadenopathy.     Limited visualized soft tissue of the neck are unremarkable.     There is no axillary there are nonspecific moderately enlarged axillary  lymph nodes, left larger than the right. The largest lymph node in the  left axilla, image #46 and series 6, measures 1.1 cm in short axis.     There are atelectatic changes in bilateral lower lobar posterior segment  consolidation with adjacent small bibasilar pleural effusion, left more  than the right.     There is  no evidence of infiltrate.     There is a tiny nodule in the left lower lobe, image #109 and series 7,  measuring 3 mm in maximum dimension.     The central airway is patent. No intrinsic abnormality.     There is moderate circumferential thickening of the esophagus more  pronounced in the distal esophagus.     The limited visualized abdomen is unremarkable.     There is diffuse subcutaneous fat infiltration/edema suggesting fluid  overload/anasarca.     The images reviewed in bone window show no acute bony abnormality.  Chronic degenerative changes of the thoracic spine are seen.       Impression:      1. No evidence of pulmonary embolism. No aortic aneurysm or dissection.  2. Severe atheromatous changes of coronary arteries. A prior CABG.  3. Small bibasilar pleural effusion left more than the right.  Atelectatic changes in the lower lungs. No acute infiltrate.  4. Edema of the chest and abdominal wall suggesting fluid  overload/anasarca.  5. Nonspecific left axillary lymphadenopathy. The etiology and clinical  significance is not certain.  6. A 3 mm nodule in the left lower lobe may represent a small  noncalcified granuloma. However, since this is a baseline study, a  follow-up examination in 6 months is recommended to ensure stability or  resolution.                                         This report was signed and finalized on 3/11/2024 2:01 PM by Dr. Wendy Hicks MD.       XR Chest 1 View [[id number]] Collected: 03/11/24 1217     Updated: 03/11/24 1221    Narrative:      EXAM: XR CHEST 1 VW- 3/11/2024 11:05 AM     HISTORY: Shortness of breath.     COMPARISON: 5/13/2023.     TECHNIQUE: Single frontal radiograph of the chest was obtained.     FINDINGS:     Support Devices: None.     Cardiac and Mediastinal Silhouettes: Normal.     Lungs/Pleura: No focal consolidation. No sizable pleural effusion. No  visible pneumothorax.     Osseous structures: No acute osseous finding.     Other: None.       Impression:          No acute cardiopulmonary abnormality.           This report was signed and finalized on 3/11/2024 12:18 PM by Law Izquierdo.               Objective     No Known Allergies    Medication Review: Performed  Current Facility-Administered Medications   Medication Dose Route Frequency Provider Last Rate Last Admin    acetaminophen (TYLENOL) tablet 650 mg  650 mg Oral Q4H PRN Roz Salinas APRN        Or    acetaminophen (TYLENOL) 160 MG/5ML oral solution 650 mg  650 mg Oral Q4H PRN Roz Salinas APRN        Or    acetaminophen (TYLENOL) suppository 650 mg  650 mg Rectal Q4H PRN Roz Salinas APRN        sennosides-docusate (PERICOLACE) 8.6-50 MG per tablet 2 tablet  2 tablet Oral BID PRN Roz Salinas APRN        And    polyethylene glycol (MIRALAX) packet 17 g  17 g Oral Daily PRN Roz Salinas APRN        And    bisacodyl (DULCOLAX) EC tablet 5 mg  5 mg Oral Daily PRN Roz Salinas APRN        And    bisacodyl (DULCOLAX) suppository 10 mg  10 mg Rectal Daily PRN Roz Salinas APRN        bumetanide (BUMEX) 25 mg/100mL (0.25 mg/mL) infusion  0.5 mg/hr Intravenous Continuous Roz Salinas APRN 2 mL/hr at 03/11/24 1837 0.5 mg/hr at 03/11/24 1837    dextrose (D50W) (25 g/50 mL) IV injection 25 g  25 g Intravenous Q15 Min PRN Roz Salinas APRN        dextrose (GLUTOSE) oral gel 15 g  15 g Oral Q15 Min PRN Roz Salinas APRN        Enoxaparin Sodium (LOVENOX) syringe 100 mg  1 mg/kg Subcutaneous Q12H Roz Salinas APRN   100 mg at 03/13/24 0514    ferric gluconate (FERRLECIT) 250 mg in sodium chloride 0.9 % 250 mL IVPB  250 mg Intravenous Q24H Roz Salinas APRN 125 mL/hr at 03/12/24 1736 250 mg at 03/12/24 1736    glucagon (GLUCAGEN) injection 1 mg  1 mg Intramuscular Q15 Min PRN Roz Salinas APRN        HYDROcodone-acetaminophen (NORCO)  MG per tablet 1 tablet  1 tablet Oral Q8H PRN Roz Salinas, APRN   1 tablet at 03/12/24 7861     "Insulin Lispro (humaLOG) injection 2-7 Units  2-7 Units Subcutaneous 4x Daily AC & at Bedtime Roz Salinas APRN   4 Units at 03/12/24 2051    lisinopril (PRINIVIL,ZESTRIL) tablet 5 mg  5 mg Oral Q24H Roz Salinas APRN   5 mg at 03/12/24 0920    Magnesium Cardiology Dose Replacement - Follow Nurse / [facility]PA Driven Protocol   Does not apply PRN Roz Salinas APRN        nitroglycerin (NITROSTAT) SL tablet 0.4 mg  0.4 mg Sublingual Q5 Min PRN Roz Salinas APRN        ondansetron ODT (ZOFRAN-ODT) disintegrating tablet 4 mg  4 mg Oral Q6H PRN Roz Salinas APRN        Or    ondansetron (ZOFRAN) injection 4 mg  4 mg Intravenous Q6H PRN Roz Salinas APRN        Pharmacy to Dose enoxaparin (LOVENOX)   Does not apply Continuous PRN Roz Salinas APRN        Potassium Replacement - Follow Nurse / [facility]PA Driven Protocol   Does not apply PRN Roz Salinas APRN        pregabalin (LYRICA) capsule 50 mg  50 mg Oral TID Roz Salinas APRN   50 mg at 03/12/24 2052    sodium chloride 0.9 % flush 10 mL  10 mL Intravenous PRN Arlin Lyons PA        sodium chloride 0.9 % flush 10 mL  10 mL Intravenous Q12H Roz Salinas APRN   10 mL at 03/12/24 2052    sodium chloride 0.9 % flush 10 mL  10 mL Intravenous PRN Roz Salinas APRN        sodium chloride 0.9 % infusion 40 mL  40 mL Intravenous PRN Roz Salinas APRN           Vital Sign Min/Max for last 24 hours  Temp  Min: 97.8 °F (36.6 °C)  Max: 98.5 °F (36.9 °C)   BP  Min: 152/82  Max: 167/85   Pulse  Min: 79  Max: 89   Resp  Min: 18  Max: 20   SpO2  Min: 92 %  Max: 96 %   No data recorded   Weight  Min: 94.9 kg (209 lb 4.8 oz)  Max: 94.9 kg (209 lb 4.8 oz)     Flowsheet Rows      Flowsheet Row First Filed Value   Admission Height 177.8 cm (70\") Documented at 03/11/2024 1118   Admission Weight 102 kg (224 lb) Documented at 03/11/2024 1118            Results for orders placed during the hospital encounter of " 03/11/24    Adult Transthoracic Echo Complete w/ Color, Spectral and Contrast if Necessary Per Protocol    Interpretation Summary    Left ventricular ejection fraction appears to be 56 - 60%.    Left ventricular wall thickness is consistent with mild concentric hypertrophy.    The left atrial cavity is moderately dilated.    Left atrial volume is severely increased.    Moderate pulmonary hypertension is present.    Apical RV free wall hypokinesis      Physical Exam:    General Appearance: Awake, alert, in no acute distress  Eyes: Pupils equal and reactive    Ears: Appear intact with no abnormalities noted  Nose: Nares normal, no drainage  Neck: supple, trachea midline, no carotid bruit and no JVD  Back: no kyphosis present,    Lungs: respirations regular, respirations even and respirations unlabored  Heart: normal S1, S2, 2/6 systolic murmur left sternal border  Abdomen: normal bowel sounds, no tenderness   Skin: no bleeding, bruising or rash  Extremities: no cyanosis, pitting edema [markedly improved]  Psychiatric/Behavioral: Negative for agitation, behavioral problems, confusion, the patient does  appear to be nervous/anxious.       Results Review:   I reviewed the patient's new clinical results.  I reviewed the patient's new imaging results and agree with the interpretation.  I reviewed the patient's other test results and agree with the interpretation  I personally viewed and interpreted the patient's EKG/Telemetry data    Discussed with patient  Updated patient regarding any new or relevant abnormalities on review of records or any new findings on physical exam.   Mentioned to patient about purpose of visit and desirable health short and long term goals and objectives.     Reviewed available prior notes, consults, prior visits, laboratory findings, radiology and cardiology relevant reports.   Updated chart as applicable.   I have reviewed the patient's medical history in detail and updated the computerized  patient record as relevant.          Assessment & Plan       Hypervolemia    Type 2 diabetes mellitus with hyperglycemia, without long-term current use of insulin    HTN (hypertension)    Anasarca    Iron deficiency anemia    New onset a-fib    Scrotal edema    Weakness    Gait instability    Elevated brain natriuretic peptide (BNP) level    Pulmonary HTN      Plan    Results of echocardiogram, DVT study as well as VQ scan reviewed and discussed with him  Plans for nuclear perfusion scan after approximately 48 hours from VQ scan that should be scheduled day after  Overall improving  Further recommendation pending results of nuclear perfusion scan on March 15  Monitor kidney functions closely  Monitor electrolytes  Care plan discussed with him  Telemetry  Deep vein thrombosis prophylaxis/precautions  Appropriate diet, fluid, sodium, caffeine, stimulants intake   Questions were encouraged, asked and answered to the patient's  understanding and satisfaction.  Compliance to diet and medications       Rigo Kathleen MD  03/13/24  07:29 CDT    EMR Dragon/Transcription was used to dictate part of this note

## 2024-03-13 NOTE — PAYOR COMM NOTE
"3/13/24. [facility] [phone]  [phone]      3/13/24 CLINICAL UPDATE FOR [facility] STAY.          Garrett Coates (59 y.o. Male)       Date of Birth   1965    Social Security Number       Address   [facility] [address] [place] [address]    Home Phone   [phone]    MRN   [id number]       Christian   Voodoo    Marital Status                               Admission Date   3/11/24    Admission Type   Emergency    Admitting Provider   Ba Cosme DO    Attending Provider   Ba Cosme DO    Department, Room/Bed   [address], 432/1       Discharge Date       Discharge Disposition       Discharge Destination                                 Attending Provider: Ba Cosme DO    Allergies: No Known Allergies    Isolation: None   Infection: None   Code Status: CPR    Ht: 177.8 cm (70\")   Wt: 94.9 kg (209 lb 4.8 oz)    Admission Cmt: None   Principal Problem: Hypervolemia [E87.70]                   Active Insurance as of 3/11/2024       Primary Coverage       Payor Plan Insurance Group Employer/Plan Group    [employer] AN[employer] PATHWAY HMO [id number]       Payor Plan Address Payor Plan Phone Number Payor Plan Fax Number Effective Dates    [address] [phone]  6/1/2022 - None Entered    [place] [address]         Subscriber Name Subscriber Birth Date Member ID       GARRETT COATES 1965 [id number]                     Emergency Contacts        (Rel.) Home Phone Work Phone Mobile Phone    Gwen Coates (Spouse) -- -- [phone]             [facility] Encounter Date/Time: 3/11/2024 [facility]   Hospital Account: [id number]    MRN: [id number]   Patient:  Garrett Coates   Contact Serial #: [id number]   SSN:          ENCOUNTER             Patient Class: Inpatient   Unit: [address] Service: Medicine     Bed: 432/1   Admitting Provider: Ba" Ba SIMS DO   Referring Physician:     Attending Provider: Ba Cosme DO   Adm Diagnosis: Hypervolemia [E87.70]               PATIENT             Name: Garrett Coates : 1965 (59 yrs)   Address: [address] [place] [address] [facility] Sex: Male   City: [place] [address]   County: [facility]   Marital Status:  Ethnicity: NOT                                                                         Race: WHITE   Primary Care Provider: Mehul Andersen MD Patients Phone: Home Phone: [phone]           EMERGENCY CONTACT   Contact Name Legal Guardian? Relationship to Patient Home Phone Work Phone Mobile Phone   1. Jennifer,Tammy  2. *No Contact Specified* No    Spouse              [phone]      GUARANTOR             Guarantor: Garrett Coates     : 1965   Address: [address] [place] [address] [address] [facility] Sex: Male     [place] [address]     Relation to Patient: Self       Home Phone: [phone]   Guarantor ID: [id number]       Work Phone:     GUARANTOR EMPLOYER   Employer: [employer]         Status: FULL TIME   COVERAGE          PRIMARY INSURANCE   Payor: [employer] Plan: [employer] PATHWAY HMO   Group Number: [id number] Insurance Type: INDEMNITY   Subscriber Name: GARRETT COATES AN* Subscriber : 1965   Subscriber ID: [id number] Coverage Address: [facility] [id number]  [place] [address]   Pat. Rel. to Subscriber: Self Coverage Phone: [phone]   SECONDARY INSURANCE   Payor: N/A Plan: N/A   Group Number:   Insurance Type:     Subscriber Name:   Subscriber :     Subscriber ID:   Coverage Address:     Pat. Rel. to Subscriber:   Coverage Phone:        Contact Serial # ([id number])         2024    Chart ID ([id number])                      Rigo Kathleen MD   Physician  Cardiology     Progress Notes      Signed     Date of Service: 24  Creation Time: 24     Signed       Expand All Collapse All        LOS: 2 days   Patient Care  Team:  Mehul Andersen MD as PCP - General (Family Medicine)     Chief Complaint: Shortness of breath     Subjective     Garrett Rodriguez is a 59 y.o. male who is being seen in follow-up  Overnight feels better  Diuresing well  No chest pain  No palpitation  No presyncope  No syncope  No orthopnea  No paroxysmal nocturnal dyspnea  DVT study is negative  VQ scan is negative  Leg swelling is improved  Resting better  Latest labs as referenced below     Telemetry: no malignant arrhythmia. No significant pauses.     Review of Systems   Constitutional: No chills   Has fatigue   No fever.   HENT: Negative.    Eyes: Negative.    Respiratory: Negative for cough,   No chest wall soreness,   Shortness of breath,   no wheezing, no stridor.    Cardiovascular: As above  Gastrointestinal: Negative for abdominal distention,  No abdominal pain,   No blood in stool,   No constipation,   No diarrhea,   No nausea   No vomiting.   Endocrine: Negative.    Genitourinary: Negative for difficulty urinating, dysuria, flank pain and hematuria.   Musculoskeletal: Negative.    Skin: Negative for rash and wound.   Allergic/Immunologic: Negative.    Neurological: Negative for dizziness, syncope, weakness,   No light-headedness  No  headaches.   Hematological: Does not bruise/bleed easily.   Psychiatric/Behavioral: Negative for agitation or behavioral problems,   No confusion,   the patient is  nervous/anxious.        History:   Medical History        Past Medical History:   Diagnosis Date    Chronic pain in right foot      Diabetes mellitus      Hypertension      PFO (patent foramen ovale)       RP at age 6         Surgical History         Past Surgical History:   Procedure Laterality Date    ANKLE FUSION Right 7/11/2023     Procedure: ANKLE ARTHRODESIS;  Surgeon: Shahbaz Reddy DPM;  Location: [facility];  Service: Podiatry;  Laterality: Right;    BACK SURGERY        CARDIAC SURGERY        EXTERNAL FIXATION ANKLE FRACTURE Right  7/11/2023     Procedure: POSSIBLE EXTERNAL FIXATION, RIGHT ANKLE;  Surgeon: Shahbaz Reddy DPM;  Location:  [facility];  Service: Podiatry;  Laterality: Right;    HARDWARE REMOVAL Right 7/11/2023     Procedure: REMOVAL OF HARDWARE, DEEP; ANKLE ARTHRODESIS; SUBTALAR ARTHRODESIS; POSSIBLE EXTERNAL FIXATION, RIGHT ANKLE;  Surgeon: Shahbaz Reddy DPM;  Location:  [facility] OR;  Service: Podiatry;  Laterality: Right;    PATENT FORAMEN OVALE CLOSURE        ROTATOR CUFF REPAIR Right      SUBTALAR ARTHRODESIS Right 7/11/2023     Procedure: SUBTALAR ARTHRODESIS;  Surgeon: Shahbaz Reddy DPM;  Location:  [facility] OR;  Service: Podiatry;  Laterality: Right;         Social History   Social History           Socioeconomic History    Marital status:    Tobacco Use    Smoking status: Never    Smokeless tobacco: Never   Vaping Use    Vaping status: Never Used   Substance and Sexual Activity    Alcohol use: Never    Drug use: Never    Sexual activity: Defer         History reviewed. No pertinent family history.     Labs:  WBC       WBC   Date Value Ref Range Status   03/11/2024 9.12 3.40 - 10.80 10*3/mm3 Final      HGB       Hemoglobin   Date Value Ref Range Status   03/11/2024 8.6 (L) 13.0 - 17.7 g/dL Final      HCT       Hematocrit   Date Value Ref Range Status   03/11/2024 28.8 (L) 37.5 - 51.0 % Final      Platelets       Platelets   Date Value Ref Range Status   03/11/2024 268 140 - 450 10*3/mm3 Final      MCV       MCV   Date Value Ref Range Status   03/11/2024 89.2 79.0 - 97.0 fL Final                 Results from last 7 days   Lab Units 03/13/24  0207 03/12/24  1557 03/12/24  0153 03/11/24  1147   SODIUM mmol/L 144 141 143 143   POTASSIUM mmol/L 3.7 3.8 4.0 4.3   CHLORIDE mmol/L 102 101 103 104   CO2 mmol/L 35.0* 31.0* 31.0* 29.0   BUN mg/dL 35* 38* 40* 44*   CREATININE mg/dL 1.25 1.31* 1.41* 1.34*   CALCIUM mg/dL 8.5* 8.4* 8.2* 8.3*   BILIRUBIN mg/dL  --   --   --  0.5   ALK PHOS U/L  --   --   --  192*   ALT  "(SGPT) U/L  --   --   --  24   AST (SGOT) U/L  --   --   --  32   GLUCOSE mg/dL 94 226* 170* 146*            Lab Results   Component Value Date     CKTOTAL 179 05/13/2023     TROPONINT 121 (C) 03/11/2024      PT/INR:  No results found for: \"PROTIME\"/No results found for: \"INR\"     Imaging Results (Last 72 Hours)         Procedure Component Value Units Date/Time     US Venous Doppler Lower Extremity Bilateral (duplex) [[id number]] Collected: 03/12/24 1509       Updated: 03/12/24 1512     Narrative:       History: Swelling        Impression:       Impression: There is no evidence of deep venous thrombosis or  superficial thrombophlebitis of right or left lower extremities.     Comments: Bilateral lower extremity venous duplex exam was performed  using color Doppler flow, Doppler waveform analysis, and grayscale  imaging, with and without compression. There is no evidence of deep  venous thrombosis in the common femoral, superficial femoral, popliteal,  peroneal, anterior tibial, and posterior tibial veins bilaterally. No  thrombus is identified in the saphenofemoral junctions and greater  saphenous veins bilaterally.            This report was signed and finalized on 3/12/2024 3:09 PM by Dr. Ricardo Hawk MD.        NM Lung Scan Perfusion Particulate [[id number]] Collected: 03/12/24 1239       Updated: 03/12/24 1245     Narrative:       EXAMINATION: NM LUNG SCAN PERFUSION PARTICULATE- 3/12/2024 12:39 PM     HISTORY: suspect pulmonary embolis, negative CTA, pulmonary HTN;  E87.70-Fluid overload, unspecified; R18.8-Other ascites;  I48.91-Unspecified atrial fibrillation; R91.1-Solitary pulmonary nodule.     Dose: 5.4 mCi technetium 99m MAA intravenously.     REPORT: Perfusion-only scintigraphic images of the lungs were obtained  in the anterior, posterior and oblique dimensions following  administration of the radiotracer.     COMPARISON: CT angio chest 3/11/2024.     Distribution of activity within the lungs is " homogeneous, there are no  focal segmental or subsegmental defects.        Impression:       No evidence of pulmonary thromboembolic disease, low  probability perfusion lung scan.     This report was signed and finalized on 3/12/2024 12:42 PM by Dr. Brandin Seymour MD.        CT Angiogram Chest [[id number]] Collected: 03/11/24 1346       Updated: 03/11/24 1404     Narrative:       EXAMINATION: CT ANGIOGRAM CHEST-      3/11/2024 12:34 PM     HISTORY: sob     In order to have a CT radiation dose as low as reasonably achievable  Automated Exposure Control was utilized for adjustment of the mA and/or  KV according to patient size.     Total DLP = 254.25 mGy.cm     CT angiography of the chest performed after intravenous contrast  enhancement.     The images are acquired in axial plane and subsequent 2D reconstruction  in coronal and sagittal planes and 3D maximum intensity projection  reconstruction.     There is no previous similar study for comparison. The correlation made  with chest radiograph obtained earlier today.     There is normal opacification of the pulmonary arteries and branches  bilaterally. There are no filling defects in the opacified pulmonary  arterial bed.     RV/LV ratio is 40 years/48 which may suggest a mild right heart strain.     Atheromatous change of thoracic aorta seen. Moderate ectasia of the  sinus of Valsalva is noted which measures 4.1 cm. The ascending aorta  measures 3.5 cm in maximum diameter. No dissection.     Severe atheromatous changes of coronary arteries are noted. There is  evidence of prior CABG.     There is no evidence of mediastinal or hilar mass or lymphadenopathy.     Limited visualized soft tissue of the neck are unremarkable.     There is no axillary there are nonspecific moderately enlarged axillary  lymph nodes, left larger than the right. The largest lymph node in the  left axilla, image #46 and series 6, measures 1.1 cm in short axis.     There are atelectatic changes  in bilateral lower lobar posterior segment  consolidation with adjacent small bibasilar pleural effusion, left more  than the right.     There is no evidence of infiltrate.     There is a tiny nodule in the left lower lobe, image #109 and series 7,  measuring 3 mm in maximum dimension.     The central airway is patent. No intrinsic abnormality.     There is moderate circumferential thickening of the esophagus more  pronounced in the distal esophagus.     The limited visualized abdomen is unremarkable.     There is diffuse subcutaneous fat infiltration/edema suggesting fluid  overload/anasarca.     The images reviewed in bone window show no acute bony abnormality.  Chronic degenerative changes of the thoracic spine are seen.        Impression:       1. No evidence of pulmonary embolism. No aortic aneurysm or dissection.  2. Severe atheromatous changes of coronary arteries. A prior CABG.  3. Small bibasilar pleural effusion left more than the right.  Atelectatic changes in the lower lungs. No acute infiltrate.  4. Edema of the chest and abdominal wall suggesting fluid  overload/anasarca.  5. Nonspecific left axillary lymphadenopathy. The etiology and clinical  significance is not certain.  6. A 3 mm nodule in the left lower lobe may represent a small  noncalcified granuloma. However, since this is a baseline study, a  follow-up examination in 6 months is recommended to ensure stability or  resolution.                                         This report was signed and finalized on 3/11/2024 2:01 PM by Dr. Wendy Hicks MD.        XR Chest 1 View [[id number]] Collected: 03/11/24 1217       Updated: 03/11/24 1221     Narrative:       EXAM: XR CHEST 1 VW- 3/11/2024 11:05 AM     HISTORY: Shortness of breath.     COMPARISON: 5/13/2023.     TECHNIQUE: Single frontal radiograph of the chest was obtained.     FINDINGS:     Support Devices: None.     Cardiac and Mediastinal Silhouettes: Normal.     Lungs/Pleura: No focal  consolidation. No sizable pleural effusion. No  visible pneumothorax.     Osseous structures: No acute osseous finding.     Other: None.        Impression:          No acute cardiopulmonary abnormality.           This report was signed and finalized on 3/11/2024 12:18 PM by Law Izquierdo.                      Objective[]Expand by Default  Allergies   No Known Allergies        Medication Review: Performed  Current Medications             Current Facility-Administered Medications   Medication Dose Route Frequency Provider Last Rate Last Admin    acetaminophen (TYLENOL) tablet 650 mg  650 mg Oral Q4H PRN Roz Salinas APRN         Or    acetaminophen (TYLENOL) 160 MG/5ML oral solution 650 mg  650 mg Oral Q4H PRN Roz Salinas APRN         Or    acetaminophen (TYLENOL) suppository 650 mg  650 mg Rectal Q4H PRN Roz Salinas APRN        sennosides-docusate (PERICOLACE) 8.6-50 MG per tablet 2 tablet  2 tablet Oral BID PRN Roz Salinas APRN         And    polyethylene glycol (MIRALAX) packet 17 g  17 g Oral Daily PRN Roz Salinas APRN         And    bisacodyl (DULCOLAX) EC tablet 5 mg  5 mg Oral Daily PRN Roz Salinas APRN         And    bisacodyl (DULCOLAX) suppository 10 mg  10 mg Rectal Daily PRN Roz Salinas APRN        bumetanide (BUMEX) 25 mg/100mL (0.25 mg/mL) infusion  0.5 mg/hr Intravenous Continuous Roz Salinas APRN 2 mL/hr at 03/11/24 1837 0.5 mg/hr at 03/11/24 1837    dextrose (D50W) (25 g/50 mL) IV injection 25 g  25 g Intravenous Q15 Min PRN Roz Salinas APRN        dextrose (GLUTOSE) oral gel 15 g  15 g Oral Q15 Min PRN Roz Salinas APRN        Enoxaparin Sodium (LOVENOX) syringe 100 mg  1 mg/kg Subcutaneous Q12H Roz Salinas APRN   100 mg at 03/13/24 0514    ferric gluconate (FERRLECIT) 250 mg in sodium chloride 0.9 % 250 mL IVPB  250 mg Intravenous Q24H Roz Salinas APRN 125 mL/hr at 03/12/24 1736 250 mg at 03/12/24 1736    glucagon  (GLUCAGEN) injection 1 mg  1 mg Intramuscular Q15 Min PRN Roz Salinas APRN        HYDROcodone-acetaminophen (NORCO)  MG per tablet 1 tablet  1 tablet Oral Q8H PRN Roz Salinas APRN   1 tablet at 03/12/24 1803    Insulin Lispro (humaLOG) injection 2-7 Units  2-7 Units Subcutaneous 4x Daily AC & at Bedtime Roz Salinas APRN   4 Units at 03/12/24 2051    lisinopril (PRINIVIL,ZESTRIL) tablet 5 mg  5 mg Oral Q24H Roz Salinas APRN   5 mg at 03/12/24 0920    Magnesium Cardiology Dose Replacement - Follow Nurse / [facility]PA Driven Protocol   Does not apply PRN Roz Salinas APRN        nitroglycerin (NITROSTAT) SL tablet 0.4 mg  0.4 mg Sublingual Q5 Min PRN Roz Salinas APRN        ondansetron ODT (ZOFRAN-ODT) disintegrating tablet 4 mg  4 mg Oral Q6H PRN Roz Salinas APRN         Or    ondansetron (ZOFRAN) injection 4 mg  4 mg Intravenous Q6H PRN Roz Salinas APRN        Pharmacy to Dose enoxaparin (LOVENOX)   Does not apply Continuous PRN Roz Salinas APRN        Potassium Replacement - Follow Nurse / BPA Driven Protocol   Does not apply PRN Roz Salinas APRN        pregabalin (LYRICA) capsule 50 mg  50 mg Oral TID Roz Salinas APRN   50 mg at 03/12/24 2052    sodium chloride 0.9 % flush 10 mL  10 mL Intravenous PRN Arlin Lyons PA        sodium chloride 0.9 % flush 10 mL  10 mL Intravenous Q12H Roz Salinas APRN   10 mL at 03/12/24 2052    sodium chloride 0.9 % flush 10 mL  10 mL Intravenous PRN Roz Salinas APRN        sodium chloride 0.9 % infusion 40 mL  40 mL Intravenous PRN Roz Salinas APRN                Vital Sign Min/Max for last 24 hours  Temp  Min: 97.8 °F (36.6 °C)  Max: 98.5 °F (36.9 °C)   BP  Min: 152/82  Max: 167/85   Pulse  Min: 79  Max: 89   Resp  Min: 18  Max: 20   SpO2  Min: 92 %  Max: 96 %   No data recorded   Weight  Min: 94.9 kg (209 lb 4.8 oz)  Max: 94.9 kg (209 lb 4.8 oz)      Flowsheet Rows      "  Flowsheet Row First Filed Value   Admission Height 177.8 cm (70\") Documented at 03/11/2024 1118   Admission Weight 102 kg (224 lb) Documented at 03/11/2024 1118                Results for orders placed during the hospital encounter of 03/11/24     Adult Transthoracic Echo Complete w/ Color, Spectral and Contrast if Necessary Per Protocol     Interpretation Summary    Left ventricular ejection fraction appears to be 56 - 60%.    Left ventricular wall thickness is consistent with mild concentric hypertrophy.    The left atrial cavity is moderately dilated.    Left atrial volume is severely increased.    Moderate pulmonary hypertension is present.    Apical RV free wall hypokinesis        Physical Exam:     General Appearance: Awake, alert, in no acute distress  Eyes: Pupils equal and reactive    Ears: Appear intact with no abnormalities noted  Nose: Nares normal, no drainage  Neck: supple, trachea midline, no carotid bruit and no JVD  Back: no kyphosis present,    Lungs: respirations regular, respirations even and respirations unlabored  Heart: normal S1, S2, 2/6 systolic murmur left sternal border  Abdomen: normal bowel sounds, no tenderness   Skin: no bleeding, bruising or rash  Extremities: no cyanosis, pitting edema [markedly improved]  Psychiatric/Behavioral: Negative for agitation, behavioral problems, confusion, the patient does  appear to be nervous/anxious.        Results Review:   I reviewed the patient's new clinical results.  I reviewed the patient's new imaging results and agree with the interpretation.  I reviewed the patient's other test results and agree with the interpretation  I personally viewed and interpreted the patient's EKG/Telemetry data     Discussed with patient  Updated patient regarding any new or relevant abnormalities on review of records or any new findings on physical exam.   Mentioned to patient about purpose of visit and desirable health short and long term goals and objectives.    "   Reviewed available prior notes, consults, prior visits, laboratory findings, radiology and cardiology relevant reports.   Updated chart as applicable.   I have reviewed the patient's medical history in detail and updated the computerized patient record as relevant.                   Assessment & Plan    Hypervolemia    Type 2 diabetes mellitus with hyperglycemia, without long-term current use of insulin    HTN (hypertension)    Anasarca    Iron deficiency anemia    New onset a-fib    Scrotal edema    Weakness    Gait instability    Elevated brain natriuretic peptide (BNP) level    Pulmonary HTN        Plan     Results of echocardiogram, DVT study as well as VQ scan reviewed and discussed with him  Plans for nuclear perfusion scan after approximately 48 hours from VQ scan that should be scheduled day after  Overall improving  Further recommendation pending results of nuclear perfusion scan on March 15  Monitor kidney functions closely  Monitor electrolytes  Care plan discussed with him  Telemetry  Deep vein thrombosis prophylaxis/precautions  Appropriate diet, fluid, sodium, caffeine, stimulants intake   Questions were encouraged, asked and answered to the patient's  understanding and satisfaction.  Compliance to diet and medications         Rigo Kathleen MD  03/13/24  07:29 CDT     EMR Dragon/Transcription was used to dictate part of this note                       /Time Temp Pulse Resp BP Patient Position Device (Oxygen Therapy) SpO2   03/13/24 0725 97.9 (36.6) 82 18 161/83 Lying room air 96   03/13/24 0355 97.8 (36.6) 81 20 152/82 Lying room air 95                           Component  Ref Range & Units 02:07  (3/13/24) 1 d ago  (3/12/24) 1 d ago  (3/12/24) 1 d ago  (3/12/24) 1 d ago  (3/12/24) 1 d ago  (3/12/24) 1 d ago  (3/12/24)   Glucose  65 - 99 mg/dL 94 284 High  R,  High  R,  High  246 High  R,  High  R,  High    BUN  6 - 20 mg/dL 35 High    38 High    40 High    Creatinine  0.76 -  1.27 mg/dL 1.25   1.31 High    1.41 High    Sodium  136 - 145 mmol/L 144   141   143   Potassium  3.5 - 5.2 mmol/L 3.7   3.8   4.0   Chloride  98 - 107 mmol/L 102   101   103   CO2  22.0 - 29.0 mmol/L 35.0 High    31.0 High    31.0 High    Calcium  8.6 - 10.5 mg/dL 8.5 Low    8.4 Low    8.2 Low    BUN/Creatinine Ratio  7.0 - 25.0 28.0 High               Encounter Date    3/11/24    CT Angiogram Chest [[id number]] (Order [id number])  Order  Status: Final result     Patient Location    Patient Class Location   Inpatient  [facility], 432, 1     [phone]     Study Notes     Wally Fairchildi on 3/11/2024  1:38 PM CDT   HPI: pt reports 50-60 lb  gain in last 6 wks, has been placed on lasix w/ small improvement.  orthropnea & excertional soa reported. denies cp,       Appointment Information    PACS Images     Radiology Images  Study Result    Narrative & Impression   EXAMINATION: CT ANGIOGRAM CHEST-      3/11/2024 12:34 PM     HISTORY: sob     In order to have a CT radiation dose as low as reasonably achievable  Automated Exposure Control was utilized for adjustment of the mA and/or  KV according to patient size.     Total DLP = 254.25 mGy.cm     CT angiography of the chest performed after intravenous contrast  enhancement.     The images are acquired in axial plane and subsequent 2D reconstruction  in coronal and sagittal planes and 3D maximum intensity projection  reconstruction.     There is no previous similar study for comparison. The correlation made  with chest radiograph obtained earlier today.     There is normal opacification of the pulmonary arteries and branches  bilaterally. There are no filling defects in the opacified pulmonary  arterial bed.     RV/LV ratio is 40 years/48 which may suggest a mild right heart strain.     Atheromatous change of thoracic aorta seen. Moderate ectasia of the  sinus of Valsalva is noted which measures 4.1 cm. The ascending aorta  measures 3.5 cm in maximum diameter. No dissection.      Severe atheromatous changes of coronary arteries are noted. There is  evidence of prior CABG.     There is no evidence of mediastinal or hilar mass or lymphadenopathy.     Limited visualized soft tissue of the neck are unremarkable.     There is no axillary there are nonspecific moderately enlarged axillary  lymph nodes, left larger than the right. The largest lymph node in the  left axilla, image #46 and series 6, measures 1.1 cm in short axis.     There are atelectatic changes in bilateral lower lobar posterior segment  consolidation with adjacent small bibasilar pleural effusion, left more  than the right.     There is no evidence of infiltrate.     There is a tiny nodule in the left lower lobe, image #109 and series 7,  measuring 3 mm in maximum dimension.     The central airway is patent. No intrinsic abnormality.     There is moderate circumferential thickening of the esophagus more  pronounced in the distal esophagus.     The limited visualized abdomen is unremarkable.     There is diffuse subcutaneous fat infiltration/edema suggesting fluid  overload/anasarca.     The images reviewed in bone window show no acute bony abnormality.  Chronic degenerative changes of the thoracic spine are seen.     IMPRESSION:  1. No evidence of pulmonary embolism. No aortic aneurysm or dissection.  2. Severe atheromatous changes of coronary arteries. A prior CABG.  3. Small bibasilar pleural effusion left more than the right.  Atelectatic changes in the lower lungs. No acute infiltrate.  4. Edema of the chest and abdominal wall suggesting fluid  overload/anasarca.  5. Nonspecific left axillary lymphadenopathy. The etiology and clinical  significance is not certain.  6. A 3 mm nodule in the left lower lobe may represent a small  noncalcified granuloma. However, since this is a baseline study, a  follow-up examination in 6 months is recommended to ensure stability or  resolution.               s report was signed and finalized  on 3/11/2024 2:01 PM by Dr. Wendy Hicks MD.                                                           Jd Cosme DO   Physician  Hospitalist     Progress Notes      Signed     Date of Service: 03/12/24 1711  Creation Time: 03/12/24 1711     Signed              [facility] Medicine Services  INPATIENT PROGRESS NOTE     Patient Name: Garrett Rodriguez  Date of Admission: 3/11/2024  Today's Date: 03/12/24  Length of Stay: 1  Primary Care Physician: Mehul Andersen MD     Subjective   Chief Complaint: Shortness of breath, edema  HPI      The patient's shortness of breath is significantly improved with aggressive diuresis.  Lower extremity edema has also improved.  Body wall edema is better as well.  He has had 6100 cc out greater than in so far.  Echocardiogram reveals pulmonary hypertension with no evidence of left heart failure and no diastolic dysfunction.  I suspect untreated sleep apnea is the origin of his moderate-severe pulmonary hypertension.  I have recommended an outpatient sleep study be performed after discharge.  CT angiogram of the chest shows no evidence of pulmonary embolus.  Bilateral venous Dopplers show no DVT.  BMP today shows creatinine improved to 1.31 with BUN 38 and glucose 226.  Hemoglobin A1c 6.7%.  Urinalysis is unremarkable.  Lipid panel was also unremarkable except .     Review of Systems   All pertinent negatives and positives are as above. All other systems have been reviewed and are negative unless otherwise stated.      Objective    Temp:  [97.3 °F (36.3 °C)-98.5 °F (36.9 °C)] 98.5 °F (36.9 °C)  Heart Rate:  [71-89] 89  Resp:  [18-20] 18  BP: (139-160)/(68-92) 157/68  Physical Exam  Constitutional:       Appearance: Normal appearance. He is normal weight.   HENT:      Head: Normocephalic and atraumatic.      Right Ear: External ear normal.      Left Ear: External ear normal.      Nose: Nose normal.      Mouth/Throat:       Mouth: Mucous membranes are moist.      Pharynx: Oropharynx is clear.   Eyes:      General: No scleral icterus.     Conjunctiva/sclera: Conjunctivae normal.   Cardiovascular:      Rate and Rhythm: Normal rate and regular rhythm.      Pulses: Normal pulses.      Heart sounds: Normal heart sounds. No murmur heard.  Pulmonary:      Effort: Pulmonary effort is normal.      Breath sounds: Decreased breath sounds present. No rhonchi or rales.   Abdominal:      General: Bowel sounds are normal.      Palpations: Abdomen is soft. There is no mass.      Tenderness: There is no abdominal tenderness.      Comments: Lower abdominal wall edema noted.   Musculoskeletal:         General: Swelling present. Normal range of motion.      Right lower leg: Edema (2/4) present.      Left lower leg: Edema (2/4) present.   Skin:     General: Skin is warm and dry.      Coloration: Skin is not pale.   Neurological:      General: No focal deficit present.      Mental Status: He is alert and oriented to person, place, and time. Mental status is at baseline.      Cranial Nerves: No cranial nerve deficit.   Psychiatric:         Mood and Affect: Mood normal.         Judgment: Judgment normal.         Results Review:  I have reviewed the labs, radiology results, and diagnostic studies.     Laboratory Data:        Results from last 7 days   Lab Units 03/11/24  1147   WBC 10*3/mm3 9.12   HEMOGLOBIN g/dL 8.6*   HEMATOCRIT % 28.8*   PLATELETS 10*3/mm3 268                Results from last 7 days   Lab Units 03/12/24  1557 03/12/24  0153 03/11/24  1147   SODIUM mmol/L 141 143 143   POTASSIUM mmol/L 3.8 4.0 4.3   CHLORIDE mmol/L 101 103 104   CO2 mmol/L 31.0* 31.0* 29.0   BUN mg/dL 38* 40* 44*   CREATININE mg/dL 1.31* 1.41* 1.34*   CALCIUM mg/dL 8.4* 8.2* 8.3*   BILIRUBIN mg/dL  --   --  0.5   ALK PHOS U/L  --   --  192*   ALT (SGPT) U/L  --   --  24   AST (SGOT) U/L  --   --  32   GLUCOSE mg/dL 226* 170* 146*         Culture Data:   No results found for:  "\"BLOODCX\", \"URINECX\", \"WOUNDCX\", \"MRSACX\", \"RESPCX\", \"STOOLCX\"     Radiology Data:   Imaging Results (Last 24 Hours)         Procedure Component Value Units Date/Time     US Venous Doppler Lower Extremity Bilateral (duplex) [[id number]] Collected: 03/12/24 1509       Updated: 03/12/24 1512     Narrative:       History: Swelling        Impression:       Impression: There is no evidence of deep venous thrombosis or  superficial thrombophlebitis of right or left lower extremities.     Comments: Bilateral lower extremity venous duplex exam was performed  using color Doppler flow, Doppler waveform analysis, and grayscale  imaging, with and without compression. There is no evidence of deep  venous thrombosis in the common femoral, superficial femoral, popliteal,  peroneal, anterior tibial, and posterior tibial veins bilaterally. No  thrombus is identified in the saphenofemoral junctions and greater  saphenous veins bilaterally.            This report was signed and finalized on 3/12/2024 3:09 PM by Dr. Ricardo Hawk MD.        NM Lung Scan Perfusion Particulate [[id number]] Collected: 03/12/24 1239       Updated: 03/12/24 1245     Narrative:       EXAMINATION: NM LUNG SCAN PERFUSION PARTICULATE- 3/12/2024 12:39 PM     HISTORY: suspect pulmonary embolis, negative CTA, pulmonary HTN;  E87.70-Fluid overload, unspecified; R18.8-Other ascites;  I48.91-Unspecified atrial fibrillation; R91.1-Solitary pulmonary nodule.     Dose: 5.4 mCi technetium 99m MAA intravenously.     REPORT: Perfusion-only scintigraphic images of the lungs were obtained  in the anterior, posterior and oblique dimensions following  administration of the radiotracer.     COMPARISON: CT angio chest 3/11/2024.     Distribution of activity within the lungs is homogeneous, there are no  focal segmental or subsegmental defects.        Impression:       No evidence of pulmonary thromboembolic disease, low  probability perfusion lung scan.     This report was " signed and finalized on 3/12/2024 12:42 PM by Dr. Brandin Seymour MD.                   I have reviewed the patient's current medications.      Assessment/Plan   Assessment       Active Hospital Problems     Diagnosis      **Hypervolemia      Pulmonary HTN      Anasarca      Iron deficiency anemia      New onset a-fib      Scrotal edema      Weakness      Gait instability      Elevated brain natriuretic peptide (BNP) level      Type 2 diabetes mellitus with hyperglycemia, without long-term current use of insulin      HTN (hypertension)           Treatment Plan  Continue Bumex drip  Continue serial BMP values  Discussed the need for outpatient sleep study with the patient and his wife     Medical Decision Making  Number and Complexity of problems:   Pulmonary hypertension, chronic, high complexity  Anasarca, chronic, high complexity  Atrial fibrillation, chronic, moderate complexity  T2DM, chronic, moderate complexity  Essential hypertension, chronic complexity     Differential Diagnosis: None others considered     Conditions and Status        Condition is improving.     [facility] Data  External documents reviewed:.  Care Everywhere documentation  Cardiac tracing (EKG, telemetry) interpretation: See HPI  Radiology interpretation: See HPI  Labs reviewed: See HPI  Any tests that were considered but not ordered: None     Decision rules/scores evaluated (example [id number], Wells, etc): [id number]     Discussed with: The patient and his wife     Care Planning  Shared decision making: The patient and his wife  Code status and discussions: Full code     Disposition  Social Determinants of Health that impact treatment or disposition: None noted  I expect the patient to be discharged to home in 3-4 days.      Electronically signed by Ba Cosme DO, 03/12/24, 17:11 CDT.                           Current Facility-Administered Medications   Medication Dose Route Frequency Provider Last Rate Last Admin    acetaminophen  (TYLENOL) tablet 650 mg  650 mg Oral Q4H PRN Roz Salinas APRN        Or    acetaminophen (TYLENOL) 160 MG/5ML oral solution 650 mg  650 mg Oral Q4H PRN Roz Salinas APRN        Or    acetaminophen (TYLENOL) suppository 650 mg  650 mg Rectal Q4H PRN Roz Salinas APRN        sennosides-docusate (PERICOLACE) 8.6-50 MG per tablet 2 tablet  2 tablet Oral BID PRN Roz Salinas APRN        And    polyethylene glycol (MIRALAX) packet 17 g  17 g Oral Daily PRN Roz Salinas APRN        And    bisacodyl (DULCOLAX) EC tablet 5 mg  5 mg Oral Daily PRN Roz Salinas APRN        And    bisacodyl (DULCOLAX) suppository 10 mg  10 mg Rectal Daily PRN Roz Salinas APRN        bumetanide (BUMEX) 25 mg/100mL (0.25 mg/mL) infusion  0.5 mg/hr Intravenous Continuous Roz Salinas APRN 2 mL/hr at 03/11/24 1837 0.5 mg/hr at 03/11/24 1837    dextrose (D50W) (25 g/50 mL) IV injection 25 g  25 g Intravenous Q15 Min PRN Roz Salinas APRN        dextrose (GLUTOSE) oral gel 15 g  15 g Oral Q15 Min PRN Roz Salinas APRN        Enoxaparin Sodium (LOVENOX) syringe 100 mg  1 mg/kg Subcutaneous Q12H Roz Salinas APRN   100 mg at 03/13/24 0514    ferric gluconate (FERRLECIT) 250 mg in sodium chloride 0.9 % 250 mL IVPB  250 mg Intravenous Q24H Roz Salinas APRN 125 mL/hr at 03/12/24 1736 250 mg at 03/12/24 1736    glucagon (GLUCAGEN) injection 1 mg  1 mg Intramuscular Q15 Min PRN Roz Salinas APRN        HYDROcodone-acetaminophen (NORCO)  MG per tablet 1 tablet  1 tablet Oral Q8H PRN Roz Salinas APRN   1 tablet at 03/13/24 0901    Insulin Lispro (humaLOG) injection 2-7 Units  2-7 Units Subcutaneous 4x Daily AC & at Bedtime Roz Salinas APRN   4 Units at 03/12/24 2051    lisinopril (PRINIVIL,ZESTRIL) tablet 5 mg  5 mg Oral Q24H oRz Salinas APRN   5 mg at 03/13/24 0945    Magnesium Cardiology Dose Replacement - Follow Nurse / BPA Driven Protocol   Does not  apply PRN Roz Salinas APRN        nitroglycerin (NITROSTAT) SL tablet 0.4 mg  0.4 mg Sublingual Q5 Min PRN Roz Salinas APRN        ondansetron ODT (ZOFRAN-ODT) disintegrating tablet 4 mg  4 mg Oral Q6H PRN Roz Salinas APRN        Or    ondansetron (ZOFRAN) injection 4 mg  4 mg Intravenous Q6H PRN Roz Salinas APRN        Pharmacy to Dose enoxaparin (LOVENOX)   Does not apply Continuous PRN Roz Salinas APRN        Potassium Replacement - Follow Nurse / BPA Driven Protocol   Does not apply PRN Roz Salinas APRN        pregabalin (LYRICA) capsule 50 mg  50 mg Oral TID Roz Salinas APRN   50 mg at 03/13/24 0901    sodium chloride 0.9 % flush 10 mL  10 mL Intravenous PRN Arlin Lyons Thi, PA        sodium chloride 0.9 % flush 10 mL  10 mL Intravenous Q12H Roz Salinas APRN   10 mL at 03/12/24 2052    sodium chloride 0.9 % flush 10 mL  10 mL Intravenous PRN Roz Salinas APRN        sodium chloride 0.9 % infusion 40 mL  40 mL Intravenous PRN Roz Salinas APRN

## 2024-03-13 NOTE — PLAN OF CARE
Goal Outcome Evaluation:           Progress: improving  Outcome Evaluation: S 77-90 per tele. no c/o pain.vss except for BP running a little high. will continue to monitor.

## 2024-03-14 LAB
ANION GAP SERPL CALCULATED.3IONS-SCNC: 6 MMOL/L (ref 5–15)
BUN SERPL-MCNC: 29 MG/DL (ref 6–20)
BUN/CREAT SERPL: 25.2 (ref 7–25)
CALCIUM SPEC-SCNC: 7.7 MG/DL (ref 8.6–10.5)
CHLORIDE SERPL-SCNC: 103 MMOL/L (ref 98–107)
CO2 SERPL-SCNC: 34 MMOL/L (ref 22–29)
CREAT SERPL-MCNC: 1.15 MG/DL (ref 0.76–1.27)
EGFRCR SERPLBLD CKD-EPI 2021: 73.8 ML/MIN/1.73
GLUCOSE BLDC GLUCOMTR-MCNC: 108 MG/DL (ref 70–130)
GLUCOSE BLDC GLUCOMTR-MCNC: 201 MG/DL (ref 70–130)
GLUCOSE BLDC GLUCOMTR-MCNC: 207 MG/DL (ref 70–130)
GLUCOSE BLDC GLUCOMTR-MCNC: 302 MG/DL (ref 70–130)
GLUCOSE SERPL-MCNC: 85 MG/DL (ref 65–99)
POTASSIUM SERPL-SCNC: 3.4 MMOL/L (ref 3.5–5.2)
POTASSIUM SERPL-SCNC: 4.7 MMOL/L (ref 3.5–5.2)
SODIUM SERPL-SCNC: 143 MMOL/L (ref 136–145)

## 2024-03-14 PROCEDURE — 25010000002 ENOXAPARIN PER 10 MG: Performed by: FAMILY MEDICINE

## 2024-03-14 PROCEDURE — 80048 BASIC METABOLIC PNL TOTAL CA: CPT | Performed by: FAMILY MEDICINE

## 2024-03-14 PROCEDURE — 82948 REAGENT STRIP/BLOOD GLUCOSE: CPT

## 2024-03-14 PROCEDURE — 63710000001 INSULIN LISPRO (HUMAN) PER 5 UNITS: Performed by: NURSE PRACTITIONER

## 2024-03-14 PROCEDURE — 84132 ASSAY OF SERUM POTASSIUM: CPT | Performed by: FAMILY MEDICINE

## 2024-03-14 PROCEDURE — 99232 SBSQ HOSP IP/OBS MODERATE 35: CPT | Performed by: INTERNAL MEDICINE

## 2024-03-14 RX ORDER — POTASSIUM CHLORIDE 750 MG/1
40 CAPSULE, EXTENDED RELEASE ORAL EVERY 4 HOURS
Status: COMPLETED | OUTPATIENT
Start: 2024-03-14 | End: 2024-03-14

## 2024-03-14 RX ORDER — LISINOPRIL 20 MG/1
20 TABLET ORAL
Status: DISCONTINUED | OUTPATIENT
Start: 2024-03-15 | End: 2024-03-15 | Stop reason: HOSPADM

## 2024-03-14 RX ORDER — LISINOPRIL 10 MG/1
10 TABLET ORAL ONCE
Status: COMPLETED | OUTPATIENT
Start: 2024-03-14 | End: 2024-03-14

## 2024-03-14 RX ORDER — POTASSIUM CHLORIDE 750 MG/1
40 CAPSULE, EXTENDED RELEASE ORAL ONCE
Status: COMPLETED | OUTPATIENT
Start: 2024-03-14 | End: 2024-03-14

## 2024-03-14 RX ADMIN — PREGABALIN 50 MG: 50 CAPSULE ORAL at 09:08

## 2024-03-14 RX ADMIN — POTASSIUM CHLORIDE 40 MEQ: 10 CAPSULE, COATED, EXTENDED RELEASE ORAL at 13:37

## 2024-03-14 RX ADMIN — INSULIN LISPRO 4 UNITS: 100 INJECTION, SOLUTION INTRAVENOUS; SUBCUTANEOUS at 17:20

## 2024-03-14 RX ADMIN — HYDROCODONE BITARTRATE AND ACETAMINOPHEN 1 TABLET: 10; 325 TABLET ORAL at 09:17

## 2024-03-14 RX ADMIN — Medication 10 ML: at 09:09

## 2024-03-14 RX ADMIN — Medication 10 ML: at 21:48

## 2024-03-14 RX ADMIN — PREGABALIN 50 MG: 50 CAPSULE ORAL at 21:48

## 2024-03-14 RX ADMIN — ENOXAPARIN SODIUM 90 MG: 100 INJECTION SUBCUTANEOUS at 17:21

## 2024-03-14 RX ADMIN — POTASSIUM CHLORIDE 40 MEQ: 750 CAPSULE, EXTENDED RELEASE ORAL at 05:01

## 2024-03-14 RX ADMIN — LISINOPRIL 10 MG: 10 TABLET ORAL at 12:56

## 2024-03-14 RX ADMIN — ENOXAPARIN SODIUM 90 MG: 100 INJECTION SUBCUTANEOUS at 05:00

## 2024-03-14 RX ADMIN — FUROSEMIDE 40 MG: 40 TABLET ORAL at 09:08

## 2024-03-14 RX ADMIN — LISINOPRIL 5 MG: 5 TABLET ORAL at 09:08

## 2024-03-14 RX ADMIN — POTASSIUM CHLORIDE 40 MEQ: 750 CAPSULE, EXTENDED RELEASE ORAL at 09:08

## 2024-03-14 RX ADMIN — PREGABALIN 50 MG: 50 CAPSULE ORAL at 16:41

## 2024-03-14 RX ADMIN — INSULIN LISPRO 3 UNITS: 100 INJECTION, SOLUTION INTRAVENOUS; SUBCUTANEOUS at 12:56

## 2024-03-14 NOTE — NURSING NOTE
"RN to Pt room to discuss Pt BP. Pt voices anxiety and concern regarding /94. Pt and wife state that even prior to admission for 2 weeks BP had been \"Creeping Up\". Stated that when talking with MD outside hospital his MD was more concerned with water weight gain at that time. Pt voices concern over current BP now..  notified of current /94 mm/Hg. Pt is asymptomatic at this time. Orders to be placed to give additional Lisinopril 10 mg po x 1  and to increase daily dose to Lisinopril 20 mg q 24 hours.  "

## 2024-03-14 NOTE — PAYOR COMM NOTE
"3/14/24 [facility] [phone]  [phone]    3/14/24 FAXING DAILY CLINICAL CONT STAY        Garrett Coates (59 y.o. Male)       Date of Birth   1965    Social Security Number       Address   [facility] [address] [place] [address]    Home Phone   [phone]    MRN   [id number]       Amish   Oriental orthodox    Marital Status                               Admission Date   3/11/24    Admission Type   Emergency    Admitting Provider   Ba Cosme DO    Attending Provider   Ba Cosme DO    Department, Room/Bed   [address], 432/1       Discharge Date       Discharge Disposition       Discharge Destination                                 Attending Provider: Ba Cosme DO    Allergies: No Known Allergies    Isolation: None   Infection: None   Code Status: CPR    Ht: 177.8 cm (70\")   Wt: 92.9 kg (204 lb 12.8 oz)    Admission Cmt: None   Principal Problem: Hypervolemia [E87.70]                   Active Insurance as of 3/11/2024       Primary Coverage       Payor Plan Insurance Group Employer/Plan Group    [employer] ANTH PATHWAY HMO [id number]       Payor Plan Address Payor Plan Phone Number Payor Plan Fax Number Effective Dates    [address] [phone]  6/1/2022 - None Entered    [place] [address]         Subscriber Name Subscriber Birth Date Member ID       GARRETT COATES 1965 [id number]                     Emergency Contacts        (Rel.) Home Phone Work Phone Mobile Phone    Gwen Coates (Spouse) -- -- [phone]             [facility] Encounter Date/Time: 3/11/2024 [facility]   Hospital Account: [id number]    MRN: [id number]   Patient:  Garrett Coates   Contact Serial #: [id number]   SSN:          ENCOUNTER             Patient Class: Inpatient   Unit: [address] Service: Medicine     Bed: 432/1   Admitting Provider: Ba Cosme" DO VALERIA   Referring Physician:     Attending Provider: Ba Cosme DO   Adm Diagnosis: Hypervolemia [E87.70]               PATIENT             Name: Garrett Coates : 1965 (59 yrs)   Address: [address] [place] [address] Sex: Male   City: [place] [address]   County: [facility]   Marital Status:  Ethnicity: NOT                                                                         Race: WHITE   Primary Care Provider: Mehul Andersen MD Patients Phone: Home Phone: [phone]           EMERGENCY CONTACT   Contact Name Legal Guardian? Relationship to Patient Home Phone Work Phone Mobile Phone   1. Gwen Coates  2. *No Contact Specified* No    Spouse              [phone]      GUARANTOR             Guarantor: Garrett Coates     : 1965   Address: [address] [place] [address] Sex: [place] [address]     Relation to Patient: Self       Home Phone: [phone]   Guarantor ID: [id number]       Work Phone:     GUARANTOR EMPLOYER   Employer: [employer]         Status: FULL TIME   COVERAGE          PRIMARY INSURANCE   Payor: [employer] Plan: [employer] PATHWAY HMO   Group Number: [id number] Insurance Type: INDEMNITY   Subscriber Name: GARRETT COATES* Subscriber : 1965   Subscriber ID: [id number] Coverage Address: [facility] [id number]  [place] [address]   Pat. Rel. to Subscriber: Self Coverage Phone: [phone]   SECONDARY INSURANCE   Payor: N/A Plan: N/A   Group Number:   Insurance Type:     Subscriber Name:   Subscriber :     Subscriber ID:   Coverage Address:     Pat. Rel. to Subscriber:   Coverage Phone:        Contact Serial # [id number])         2024    Chart ID ([id number])         Rigo Kathleen MD   Physician  Cardiology     Progress Notes      Signed     Date of Service: 24  Creation Time: 24     Signed       Expand All Collapse All        LOS: 3 days   Patient Care Team:  Mehul Andersen MD as PCP  - General (Family Medicine)     Chief Complaint: Shortness of breath     Subjective     Garrett Rodriguez is a 59 y.o. male who is being seen in follow-up  Overnight no new issues or events  Resting comfortably  No chest pain  No palpitation  No presyncope  No syncope  No orthopnea  No paroxysmal nocturnal dyspnea  Diuresed well  Pedal edema markedly improved  Awaiting Lexiscan Cardiolite stress test ordered by Dr. Cosme        Telemetry: no malignant arrhythmia. No significant pauses.     Review of Systems   Constitutional: No chills   Has fatigue   No fever.   HENT: Negative.    Eyes: Negative.    Respiratory: Negative for cough,   No chest wall soreness,   Shortness of breath,   no wheezing, no stridor.    Cardiovascular: As above  Gastrointestinal: Negative for abdominal distention,  No abdominal pain,   No blood in stool,   No constipation,   No diarrhea,   No nausea   No vomiting.   Endocrine: Negative.    Genitourinary: Negative for difficulty urinating, dysuria, flank pain and hematuria.   Musculoskeletal: Negative.    Skin: Negative for rash and wound.   Allergic/Immunologic: Negative.    Neurological: Negative for dizziness, syncope, weakness,   No light-headedness  No  headaches.   Hematological: Does not bruise/bleed easily.   Psychiatric/Behavioral: Negative for agitation or behavioral problems,   No confusion,   the patient is  nervous/anxious.        History:   Medical History        Past Medical History:   Diagnosis Date    Chronic pain in right foot      Diabetes mellitus      Hypertension      PFO (patent foramen ovale)       RP at age 6         Surgical History         Past Surgical History:   Procedure Laterality Date    ANKLE FUSION Right 7/11/2023     Procedure: ANKLE ARTHRODESIS;  Surgeon: Shahbaz Reddy DPM;  Location: [facility];  Service: Podiatry;  Laterality: Right;    BACK SURGERY        CARDIAC SURGERY        EXTERNAL FIXATION ANKLE FRACTURE Right 7/11/2023     Procedure:  POSSIBLE EXTERNAL FIXATION, RIGHT ANKLE;  Surgeon: Shahbaz Reddy DPM;  Location:  [facility];  Service: Podiatry;  Laterality: Right;    HARDWARE REMOVAL Right 7/11/2023     Procedure: REMOVAL OF HARDWARE, DEEP; ANKLE ARTHRODESIS; SUBTALAR ARTHRODESIS; POSSIBLE EXTERNAL FIXATION, RIGHT ANKLE;  Surgeon: Shahbaz Reddy DPM;  Location:  [facility];  Service: Podiatry;  Laterality: Right;    PATENT FORAMEN OVALE CLOSURE        ROTATOR CUFF REPAIR Right      SUBTALAR ARTHRODESIS Right 7/11/2023     Procedure: SUBTALAR ARTHRODESIS;  Surgeon: Shahbaz Reddy DPM;  Location:  [facility];  Service: Podiatry;  Laterality: Right;         Social History   Social History           Socioeconomic History    Marital status:    Tobacco Use    Smoking status: Never    Smokeless tobacco: Never   Vaping Use    Vaping status: Never Used   Substance and Sexual Activity    Alcohol use: Never    Drug use: Never    Sexual activity: Defer         History reviewed. No pertinent family history.     Labs:  WBC       WBC   Date Value Ref Range Status   03/11/2024 9.12 3.40 - 10.80 10*3/mm3 Final      HGB       Hemoglobin   Date Value Ref Range Status   03/11/2024 8.6 (L) 13.0 - 17.7 g/dL Final      HCT       Hematocrit   Date Value Ref Range Status   03/11/2024 28.8 (L) 37.5 - 51.0 % Final      Platelets       Platelets   Date Value Ref Range Status   03/11/2024 268 140 - 450 10*3/mm3 Final      MCV       MCV   Date Value Ref Range Status   03/11/2024 89.2 79.0 - 97.0 fL Final                  Results from last 7 days   Lab Units 03/14/24  0337 03/13/24  0207 03/12/24  1557 03/12/24  0153 03/11/24  1147   SODIUM mmol/L 143 144 141   < > 143   POTASSIUM mmol/L 3.4* 3.7 3.8   < > 4.3   CHLORIDE mmol/L 103 102 101   < > 104   CO2 mmol/L 34.0* 35.0* 31.0*   < > 29.0   BUN mg/dL 29* 35* 38*   < > 44*   CREATININE mg/dL 1.15 1.25 1.31*   < > 1.34*   CALCIUM mg/dL 7.7* 8.5* 8.4*   < > 8.3*   BILIRUBIN mg/dL  --   --   --   --  0.5   ALK  "PHOS U/L  --   --   --   --  192*   ALT (SGPT) U/L  --   --   --   --  24   AST (SGOT) U/L  --   --   --   --  32   GLUCOSE mg/dL 85 94 226*   < > 146*    < > = values in this interval not displayed.            Lab Results   Component Value Date     CKTOTAL 179 05/13/2023     TROPONINT 121 (C) 03/11/2024      PT/INR:  No results found for: \"PROTIME\"/No results found for: \"INR\"     Imaging Results (Last 72 Hours)         Procedure Component Value Units Date/Time     US Venous Doppler Lower Extremity Bilateral (duplex) [[id number]] Collected: 03/12/24 1509       Updated: 03/12/24 1512     Narrative:       History: Swelling        Impression:       Impression: There is no evidence of deep venous thrombosis or  superficial thrombophlebitis of right or left lower extremities.     Comments: Bilateral lower extremity venous duplex exam was performed  using color Doppler flow, Doppler waveform analysis, and grayscale  imaging, with and without compression. There is no evidence of deep  venous thrombosis in the common femoral, superficial femoral, popliteal,  peroneal, anterior tibial, and posterior tibial veins bilaterally. No  thrombus is identified in the saphenofemoral junctions and greater  saphenous veins bilaterally.            This report was signed and finalized on 3/12/2024 3:09 PM by Dr. Ricardo Hawk MD.        NM Lung Scan Perfusion Particulate [[id number]] Collected: 03/12/24 1239       Updated: 03/12/24 1245     Narrative:       EXAMINATION: NM LUNG SCAN PERFUSION PARTICULATE- 3/12/2024 12:39 PM     HISTORY: suspect pulmonary embolis, negative CTA, pulmonary HTN;  E87.70-Fluid overload, unspecified; R18.8-Other ascites;  I48.91-Unspecified atrial fibrillation; R91.1-Solitary pulmonary nodule.     Dose: 5.4 mCi technetium 99m MAA intravenously.     REPORT: Perfusion-only scintigraphic images of the lungs were obtained  in the anterior, posterior and oblique dimensions following  administration of the " radiotracer.     COMPARISON: CT angio chest 3/11/2024.     Distribution of activity within the lungs is homogeneous, there are no  focal segmental or subsegmental defects.        Impression:       No evidence of pulmonary thromboembolic disease, low  probability perfusion lung scan.     This report was signed and finalized on 3/12/2024 12:42 PM by Dr. Brandin Seymour MD.        CT Angiogram Chest [[id number]] Collected: 03/11/24 1346       Updated: 03/11/24 1404     Narrative:       EXAMINATION: CT ANGIOGRAM CHEST-      3/11/2024 12:34 PM     HISTORY: sob     In order to have a CT radiation dose as low as reasonably achievable  Automated Exposure Control was utilized for adjustment of the mA and/or  KV according to patient size.     Total DLP = 254.25 mGy.cm     CT angiography of the chest performed after intravenous contrast  enhancement.     The images are acquired in axial plane and subsequent 2D reconstruction  in coronal and sagittal planes and 3D maximum intensity projection  reconstruction.     There is no previous similar study for comparison. The correlation made  with chest radiograph obtained earlier today.     There is normal opacification of the pulmonary arteries and branches  bilaterally. There are no filling defects in the opacified pulmonary  arterial bed.     RV/LV ratio is 40 years/48 which may suggest a mild right heart strain.     Atheromatous change of thoracic aorta seen. Moderate ectasia of the  sinus of Valsalva is noted which measures 4.1 cm. The ascending aorta  measures 3.5 cm in maximum diameter. No dissection.     Severe atheromatous changes of coronary arteries are noted. There is  evidence of prior CABG.     There is no evidence of mediastinal or hilar mass or lymphadenopathy.     Limited visualized soft tissue of the neck are unremarkable.     There is no axillary there are nonspecific moderately enlarged axillary  lymph nodes, left larger than the right. The largest lymph node in  the  left axilla, image #46 and series 6, measures 1.1 cm in short axis.     There are atelectatic changes in bilateral lower lobar posterior segment  consolidation with adjacent small bibasilar pleural effusion, left more  than the right.     There is no evidence of infiltrate.     There is a tiny nodule in the left lower lobe, image #109 and series 7,  measuring 3 mm in maximum dimension.     The central airway is patent. No intrinsic abnormality.     There is moderate circumferential thickening of the esophagus more  pronounced in the distal esophagus.     The limited visualized abdomen is unremarkable.     There is diffuse subcutaneous fat infiltration/edema suggesting fluid  overload/anasarca.     The images reviewed in bone window show no acute bony abnormality.  Chronic degenerative changes of the thoracic spine are seen.        Impression:       1. No evidence of pulmonary embolism. No aortic aneurysm or dissection.  2. Severe atheromatous changes of coronary arteries. A prior CABG.  3. Small bibasilar pleural effusion left more than the right.  Atelectatic changes in the lower lungs. No acute infiltrate.  4. Edema of the chest and abdominal wall suggesting fluid  overload/anasarca.  5. Nonspecific left axillary lymphadenopathy. The etiology and clinical  significance is not certain.  6. A 3 mm nodule in the left lower lobe may represent a small  noncalcified granuloma. However, since this is a baseline study, a  follow-up examination in 6 months is recommended to ensure stability or  resolution.                                         This report was signed and finalized on 3/11/2024 2:01 PM by Dr. Wendy Hicks MD.        XR Chest 1 View [[id number]] Collected: 03/11/24 1217       Updated: 03/11/24 1221     Narrative:       EXAM: XR CHEST 1 VW- 3/11/2024 11:05 AM     HISTORY: Shortness of breath.     COMPARISON: 5/13/2023.     TECHNIQUE: Single frontal radiograph of the chest was obtained.      FINDINGS:     Support Devices: None.     Cardiac and Mediastinal Silhouettes: Normal.     Lungs/Pleura: No focal consolidation. No sizable pleural effusion. No  visible pneumothorax.     Osseous structures: No acute osseous finding.     Other: None.        Impression:          No acute cardiopulmonary abnormality.           This report was signed and finalized on 3/11/2024 12:18 PM by Law Izquierdo.                      Objective  Allergies   No Known Allergies        Medication Review: Performed  Current Medications             Current Facility-Administered Medications   Medication Dose Route Frequency Provider Last Rate Last Admin    acetaminophen (TYLENOL) tablet 650 mg  650 mg Oral Q4H PRN Roz Salinas APRN         Or    acetaminophen (TYLENOL) 160 MG/5ML oral solution 650 mg  650 mg Oral Q4H PRN Roz Salinas APRN         Or    acetaminophen (TYLENOL) suppository 650 mg  650 mg Rectal Q4H PRN Roz Salinas APRN        sennosides-docusate (PERICOLACE) 8.6-50 MG per tablet 2 tablet  2 tablet Oral BID PRN Roz Salinas APRN         And    polyethylene glycol (MIRALAX) packet 17 g  17 g Oral Daily PRN Roz Salinas APRN         And    bisacodyl (DULCOLAX) EC tablet 5 mg  5 mg Oral Daily PRN Roz Salinas APRN         And    bisacodyl (DULCOLAX) suppository 10 mg  10 mg Rectal Daily PRN Roz Salinas APRN        dextrose (D50W) (25 g/50 mL) IV injection 25 g  25 g Intravenous Q15 Min PRN Roz Salinas APRN        dextrose (GLUTOSE) oral gel 15 g  15 g Oral Q15 Min PRN Roz Salinas APRN        Enoxaparin Sodium (LOVENOX) syringe 90 mg  1 mg/kg Subcutaneous Q12H Ba Cosme DO   90 mg at 03/14/24 0500    furosemide (LASIX) tablet 40 mg  40 mg Oral Daily Ba Cosme DO        glucagon (GLUCAGEN) injection 1 mg  1 mg Intramuscular Q15 Min PRN Roz Salinas APRN        HYDROcodone-acetaminophen (NORCO)  MG per tablet 1 tablet  1 tablet Oral  "Q8H PRN Roz Salinas APRN   1 tablet at 03/13/24 1840    Insulin Lispro (humaLOG) injection 2-7 Units  2-7 Units Subcutaneous 4x Daily AC & at Bedtime Roz Salinas APRN   3 Units at 03/13/24 2059    lisinopril (PRINIVIL,ZESTRIL) tablet 5 mg  5 mg Oral Q24H Roz Salinas APRN   5 mg at 03/13/24 0945    Magnesium Cardiology Dose Replacement - Follow Nurse / BPA Driven Protocol   Does not apply PRN Roz Salinas APRN        nitroglycerin (NITROSTAT) SL tablet 0.4 mg  0.4 mg Sublingual Q5 Min PRN Roz Salinas APRN        ondansetron ODT (ZOFRAN-ODT) disintegrating tablet 4 mg  4 mg Oral Q6H PRN Roz Salinas APRN         Or    ondansetron (ZOFRAN) injection 4 mg  4 mg Intravenous Q6H PRN Roz Salinas APRN        Pharmacy to Dose enoxaparin (LOVENOX)   Does not apply Continuous PRN Roz Salinas APRN        potassium chloride (MICRO-K/KLOR-CON) CR capsule  40 mEq Oral Q4H Ba Cosme DO   40 mEq at 03/14/24 0501    Potassium Replacement - Follow Nurse / BPA Driven Protocol   Does not apply PRN Roz Salinas APRN        pregabalin (LYRICA) capsule 50 mg  50 mg Oral TID Roz Salinas APRN   50 mg at 03/13/24 2059    sodium chloride 0.9 % flush 10 mL  10 mL Intravenous PRN Arlin Lyons PA        sodium chloride 0.9 % flush 10 mL  10 mL Intravenous Q12H Roz Salinas APRN   10 mL at 03/13/24 2100    sodium chloride 0.9 % flush 10 mL  10 mL Intravenous PRN Roz Salinas APRN        sodium chloride 0.9 % infusion 40 mL  40 mL Intravenous PRN Roz Salinas APRN                Vital Sign Min/Max for last 24 hours  Temp  Min: 97.4 °F (36.3 °C)  Max: 98.3 °F (36.8 °C)   BP  Min: 136/73  Max: 161/83   Pulse  Min: 71  Max: 82   Resp  Min: 16  Max: 18   SpO2  Min: 91 %  Max: 98 %   No data recorded   No data recorded      Flowsheet Rows       Flowsheet Row First Filed Value   Admission Height 177.8 cm (70\") Documented at 03/11/2024 1118 "   Admission Weight 102 kg (224 lb) Documented at 03/11/2024 1118                Results for orders placed during the hospital encounter of 03/11/24     Adult Transthoracic Echo Complete w/ Color, Spectral and Contrast if Necessary Per Protocol     Interpretation Summary    Left ventricular ejection fraction appears to be 56 - 60%.    Left ventricular wall thickness is consistent with mild concentric hypertrophy.    The left atrial cavity is moderately dilated.    Left atrial volume is severely increased.    Moderate pulmonary hypertension is present.    Apical RV free wall hypokinesis        Physical Exam:     General Appearance: Awake, alert, in no acute distress  Eyes: Pupils equal and reactive    Ears: Appear intact with no abnormalities noted  Nose: Nares normal, no drainage  Neck: supple, trachea midline, no carotid bruit and no JVD  Back: no kyphosis present,    Lungs: respirations regular, respirations even and respirations unlabored  Heart: normal S1, S2, 2/6 systolic murmur left sternal border  Abdomen: normal bowel sounds, no tenderness   Skin: no bleeding, bruising or rash  Extremities: no cyanosis, pitting edema [markedly improved]  Psychiatric/Behavioral: Negative for agitation, behavioral problems, confusion, the patient does  appear to be nervous/anxious.        Results Review:   I reviewed the patient's new clinical results.  I reviewed the patient's new imaging results and agree with the interpretation.  I reviewed the patient's other test results and agree with the interpretation  I personally viewed and interpreted the patient's EKG/Telemetry data     Discussed with patient  Updated patient regarding any new or relevant abnormalities on review of records or any new findings on physical exam.   Mentioned to patient about purpose of visit and desirable health short and long term goals and objectives.      Reviewed available prior notes, consults, prior visits, laboratory findings, radiology and  cardiology relevant reports.   Updated chart as applicable.   I have reviewed the patient's medical history in detail and updated the computerized patient record as relevant.                   Assessment & Plan    Hypervolemia    Type 2 diabetes mellitus with hyperglycemia, without long-term current use of insulin    HTN (hypertension)    Anasarca    Iron deficiency anemia    New onset a-fib    Scrotal edema    Weakness    Gait instability    Elevated brain natriuretic peptide (BNP) level    Pulmonary HTN        Plan     Results of echocardiogram, DVT study as well as VQ scan reviewed and discussed with him  Patient to be scheduled for Lexiscan Cardiolite stress test today after 12 PM which will be 48 hours after VQ scan  Further recommendation pending results of nuclear perfusion scan  Overall clinically improved  Continue monitoring kidney functions and electrolytes  Leg swelling is improved  He is diuresed well  Care plan discussed with him and he is agreeable   Telemetry  Deep vein thrombosis prophylaxis/precautions  Appropriate diet, fluid, sodium, caffeine, stimulants intake   Questions were encouraged, asked and answered to the patient's  understanding and satisfaction.  Compliance to diet and medications         Rigo Kathleen MD  03/14/24  07:06 CDT     EMR Dragon/Transcription was used to dictate part of this note                               Component  Ref Range & Units 03:37  (3/14/24) 1 d ago  (3/13/24) 1 d ago  (3/13/24) 1 d ago  (3/13/24) 1 d ago  (3/13/24) 1 d ago  (3/13/24) 2 d ago  (3/12/24)   Glucose  65 - 99 mg/dL 85 225 High  R,  High  R,  High  R,  R, CM 94 284 High  R, CM   BUN  6 - 20 mg/dL 29 High      35 High     Creatinine  0.76 - 1.27 mg/dL 1.15     1.25    Sodium  136 - 145 mmol/L 143     144    Potassium  3.5 - 5.2 mmol/L 3.4 Low      3.7    Chloride  98 - 107 mmol/L 103     102    CO2  22.0 - 29.0 mmol/L 34.0 High      35.0 High     Calcium  8.6 - 10.5 mg/dL 7.7 Low       8.5 Low     BUN/Creatinine Ratio  7.0 - 25.0 25.2 High               Reprint Order Requisition    Stress Test With Myocardial Perfusion One Day (Order #[id number]) on  3/14/24 TODAY       24 0850 -- -- -- -- -- room air --   03/14/24 0725 97.9 (36.6) 75 16 159/84 Lying room air 91   03/14/24 0341 97.5 (36.4) 71 16 136/73 Lying room air 91   03/13/24 2346 97.8 (36.6) 72 16 140/75 Lying room air 94   03/13/24                     Current Facility-Administered Medications   Medication Dose Route Frequency Provider Last Rate Last Admin    acetaminophen (TYLENOL) tablet 650 mg  650 mg Oral Q4H PRN Roz Salinas APRN        Or    acetaminophen (TYLENOL) 160 MG/5ML oral solution 650 mg  650 mg Oral Q4H PRN Roz Salinas APRN        Or    acetaminophen (TYLENOL) suppository 650 mg  650 mg Rectal Q4H PRN Roz Salinas APRN        sennosides-docusate (PERICOLACE) 8.6-50 MG per tablet 2 tablet  2 tablet Oral BID PRN Roz Salinas APRN        And    polyethylene glycol (MIRALAX) packet 17 g  17 g Oral Daily PRN Roz Salinas APRN        And    bisacodyl (DULCOLAX) EC tablet 5 mg  5 mg Oral Daily PRN Roz Salinas APRN        And    bisacodyl (DULCOLAX) suppository 10 mg  10 mg Rectal Daily PRN Roz Salinas APRN        dextrose (D50W) (25 g/50 mL) IV injection 25 g  25 g Intravenous Q15 Min PRN Roz Salinas APRN        dextrose (GLUTOSE) oral gel 15 g  15 g Oral Q15 Min PRN Roz Salinas APRN        Enoxaparin Sodium (LOVENOX) syringe 90 mg  1 mg/kg Subcutaneous Q12H Ba Cosme DO   90 mg at 03/14/24 0500    furosemide (LASIX) tablet 40 mg  40 mg Oral Daily Ba Cosme DO   40 mg at 03/14/24 0908    glucagon (GLUCAGEN) injection 1 mg  1 mg Intramuscular Q15 Min PRN Salinas, Roz D, APRN        HYDROcodone-acetaminophen (NORCO)  MG per tablet 1 tablet  1 tablet Oral Q8H PRN Roz Salinas, MEGHANN   1 tablet at 03/14/24 0917    Insulin Lispro (humaLOG)  injection 2-7 Units  2-7 Units Subcutaneous 4x Daily AC & at Bedtime Roz Salinas APRN   3 Units at 03/13/24 2059    lisinopril (PRINIVIL,ZESTRIL) tablet 5 mg  5 mg Oral Q24H Roz Salinas APRN   5 mg at 03/14/24 0908    Magnesium Cardiology Dose Replacement - Follow Nurse / BPA Driven Protocol   Does not apply PRN Roz Salinas APRN        nitroglycerin (NITROSTAT) SL tablet 0.4 mg  0.4 mg Sublingual Q5 Min PRN Roz Salinas APRN        ondansetron ODT (ZOFRAN-ODT) disintegrating tablet 4 mg  4 mg Oral Q6H PRN Roz Salinas APRN        Or    ondansetron (ZOFRAN) injection 4 mg  4 mg Intravenous Q6H PRN Roz Salinas APRN        Pharmacy to Dose enoxaparin (LOVENOX)   Does not apply Continuous PRN Roz Salinas APRN        Potassium Replacement - Follow Nurse / BPA Driven Protocol   Does not apply PRN Roz Salinas APRN        pregabalin (LYRICA) capsule 50 mg  50 mg Oral TID Roz Salinas APRN   50 mg at 03/14/24 0908    sodium chloride 0.9 % flush 10 mL  10 mL Intravenous PRN Arlin Lyons, PA        sodium chloride 0.9 % flush 10 mL  10 mL Intravenous Q12H Roz Salinas APRN   10 mL at 03/14/24 0909    sodium chloride 0.9 % flush 10 mL  10 mL Intravenous PRN Roz Salinas APRN        sodium chloride 0.9 % infusion 40 mL  40 mL Intravenous PRN Roz Salinas APRN

## 2024-03-14 NOTE — PROGRESS NOTES
LOS: 3 days   Patient Care Team:  Mehul Andersen MD as PCP - General (Family Medicine)    Chief Complaint: Shortness of breath     Subjective    Garrett Rodriguez is a 59 y.o. male who is being seen in follow-up  Overnight no new issues or events  Resting comfortably  No chest pain  No palpitation  No presyncope  No syncope  No orthopnea  No paroxysmal nocturnal dyspnea  Diuresed well  Pedal edema markedly improved  Awaiting Lexiscan Cardiolite stress test ordered by Dr. Cosme      Telemetry: no malignant arrhythmia. No significant pauses.    Review of Systems   Constitutional: No chills   Has fatigue   No fever.   HENT: Negative.    Eyes: Negative.    Respiratory: Negative for cough,   No chest wall soreness,   Shortness of breath,   no wheezing, no stridor.    Cardiovascular: As above  Gastrointestinal: Negative for abdominal distention,  No abdominal pain,   No blood in stool,   No constipation,   No diarrhea,   No nausea   No vomiting.   Endocrine: Negative.    Genitourinary: Negative for difficulty urinating, dysuria, flank pain and hematuria.   Musculoskeletal: Negative.    Skin: Negative for rash and wound.   Allergic/Immunologic: Negative.    Neurological: Negative for dizziness, syncope, weakness,   No light-headedness  No  headaches.   Hematological: Does not bruise/bleed easily.   Psychiatric/Behavioral: Negative for agitation or behavioral problems,   No confusion,   the patient is  nervous/anxious.       History:   Past Medical History:   Diagnosis Date    Chronic pain in right foot     Diabetes mellitus     Hypertension     PFO (patent foramen ovale)     RP at age 6     Past Surgical History:   Procedure Laterality Date    ANKLE FUSION Right 7/11/2023    Procedure: ANKLE ARTHRODESIS;  Surgeon: Shahbaz Reddy DPM;  Location: [facility];  Service: Podiatry;  Laterality: Right;    BACK SURGERY      CARDIAC SURGERY      EXTERNAL FIXATION ANKLE FRACTURE Right 7/11/2023    Procedure:  POSSIBLE EXTERNAL FIXATION, RIGHT ANKLE;  Surgeon: Shahbaz Reddy DPM;  Location:  [facility];  Service: Podiatry;  Laterality: Right;    HARDWARE REMOVAL Right 7/11/2023    Procedure: REMOVAL OF HARDWARE, DEEP; ANKLE ARTHRODESIS; SUBTALAR ARTHRODESIS; POSSIBLE EXTERNAL FIXATION, RIGHT ANKLE;  Surgeon: Shahbaz Reddy DPM;  Location:  [facility];  Service: Podiatry;  Laterality: Right;    PATENT FORAMEN OVALE CLOSURE      ROTATOR CUFF REPAIR Right     SUBTALAR ARTHRODESIS Right 7/11/2023    Procedure: SUBTALAR ARTHRODESIS;  Surgeon: Shahbaz Reddy DPM;  Location:  [facility];  Service: Podiatry;  Laterality: Right;     Social History     Socioeconomic History    Marital status:    Tobacco Use    Smoking status: Never    Smokeless tobacco: Never   Vaping Use    Vaping status: Never Used   Substance and Sexual Activity    Alcohol use: Never    Drug use: Never    Sexual activity: Defer     History reviewed. No pertinent family history.    Labs:  WBC WBC   Date Value Ref Range Status   03/11/2024 9.12 3.40 - 10.80 10*3/mm3 Final      HGB Hemoglobin   Date Value Ref Range Status   03/11/2024 8.6 (L) 13.0 - 17.7 g/dL Final      HCT Hematocrit   Date Value Ref Range Status   03/11/2024 28.8 (L) 37.5 - 51.0 % Final      Platelets Platelets   Date Value Ref Range Status   03/11/2024 268 140 - 450 10*3/mm3 Final      MCV MCV   Date Value Ref Range Status   03/11/2024 89.2 79.0 - 97.0 fL Final        Results from last 7 days   Lab Units 03/14/24  0337 03/13/24  0207 03/12/24  1557 03/12/24  0153 03/11/24  1147   SODIUM mmol/L 143 144 141   < > 143   POTASSIUM mmol/L 3.4* 3.7 3.8   < > 4.3   CHLORIDE mmol/L 103 102 101   < > 104   CO2 mmol/L 34.0* 35.0* 31.0*   < > 29.0   BUN mg/dL 29* 35* 38*   < > 44*   CREATININE mg/dL 1.15 1.25 1.31*   < > 1.34*   CALCIUM mg/dL 7.7* 8.5* 8.4*   < > 8.3*   BILIRUBIN mg/dL  --   --   --   --  0.5   ALK PHOS U/L  --   --   --   --  192*   ALT (SGPT) U/L  --   --   --   --  24   AST  "(SGOT) U/L  --   --   --   --  32   GLUCOSE mg/dL 85 94 226*   < > 146*    < > = values in this interval not displayed.     Lab Results   Component Value Date    CKTOTAL 179 05/13/2023    TROPONINT 121 (C) 03/11/2024     PT/INR:  No results found for: \"PROTIME\"/No results found for: \"INR\"    Imaging Results (Last 72 Hours)       Procedure Component Value Units Date/Time    US Venous Doppler Lower Extremity Bilateral (duplex) [[id number]] Collected: 03/12/24 1509     Updated: 03/12/24 1512    Narrative:      History: Swelling       Impression:      Impression: There is no evidence of deep venous thrombosis or  superficial thrombophlebitis of right or left lower extremities.     Comments: Bilateral lower extremity venous duplex exam was performed  using color Doppler flow, Doppler waveform analysis, and grayscale  imaging, with and without compression. There is no evidence of deep  venous thrombosis in the common femoral, superficial femoral, popliteal,  peroneal, anterior tibial, and posterior tibial veins bilaterally. No  thrombus is identified in the saphenofemoral junctions and greater  saphenous veins bilaterally.            This report was signed and finalized on 3/12/2024 3:09 PM by Dr. Ricardo Hawk MD.       NM Lung Scan Perfusion Particulate [[id number]] Collected: 03/12/24 1239     Updated: 03/12/24 1245    Narrative:      EXAMINATION: NM LUNG SCAN PERFUSION PARTICULATE- 3/12/2024 12:39 PM     HISTORY: suspect pulmonary embolis, negative CTA, pulmonary HTN;  E87.70-Fluid overload, unspecified; R18.8-Other ascites;  I48.91-Unspecified atrial fibrillation; R91.1-Solitary pulmonary nodule.     Dose: 5.4 mCi technetium 99m MAA intravenously.     REPORT: Perfusion-only scintigraphic images of the lungs were obtained  in the anterior, posterior and oblique dimensions following  administration of the radiotracer.     COMPARISON: CT angio chest 3/11/2024.     Distribution of activity within the lungs is " homogeneous, there are no  focal segmental or subsegmental defects.       Impression:      No evidence of pulmonary thromboembolic disease, low  probability perfusion lung scan.     This report was signed and finalized on 3/12/2024 12:42 PM by Dr. Brandin Seymour MD.       CT Angiogram Chest [[id number]] Collected: 03/11/24 1346     Updated: 03/11/24 1404    Narrative:      EXAMINATION: CT ANGIOGRAM CHEST-      3/11/2024 12:34 PM     HISTORY: sob     In order to have a CT radiation dose as low as reasonably achievable  Automated Exposure Control was utilized for adjustment of the mA and/or  KV according to patient size.     Total DLP = 254.25 mGy.cm     CT angiography of the chest performed after intravenous contrast  enhancement.     The images are acquired in axial plane and subsequent 2D reconstruction  in coronal and sagittal planes and 3D maximum intensity projection  reconstruction.     There is no previous similar study for comparison. The correlation made  with chest radiograph obtained earlier today.     There is normal opacification of the pulmonary arteries and branches  bilaterally. There are no filling defects in the opacified pulmonary  arterial bed.     RV/LV ratio is 40 years/48 which may suggest a mild right heart strain.     Atheromatous change of thoracic aorta seen. Moderate ectasia of the  sinus of Valsalva is noted which measures 4.1 cm. The ascending aorta  measures 3.5 cm in maximum diameter. No dissection.     Severe atheromatous changes of coronary arteries are noted. There is  evidence of prior CABG.     There is no evidence of mediastinal or hilar mass or lymphadenopathy.     Limited visualized soft tissue of the neck are unremarkable.     There is no axillary there are nonspecific moderately enlarged axillary  lymph nodes, left larger than the right. The largest lymph node in the  left axilla, image #46 and series 6, measures 1.1 cm in short axis.     There are atelectatic changes in  bilateral lower lobar posterior segment  consolidation with adjacent small bibasilar pleural effusion, left more  than the right.     There is no evidence of infiltrate.     There is a tiny nodule in the left lower lobe, image #109 and series 7,  measuring 3 mm in maximum dimension.     The central airway is patent. No intrinsic abnormality.     There is moderate circumferential thickening of the esophagus more  pronounced in the distal esophagus.     The limited visualized abdomen is unremarkable.     There is diffuse subcutaneous fat infiltration/edema suggesting fluid  overload/anasarca.     The images reviewed in bone window show no acute bony abnormality.  Chronic degenerative changes of the thoracic spine are seen.       Impression:      1. No evidence of pulmonary embolism. No aortic aneurysm or dissection.  2. Severe atheromatous changes of coronary arteries. A prior CABG.  3. Small bibasilar pleural effusion left more than the right.  Atelectatic changes in the lower lungs. No acute infiltrate.  4. Edema of the chest and abdominal wall suggesting fluid  overload/anasarca.  5. Nonspecific left axillary lymphadenopathy. The etiology and clinical  significance is not certain.  6. A 3 mm nodule in the left lower lobe may represent a small  noncalcified granuloma. However, since this is a baseline study, a  follow-up examination in 6 months is recommended to ensure stability or  resolution.                                         This report was signed and finalized on 3/11/2024 2:01 PM by Dr. Wendy Hicks MD.       XR Chest 1 View [[id number]] Collected: 03/11/24 1217     Updated: 03/11/24 1221    Narrative:      EXAM: XR CHEST 1 VW- 3/11/2024 11:05 AM     HISTORY: Shortness of breath.     COMPARISON: 5/13/2023.     TECHNIQUE: Single frontal radiograph of the chest was obtained.     FINDINGS:     Support Devices: None.     Cardiac and Mediastinal Silhouettes: Normal.     Lungs/Pleura: No focal  consolidation. No sizable pleural effusion. No  visible pneumothorax.     Osseous structures: No acute osseous finding.     Other: None.       Impression:         No acute cardiopulmonary abnormality.           This report was signed and finalized on 3/11/2024 12:18 PM by Law Izquierdo.               Objective     No Known Allergies    Medication Review: Performed  Current Facility-Administered Medications   Medication Dose Route Frequency Provider Last Rate Last Admin    acetaminophen (TYLENOL) tablet 650 mg  650 mg Oral Q4H PRN Roz Salinas APRN        Or    acetaminophen (TYLENOL) 160 MG/5ML oral solution 650 mg  650 mg Oral Q4H PRN Roz Salinas APRN        Or    acetaminophen (TYLENOL) suppository 650 mg  650 mg Rectal Q4H PRN Roz Salinas APRN        sennosides-docusate (PERICOLACE) 8.6-50 MG per tablet 2 tablet  2 tablet Oral BID PRN Roz Salinas APRN        And    polyethylene glycol (MIRALAX) packet 17 g  17 g Oral Daily PRN Roz Salinas APRN        And    bisacodyl (DULCOLAX) EC tablet 5 mg  5 mg Oral Daily PRN Roz Salinas APRN        And    bisacodyl (DULCOLAX) suppository 10 mg  10 mg Rectal Daily PRN Roz Salinas APRN        dextrose (D50W) (25 g/50 mL) IV injection 25 g  25 g Intravenous Q15 Min PRN Roz Salinas APRN        dextrose (GLUTOSE) oral gel 15 g  15 g Oral Q15 Min PRN Roz Salinas APRN        Enoxaparin Sodium (LOVENOX) syringe 90 mg  1 mg/kg Subcutaneous Q12H Ba Cosme DO   90 mg at 03/14/24 0500    furosemide (LASIX) tablet 40 mg  40 mg Oral Daily Ba Cosme DO        glucagon (GLUCAGEN) injection 1 mg  1 mg Intramuscular Q15 Min PRN Roz Salinas APRN        HYDROcodone-acetaminophen (NORCO)  MG per tablet 1 tablet  1 tablet Oral Q8H PRN Roz Salinas APRN   1 tablet at 03/13/24 1840    Insulin Lispro (humaLOG) injection 2-7 Units  2-7 Units Subcutaneous 4x Daily AC & at Bedtime Roz Salinas  "APRN   3 Units at 03/13/24 2059    lisinopril (PRINIVIL,ZESTRIL) tablet 5 mg  5 mg Oral Q24H Roz Salinas APRN   5 mg at 03/13/24 0945    Magnesium Cardiology Dose Replacement - Follow Nurse / BPA Driven Protocol   Does not apply PRN Roz Salinas APRN        nitroglycerin (NITROSTAT) SL tablet 0.4 mg  0.4 mg Sublingual Q5 Min PRN Roz Salinas APRN        ondansetron ODT (ZOFRAN-ODT) disintegrating tablet 4 mg  4 mg Oral Q6H PRN Roz Salinas APRN        Or    ondansetron (ZOFRAN) injection 4 mg  4 mg Intravenous Q6H PRN Roz Salinas APRN        Pharmacy to Dose enoxaparin (LOVENOX)   Does not apply Continuous PRN Roz Salinas APRN        potassium chloride (MICRO-K/KLOR-CON) CR capsule  40 mEq Oral Q4H Ba Cosme DO   40 mEq at 03/14/24 0501    Potassium Replacement - Follow Nurse / BPA Driven Protocol   Does not apply PRN Roz Salinas APRN        pregabalin (LYRICA) capsule 50 mg  50 mg Oral TID Roz Salinas APRN   50 mg at 03/13/24 2059    sodium chloride 0.9 % flush 10 mL  10 mL Intravenous PRN Arlin Lyons PA        sodium chloride 0.9 % flush 10 mL  10 mL Intravenous Q12H Roz Salinas APRN   10 mL at 03/13/24 2100    sodium chloride 0.9 % flush 10 mL  10 mL Intravenous PRN Roz Salinas APRN        sodium chloride 0.9 % infusion 40 mL  40 mL Intravenous PRN Roz Salinas APRN           Vital Sign Min/Max for last 24 hours  Temp  Min: 97.4 °F (36.3 °C)  Max: 98.3 °F (36.8 °C)   BP  Min: 136/73  Max: 161/83   Pulse  Min: 71  Max: 82   Resp  Min: 16  Max: 18   SpO2  Min: 91 %  Max: 98 %   No data recorded   No data recorded     Flowsheet Rows      Flowsheet Row First Filed Value   Admission Height 177.8 cm (70\") Documented at 03/11/2024 1118   Admission Weight 102 kg (224 lb) Documented at 03/11/2024 1118            Results for orders placed during the hospital encounter of 03/11/24    Adult Transthoracic Echo Complete w/ Color, " Spectral and Contrast if Necessary Per Protocol    Interpretation Summary    Left ventricular ejection fraction appears to be 56 - 60%.    Left ventricular wall thickness is consistent with mild concentric hypertrophy.    The left atrial cavity is moderately dilated.    Left atrial volume is severely increased.    Moderate pulmonary hypertension is present.    Apical RV free wall hypokinesis      Physical Exam:    General Appearance: Awake, alert, in no acute distress  Eyes: Pupils equal and reactive    Ears: Appear intact with no abnormalities noted  Nose: Nares normal, no drainage  Neck: supple, trachea midline, no carotid bruit and no JVD  Back: no kyphosis present,    Lungs: respirations regular, respirations even and respirations unlabored  Heart: normal S1, S2, 2/6 systolic murmur left sternal border  Abdomen: normal bowel sounds, no tenderness   Skin: no bleeding, bruising or rash  Extremities: no cyanosis, pitting edema [markedly improved]  Psychiatric/Behavioral: Negative for agitation, behavioral problems, confusion, the patient does  appear to be nervous/anxious.       Results Review:   I reviewed the patient's new clinical results.  I reviewed the patient's new imaging results and agree with the interpretation.  I reviewed the patient's other test results and agree with the interpretation  I personally viewed and interpreted the patient's EKG/Telemetry data    Discussed with patient  Updated patient regarding any new or relevant abnormalities on review of records or any new findings on physical exam.   Mentioned to patient about purpose of visit and desirable health short and long term goals and objectives.     Reviewed available prior notes, consults, prior visits, laboratory findings, radiology and cardiology relevant reports.   Updated chart as applicable.   I have reviewed the patient's medical history in detail and updated the computerized patient record as relevant.          Assessment & Plan        Hypervolemia    Type 2 diabetes mellitus with hyperglycemia, without long-term current use of insulin    HTN (hypertension)    Anasarca    Iron deficiency anemia    New onset a-fib    Scrotal edema    Weakness    Gait instability    Elevated brain natriuretic peptide (BNP) level    Pulmonary HTN      Plan    Results of echocardiogram, DVT study as well as VQ scan reviewed and discussed with him  Patient to be scheduled for Lexiscan Cardiolite stress test today after 12 PM which will be 48 hours after VQ scan  Further recommendation pending results of nuclear perfusion scan  Overall clinically improved  Continue monitoring kidney functions and electrolytes  Leg swelling is improved  He is diuresed well  Care plan discussed with him and he is agreeable   Telemetry  Deep vein thrombosis prophylaxis/precautions  Appropriate diet, fluid, sodium, caffeine, stimulants intake   Questions were encouraged, asked and answered to the patient's  understanding and satisfaction.  Compliance to diet and medications       Rigo Kathleen MD  03/14/24  07:06 CDT    EMR Dragon/Transcription was used to dictate part of this note

## 2024-03-14 NOTE — PLAN OF CARE
Goal Outcome Evaluation:  Plan of Care Reviewed With: patient, spouse           Outcome Evaluation: Pt cont to diurese with Furosemide po. Bumex drip stopped. Pt ambulating to restroom for voidiing. Weight today 204 lbs down from 213 lbs 3/12. Pt HR sinus 74-85 BPM. BP elevated today. Changes made to Lisinopril today for hypertension. Pt reports decreased SOB compared to prior days.

## 2024-03-14 NOTE — PROGRESS NOTES
[facility] Medicine Services  INPATIENT PROGRESS NOTE    Patient Name: Garrett Rodriguez  Date of Admission: 3/11/2024  Today's Date: 03/14/24  Length of Stay: 3  Primary Care Physician: Mehul Andersen MD    Subjective   Chief Complaint: Feeling better  HPI     Lower extremity edema continues to improve.  The patient has had 10,700 cc out greater than in since admission.  He has been switched to oral Lasix this morning and continues to maintain adequate diuresis.  Stress test is planned for tomorrow.  The first segment of the test has been completed and the remainder will be completed tomorrow.  Renal function continues to improve.  If no intervention is required from a cardiac standpoint, the patient could be discharged as early as tomorrow afternoon.    Review of Systems   All pertinent negatives and positives are as above. All other systems have been reviewed and are negative unless otherwise stated.     Objective    Temp:  [97.5 °F (36.4 °C)-98.3 °F (36.8 °C)] 98.1 °F (36.7 °C)  Heart Rate:  [] 100  Resp:  [16-18] 18  BP: (136-163)/(73-94) 163/90  Physical Exam  Constitutional:       Appearance: Normal appearance. He is normal weight.   HENT:      Head: Normocephalic and atraumatic.      Right Ear: External ear normal.      Left Ear: External ear normal.      Nose: Nose normal.      Mouth: Mucous membranes are moist.      Pharynx: Oropharynx is clear.   Eyes:      General: No scleral icterus.     Conjunctiva/sclera: Conjunctivae normal.   Cardiovascular:      Rate and Rhythm: Normal rate and regular rhythm.      Pulses: Normal pulses.      Heart sounds: Normal heart sounds. No murmur heard.  Pulmonary:      Effort: Pulmonary effort is normal.      Breath sounds: Decreased breath sounds present. No rhonchi or rales.   Abdominal:      General: Bowel sounds are normal.      Palpations: Abdomen is soft. There is no mass.      Tenderness: There is no abdominal  "tenderness.      Comments: No abdominal wall edema is resolved  Musculoskeletal:         General: Swelling present. Normal range of motion.      Right lower leg: Edema (1/4) present.      Left lower leg: Edema (1/4) present.   Skin:     General: Skin is warm and dry.      Coloration: Skin is not pale.   Neurological:      General: No focal deficit present.      Mental Status: He is alert and oriented to person, place, and time. Mental status is at baseline.   Psychiatric:         Mood and Affect: Mood normal.         Judgment: Judgment normal.     Results Review:  I have reviewed the labs, radiology results, and diagnostic studies.    Laboratory Data:   Results from last 7 days   Lab Units 03/11/24  1147   WBC 10*3/mm3 9.12   HEMOGLOBIN g/dL 8.6*   HEMATOCRIT % 28.8*   PLATELETS 10*3/mm3 268        Results from last 7 days   Lab Units 03/14/24  1727 03/14/24  0337 03/13/24  0207 03/12/24  1557 03/12/24  0153 03/11/24  1147   SODIUM mmol/L  --  143 144 141   < > 143   POTASSIUM mmol/L 4.7 3.4* 3.7 3.8   < > 4.3   CHLORIDE mmol/L  --  103 102 101   < > 104   CO2 mmol/L  --  34.0* 35.0* 31.0*   < > 29.0   BUN mg/dL  --  29* 35* 38*   < > 44*   CREATININE mg/dL  --  1.15 1.25 1.31*   < > 1.34*   CALCIUM mg/dL  --  7.7* 8.5* 8.4*   < > 8.3*   BILIRUBIN mg/dL  --   --   --   --   --  0.5   ALK PHOS U/L  --   --   --   --   --  192*   ALT (SGPT) U/L  --   --   --   --   --  24   AST (SGOT) U/L  --   --   --   --   --  32   GLUCOSE mg/dL  --  85 94 226*   < > 146*    < > = values in this interval not displayed.       Culture Data:   No results found for: \"BLOODCX\", \"URINECX\", \"WOUNDCX\", \"MRSACX\", \"RESPCX\", \"STOOLCX\"    Radiology Data:   Imaging Results (Last 24 Hours)       ** No results found for the last 24 hours. **            I have reviewed the patient's current medications.     Assessment/Plan   Assessment  Active Hospital Problems    Diagnosis     **Hypervolemia     Pulmonary HTN     Anasarca     Iron deficiency " anemia     New onset a-fib     Scrotal edema     Weakness     Gait instability     Elevated brain natriuretic peptide (BNP) level     Type 2 diabetes mellitus with hyperglycemia, without long-term current use of insulin     HTN (hypertension)        Treatment Plan  Lasix 40 mg p.o. daily  Next drip DC'd this a.m.  Remainder of nuclear stress test tomorrow    Medical Decision Making  Number and Complexity of problems:   Pulmonary hypertension, chronic, high complexity  Anasarca, chronic, high complexity  Atrial fibrillation, chronic, moderate complexity  T2DM, chronic, moderate complexity  Essential hypertension, chronic complexity     Differential Diagnosis: None others considered     Conditions and Status        Condition is improving.     [facility] Data  External documents reviewed:.  Care Everywhere documentation  Cardiac tracing (EKG, telemetry) interpretation: See HPI  Radiology interpretation: See HPI  Labs reviewed: See HPI  Any tests that were considered but not ordered: None     Decision rules/scores evaluated (example [id number], Wells, etc): [id number]     Discussed with: The patient and his wife     Care Planning  Shared decision making: The patient and his wife  Code status and discussions: Full code     Disposition  Social Determinants of Health that impact treatment or disposition: None noted  I expect the patient to be discharged to home in 1-2 days.     Electronically signed by Ba Cosme DO, 03/14/24, 17:52 CDT.

## 2024-03-15 ENCOUNTER — APPOINTMENT (OUTPATIENT)
Dept: CARDIOLOGY | Facility: HOSPITAL | Age: 59
DRG: 641 | End: 2024-03-15
Payer: COMMERCIAL

## 2024-03-15 VITALS
HEIGHT: 70 IN | RESPIRATION RATE: 16 BRPM | SYSTOLIC BLOOD PRESSURE: 154 MMHG | OXYGEN SATURATION: 93 % | BODY MASS INDEX: 28.66 KG/M2 | WEIGHT: 200.2 LBS | TEMPERATURE: 98.9 F | DIASTOLIC BLOOD PRESSURE: 78 MMHG | HEART RATE: 85 BPM

## 2024-03-15 PROBLEM — I48.0 PAF (PAROXYSMAL ATRIAL FIBRILLATION): Status: ACTIVE | Noted: 2024-03-11

## 2024-03-15 LAB
ANION GAP SERPL CALCULATED.3IONS-SCNC: 7 MMOL/L (ref 5–15)
BH CV NUCLEAR PRIOR STUDY: 3
BH CV REST NUCLEAR ISOTOPE DOSE: 11.4 MCI
BH CV STRESS BP STAGE 1: NORMAL
BH CV STRESS COMMENTS STAGE 1: NORMAL
BH CV STRESS DOSE REGADENOSON STAGE 1: 0.4
BH CV STRESS DURATION MIN STAGE 1: 0
BH CV STRESS DURATION SEC STAGE 1: 10
BH CV STRESS HR STAGE 1: 95
BH CV STRESS NUCLEAR ISOTOPE DOSE: 35.9 MCI
BH CV STRESS PROTOCOL 1: NORMAL
BH CV STRESS RECOVERY BP: NORMAL MMHG
BH CV STRESS RECOVERY HR: 94 BPM
BH CV STRESS STAGE 1: 1
BUN SERPL-MCNC: 30 MG/DL (ref 6–20)
BUN/CREAT SERPL: 27.8 (ref 7–25)
CALCIUM SPEC-SCNC: 8.1 MG/DL (ref 8.6–10.5)
CHLORIDE SERPL-SCNC: 107 MMOL/L (ref 98–107)
CO2 SERPL-SCNC: 30 MMOL/L (ref 22–29)
CREAT SERPL-MCNC: 1.08 MG/DL (ref 0.76–1.27)
DEPRECATED RDW RBC AUTO: 54.8 FL (ref 37–54)
EGFRCR SERPLBLD CKD-EPI 2021: 79.5 ML/MIN/1.73
ERYTHROCYTE [DISTWIDTH] IN BLOOD BY AUTOMATED COUNT: 17.1 % (ref 12.3–15.4)
GLUCOSE BLDC GLUCOMTR-MCNC: 200 MG/DL (ref 70–130)
GLUCOSE BLDC GLUCOMTR-MCNC: 215 MG/DL (ref 70–130)
GLUCOSE SERPL-MCNC: 149 MG/DL (ref 65–99)
HCT VFR BLD AUTO: 28.5 % (ref 37.5–51)
HGB BLD-MCNC: 8.6 G/DL (ref 13–17.7)
LV EF NUC BP: 54 %
MAXIMAL PREDICTED HEART RATE: 162 BPM
MCH RBC QN AUTO: 26.5 PG (ref 26.6–33)
MCHC RBC AUTO-ENTMCNC: 30.2 G/DL (ref 31.5–35.7)
MCV RBC AUTO: 88 FL (ref 79–97)
PERCENT MAX PREDICTED HR: 58.64 %
PLATELET # BLD AUTO: 253 10*3/MM3 (ref 140–450)
PMV BLD AUTO: 10.3 FL (ref 6–12)
POTASSIUM SERPL-SCNC: 4.4 MMOL/L (ref 3.5–5.2)
RBC # BLD AUTO: 3.24 10*6/MM3 (ref 4.14–5.8)
SODIUM SERPL-SCNC: 144 MMOL/L (ref 136–145)
STRESS BASELINE BP: NORMAL MMHG
STRESS BASELINE HR: 98 BPM
STRESS PERCENT HR: 69 %
STRESS POST EXERCISE DUR MIN: 0 MIN
STRESS POST EXERCISE DUR SEC: 10 SEC
STRESS POST PEAK BP: NORMAL MMHG
STRESS POST PEAK HR: 95 BPM
STRESS TARGET HR: 138 BPM
WBC NRBC COR # BLD AUTO: 9.63 10*3/MM3 (ref 3.4–10.8)

## 2024-03-15 PROCEDURE — 78452 HT MUSCLE IMAGE SPECT MULT: CPT

## 2024-03-15 PROCEDURE — 80048 BASIC METABOLIC PNL TOTAL CA: CPT | Performed by: FAMILY MEDICINE

## 2024-03-15 PROCEDURE — 0 TECHNETIUM TETROFOSMIN KIT: Performed by: FAMILY MEDICINE

## 2024-03-15 PROCEDURE — 78452 HT MUSCLE IMAGE SPECT MULT: CPT | Performed by: INTERNAL MEDICINE

## 2024-03-15 PROCEDURE — 25010000002 REGADENOSON 0.4 MG/5ML SOLUTION: Performed by: INTERNAL MEDICINE

## 2024-03-15 PROCEDURE — 63710000001 INSULIN LISPRO (HUMAN) PER 5 UNITS: Performed by: NURSE PRACTITIONER

## 2024-03-15 PROCEDURE — 99232 SBSQ HOSP IP/OBS MODERATE 35: CPT | Performed by: NURSE PRACTITIONER

## 2024-03-15 PROCEDURE — 93017 CV STRESS TEST TRACING ONLY: CPT

## 2024-03-15 PROCEDURE — 25010000002 ENOXAPARIN PER 10 MG: Performed by: FAMILY MEDICINE

## 2024-03-15 PROCEDURE — 82948 REAGENT STRIP/BLOOD GLUCOSE: CPT

## 2024-03-15 PROCEDURE — 85027 COMPLETE CBC AUTOMATED: CPT | Performed by: FAMILY MEDICINE

## 2024-03-15 PROCEDURE — 93018 CV STRESS TEST I&R ONLY: CPT | Performed by: INTERNAL MEDICINE

## 2024-03-15 PROCEDURE — A9502 TC99M TETROFOSMIN: HCPCS | Performed by: FAMILY MEDICINE

## 2024-03-15 RX ORDER — LISINOPRIL 20 MG/1
20 TABLET ORAL
Qty: 30 TABLET | Refills: 2 | Status: SHIPPED | OUTPATIENT
Start: 2024-03-16

## 2024-03-15 RX ORDER — REGADENOSON 0.08 MG/ML
0.4 INJECTION, SOLUTION INTRAVENOUS ONCE
Status: COMPLETED | OUTPATIENT
Start: 2024-03-15 | End: 2024-03-15

## 2024-03-15 RX ORDER — FUROSEMIDE 40 MG/1
40 TABLET ORAL DAILY
Qty: 50 TABLET | Refills: 2 | Status: SHIPPED | OUTPATIENT
Start: 2024-03-16

## 2024-03-15 RX ADMIN — ENOXAPARIN SODIUM 90 MG: 100 INJECTION SUBCUTANEOUS at 06:14

## 2024-03-15 RX ADMIN — FUROSEMIDE 40 MG: 40 TABLET ORAL at 09:32

## 2024-03-15 RX ADMIN — REGADENOSON 0.4 MG: 0.08 INJECTION, SOLUTION INTRAVENOUS at 07:54

## 2024-03-15 RX ADMIN — HYDROCODONE BITARTRATE AND ACETAMINOPHEN 1 TABLET: 10; 325 TABLET ORAL at 09:36

## 2024-03-15 RX ADMIN — PREGABALIN 50 MG: 50 CAPSULE ORAL at 09:34

## 2024-03-15 RX ADMIN — LISINOPRIL 20 MG: 20 TABLET ORAL at 09:32

## 2024-03-15 RX ADMIN — PREGABALIN 50 MG: 50 CAPSULE ORAL at 17:22

## 2024-03-15 RX ADMIN — Medication 10 ML: at 09:32

## 2024-03-15 RX ADMIN — TETROFOSMIN 1 DOSE: 1.38 INJECTION, POWDER, LYOPHILIZED, FOR SOLUTION INTRAVENOUS at 07:00

## 2024-03-15 RX ADMIN — INSULIN LISPRO 3 UNITS: 100 INJECTION, SOLUTION INTRAVENOUS; SUBCUTANEOUS at 17:22

## 2024-03-15 RX ADMIN — TETROFOSMIN 1 DOSE: 1.38 INJECTION, POWDER, LYOPHILIZED, FOR SOLUTION INTRAVENOUS at 09:04

## 2024-03-15 NOTE — PLAN OF CARE
Goal Outcome Evaluation: Pt is alert and oriented, no c/o pain, vitals are stable, pt ambulates steadily, no s/s of distress, pt has orders to discharge home           Progress: improving

## 2024-03-15 NOTE — PLAN OF CARE
Goal Outcome Evaluation: Pt is alert and oriented, no c/o pain, pt is currently resting, vitals are stable, room air, IV is clean dry and intact, awaiting Heena can restults           Progress: improving

## 2024-03-15 NOTE — DISCHARGE SUMMARY
[facility] Medicine Services  DISCHARGE SUMMARY       Date of Admission: 3/11/2024  Date of Discharge:  3/15/2024  Primary Care Physician: Mehul Andersen MD    Discharge Diagnoses:  Active Hospital Problems    Diagnosis     **Hypervolemia     Pulmonary HTN     Anasarca     Iron deficiency anemia     PAF (paroxysmal atrial fibrillation) new onset     Scrotal edema     Weakness     Gait instability     Elevated brain natriuretic peptide (BNP) level     Type 2 diabetes mellitus with hyperglycemia, without long-term current use of insulin     HTN (hypertension)          Presenting Problem/History of Present Illness:  Hypervolemia [E87.70]     Chief Complaint on Day of Discharge:   New complaint    History of Present Illness on Day of Discharge:   The patient is doing well today.  Diuresis has been over 11 L since admission.  He continues in normal sinus rhythm and had only 1 episode of atrial fibrillation at the time of admission.  He was placed on anticoagulation at discharge which will be discussed with cardiology at the time of discharge and recheck.  Pressure was elevated today and lisinopril was increased.  He is appropriate for discharge today as Lexiscan was negative for ischemia.  Cardiology indicates that the patient is to weigh on a daily basis and should take an extra Lasix each day for a greater than 2 pound weight gain overnight or 4 pounds in 2 days or any evolution of shortness of breath.  Is to follow-up with cardiology in 1 to 2 weeks.    Hospital Course  Garrett Rodriguez is a 59-year-old male with a history of chronic pain in the right foot, diabetes mellitus insulin dependence, hypertension, repair of PFO in childhood.  Ankle fusion in 7/2023 with chronic pain.  Patient presented today after an outpatient appointment with Dr. Reddy for reevaluation of his right ankle.  Apparently the PA taking care of him saw his edema and immediately told him to present to the  ED.    Patient states that for approximately 6 weeks he has noticed increased swelling which began in his right lower extremity then to his left lower extremity progressing to his scrotum and then abdomen and now up to the middle of his flank.  Patient states in addition to the weight he has had increasing shortness of breath, orthopnea, and chest tightness.  He states that for the last weeks he has had to use a walker because of extreme shortness of breath.  Patient states he has gained approximately 50 to 60 pounds over this period.  The patient initially went to his PCP Dr. Andersen, approximately 2 weeks ago.  Who prescribed him Lasix 40 mg p.o. twice daily with minimal results.  The patient states he lost approximately 10 pounds with the Lasix but no significant change in his shortness of breath.  Patient denies any palpitations, pressure, diaphoresis, nausea or vomiting, cough, respiratory issues, or dizziness.  He is currently scheduled for a (cardiac evaluation) March 18 as well as a colonoscopy on March 13.  On admission to the ED patient presented in A-fib without RVR, patient additionally states no history of arrhythmia to his knowledge.  ETA revealed severe atheromatous changes of coronary artery, small bibasilar pleural effusions L>R, with significant edema of the chest and abdominal wall suggesting anasarca, nonspecific left axillary lymphadenopathy.     Upon assessment patient is sitting up in bed with his wife, able to communicate without shortness of breath.  He is hypertensive at 159/87, oxygen saturation of 94% on room air, and heart rate of 80 with a rhythm of atrial fibrillation.  Patient has 4+ edema from the middle aspect of bilateral flank all the way down to the bottom of his feet.  No weeping present, chronic wound to the right ankle which is healed, Doppler pulse present.  Initiated patient on Bumex drip 0.5 mg/hour, with stat echocardiogram if possible.  The other patient complaint is a  chronic headache which he has had since his LASEK surgery  approximately 1 months ago.  Patient was told to notify staff if the headache became worse otherwise for him to follow-up with his ophthalmologist postdischarge.  These are to be admitted while patient is being held in the ED, cardiology and Heart failure navigator consult initiated.  Patient will be for further evaluation and treatment.  Treatment Plan  1.The patient will be admitted to Dr. Cosme's service here at .   2.  Hypervolemia/probable heart failure-heart failure pathway initiated, stat echocardiogram, Bumex IV 0.5 mg/hr, cardiology consult.  Daily weights, strict I/O. ReDs vest ordred  3.  Iron deficiency anemia-anemia studies initiated, occult blood stool if possible, patient's iron level was 20 and a saturation of 6, replaced iron, monitoring of daily CBC.  4.  Anasarca/scrotal edema-close monitoring of urine output, elevation of scrotum and bilateral lower extremities at all times.  Monitor for skin breakdown.  5.  Weakness/gait instability-up with assistance, PT/ OT consult, patient to wear right ankle brace at all times when ambulating.  6.  TARSHA-strict intake and output, electrolyte protocol in place, daily BMP.  7.  Diabetes mellitus type 2 without insulin dependence-A1c in process, sliding scale action insulin initiated, metformin on hold.  Accu-Cheks before meals and at bedtime.  8.  Primary hypertension-resumption of home lisinopril, vital signs every 4.  9.  New onset atrial fibrillation-currently patient is rate controlled and has been since admission.  As needed EKG for any change or transition to RVR.  Nursing to contact MD for any elevation in heart rate.  Lovenox full dose per pharmacy dosing due to TARSHA.  10.  Home medications reviewed and resumed as appropriate.  11.  Labs in a.m.     On the morning after admission, the patient's shortness of breath was significantly improved.  Lower extremity edema had  improved as well as body wall edema.  6100 cc out greater than in after the first day.  Echocardiogram revealed pulmonary hypertension with no evidence of left heart failure and no diastolic dysfunction.  Untreated sleep apnea has got to be high in the differential regarding because of severe pulmonary hypertension.  I have recommended an outpatient sleep study and he will discuss this with his PCP.  CT angiogram of the chest showed no evidence of pulmonary embolus and no evidence of pulmonary fibrosis.  Bilateral venous Doppler showed no DVT.  Renal function improved with diuresis.  The patient had a 48-hour washout period post VQ scan and was scheduled for nuclear stress test.  Stress test has been completed and is low risk for ischemia.  Cardiology has seen and evaluated the patient and agrees that the patient is stable for discharge home after aggressive diuresis.  The patient is to continue Lasix him to double the dose if he has a 2 pound weight gain overnight, a 4 pound weight gain over 2-day or if he becomes short of breath with PND or orthopnea.  He is to follow-up with his PCP next week and with cardiology in 1-2 weeks.        Consults:   Cardiology:  Assessment & Plan    Hypervolemia    Type 2 diabetes mellitus with hyperglycemia, without long-term current use of insulin    HTN (hypertension)    Anasarca    Iron deficiency anemia    New onset a-fib    Scrotal edema    Weakness    Gait instability    TARSHA (acute kidney injury)    Elevated brain natriuretic peptide (BNP) level  2+ lower extremity pitting edema     Plan     Care plan discussed with patient along with the results of echocardiogram  Recommend VQ scan  In future will require nuclear perfusion scan which can be done 2 days after VQ scan  Monitor kidney functions  Currently on diuresis and close attention to renal function and electrolytes monitor  Telemetry  Deep vein thrombosis prophylaxis/precautions  Appropriate diet, fluid, sodium, caffeine,  "stimulants intake   Questions were encouraged, asked and answered to the patient's  understanding and satisfaction.  Compliance to diet and medications         Rigo Kathleen MD      Result Review    Result Review:  I have personally reviewed the results from the time of this admission to 3/15/2024 18:55 CDT and agree with these findings:  []  Laboratory  []  Microbiology  []  Radiology  []  EKG/Telemetry   []  Cardiology/Vascular   []  Pathology  []  Old records  []  Other:    Condition on Discharge:    Stable and at baseline    Physical Exam on Discharge:  /78 (BP Location: Left arm, Patient Position: Sitting)   Pulse 85   Temp 98.9 °F (37.2 °C) (Oral)   Resp 16   Ht 177.8 cm (70\")   Wt 90.8 kg (200 lb 3.2 oz)   SpO2 93%   BMI 28.73 kg/m²   Physical Exam     Constitutional:       Appearance: Normal appearance. He is normal weight.   HENT:      Head: Normocephalic and atraumatic.      Right Ear: External ear normal.      Left Ear: External ear normal.      Nose: Nose normal.      Mouth: Mucous membranes are moist.      Pharynx: Oropharynx is clear.   Eyes:      General: No scleral icterus.     Conjunctiva/sclera: Conjunctivae normal.   Cardiovascular:      Rate and Rhythm: Normal rate and regular rhythm.      Pulses: Normal pulses.      Heart sounds: Normal heart sounds. No murmur heard.  Pulmonary:      Effort: Pulmonary effort is normal.      Breath sounds: Decreased breath sounds present. No rhonchi or rales.   Abdominal:      General: Bowel sounds are normal.      Palpations: Abdomen is soft. There is no mass.      Tenderness: There is no abdominal tenderness.      Comments: No abdominal wall edema is resolved  Musculoskeletal:         General: Swelling present. Normal range of motion.      Right lower leg: Edema (1/4) present.      Left lower leg: Edema (1/4) present.   Skin:     General: Skin is warm and dry.      Coloration: Skin is not pale.   Neurological:      General: No focal deficit present. "      Mental Status: He is alert and oriented to person, place, and time. Mental status is at baseline.   Psychiatric:         Mood and Affect: Mood normal.         Judgment: Judgment normal.       Discharge Disposition:  Home or Self Care    Discharge Medications:     Discharge Medications        New Medications        Instructions Start Date   apixaban 5 MG tablet tablet  Commonly known as: ELIQUIS   5 mg, Oral, 2 Times Daily             Changes to Medications        Instructions Start Date   furosemide 40 MG tablet  Commonly known as: LASIX  What changed: additional instructions   40 mg, Oral, Daily, Take an extra dose for any 2-3 pound weight gain   Start Date: March 16, 2024     lisinopril 20 MG tablet  Commonly known as: PRINIVIL,ZESTRIL  What changed:   medication strength  how much to take   20 mg, Oral, Every 24 Hours Scheduled   Start Date: March 16, 2024            Continue These Medications        Instructions Start Date   HYDROcodone-acetaminophen  MG per tablet  Commonly known as: NORCO   1 tablet, Oral, Every 8 Hours PRN      metFORMIN 1000 MG tablet  Commonly known as: GLUCOPHAGE   1,000 mg, Oral, 2 Times Daily With Meals      naloxone 4 MG/0.1ML nasal spray  Commonly known as: NARCAN   Call 911. Don't prime. [place] in 1 nostril for overdose. Repeat in 2-3 minutes in other nostril if no or minimal breathing/responsiveness.      pregabalin 100 MG capsule  Commonly known as: LYRICA   100 mg, Oral, 3 Times Daily               Discharge Diet:   Diet Instructions       Diet: Diabetic Diets, Cardiac Diets; Healthy Heart (2-3 Na+); Thin (IDDSI 0); Consistent Carbohydrate      Discharge Diet:  Diabetic Diets  Cardiac Diets       Cardiac Diet: Healthy Heart (2-3 Na+)    Fluid Consistency: Thin (IDDSI 0)    Diabetic Diet: Consistent Carbohydrate            Discharge Care Plan / Instructions:   Discharge home    Activity at Discharge:   Activity Instructions       Activity as Tolerated               Follow-up Appointments:  Follow-up with PCP next week  Follow-up with cardiology in 1-2 weeks    Electronically signed by Ba Cosme DO, 03/15/24, 18:55 CDT.    Time: Discharge over 30 min    Part of this note may be an electronic transcription/translation of spoken language to printed text using the Dragon Dictation system.

## 2024-03-15 NOTE — PAYOR COMM NOTE
"3/15/24 [facility] [phone]  [phone]      3/15/24 FAXING DAILY CLINICAL FOR DATE 3/15/24 CONT STAY.          JenniferGarrett (59 y.o. Male)       Date of Birth   1965    Social Security Number       Address   [facility] [address] [place] [address]    Home Phone   [phone]    MRN   [id number]       Yazdanism   Yazdanism    Marital Status                               Admission Date   3/11/24    Admission Type   Emergency    Admitting Provider   Ba Cosme DO    Attending Provider   Ba Csome DO    Department, Room/Bed   [address], 432/1       Discharge Date       Discharge Disposition       Discharge Destination                                 Attending Provider: Ba Cosme DO    Allergies: No Known Allergies    Isolation: None   Infection: None   Code Status: CPR    Ht: 177.8 cm (70\")   Wt: 90.8 kg (200 lb 3.2 oz)    Admission Cmt: None   Principal Problem: Hypervolemia [E87.70]                   Active Insurance as of 3/11/2024       Primary Coverage       Payor Plan Insurance Group Employer/Plan Group    [employer]OSS AN[employer]M PATHWAY HMO [id number]       Payor Plan Address Payor Plan Phone Number Payor Plan Fax Number Effective Dates    [address] [phone]  6/1/2022 - None Entered    [place] [address]         Subscriber Name Subscriber Birth Date Member ID       GARRETT COATES 1965 [id number]                     Emergency Contacts        (Rel.) Home Phone Work Phone Mobile Phone    JenniferGwen (Spouse) -- -- [phone]             [facility] Encounter Date/Time: 3/11/2024 [facility]   Hospital Account: [id number]    MRN: [id number]   Patient:  Garrett Coates   Contact Serial #: [id number]   SSN:          ENCOUNTER             Patient Class: Inpatient   Unit: [address] Service: Medicine     Bed: 432/1   Admitting " Provider: Ba Cosme DO   Referring Physician:     Attending Provider: Ba Cosme DO   Adm Diagnosis: Hypervolemia [E87.70]               PATIENT             Name: Garrett Coates : 1965 (59 yrs)   Address: [address] [place] [address] Sex: Male   City: [place] [id number]   County: [facility]   Marital Status:  Ethnicity: NOT                                                                         Race: WHITE   Primary Care Provider: Mehul Andersen MD Patients Phone: Home Phone: [phone]           EMERGENCY CONTACT   Contact Name Legal Guardian? Relationship to Patient Home Phone Work Phone Mobile Phone   1. JenniferAvery greenfieldmy  2. *No Contact Specified* No    Spouse              [phone]      GUARANTOR             Guarantor: Garrett Coates     : 1965   Address: [address] [place] [address] Sex: Male     [place] [phone]     Relation to Patient: Self       Home Phone: [phone]   Guarantor ID: [id number]       Work Phone:     GUARANTOR EMPLOYER   Employer: [employer]         Status: FULL TIME   COVERAGE          PRIMARY INSURANCE   Payor: LISY BLUE CROSS Plan: ANTHEM PATHWAY HMO   Group Number: [id number] Insurance Type: INDEMNITY   Subscriber Name: GARRETT COATES AN* Subscriber : 1965   Subscriber ID: [id number] Coverage Address: [facility] [id number]  [place] [address]   Pat. Rel. to Subscriber: Self Coverage Phone: [phone]   SECONDARY INSURANCE   Payor: N/A Plan: N/A   Group Number:   Insurance Type:     Subscriber Name:   Subscriber :     Subscriber ID:   Coverage Address:     Pat. Rel. to Subscriber:   Coverage Phone:        Contact Serial # ([id number])         March 15, 2024    Chart ID ([id number])            Dina Bowens APRN   Nurse Practitioner  Cardiology     Progress Notes      Cosign Needed     Date of Service: 03/15/24 0957  Creation Time: 03/15/24 0957     Cosign Needed       Expand All Collapse All    [facility] -  Progress Note      LOS: 4 days   Patient Care Team:  Mehul Andersen MD as PCP - General (Family Medicine)     Chief Complaint: follow up volume overload         Subjective  Interval History:      Patient Complaints: The patient is resting comfortably.  He states his shortness of breath has resolved.  He denies ever having any chest pain.  Swelling has improved.  He is now on oral diuretics.  He is net negative over 11 L this admission.     He is maintaining normal sinus rhythm on telemetry with heart rates in the 90s.     His systolic blood pressure is elevated today at 170.  Subsequently, lisinopril dose has been increased by admitting team.     Per review of telemetry strips and EKGs, I am not convinced that the patient has had atrial fibrillation.     He is anemic but denies signs of bleeding.     He tells me he had a \"hole in his heart\" repaired at the age of 6 at [facility] in [place] and quit following with cardiology at the age of 19 years old.     Review of Systems:      Review of Systems   Constitutional:  Negative for diaphoresis, fatigue, fever and unexpected weight change.   HENT:  Negative for nosebleeds.    Respiratory:  Negative for apnea, cough, chest tightness, shortness of breath and wheezing.    Cardiovascular:  Positive for leg swelling. Negative for chest pain and palpitations.   Gastrointestinal:  Negative for abdominal distention, nausea and vomiting.   Genitourinary:  Negative for hematuria.   Musculoskeletal:  Negative for gait problem.   Skin:  Negative for color change.   Neurological:  Negative for dizziness, syncope, weakness and light-headedness.            Objective[]Expand by Default  Vital Sign Min/Max for last 24 hours  Temp  Min: 98.1 °F (36.7 °C)  Max: 100.2 °F (37.9 °C)   BP  Min: 133/101  Max: 170/86   Pulse  Min: 80  Max: 100   Resp  Min: 16  Max: 18   SpO2  Min: 92 %  Max: 98 %   No data recorded   Weight  Min: 90.8 kg (200 lb 3.2 oz)  Max: " 92.4 kg (203 lb 12.8 oz)      Vitals             03/15/24  0920   Weight: 90.8 kg (200 lb 3.2 oz)            Physical Exam:     Vitals and nursing note reviewed.   Constitutional:       General: Not in acute distress.     Appearance: Well-developed and not in distress. Not diaphoretic.   Neck:      Vascular: No JVD.   Pulmonary:      Effort: Pulmonary effort is normal. No respiratory distress.      Breath sounds: Normal breath sounds.   Cardiovascular:      Normal rate. Regular rhythm.      Murmurs: There is a grade 3/6 systolic murmur.   Edema:     Peripheral edema absent.   Abdominal:      Tenderness: There is no abdominal tenderness.   Skin:     General: Skin is warm and dry.   Neurological:      Mental Status: Alert and oriented to person, place, and time.         Results Review:   Lab Results (last 72 hours)         Procedure Component Value Units Date/Time     Basic Metabolic Panel [[id number]]  (Abnormal) Collected: 03/15/24 0308     Specimen: Blood Updated: 03/15/24 0335       Glucose 149 mg/dL         BUN 30 mg/dL         Creatinine 1.08 mg/dL         Sodium 144 mmol/L         Potassium 4.4 mmol/L         Chloride 107 mmol/L         CO2 30.0 mmol/L         Calcium 8.1 mg/dL         BUN/Creatinine Ratio 27.8       Anion Gap 7.0 mmol/L         eGFR 79.5 mL/min/1.73       Narrative:       GFR Normal >60  Chronic Kidney Disease <60  Kidney Failure <15        CBC (No Diff) [[id number]]  (Abnormal) Collected: 03/15/24 0308     Specimen: Blood Updated: 03/15/24 0320       WBC 9.63 10*3/mm3         RBC 3.24 10*6/mm3         Hemoglobin 8.6 g/dL         Hematocrit 28.5 %         MCV 88.0 fL         MCH 26.5 pg         MCHC 30.2 g/dL         RDW 17.1 %         RDW-SD 54.8 fl         MPV 10.3 fL         Platelets 253 10*3/mm3       POC Glucose Once [[id number]]  (Abnormal) Collected: 03/14/24 1928     Specimen: Blood Updated: 03/14/24 1939       Glucose 201 mg/dL         Comment: : [id number] Ankur PatlinMeter ID:  [id number]        Potassium [[id number]]  (Normal) Collected: 03/14/24 1727     Specimen: Blood Updated: 03/14/24 1748       Potassium 4.7 mmol/L         Comment: Slight hemolysis detected by analyzer. Result may be falsely elevated.        POC Glucose Once [[id number]]  (Abnormal) Collected: 03/14/24 1642     Specimen: Blood Updated: 03/14/24 1712       Glucose 302 mg/dL         Comment: : [id number] Vadim LisaMeter ID: [id number]        POC Glucose Once [[id number]]  (Abnormal) Collected: 03/14/24 1111     Specimen: Blood Updated: 03/14/24 1141       Glucose 207 mg/dL         Comment: : [id number] Vadim LisaMeter ID: [id number]        POC Glucose Once [[id number]]  (Normal) Collected: 03/14/24 0720     Specimen: Blood Updated: 03/14/24 0754       Glucose 108 mg/dL         Comment: : [id number] Vadim LisaMeter ID: [id number]        Basic Metabolic Panel [[id number]]  (Abnormal) Collected: 03/14/24 0337     Specimen: Blood Updated: 03/14/24 0425       Glucose 85 mg/dL         BUN 29 mg/dL         Creatinine 1.15 mg/dL         Sodium 143 mmol/L         Potassium 3.4 mmol/L         Chloride 103 mmol/L         CO2 34.0 mmol/L         Calcium 7.7 mg/dL         BUN/Creatinine Ratio 25.2       Anion Gap 6.0 mmol/L         eGFR 73.8 mL/min/1.73       Narrative:       GFR Normal >60  Chronic Kidney Disease <60  Kidney Failure <15        POC Glucose Once [[id number]]  (Abnormal) Collected: 03/13/24 2012     Specimen: Blood Updated: 03/13/24 2036       Glucose 225 mg/dL         Comment: : [id number] Ozzie CarlgieMeter ID: [id number]        POC Glucose Once [[id number]]  (Abnormal) Collected: 03/13/24 1648     Specimen: Blood Updated: 03/13/24 1700       Glucose 210 mg/dL         Comment: : [id number] Vadim LisaMeter ID: [id number]        POC Glucose Once [[id number]]  (Abnormal) Collected: 03/13/24 1131     Specimen: Blood Updated: 03/13/24 1201       Glucose 212 mg/dL         Comment: : [id number] Vadim  "LisaMeter ID: [id number]        POC Glucose Once [[id number]]  (Normal) Collected: 03/13/24 0728     Specimen: Blood Updated: 03/13/24 0809       Glucose 115 mg/dL         Comment: : [id number] Vadim LisaMeter ID: [id number]        Basic Metabolic Panel [[id number]]  (Abnormal) Collected: 03/13/24 0207     Specimen: Blood Updated: 03/13/24 0248       Glucose 94 mg/dL         BUN 35 mg/dL         Creatinine 1.25 mg/dL         Sodium 144 mmol/L         Potassium 3.7 mmol/L         Chloride 102 mmol/L         CO2 35.0 mmol/L         Calcium 8.5 mg/dL         BUN/Creatinine Ratio 28.0       Anion Gap 7.0 mmol/L         eGFR 66.7 mL/min/1.73       Narrative:       GFR Normal >60  Chronic Kidney Disease <60  Kidney Failure <15        POC Glucose Once [[id number]]  (Abnormal) Collected: 03/12/24 2002     Specimen: Blood Updated: 03/12/24 2013       Glucose 284 mg/dL         Comment: : [id number] Anju [facility] Psychiatric CenterekaMeter ID: [id number]        POC Glucose Once [[id number]]  (Abnormal) Collected: 03/12/24 1702     Specimen: Blood Updated: 03/12/24 1715       Glucose 290 mg/dL         Comment: : [id number] Gary Andres AnnMeter ID: [id number]        Basic Metabolic Panel [[id number]]  (Abnormal) Collected: 03/12/24 1557     Specimen: Blood Updated: 03/12/24 1643       Glucose 226 mg/dL         BUN 38 mg/dL         Creatinine 1.31 mg/dL         Sodium 141 mmol/L         Potassium 3.8 mmol/L         Chloride 101 mmol/L         CO2 31.0 mmol/L         Calcium 8.4 mg/dL         BUN/Creatinine Ratio 29.0       Anion Gap 9.0 mmol/L         eGFR 63.1 mL/min/1.73       Narrative:       GFR Normal >60  Chronic Kidney Disease <60  Kidney Failure <15        POC Glucose Once [[id number]]  (Abnormal) Collected: 03/12/24 1051     Specimen: Blood Updated: 03/12/24 1103       Glucose 246 mg/dL         Comment: : [id number] Gary Andres AnnMeter ID: [id number]                      Echo EF Estimated  No results found for: \"ECHOEFEST\"      " "  Cath Ejection Fraction Quantitative  No results found for: \"CATHEF\"           Medication Review: yes  Current Medications             Current Facility-Administered Medications   Medication Dose Route Frequency Provider Last Rate Last Admin    acetaminophen (TYLENOL) tablet 650 mg  650 mg Oral Q4H PRN Roz Salinas APRN         Or    acetaminophen (TYLENOL) 160 MG/5ML oral solution 650 mg  650 mg Oral Q4H PRN Roz Salinas APRN         Or    acetaminophen (TYLENOL) suppository 650 mg  650 mg Rectal Q4H PRN Roz Salinas APRN        sennosides-docusate (PERICOLACE) 8.6-50 MG per tablet 2 tablet  2 tablet Oral BID PRN Roz Salinas APRN         And    polyethylene glycol (MIRALAX) packet 17 g  17 g Oral Daily PRN Roz Salinas APRN         And    bisacodyl (DULCOLAX) EC tablet 5 mg  5 mg Oral Daily PRN Roz Salinas APRN         And    bisacodyl (DULCOLAX) suppository 10 mg  10 mg Rectal Daily PRN Roz Salinas APRN        dextrose (D50W) (25 g/50 mL) IV injection 25 g  25 g Intravenous Q15 Min PRN Roz Salinas APRN        dextrose (GLUTOSE) oral gel 15 g  15 g Oral Q15 Min PRN Roz Salinas APRN        Enoxaparin Sodium (LOVENOX) syringe 90 mg  1 mg/kg Subcutaneous Q12H Ba Cosme DO   90 mg at 03/15/24 0614    furosemide (LASIX) tablet 40 mg  40 mg Oral Daily Ba Cosme DO   40 mg at 03/15/24 0932    glucagon (GLUCAGEN) injection 1 mg  1 mg Intramuscular Q15 Min PRN Roz Salinas APRN        HYDROcodone-acetaminophen (NORCO)  MG per tablet 1 tablet  1 tablet Oral Q8H PRN Roz Salinas APRN   1 tablet at 03/15/24 0936    Insulin Lispro (humaLOG) injection 2-7 Units  2-7 Units Subcutaneous 4x Daily AC & at Bedtime Roz Salinas APRN   4 Units at 03/14/24 1720    lisinopril (PRINIVIL,ZESTRIL) tablet 20 mg  20 mg Oral Q24H Ba Cosme DO   20 mg at 03/15/24 0932    Magnesium Cardiology Dose Replacement - Follow Nurse / BPA " Driven Protocol   Does not apply PRN Roz Salinas APRN        nitroglycerin (NITROSTAT) SL tablet 0.4 mg  0.4 mg Sublingual Q5 Min PRN Roz Salinas APRN        ondansetron ODT (ZOFRAN-ODT) disintegrating tablet 4 mg  4 mg Oral Q6H PRN Roz Salinas APRN         Or    ondansetron (ZOFRAN) injection 4 mg  4 mg Intravenous Q6H PRN Roz Salinas APRN        Pharmacy to Dose enoxaparin (LOVENOX)   Does not apply Continuous PRN Roz Salinas APRN        Potassium Replacement - Follow Nurse / BPA Driven Protocol   Does not apply PRN Roz Salinas APRN        pregabalin (LYRICA) capsule 50 mg  50 mg Oral TID Roz Salinas APRN   50 mg at 03/15/24 0934    sodium chloride 0.9 % flush 10 mL  10 mL Intravenous PRN Arlin Lyons, PA        sodium chloride 0.9 % flush 10 mL  10 mL Intravenous Q12H Roz Salinas APRN   10 mL at 03/15/24 0932    sodium chloride 0.9 % infusion 40 mL  40 mL Intravenous PRN Roz Salinas APRN                         Assessment & Plan  1.  Volume overload: Improved with diuresis.  Dyspnea resolved.  Now on oral Lasix.  We discussed daily weights, heart healthy low-sodium diet and signs to watch for that would indicate he may need to take an additional dose of Lasix-weight gain of greater than 2 pounds overnight or greater than 4 pounds in 2 days or shortness of breath/orthopnea/PND.  He has a normal LVEF.  Follow-up with MEGHANN Corona 1 to 2 weeks     Lexiscan has been completed today presumably due to dyspnea, volume overload, elevated troponins.  He states he never had any chest pain.  We are awaiting the results of this.     2.  Anemia-no signs of bleeding  3.  Diabetes mellitus type 2: A1c 6.7  4.  Congenital heart disease: Prior to outpatient follow-up we will try to obtain records regarding his surgery at the age of 6 at [facility] in [place].  5.  Hypertension: Agree with increasing lisinopril for better blood pressure control      There has been some concern that he was in rate controlled atrial fibrillation earlier this admission.  He has remained in normal sinus rhythm since arrival to .  I do see some P waves on his EKGs.  To assist with more definitive diagnosis consider 14-day Zio patch at discharge.     Follow-up Dr. Kathleen or MEGHANN Corona in 1 to 2 weeks     MEGHANN Salmon  03/15/24  09:58 CDT                  Kay Crenshaw, RN   Registered Nurse     Plan of Care      Signed     Date of Service: 03/15/24 0319  Creation Time: 03/15/24 0319     Signed         Goal Outcome Evaluation: no co pain.  Tele on, sr, no ectopy noted.  Cont to have mild to mod edema to bialt le and abd.  Voids without difficulty.                   5/24 0920 98.9 (37.2) 94 18 170/86 Sitting room air 96   03/15/24 0752 -- 93 -- 133/101 Abnormal  -- -- --   03/15/24 0737 -- -- -- -- -- room air --   03/15/24 0525 98.6 (37) 88 18 166/93 Lying room air 92   03/15/24 0029 100.2 (37.9) 93 18 149/77 Lying room air 93   03/14                    Current Facility-Administered Medications   Medication Dose Route Frequency Provider Last Rate Last Admin    acetaminophen (TYLENOL) tablet 650 mg  650 mg Oral Q4H PRN Roz Salinas APRN        Or    acetaminophen (TYLENOL) 160 MG/5ML oral solution 650 mg  650 mg Oral Q4H PRN Roz Salinas APRN        Or    acetaminophen (TYLENOL) suppository 650 mg  650 mg Rectal Q4H PRN Roz Salinas APRN        sennosides-docusate (PERICOLACE) 8.6-50 MG per tablet 2 tablet  2 tablet Oral BID PRN Roz Salinas APRN        And    polyethylene glycol (MIRALAX) packet 17 g  17 g Oral Daily PRN Roz Salinas APRN        And    bisacodyl (DULCOLAX) EC tablet 5 mg  5 mg Oral Daily PRN Roz Salinas APRN        And    bisacodyl (DULCOLAX) suppository 10 mg  10 mg Rectal Daily PRN Roz Salinas APRN        dextrose (D50W) (25 g/50 mL) IV injection 25 g  25 g Intravenous Q15 Min PRN Roz Salinas,  MEGHANN        dextrose (GLUTOSE) oral gel 15 g  15 g Oral Q15 Min PRN Roz Salinas APRN        Enoxaparin Sodium (LOVENOX) syringe 90 mg  1 mg/kg Subcutaneous Q12H Ba Cosme, DO   90 mg at 03/15/24 0614    furosemide (LASIX) tablet 40 mg  40 mg Oral Daily Ba Cosme, DO   40 mg at 03/15/24 0932    glucagon (GLUCAGEN) injection 1 mg  1 mg Intramuscular Q15 Min PRN Roz Salinas APRN        HYDROcodone-acetaminophen (NORCO)  MG per tablet 1 tablet  1 tablet Oral Q8H PRN Roz Salinas APRN   1 tablet at 03/15/24 0936    Insulin Lispro (humaLOG) injection 2-7 Units  2-7 Units Subcutaneous 4x Daily AC & at Bedtime Roz Salinas APRN   4 Units at 03/14/24 1720    lisinopril (PRINIVIL,ZESTRIL) tablet 20 mg  20 mg Oral Q24H Ba Cosme, DO   20 mg at 03/15/24 0932    Magnesium Cardiology Dose Replacement - Follow Nurse / BPA Driven Protocol   Does not apply PRN Roz Salinas APRN        nitroglycerin (NITROSTAT) SL tablet 0.4 mg  0.4 mg Sublingual Q5 Min PRN Roz Salinas APRN        ondansetron ODT (ZOFRAN-ODT) disintegrating tablet 4 mg  4 mg Oral Q6H PRN Roz Salinas APRN        Or    ondansetron (ZOFRAN) injection 4 mg  4 mg Intravenous Q6H PRN Roz Salinas APRN        Pharmacy to Dose enoxaparin (LOVENOX)   Does not apply Continuous PRN Roz Salinas APRN        Potassium Replacement - Follow Nurse / BPA Driven Protocol   Does not apply PRN Roz Salinas APRN        pregabalin (LYRICA) capsule 50 mg  50 mg Oral TID Roz Salinas APRN   50 mg at 03/15/24 0934    sodium chloride 0.9 % flush 10 mL  10 mL Intravenous PRN Arlin Lyons PA        sodium chloride 0.9 % flush 10 mL  10 mL Intravenous Q12H Roz Salinas APRN   10 mL at 03/15/24 0932    sodium chloride 0.9 % infusion 40 mL  40 mL Intravenous PRN Roz Salinas, APRN

## 2024-03-15 NOTE — PLAN OF CARE
Goal Outcome Evaluation: no co pain.  Tele on, sr, no ectopy noted.  Cont to have mild to mod edema to bialt le and abd.  Voids without difficulty.

## 2024-03-15 NOTE — PROGRESS NOTES
"[facility] -  Progress Note     LOS: 4 days   Patient Care Team:  Mehul Andersen MD as PCP - General (Family Medicine)    Chief Complaint: follow up volume overload     Subjective     Interval History:     Patient Complaints: The patient is resting comfortably.  He states his shortness of breath has resolved.  He denies ever having any chest pain.  Swelling has improved.  He is now on oral diuretics.  He is net negative over 11 L this admission.    He is maintaining normal sinus rhythm on telemetry with heart rates in the 90s.    His systolic blood pressure is elevated today at 170.  Subsequently, lisinopril dose has been increased by admitting team.    Per review of telemetry strips and EKGs, I am not convinced that the patient has had atrial fibrillation.    He is anemic but denies signs of bleeding.    He tells me he had a \"hole in his heart\" repaired at the age of 6 at [facility] in [place] and quit following with cardiology at the age of 19 years old.    Review of Systems:     Review of Systems   Constitutional:  Negative for diaphoresis, fatigue, fever and unexpected weight change.   HENT:  Negative for nosebleeds.    Respiratory:  Negative for apnea, cough, chest tightness, shortness of breath and wheezing.    Cardiovascular:  Positive for leg swelling. Negative for chest pain and palpitations.   Gastrointestinal:  Negative for abdominal distention, nausea and vomiting.   Genitourinary:  Negative for hematuria.   Musculoskeletal:  Negative for gait problem.   Skin:  Negative for color change.   Neurological:  Negative for dizziness, syncope, weakness and light-headedness.     Objective     Vital Sign Min/Max for last 24 hours  Temp  Min: 98.1 °F (36.7 °C)  Max: 100.2 °F (37.9 °C)   BP  Min: 133/101  Max: 170/86   Pulse  Min: 80  Max: 100   Resp  Min: 16  Max: 18   SpO2  Min: 92 %  Max: 98 %   No data recorded   Weight  Min: 90.8 kg (200 lb 3.2 oz)  Max: 92.4 kg (203 lb 12.8 oz)      "    03/15/24  0920   Weight: 90.8 kg (200 lb 3.2 oz)       Physical Exam:    Vitals and nursing note reviewed.   Constitutional:       General: Not in acute distress.     Appearance: Well-developed and not in distress. Not diaphoretic.   Neck:      Vascular: No JVD.   Pulmonary:      Effort: Pulmonary effort is normal. No respiratory distress.      Breath sounds: Normal breath sounds.   Cardiovascular:      Normal rate. Regular rhythm.      Murmurs: There is a grade 3/6 systolic murmur.   Edema:     Peripheral edema absent.   Abdominal:      Tenderness: There is no abdominal tenderness.   Skin:     General: Skin is warm and dry.   Neurological:      Mental Status: Alert and oriented to person, place, and time.       Results Review:   Lab Results (last 72 hours)       Procedure Component Value Units Date/Time    Basic Metabolic Panel [[id number]]  (Abnormal) Collected: 03/15/24 0308    Specimen: Blood Updated: 03/15/24 0335     Glucose 149 mg/dL      BUN 30 mg/dL      Creatinine 1.08 mg/dL      Sodium 144 mmol/L      Potassium 4.4 mmol/L      Chloride 107 mmol/L      CO2 30.0 mmol/L      Calcium 8.1 mg/dL      BUN/Creatinine Ratio 27.8     Anion Gap 7.0 mmol/L      eGFR 79.5 mL/min/1.73     Narrative:      GFR Normal >60  Chronic Kidney Disease <60  Kidney Failure <15      CBC (No Diff) [[id number]]  (Abnormal) Collected: 03/15/24 0308    Specimen: Blood Updated: 03/15/24 0320     WBC 9.63 10*3/mm3      RBC 3.24 10*6/mm3      Hemoglobin 8.6 g/dL      Hematocrit 28.5 %      MCV 88.0 fL      MCH 26.5 pg      MCHC 30.2 g/dL      RDW 17.1 %      RDW-SD 54.8 fl      MPV 10.3 fL      Platelets 253 10*3/mm3     POC Glucose Once [[id number]]  (Abnormal) Collected: 03/14/24 1928    Specimen: Blood Updated: 03/14/24 1939     Glucose 201 mg/dL      Comment: : [id number] Ankur PatlinMeter ID: [id number]       Potassium [[id number]]  (Normal) Collected: 03/14/24 1727    Specimen: Blood Updated: 03/14/24 1748     Potassium 4.7  mmol/L      Comment: Slight hemolysis detected by analyzer. Result may be falsely elevated.       POC Glucose Once [[id number]]  (Abnormal) Collected: 03/14/24 1642    Specimen: Blood Updated: 03/14/24 1712     Glucose 302 mg/dL      Comment: : [id number] Vadim LisaMeter ID: [id number]       POC Glucose Once [[id number]]  (Abnormal) Collected: 03/14/24 1111    Specimen: Blood Updated: 03/14/24 1141     Glucose 207 mg/dL      Comment: : [id number] [place] LisaMeter ID: [id number]       POC Glucose Once [[id number]]  (Normal) Collected: 03/14/24 0720    Specimen: Blood Updated: 03/14/24 0754     Glucose 108 mg/dL      Comment: : [id number] Vadim LisaMeter ID: [id number]       Basic Metabolic Panel [[id number]]  (Abnormal) Collected: 03/14/24 0337    Specimen: Blood Updated: 03/14/24 0425     Glucose 85 mg/dL      BUN 29 mg/dL      Creatinine 1.15 mg/dL      Sodium 143 mmol/L      Potassium 3.4 mmol/L      Chloride 103 mmol/L      CO2 34.0 mmol/L      Calcium 7.7 mg/dL      BUN/Creatinine Ratio 25.2     Anion Gap 6.0 mmol/L      eGFR 73.8 mL/min/1.73     Narrative:      GFR Normal >60  Chronic Kidney Disease <60  Kidney Failure <15      POC Glucose Once [[id number]]  (Abnormal) Collected: 03/13/24 2012    Specimen: Blood Updated: 03/13/24 2036     Glucose 225 mg/dL      Comment: : [id number] Ozzie Tavares ID: [id number]       POC Glucose Once [[id number]]  (Abnormal) Collected: 03/13/24 1648    Specimen: Blood Updated: 03/13/24 1700     Glucose 210 mg/dL      Comment: : [id number] Vadim LisaMeter ID: [id number]       POC Glucose Once [[id number]]  (Abnormal) Collected: 03/13/24 1131    Specimen: Blood Updated: 03/13/24 1201     Glucose 212 mg/dL      Comment: : [id number] Vadim LisaMeter ID: [id number]       POC Glucose Once [[id number]]  (Normal) Collected: 03/13/24 0728    Specimen: Blood Updated: 03/13/24 0809     Glucose 115 mg/dL      Comment: : [id number] Vadim Essentia Healthter ID: [id number]        "Basic Metabolic Panel [[id number]]  (Abnormal) Collected: 03/13/24 0207    Specimen: Blood Updated: 03/13/24 0248     Glucose 94 mg/dL      BUN 35 mg/dL      Creatinine 1.25 mg/dL      Sodium 144 mmol/L      Potassium 3.7 mmol/L      Chloride 102 mmol/L      CO2 35.0 mmol/L      Calcium 8.5 mg/dL      BUN/Creatinine Ratio 28.0     Anion Gap 7.0 mmol/L      eGFR 66.7 mL/min/1.73     Narrative:      GFR Normal >60  Chronic Kidney Disease <60  Kidney Failure <15      POC Glucose Once [[id number]]  (Abnormal) Collected: 03/12/24 2002    Specimen: Blood Updated: 03/12/24 2013     Glucose 284 mg/dL      Comment: : [id number] Abmiguel [employer]NR[employer] Renew[employer]ter ID: [id number]       POC Glucose Once [[id number]]  (Abnormal) Collected: 03/12/24 1702    Specimen: Blood Updated: 03/12/24 1715     Glucose 290 mg/dL      Comment: : [id number] Gary Andres [employer] and Management CompanyMeter ID: [id number]       Basic Metabolic Panel [[id number]]  (Abnormal) Collected: 03/12/24 1557    Specimen: Blood Updated: 03/12/24 1643     Glucose 226 mg/dL      BUN 38 mg/dL      Creatinine 1.31 mg/dL      Sodium 141 mmol/L      Potassium 3.8 mmol/L      Chloride 101 mmol/L      CO2 31.0 mmol/L      Calcium 8.4 mg/dL      BUN/Creatinine Ratio 29.0     Anion Gap 9.0 mmol/L      eGFR 63.1 mL/min/1.73     Narrative:      GFR Normal >60  Chronic Kidney Disease <60  Kidney Failure <15      POC Glucose Once [[id number]]  (Abnormal) Collected: 03/12/24 1051    Specimen: Blood Updated: 03/12/24 1103     Glucose 246 mg/dL      Comment: : [id number] Gary Mckenna AnnMeter ID: [id number]                   Echo EF Estimated  No results found for: \"ECHOEFEST\"      Cath Ejection Fraction Quantitative  No results found for: \"CATHEF\"        Medication Review: yes  Current Facility-Administered Medications   Medication Dose Route Frequency Provider Last Rate Last Admin    acetaminophen (TYLENOL) tablet 650 mg  650 mg Oral Q4H PRN Roz Salinas APRN        Or    acetaminophen (TYLENOL) 160 " MG/5ML oral solution 650 mg  650 mg Oral Q4H PRN Roz Salinas APRN        Or    acetaminophen (TYLENOL) suppository 650 mg  650 mg Rectal Q4H PRN Roz Salinas APRN        sennosides-docusate (PERICOLACE) 8.6-50 MG per tablet 2 tablet  2 tablet Oral BID PRN Roz Salinas APRN        And    polyethylene glycol (MIRALAX) packet 17 g  17 g Oral Daily PRN Roz Salinas APRN        And    bisacodyl (DULCOLAX) EC tablet 5 mg  5 mg Oral Daily PRN Roz Salinas APRN        And    bisacodyl (DULCOLAX) suppository 10 mg  10 mg Rectal Daily PRN Roz Salinas APRN        dextrose (D50W) (25 g/50 mL) IV injection 25 g  25 g Intravenous Q15 Min PRN Roz Salinas APRN        dextrose (GLUTOSE) oral gel 15 g  15 g Oral Q15 Min PRN Roz Salinas APRN        Enoxaparin Sodium (LOVENOX) syringe 90 mg  1 mg/kg Subcutaneous Q12H Ba Cosme DO   90 mg at 03/15/24 0614    furosemide (LASIX) tablet 40 mg  40 mg Oral Daily Ba Cosme DO   40 mg at 03/15/24 0932    glucagon (GLUCAGEN) injection 1 mg  1 mg Intramuscular Q15 Min PRN Roz Salinas APRN        HYDROcodone-acetaminophen (NORCO)  MG per tablet 1 tablet  1 tablet Oral Q8H PRN Roz Salinas APRN   1 tablet at 03/15/24 0936    Insulin Lispro (humaLOG) injection 2-7 Units  2-7 Units Subcutaneous 4x Daily AC & at Bedtime Roz Salinas APRN   4 Units at 03/14/24 1720    lisinopril (PRINIVIL,ZESTRIL) tablet 20 mg  20 mg Oral Q24H Ba Cosme DO   20 mg at 03/15/24 0932    Magnesium Cardiology Dose Replacement - Follow Nurse / BPA Driven Protocol   Does not apply PRN Roz Salinas APRN        nitroglycerin (NITROSTAT) SL tablet 0.4 mg  0.4 mg Sublingual Q5 Min PRN Roz Salinas APRN        ondansetron ODT (ZOFRAN-ODT) disintegrating tablet 4 mg  4 mg Oral Q6H PRN Roz Salinas APRN        Or    ondansetron (ZOFRAN) injection 4 mg  4 mg Intravenous Q6H PRN Roz Salinas, APRN         Pharmacy to Dose enoxaparin (LOVENOX)   Does not apply Continuous PRN Roz Salinas APRN        Potassium Replacement - Follow Nurse / BPA Driven Protocol   Does not apply PRN Roz Salinas APRN        pregabalin (LYRICA) capsule 50 mg  50 mg Oral TID Roz Salinas APRN   50 mg at 03/15/24 0934    sodium chloride 0.9 % flush 10 mL  10 mL Intravenous PRN King ThuSelinaVeronica FARHAN Muhammad        sodium chloride 0.9 % flush 10 mL  10 mL Intravenous Q12H Roz Salinas APRN   10 mL at 03/15/24 0932    sodium chloride 0.9 % infusion 40 mL  40 mL Intravenous PRN Roz Salinas APRN             Assessment & Plan     1.  Volume overload: Improved with diuresis.  Dyspnea resolved.  Now on oral Lasix.  We discussed daily weights, heart healthy low-sodium diet and signs to watch for that would indicate he may need to take an additional dose of Lasix-weight gain of greater than 2 pounds overnight or greater than 4 pounds in 2 days or shortness of breath/orthopnea/PND.  He has a normal LVEF.  Follow-up with MEGHANN Corona 1 to 2 weeks    Lexiscan has been completed today presumably due to dyspnea, volume overload, elevated troponins.  He states he never had any chest pain.  We are awaiting the results of this.    2.  Anemia-no signs of bleeding  3.  Diabetes mellitus type 2: A1c 6.7  4.  Congenital heart disease: Prior to outpatient follow-up we will try to obtain records regarding his surgery at the age of 6 at [facility] in [place].  5.  Hypertension: Agree with increasing lisinopril for better blood pressure control    There has been some concern that he was in rate controlled atrial fibrillation earlier this admission.  He has remained in normal sinus rhythm since arrival to .  I do see some P waves on his EKGs.  To assist with more definitive diagnosis consider 14-day Zio patch at discharge.    Follow-up Dr. Kathleen or MEGHANN Corona in 1 to 2 weeks    MEGHANN Salmon  03/15/24  09:58  CDT

## 2024-03-16 ENCOUNTER — READMISSION MANAGEMENT (OUTPATIENT)
Dept: CALL CENTER | Facility: HOSPITAL | Age: 59
End: 2024-03-16
Payer: COMMERCIAL

## 2024-03-16 NOTE — PAYOR COMM NOTE
"[place] [facility] 3-15-24    Garrett Coates (59 y.o. Male)       Date of Birth   1965    Social Security Number       Address   [address] [place] [address]    Home Phone   [phone]    MRN   [id number]       Mu-ism   Scientologist    Marital Status                               Admission Date   3/11/24    Admission Type   Emergency    Admitting Provider   Ba Cosme DO    Attending Provider       Department, Room/Bed   [facility] 4B, 432/1       Discharge Date   3/15/2024    Discharge Disposition   Home or Self Care    Discharge Destination                                 Attending Provider: (none)   Allergies: No Known Allergies    Isolation: None   Infection: None   Code Status: Prior    Ht: 177.8 cm (70\")   Wt: 90.8 kg (200 lb 3.2 oz)    Admission Cmt: None   Principal Problem: Hypervolemia [E87.70]                   Active Insurance as of 3/11/2024       Primary Coverage       Payor Plan Insurance Group Employer/Plan Group    [id number] HMO [id number]       Payor Plan Address Payor Plan Phone Number Payor Plan Fax Number Effective Dates    [address] [phone]  6/1/2022 - None Entered    [place] [address]         Subscriber Name Subscriber Birth Date Member ID       GARRETT COATES 1965 [id number]                     Emergency Contacts        (Rel.) Home Phone Work Phone Mobile Phone    Gwen Coates (Spouse) -- -- [phone]                 Discharge Summary        Ba Cosme DO at 03/15/24 [facility]              [facility]  DISCHARGE SUMMARY       Date of Admission: 3/11/2024  Date of Discharge:  3/15/2024  Primary Care Physician: Mehul Andersen MD    Discharge Diagnoses:  Active Hospital Problems    Diagnosis     **Hypervolemia     Pulmonary HTN     Anasarca     Iron deficiency anemia     PAF (paroxysmal atrial fibrillation) new onset     " Scrotal edema     Weakness     Gait instability     Elevated brain natriuretic peptide (BNP) level     Type 2 diabetes mellitus with hyperglycemia, without long-term current use of insulin     HTN (hypertension)          Presenting Problem/History of Present Illness:  Hypervolemia [E87.70]     Chief Complaint on Day of Discharge:   New complaint    History of Present Illness on Day of Discharge:   The patient is doing well today.  Diuresis has been over 11 L since admission.  He continues in normal sinus rhythm and had only 1 episode of atrial fibrillation at the time of admission.  He was placed on anticoagulation at discharge which will be discussed with cardiology at the time of discharge and recheck.  Pressure was elevated today and lisinopril was increased.  He is appropriate for discharge today as Lexiscan was negative for ischemia.  Cardiology indicates that the patient is to weigh on a daily basis and should take an extra Lasix each day for a greater than 2 pound weight gain overnight or 4 pounds in 2 days or any evolution of shortness of breath.  Is to follow-up with cardiology in 1 to 2 weeks.    Hospital Course  Garrett Rodriguez is a 59-year-old male with a history of chronic pain in the right foot, diabetes mellitus insulin dependence, hypertension, repair of PFO in childhood.  Ankle fusion in 7/2023 with chronic pain.  Patient presented today after an outpatient appointment with Dr. Reddy for reevaluation of his right ankle.  Apparently the PA taking care of him saw his edema and immediately told him to present to the ED.    Patient states that for approximately 6 weeks he has noticed increased swelling which began in his right lower extremity then to his left lower extremity progressing to his scrotum and then abdomen and now up to the middle of his flank.  Patient states in addition to the weight he has had increasing shortness of breath, orthopnea, and chest tightness.  He states that for the last  weeks he has had to use a walker because of extreme shortness of breath.  Patient states he has gained approximately 50 to 60 pounds over this period.  The patient initially went to his PCP Dr. Andersen, approximately 2 weeks ago.  Who prescribed him Lasix 40 mg p.o. twice daily with minimal results.  The patient states he lost approximately 10 pounds with the Lasix but no significant change in his shortness of breath.  Patient denies any palpitations, pressure, diaphoresis, nausea or vomiting, cough, respiratory issues, or dizziness.  He is currently scheduled for a (cardiac evaluation) March 18 as well as a colonoscopy on March 13.  On admission to the ED patient presented in A-fib without RVR, patient additionally states no history of arrhythmia to his knowledge.  ETA revealed severe atheromatous changes of coronary artery, small bibasilar pleural effusions L>R, with significant edema of the chest and abdominal wall suggesting anasarca, nonspecific left axillary lymphadenopathy.     Upon assessment patient is sitting up in bed with his wife, able to communicate without shortness of breath.  He is hypertensive at 159/87, oxygen saturation of 94% on room air, and heart rate of 80 with a rhythm of atrial fibrillation.  Patient has 4+ edema from the middle aspect of bilateral flank all the way down to the bottom of his feet.  No weeping present, chronic wound to the right ankle which is healed, Doppler pulse present.  Initiated patient on Bumex drip 0.5 mg/hour, with stat echocardiogram if possible.  The other patient complaint is a chronic headache which he has had since his LASEK surgery  approximately 1 months ago.  Patient was told to notify staff if the headache became worse otherwise for him to follow-up with his ophthalmologist postdischarge.  These are to be admitted while patient is being held in the ED, cardiology and Heart failure navigator consult initiated.  Patient will be for further evaluation and  treatment.  Treatment Plan  1.The patient will be admitted to Dr. Cosme's service here at [facility].   2.  Hypervolemia/probable heart failure-heart failure pathway initiated, stat echocardiogram, Bumex IV 0.5 mg/hr, cardiology consult.  Daily weights, strict I/O. ReDs vest ordred  3.  Iron deficiency anemia-anemia studies initiated, occult blood stool if possible, patient's iron level was 20 and a saturation of 6, replaced iron, monitoring of daily CBC.  4.  Anasarca/scrotal edema-close monitoring of urine output, elevation of scrotum and bilateral lower extremities at all times.  Monitor for skin breakdown.  5.  Weakness/gait instability-up with assistance, PT/ OT consult, patient to wear right ankle brace at all times when ambulating.  6.  TARSHA-strict intake and output, electrolyte protocol in place, daily BMP.  7.  Diabetes mellitus type 2 without insulin dependence-A1c in process, sliding scale action insulin initiated, metformin on hold.  Accu-Cheks before meals and at bedtime.  8.  Primary hypertension-resumption of home lisinopril, vital signs every 4.  9.  New onset atrial fibrillation-currently patient is rate controlled and has been since admission.  As needed EKG for any change or transition to RVR.  Nursing to contact MD for any elevation in heart rate.  Lovenox full dose per pharmacy dosing due to TARSHA.  10.  Home medications reviewed and resumed as appropriate.  11.  Labs in a.m.     On the morning after admission, the patient's shortness of breath was significantly improved.  Lower extremity edema had improved as well as body wall edema.  6100 cc out greater than in after the first day.  Echocardiogram revealed pulmonary hypertension with no evidence of left heart failure and no diastolic dysfunction.  Untreated sleep apnea has got to be high in the differential regarding because of severe pulmonary hypertension.  I have recommended an outpatient sleep study and he will discuss this  with his PCP.  CT angiogram of the chest showed no evidence of pulmonary embolus and no evidence of pulmonary fibrosis.  Bilateral venous Doppler showed no DVT.  Renal function improved with diuresis.  The patient had a 48-hour washout period post VQ scan and was scheduled for nuclear stress test.  Stress test has been completed and is low risk for ischemia.  Cardiology has seen and evaluated the patient and agrees that the patient is stable for discharge home after aggressive diuresis.  The patient is to continue Lasix him to double the dose if he has a 2 pound weight gain overnight, a 4 pound weight gain over 2-day or if he becomes short of breath with PND or orthopnea.  He is to follow-up with his PCP next week and with cardiology in 1-2 weeks.        Consults:   Cardiology:  Assessment & Plan    Hypervolemia    Type 2 diabetes mellitus with hyperglycemia, without long-term current use of insulin    HTN (hypertension)    Anasarca    Iron deficiency anemia    New onset a-fib    Scrotal edema    Weakness    Gait instability    TARSHA (acute kidney injury)    Elevated brain natriuretic peptide (BNP) level  2+ lower extremity pitting edema     Plan     Care plan discussed with patient along with the results of echocardiogram  Recommend VQ scan  In future will require nuclear perfusion scan which can be done 2 days after VQ scan  Monitor kidney functions  Currently on diuresis and close attention to renal function and electrolytes monitor  Telemetry  Deep vein thrombosis prophylaxis/precautions  Appropriate diet, fluid, sodium, caffeine, stimulants intake   Questions were encouraged, asked and answered to the patient's  understanding and satisfaction.  Compliance to diet and medications         Rigo Kathleen MD      Result Review    Result Review:  I have personally reviewed the results from the time of this admission to 3/15/2024 18:55 CDT and agree with these findings:  []  Laboratory  []  Microbiology  []   "Radiology  []  EKG/Telemetry   []  Cardiology/Vascular   []  Pathology  []  Old records  []  Other:    Condition on Discharge:    Stable and at baseline    Physical Exam on Discharge:  /78 (BP Location: Left arm, Patient Position: Sitting)   Pulse 85   Temp 98.9 °F (37.2 °C) (Oral)   Resp 16   Ht 177.8 cm (70\")   Wt 90.8 kg (200 lb 3.2 oz)   SpO2 93%   BMI 28.73 kg/m²   Physical Exam     Constitutional:       Appearance: Normal appearance. He is normal weight.   HENT:      Head: Normocephalic and atraumatic.      Right Ear: External ear normal.      Left Ear: External ear normal.      Nose: Nose normal.      Mouth: Mucous membranes are moist.      Pharynx: Oropharynx is clear.   Eyes:      General: No scleral icterus.     Conjunctiva/sclera: Conjunctivae normal.   Cardiovascular:      Rate and Rhythm: Normal rate and regular rhythm.      Pulses: Normal pulses.      Heart sounds: Normal heart sounds. No murmur heard.  Pulmonary:      Effort: Pulmonary effort is normal.      Breath sounds: Decreased breath sounds present. No rhonchi or rales.   Abdominal:      General: Bowel sounds are normal.      Palpations: Abdomen is soft. There is no mass.      Tenderness: There is no abdominal tenderness.      Comments: No abdominal wall edema is resolved  Musculoskeletal:         General: Swelling present. Normal range of motion.      Right lower leg: Edema (1/4) present.      Left lower leg: Edema (1/4) present.   Skin:     General: Skin is warm and dry.      Coloration: Skin is not pale.   Neurological:      General: No focal deficit present.      Mental Status: He is alert and oriented to person, place, and time. Mental status is at baseline.   Psychiatric:         Mood and Affect: Mood normal.         Judgment: Judgment normal.       Discharge Disposition:  Home or Self Care    Discharge Medications:     Discharge Medications        New Medications        Instructions Start Date   apixaban 5 MG tablet " tablet  Commonly known as: ELIQUIS   5 mg, Oral, 2 Times Daily             Changes to Medications        Instructions Start Date   furosemide 40 MG tablet  Commonly known as: LASIX  What changed: additional instructions   40 mg, Oral, Daily, Take an extra dose for any 2-3 pound weight gain   Start Date: March 16, 2024     lisinopril 20 MG tablet  Commonly known as: PRINIVILZESTRIL  What changed:   medication strength  how much to take   20 mg, Oral, Every 24 Hours Scheduled   Start Date: March 16, 2024            Continue These Medications        Instructions Start Date   HYDROcodone-acetaminophen  MG per tablet  Commonly known as: NORCO   1 tablet, Oral, Every 8 Hours PRN      metFORMIN 1000 MG tablet  Commonly known as: GLUCOPHAGE   1,000 mg, Oral, 2 Times Daily With Meals      naloxone 4 MG/0.1ML nasal spray  Commonly known as: NARCAN   Call 911. Don't prime. Monitor in 1 nostril for overdose. Repeat in 2-3 minutes in other nostril if no or minimal breathing/responsiveness.      pregabalin 100 MG capsule  Commonly known as: LYRICA   100 mg, Oral, 3 Times Daily               Discharge Diet:   Diet Instructions       Diet: Diabetic Diets, Cardiac Diets; Healthy Heart (2-3 Na+); Thin (IDDSI 0); Consistent Carbohydrate      Discharge Diet:  Diabetic Diets  Cardiac Diets       Cardiac Diet: Healthy Heart (2-3 Na+)    Fluid Consistency: Thin (IDDSI 0)    Diabetic Diet: Consistent Carbohydrate            Discharge Care Plan / Instructions:   Discharge home    Activity at Discharge:   Activity Instructions       Activity as Tolerated              Follow-up Appointments:  Follow-up with PCP next week  Follow-up with cardiology in 1-2 weeks    Electronically signed by Ba Cosme DO, 03/15/24, 18:55 CDT.    Time: Discharge over 30 min    Part of this note may be an electronic transcription/translation of spoken language to printed text using the Dragon Dictation system.        Electronically signed by  Ba Cosme, DO at 03/15/24 185

## 2024-03-16 NOTE — OUTREACH NOTE
Prep Survey      Flowsheet Row Responses   [facility] facility patient discharged from? [place]   Is LACE score < 7 ? No   Eligibility Readm Mgmt   Discharge diagnosis Hypervolemia   Does the patient have one of the following disease processes/diagnoses(primary or secondary)? Other   Does the patient have Home health ordered? No   Is there a DME ordered? No   Medication alerts for this patient see avs   Prep survey completed? Yes            Latosha CARVER - Registered Nurse

## 2024-03-19 ENCOUNTER — READMISSION MANAGEMENT (OUTPATIENT)
Dept: CALL CENTER | Facility: HOSPITAL | Age: 59
End: 2024-03-19
Payer: COMMERCIAL

## 2024-03-19 ENCOUNTER — TELEPHONE (OUTPATIENT)
Dept: CARDIOLOGY | Facility: CLINIC | Age: 59
End: 2024-03-19

## 2024-03-19 NOTE — OUTREACH NOTE
Medical Week 1 Survey      Flowsheet Row Responses   [facility] patient discharged from? [place]   Does the patient have one of the following disease processes/diagnoses(primary or secondary)? Other   Week 1 attempt successful? Yes   Call start time 1816   Call end time 1825   Discharge diagnosis Hypervolemia   Person spoke with today (if not patient) and relationship pt   Meds reviewed with patient/caregiver? Yes   Is the patient having any side effects they believe may be caused by any medication additions or changes? No   Does the patient have all medications ordered at discharge? Yes   Is the patient taking all medications as directed (includes completed medication regime)? Yes   Does the patient have a primary care provider?  Yes   Does the patient have an appointment with their PCP within 7 days of discharge? Yes   Has the patient kept scheduled appointments due by today? N/A   Psychosocial issues? No   Did the patient receive a copy of their discharge instructions? Yes   Nursing interventions Reviewed instructions with patient   What is the patient's perception of their health status since discharge? Same   Is the patient/caregiver able to teach back signs and symptoms related to disease process for when to call PCP? Yes   Is the patient/caregiver able to teach back signs and symptoms related to disease process for when to call 911? Yes   Is the patient/caregiver able to teach back the hierarchy of who to call/visit for symptoms/problems? PCP, Specialist, Home health nurse, Urgent Care, ED, 911 Yes   If the patient is a current smoker, are they able to teach back resources for cessation? Not a smoker   Week 1 call completed? Yes   Would this patient benefit from a Referral to Amb Social Work? No   Is the patient interested in additional calls from an ambulatory ? No   Wrap up additional comments Pt states he is doing about the same, and feels he is still holding onto fluid. Pt does weigh  daily, and taking an extra Lasix as ordered. Reviewed AVS/meds with pt. Pt has fu appt with PCP, and verified Cardiology/GI fu appts.   Call end time 1825            Tiffany SHAW - Registered Nurse

## 2024-03-19 NOTE — TELEPHONE ENCOUNTER
Caller: Garrett Rodriguez    Relationship to patient: Self    Best call back number: [phone]    New or established patient?  [] New  [x] Established    Date of discharge: 03-15-24    Facility discharged from:  [facility]    Diagnosis/Symptoms:     Length of stay (If applicable):     Specialty Only: Did you see a [facility] provider?    [] Yes  [] No  If so, who?     1 WEEK F/U  SCHEDULED FOR 4-18-24 @ 10:45  OFFERED 3-25-24 @ 3.  PATIENT NEEDS A MORNING APPT.

## 2024-03-27 ENCOUNTER — TELEPHONE (OUTPATIENT)
Dept: CARDIOLOGY | Facility: CLINIC | Age: 59
End: 2024-03-27
Payer: COMMERCIAL

## 2024-03-27 ENCOUNTER — READMISSION MANAGEMENT (OUTPATIENT)
Dept: CALL CENTER | Facility: HOSPITAL | Age: 59
End: 2024-03-27
Payer: COMMERCIAL

## 2024-03-27 ENCOUNTER — TELEPHONE (OUTPATIENT)
Dept: CARDIOLOGY | Facility: CLINIC | Age: 59
End: 2024-03-27

## 2024-03-27 DIAGNOSIS — R60.9 FLUID RETENTION: ICD-10-CM

## 2024-03-27 DIAGNOSIS — R79.89 ELEVATED BRAIN NATRIURETIC PEPTIDE (BNP) LEVEL: Primary | ICD-10-CM

## 2024-03-27 RX ORDER — FUROSEMIDE 20 MG/1
40 TABLET ORAL DAILY
Status: SHIPPED | OUTPATIENT
Start: 2024-03-27 | End: 2024-03-30

## 2024-03-27 RX ORDER — FUROSEMIDE 10 MG/ML
40 INJECTION INTRAMUSCULAR; INTRAVENOUS DAILY
Status: SHIPPED | OUTPATIENT
Start: 2024-03-27 | End: 2024-03-30

## 2024-03-27 NOTE — OUTREACH NOTE
Medical Week 2 Survey      Flowsheet Row Responses   [facility] patient discharged from? [place]   Does the patient have one of the following disease processes/diagnoses(primary or secondary)? Other   Week 2 attempt successful? Yes   Call start time 1023   Discharge diagnosis Hypervolemia   Call end time 1034   Meds reviewed with patient/caregiver? Yes   Is the patient having any side effects they believe may be caused by any medication additions or changes? No   Does the patient have all medications ordered at discharge? Yes   Is the patient taking all medications as directed (includes completed medication regime)? Yes   Does the patient have a primary care provider?  Yes   Does the patient have an appointment with their PCP within 7 days of discharge? Yes   Has the patient kept scheduled appointments due by today? Yes   Has home health visited the patient within 72 hours of discharge? N/A   Psychosocial issues? No   Did the patient receive a copy of their discharge instructions? Yes   Nursing interventions Reviewed instructions with patient   What is the patient's perception of their health status since discharge? Improving   Is the patient/caregiver able to teach back signs and symptoms related to disease process for when to call PCP? Yes   Is the patient/caregiver able to teach back signs and symptoms related to disease process for when to call 911? Yes   Is the patient/caregiver able to teach back the hierarchy of who to call/visit for symptoms/problems? PCP, Specialist, Home health nurse, Urgent Care, ED, 911 Yes   Additional teach back comments weighing daily to watch for CHF symptoms, states follows low sodium and DM diet   Week 2 Call Completed? Yes   Call end time 1034            Katelyn SHAW - Registered Nurse

## 2024-03-27 NOTE — TELEPHONE ENCOUNTER
Patients spouse - Bertha called Garrett  has gained more than 3 lbs from the 11 th to now.     He  was 200 lbs and now she is 221 lbs     They want to know what they need to do.          Per Lexy Kathleen, he may sound like he would do well with IV diuresis this week again to prevent a weekend ER visit.       Nina Schneider RN said  this can be done,  just say how you would like the IV diuresis and if you would like labs they are ran STAT so they come back within 30 min's they can give you a call before medication is pushed if you would like.

## 2024-03-28 ENCOUNTER — HOSPITAL ENCOUNTER (OUTPATIENT)
Dept: CARDIOLOGY | Facility: HOSPITAL | Age: 59
Discharge: HOME | End: 2024-03-28
Payer: COMMERCIAL

## 2024-03-28 ENCOUNTER — TELEPHONE (OUTPATIENT)
Dept: CARDIOLOGY | Facility: CLINIC | Age: 59
End: 2024-03-28
Payer: COMMERCIAL

## 2024-03-28 VITALS
BODY MASS INDEX: 31.42 KG/M2 | OXYGEN SATURATION: 97 % | HEART RATE: 79 BPM | WEIGHT: 219 LBS | SYSTOLIC BLOOD PRESSURE: 169 MMHG | DIASTOLIC BLOOD PRESSURE: 108 MMHG

## 2024-03-28 DIAGNOSIS — R60.9 FLUID RETENTION: Primary | ICD-10-CM

## 2024-03-28 LAB
ANION GAP SERPL CALCULATED.3IONS-SCNC: 10 MMOL/L (ref 5–15)
BUN SERPL-MCNC: 45 MG/DL (ref 6–20)
BUN/CREAT SERPL: 41.7 (ref 7–25)
CALCIUM SPEC-SCNC: 8.5 MG/DL (ref 8.6–10.5)
CHLORIDE SERPL-SCNC: 103 MMOL/L (ref 98–107)
CO2 SERPL-SCNC: 29 MMOL/L (ref 22–29)
CREAT SERPL-MCNC: 1.08 MG/DL (ref 0.76–1.27)
EGFRCR SERPLBLD CKD-EPI 2021: 79.5 ML/MIN/1.73
GLUCOSE SERPL-MCNC: 272 MG/DL (ref 65–99)
NT-PROBNP SERPL-MCNC: 6146 PG/ML (ref 0–900)
POTASSIUM SERPL-SCNC: 4.3 MMOL/L (ref 3.5–5.2)
SODIUM SERPL-SCNC: 142 MMOL/L (ref 136–145)

## 2024-03-28 PROCEDURE — 83880 ASSAY OF NATRIURETIC PEPTIDE: CPT | Performed by: INTERNAL MEDICINE

## 2024-03-28 PROCEDURE — 25010000002 FUROSEMIDE PER 20 MG: Performed by: INTERNAL MEDICINE

## 2024-03-28 PROCEDURE — 96374 THER/PROPH/DIAG INJ IV PUSH: CPT | Performed by: INTERNAL MEDICINE

## 2024-03-28 PROCEDURE — 80048 BASIC METABOLIC PNL TOTAL CA: CPT | Performed by: INTERNAL MEDICINE

## 2024-03-28 RX ADMIN — FUROSEMIDE 40 MG: 10 INJECTION, SOLUTION INTRAVENOUS at 11:08

## 2024-03-28 NOTE — TELEPHONE ENCOUNTER
I GOT A CALL FROM JANE AT THE [facility]F CLINIC STATING THAT THE PT WAS THERE FOR HIS TREATMENT AND SHE ASKED HIM HOW MUCH LONGER HE WAS TO WEAR THE MONITOR.  HE SAID HE DID NOT GET A MONITOR PLACED BEFORE HE LEFT THE HOSPITAL.  IT WAS ORDERED BY DR. MAURICIO BUT WAS NEVER PUT ON OR SCHEDULED FOR THE PT TO RETURN TO HAVE IT PLACED.  SHE ASKED IF I COULD PUT IT ON THE PT TODAY.  I TOLD HER TO HAVE HIM COME AROUND 2 TODAY.  I SCHEDULED THE PT AND WENT INTO THE ORDER AND CHANGED IT FROM HOSPITAL PERFORMED TO CLINIC PERFORMED.  THE READING PROVIDER NEEDS TO BE DR. MCCALL.  Tomasz Bell, CMA

## 2024-04-01 ENCOUNTER — OFFICE VISIT (OUTPATIENT)
Dept: CARDIOLOGY | Facility: CLINIC | Age: 59
End: 2024-04-01
Payer: COMMERCIAL

## 2024-04-01 ENCOUNTER — TELEPHONE (OUTPATIENT)
Dept: CARDIOLOGY | Facility: CLINIC | Age: 59
End: 2024-04-01
Payer: COMMERCIAL

## 2024-04-01 VITALS
BODY MASS INDEX: 31.5 KG/M2 | HEIGHT: 70 IN | WEIGHT: 220 LBS | SYSTOLIC BLOOD PRESSURE: 166 MMHG | HEART RATE: 89 BPM | DIASTOLIC BLOOD PRESSURE: 96 MMHG

## 2024-04-01 DIAGNOSIS — R60.1 ANASARCA: ICD-10-CM

## 2024-04-01 DIAGNOSIS — I27.20 PULMONARY HTN: ICD-10-CM

## 2024-04-01 DIAGNOSIS — D64.9 CHRONIC ANEMIA: ICD-10-CM

## 2024-04-01 DIAGNOSIS — N50.89 SCROTAL EDEMA: ICD-10-CM

## 2024-04-01 DIAGNOSIS — I10 PRIMARY HYPERTENSION: Primary | ICD-10-CM

## 2024-04-01 DIAGNOSIS — R53.1 WEAKNESS: ICD-10-CM

## 2024-04-01 DIAGNOSIS — R60.0 PEDAL EDEMA: ICD-10-CM

## 2024-04-01 DIAGNOSIS — E11.65 TYPE 2 DIABETES MELLITUS WITH HYPERGLYCEMIA, WITHOUT LONG-TERM CURRENT USE OF INSULIN: ICD-10-CM

## 2024-04-01 DIAGNOSIS — I48.0 PAF (PAROXYSMAL ATRIAL FIBRILLATION): ICD-10-CM

## 2024-04-01 DIAGNOSIS — R06.83 SNORING: ICD-10-CM

## 2024-04-01 DIAGNOSIS — R79.89 ELEVATED BRAIN NATRIURETIC PEPTIDE (BNP) LEVEL: ICD-10-CM

## 2024-04-01 RX ORDER — METOLAZONE 2.5 MG/1
2.5 TABLET ORAL DAILY
Qty: 30 TABLET | Refills: 11 | Status: SHIPPED | OUTPATIENT
Start: 2024-04-01

## 2024-04-01 NOTE — PROGRESS NOTES
Garrett Rodriguez  [id number]  1965  59 y.o.  male    Referring Provider: Mehul Andersen MD    Reason for  Visit:  short term office follow up after recent encounter       Subjective    Mild chronic exertional shortness of breath on exertion relieved with rest  No significant cough or wheezing    No palpitations  No associated chest pain     No fever or chills  No significant expectoration    No hemoptysis  No presyncope or syncope    Tolerating current medications well with no untoward side effects   Compliant with prescribed medication regimen. Tries to adhere to cardiac diet.     Moderate pedal edema     History of present illness:  Garrett Rodriguez is a 59 y.o. yo male with congestive heart failure who presents today for   Chief Complaint   Patient presents with    Hypertension   .    History  Past Medical History:   Diagnosis Date    Chronic pain in right foot     Diabetes mellitus     Hypertension     PFO (patent foramen ovale)     RP at age 6   ,   Past Surgical History:   Procedure Laterality Date    ANKLE FUSION Right 7/11/2023    Procedure: ANKLE ARTHRODESIS;  Surgeon: Shahbaz Reddy DPM;  Location:  [facility];  Service: Podiatry;  Laterality: Right;    BACK SURGERY      CARDIAC SURGERY      EXTERNAL FIXATION ANKLE FRACTURE Right 7/11/2023    Procedure: POSSIBLE EXTERNAL FIXATION, RIGHT ANKLE;  Surgeon: Shahbaz Reddy DPM;  Location:  [facility];  Service: Podiatry;  Laterality: Right;    HARDWARE REMOVAL Right 7/11/2023    Procedure: REMOVAL OF HARDWARE, DEEP; ANKLE ARTHRODESIS; SUBTALAR ARTHRODESIS; POSSIBLE EXTERNAL FIXATION, RIGHT ANKLE;  Surgeon: Shahbaz Reddy DPM;  Location:  [facility];  Service: Podiatry;  Laterality: Right;    PATENT FORAMEN OVALE CLOSURE      ROTATOR CUFF REPAIR Right     SUBTALAR ARTHRODESIS Right 7/11/2023    Procedure: SUBTALAR ARTHRODESIS;  Surgeon: Shahbaz Reddy DPM;  Location:  [facility];  Service: Podiatry;  Laterality: Right;   ,   History  reviewed. No pertinent family history.,   Social History     Tobacco Use    Smoking status: Never    Smokeless tobacco: Never   Vaping Use    Vaping status: Never Used   Substance Use Topics    Alcohol use: Never    Drug use: Never   ,     Medications  Current Outpatient Medications   Medication Sig Dispense Refill    apixaban (ELIQUIS) 5 MG tablet tablet Take 1 tablet by mouth 2 (Two) Times a Day. 60 tablet 2    furosemide (LASIX) 40 MG tablet Take 1 tablet by mouth Daily. Take an extra dose for any 2-3 pound weight gain 50 tablet 2    HYDROcodone-acetaminophen (NORCO)  MG per tablet Take 1 tablet by mouth Every 8 (Eight) Hours As Needed for Moderate Pain.      lisinopril (PRINIVIL,ZESTRIL) 20 MG tablet Take 1 tablet by mouth Daily. 30 tablet 2    metFORMIN (GLUCOPHAGE) 1000 MG tablet Take 1 tablet by mouth 2 (Two) Times a Day With Meals.      naloxone (NARCAN) 4 MG/0.1ML nasal spray Call 911. Don't prime. Vining in 1 nostril for overdose. Repeat in 2-3 minutes in other nostril if no or minimal breathing/responsiveness. 2 each 0    pregabalin (LYRICA) 100 MG capsule Take 1 capsule by mouth 3 (Three) Times a Day.      metOLazone (ZAROXOLYN) 2.5 MG tablet Take 1 tablet by mouth Daily. 30 tablet 11     No current facility-administered medications for this visit.       Allergies:  Patient has no known allergies.    Review of Systems  Review of Systems   Constitutional: Positive for malaise/fatigue.   HENT: Negative.     Eyes: Negative.    Cardiovascular:  Positive for dyspnea on exertion and leg swelling. Negative for chest pain, claudication, cyanosis, irregular heartbeat, near-syncope, orthopnea, palpitations, paroxysmal nocturnal dyspnea and syncope.   Respiratory: Negative.     Endocrine: Negative.    Hematologic/Lymphatic: Negative.    Skin: Negative.    Gastrointestinal:  Negative for anorexia.   Genitourinary: Negative.    Neurological: Negative.    Psychiatric/Behavioral: Negative.         Objective  "    Physical Exam:  /96   Pulse 89   Ht 177.8 cm (70\")   Wt 99.8 kg (220 lb)   BMI 31.57 kg/m²     Physical Exam  Constitutional:       Appearance: He is well-developed.   HENT:      Head: Normocephalic.   Neck:      Vascular: Normal carotid pulses. No carotid bruit or JVD.      Trachea: No tracheal tenderness or tracheal deviation.   Cardiovascular:      Rate and Rhythm: Regular rhythm.      Pulses: Normal pulses.      Heart sounds: Normal heart sounds.       with a grade of 2/6.   Pulmonary:      Effort: Pulmonary effort is normal.      Breath sounds: No stridor.   Abdominal:      Palpations: Abdomen is soft.   Musculoskeletal:      Cervical back: No edema.      Right lower le+ Pitting Edema present.      Left lower le+ Pitting Edema present.   Skin:     General: Skin is warm.   Neurological:      Mental Status: He is alert.      Cranial Nerves: No cranial nerve deficit.      Sensory: No sensory deficit.   Psychiatric:         Speech: Speech normal.         Behavior: Behavior normal.         Results Review:    Narrative & Impression   History: Swelling     IMPRESSION:  Impression: There is no evidence of deep venous thrombosis or  superficial thrombophlebitis of right or left lower extremities.     Comments: Bilateral lower extremity venous duplex exam was performed  using color Doppler flow, Doppler waveform analysis, and grayscale  imaging, with and without compression. There is no evidence of deep  venous thrombosis in the common femoral, superficial femoral, popliteal,  peroneal, anterior tibial, and posterior tibial veins bilaterally. No  thrombus is identified in the saphenofemoral junctions and greater  saphenous veins bilaterally.            This report was signed and finalized on 3/12/2024 3:09 PM by Dr. Ricardo Hawk MD.          Narrative & Impression   EXAMINATION: NM LUNG SCAN PERFUSION PARTICULATE- 3/12/2024 12:39 PM     HISTORY: suspect pulmonary embolis, negative CTA, " pulmonary HTN;  E87.70-Fluid overload, unspecified; R18.8-Other ascites;  I48.91-Unspecified atrial fibrillation; R91.1-Solitary pulmonary nodule.     Dose: 5.4 mCi technetium 99m MAA intravenously.     REPORT: Perfusion-only scintigraphic images of the lungs were obtained  in the anterior, posterior and oblique dimensions following  administration of the radiotracer.     COMPARISON: CT angio chest 3/11/2024.     Distribution of activity within the lungs is homogeneous, there are no  focal segmental or subsegmental defects.     IMPRESSION:  No evidence of pulmonary thromboembolic disease, low  probability perfusion lung scan.     This report was signed and finalized on 3/12/2024 12:42 PM by Dr. Brandin Seymour MD.                     Regadenoson Stress Test with Myocardial Perfusion SPECT (Multi Study)    Accession Number: [id number]   Date of Study: 3/15/24   Ordering Provider: Ba Cosme DO   Clinical Indications: Heart Failure        [facility]  Performing Staff   ECG [place], SPECT [place]: Roland Epps MD    Tech: Rica Coelho   Support Staff: Neha Segal RN        Interpretation Summary         Myocardial perfusion imaging indicates a normal myocardial perfusion study with no evidence of ischemia. Impressions are consistent with a low risk study.    Left ventricular ejection fraction is normal (Calculated EF = 54%).    Findings consistent with a normal ECG stress test.    There is no prior study available for comparison.       Results for orders placed during the hospital encounter of 03/11/24    Adult Transthoracic Echo Complete w/ Color, Spectral and Contrast if Necessary Per Protocol    Interpretation Summary    Left ventricular ejection fraction appears to be 56 - 60%.    Left ventricular wall thickness is consistent with mild concentric hypertrophy.    The left atrial cavity is moderately dilated.    Left atrial volume is severely increased.    Moderate pulmonary hypertension is  present.    Apical RV free wall hypokinesis        ____________________________________________________________________________________________________________________________________________  Health maintenance and recommendations    Low salt/ HTN/ Heart healthy carbohydrate restricted cardiac diet   The patient is advised to reduce or avoid caffeine or other cardiac stimulants.   Minimize or avoid  NSAID-type medications      Monitor for any signs of bleeding including red or dark stools. Fall precautions.   Advised staying uptodate with immunizations per established standard guidelines.    Offered to give patient  a copy of my notes     Questions were encouraged, asked and answered to the patient's  understanding and satisfaction. Questions if any regarding current medications and side effects, need for refills and importance of compliance to medications stressed.    Reviewed available prior notes, consults, prior visits, laboratory findings, radiology and cardiology relevant reports. Updated chart as applicable. I have reviewed the patient's medical history in detail and updated the computerized patient record as relevant.      Updated patient regarding any new or relevant abnormalities on review of records or any new findings on physical exam. Mentioned to patient about purpose of visit and desirable health short and long term goals and objectives.    Primary to monitor CBC CMP Lipid panel and TSH as applicable    ___________________________________________________________________________________________________________________________________________   Procedures    Assessment & Plan   Diagnoses and all orders for this visit:    1. Primary hypertension (Primary)    2. PAF (paroxysmal atrial fibrillation) new onset  -     Ambulatory Referral to Cardiac Electrophysiology    3. Pulmonary HTN  -     Ambulatory Referral to Heart Failure Clinic  -     Ambulatory Referral to Pulmonology    4. Anasarca  -      Ambulatory Referral to Vascular Surgery    5. Elevated brain natriuretic peptide (BNP) level  -     Ambulatory Referral to Heart Failure Clinic    6. Scrotal edema    7. Type 2 diabetes mellitus with hyperglycemia, without long-term current use of insulin    8. Weakness    9. Chronic anemia  -     Ambulatory Referral to Hematology    10. Pedal edema  -     Ambulatory Referral to Vascular Surgery    11. Snoring  -     Overnight Sleep Oximetry Study; Future  -     Ambulatory Referral to Sleep Medicine    Other orders  -     metOLazone (ZAROXOLYN) 2.5 MG tablet; Take 1 tablet by mouth Daily.  Dispense: 30 tablet; Refill: 11          Plan      Patient expressed understanding  Encouraged and answered all questions   Discussed with the patient and all questioned fully answered. He will call me if any problems arise.   Discussed results of prior testing with patient : echo and other   Orders Placed This Encounter   Procedures    Ambulatory Referral to Vascular Surgery     Referral Priority:   Routine     Referral Type:   Consultation     Referral Reason:   Specialty Services Required     Requested Specialty:   Vascular Surgery     Number of Visits Requested:   1    Ambulatory Referral to Hematology     Referral Priority:   Routine     Referral Type:   Consultation     Referral Reason:   Specialty Services Required     Requested Specialty:   Hematology     Number of Visits Requested:   1    Ambulatory Referral to Heart Failure Clinic     Referral Priority:   Routine     Referral Type:   Consultation     Referral Reason:   Specialty Services Required     Requested Specialty:   Cardiology     Number of Visits Requested:   1    Ambulatory Referral to Cardiac Electrophysiology     Referral Priority:   Routine     Referral Type:   Consultation     Referral Reason:   Specialty Services Required     Requested Specialty:   Cardiac Electrophysiology     Number of Visits Requested:   1    Ambulatory Referral to Sleep Medicine      Referral Priority:   Routine     Referral Type:   Consultation     Number of Visits Requested:   1    Ambulatory Referral to Pulmonology     Referral Priority:   Routine     Referral Type:   Consultation     Referral Reason:   Specialty Services Required     Requested Specialty:   Pulmonary Disease     Number of Visits Requested:   1    Overnight Sleep Oximetry Study     Standing Status:   Future     Standing Expiration Date:   4/1/2025        Monitor CBC, BNP and BMP   May need urology consult for massive scrotal edema     Check BP and heart rates twice daily initially till blood pressures and heart rates under good control and then at least 3x / week,   If blood pressures continue to be well-controlled then can check week a month  at home and bring a recording for review next visit  If BP >130/85 or < 100/60 persistently over 3 reading 30 mins apart or if heart rates persistently above 100 bpm or less than 55 bpm call sooner for evaluation and advise               Return in about 2 weeks (around 4/15/2024).

## 2024-04-01 NOTE — TELEPHONE ENCOUNTER
PATIENT IS NEEDING TO HAVE AN ENDOSCOPY AND COLONOSCOPY IT IS SCHEDULED ON 04/17/2024 THEY WOULD LIKE HIM OFF ELIQUIS X 3 DAYS.    PLEASE ADVISE

## 2024-04-05 ENCOUNTER — TELEPHONE (OUTPATIENT)
Dept: CARDIOLOGY | Facility: CLINIC | Age: 59
End: 2024-04-05
Payer: COMMERCIAL

## 2024-04-05 NOTE — TELEPHONE ENCOUNTER
Caller: Gwen Rodriguez    Relationship: Emergency Contact    Best call back number: [phone]    What is the best time to reach you: ANY    Who are you requesting to speak with (clinical staff, provider,  specific staff member): ANY    Do you know the name of the person who called:     What was the call regarding: PATIENT IS WANTING TO KNOW IF HE NEEDS BLOOD WORK DONE BEFORE HIS 4-17-24 APPT    Is it okay if the provider responds through MyChart: PLEASE CALL

## 2024-04-17 ENCOUNTER — LAB (OUTPATIENT)
Dept: LAB | Facility: HOSPITAL | Age: 59
End: 2024-04-17
Payer: COMMERCIAL

## 2024-04-17 ENCOUNTER — OFFICE VISIT (OUTPATIENT)
Dept: CARDIOLOGY | Facility: CLINIC | Age: 59
End: 2024-04-17
Payer: COMMERCIAL

## 2024-04-17 VITALS
BODY MASS INDEX: 29.78 KG/M2 | DIASTOLIC BLOOD PRESSURE: 73 MMHG | HEART RATE: 76 BPM | SYSTOLIC BLOOD PRESSURE: 150 MMHG | HEIGHT: 70 IN | WEIGHT: 208 LBS

## 2024-04-17 DIAGNOSIS — E11.65 TYPE 2 DIABETES MELLITUS WITH HYPERGLYCEMIA, WITHOUT LONG-TERM CURRENT USE OF INSULIN: ICD-10-CM

## 2024-04-17 DIAGNOSIS — I27.20 PULMONARY HTN: ICD-10-CM

## 2024-04-17 DIAGNOSIS — R60.1 ANASARCA: ICD-10-CM

## 2024-04-17 DIAGNOSIS — I48.0 PAF (PAROXYSMAL ATRIAL FIBRILLATION): Primary | ICD-10-CM

## 2024-04-17 DIAGNOSIS — I48.0 PAF (PAROXYSMAL ATRIAL FIBRILLATION): ICD-10-CM

## 2024-04-17 DIAGNOSIS — N50.89 SCROTAL EDEMA: ICD-10-CM

## 2024-04-17 DIAGNOSIS — R06.09 DOE (DYSPNEA ON EXERTION): ICD-10-CM

## 2024-04-17 DIAGNOSIS — I10 PRIMARY HYPERTENSION: ICD-10-CM

## 2024-04-17 LAB
ANION GAP SERPL CALCULATED.3IONS-SCNC: 8 MMOL/L (ref 5–15)
BASOPHILS # BLD AUTO: 0.06 10*3/MM3 (ref 0–0.2)
BASOPHILS NFR BLD AUTO: 0.9 % (ref 0–1.5)
BUN SERPL-MCNC: 52 MG/DL (ref 6–20)
BUN/CREAT SERPL: 30.8 (ref 7–25)
CALCIUM SPEC-SCNC: 8.4 MG/DL (ref 8.6–10.5)
CHLORIDE SERPL-SCNC: 105 MMOL/L (ref 98–107)
CO2 SERPL-SCNC: 27 MMOL/L (ref 22–29)
CREAT SERPL-MCNC: 1.69 MG/DL (ref 0.76–1.27)
DEPRECATED RDW RBC AUTO: 58.4 FL (ref 37–54)
EGFRCR SERPLBLD CKD-EPI 2021: 46.5 ML/MIN/1.73
EOSINOPHIL # BLD AUTO: 0.18 10*3/MM3 (ref 0–0.4)
EOSINOPHIL NFR BLD AUTO: 2.7 % (ref 0.3–6.2)
ERYTHROCYTE [DISTWIDTH] IN BLOOD BY AUTOMATED COUNT: 17.3 % (ref 12.3–15.4)
GLUCOSE SERPL-MCNC: 92 MG/DL (ref 65–99)
HCT VFR BLD AUTO: 28.7 % (ref 37.5–51)
HGB BLD-MCNC: 8.8 G/DL (ref 13–17.7)
IMM GRANULOCYTES # BLD AUTO: 0.03 10*3/MM3 (ref 0–0.05)
IMM GRANULOCYTES NFR BLD AUTO: 0.5 % (ref 0–0.5)
LYMPHOCYTES # BLD AUTO: 1.92 10*3/MM3 (ref 0.7–3.1)
LYMPHOCYTES NFR BLD AUTO: 28.9 % (ref 19.6–45.3)
MCH RBC QN AUTO: 27.8 PG (ref 26.6–33)
MCHC RBC AUTO-ENTMCNC: 30.7 G/DL (ref 31.5–35.7)
MCV RBC AUTO: 90.5 FL (ref 79–97)
MONOCYTES # BLD AUTO: 0.48 10*3/MM3 (ref 0.1–0.9)
MONOCYTES NFR BLD AUTO: 7.2 % (ref 5–12)
NEUTROPHILS NFR BLD AUTO: 3.98 10*3/MM3 (ref 1.7–7)
NEUTROPHILS NFR BLD AUTO: 59.8 % (ref 42.7–76)
NRBC BLD AUTO-RTO: 0 /100 WBC (ref 0–0.2)
NT-PROBNP SERPL-MCNC: 3410 PG/ML (ref 0–900)
PLATELET # BLD AUTO: 250 10*3/MM3 (ref 140–450)
PMV BLD AUTO: 10.4 FL (ref 6–12)
POTASSIUM SERPL-SCNC: 5.5 MMOL/L (ref 3.5–5.2)
RBC # BLD AUTO: 3.17 10*6/MM3 (ref 4.14–5.8)
SODIUM SERPL-SCNC: 140 MMOL/L (ref 136–145)
WBC NRBC COR # BLD AUTO: 6.65 10*3/MM3 (ref 3.4–10.8)

## 2024-04-17 PROCEDURE — 85025 COMPLETE CBC W/AUTO DIFF WBC: CPT

## 2024-04-17 PROCEDURE — 99214 OFFICE O/P EST MOD 30 MIN: CPT | Performed by: INTERNAL MEDICINE

## 2024-04-17 PROCEDURE — 36415 COLL VENOUS BLD VENIPUNCTURE: CPT

## 2024-04-17 PROCEDURE — 83880 ASSAY OF NATRIURETIC PEPTIDE: CPT

## 2024-04-17 PROCEDURE — 80048 BASIC METABOLIC PNL TOTAL CA: CPT

## 2024-04-17 NOTE — PROGRESS NOTES
Garrett Rodriguez  [id number]  1965  59 y.o.  male    Referring Provider: Mehul Andersen MD    Reason for  Visit:  short term office follow up after recent encounter       Subjective    Mild chronic exertional shortness of breath on exertion relieved with rest  No significant cough or wheezing    No palpitations  No associated chest pain     No fever or chills  No significant expectoration    No hemoptysis  No presyncope or syncope    Tolerating current medications well with no untoward side effects   Compliant with prescribed medication regimen. Tries to adhere to cardiac diet.     Moderate pedal edema     History of present illness:  Garrett Rodriguez is a 59 y.o. yo male with congestive heart failure who presents today for   Chief Complaint   Patient presents with    Follow-up     Results recent heart monitor    .    History  Past Medical History:   Diagnosis Date    Atrial fibrillation 3/11/2024    Chronic pain in right foot     Diabetes mellitus     Hyperlipidemia Feb 2024    Hypertension     PFO (patent foramen ovale)     RP at age 6   ,   Past Surgical History:   Procedure Laterality Date    ANKLE FUSION Right 07/11/2023    Procedure: ANKLE ARTHRODESIS;  Surgeon: Shahbaz Reddy DPM;  Location:  [facility];  Service: Podiatry;  Laterality: Right;    BACK SURGERY      CARDIAC SURGERY      EXTERNAL FIXATION ANKLE FRACTURE Right 07/11/2023    Procedure: POSSIBLE EXTERNAL FIXATION, RIGHT ANKLE;  Surgeon: Shahbaz Reddy DPM;  Location:  [facility];  Service: Podiatry;  Laterality: Right;    HARDWARE REMOVAL Right 07/11/2023    Procedure: REMOVAL OF HARDWARE, DEEP; ANKLE ARTHRODESIS; SUBTALAR ARTHRODESIS; POSSIBLE EXTERNAL FIXATION, RIGHT ANKLE;  Surgeon: Shahbaz Reddy DPM;  Location:  [facility];  Service: Podiatry;  Laterality: Right;    PATENT FORAMEN OVALE CLOSURE      ROTATOR CUFF REPAIR Right     SUBTALAR ARTHRODESIS Right 07/11/2023    Procedure: SUBTALAR ARTHRODESIS;  Surgeon: Barry  Shahbaz PRAKASH DPM;  Location:  [facility];  Service: Podiatry;  Laterality: Right;   ,   Family History   Problem Relation Age of Onset    Hypertension Mother     Hypertension Father    ,   Social History     Tobacco Use    Smoking status: Never    Smokeless tobacco: Never   Vaping Use    Vaping status: Never Used   Substance Use Topics    Alcohol use: Never    Drug use: Never   ,     Medications  Current Outpatient Medications   Medication Sig Dispense Refill    apixaban (ELIQUIS) 5 MG tablet tablet Take 1 tablet by mouth 2 (Two) Times a Day. 60 tablet 2    furosemide (LASIX) 40 MG tablet Take 1 tablet by mouth Daily. Take an extra dose for any 2-3 pound weight gain (Patient taking differently: Take 1 tablet by mouth 2 (Two) Times a Day. Take an extra dose for any 2-3 pound weight gain) 50 tablet 2    HYDROcodone-acetaminophen (NORCO)  MG per tablet Take 1 tablet by mouth Every 8 (Eight) Hours As Needed for Moderate Pain.      lisinopril (PRINIVIL,ZESTRIL) 20 MG tablet Take 1 tablet by mouth Daily. 30 tablet 2    metFORMIN (GLUCOPHAGE) 1000 MG tablet Take 1 tablet by mouth 2 (Two) Times a Day With Meals.      metOLazone (ZAROXOLYN) 2.5 MG tablet Take 1 tablet by mouth Daily. 30 tablet 11    naloxone (NARCAN) 4 MG/0.1ML nasal spray Call 911. Don't prime. [place] in 1 nostril for overdose. Repeat in 2-3 minutes in other nostril if no or minimal breathing/responsiveness. 2 each 0    pregabalin (LYRICA) 100 MG capsule Take 1 capsule by mouth 3 (Three) Times a Day.       No current facility-administered medications for this visit.       Allergies:  Patient has no known allergies.    Review of Systems  Review of Systems   Constitutional: Positive for malaise/fatigue.   HENT: Negative.     Eyes: Negative.    Cardiovascular:  Positive for dyspnea on exertion and leg swelling. Negative for chest pain, claudication, cyanosis, irregular heartbeat, near-syncope, orthopnea, palpitations, paroxysmal nocturnal dyspnea and  "syncope.   Respiratory: Negative.     Endocrine: Negative.    Hematologic/Lymphatic: Negative.    Skin: Negative.    Gastrointestinal:  Negative for anorexia.   Genitourinary: Negative.    Neurological: Negative.    Psychiatric/Behavioral: Negative.         Objective     Physical Exam:  /73   Pulse 76   Ht 177.8 cm (70\")   Wt 94.3 kg (208 lb)   BMI 29.84 kg/m²     Physical Exam  Constitutional:       Appearance: He is well-developed.   HENT:      Head: Normocephalic.   Neck:      Vascular: Normal carotid pulses. No carotid bruit or JVD.      Trachea: No tracheal tenderness or tracheal deviation.   Cardiovascular:      Rate and Rhythm: Regular rhythm.      Pulses: Normal pulses.      Heart sounds: Normal heart sounds.       with a grade of 2/6.   Pulmonary:      Effort: Pulmonary effort is normal.      Breath sounds: No stridor.   Abdominal:      Palpations: Abdomen is soft.   Musculoskeletal:      Cervical back: No edema.      Right lower le+ Pitting Edema present.      Left lower le+ Pitting Edema present.   Skin:     General: Skin is warm.   Neurological:      Mental Status: He is alert.      Cranial Nerves: No cranial nerve deficit.      Sensory: No sensory deficit.   Psychiatric:         Speech: Speech normal.         Behavior: Behavior normal.         Results Review:    Narrative & Impression   History: Swelling     IMPRESSION:  Impression: There is no evidence of deep venous thrombosis or  superficial thrombophlebitis of right or left lower extremities.     Comments: Bilateral lower extremity venous duplex exam was performed  using color Doppler flow, Doppler waveform analysis, and grayscale  imaging, with and without compression. There is no evidence of deep  venous thrombosis in the common femoral, superficial femoral, popliteal,  peroneal, anterior tibial, and posterior tibial veins bilaterally. No  thrombus is identified in the saphenofemoral junctions and greater  saphenous veins " bilaterally.            This report was signed and finalized on 3/12/2024 3:09 PM by Dr. Ricardo Hawk MD.          Narrative & Impression   EXAMINATION: NM LUNG SCAN PERFUSION PARTICULATE- 3/12/2024 12:39 PM     HISTORY: suspect pulmonary embolis, negative CTA, pulmonary HTN;  E87.70-Fluid overload, unspecified; R18.8-Other ascites;  I48.91-Unspecified atrial fibrillation; R91.1-Solitary pulmonary nodule.     Dose: 5.4 mCi technetium 99m MAA intravenously.     REPORT: Perfusion-only scintigraphic images of the lungs were obtained  in the anterior, posterior and oblique dimensions following  administration of the radiotracer.     COMPARISON: CT angio chest 3/11/2024.     Distribution of activity within the lungs is homogeneous, there are no  focal segmental or subsegmental defects.     IMPRESSION:  No evidence of pulmonary thromboembolic disease, low  probability perfusion lung scan.     This report was signed and finalized on 3/12/2024 12:42 PM by Dr. Brandin Seymour MD.                     Regadenoson Stress Test with Myocardial Perfusion SPECT (Multi Study)    Accession Number: [id number]   Date of Study: 3/15/24   Ordering Provider: Ba Cosme DO   Clinical Indications: Heart Failure        [facility]  Performing Staff   ECG [place], SPECT [place]: Roland Epps MD    Tech: Rica Coelho   Support Staff: Neha Segal RN        Interpretation Summary         Myocardial perfusion imaging indicates a normal myocardial perfusion study with no evidence of ischemia. Impressions are consistent with a low risk study.    Left ventricular ejection fraction is normal (Calculated EF = 54%).    Findings consistent with a normal ECG stress test.    There is no prior study available for comparison.       Results for orders placed during the hospital encounter of 03/11/24    Adult Transthoracic Echo Complete w/ Color, Spectral and Contrast if Necessary Per Protocol    Interpretation Summary    Left  ventricular ejection fraction appears to be 56 - 60%.    Left ventricular wall thickness is consistent with mild concentric hypertrophy.    The left atrial cavity is moderately dilated.    Left atrial volume is severely increased.    Moderate pulmonary hypertension is present.    Apical RV free wall hypokinesis        ____________________________________________________________________________________________________________________________________________  Health maintenance and recommendations    Low salt/ HTN/ Heart healthy carbohydrate restricted cardiac diet   The patient is advised to reduce or avoid caffeine or other cardiac stimulants.   Minimize or avoid  NSAID-type medications      Monitor for any signs of bleeding including red or dark stools. Fall precautions.   Advised staying uptodate with immunizations per established standard guidelines.    Offered to give patient  a copy of my notes     Questions were encouraged, asked and answered to the patient's  understanding and satisfaction. Questions if any regarding current medications and side effects, need for refills and importance of compliance to medications stressed.    Reviewed available prior notes, consults, prior visits, laboratory findings, radiology and cardiology relevant reports. Updated chart as applicable. I have reviewed the patient's medical history in detail and updated the computerized patient record as relevant.      Updated patient regarding any new or relevant abnormalities on review of records or any new findings on physical exam. Mentioned to patient about purpose of visit and desirable health short and long term goals and objectives.    Primary to monitor CBC CMP Lipid panel and TSH as applicable    ___________________________________________________________________________________________________________________________________________   Procedures    Assessment & Plan   Diagnoses and all orders for this visit:    1. PAF  (paroxysmal atrial fibrillation) new onset (Primary)  -     Basic Metabolic Panel; Standing  -     CBC & Differential; Standing    2. Primary hypertension    3. Pulmonary HTN  -     Basic Metabolic Panel; Standing  -     CBC & Differential; Standing    4. Type 2 diabetes mellitus with hyperglycemia, without long-term current use of insulin    5. Anasarca  -     Basic Metabolic Panel; Standing  -     CBC & Differential; Standing    6. Scrotal edema    7. WYNNE (dyspnea on exertion)  -     BNP; Future          Plan      Patient expressed understanding  Encouraged and answered all questions   Discussed with the patient and all questioned fully answered. He will call me if any problems arise.   Discussed results of prior testing with patient : echo and other      May need urology consult for massive scrotal edema     Check BP and heart rates twice daily initially till blood pressures and heart rates under good control and then at least 3x / week,   If blood pressures continue to be well-controlled then can check week a month  at home and bring a recording for review next visit  If BP >130/85 or < 100/60 persistently over 3 reading 30 mins apart or if heart rates persistently above 100 bpm or less than 55 bpm call sooner for evaluation and advise     Overall appears to have lost weight     Orders Placed This Encounter   Procedures    Basic Metabolic Panel     Standing Status:   Standing     Number of Occurrences:   99     Standing Expiration Date:   4/17/2025     Order Specific Question:   Release to patient     Answer:   Routine Release [[id number]]    BNP     Standing Status:   Future     Standing Expiration Date:   4/17/2025     Order Specific Question:   Release to patient     Answer:   Routine Release [[id number]]    CBC & Differential     Standing Status:   Standing     Number of Occurrences:   99     Standing Expiration Date:   4/17/2025     Order Specific Question:   Manual Differential     Answer:   No     Order  Specific Question:   Release to patient     Answer:   Routine Release [[id number]]      Increased but acceptable cardiovascular risk of planned procedure: upper GI endoscopy and colonoscopy   He will get this done at [facility]   Hold Eliquis for 48 hours prior to procedure   Can proceed with surgery with usual caution and perioperative hemodynamic and cardiac rhythm monitoring.             Return in about 4 weeks (around 5/15/2024).

## 2024-04-23 ENCOUNTER — TRANSCRIBE ORDERS (OUTPATIENT)
Dept: ADMINISTRATIVE | Facility: HOSPITAL | Age: 59
End: 2024-04-23
Payer: COMMERCIAL

## 2024-04-23 ENCOUNTER — LAB REQUISITION (OUTPATIENT)
Dept: LAB | Facility: HOSPITAL | Age: 59
End: 2024-04-23
Payer: OTHER MISCELLANEOUS

## 2024-04-23 ENCOUNTER — LAB (OUTPATIENT)
Dept: LAB | Facility: HOSPITAL | Age: 59
End: 2024-04-23
Payer: OTHER MISCELLANEOUS

## 2024-04-23 ENCOUNTER — OFFICE VISIT (OUTPATIENT)
Dept: WOUND CARE | Facility: HOSPITAL | Age: 59
End: 2024-04-23
Payer: OTHER MISCELLANEOUS

## 2024-04-23 DIAGNOSIS — M86.9 DIABETIC FOOT ULCER WITH OSTEOMYELITIS: Primary | ICD-10-CM

## 2024-04-23 DIAGNOSIS — L97.509 DIABETIC FOOT ULCER WITH OSTEOMYELITIS: ICD-10-CM

## 2024-04-23 DIAGNOSIS — E11.69 DIABETIC FOOT ULCER WITH OSTEOMYELITIS: ICD-10-CM

## 2024-04-23 DIAGNOSIS — M86.9 DIABETIC FOOT ULCER WITH OSTEOMYELITIS: ICD-10-CM

## 2024-04-23 DIAGNOSIS — E11.621 DIABETIC FOOT ULCER WITH OSTEOMYELITIS: Primary | ICD-10-CM

## 2024-04-23 DIAGNOSIS — E11.69 DIABETIC FOOT ULCER WITH OSTEOMYELITIS: Primary | ICD-10-CM

## 2024-04-23 DIAGNOSIS — L97.509 DIABETIC FOOT ULCER WITH OSTEOMYELITIS: Primary | ICD-10-CM

## 2024-04-23 DIAGNOSIS — E11.621 DIABETIC FOOT ULCER WITH OSTEOMYELITIS: ICD-10-CM

## 2024-04-23 DIAGNOSIS — E11.621 TYPE 2 DIABETES MELLITUS WITH FOOT ULCER (CODE): ICD-10-CM

## 2024-04-23 LAB
ALBUMIN SERPL-MCNC: 3.8 G/DL (ref 3.5–5.2)
ALBUMIN/GLOB SERPL: 1.1 G/DL
ALP SERPL-CCNC: 209 U/L (ref 39–117)
ALT SERPL W P-5'-P-CCNC: 23 U/L (ref 1–41)
ANION GAP SERPL CALCULATED.3IONS-SCNC: 8 MMOL/L (ref 5–15)
AST SERPL-CCNC: 23 U/L (ref 1–40)
BASOPHILS # BLD AUTO: 0.06 10*3/MM3 (ref 0–0.2)
BASOPHILS NFR BLD AUTO: 0.8 % (ref 0–1.5)
BILIRUB SERPL-MCNC: 0.4 MG/DL (ref 0–1.2)
BUN SERPL-MCNC: 43 MG/DL (ref 6–20)
BUN/CREAT SERPL: 33.6 (ref 7–25)
CALCIUM SPEC-SCNC: 8.7 MG/DL (ref 8.6–10.5)
CHLORIDE SERPL-SCNC: 105 MMOL/L (ref 98–107)
CO2 SERPL-SCNC: 30 MMOL/L (ref 22–29)
CREAT SERPL-MCNC: 1.28 MG/DL (ref 0.76–1.27)
CRP SERPL-MCNC: 1.5 MG/DL (ref 0–0.5)
DEPRECATED RDW RBC AUTO: 58.7 FL (ref 37–54)
EGFRCR SERPLBLD CKD-EPI 2021: 64.9 ML/MIN/1.73
EOSINOPHIL # BLD AUTO: 0.11 10*3/MM3 (ref 0–0.4)
EOSINOPHIL NFR BLD AUTO: 1.5 % (ref 0.3–6.2)
ERYTHROCYTE [DISTWIDTH] IN BLOOD BY AUTOMATED COUNT: 17.2 % (ref 12.3–15.4)
ERYTHROCYTE [SEDIMENTATION RATE] IN BLOOD: 44 MM/HR (ref 0–20)
GLOBULIN UR ELPH-MCNC: 3.4 GM/DL
GLUCOSE SERPL-MCNC: 170 MG/DL (ref 65–99)
HBA1C MFR BLD: 7 % (ref 4.8–5.6)
HCT VFR BLD AUTO: 29.1 % (ref 37.5–51)
HGB BLD-MCNC: 8.5 G/DL (ref 13–17.7)
IMM GRANULOCYTES # BLD AUTO: 0.04 10*3/MM3 (ref 0–0.05)
IMM GRANULOCYTES NFR BLD AUTO: 0.5 % (ref 0–0.5)
LYMPHOCYTES # BLD AUTO: 1.64 10*3/MM3 (ref 0.7–3.1)
LYMPHOCYTES NFR BLD AUTO: 21.9 % (ref 19.6–45.3)
MCH RBC QN AUTO: 26.8 PG (ref 26.6–33)
MCHC RBC AUTO-ENTMCNC: 29.2 G/DL (ref 31.5–35.7)
MCV RBC AUTO: 91.8 FL (ref 79–97)
MONOCYTES # BLD AUTO: 0.58 10*3/MM3 (ref 0.1–0.9)
MONOCYTES NFR BLD AUTO: 7.8 % (ref 5–12)
NEUTROPHILS NFR BLD AUTO: 5.05 10*3/MM3 (ref 1.7–7)
NEUTROPHILS NFR BLD AUTO: 67.5 % (ref 42.7–76)
NRBC BLD AUTO-RTO: 0 /100 WBC (ref 0–0.2)
PLATELET # BLD AUTO: 229 10*3/MM3 (ref 140–450)
PMV BLD AUTO: 9.9 FL (ref 6–12)
POTASSIUM SERPL-SCNC: 5 MMOL/L (ref 3.5–5.2)
PREALB SERPL-MCNC: 16.8 MG/DL (ref 20–40)
PROT SERPL-MCNC: 7.2 G/DL (ref 6–8.5)
RBC # BLD AUTO: 3.17 10*6/MM3 (ref 4.14–5.8)
SODIUM SERPL-SCNC: 143 MMOL/L (ref 136–145)
WBC NRBC COR # BLD AUTO: 7.48 10*3/MM3 (ref 3.4–10.8)

## 2024-04-23 PROCEDURE — 84134 ASSAY OF PREALBUMIN: CPT

## 2024-04-23 PROCEDURE — 83036 HEMOGLOBIN GLYCOSYLATED A1C: CPT

## 2024-04-23 PROCEDURE — 36415 COLL VENOUS BLD VENIPUNCTURE: CPT

## 2024-04-23 PROCEDURE — 87075 CULTR BACTERIA EXCEPT BLOOD: CPT

## 2024-04-23 PROCEDURE — 85652 RBC SED RATE AUTOMATED: CPT

## 2024-04-23 PROCEDURE — G0463 HOSPITAL OUTPT CLINIC VISIT: HCPCS

## 2024-04-23 PROCEDURE — 87205 SMEAR GRAM STAIN: CPT

## 2024-04-23 PROCEDURE — 87070 CULTURE OTHR SPECIMN AEROBIC: CPT

## 2024-04-23 PROCEDURE — 86140 C-REACTIVE PROTEIN: CPT

## 2024-04-23 PROCEDURE — 85025 COMPLETE CBC W/AUTO DIFF WBC: CPT

## 2024-04-23 PROCEDURE — 80053 COMPREHEN METABOLIC PANEL: CPT

## 2024-04-23 PROCEDURE — 87176 TISSUE HOMOGENIZATION CULTR: CPT

## 2024-04-25 LAB
BACTERIA SPEC AEROBE CULT: ABNORMAL
BACTERIA SPEC AEROBE CULT: ABNORMAL
GRAM STN SPEC: ABNORMAL

## 2024-04-28 LAB — BACTERIA SPEC ANAEROBE CULT: NORMAL

## 2024-04-29 ENCOUNTER — TRANSCRIBE ORDERS (OUTPATIENT)
Dept: ADMINISTRATIVE | Facility: HOSPITAL | Age: 59
End: 2024-04-29
Payer: COMMERCIAL

## 2024-04-29 ENCOUNTER — TELEPHONE (OUTPATIENT)
Dept: VASCULAR SURGERY | Facility: CLINIC | Age: 59
End: 2024-04-29
Payer: COMMERCIAL

## 2024-04-29 DIAGNOSIS — I73.9 PERIPHERAL VASCULAR DISEASE, UNSPECIFIED: Primary | ICD-10-CM

## 2024-04-30 ENCOUNTER — OFFICE VISIT (OUTPATIENT)
Dept: VASCULAR SURGERY | Facility: CLINIC | Age: 59
End: 2024-04-30
Payer: OTHER MISCELLANEOUS

## 2024-04-30 ENCOUNTER — LAB (OUTPATIENT)
Dept: LAB | Facility: HOSPITAL | Age: 59
End: 2024-04-30
Payer: COMMERCIAL

## 2024-04-30 ENCOUNTER — HOSPITAL ENCOUNTER (OUTPATIENT)
Dept: ULTRASOUND IMAGING | Facility: HOSPITAL | Age: 59
Discharge: HOME | End: 2024-04-30
Payer: OTHER MISCELLANEOUS

## 2024-04-30 ENCOUNTER — TRANSCRIBE ORDERS (OUTPATIENT)
Dept: ADMINISTRATIVE | Facility: HOSPITAL | Age: 59
End: 2024-04-30
Payer: COMMERCIAL

## 2024-04-30 VITALS
BODY MASS INDEX: 26.2 KG/M2 | OXYGEN SATURATION: 97 % | DIASTOLIC BLOOD PRESSURE: 80 MMHG | HEART RATE: 74 BPM | WEIGHT: 183 LBS | SYSTOLIC BLOOD PRESSURE: 130 MMHG | HEIGHT: 70 IN

## 2024-04-30 DIAGNOSIS — I48.0 PAF (PAROXYSMAL ATRIAL FIBRILLATION): ICD-10-CM

## 2024-04-30 DIAGNOSIS — M79.89 LEG SWELLING: Primary | ICD-10-CM

## 2024-04-30 DIAGNOSIS — E11.65 TYPE 2 DIABETES MELLITUS WITH HYPERGLYCEMIA, WITHOUT LONG-TERM CURRENT USE OF INSULIN: ICD-10-CM

## 2024-04-30 DIAGNOSIS — R60.1 ANASARCA: ICD-10-CM

## 2024-04-30 DIAGNOSIS — I10 PRIMARY HYPERTENSION: ICD-10-CM

## 2024-04-30 DIAGNOSIS — R79.89 ELEVATED BRAIN NATRIURETIC PEPTIDE (BNP) LEVEL: ICD-10-CM

## 2024-04-30 DIAGNOSIS — I89.0 LYMPHEDEMA: ICD-10-CM

## 2024-04-30 DIAGNOSIS — I73.9 PERIPHERAL VASCULAR DISEASE, UNSPECIFIED: Primary | ICD-10-CM

## 2024-04-30 DIAGNOSIS — R60.9 FLUID RETENTION: ICD-10-CM

## 2024-04-30 DIAGNOSIS — I27.20 PULMONARY HTN: ICD-10-CM

## 2024-04-30 DIAGNOSIS — I73.9 PERIPHERAL VASCULAR DISEASE, UNSPECIFIED: ICD-10-CM

## 2024-04-30 LAB
ANION GAP SERPL CALCULATED.3IONS-SCNC: 10 MMOL/L (ref 5–15)
BASOPHILS # BLD AUTO: 0.03 10*3/MM3 (ref 0–0.2)
BASOPHILS NFR BLD AUTO: 0.4 % (ref 0–1.5)
BUN SERPL-MCNC: 51 MG/DL (ref 6–20)
BUN/CREAT SERPL: 40.2 (ref 7–25)
CALCIUM SPEC-SCNC: 8.9 MG/DL (ref 8.6–10.5)
CHLORIDE SERPL-SCNC: 104 MMOL/L (ref 98–107)
CO2 SERPL-SCNC: 30 MMOL/L (ref 22–29)
CREAT SERPL-MCNC: 1.27 MG/DL (ref 0.76–1.27)
DEPRECATED RDW RBC AUTO: 57.5 FL (ref 37–54)
EGFRCR SERPLBLD CKD-EPI 2021: 65.5 ML/MIN/1.73
EOSINOPHIL # BLD AUTO: 0.09 10*3/MM3 (ref 0–0.4)
EOSINOPHIL NFR BLD AUTO: 1.3 % (ref 0.3–6.2)
ERYTHROCYTE [DISTWIDTH] IN BLOOD BY AUTOMATED COUNT: 17.2 % (ref 12.3–15.4)
GLUCOSE SERPL-MCNC: 119 MG/DL (ref 65–99)
HCT VFR BLD AUTO: 30 % (ref 37.5–51)
HGB BLD-MCNC: 8.9 G/DL (ref 13–17.7)
IMM GRANULOCYTES # BLD AUTO: 0.03 10*3/MM3 (ref 0–0.05)
IMM GRANULOCYTES NFR BLD AUTO: 0.4 % (ref 0–0.5)
LYMPHOCYTES # BLD AUTO: 1.76 10*3/MM3 (ref 0.7–3.1)
LYMPHOCYTES NFR BLD AUTO: 24.5 % (ref 19.6–45.3)
MCH RBC QN AUTO: 27.1 PG (ref 26.6–33)
MCHC RBC AUTO-ENTMCNC: 29.7 G/DL (ref 31.5–35.7)
MCV RBC AUTO: 91.2 FL (ref 79–97)
MONOCYTES # BLD AUTO: 0.64 10*3/MM3 (ref 0.1–0.9)
MONOCYTES NFR BLD AUTO: 8.9 % (ref 5–12)
NEUTROPHILS NFR BLD AUTO: 4.62 10*3/MM3 (ref 1.7–7)
NEUTROPHILS NFR BLD AUTO: 64.5 % (ref 42.7–76)
NRBC BLD AUTO-RTO: 0 /100 WBC (ref 0–0.2)
NT-PROBNP SERPL-MCNC: 4247 PG/ML (ref 0–900)
PLATELET # BLD AUTO: 251 10*3/MM3 (ref 140–450)
PMV BLD AUTO: 10.4 FL (ref 6–12)
POTASSIUM SERPL-SCNC: 4.1 MMOL/L (ref 3.5–5.2)
RBC # BLD AUTO: 3.29 10*6/MM3 (ref 4.14–5.8)
SODIUM SERPL-SCNC: 144 MMOL/L (ref 136–145)
WBC NRBC COR # BLD AUTO: 7.17 10*3/MM3 (ref 3.4–10.8)

## 2024-04-30 PROCEDURE — 83880 ASSAY OF NATRIURETIC PEPTIDE: CPT

## 2024-04-30 PROCEDURE — 99204 OFFICE O/P NEW MOD 45 MIN: CPT | Performed by: SURGERY

## 2024-04-30 PROCEDURE — 36415 COLL VENOUS BLD VENIPUNCTURE: CPT

## 2024-04-30 PROCEDURE — 80048 BASIC METABOLIC PNL TOTAL CA: CPT

## 2024-04-30 PROCEDURE — 93923 UPR/LXTR ART STDY 3+ LVLS: CPT

## 2024-04-30 PROCEDURE — 85025 COMPLETE CBC W/AUTO DIFF WBC: CPT

## 2024-04-30 PROCEDURE — 93923 UPR/LXTR ART STDY 3+ LVLS: CPT | Performed by: SURGERY

## 2024-04-30 NOTE — PROGRESS NOTES
04/30/2024      Rigo Kathleen MD  [address]  [id number]  [place] [address]    Garrett Rodriguez  1965    Chief Complaint   Patient presents with    Establish Care     Patient is here to establish care patient was referred by Dr. Kathleen due to  Anasarca, Pedal edema. Patient was also sent a referral from Wound Care due to Peripheral vascular disease. He did have an CECILIA preformed at [facility] today.          Dear Rigo Kathleen MD    HPI  I had the pleasure of seeing your patient Garrett Rodriguez in the office today.  Thank you kindly for this consultation.  As you recall, Garrett Rodriguez is a 59 y.o.  male who you are currently following for congestive heart failure.  He did have anasarca, but now fluid has be removed.  He has had 5 right ankle surgeries.  He has a small healing wound to his right heel. He does have a brace to his right ankle with chronic right leg swelling since his injury at work necessitating multiple surgical intervention.  He is maintained on Eliquis.  He is taking lasix to keep swelling down.  He is wearing compression stockings. He did have noninvasive testing performed today, which I did review in office.        Past Medical History:   Diagnosis Date    Atrial fibrillation 3/11/2024    Chronic pain in right foot     Diabetes mellitus     Hyperlipidemia Feb 2024    Hypertension     PFO (patent foramen ovale)     RP at age 6       Past Surgical History:   Procedure Laterality Date    ANKLE FUSION Right 07/11/2023    Procedure: ANKLE ARTHRODESIS;  Surgeon: Shahbaz Reddy DPM;  Location:  [facility];  Service: Podiatry;  Laterality: Right;    BACK SURGERY      CARDIAC SURGERY      EXTERNAL FIXATION ANKLE FRACTURE Right 07/11/2023    Procedure: POSSIBLE EXTERNAL FIXATION, RIGHT ANKLE;  Surgeon: Shahbaz Reddy DPM;  Location:  [facility];  Service: Podiatry;  Laterality: Right;    HARDWARE REMOVAL Right 07/11/2023    Procedure: REMOVAL OF HARDWARE, DEEP;  ANKLE ARTHRODESIS; SUBTALAR ARTHRODESIS; POSSIBLE EXTERNAL FIXATION, RIGHT ANKLE;  Surgeon: Shahbaz Reddy DPM;  Location:  [facility];  Service: Podiatry;  Laterality: Right;    PATENT FORAMEN OVALE CLOSURE      ROTATOR CUFF REPAIR Right     SUBTALAR ARTHRODESIS Right 07/11/2023    Procedure: SUBTALAR ARTHRODESIS;  Surgeon: Shahbaz Reddy DPM;  Location:  [facility];  Service: Podiatry;  Laterality: Right;       Family History   Problem Relation Age of Onset    Hypertension Mother     Hypertension Father        Social History     Socioeconomic History    Marital status:    Tobacco Use    Smoking status: Never    Smokeless tobacco: Never   Vaping Use    Vaping status: Never Used   Substance and Sexual Activity    Alcohol use: Never    Drug use: Never    Sexual activity: Not Currently     Partners: Female       No Known Allergies      Current Outpatient Medications:     apixaban (ELIQUIS) 5 MG tablet tablet, Take 1 tablet by mouth 2 (Two) Times a Day., Disp: 60 tablet, Rfl: 2    furosemide (LASIX) 40 MG tablet, Take 1 tablet by mouth Daily. Take an extra dose for any 2-3 pound weight gain (Patient taking differently: Take 1 tablet by mouth 2 (Two) Times a Day. Take an extra dose for any 2-3 pound weight gain), Disp: 50 tablet, Rfl: 2    HYDROcodone-acetaminophen (NORCO)  MG per tablet, Take 1 tablet by mouth Every 8 (Eight) Hours As Needed for Moderate Pain., Disp: , Rfl:     lisinopril (PRINIVIL,ZESTRIL) 20 MG tablet, Take 1 tablet by mouth Daily., Disp: 30 tablet, Rfl: 2    metFORMIN (GLUCOPHAGE) 1000 MG tablet, Take 1 tablet by mouth 2 (Two) Times a Day With Meals., Disp: , Rfl:     metOLazone (ZAROXOLYN) 2.5 MG tablet, Take 1 tablet by mouth Daily., Disp: 30 tablet, Rfl: 11    naloxone (NARCAN) 4 MG/0.1ML nasal spray, Call 911. Don't prime. [place] in 1 nostril for overdose. Repeat in 2-3 minutes in other nostril if no or minimal breathing/responsiveness., Disp: 2 each, Rfl: 0    pregabalin  "(LYRICA) 100 MG capsule, Take 1 capsule by mouth 3 (Three) Times a Day., Disp: , Rfl:     Review of Systems   Constitutional: Negative.    HENT: Negative.     Eyes: Negative.    Respiratory: Negative.     Cardiovascular:  Positive for leg swelling.   Gastrointestinal: Negative.    Endocrine: Negative.    Genitourinary: Negative.    Musculoskeletal: Negative.    Skin:  Positive for wound.   Allergic/Immunologic: Negative.    Neurological: Negative.    Hematological: Negative.    Psychiatric/Behavioral: Negative.     All other systems reviewed and are negative.    /80   Pulse 74   Ht 177.8 cm (70\")   Wt 83 kg (183 lb)   SpO2 97%   BMI 26.26 kg/m²     Physical Exam  Vitals and nursing note reviewed.   Constitutional:       Appearance: Normal appearance. He is well-developed.   HENT:      Head: Normocephalic and atraumatic.   Eyes:      General: No scleral icterus.     Pupils: Pupils are equal, round, and reactive to light.   Neck:      Thyroid: No thyromegaly.      Vascular: No carotid bruit or JVD.   Cardiovascular:      Rate and Rhythm: Normal rate and regular rhythm.      Pulses:           Carotid pulses are 2+ on the right side and 2+ on the left side.       Femoral pulses are 2+ on the right side and 2+ on the left side.       Popliteal pulses are 2+ on the right side and 2+ on the left side.        Dorsalis pedis pulses are 2+ on the right side and 2+ on the left side.        Posterior tibial pulses are 2+ on the right side and 2+ on the left side.      Heart sounds: Normal heart sounds.   Pulmonary:      Effort: Pulmonary effort is normal.      Breath sounds: Normal breath sounds.   Abdominal:      General: Bowel sounds are normal. There is no distension or abdominal bruit.      Palpations: Abdomen is soft. There is no mass.      Tenderness: There is no abdominal tenderness.   Musculoskeletal:         General: Swelling (right greater than left) present. Normal range of motion.      Cervical back: Neck " supple.      Comments: Lymphedema to right leg   Lymphadenopathy:      Cervical: No cervical adenopathy.   Skin:     General: Skin is warm and dry.   Neurological:      Mental Status: He is alert and oriented to person, place, and time.      Cranial Nerves: No cranial nerve deficit.      Sensory: No sensory deficit.   Psychiatric:         Mood and Affect: Mood normal.         Behavior: Behavior normal.         Thought Content: Thought content normal.         Judgment: Judgment normal.       Diagnostic Data:  Noninvasive testing including ABIs shows no arterial insufficiency to bilateral lower extremities.     Patient Active Problem List   Diagnosis    Type 2 diabetes mellitus with hyperglycemia, without long-term current use of insulin    HTN (hypertension)    Hypoglycemia    Retained orthopedic hardware    Hypervolemia    Anasarca    Iron deficiency anemia    PAF (paroxysmal atrial fibrillation) new onset    Scrotal edema    Weakness    Gait instability    Elevated brain natriuretic peptide (BNP) level    Pulmonary HTN        Diagnosis Plan   1. Leg swelling        2. Lymphedema        3. Type 2 diabetes mellitus with hyperglycemia, without long-term current use of insulin        4. Primary hypertension        5. PAF (paroxysmal atrial fibrillation) new onset            Plan: After thoroughly evaluating Garrett Shirley Jennifer, I believe the best course of action is to remain conservative from a vascular surgery standpoint.  I think he would be a great candidate for home lymphedema pumps.  He does wear compression stockings on a daily basis without complete relief.  His reasons for leg swelling are multifactorial, including multiple surgical interventions of the right lower extremity, congestive heart failure, some kidney dysfunction.  Despite compressions 20-30 mmHg, exercise and elevation he continues to have swelling to his lower extremities, right greater than left.  He does appear to have lymphedema to his  right lower extremity likely mostly related to his injury with repeated surgical interventions.  I did discuss vascular risk factors as they pertain to the progression of vascular disease including controlling his hypertension and diabetes.  His blood pressure stable on his current medications.  The patient is to continue taking their medications as previously discussed.   This was all discussed in full with complete understanding.  Thank you for allowing me to participate in the care of your patient.  Please do not hesitate to call with any questions or concerns.  We will keep you aware of any further encounters with Garrett Rodriguez.        Sincerely yours,         DO Ganesh Saab David R, MD

## 2024-04-30 NOTE — LETTER
April 30, 2024     Rigo Kathleen MD  [address] [facility] [address]  [place] [address]    Patient: Garrett Rodriguez   YOB: 1965   Date of Visit: 4/30/2024     Dear Rigo Kathleen MD:       Thank you for referring Garrett Rodriguez to me for evaluation. Below are the relevant portions of my assessment and plan of care.    If you have questions, please do not hesitate to call me. I look forward to following Garrett along with you.         Sincerely,        Ricardo Hawk DO        CC: No Recipients    Ricardo Hawk DO  04/30/24 1314  Sign when Signing Visit  04/30/2024      Rigo Kathleen MD  [address] [facility] [address]  [place] [address]    Garrett Rodriguez  1965    Chief Complaint   Patient presents with   • Establish Care     Patient is here to establish care patient was referred by Dr. Kathleen due to  Anasarca, Pedal edema. Patient was also sent a referral from Wound Care due to Peripheral vascular disease. He did have an CECILIA preformed at [facility] today.          Dear Rigo Kathleen MD    HPI  I had the pleasure of seeing your patient Garrett Rodriguez in the office today.  Thank you kindly for this consultation.  As you recall, Garrett Rodriguez is a 59 y.o.  male who you are currently following for congestive heart failure.  He did have anasarca, but now fluid has be removed.  He has had 5 right ankle surgeries.  He has a small healing wound to his right heel. He does have a brace to his right ankle with chronic right leg swelling since his injury at work necessitating multiple surgical intervention.  He is maintained on Eliquis.  He is taking lasix to keep swelling down.  He is wearing compression stockings. He did have noninvasive testing performed today, which I did review in office.        Past Medical History:   Diagnosis Date   • Atrial fibrillation 3/11/2024   • Chronic pain in right foot    • Diabetes mellitus    • Hyperlipidemia Feb 2024   •  Hypertension    • PFO (patent foramen ovale)     RP at age 6       Past Surgical History:   Procedure Laterality Date   • ANKLE FUSION Right 07/11/2023    Procedure: ANKLE ARTHRODESIS;  Surgeon: Shahbaz Reddy DPM;  Location:  [facility] OR;  Service: Podiatry;  Laterality: Right;   • BACK SURGERY     • CARDIAC SURGERY     • EXTERNAL FIXATION ANKLE FRACTURE Right 07/11/2023    Procedure: POSSIBLE EXTERNAL FIXATION, RIGHT ANKLE;  Surgeon: Shahbaz Reddy DPM;  Location:  [facility] OR;  Service: Podiatry;  Laterality: Right;   • HARDWARE REMOVAL Right 07/11/2023    Procedure: REMOVAL OF HARDWARE, DEEP; ANKLE ARTHRODESIS; SUBTALAR ARTHRODESIS; POSSIBLE EXTERNAL FIXATION, RIGHT ANKLE;  Surgeon: Shahbaz Reddy DPM;  Location:  [facility] OR;  Service: Podiatry;  Laterality: Right;   • PATENT FORAMEN OVALE CLOSURE     • ROTATOR CUFF REPAIR Right    • SUBTALAR ARTHRODESIS Right 07/11/2023    Procedure: SUBTALAR ARTHRODESIS;  Surgeon: Shahbaz Reddy DPM;  Location:  [facility] OR;  Service: Podiatry;  Laterality: Right;       Family History   Problem Relation Age of Onset   • Hypertension Mother    • Hypertension Father        Social History     Socioeconomic History   • Marital status:    Tobacco Use   • Smoking status: Never   • Smokeless tobacco: Never   Vaping Use   • Vaping status: Never Used   Substance and Sexual Activity   • Alcohol use: Never   • Drug use: Never   • Sexual activity: Not Currently     Partners: Female       No Known Allergies      Current Outpatient Medications:   •  apixaban (ELIQUIS) 5 MG tablet tablet, Take 1 tablet by mouth 2 (Two) Times a Day., Disp: 60 tablet, Rfl: 2  •  furosemide (LASIX) 40 MG tablet, Take 1 tablet by mouth Daily. Take an extra dose for any 2-3 pound weight gain (Patient taking differently: Take 1 tablet by mouth 2 (Two) Times a Day. Take an extra dose for any 2-3 pound weight gain), Disp: 50 tablet, Rfl: 2  •  HYDROcodone-acetaminophen (NORCO)  MG per tablet, Take  "1 tablet by mouth Every 8 (Eight) Hours As Needed for Moderate Pain., Disp: , Rfl:   •  lisinopril (PRINIVIL,ZESTRIL) 20 MG tablet, Take 1 tablet by mouth Daily., Disp: 30 tablet, Rfl: 2  •  metFORMIN (GLUCOPHAGE) 1000 MG tablet, Take 1 tablet by mouth 2 (Two) Times a Day With Meals., Disp: , Rfl:   •  metOLazone (ZAROXOLYN) 2.5 MG tablet, Take 1 tablet by mouth Daily., Disp: 30 tablet, Rfl: 11  •  naloxone (NARCAN) 4 MG/0.1ML nasal spray, Call 911. Don't prime. [place] in 1 nostril for overdose. Repeat in 2-3 minutes in other nostril if no or minimal breathing/responsiveness., Disp: 2 each, Rfl: 0  •  pregabalin (LYRICA) 100 MG capsule, Take 1 capsule by mouth 3 (Three) Times a Day., Disp: , Rfl:     Review of Systems   Constitutional: Negative.    HENT: Negative.     Eyes: Negative.    Respiratory: Negative.     Cardiovascular:  Positive for leg swelling.   Gastrointestinal: Negative.    Endocrine: Negative.    Genitourinary: Negative.    Musculoskeletal: Negative.    Skin:  Positive for wound.   Allergic/Immunologic: Negative.    Neurological: Negative.    Hematological: Negative.    Psychiatric/Behavioral: Negative.     All other systems reviewed and are negative.    /80   Pulse 74   Ht 177.8 cm (70\")   Wt 83 kg (183 lb)   SpO2 97%   BMI 26.26 kg/m²     Physical Exam  Vitals and nursing note reviewed.   Constitutional:       Appearance: Normal appearance. He is well-developed.   HENT:      Head: Normocephalic and atraumatic.   Eyes:      General: No scleral icterus.     Pupils: Pupils are equal, round, and reactive to light.   Neck:      Thyroid: No thyromegaly.      Vascular: No carotid bruit or JVD.   Cardiovascular:      Rate and Rhythm: Normal rate and regular rhythm.      Pulses:           Carotid pulses are 2+ on the right side and 2+ on the left side.       Femoral pulses are 2+ on the right side and 2+ on the left side.       Popliteal pulses are 2+ on the right side and 2+ on the left side.     "    Dorsalis pedis pulses are 2+ on the right side and 2+ on the left side.        Posterior tibial pulses are 2+ on the right side and 2+ on the left side.      Heart sounds: Normal heart sounds.   Pulmonary:      Effort: Pulmonary effort is normal.      Breath sounds: Normal breath sounds.   Abdominal:      General: Bowel sounds are normal. There is no distension or abdominal bruit.      Palpations: Abdomen is soft. There is no mass.      Tenderness: There is no abdominal tenderness.   Musculoskeletal:         General: Swelling (right greater than left) present. Normal range of motion.      Cervical back: Neck supple.      Comments: Lymphedema to right leg   Lymphadenopathy:      Cervical: No cervical adenopathy.   Skin:     General: Skin is warm and dry.   Neurological:      Mental Status: He is alert and oriented to person, place, and time.      Cranial Nerves: No cranial nerve deficit.      Sensory: No sensory deficit.   Psychiatric:         Mood and Affect: Mood normal.         Behavior: Behavior normal.         Thought Content: Thought content normal.         Judgment: Judgment normal.       Diagnostic Data:  Noninvasive testing including ABIs shows no arterial insufficiency to bilateral lower extremities.     Patient Active Problem List   Diagnosis   • Type 2 diabetes mellitus with hyperglycemia, without long-term current use of insulin   • HTN (hypertension)   • Hypoglycemia   • Retained orthopedic hardware   • Hypervolemia   • Anasarca   • Iron deficiency anemia   • PAF (paroxysmal atrial fibrillation) new onset   • Scrotal edema   • Weakness   • Gait instability   • Elevated brain natriuretic peptide (BNP) level   • Pulmonary HTN        Diagnosis Plan   1. Leg swelling        2. Lymphedema        3. Type 2 diabetes mellitus with hyperglycemia, without long-term current use of insulin        4. Primary hypertension        5. PAF (paroxysmal atrial fibrillation) new onset            Plan: After thoroughly  evaluating Garrett Rodriguez, I believe the best course of action is to remain conservative from a vascular surgery standpoint.  I think he would be a great candidate for home lymphedema pumps.  He does wear compression stockings on a daily basis without complete relief.  His reasons for leg swelling are multifactorial, including multiple surgical interventions of the right lower extremity, congestive heart failure, some kidney dysfunction.  Despite compressions 20-30 mmHg, exercise and elevation he continues to have swelling to his lower extremities, right greater than left.  He does appear to have lymphedema to his right lower extremity likely mostly related to his injury with repeated surgical interventions.  I did discuss vascular risk factors as they pertain to the progression of vascular disease including controlling his hypertension and diabetes.  His blood pressure stable on his current medications.  The patient is to continue taking their medications as previously discussed.   This was all discussed in full with complete understanding.  Thank you for allowing me to participate in the care of your patient.  Please do not hesitate to call with any questions or concerns.  We will keep you aware of any further encounters with Garrett Rodriguez.        Sincerely yours,         DO Ganesh Saab David R, MD

## 2024-05-01 ENCOUNTER — OFFICE VISIT (OUTPATIENT)
Dept: WOUND CARE | Facility: HOSPITAL | Age: 59
End: 2024-05-01
Payer: OTHER MISCELLANEOUS

## 2024-05-01 PROCEDURE — G0463 HOSPITAL OUTPT CLINIC VISIT: HCPCS

## 2024-05-17 ENCOUNTER — HOSPITAL ENCOUNTER (OUTPATIENT)
Dept: GENERAL RADIOLOGY | Facility: HOSPITAL | Age: 59
Discharge: HOME | End: 2024-05-17
Payer: OTHER MISCELLANEOUS

## 2024-05-17 ENCOUNTER — OFFICE VISIT (OUTPATIENT)
Dept: WOUND CARE | Facility: HOSPITAL | Age: 59
End: 2024-05-17
Payer: OTHER MISCELLANEOUS

## 2024-05-17 ENCOUNTER — TRANSCRIBE ORDERS (OUTPATIENT)
Dept: ADMINISTRATIVE | Facility: HOSPITAL | Age: 59
End: 2024-05-17
Payer: COMMERCIAL

## 2024-05-17 DIAGNOSIS — M86.9 DIABETIC FOOT ULCER WITH OSTEOMYELITIS: ICD-10-CM

## 2024-05-17 DIAGNOSIS — E11.69 DIABETIC FOOT ULCER WITH OSTEOMYELITIS: ICD-10-CM

## 2024-05-17 DIAGNOSIS — L97.509 DIABETIC FOOT ULCER WITH OSTEOMYELITIS: ICD-10-CM

## 2024-05-17 DIAGNOSIS — E11.621 DIABETIC FOOT ULCER WITH OSTEOMYELITIS: Primary | ICD-10-CM

## 2024-05-17 DIAGNOSIS — M86.9 DIABETIC FOOT ULCER WITH OSTEOMYELITIS: Primary | ICD-10-CM

## 2024-05-17 DIAGNOSIS — L97.509 TYPE 2 DIABETES MELLITUS WITH FOOT ULCER, UNSPECIFIED WHETHER LONG TERM INSULIN USE: Primary | ICD-10-CM

## 2024-05-17 DIAGNOSIS — E11.621 TYPE 2 DIABETES MELLITUS WITH FOOT ULCER, UNSPECIFIED WHETHER LONG TERM INSULIN USE: Primary | ICD-10-CM

## 2024-05-17 DIAGNOSIS — E11.69 DIABETIC FOOT ULCER WITH OSTEOMYELITIS: Primary | ICD-10-CM

## 2024-05-17 DIAGNOSIS — E11.40 TYPE 2 DIABETES MELLITUS WITH DIABETIC NEUROPATHY, UNSPECIFIED WHETHER LONG TERM INSULIN USE: ICD-10-CM

## 2024-05-17 DIAGNOSIS — E11.621 DIABETIC FOOT ULCER WITH OSTEOMYELITIS: ICD-10-CM

## 2024-05-17 DIAGNOSIS — I89.0 LYMPHEDEMA, NOT ELSEWHERE CLASSIFIED: ICD-10-CM

## 2024-05-17 DIAGNOSIS — L97.509 DIABETIC FOOT ULCER WITH OSTEOMYELITIS: Primary | ICD-10-CM

## 2024-05-17 DIAGNOSIS — L97.512 NON-PRESSURE CHRONIC ULCER OF OTHER PART OF RIGHT FOOT WITH FAT LAYER EXPOSED: ICD-10-CM

## 2024-05-17 PROCEDURE — 73620 X-RAY EXAM OF FOOT: CPT

## 2024-05-20 ENCOUNTER — TELEPHONE (OUTPATIENT)
Dept: CARDIOLOGY | Facility: CLINIC | Age: 59
End: 2024-05-20
Payer: COMMERCIAL

## 2024-05-20 NOTE — TELEPHONE ENCOUNTER
REQUEST FOR CARDIAC CLEARANCE    Caller name: SANA COATES    Phone Number: [phone]     Surgeon's name: DR. HOOPER    Type of planned surgery: LEG AMPUTATION    Date of planned surgery: NOT YET SCHEDULED    Type of anesthesia: UNKNOWN    Have you been experiencing chest pain or shortness of breath? NO , BLOOD PRESSURE HAS BEEN LOW    Is your doctor requesting for you to stop any of your medications prior to your surgery? ELIQUIS OR ANY OTHER MEDICATIONS THAT COULD INTERFERE ON THIS. SANA MENTIONED THAT THE PT IS DOWN TO 170LBS SO THEY WEREN'T SURE OF HE SHOULD BACK OFF ANY OF THE LASIX.    Where should we fax the clearance to? DOES NOT HAVE

## 2024-05-21 ENCOUNTER — TELEPHONE (OUTPATIENT)
Dept: VASCULAR SURGERY | Facility: CLINIC | Age: 59
End: 2024-05-21
Payer: COMMERCIAL

## 2024-05-21 NOTE — TELEPHONE ENCOUNTER
Patient has scheduled appointment with Violetta Fritz,MEGHANN 05/30/24 unless we receive a request for sooner follow up. So far we haven't received a sx cardiac risk assessment request.

## 2024-05-21 NOTE — TELEPHONE ENCOUNTER
Call placed to wife and she stated that Dr. Reddy has requested the amputation. She stated it was emergent, have received to referral and arranged an appointment on 5/22/2024  at 0900.Explained that scheduling and so forth would take place tomorrow, Wife verbalized understanding. Call Dotty Venegas  and informed her that we are seeing in the am and she would like acall afterwards. Will attempt to facilitate.

## 2024-05-21 NOTE — TELEPHONE ENCOUNTER
I've left patients wife a detailed voicemail informing her Gwen at Dr.David Meade office has been made aware that due to patients insurance plan being an HMO plan all referrals recommended by  must be placed by patients pcp. If pcp doesn't place referrals insurance will not cover appointments per Manuela Sesay in scheduling. Gwen with Dr.David Andersen states she will have  place referrals asap. We've also reached out to GULSHAN Maher with  [facility] and they're unaware of sx patients wife has described. Gunnar states he will reach out to patient regarding needed sx. Patients wife has been advised to return my call if she has any further questions or concerns.     Thanks  WF

## 2024-05-22 ENCOUNTER — OFFICE VISIT (OUTPATIENT)
Dept: VASCULAR SURGERY | Facility: CLINIC | Age: 59
End: 2024-05-22
Payer: OTHER MISCELLANEOUS

## 2024-05-22 ENCOUNTER — TELEPHONE (OUTPATIENT)
Dept: VASCULAR SURGERY | Facility: CLINIC | Age: 59
End: 2024-05-22
Payer: COMMERCIAL

## 2024-05-22 VITALS
BODY MASS INDEX: 24.34 KG/M2 | WEIGHT: 170 LBS | SYSTOLIC BLOOD PRESSURE: 102 MMHG | HEIGHT: 70 IN | DIASTOLIC BLOOD PRESSURE: 50 MMHG | OXYGEN SATURATION: 94 % | HEART RATE: 76 BPM

## 2024-05-22 DIAGNOSIS — Z51.81 ENCOUNTER FOR MONITORING ANTIPLATELET THERAPY: ICD-10-CM

## 2024-05-22 DIAGNOSIS — Z01.818 PREOP TESTING: ICD-10-CM

## 2024-05-22 DIAGNOSIS — Z79.02 ENCOUNTER FOR MONITORING ANTIPLATELET THERAPY: ICD-10-CM

## 2024-05-22 DIAGNOSIS — T81.89XA NON-HEALING SURGICAL WOUND, INITIAL ENCOUNTER: Primary | ICD-10-CM

## 2024-05-22 PROCEDURE — 99214 OFFICE O/P EST MOD 30 MIN: CPT | Performed by: NURSE PRACTITIONER

## 2024-05-22 RX ORDER — DOXYCYCLINE HYCLATE 100 MG/1
1 CAPSULE ORAL EVERY 12 HOURS SCHEDULED
Status: ON HOLD | COMMUNITY
Start: 2024-05-17

## 2024-05-22 RX ORDER — CEFDINIR 300 MG/1
300 CAPSULE ORAL 2 TIMES DAILY
Status: ON HOLD | COMMUNITY
Start: 2024-05-17

## 2024-05-22 NOTE — TELEPHONE ENCOUNTER
Attempted to notify pt of prework/surgery time and instruction for procedure scheduled 06/03/24 with Dr. Hawk.  NPO after midnight.  No answer, lm with information on VM.

## 2024-05-22 NOTE — PROGRESS NOTES
"05/22/2024       Shahbaz Reddy DPM  [address] [place] [address] okay 1 second    Garrett Rodriguez  1965    Chief Complaint   Patient presents with    Follow-up     2 week follow up. Patient is here to discuss surgery. Last seen 4/30/24. Dr. Reddy is recommending amp on right.  Patient denies any stroke type symptoms.        Dear Shahbaz Reddy DPM    HPI  I had the pleasure of seeing your patient Garrett Rodriguez in the office today.  As you recall, Garrett Rodriguez is a 59 y.o.  male who you are currently following for congestive heart failure.  He did have anasarca, but now fluid has be removed.  He has had 5 right ankle surgeries.  He has a small healing wound to his right heel. He does have a brace to his right ankle with chronic right leg swelling since his injury at work necessitating multiple surgical intervention.  He is maintained on Eliquis.  He is taking lasix to keep swelling down.  He is wearing compression stockings.  He has seen Dr. Reddy who recommends right below-knee amputation.  He is here to further discuss.      Review of Systems   Constitutional: Negative.    HENT: Negative.     Eyes: Negative.    Respiratory: Negative.     Cardiovascular:  Positive for leg swelling.   Gastrointestinal: Negative.    Endocrine: Negative.    Genitourinary: Negative.    Musculoskeletal: Negative.    Skin:  Positive for wound.   Allergic/Immunologic: Negative.    Neurological: Negative.    Hematological: Negative.    Psychiatric/Behavioral: Negative.     All other systems reviewed and are negative.    /50   Pulse 76   Ht 177.8 cm (70\")   Wt 77.1 kg (170 lb)   SpO2 94%   BMI 24.39 kg/m²     Physical Exam  Vitals and nursing note reviewed.   Constitutional:       Appearance: Normal appearance. He is well-developed.   HENT:      Head: Normocephalic and atraumatic.   Eyes:      General: No scleral icterus.     Pupils: Pupils are equal, round, and reactive to " light.   Neck:      Thyroid: No thyromegaly.      Vascular: No carotid bruit or JVD.   Cardiovascular:      Rate and Rhythm: Normal rate and regular rhythm.      Pulses:           Carotid pulses are 2+ on the right side and 2+ on the left side.       Femoral pulses are 2+ on the right side and 2+ on the left side.       Popliteal pulses are 2+ on the right side and 2+ on the left side.        Dorsalis pedis pulses are 2+ on the right side and 2+ on the left side.        Posterior tibial pulses are 2+ on the right side and 2+ on the left side.      Heart sounds: Normal heart sounds.   Pulmonary:      Effort: Pulmonary effort is normal.      Breath sounds: Normal breath sounds.   Abdominal:      General: Bowel sounds are normal. There is no distension or abdominal bruit.      Palpations: Abdomen is soft. There is no mass.      Tenderness: There is no abdominal tenderness.   Musculoskeletal:         General: Swelling (right greater than left) present. Normal range of motion.      Cervical back: Neck supple.      Comments: Lymphedema to right leg   Lymphadenopathy:      Cervical: No cervical adenopathy.   Skin:     General: Skin is warm and dry.   Neurological:      Mental Status: He is alert and oriented to person, place, and time.      Cranial Nerves: No cranial nerve deficit.      Sensory: No sensory deficit.   Psychiatric:         Mood and Affect: Mood normal.         Behavior: Behavior normal.         Thought Content: Thought content normal.         Judgment: Judgment normal.       Diagnostic Data:  Noninvasive testing including ABIs shows no arterial insufficiency to bilateral lower extremities.     Patient Active Problem List   Diagnosis    Type 2 diabetes mellitus with hyperglycemia, without long-term current use of insulin    HTN (hypertension)    Retained orthopedic hardware    Anasarca    Iron deficiency anemia    PAF (paroxysmal atrial fibrillation) new onset    Scrotal edema    Gait instability    Pulmonary  HTN    Surgical wound, non healing    TARSHA (acute kidney injury)    Bleeding from wound    Diarrhea of presumed infectious origin    Preop testing        Diagnosis Plan   1. Non-healing surgical wound, initial encounter  CBC and Differential    Basic metabolic panel    APTT    Protime-INR    XR chest 2 vw    ECG 12 Lead    Case Request    ceFAZolin (ANCEF) 2,000 mg in sodium chloride 0.9 % 100 mL IVPB    Case Request      2. Preop testing  CBC and Differential    Basic metabolic panel    APTT    Protime-INR    XR chest 2 vw    ECG 12 Lead    Case Request    ceFAZolin (ANCEF) 2,000 mg in sodium chloride 0.9 % 100 mL IVPB    Case Request      3. Encounter for monitoring antiplatelet therapy  APTT          Plan: After thoroughly evaluating Garrett Rodriguez, I believe the best course of action is to proceed with a right below-knee amputation.  Dr. Reddy has recommended this for healing.  Risks/benefits were explained at great length to the patient which include but are not limited to bleeding, infection, vessel damage, nerve damage, failure of wound to heal, MI, stroke, and death.  The patient understands all the risks and wishes for me to proceed.  I did discuss vascular risk factors as they pertain to the progression of vascular disease including controlling his hypertension and diabetes.  His blood pressure stable on his current medications.  The patient is to continue taking their medications as previously discussed.   This was all discussed in full with complete understanding.  Thank you for allowing me to participate in the care of your patient.  Please do not hesitate to call with any questions or concerns.  We will keep you aware of any further encounters with Garrett Rodriguez.        Sincerely yours,         MEGHANN Baltazar, Mehul LOPEZ MD

## 2024-05-22 NOTE — LETTER
May 28, 2024       No Recipients    Patient: Garrett Rodriguez   YOB: 1965   Date of Visit: 5/22/2024     Dear Mehul Andersen MD:       Thank you for referring Garrett Rodriguez to me for evaluation. Below are the relevant portions of my assessment and plan of care.    If you have questions, please do not hesitate to call me. I look forward to following Garrett along with you.         Sincerely,        Deborah MEGHANN Monk        CC:   No Recipients    RichieDeborah márquezMEGHANN  05/28/24 1212  Sign when Signing Visit  05/22/2024       Shahbaz Reddy DPM  [address] [place] [address] okay 1 second    Garrett Rodriguez  1965    Chief Complaint   Patient presents with   • Follow-up     2 week follow up. Patient is here to discuss surgery. Last seen 4/30/24. Dr. Reddy is recommending amp on right.  Patient denies any stroke type symptoms.        Dear Shahbaz Reddy DPM    HPI  I had the pleasure of seeing your patient Garrett Rodriguez in the office today.  As you recall, Garrett Rodriguez is a 59 y.o.  male who you are currently following for congestive heart failure.  He did have anasarca, but now fluid has be removed.  He has had 5 right ankle surgeries.  He has a small healing wound to his right heel. He does have a brace to his right ankle with chronic right leg swelling since his injury at work necessitating multiple surgical intervention.  He is maintained on Eliquis.  He is taking lasix to keep swelling down.  He is wearing compression stockings.  He has seen Dr. Reddy who recommends right below-knee amputation.  He is here to further discuss.      Review of Systems   Constitutional: Negative.    HENT: Negative.     Eyes: Negative.    Respiratory: Negative.     Cardiovascular:  Positive for leg swelling.   Gastrointestinal: Negative.    Endocrine: Negative.    Genitourinary: Negative.    Musculoskeletal: Negative.    Skin:  Positive for wound.  "  Allergic/Immunologic: Negative.    Neurological: Negative.    Hematological: Negative.    Psychiatric/Behavioral: Negative.     All other systems reviewed and are negative.    /50   Pulse 76   Ht 177.8 cm (70\")   Wt 77.1 kg (170 lb)   SpO2 94%   BMI 24.39 kg/m²     Physical Exam  Vitals and nursing note reviewed.   Constitutional:       Appearance: Normal appearance. He is well-developed.   HENT:      Head: Normocephalic and atraumatic.   Eyes:      General: No scleral icterus.     Pupils: Pupils are equal, round, and reactive to light.   Neck:      Thyroid: No thyromegaly.      Vascular: No carotid bruit or JVD.   Cardiovascular:      Rate and Rhythm: Normal rate and regular rhythm.      Pulses:           Carotid pulses are 2+ on the right side and 2+ on the left side.       Femoral pulses are 2+ on the right side and 2+ on the left side.       Popliteal pulses are 2+ on the right side and 2+ on the left side.        Dorsalis pedis pulses are 2+ on the right side and 2+ on the left side.        Posterior tibial pulses are 2+ on the right side and 2+ on the left side.      Heart sounds: Normal heart sounds.   Pulmonary:      Effort: Pulmonary effort is normal.      Breath sounds: Normal breath sounds.   Abdominal:      General: Bowel sounds are normal. There is no distension or abdominal bruit.      Palpations: Abdomen is soft. There is no mass.      Tenderness: There is no abdominal tenderness.   Musculoskeletal:         General: Swelling (right greater than left) present. Normal range of motion.      Cervical back: Neck supple.      Comments: Lymphedema to right leg   Lymphadenopathy:      Cervical: No cervical adenopathy.   Skin:     General: Skin is warm and dry.   Neurological:      Mental Status: He is alert and oriented to person, place, and time.      Cranial Nerves: No cranial nerve deficit.      Sensory: No sensory deficit.   Psychiatric:         Mood and Affect: Mood normal.         Behavior: " Behavior normal.         Thought Content: Thought content normal.         Judgment: Judgment normal.       Diagnostic Data:  Noninvasive testing including ABIs shows no arterial insufficiency to bilateral lower extremities.     Patient Active Problem List   Diagnosis   • Type 2 diabetes mellitus with hyperglycemia, without long-term current use of insulin   • HTN (hypertension)   • Retained orthopedic hardware   • Anasarca   • Iron deficiency anemia   • PAF (paroxysmal atrial fibrillation) new onset   • Scrotal edema   • Gait instability   • Pulmonary HTN   • Surgical wound, non healing   • TARSHA (acute kidney injury)   • Bleeding from wound   • Diarrhea of presumed infectious origin   • Preop testing        Diagnosis Plan   1. Non-healing surgical wound, initial encounter  CBC and Differential    Basic metabolic panel    APTT    Protime-INR    XR chest 2 vw    ECG 12 Lead    Case Request    ceFAZolin (ANCEF) 2,000 mg in sodium chloride 0.9 % 100 mL IVPB    Case Request      2. Preop testing  CBC and Differential    Basic metabolic panel    APTT    Protime-INR    XR chest 2 vw    ECG 12 Lead    Case Request    ceFAZolin (ANCEF) 2,000 mg in sodium chloride 0.9 % 100 mL IVPB    Case Request      3. Encounter for monitoring antiplatelet therapy  APTT          Plan: After thoroughly evaluating Garrett Glovereynolds, I believe the best course of action is to proceed with a right below-knee amputation.  Dr. Reddy has recommended this for healing.  Risks/benefits were explained at great length to the patient which include but are not limited to bleeding, infection, vessel damage, nerve damage, failure of wound to heal, MI, stroke, and death.  The patient understands all the risks and wishes for me to proceed.  I did discuss vascular risk factors as they pertain to the progression of vascular disease including controlling his hypertension and diabetes.  His blood pressure stable on his current medications.  The patient is  to continue taking their medications as previously discussed.   This was all discussed in full with complete understanding.  Thank you for allowing me to participate in the care of your patient.  Please do not hesitate to call with any questions or concerns.  We will keep you aware of any further encounters with Garrett Rodriguez.        Sincerely yours,         MEGHANN Baltazar David R, MD

## 2024-05-25 ENCOUNTER — HOSPITAL ENCOUNTER (INPATIENT)
Facility: HOSPITAL | Age: 59
LOS: 3 days | Discharge: HOME | End: 2024-05-29
Attending: EMERGENCY MEDICINE | Admitting: INTERNAL MEDICINE
Payer: COMMERCIAL

## 2024-05-25 DIAGNOSIS — S91.301S OPEN WOUND OF FOOT, RIGHT, SEQUELA: ICD-10-CM

## 2024-05-25 DIAGNOSIS — R26.81 GAIT INSTABILITY: ICD-10-CM

## 2024-05-25 DIAGNOSIS — N17.9 AKI (ACUTE KIDNEY INJURY): Primary | ICD-10-CM

## 2024-05-25 PROCEDURE — 36415 COLL VENOUS BLD VENIPUNCTURE: CPT

## 2024-05-25 PROCEDURE — 99285 EMERGENCY DEPT VISIT HI MDM: CPT

## 2024-05-26 PROBLEM — R19.7 DIARRHEA OF PRESUMED INFECTIOUS ORIGIN: Status: ACTIVE | Noted: 2024-05-26

## 2024-05-26 PROBLEM — T14.8XXA BLEEDING FROM WOUND: Status: ACTIVE | Noted: 2024-05-26

## 2024-05-26 PROBLEM — N17.9 AKI (ACUTE KIDNEY INJURY): Status: ACTIVE | Noted: 2024-05-26

## 2024-05-26 LAB
ALBUMIN SERPL-MCNC: 2.9 G/DL (ref 3.5–5.2)
ALBUMIN SERPL-MCNC: 3.3 G/DL (ref 3.5–5.2)
ALBUMIN UR-MCNC: 15.7 MG/DL
ALBUMIN/GLOB SERPL: 0.7 G/DL
ALBUMIN/GLOB SERPL: 0.8 G/DL
ALP SERPL-CCNC: 149 U/L (ref 39–117)
ALP SERPL-CCNC: 162 U/L (ref 39–117)
ALT SERPL W P-5'-P-CCNC: 17 U/L (ref 1–41)
ALT SERPL W P-5'-P-CCNC: 18 U/L (ref 1–41)
ANION GAP SERPL CALCULATED.3IONS-SCNC: 13 MMOL/L (ref 5–15)
ANION GAP SERPL CALCULATED.3IONS-SCNC: 14 MMOL/L (ref 5–15)
APTT PPP: 38.3 SECONDS (ref 24.5–36)
AST SERPL-CCNC: 12 U/L (ref 1–40)
AST SERPL-CCNC: 16 U/L (ref 1–40)
BASOPHILS # BLD AUTO: 0.04 10*3/MM3 (ref 0–0.2)
BASOPHILS # BLD AUTO: 0.05 10*3/MM3 (ref 0–0.2)
BASOPHILS NFR BLD AUTO: 0.5 % (ref 0–1.5)
BASOPHILS NFR BLD AUTO: 0.5 % (ref 0–1.5)
BILIRUB SERPL-MCNC: 0.3 MG/DL (ref 0–1.2)
BILIRUB SERPL-MCNC: 0.4 MG/DL (ref 0–1.2)
BUN SERPL-MCNC: 108 MG/DL (ref 6–20)
BUN SERPL-MCNC: 112 MG/DL (ref 6–20)
BUN/CREAT SERPL: 24.4 (ref 7–25)
BUN/CREAT SERPL: 26.7 (ref 7–25)
CALCIUM SPEC-SCNC: 9.1 MG/DL (ref 8.6–10.5)
CALCIUM SPEC-SCNC: 9.5 MG/DL (ref 8.6–10.5)
CHLORIDE SERPL-SCNC: 93 MMOL/L (ref 98–107)
CHLORIDE SERPL-SCNC: 93 MMOL/L (ref 98–107)
CO2 SERPL-SCNC: 29 MMOL/L (ref 22–29)
CO2 SERPL-SCNC: 32 MMOL/L (ref 22–29)
CREAT SERPL-MCNC: 4.05 MG/DL (ref 0.76–1.27)
CREAT SERPL-MCNC: 4.59 MG/DL (ref 0.76–1.27)
CREAT UR-MCNC: 29.2 MG/DL
DEPRECATED RDW RBC AUTO: 46.9 FL (ref 37–54)
DEPRECATED RDW RBC AUTO: 47.8 FL (ref 37–54)
EGFRCR SERPLBLD CKD-EPI 2021: 14 ML/MIN/1.73
EGFRCR SERPLBLD CKD-EPI 2021: 16.3 ML/MIN/1.73
EOSINOPHIL # BLD AUTO: 0.16 10*3/MM3 (ref 0–0.4)
EOSINOPHIL # BLD AUTO: 0.19 10*3/MM3 (ref 0–0.4)
EOSINOPHIL NFR BLD AUTO: 1.8 % (ref 0.3–6.2)
EOSINOPHIL NFR BLD AUTO: 1.9 % (ref 0.3–6.2)
ERYTHROCYTE [DISTWIDTH] IN BLOOD BY AUTOMATED COUNT: 14.9 % (ref 12.3–15.4)
ERYTHROCYTE [DISTWIDTH] IN BLOOD BY AUTOMATED COUNT: 15 % (ref 12.3–15.4)
GLOBULIN UR ELPH-MCNC: 4 GM/DL
GLOBULIN UR ELPH-MCNC: 4.4 GM/DL
GLUCOSE BLDC GLUCOMTR-MCNC: 170 MG/DL (ref 70–130)
GLUCOSE BLDC GLUCOMTR-MCNC: 191 MG/DL (ref 70–130)
GLUCOSE BLDC GLUCOMTR-MCNC: 220 MG/DL (ref 70–130)
GLUCOSE BLDC GLUCOMTR-MCNC: 342 MG/DL (ref 70–130)
GLUCOSE SERPL-MCNC: 276 MG/DL (ref 65–99)
GLUCOSE SERPL-MCNC: 289 MG/DL (ref 65–99)
HCT VFR BLD AUTO: 26.1 % (ref 37.5–51)
HCT VFR BLD AUTO: 29.7 % (ref 37.5–51)
HGB BLD-MCNC: 8.1 G/DL (ref 13–17.7)
HGB BLD-MCNC: 9.1 G/DL (ref 13–17.7)
IMM GRANULOCYTES # BLD AUTO: 0.22 10*3/MM3 (ref 0–0.05)
IMM GRANULOCYTES # BLD AUTO: 0.24 10*3/MM3 (ref 0–0.05)
IMM GRANULOCYTES NFR BLD AUTO: 2.4 % (ref 0–0.5)
IMM GRANULOCYTES NFR BLD AUTO: 2.5 % (ref 0–0.5)
INR PPP: 1.29 (ref 0.91–1.09)
IRON 24H UR-MRATE: 18 MCG/DL (ref 59–158)
IRON SATN MFR SERPL: 6 % (ref 20–50)
LYMPHOCYTES # BLD AUTO: 1.87 10*3/MM3 (ref 0.7–3.1)
LYMPHOCYTES # BLD AUTO: 1.87 10*3/MM3 (ref 0.7–3.1)
LYMPHOCYTES NFR BLD AUTO: 19 % (ref 19.6–45.3)
LYMPHOCYTES NFR BLD AUTO: 21.6 % (ref 19.6–45.3)
MCH RBC QN AUTO: 26.6 PG (ref 26.6–33)
MCH RBC QN AUTO: 26.6 PG (ref 26.6–33)
MCHC RBC AUTO-ENTMCNC: 30.6 G/DL (ref 31.5–35.7)
MCHC RBC AUTO-ENTMCNC: 31 G/DL (ref 31.5–35.7)
MCV RBC AUTO: 85.9 FL (ref 79–97)
MCV RBC AUTO: 86.8 FL (ref 79–97)
MICROALBUMIN/CREAT UR: 537.7 MG/G (ref 0–29)
MONOCYTES # BLD AUTO: 0.7 10*3/MM3 (ref 0.1–0.9)
MONOCYTES # BLD AUTO: 0.9 10*3/MM3 (ref 0.1–0.9)
MONOCYTES NFR BLD AUTO: 8.1 % (ref 5–12)
MONOCYTES NFR BLD AUTO: 9.1 % (ref 5–12)
NEUTROPHILS NFR BLD AUTO: 5.67 10*3/MM3 (ref 1.7–7)
NEUTROPHILS NFR BLD AUTO: 6.6 10*3/MM3 (ref 1.7–7)
NEUTROPHILS NFR BLD AUTO: 65.5 % (ref 42.7–76)
NEUTROPHILS NFR BLD AUTO: 67.1 % (ref 42.7–76)
NRBC BLD AUTO-RTO: 0 /100 WBC (ref 0–0.2)
OSMOLALITY UR: 370 MOSM/KG (ref 50–1400)
PLATELET # BLD AUTO: 294 10*3/MM3 (ref 140–450)
PLATELET # BLD AUTO: 346 10*3/MM3 (ref 140–450)
PMV BLD AUTO: 9.8 FL (ref 6–12)
PMV BLD AUTO: 9.9 FL (ref 6–12)
POTASSIUM SERPL-SCNC: 4.1 MMOL/L (ref 3.5–5.2)
POTASSIUM SERPL-SCNC: 4.2 MMOL/L (ref 3.5–5.2)
PROT SERPL-MCNC: 6.9 G/DL (ref 6–8.5)
PROT SERPL-MCNC: 7.7 G/DL (ref 6–8.5)
PROTHROMBIN TIME: 16.6 SECONDS (ref 11.8–14.8)
RBC # BLD AUTO: 3.04 10*6/MM3 (ref 4.14–5.8)
RBC # BLD AUTO: 3.42 10*6/MM3 (ref 4.14–5.8)
SODIUM SERPL-SCNC: 136 MMOL/L (ref 136–145)
SODIUM SERPL-SCNC: 138 MMOL/L (ref 136–145)
SODIUM UR-SCNC: 91 MMOL/L
TIBC SERPL-MCNC: 277 MCG/DL (ref 298–536)
TRANSFERRIN SERPL-MCNC: 186 MG/DL (ref 200–360)
URATE SERPL-MCNC: 14.7 MG/DL (ref 3.4–7)
WBC NRBC COR # BLD AUTO: 8.66 10*3/MM3 (ref 3.4–10.8)
WBC NRBC COR # BLD AUTO: 9.85 10*3/MM3 (ref 3.4–10.8)

## 2024-05-26 PROCEDURE — 83935 ASSAY OF URINE OSMOLALITY: CPT | Performed by: INTERNAL MEDICINE

## 2024-05-26 PROCEDURE — 80053 COMPREHEN METABOLIC PANEL: CPT | Performed by: INTERNAL MEDICINE

## 2024-05-26 PROCEDURE — 85610 PROTHROMBIN TIME: CPT | Performed by: EMERGENCY MEDICINE

## 2024-05-26 PROCEDURE — 85025 COMPLETE CBC W/AUTO DIFF WBC: CPT | Performed by: INTERNAL MEDICINE

## 2024-05-26 PROCEDURE — 36415 COLL VENOUS BLD VENIPUNCTURE: CPT

## 2024-05-26 PROCEDURE — 25810000003 SODIUM CHLORIDE 0.9 % SOLUTION: Performed by: FAMILY MEDICINE

## 2024-05-26 PROCEDURE — 84466 ASSAY OF TRANSFERRIN: CPT | Performed by: INTERNAL MEDICINE

## 2024-05-26 PROCEDURE — 63710000001 INSULIN LISPRO (HUMAN) PER 5 UNITS: Performed by: INTERNAL MEDICINE

## 2024-05-26 PROCEDURE — 82043 UR ALBUMIN QUANTITATIVE: CPT | Performed by: INTERNAL MEDICINE

## 2024-05-26 PROCEDURE — 84300 ASSAY OF URINE SODIUM: CPT | Performed by: INTERNAL MEDICINE

## 2024-05-26 PROCEDURE — 80053 COMPREHEN METABOLIC PANEL: CPT | Performed by: EMERGENCY MEDICINE

## 2024-05-26 PROCEDURE — 25810000003 SODIUM CHLORIDE 0.9 % SOLUTION: Performed by: INTERNAL MEDICINE

## 2024-05-26 PROCEDURE — 83540 ASSAY OF IRON: CPT | Performed by: INTERNAL MEDICINE

## 2024-05-26 PROCEDURE — 84550 ASSAY OF BLOOD/URIC ACID: CPT | Performed by: INTERNAL MEDICINE

## 2024-05-26 PROCEDURE — 85730 THROMBOPLASTIN TIME PARTIAL: CPT | Performed by: EMERGENCY MEDICINE

## 2024-05-26 PROCEDURE — 82570 ASSAY OF URINE CREATININE: CPT | Performed by: INTERNAL MEDICINE

## 2024-05-26 PROCEDURE — 82948 REAGENT STRIP/BLOOD GLUCOSE: CPT

## 2024-05-26 PROCEDURE — 85025 COMPLETE CBC W/AUTO DIFF WBC: CPT | Performed by: EMERGENCY MEDICINE

## 2024-05-26 RX ORDER — IBUPROFEN 600 MG/1
1 TABLET ORAL
Status: DISCONTINUED | OUTPATIENT
Start: 2024-05-26 | End: 2024-05-29 | Stop reason: HOSPADM

## 2024-05-26 RX ORDER — GABAPENTIN 100 MG/1
100 CAPSULE ORAL 3 TIMES DAILY
Status: ON HOLD | COMMUNITY
End: 2024-05-26

## 2024-05-26 RX ORDER — DOXYCYCLINE 100 MG/1
100 TABLET ORAL EVERY 12 HOURS SCHEDULED
Status: DISCONTINUED | OUTPATIENT
Start: 2024-05-26 | End: 2024-05-29 | Stop reason: HOSPADM

## 2024-05-26 RX ORDER — ONDANSETRON 2 MG/ML
4 INJECTION INTRAMUSCULAR; INTRAVENOUS EVERY 6 HOURS PRN
Status: DISCONTINUED | OUTPATIENT
Start: 2024-05-26 | End: 2024-05-29 | Stop reason: HOSPADM

## 2024-05-26 RX ORDER — SODIUM CHLORIDE 0.9 % (FLUSH) 0.9 %
10 SYRINGE (ML) INJECTION AS NEEDED
Status: DISCONTINUED | OUTPATIENT
Start: 2024-05-26 | End: 2024-05-29 | Stop reason: HOSPADM

## 2024-05-26 RX ORDER — SODIUM CHLORIDE 0.9 % (FLUSH) 0.9 %
10 SYRINGE (ML) INJECTION EVERY 12 HOURS SCHEDULED
Status: DISCONTINUED | OUTPATIENT
Start: 2024-05-26 | End: 2024-05-29 | Stop reason: HOSPADM

## 2024-05-26 RX ORDER — FUROSEMIDE 40 MG/1
40 TABLET ORAL 2 TIMES DAILY
Status: ON HOLD | COMMUNITY
End: 2024-05-29

## 2024-05-26 RX ORDER — HYDROCODONE BITARTRATE AND ACETAMINOPHEN 7.5; 325 MG/1; MG/1
1 TABLET ORAL EVERY 4 HOURS PRN
Status: DISCONTINUED | OUTPATIENT
Start: 2024-05-26 | End: 2024-05-29 | Stop reason: HOSPADM

## 2024-05-26 RX ORDER — DEXTROSE MONOHYDRATE 25 G/50ML
25 INJECTION, SOLUTION INTRAVENOUS
Status: DISCONTINUED | OUTPATIENT
Start: 2024-05-26 | End: 2024-05-29 | Stop reason: HOSPADM

## 2024-05-26 RX ORDER — BISACODYL 5 MG/1
5 TABLET, DELAYED RELEASE ORAL DAILY PRN
Status: DISCONTINUED | OUTPATIENT
Start: 2024-05-26 | End: 2024-05-29 | Stop reason: HOSPADM

## 2024-05-26 RX ORDER — SODIUM CHLORIDE 9 MG/ML
100 INJECTION, SOLUTION INTRAVENOUS CONTINUOUS
Status: DISCONTINUED | OUTPATIENT
Start: 2024-05-26 | End: 2024-05-26

## 2024-05-26 RX ORDER — POLYETHYLENE GLYCOL 3350 17 G/17G
17 POWDER, FOR SOLUTION ORAL DAILY PRN
Status: DISCONTINUED | OUTPATIENT
Start: 2024-05-26 | End: 2024-05-29 | Stop reason: HOSPADM

## 2024-05-26 RX ORDER — HYDROCODONE BITARTRATE AND ACETAMINOPHEN 10; 325 MG/1; MG/1
1 TABLET ORAL EVERY 8 HOURS PRN
Status: DISCONTINUED | OUTPATIENT
Start: 2024-05-26 | End: 2024-05-26

## 2024-05-26 RX ORDER — AMOXICILLIN 250 MG
2 CAPSULE ORAL 2 TIMES DAILY PRN
Status: DISCONTINUED | OUTPATIENT
Start: 2024-05-26 | End: 2024-05-29 | Stop reason: HOSPADM

## 2024-05-26 RX ORDER — NICOTINE POLACRILEX 4 MG
15 LOZENGE BUCCAL
Status: DISCONTINUED | OUTPATIENT
Start: 2024-05-26 | End: 2024-05-29 | Stop reason: HOSPADM

## 2024-05-26 RX ORDER — BISACODYL 10 MG
10 SUPPOSITORY, RECTAL RECTAL DAILY PRN
Status: DISCONTINUED | OUTPATIENT
Start: 2024-05-26 | End: 2024-05-29 | Stop reason: HOSPADM

## 2024-05-26 RX ORDER — SODIUM CHLORIDE 9 MG/ML
100 INJECTION, SOLUTION INTRAVENOUS CONTINUOUS
Status: DISCONTINUED | OUTPATIENT
Start: 2024-05-26 | End: 2024-05-29

## 2024-05-26 RX ORDER — SODIUM CHLORIDE 9 MG/ML
40 INJECTION, SOLUTION INTRAVENOUS AS NEEDED
Status: DISCONTINUED | OUTPATIENT
Start: 2024-05-26 | End: 2024-05-29 | Stop reason: HOSPADM

## 2024-05-26 RX ORDER — HYDROCODONE BITARTRATE AND ACETAMINOPHEN 10; 325 MG/1; MG/1
1 TABLET ORAL EVERY 4 HOURS PRN
Status: DISCONTINUED | OUTPATIENT
Start: 2024-05-26 | End: 2024-05-29 | Stop reason: HOSPADM

## 2024-05-26 RX ORDER — ACETAMINOPHEN 325 MG/1
650 TABLET ORAL EVERY 4 HOURS PRN
Status: DISCONTINUED | OUTPATIENT
Start: 2024-05-26 | End: 2024-05-29 | Stop reason: HOSPADM

## 2024-05-26 RX ORDER — PREGABALIN 100 MG/1
100 CAPSULE ORAL EVERY 12 HOURS SCHEDULED
Status: DISCONTINUED | OUTPATIENT
Start: 2024-05-26 | End: 2024-05-29 | Stop reason: HOSPADM

## 2024-05-26 RX ORDER — INSULIN LISPRO 100 [IU]/ML
2-7 INJECTION, SOLUTION INTRAVENOUS; SUBCUTANEOUS
Status: DISCONTINUED | OUTPATIENT
Start: 2024-05-26 | End: 2024-05-28

## 2024-05-26 RX ADMIN — SODIUM CHLORIDE 100 ML/HR: 9 INJECTION, SOLUTION INTRAVENOUS at 13:16

## 2024-05-26 RX ADMIN — DOXYCYCLINE 100 MG: 100 TABLET, FILM COATED ORAL at 21:10

## 2024-05-26 RX ADMIN — Medication 10 ML: at 21:10

## 2024-05-26 RX ADMIN — SODIUM CHLORIDE 100 ML/HR: 9 INJECTION, SOLUTION INTRAVENOUS at 02:06

## 2024-05-26 RX ADMIN — PREGABALIN 100 MG: 100 CAPSULE ORAL at 08:16

## 2024-05-26 RX ADMIN — HYDROCODONE BITARTRATE AND ACETAMINOPHEN 1 TABLET: 10; 325 TABLET ORAL at 17:24

## 2024-05-26 RX ADMIN — INSULIN LISPRO 5 UNITS: 100 INJECTION, SOLUTION INTRAVENOUS; SUBCUTANEOUS at 08:16

## 2024-05-26 RX ADMIN — DOXYCYCLINE 100 MG: 100 TABLET, FILM COATED ORAL at 08:16

## 2024-05-26 RX ADMIN — HYDROCODONE BITARTRATE AND ACETAMINOPHEN 1 TABLET: 10; 325 TABLET ORAL at 05:47

## 2024-05-26 RX ADMIN — Medication 10 ML: at 02:06

## 2024-05-26 RX ADMIN — HYDROCODONE BITARTRATE AND ACETAMINOPHEN 1 TABLET: 10; 325 TABLET ORAL at 21:36

## 2024-05-26 RX ADMIN — HYDROCODONE BITARTRATE AND ACETAMINOPHEN 1 TABLET: 10; 325 TABLET ORAL at 13:25

## 2024-05-26 RX ADMIN — INSULIN LISPRO 2 UNITS: 100 INJECTION, SOLUTION INTRAVENOUS; SUBCUTANEOUS at 17:18

## 2024-05-26 RX ADMIN — PREGABALIN 100 MG: 100 CAPSULE ORAL at 21:10

## 2024-05-26 RX ADMIN — INSULIN LISPRO 3 UNITS: 100 INJECTION, SOLUTION INTRAVENOUS; SUBCUTANEOUS at 11:20

## 2024-05-26 RX ADMIN — INSULIN LISPRO 2 UNITS: 100 INJECTION, SOLUTION INTRAVENOUS; SUBCUTANEOUS at 21:16

## 2024-05-26 NOTE — PLAN OF CARE
Problem: Adult Inpatient Plan of Care  Goal: Plan of Care Review  Outcome: Ongoing, Progressing  Goal: Patient-Specific Goal (Individualized)  Outcome: Ongoing, Progressing  Goal: Absence of Hospital-Acquired Illness or Injury  Outcome: Ongoing, Progressing  Intervention: Identify and Manage Fall Risk  Recent Flowsheet Documentation  Taken 5/26/2024 1600 by Erma Hoang RN  Safety Promotion/Fall Prevention: safety round/check completed  Taken 5/26/2024 1356 by Erma Hoang RN  Safety Promotion/Fall Prevention: safety round/check completed  Taken 5/26/2024 1210 by Erma Hoang RN  Safety Promotion/Fall Prevention: safety round/check completed  Taken 5/26/2024 1010 by Erma Hoang RN  Safety Promotion/Fall Prevention: safety round/check completed  Taken 5/26/2024 0809 by Erma Hoang RN  Safety Promotion/Fall Prevention:   safety round/check completed   nonskid shoes/slippers when out of bed   clutter free environment maintained   assistive device/personal items within reach  Intervention: Prevent Skin Injury  Recent Flowsheet Documentation  Taken 5/26/2024 0809 by Erma Hoang RN  Body Position: position changed independently  Intervention: Prevent and Manage VTE (Venous Thromboembolism) Risk  Recent Flowsheet Documentation  Taken 5/26/2024 1600 by Erma Hoang RN  Activity Management: up to bedside commode  Goal: Optimal Comfort and Wellbeing  Outcome: Ongoing, Progressing  Intervention: Monitor Pain and Promote Comfort  Recent Flowsheet Documentation  Taken 5/26/2024 1724 by Erma Hoang RN  Pain Management Interventions: see MAR  Taken 5/26/2024 1600 by Erma Hoang RN  Pain Management Interventions:   pillow support provided   position adjusted  Taken 5/26/2024 1325 by Erma Hoang RN  Pain Management Interventions: see MAR  Taken 5/26/2024 1210 by Erma Hoang RN  Pain Management Interventions:   position adjusted   pillow support  provided  Taken 5/26/2024 0805 by Erma Hoang RN  Pain Management Interventions:   position adjusted   pillow support provided  Intervention: Provide Person-Centered Care  Recent Flowsheet Documentation  Taken 5/26/2024 0805 by Erma Hoang RN  Trust Relationship/Rapport:   care explained   choices provided   emotional support provided   empathic listening provided   questions answered   reassurance provided   questions encouraged   thoughts/feelings acknowledged  Goal: Readiness for Transition of Care  Outcome: Ongoing, Progressing     Problem: Fluid and Electrolyte Imbalance (Acute Kidney Injury/Impairment)  Goal: Fluid and Electrolyte Balance  Outcome: Ongoing, Progressing     Problem: Oral Intake Inadequate (Acute Kidney Injury/Impairment)  Goal: Optimal Nutrition Intake  Outcome: Ongoing, Progressing     Problem: Renal Function Impairment (Acute Kidney Injury/Impairment)  Goal: Effective Renal Function  Outcome: Ongoing, Progressing  Intervention: Monitor and Support Renal Function  Recent Flowsheet Documentation  Taken 5/26/2024 0809 by Erma Haong RN  Medication Review/Management: medications reviewed     Problem: Impaired Wound Healing  Goal: Optimal Wound Healing  Outcome: Ongoing, Progressing  Intervention: Promote Wound Healing  Recent Flowsheet Documentation  Taken 5/26/2024 1724 by Erma Hoang RN  Pain Management Interventions: see MAR  Taken 5/26/2024 1600 by Erma Hoang RN  Activity Management: up to bedside commode  Pain Management Interventions:   pillow support provided   position adjusted  Taken 5/26/2024 1325 by Erma Hoang RN  Pain Management Interventions: see MAR  Taken 5/26/2024 1210 by Erma Hoang RN  Pain Management Interventions:   position adjusted   pillow support provided  Taken 5/26/2024 0805 by Erma Hoang RN  Pain Management Interventions:   position adjusted   pillow support provided   Goal Outcome Evaluation:

## 2024-05-26 NOTE — CONSULTS
Nephrology ([facility]) Consult Note      Patient:  Garrett Rodriguez  YOB: 1965  Date of Service: 5/26/2024  MRN: [id number]   Acct: [id number]   Primary Care Physician: Mehul Andersen MD  Advance Directive:   Code Status and Medical Interventions:   Ordered at: 05/26/24 0131     Level Of Support Discussed With:    Patient     Code Status (Patient has no pulse and is not breathing):    CPR (Attempt to Resuscitate)     Medical Interventions (Patient has pulse or is breathing):    Full Support     Admit Date: 5/25/2024       Hospital Day: 0  Referring Provider: Colin Patrick DO      Patient Seen, Chart, Consults, Notes, Labs, Radiology studies reviewed.    Chief complaint: Abnormal labs.    Subjective:  Garrett Rodriguez is a 59 y.o. male  whom we were consulted for acute kidney injury.  His baseline creatinine usually 1.0 mg.  Patient has been fighting with right lower extremity wound which all started after an accident and fracture of his ankle.  Patient has open reduction internal fixation of his leg.  He has recurrent cellulitis/infection, required multiple rounds of IV antibiotics and debridements.  Finally he is scheduled to have right below the knee amputation in early part of June.  He presented to the emergency room with bleeding from his right leg wound.  Routine lab data in ER indicated his serum creatinine now 4.0 mg.  About a month ago he was hospitalized with new onset of congestive heart failure.  He was sent home with high-dose loop diuretics and metolazone and ACE inhibitor was initiated as well.  Patient has known routine labs within the last several weeks.  Now his creatinine is 4.0 mg.  He is now admitted for treatment of acute kidney injury likely caused by excessive diuresis.  He denies any shortness of breath or swelling of legs.  His blood pressure has been relatively low as well.  Patient denies any nausea vomiting or diarrhea.  He  denies any use of nonsteroidal agents.    Allergies:  Patient has no known allergies.    Home Meds:  Medications Prior to Admission   Medication Sig Dispense Refill Last Dose    apixaban (ELIQUIS) 5 MG tablet tablet Take 1 tablet by mouth 2 (Two) Times a Day. 60 tablet 2 5/25/2024    cefdinir (OMNICEF) 300 MG capsule Take 1 capsule by mouth 2 (Two) Times a Day.   5/25/2024    doxycycline (VIBRAMYCIN) 100 MG capsule Take 1 capsule by mouth Every 12 (Twelve) Hours.   5/25/2024    furosemide (LASIX) 40 MG tablet Take 1 tablet by mouth Daily. Take an extra dose for any 2-3 pound weight gain (Patient taking differently: Take 1 tablet by mouth 2 (Two) Times a Day. Take an extra dose for any 2-3 pound weight gain) 50 tablet 2 5/25/2024    gabapentin (NEURONTIN) 100 MG capsule Take 1 capsule by mouth 3 (Three) Times a Day.   5/25/2024    HYDROcodone-acetaminophen (NORCO)  MG per tablet Take 1 tablet by mouth Every 8 (Eight) Hours As Needed for Moderate Pain.   5/25/2024    lisinopril (PRINIVIL,ZESTRIL) 20 MG tablet Take 1 tablet by mouth Daily. 30 tablet 2 5/25/2024    metFORMIN (GLUCOPHAGE) 1000 MG tablet Take 1 tablet by mouth 2 (Two) Times a Day With Meals.   5/25/2024    metOLazone (ZAROXOLYN) 2.5 MG tablet Take 1 tablet by mouth Daily. 30 tablet 11 5/25/2024    naloxone (NARCAN) 4 MG/0.1ML nasal spray Call 911. Don't prime. [place] in 1 nostril for overdose. Repeat in 2-3 minutes in other nostril if no or minimal breathing/responsiveness. 2 each 0     pregabalin (LYRICA) 100 MG capsule Take 1 capsule by mouth 3 (Three) Times a Day. No longer taking. Patient states he takes gabapentin instead   Unknown       Medicines:  Current Facility-Administered Medications   Medication Dose Route Frequency Provider Last Rate Last Admin    acetaminophen (TYLENOL) tablet 650 mg  650 mg Oral Q4H PRN Colin Patrick DO        sennosides-docusate (PERICOLACE) 8.6-50 MG per tablet 2 tablet  2 tablet Oral BID PRN Darnell, Ali  DO Yumiko        And    polyethylene glycol (MIRALAX) packet 17 g  17 g Oral Daily PRN Colin Patrick DO        And    bisacodyl (DULCOLAX) EC tablet 5 mg  5 mg Oral Daily PRN Colin Patrick DO        And    bisacodyl (DULCOLAX) suppository 10 mg  10 mg Rectal Daily PRN Colin Patrick DO        dextrose (D50W) (25 g/50 mL) IV injection 25 g  25 g Intravenous Q15 Min PRN Colin Patrick DO        dextrose (GLUTOSE) oral gel 15 g  15 g Oral Q15 Min PRN Colin Patrick DO        doxycycline (ADOXA) tablet 100 mg  100 mg Oral Q12H Colin Patrick DO   100 mg at 05/26/24 0816    glucagon (GLUCAGEN) injection 1 mg  1 mg Intramuscular Q15 Min PRN Colin Patrick DO        HYDROcodone-acetaminophen (NORCO)  MG per tablet 1 tablet  1 tablet Oral Q4H PRN Ba Cosme DO   1 tablet at 05/26/24 1325    HYDROcodone-acetaminophen (NORCO) 7.5-325 MG per tablet 1 tablet  1 tablet Oral Q4H PRN Ba Cosme DO        Insulin Lispro (humaLOG) injection 2-7 Units  2-7 Units Subcutaneous 4x Daily AC & at Bedtime Colin Patrick DO   3 Units at 05/26/24 1120    ondansetron (ZOFRAN) injection 4 mg  4 mg Intravenous Q6H PRN Colin Patrick DO        pregabalin (LYRICA) capsule 100 mg  100 mg Oral Q12H Colin Patrick DO   100 mg at 05/26/24 0816    sodium chloride 0.9 % flush 10 mL  10 mL Intravenous PRN Roderick Todd MD        sodium chloride 0.9 % flush 10 mL  10 mL Intravenous Q12H Colin Patrick DO   10 mL at 05/26/24 0206    sodium chloride 0.9 % flush 10 mL  10 mL Intravenous PRN Colin Patrick DO        sodium chloride 0.9 % infusion 40 mL  40 mL Intravenous PRN Colin Patrick DO        sodium chloride 0.9 % infusion  100 mL/hr Intravenous Continuous Ba Cosme  mL/hr at 05/26/24 1316 100 mL/hr at 05/26/24 1316       Past Medical History:  Past Medical History:   Diagnosis Date    Atrial fibrillation 3/11/2024    Chronic pain in right  foot     Diabetes mellitus     Hyperlipidemia Feb 2024    Hypertension     PFO (patent foramen ovale)     RP at age 6       Past Surgical History:  Past Surgical History:   Procedure Laterality Date    ANKLE FUSION Right 07/11/2023    Procedure: ANKLE ARTHRODESIS;  Surgeon: Shahbaz Reddy DPM;  Location:  [facility] OR;  Service: Podiatry;  Laterality: Right;    BACK SURGERY      CARDIAC SURGERY      EXTERNAL FIXATION ANKLE FRACTURE Right 07/11/2023    Procedure: POSSIBLE EXTERNAL FIXATION, RIGHT ANKLE;  Surgeon: Shahbaz Reddy DPM;  Location:  [facility];  Service: Podiatry;  Laterality: Right;    HARDWARE REMOVAL Right 07/11/2023    Procedure: REMOVAL OF HARDWARE, DEEP; ANKLE ARTHRODESIS; SUBTALAR ARTHRODESIS; POSSIBLE EXTERNAL FIXATION, RIGHT ANKLE;  Surgeon: Shahbaz Reddy DPM;  Location:  [facility];  Service: Podiatry;  Laterality: Right;    PATENT FORAMEN OVALE CLOSURE      ROTATOR CUFF REPAIR Right     SUBTALAR ARTHRODESIS Right 07/11/2023    Procedure: SUBTALAR ARTHRODESIS;  Surgeon: Shahbaz Reddy DPM;  Location:  [facility];  Service: Podiatry;  Laterality: Right;       Family History  Family History   Problem Relation Age of Onset    Hypertension Mother     Hypertension Father        Social History  Social History     Socioeconomic History    Marital status:    Tobacco Use    Smoking status: Never    Smokeless tobacco: Never   Vaping Use    Vaping status: Never Used   Substance and Sexual Activity    Alcohol use: Never    Drug use: Never    Sexual activity: Not Currently     Partners: Female         Review of Systems:  History obtained from chart review and the patient  General ROS: No fever or chills  Respiratory ROS: No cough, shortness of breath, wheezing  Cardiovascular ROS: no chest pain or dyspnea on exertion  Gastrointestinal ROS: No abdominal pain or melena  Genito-Urinary ROS: No dysuria or hematuria  14 point ROS reviewed with the patient and negative except as noted above and in  "the HPI unless unable to obtain.    Objective:  /68 (BP Location: Left arm, Patient Position: Lying)   Pulse 77   Temp 97.5 °F (36.4 °C)   Resp 16   Ht 177.8 cm (70\")   Wt 79.8 kg (175 lb 14.8 oz)   SpO2 96%   BMI 25.24 kg/m²     Intake/Output Summary (Last 24 hours) at 5/26/2024 1441  Last data filed at 5/26/2024 0805  Gross per 24 hour   Intake 597.5 ml   Output 800 ml   Net -202.5 ml     General: awake/alert   HEENT: Normocephalic atraumatic head  Neck: Supple no JVD or carotid bruits.  Chest:  clear to auscultation bilaterally without respiratory distress  CVS: regular rate and rhythm  Abdominal: soft, nontender, normal bowel sounds  Extremities:  Right lower extremity deformity/no edema  Skin: warm and dry without rash      Labs:    Results from last 7 days   Lab Units 05/26/24  0605 05/26/24  0021   WBC 10*3/mm3 8.66 9.85   HEMOGLOBIN g/dL 9.1* 8.1*   HEMATOCRIT % 29.7* 26.1*   PLATELETS 10*3/mm3 346 294       Results from last 7 days   Lab Units 05/26/24  0605 05/26/24  0021   SODIUM mmol/L 138 136   POTASSIUM mmol/L 4.2 4.1   CHLORIDE mmol/L 93* 93*   CO2 mmol/L 32.0* 29.0   BUN mg/dL 108* 112*   CREATININE mg/dL 4.05* 4.59*   CALCIUM mg/dL 9.5 9.1   BILIRUBIN mg/dL 0.4 0.3   ALK PHOS U/L 162* 149*   ALT (SGPT) U/L 18 17   AST (SGOT) U/L 12 16   GLUCOSE mg/dL 289* 276*   EGFR mL/min/1.73 16.3* 14.0*         Radiology:   Imaging Results (Last 24 Hours)       ** No results found for the last 24 hours. **            Culture:  No components found for: \"WOUNDCUL\", \"3\"  No components found for: \"CSFCUL\", \"3\"  No components found for: \"BC\", \"3\"  No components found for: \"URINECUL\", \"3\"      Assessment   1.  Acute kidney injury/improving.  2.  Intravascular volume depletion due to excessive diuresis.  3.  New onset of diastolic CHF.  4.  Type 2 diabetes/non-insulin-dependent  5.  Anemia of chronic disease.  6.  Right lower extremity deformity/recurrent infections.    Plan:  1.  Agree to hold loop " diuretics/metolazone.  2.  Agree with IV fluid administration.  3.  Urinary electrolytes.  4.  Urinary protein to creatinine ratio/UACR.  5.  Baseline renal ultrasound.  6.  Renal prognosis looks very good      Thank you for the consult, we appreciate the opportunity to provide care to your patients.  Feel free to contact me if I can be of any further assistance.      Martínez Shaw MD  5/26/2024  14:41 CDT

## 2024-05-26 NOTE — PLAN OF CARE
Goal Outcome Evaluation:  Plan of Care Reviewed With: patient        Progress: no change  Outcome Evaluation: Patient presented to the ER due to the wound on his foot bleeding; Patient was scheduled for a Right BKA on 6/3; Bun 112 Creat 4.59 GFR 14; Admitted with TARSHA; IVF; PO ABX; Pivots to bedside commode; Wife at bedside; Room air; Alert and oriented; resting between care; safety maintained

## 2024-05-26 NOTE — PROGRESS NOTES
This 59-year-old male was admitted early this morning by Dr. Patrick.  The patient had presented to the emergency department because of continued bleeding from the right lower extremity.  The patient's foot has been bandaged and bleeding has been stopped.  The patient has been having occasional vomiting and frequent diarrhea.  Creatinine was quite elevated at the time of presentation at 4.59 with repeat this a.m. and 4.05.  The patient remains on IV fluids.  Nephrology will be consulted.  White blood cell count is within normal limits.  Hemoglobin is 9.1.  GI panel will be ordered.  Labs will be repeated in AM.    Electronically signed by Ba Cosme DO, 05/26/24, 14:30 CDT.

## 2024-05-26 NOTE — ED NOTES
Cleaned pts foot and rewrapped it with nonadherent guaze, ABD pads, and kerlex with medipore tape. Extra supplies given to the family.

## 2024-05-26 NOTE — H&P
[facility] Medicine Services  HISTORY AND PHYSICAL    Date of Admission: 5/25/2024  Primary Care Physician: Mehul Andersen MD    Subjective   Primary Historian: Patient     Chief Complaint: Wound bleeding    Wound Check    59-year-old male who presents emergency department with right lower extremity wound bleeding.  Patient has a chronic wound on the right lower extremity.  He is scheduled to have a right lower extremity amputation with Dr. Hawk next week.  He states the wound started bleeding a lot, and he wanted to come and have it checked.  He takes Eliquis.  Hemoglobin appears to be at his baseline, but he was found to have an TARSHA.  The patient takes multiple diuretics as well as an ACE inhibitor.  The patient's 2-month creatinine review: 1.08, 1.69, 1.28 with a GFR between 31 and 41%.  Patient denies chest pain.  He denies shortness of breath.  He does not feel like he has been dehydrated, no nausea, vomiting, or diarrhea.  He states he has not noticed any change in his urine output.        Review of Systems   Skin:         Wound bleeding, right lower extremity      Otherwise complete ROS reviewed and negative except as mentioned in the HPI.    Past Medical History:   Past Medical History:   Diagnosis Date    Atrial fibrillation 3/11/2024    Chronic pain in right foot     Diabetes mellitus     Hyperlipidemia Feb 2024    Hypertension     PFO (patent foramen ovale)     RP at age 6     Past Surgical History:  Past Surgical History:   Procedure Laterality Date    ANKLE FUSION Right 07/11/2023    Procedure: ANKLE ARTHRODESIS;  Surgeon: Shahbaz Reddy DPM;  Location:  [facility];  Service: Podiatry;  Laterality: Right;    BACK SURGERY      CARDIAC SURGERY      EXTERNAL FIXATION ANKLE FRACTURE Right 07/11/2023    Procedure: POSSIBLE EXTERNAL FIXATION, RIGHT ANKLE;  Surgeon: Shahbaz Reddy DPM;  Location:  [facility];  Service: Podiatry;  Laterality: Right;    HARDWARE  REMOVAL Right 07/11/2023    Procedure: REMOVAL OF HARDWARE, DEEP; ANKLE ARTHRODESIS; SUBTALAR ARTHRODESIS; POSSIBLE EXTERNAL FIXATION, RIGHT ANKLE;  Surgeon: Shahbaz Reddy DPM;  Location:  [facility];  Service: Podiatry;  Laterality: Right;    PATENT FORAMEN OVALE CLOSURE      ROTATOR CUFF REPAIR Right     SUBTALAR ARTHRODESIS Right 07/11/2023    Procedure: SUBTALAR ARTHRODESIS;  Surgeon: Shahbaz Reddy DPM;  Location:  [facility];  Service: Podiatry;  Laterality: Right;     Social History:  reports that he has never smoked. He has never used smokeless tobacco. He reports that he does not drink alcohol and does not use drugs.    Family History: family history includes Hypertension in his father and mother.       Allergies:  No Known Allergies    Medications:  Prior to Admission medications    Medication Sig Start Date End Date Taking? Authorizing Provider   apixaban (ELIQUIS) 5 MG tablet tablet Take 1 tablet by mouth 2 (Two) Times a Day. 3/15/24   Ba Cosme DO   cefdinir (OMNICEF) 300 MG capsule Take 1 capsule by mouth 2 (Two) Times a Day. 5/17/24   Lula Irizarry MD   doxycycline (VIBRAMYCIN) 100 MG capsule Take 1 capsule by mouth Every 12 (Twelve) Hours. 5/17/24   Lula Irizarry MD   furosemide (LASIX) 40 MG tablet Take 1 tablet by mouth Daily. Take an extra dose for any 2-3 pound weight gain  Patient taking differently: Take 1 tablet by mouth 2 (Two) Times a Day. Take an extra dose for any 2-3 pound weight gain 3/16/24   Ba Cosme DO   HYDROcodone-acetaminophen (NORCO)  MG per tablet Take 1 tablet by mouth Every 8 (Eight) Hours As Needed for Moderate Pain.    Lula Irizarry MD   lisinopril (PRINIVIL,ZESTRIL) 20 MG tablet Take 1 tablet by mouth Daily. 3/16/24   Ba Cosme DO   metFORMIN (GLUCOPHAGE) 1000 MG tablet Take 1 tablet by mouth 2 (Two) Times a Day With Meals.    Lula Irizarry MD   metOLazone (ZAROXOLYN) 2.5 MG tablet Take 1 tablet by  "mouth Daily. 4/1/24   Rigo Kathleen MD   naloxone (NARCAN) 4 MG/0.1ML nasal spray Call 911. Don't prime. [place] in 1 nostril for overdose. Repeat in 2-3 minutes in other nostril if no or minimal breathing/responsiveness. 7/11/23   Shahbaz Reddy, DPSHELBY   pregabalin (LYRICA) 100 MG capsule Take 1 capsule by mouth 3 (Three) Times a Day.    Provider, MD Lula     I have utilized all available immediate resources to obtain, update, or review the patient's current medications (including all prescriptions, over-the-counter products, herbals, cannabis/cannabidiol products, and vitamin/mineral/dietary (nutritional) supplements).    Objective     Vital Signs: /62 (BP Location: Right arm, Patient Position: Sitting)   Pulse 83   Temp 98.2 °F (36.8 °C) (Oral)   Resp 20   Ht 177.8 cm (70\")   Wt 74.8 kg (165 lb)   SpO2 93%   BMI 23.68 kg/m²   Physical Exam  Vitals reviewed.   Constitutional:       Appearance: Normal appearance. He is not ill-appearing.   HENT:      Head: Normocephalic and atraumatic.      Right Ear: External ear normal.      Left Ear: External ear normal.      Nose: Nose normal.   Eyes:      General: No scleral icterus.     Conjunctiva/sclera: Conjunctivae normal.   Cardiovascular:      Rate and Rhythm: Normal rate and regular rhythm.      Heart sounds: Normal heart sounds.   Pulmonary:      Effort: Pulmonary effort is normal.      Breath sounds: Normal breath sounds.   Abdominal:      General: There is no distension.      Palpations: Abdomen is soft.   Musculoskeletal:         General: No tenderness.      Cervical back: Normal range of motion and neck supple.      Comments: RLE bandaged   Skin:     General: Skin is warm and dry.   Neurological:      Mental Status: He is alert and oriented to person, place, and time.      Cranial Nerves: No cranial nerve deficit.   Psychiatric:         Mood and Affect: Mood normal.         Behavior: Behavior normal.          Results Reviewed:  Lab Results (last " 24 hours)       Procedure Component Value Units Date/Time    Comprehensive Metabolic Panel [[id number]]  (Abnormal) Collected: 05/26/24 0021    Specimen: Blood Updated: 05/26/24 0046     Glucose 276 mg/dL       mg/dL      Creatinine 4.59 mg/dL      Sodium 136 mmol/L      Potassium 4.1 mmol/L      Chloride 93 mmol/L      CO2 29.0 mmol/L      Calcium 9.1 mg/dL      Total Protein 6.9 g/dL      Albumin 2.9 g/dL      ALT (SGPT) 17 U/L      AST (SGOT) 16 U/L      Alkaline Phosphatase 149 U/L      Total Bilirubin 0.3 mg/dL      Globulin 4.0 gm/dL      A/G Ratio 0.7 g/dL      BUN/Creatinine Ratio 24.4     Anion Gap 14.0 mmol/L      eGFR 14.0 mL/min/1.73      Comment: <15 Indicative of kidney failure       Narrative:      GFR Normal >60  Chronic Kidney Disease <60  Kidney Failure <15      aPTT [[id number]]  (Abnormal) Collected: 05/26/24 0021    Specimen: Blood Updated: 05/26/24 0038     PTT 38.3 seconds     Protime-INR [[id number]]  (Abnormal) Collected: 05/26/24 0021    Specimen: Blood Updated: 05/26/24 0038     Protime 16.6 Seconds      INR 1.29    CBC & Differential [[id number]]  (Abnormal) Collected: 05/26/24 0021    Specimen: Blood Updated: 05/26/24 0033    Narrative:      The following orders were created for panel order CBC & Differential.  Procedure                               Abnormality         Status                     ---------                               -----------         ------                     CBC Auto Differential[[id number]]        Abnormal            Final result                 Please view results for these tests on the individual orders.    CBC Auto Differential [[id number]]  (Abnormal) Collected: 05/26/24 0021    Specimen: Blood Updated: 05/26/24 0033     WBC 9.85 10*3/mm3      RBC 3.04 10*6/mm3      Hemoglobin 8.1 g/dL      Hematocrit 26.1 %      MCV 85.9 fL      MCH 26.6 pg      MCHC 31.0 g/dL      RDW 14.9 %      RDW-SD 46.9 fl      MPV 9.8 fL      Platelets 294 10*3/mm3      Neutrophil %  67.1 %      Lymphocyte % 19.0 %      Monocyte % 9.1 %      Eosinophil % 1.9 %      Basophil % 0.5 %      Immature Grans % 2.4 %      Neutrophils, Absolute 6.60 10*3/mm3      Lymphocytes, Absolute 1.87 10*3/mm3      Monocytes, Absolute 0.90 10*3/mm3      Eosinophils, Absolute 0.19 10*3/mm3      Basophils, Absolute 0.05 10*3/mm3      Immature Grans, Absolute 0.24 10*3/mm3           Imaging Results (Last 24 Hours)       ** No results found for the last 24 hours. **          I have personally reviewed and interpreted the radiology studies and ECG obtained at time of admission.     Assessment / Plan   Assessment:   Active Hospital Problems    Diagnosis     **TARSHA (acute kidney injury)      Impression:  Acute kidney injury  Hypertension  Non-insulin-dependent diabetes  Right lower extremity wound    Treatment Plan  Admit to observation  As needed wound care to the right lower extremity  Hold diuretics and ACE inhibitor secondary to kidney injury  Sliding-scale insulin with Accu-Cheks, hold Glucophage  Gentle IV hydration  Follow-up labs in the morning  Hold Eliquis secondary to increased bleeding and follow-up hemoglobin in the morning    The patient will be admitted to my service here at [facility].  Primary team to take over in the morning    Medical Decision Making  Number and Complexity of problems: 4, moderate  Differential Diagnosis: BPH    Conditions and Status        Condition is unchanged.     [facility] Data  External documents reviewed: None  Cardiac tracing (EKG, telemetry) interpretation: None  Radiology interpretation: None  Labs reviewed: Reviewed  Any tests that were considered but not ordered: None     Decision rules/scores evaluated (example [id number], [place], etc): Chronically anticoagulated     Discussed with: Patient and family at bedside     Care Planning  Shared decision making: Patient, family, and ED staff  Code status and discussions: Full    Disposition  Social Determinants of Health that  impact treatment or disposition: None  Estimated length of stay is overnight.     I confirmed that the patient's advanced care plan is present, code status is documented, and a surrogate decision maker is listed in the patient's medical record.     The patient's surrogate decision maker is family.     The patient was seen and examined by me on 5/26/2024 at 1.    Electronically signed by Colin Patrick DO, 05/26/24, 01:31 CDT.

## 2024-05-26 NOTE — ED PROVIDER NOTES
"EMERGENCY DEPARTMENT ATTENDING NOTE    Patient Name: Garrett Rodriguez    Chief Complaint   Patient presents with    Wound Check       PATIENT PRESENTATION:  Garrett Rodriguez is a 59 y.o. male with PMH significant for diabetes, A-fib on Eliquis, hypertension, hyperlipidemia, chronic right lower extremity scheduled for amputation below the knee on 3 [place] who presents to the ED worsening bleeding from necrotic ulcer from the bottom of his right foot.  He is scheduled for his knee amputation due to the lesions.  He has prior hardware in his foot and with walking with his significant neuropathy suspect that his hardware has been pushing through the bottom of his foot.  Family reports that he started bleeding around 7 PM tonight after walking to the bathroom in the house.  Patient has a scooter but does not use crutches or any other additional assistance for moving.  He normally wears boots.  The bleeding started from the ulcer and has worsened in the were using pads that the patient was bleeding through.  Has not held his anticoagulation for surgery.  Discussed with his cardiologist and the cardiologist deferred to vascular surgery based off their scheduling.  Patient is on cefdinir and doxycycline for his lower leg wounds.  He has regular wound cares.      PHYSICAL EXAM:   VS: /62 (BP Location: Right arm, Patient Position: Sitting)   Pulse 83   Temp 98.2 °F (36.8 °C) (Oral)   Resp 20   Ht 177.8 cm (70\")   Wt 74.8 kg (165 lb)   SpO2 93%   BMI 23.68 kg/m²   GENERAL: well-nourished, well-developed, awake, alert, no acute distress, nontoxic appearing, comfortable  EYES: PERRL, sclerae anicteric, extraocular movements grossly intact, symmetric lids  EARS, NOSE, MOUTH, THROAT: atraumatic external nose and ears, moist mucous membranes  NECK: symmetric, trachea midline  RESPIRATORY: unlabored respiratory effort, clear to auscultation bilaterally, good air movement  CARDIOVASCULAR: no murmurs, 2+ " dorsalis pedis and posterior tibial pulses  GI: soft, nontender, nondistended  MUSCULOSKELETAL/EXTREMITIES: Necrotic 5 cm radius lesion on the plantar surface of the right leg with venous oozing when bandage was removed, swelling of the right lower mid leg down with skin discoloration and necrotic wounds on the plantar and medial malleolus surface of the right leg  SKIN: warm and dry with no obvious rashes  NEUROLOGIC: moving all 4 extremities symmetrically, CN II-XII grossly intact  PSYCHIATRIC: alert, pleasant and cooperative. Appropriate mood and affect.      MEDICAL DECISION MAKING:    Garrett Rodriguez is a 59 y.o. male who presented to the ED bleeding from his necrotic wound    Procedures    Differential Diagnosis Considered: Arterial bleed, anemia, coagulopathy    Labs Ordered:  Labs Reviewed   COMPREHENSIVE METABOLIC PANEL - Abnormal; Notable for the following components:       Result Value    Glucose 276 (*)      (*)     Creatinine 4.59 (*)     Chloride 93 (*)     Albumin 2.9 (*)     Alkaline Phosphatase 149 (*)     eGFR 14.0 (*)     All other components within normal limits    Narrative:     GFR Normal >60  Chronic Kidney Disease <60  Kidney Failure <15     APTT - Abnormal; Notable for the following components:    PTT 38.3 (*)     All other components within normal limits   PROTIME-INR - Abnormal; Notable for the following components:    Protime 16.6 (*)     INR 1.29 (*)     All other components within normal limits   CBC WITH AUTO DIFFERENTIAL - Abnormal; Notable for the following components:    RBC 3.04 (*)     Hemoglobin 8.1 (*)     Hematocrit 26.1 (*)     MCHC 31.0 (*)     Lymphocyte % 19.0 (*)     Immature Grans % 2.4 (*)     Immature Grans, Absolute 0.24 (*)     All other components within normal limits   CBC AND DIFFERENTIAL    Narrative:     The following orders were created for panel order CBC & Differential.  Procedure                               Abnormality         Status                      ---------                               -----------         ------                     CBC Auto Differential[[id number]]        Abnormal            Final result                 Please view results for these tests on the individual orders.        Imaging Ordered:   No orders to display       Internal chart review:   Past Medical History:   Diagnosis Date    Atrial fibrillation 3/11/2024    Chronic pain in right foot     Diabetes mellitus     Hyperlipidemia Feb 2024    Hypertension     PFO (patent foramen ovale)     RP at age 6       Past Surgical History:   Procedure Laterality Date    ANKLE FUSION Right 07/11/2023    Procedure: ANKLE ARTHRODESIS;  Surgeon: Shahbaz Reddy DPM;  Location:  [facility];  Service: Podiatry;  Laterality: Right;    BACK SURGERY      CARDIAC SURGERY      EXTERNAL FIXATION ANKLE FRACTURE Right 07/11/2023    Procedure: POSSIBLE EXTERNAL FIXATION, RIGHT ANKLE;  Surgeon: Shahbaz Reddy DPM;  Location:  [facility];  Service: Podiatry;  Laterality: Right;    HARDWARE REMOVAL Right 07/11/2023    Procedure: REMOVAL OF HARDWARE, DEEP; ANKLE ARTHRODESIS; SUBTALAR ARTHRODESIS; POSSIBLE EXTERNAL FIXATION, RIGHT ANKLE;  Surgeon: Shahbaz Reddy DPM;  Location:  [facility];  Service: Podiatry;  Laterality: Right;    PATENT FORAMEN OVALE CLOSURE      ROTATOR CUFF REPAIR Right     SUBTALAR ARTHRODESIS Right 07/11/2023    Procedure: SUBTALAR ARTHRODESIS;  Surgeon: Shahbaz Reddy DPM;  Location:  [facility];  Service: Podiatry;  Laterality: Right;       No Known Allergies      Current Facility-Administered Medications:     [COMPLETED] Insert Peripheral IV, , , Once **AND** sodium chloride 0.9 % flush 10 mL, 10 mL, Intravenous, PRNPerez Joseph, MD    Current Outpatient Medications:     apixaban (ELIQUIS) 5 MG tablet tablet, Take 1 tablet by mouth 2 (Two) Times a Day., Disp: 60 tablet, Rfl: 2    cefdinir (OMNICEF) 300 MG capsule, Take 1 capsule by mouth 2 (Two) Times a Day., Disp: , Rfl:      doxycycline (VIBRAMYCIN) 100 MG capsule, Take 1 capsule by mouth Every 12 (Twelve) Hours., Disp: , Rfl:     furosemide (LASIX) 40 MG tablet, Take 1 tablet by mouth Daily. Take an extra dose for any 2-3 pound weight gain (Patient taking differently: Take 1 tablet by mouth 2 (Two) Times a Day. Take an extra dose for any 2-3 pound weight gain), Disp: 50 tablet, Rfl: 2    HYDROcodone-acetaminophen (NORCO)  MG per tablet, Take 1 tablet by mouth Every 8 (Eight) Hours As Needed for Moderate Pain., Disp: , Rfl:     lisinopril (PRINIVIL,ZESTRIL) 20 MG tablet, Take 1 tablet by mouth Daily., Disp: 30 tablet, Rfl: 2    metFORMIN (GLUCOPHAGE) 1000 MG tablet, Take 1 tablet by mouth 2 (Two) Times a Day With Meals., Disp: , Rfl:     metOLazone (ZAROXOLYN) 2.5 MG tablet, Take 1 tablet by mouth Daily., Disp: 30 tablet, Rfl: 11    naloxone (NARCAN) 4 MG/0.1ML nasal spray, Call 911. Don't prime. [place] in 1 nostril for overdose. Repeat in 2-3 minutes in other nostril if no or minimal breathing/responsiveness., Disp: 2 each, Rfl: 0    pregabalin (LYRICA) 100 MG capsule, Take 1 capsule by mouth 3 (Three) Times a Day., Disp: , Rfl:     External documents reviewed: Reviewed chart with Dr. Hawk having a scheduled surgery for 03 June for right amputation below the knee for nonhealing surgical wound.    My lab interpretation: Hemoglobin at baseline of 8.1, patient with an TARSHA with GFR 14 and creatinine over 4    Discussed with: Patient and wife, discussed with Dr. Chase and patient be admitted for observation    ED Course and Re-evaluation: Patient had wound recovered and packed by nursing without increased bleeding.  No significant coagulopathy on lab work.  Stable chronic anemia.  Patient did have a significant TARSHA and meets observation criteria.  Due to platelet dysfunction with renal failure patient to be admitted for observation due to this wound and bleeding.  Patient has been getting aggressively diuresed with Lasix due to  heart failure and suspect this is as the cause for his severe TARSHA.      ED Diagnosis:  (N17.9) TARSHA (acute kidney injury)    (S91.301S) Open wound of foot, right, sequela     Disposition: Admit for observation        Signed:  Roderick Todd MD  Emergency Medicine Physician    Please note that portions of this note were completed with a voice recognition program.      Roderick Todd MD  05/26/24 0118

## 2024-05-26 NOTE — CASE MANAGEMENT/SOCIAL WORK
Continued Stay Note   Sera     Patient Name: Garrett Rodriguez  MRN: [id number]  Today's Date: 5/26/2024    Admit Date: 5/25/2024        Discharge Plan       Row Name 05/26/24 0808       Plan    Plan Comments Social service consult received stating patient was requesting a wheelchair and a BSC.  Patient's current payor is listed as worker's compensation.  Would not be able to reach worker's compensation for coverage info over the weekend but if DME company is willing to deliver items can certainly arrange with appropriate physician's DME orders.                   Discharge Codes    No documentation.                       NICO Prince

## 2024-05-27 ENCOUNTER — APPOINTMENT (OUTPATIENT)
Dept: ULTRASOUND IMAGING | Facility: HOSPITAL | Age: 59
End: 2024-05-27
Payer: COMMERCIAL

## 2024-05-27 LAB
ALBUMIN SERPL-MCNC: 2.7 G/DL (ref 3.5–5.2)
ALBUMIN/GLOB SERPL: 0.7 G/DL
ALP SERPL-CCNC: 123 U/L (ref 39–117)
ALT SERPL W P-5'-P-CCNC: 14 U/L (ref 1–41)
ANION GAP SERPL CALCULATED.3IONS-SCNC: 8 MMOL/L (ref 5–15)
AST SERPL-CCNC: 11 U/L (ref 1–40)
BASOPHILS # BLD AUTO: 0.03 10*3/MM3 (ref 0–0.2)
BASOPHILS NFR BLD AUTO: 0.5 % (ref 0–1.5)
BILIRUB SERPL-MCNC: 0.2 MG/DL (ref 0–1.2)
BUN SERPL-MCNC: 90 MG/DL (ref 6–20)
BUN/CREAT SERPL: 30.4 (ref 7–25)
CALCIUM SPEC-SCNC: 9 MG/DL (ref 8.6–10.5)
CHLORIDE SERPL-SCNC: 101 MMOL/L (ref 98–107)
CO2 SERPL-SCNC: 34 MMOL/L (ref 22–29)
CREAT SERPL-MCNC: 2.96 MG/DL (ref 0.76–1.27)
DEPRECATED RDW RBC AUTO: 47.7 FL (ref 37–54)
EGFRCR SERPLBLD CKD-EPI 2021: 23.7 ML/MIN/1.73
EOSINOPHIL # BLD AUTO: 0.22 10*3/MM3 (ref 0–0.4)
EOSINOPHIL NFR BLD AUTO: 3.4 % (ref 0.3–6.2)
ERYTHROCYTE [DISTWIDTH] IN BLOOD BY AUTOMATED COUNT: 14.9 % (ref 12.3–15.4)
GLOBULIN UR ELPH-MCNC: 3.7 GM/DL
GLUCOSE BLDC GLUCOMTR-MCNC: 176 MG/DL (ref 70–130)
GLUCOSE BLDC GLUCOMTR-MCNC: 186 MG/DL (ref 70–130)
GLUCOSE BLDC GLUCOMTR-MCNC: 323 MG/DL (ref 70–130)
GLUCOSE BLDC GLUCOMTR-MCNC: 410 MG/DL (ref 70–130)
GLUCOSE SERPL-MCNC: 126 MG/DL (ref 65–99)
HCT VFR BLD AUTO: 24 % (ref 37.5–51)
HGB BLD-MCNC: 7.4 G/DL (ref 13–17.7)
IMM GRANULOCYTES # BLD AUTO: 0.11 10*3/MM3 (ref 0–0.05)
IMM GRANULOCYTES NFR BLD AUTO: 1.7 % (ref 0–0.5)
LYMPHOCYTES # BLD AUTO: 1.74 10*3/MM3 (ref 0.7–3.1)
LYMPHOCYTES NFR BLD AUTO: 27.1 % (ref 19.6–45.3)
MCH RBC QN AUTO: 26.7 PG (ref 26.6–33)
MCHC RBC AUTO-ENTMCNC: 30.8 G/DL (ref 31.5–35.7)
MCV RBC AUTO: 86.6 FL (ref 79–97)
MONOCYTES # BLD AUTO: 0.58 10*3/MM3 (ref 0.1–0.9)
MONOCYTES NFR BLD AUTO: 9 % (ref 5–12)
NEUTROPHILS NFR BLD AUTO: 3.75 10*3/MM3 (ref 1.7–7)
NEUTROPHILS NFR BLD AUTO: 58.3 % (ref 42.7–76)
NRBC BLD AUTO-RTO: 0 /100 WBC (ref 0–0.2)
PLATELET # BLD AUTO: 258 10*3/MM3 (ref 140–450)
PMV BLD AUTO: 9.9 FL (ref 6–12)
POTASSIUM SERPL-SCNC: 4.1 MMOL/L (ref 3.5–5.2)
PROT SERPL-MCNC: 6.4 G/DL (ref 6–8.5)
RBC # BLD AUTO: 2.77 10*6/MM3 (ref 4.14–5.8)
SODIUM SERPL-SCNC: 143 MMOL/L (ref 136–145)
WBC NRBC COR # BLD AUTO: 6.43 10*3/MM3 (ref 3.4–10.8)

## 2024-05-27 PROCEDURE — 80053 COMPREHEN METABOLIC PANEL: CPT | Performed by: INTERNAL MEDICINE

## 2024-05-27 PROCEDURE — 63710000001 INSULIN LISPRO (HUMAN) PER 5 UNITS: Performed by: INTERNAL MEDICINE

## 2024-05-27 PROCEDURE — 25810000003 SODIUM CHLORIDE 0.9 % SOLUTION: Performed by: FAMILY MEDICINE

## 2024-05-27 PROCEDURE — 85025 COMPLETE CBC W/AUTO DIFF WBC: CPT | Performed by: FAMILY MEDICINE

## 2024-05-27 PROCEDURE — 25810000003 SODIUM CHLORIDE 0.9 % SOLUTION 250 ML FLEX CONT: Performed by: NURSE PRACTITIONER

## 2024-05-27 PROCEDURE — 97161 PT EVAL LOW COMPLEX 20 MIN: CPT | Performed by: PHYSICAL THERAPIST

## 2024-05-27 PROCEDURE — 25010000002 NA FERRIC GLUC CPLX PER 12.5 MG: Performed by: NURSE PRACTITIONER

## 2024-05-27 PROCEDURE — 76775 US EXAM ABDO BACK WALL LIM: CPT

## 2024-05-27 PROCEDURE — 82948 REAGENT STRIP/BLOOD GLUCOSE: CPT

## 2024-05-27 PROCEDURE — 25010000002 ENOXAPARIN PER 10 MG: Performed by: NURSE PRACTITIONER

## 2024-05-27 PROCEDURE — 25010000002 CEFTRIAXONE PER 250 MG: Performed by: NURSE PRACTITIONER

## 2024-05-27 RX ORDER — ENOXAPARIN SODIUM 100 MG/ML
1 INJECTION SUBCUTANEOUS EVERY 12 HOURS SCHEDULED
Status: DISCONTINUED | OUTPATIENT
Start: 2024-05-28 | End: 2024-05-29 | Stop reason: HOSPADM

## 2024-05-27 RX ORDER — ENOXAPARIN SODIUM 100 MG/ML
1 INJECTION SUBCUTANEOUS ONCE
Qty: 0.8 ML | Refills: 0 | Status: COMPLETED | OUTPATIENT
Start: 2024-05-27 | End: 2024-05-27

## 2024-05-27 RX ADMIN — DOXYCYCLINE 100 MG: 100 TABLET, FILM COATED ORAL at 09:27

## 2024-05-27 RX ADMIN — SODIUM CHLORIDE 100 ML/HR: 9 INJECTION, SOLUTION INTRAVENOUS at 21:16

## 2024-05-27 RX ADMIN — INSULIN LISPRO 5 UNITS: 100 INJECTION, SOLUTION INTRAVENOUS; SUBCUTANEOUS at 17:39

## 2024-05-27 RX ADMIN — Medication 10 ML: at 21:17

## 2024-05-27 RX ADMIN — INSULIN LISPRO 6 UNITS: 100 INJECTION, SOLUTION INTRAVENOUS; SUBCUTANEOUS at 21:16

## 2024-05-27 RX ADMIN — PREGABALIN 100 MG: 100 CAPSULE ORAL at 21:14

## 2024-05-27 RX ADMIN — DOXYCYCLINE 100 MG: 100 TABLET, FILM COATED ORAL at 21:14

## 2024-05-27 RX ADMIN — SODIUM CHLORIDE 250 MG: 9 INJECTION, SOLUTION INTRAVENOUS at 16:09

## 2024-05-27 RX ADMIN — PREGABALIN 100 MG: 100 CAPSULE ORAL at 09:27

## 2024-05-27 RX ADMIN — ENOXAPARIN SODIUM 80 MG: 100 INJECTION SUBCUTANEOUS at 15:03

## 2024-05-27 RX ADMIN — HYDROCODONE BITARTRATE AND ACETAMINOPHEN 1 TABLET: 10; 325 TABLET ORAL at 05:15

## 2024-05-27 RX ADMIN — SODIUM CHLORIDE 1000 MG: 900 INJECTION INTRAVENOUS at 15:02

## 2024-05-27 RX ADMIN — HYDROCODONE BITARTRATE AND ACETAMINOPHEN 1 TABLET: 10; 325 TABLET ORAL at 21:14

## 2024-05-27 NOTE — PLAN OF CARE
Goal Outcome Evaluation:  Plan of Care Reviewed With: patient, spouse        Progress: no change  Outcome Evaluation: Patient received alert and orientated with no acute distress symptoms.  Patient on bedside report noted to have a soiled right foot dressing and requested RN to change dressing.  Nurse completed a wet to dry dressing with using non-adhesive gauze to fragile skin around the ankle to keep from sticking to damp gauze.  Patient tolerated wound care well with no complaints.  Patient is NPO after Midnight for a scheduled renal ultrasound; Patient took all prescribed meds and reports no s/s of side effects.  Will continue to assess and treat patient per plan of care.

## 2024-05-27 NOTE — PROGRESS NOTES
Nephrology ([facility]) Progress Note      Patient:  Garrett Rodriguez  YOB: 1965  Date of Service: 5/27/2024  MRN: [id number]   Acct: [id number]   Primary Care Physician: Mehul Andersen MD  Advance Directive:   Code Status and Medical Interventions:   Ordered at: 05/26/24 0131     Level Of Support Discussed With:    Patient     Code Status (Patient has no pulse and is not breathing):    CPR (Attempt to Resuscitate)     Medical Interventions (Patient has pulse or is breathing):    Full Support     Admit Date: 5/25/2024       Hospital Day: 1  Referring Provider: Colin Patrick DO      Patient personally seen and examined.  Complete chart including Consults, Notes, Operative Reports, Labs, Cardiology, and Radiology studies reviewed as able.    Chief complaint: Abnormal labs.    Subjective:    Garrett Rodriguez is a 59 y.o. male  whom we were consulted for acute kidney injury.  His baseline creatinine usually 1.0 mg.  Patient has been fighting with right lower extremity wound which all started after an accident leading to fracture of right leg/ankle about 2 years ago. Patient has open reduction internal fixation of his leg.  He has recurrent cellulitis/infection, required multiple rounds of IV antibiotics and debridements.  Finally he is scheduled to have right below the knee amputation in early part of June.  He presented to the emergency room with bleeding from his right leg wound.  Routine lab data in ER indicated his serum creatinine now 4.0 mg.  About a month ago he was hospitalized with new onset of congestive heart failure.  He was sent home with high-dose loop diuretics and metolazone and ACE inhibitor was initiated as well.  Patient has known routine labs within the last several weeks.  Now his creatinine is 4.0 mg.  He is now admitted for treatment of acute kidney injury likely caused by excessive diuresis.  He denies any shortness of breath or  swelling of legs.  His blood pressure has been relatively low as well.  Patient denies any nausea vomiting or diarrhea.  He denies any use of nonsteroidal agents.  Hospital course remarkable for discontinuation of diuretics and IV fluid was initiated.    Patient has been responding to IV fluid and renal function is slowly improving.  This afternoon he is feeling much better.      Allergies:  Patient has no known allergies.    Home Meds:  Medications Prior to Admission   Medication Sig Dispense Refill Last Dose    apixaban (ELIQUIS) 5 MG tablet tablet Take 1 tablet by mouth 2 (Two) Times a Day. 60 tablet 2 5/25/2024    cefdinir (OMNICEF) 300 MG capsule Take 1 capsule by mouth 2 (Two) Times a Day.   5/25/2024    doxycycline (VIBRAMYCIN) 100 MG capsule Take 1 capsule by mouth Every 12 (Twelve) Hours.   5/25/2024    furosemide (LASIX) 40 MG tablet Take 1 tablet by mouth 2 (Two) Times a Day.       HYDROcodone-acetaminophen (NORCO)  MG per tablet Take 1 tablet by mouth Every 8 (Eight) Hours As Needed for Severe Pain.   5/25/2024    lisinopril (PRINIVIL,ZESTRIL) 20 MG tablet Take 1 tablet by mouth Daily. 30 tablet 2 5/25/2024    metFORMIN (GLUCOPHAGE) 1000 MG tablet Take 1 tablet by mouth 2 (Two) Times a Day With Meals.   5/25/2024    metOLazone (ZAROXOLYN) 2.5 MG tablet Take 1 tablet by mouth Daily. 30 tablet 11 5/25/2024    pregabalin (LYRICA) 100 MG capsule Take 1 capsule by mouth 3 (Three) Times a Day.       naloxone (NARCAN) 4 MG/0.1ML nasal spray Call 911. Don't prime. Mears in 1 nostril for overdose. Repeat in 2-3 minutes in other nostril if no or minimal breathing/responsiveness. 2 each 0        Medicines:  Current Facility-Administered Medications   Medication Dose Route Frequency Provider Last Rate Last Admin    acetaminophen (TYLENOL) tablet 650 mg  650 mg Oral Q4H PRN Colin Patrick DO        sennosides-docusate (PERICOLACE) 8.6-50 MG per tablet 2 tablet  2 tablet Oral BID PRN Colin Patrick DO         And    polyethylene glycol (MIRALAX) packet 17 g  17 g Oral Daily PRN Colin Patrick DO        And    bisacodyl (DULCOLAX) EC tablet 5 mg  5 mg Oral Daily PRN Colin Patrick DO        And    bisacodyl (DULCOLAX) suppository 10 mg  10 mg Rectal Daily PRN Colin Patrick DO        cefTRIAXone (ROCEPHIN) 1,000 mg in sodium chloride 0.9 % 100 mL MBP  1,000 mg Intravenous Q24H Camilla Rider APRN        dextrose (D50W) (25 g/50 mL) IV injection 25 g  25 g Intravenous Q15 Min PRN Colin Patrick DO        dextrose (GLUTOSE) oral gel 15 g  15 g Oral Q15 Min PRN Colin Patrick DO        doxycycline (ADOXA) tablet 100 mg  100 mg Oral Q12H Colin Patrick DO   100 mg at 05/27/24 0927    Enoxaparin Sodium (LOVENOX) syringe 80 mg  1 mg/kg Subcutaneous Once Camilla Rider APRN        [START ON 5/28/2024] Enoxaparin Sodium (LOVENOX) syringe 80 mg  1 mg/kg Subcutaneous Q12H Camilla Rider APRN        ferric gluconate (FERRLECIT) 250 mg in sodium chloride 0.9 % 250 mL IVPB  250 mg Intravenous Once Camilla Rider APRN        glucagon (GLUCAGEN) injection 1 mg  1 mg Intramuscular Q15 Min PRN Colin Patrick DO        HYDROcodone-acetaminophen (NORCO)  MG per tablet 1 tablet  1 tablet Oral Q4H PRN Ba Cosme DO   1 tablet at 05/27/24 0515    HYDROcodone-acetaminophen (NORCO) 7.5-325 MG per tablet 1 tablet  1 tablet Oral Q4H PRN Ba Cosme DO        Insulin Lispro (humaLOG) injection 2-7 Units  2-7 Units Subcutaneous 4x Daily AC & at Bedtime Colin Patrick DO   2 Units at 05/26/24 2116    ondansetron (ZOFRAN) injection 4 mg  4 mg Intravenous Q6H PRN Colin Patrick DO        pregabalin (LYRICA) capsule 100 mg  100 mg Oral Q12H Colin Patrick DO   100 mg at 05/27/24 0927    sodium chloride 0.9 % flush 10 mL  10 mL Intravenous Roderick Bean MD        sodium chloride 0.9 % flush 10 mL  10 mL Intravenous Q12H Colin Patrick DO   10 mL at 05/26/24 2110    sodium  chloride 0.9 % flush 10 mL  10 mL Intravenous PRN Colin Patrick,         sodium chloride 0.9 % infusion 40 mL  40 mL Intravenous PRN Colin Patrick,         sodium chloride 0.9 % infusion  100 mL/hr Intravenous Continuous Ba Cosme  mL/hr at 05/26/24 1316 100 mL/hr at 05/26/24 1316       Past Medical History:  Past Medical History:   Diagnosis Date    Atrial fibrillation 3/11/2024    Chronic pain in right foot     Diabetes mellitus     Hyperlipidemia Feb 2024    Hypertension     PFO (patent foramen ovale)     RP at age 6       Past Surgical History:  Past Surgical History:   Procedure Laterality Date    ANKLE FUSION Right 07/11/2023    Procedure: ANKLE ARTHRODESIS;  Surgeon: Shahbaz Reddy DPM;  Location:  [facility];  Service: Podiatry;  Laterality: Right;    BACK SURGERY      CARDIAC SURGERY      EXTERNAL FIXATION ANKLE FRACTURE Right 07/11/2023    Procedure: POSSIBLE EXTERNAL FIXATION, RIGHT ANKLE;  Surgeon: Shahbaz Reddy DPM;  Location:  [facility];  Service: Podiatry;  Laterality: Right;    HARDWARE REMOVAL Right 07/11/2023    Procedure: REMOVAL OF HARDWARE, DEEP; ANKLE ARTHRODESIS; SUBTALAR ARTHRODESIS; POSSIBLE EXTERNAL FIXATION, RIGHT ANKLE;  Surgeon: Shahbaz Reddy DPM;  Location:  [facility];  Service: Podiatry;  Laterality: Right;    PATENT FORAMEN OVALE CLOSURE      ROTATOR CUFF REPAIR Right     SUBTALAR ARTHRODESIS Right 07/11/2023    Procedure: SUBTALAR ARTHRODESIS;  Surgeon: Shahbaz Reddy DPM;  Location:  [facility];  Service: Podiatry;  Laterality: Right;       Family History  Family History   Problem Relation Age of Onset    Hypertension Mother     Hypertension Father        Social History  Social History     Socioeconomic History    Marital status:    Tobacco Use    Smoking status: Never    Smokeless tobacco: Never   Vaping Use    Vaping status: Never Used   Substance and Sexual Activity    Alcohol use: Never    Drug use: Never    Sexual activity: Not  Currently     Partners: Female       Review of Systems:  History obtained from chart review and the patient  General ROS: No fever or chills  Respiratory ROS: No cough, shortness of breath, wheezing  Cardiovascular ROS: No chest pain or palpitations  Gastrointestinal ROS: No abdominal pain or melena  Genito-Urinary ROS: No dysuria or hematuria  Psych ROS: No anxiety and depression  14 point ROS reviewed with the patient and negative except as noted above and in the HPI unless unable to obtain.    Objective:  Patient Vitals for the past 24 hrs:   BP Temp Temp src Pulse Resp SpO2 Weight   05/27/24 0752 130/68 98.5 °F (36.9 °C) -- 71 16 95 % --   05/27/24 0352 106/58 98 °F (36.7 °C) Oral 74 16 92 % 80.1 kg (176 lb 9.6 oz)   05/26/24 2341 108/54 98 °F (36.7 °C) Oral 69 16 92 % --   05/26/24 1948 115/65 97.9 °F (36.6 °C) Oral 73 16 94 % --   05/26/24 1520 143/80 97.6 °F (36.4 °C) Oral 73 16 96 % --       Intake/Output Summary (Last 24 hours) at 5/27/2024 1339  Last data filed at 5/26/2024 1600  Gross per 24 hour   Intake --   Output 200 ml   Net -200 ml     General: awake/alert   HEENT: Normocephalic atraumatic head  Chest:  clear to auscultation bilaterally without respiratory distress  CVS: regular rate and rhythm  Abdominal: soft, nontender, positive bowel sounds  Extremities:      Right leg deformity  Skin: warm and dry without rash      Labs:  Results from last 7 days   Lab Units 05/27/24  0344 05/26/24  0605 05/26/24  0021   WBC 10*3/mm3 6.43 8.66 9.85   HEMOGLOBIN g/dL 7.4* 9.1* 8.1*   HEMATOCRIT % 24.0* 29.7* 26.1*   PLATELETS 10*3/mm3 258 346 294         Results from last 7 days   Lab Units 05/27/24  0344 05/26/24  0605 05/26/24  0021   SODIUM mmol/L 143 138 136   POTASSIUM mmol/L 4.1 4.2 4.1   CHLORIDE mmol/L 101 93* 93*   CO2 mmol/L 34.0* 32.0* 29.0   BUN mg/dL 90* 108* 112*   CREATININE mg/dL 2.96* 4.05* 4.59*   CALCIUM mg/dL 9.0 9.5 9.1   EGFR mL/min/1.73 23.7* 16.3* 14.0*   BILIRUBIN mg/dL 0.2 0.4 0.3   ALK  "PHOS U/L 123* 162* 149*   ALT (SGPT) U/L 14 18 17   AST (SGOT) U/L 11 12 16   GLUCOSE mg/dL 126* 289* 276*       Radiology:   Imaging Results (Last 72 Hours)       Procedure Component Value Units Date/Time    US Renal Bilateral [[id number]] Collected: 05/27/24 1259     Updated: 05/27/24 1303    Narrative:      US RENAL BILATERAL- 5/27/2024 11:11 AM     HISTORY: Acute kidney injury; N17.9-Acute kidney failure, unspecified;  S91.301S-Unspecified open wound, right foot, sequela     COMPARISON: None available.       TECHNIQUE: Multiple longitudinal and transverse real-time sonographic  images of the kidneys and urinary bladder are obtained. Report and  images stored per institutional and state regulations.           FINDINGS:     Visualized proximal abdominal aorta and IVC are unremarkable.        RIGHT KIDNEY: Right kidney is 11.5 cm in length. Renal cortex is  unremarkable. There is no hydronephrosis..     LEFT KIDNEY: Left kidney is 11.5 cm in length. Left renal cortex is  unremarkable. There is no left hydronephrosis.     PELVIS: Urinary bladder is grossly unremarkable.          Impression:      Unremarkable kidneys with no hydronephrosis..           This report was signed and finalized on 5/27/2024 1:00 PM by Kuldip Mcclendon.               Culture:  No results found for: \"BLOODCX\", \"URINECX\", \"WOUNDCX\", \"MRSACX\", \"RESPCX\", \"STOOLCX\"      Assessment   1.  Acute kidney injury improving.  2.  Intravascular volume depletion due to excessive diuresis.  3.  New onset of congestive heart failure/diastolic.  4.  Insulin-dependent type 2 diabetes.  5.  Severe iron deficiency anemia  6.  Right lower extremity deformity/recurrent infection    Plan:  1.  Patient has shown good improvement of renal function.  I would recommend to continue IV fluid.  Clinical exam did not suggest any volume overload or CHF.  2.  Renal ultrasound was reviewed  3.  Intravenous iron needed.      Martínez Shaw MD  5/27/2024  13:39 CDT    "

## 2024-05-27 NOTE — THERAPY DISCHARGE NOTE
Patient Name: Garrett Rodriguez  : 1965    MRN: [id number]                              Today's Date: 2024       Admit Date: 2024    Visit Dx:     ICD-10-CM ICD-9-CM   1. TARSHA (acute kidney injury)  N17.9 584.9   2. Open wound of foot, right, sequela  S91.301S 906.1     Patient Active Problem List   Diagnosis    Type 2 diabetes mellitus with hyperglycemia, without long-term current use of insulin    HTN (hypertension)    Retained orthopedic hardware    Anasarca    Iron deficiency anemia    PAF (paroxysmal atrial fibrillation) new onset    Scrotal edema    Gait instability    Pulmonary HTN    Surgical wound, non healing    TARHSA (acute kidney injury)    Bleeding from wound    Diarrhea of presumed infectious origin     Past Medical History:   Diagnosis Date    Atrial fibrillation 3/11/2024    Chronic pain in right foot     Diabetes mellitus     Hyperlipidemia 2024    Hypertension     PFO (patent foramen ovale)     RP at age 6     Past Surgical History:   Procedure Laterality Date    ANKLE FUSION Right 2023    Procedure: ANKLE ARTHRODESIS;  Surgeon: Shahbaz Reddy DPM;  Location:  [facility];  Service: Podiatry;  Laterality: Right;    BACK SURGERY      CARDIAC SURGERY      EXTERNAL FIXATION ANKLE FRACTURE Right 2023    Procedure: POSSIBLE EXTERNAL FIXATION, RIGHT ANKLE;  Surgeon: Shahbaz Reddy DPM;  Location:  [facility];  Service: Podiatry;  Laterality: Right;    HARDWARE REMOVAL Right 2023    Procedure: REMOVAL OF HARDWARE, DEEP; ANKLE ARTHRODESIS; SUBTALAR ARTHRODESIS; POSSIBLE EXTERNAL FIXATION, RIGHT ANKLE;  Surgeon: Shahbaz Reddy DPM;  Location:  [facility];  Service: Podiatry;  Laterality: Right;    PATENT FORAMEN OVALE CLOSURE      ROTATOR CUFF REPAIR Right     SUBTALAR ARTHRODESIS Right 2023    Procedure: SUBTALAR ARTHRODESIS;  Surgeon: Shahbaz Reddy DPM;  Location:  [facility];  Service: Podiatry;  Laterality: Right;      General Information       Row  Name 05/27/24 1300          Physical Therapy Time and Intention    Document Type evaluation  TARSHA, bleeding from R foot wound, hx of mulitple previous R ankle surgeries. scheduled for R BKA 6/3  -MS     Mode of Treatment physical therapy;individual therapy  -MS       Row Name 05/27/24 1300          General Information    Patient Profile Reviewed yes  -MS     Prior Level of Function independent:;all household mobility;ADL's  uses knee scooter, RW  -MS     Existing Precautions/Restrictions fall;non-weight bearing  NWB R lower leg  -MS     Barriers to Rehab medically complex;physical barrier  -MS       Row Name 05/27/24 1300          Living Environment    People in Home spouse  -MS       Row Name 05/27/24 1300          Home Main Entrance    Number of Stairs, Main Entrance other (see comments)  ramp  -MS       Row Name 05/27/24 1300          Cognition    Orientation Status (Cognition) oriented x 4  -MS               User Key  (r) = Recorded By, (t) = Taken By, (c) = Cosigned By      Initials Name Provider Type    MS Belinda Keith R, PT, DPT, NCS Physical Therapist                   Mobility       Row Name 05/27/24 1300          Bed Mobility    Bed Mobility supine-sit;sit-supine;scooting/bridging  -MS     Scooting/Bridging [place] (Bed Mobility) modified independence  -MS     Supine-Sit [place] (Bed Mobility) modified independence  -MS     Sit-Supine [place] (Bed Mobility) modified independence  -MS     Assistive Device (Bed Mobility) head of bed elevated  -MS       Row Name 05/27/24 1300          Bed-Chair Transfer    Bed-Chair [place] (Transfers) independent  -MS     Assistive Device (Bed-Chair Transfers) wheelchair  -MS       Row Name 05/27/24 1300          Sit-Stand Transfer    Sit-Stand [place] (Transfers) independent  -MS     Assistive Device (Sit-Stand Transfers) wheelchair  -MS       Row Name 05/27/24 1300          Gait/Stairs (Locomotion)    Comment, (Gait/Stairs) pt propelled wheelchair  independently 200ft and navigated around the room in small space  -MS               User Key  (r) = Recorded By, (t) = Taken By, (c) = Cosigned By      Initials Name Provider Type    Belinda Eaton JOHN, PT, DPT, NCS Physical Therapist                   Obj/Interventions       Row Name 05/27/24 1300          Range of Motion Comprehensive    General Range of Motion bilateral upper extremity ROM WFL;bilateral lower extremity ROM WFL  -MS       Row Name 05/27/24 1300          Strength Comprehensive (MMT)    Comment, General Manual Muscle Testing (MMT) Assessment B UE 5/5, L LE 5/5  -MS       Row Name 05/27/24 1300          Balance    Balance Assessment sitting static balance;sitting dynamic balance  -MS     Static Sitting Balance independent  -MS     Dynamic Sitting Balance independent  -MS     Position, Sitting Balance unsupported;sitting in chair;sitting edge of bed  -MS               User Key  (r) = Recorded By, (t) = Taken By, (c) = Cosigned By      Initials Name Provider Type    Belinda Eaton, PT, DPT, NCS Physical Therapist                   Goals/Plan    No documentation.                  Clinical Impression       Row Name 05/27/24 1300          Pain    Pretreatment Pain Rating 5/10  -MS     Posttreatment Pain Rating 5/10  -MS     Pain Location - Side/Orientation Right  -MS     Pain Location - foot  -MS     Pain Intervention(s) Repositioned;Ambulation/increased activity  -MS       Row Name 05/27/24 1300          Plan of Care Review    Plan of Care Reviewed With patient;spouse  -MS     Progress no change  -MS     Outcome Evaluation The patient presents alert and oriented x4 lying in bed with spouse at bedside. He has been heel touch weight bearing at home on his R LE and mobilizing with a knee scooter and RW. The hardware has now come through the skin so he is NWB on the R lower leg. He will need to use a wheelchair at home until his BKA. He was able to demonstrate a safe transfer to and from the   maintaining NWB on his R LE. He was also able to navigate the WC safely. He will also benefit from use of a BSC as home. No further needs from PT at this time. Recommend discharge home with assist.  -MS       Row Name 05/27/24 1300          Therapy Assessment/Plan (PT)    Criteria for Skilled Interventions Met (PT) no problems identified which require skilled intervention  -MS     Therapy Frequency (PT) evaluation only  -MS       Row Name 05/27/24 1300          Positioning and Restraints    Post Treatment Position bed  -MS     In Bed fowlers;call light within reach;encouraged to call for assist;with family/caregiver;side rails up x2  -MS               User Key  (r) = Recorded By, (t) = Taken By, (c) = Cosigned By      Initials Name Provider Type    Belinda Eaton, PT, DPT, NCS Physical Therapist                   Outcome Measures       Row Name 05/27/24 1300 05/27/24 0735       How much help from another person do you currently need...    Turning from your back to your side while in flat bed without using bedrails? 4  -MS 4  -AO    Moving from lying on back to sitting on the side of a flat bed without bedrails? 4  -MS 4  -AO    Moving to and from a bed to a chair (including a wheelchair)? 4  -MS 3  -AO    Standing up from a chair using your arms (e.g., wheelchair, bedside chair)? 1  -MS 3  -AO    Climbing 3-5 steps with a railing? 1  -MS 2  -AO    To walk in hospital room? 1  -MS 2  -AO    AM-PAC 6 Clicks Score (PT) 15  -MS 18  -AO    Highest Level of Mobility Goal 4 --> Transfer to chair/commode  -MS 6 --> Walk 10 steps or more  -AO      Row Name 05/27/24 1300          Functional Assessment    Outcome Measure Options AM-PAC 6 Clicks Basic Mobility (PT)  -MS               User Key  (r) = Recorded By, (t) = Taken By, (c) = Cosigned By      Initials Name Provider Type    Belinda Eaton, PT, DPT, NCS Physical Therapist    AO Cynthia Jacobson, RN Registered Nurse                    PT Recommendation and Plan      Plan of Care Reviewed With: patient, spouse  Progress: no change  Outcome Evaluation: The patient presents alert and oriented x4 lying in bed with spouse at bedside. He has been heel touch weight bearing at home on his R LE and mobilizing with a knee scooter and RW. The hardware has now come through the skin so he is NWB on the R lower leg. He will need to use a wheelchair at home until his BKA. He was able to demonstrate a safe transfer to and from the  maintaining NWB on his R LE. He was also able to navigate the WC safely. He will also benefit from use of a BSC as home. No further needs from PT at this time. Recommend discharge home with assist.     Time Calculation:         PT Charges       Row Name 05/27/24 1300             Time Calculation    Start Time 1300  -MS      Stop Time 1355  -MS      Time Calculation (min) 55 min  -MS      PT Received On 05/27/24  -MS         Untimed Charges    PT Eval/Re-eval Minutes 55  -MS         Total Minutes    Untimed Charges Total Minutes 55  -MS       Total Minutes 55  -MS                User Key  (r) = Recorded By, (t) = Taken By, (c) = Cosigned By      Initials Name Provider Type    Belinda Eaton, PT, DPT, NCS Physical Therapist                      PT G-Codes  Outcome Measure Options: AM-PAC 6 Clicks Basic Mobility (PT)  AM-PAC 6 Clicks Score (PT): 15    PT Discharge Summary  Anticipated Discharge Disposition (PT): home with assist    Belinda Keith, PT, DPT, NCS  5/27/2024

## 2024-05-27 NOTE — PROGRESS NOTES
[facility]  INPATIENT PROGRESS NOTE    Patient Name: Garrett Rodriguez  Date of Admission: 5/25/2024  Today's Date: 05/27/24  Length of Stay: 1  Primary Care Physician: Mehul Andersen MD    Subjective   Chief Complaint: none offered  HPI   He was lying in bed resting comfortably.  He tells me that he is feeling better.  No nausea, vomiting, abdominal pain, diarrhea.  He is tolerating diet.  He can tell his urine output is improving. Creatinine 2.96 (4.05).    Review of Systems   All pertinent negatives and positives are as above. All other systems have been reviewed and are negative unless otherwise stated.     Objective    Temp:  [97.5 °F (36.4 °C)-98.5 °F (36.9 °C)] 98.5 °F (36.9 °C)  Heart Rate:  [69-77] 71  Resp:  [16] 16  BP: (106-143)/(54-80) 130/68  Physical Exam  Vitals reviewed.   Constitutional:       General: He is not in acute distress.     Appearance: He is not toxic-appearing.      Comments: Lying in bed.  No acute distress.  On room air.  No family at bedside.   HENT:      Head: Normocephalic and atraumatic.      Mouth/Throat:      Mouth: Mucous membranes are moist.      Pharynx: Oropharynx is clear.   Eyes:      Extraocular Movements: Extraocular movements intact.      Conjunctiva/sclera: Conjunctivae normal.      Pupils: Pupils are equal, round, and reactive to light.   Cardiovascular:      Rate and Rhythm: Normal rate and regular rhythm.      Pulses: Normal pulses.   Pulmonary:      Effort: Pulmonary effort is normal. No respiratory distress.      Breath sounds: Normal breath sounds. No wheezing or rhonchi.   Abdominal:      General: Bowel sounds are normal. There is no distension.      Palpations: Abdomen is soft.      Tenderness: There is no abdominal tenderness.   Musculoskeletal:         General: No swelling or tenderness. Normal range of motion.      Cervical back: Normal range of motion and neck supple. No muscular tenderness.    Skin:     General: Skin is warm and dry.      Findings: No erythema or rash.   Neurological:      General: No focal deficit present.      Mental Status: He is alert and oriented to person, place, and time.      Cranial Nerves: No cranial nerve deficit.      Motor: No weakness.   Psychiatric:         Mood and Affect: Mood normal.         Behavior: Behavior normal.                 Results Review:  I have reviewed the labs, radiology results, and diagnostic studies.    Laboratory Data:   Results from last 7 days   Lab Units 05/27/24  0344 05/26/24  0605 05/26/24  0021   WBC 10*3/mm3 6.43 8.66 9.85   HEMOGLOBIN g/dL 7.4* 9.1* 8.1*   HEMATOCRIT % 24.0* 29.7* 26.1*   PLATELETS 10*3/mm3 258 346 294        Results from last 7 days   Lab Units 05/27/24  0344 05/26/24  0605 05/26/24  0021   SODIUM mmol/L 143 138 136   POTASSIUM mmol/L 4.1 4.2 4.1   CHLORIDE mmol/L 101 93* 93*   CO2 mmol/L 34.0* 32.0* 29.0   BUN mg/dL 90* 108* 112*   CREATININE mg/dL 2.96* 4.05* 4.59*   CALCIUM mg/dL 9.0 9.5 9.1   BILIRUBIN mg/dL 0.2 0.4 0.3   ALK PHOS U/L 123* 162* 149*   ALT (SGPT) U/L 14 18 17   AST (SGOT) U/L 11 12 16   GLUCOSE mg/dL 126* 289* 276*       Culture Data:   Microbiology Results (last 10 days)       ** No results found for the last 240 hours. **          Radiology Data:   Imaging Results (Last 24 Hours)       ** No results found for the last 24 hours. **            I have reviewed the patient's current medications.     Assessment/Plan   Assessment  Active Hospital Problems    Diagnosis     **TARSHA (acute kidney injury)     Bleeding from wound     Diarrhea of presumed infectious origin     Pulmonary HTN     PAF (paroxysmal atrial fibrillation) new onset     Type 2 diabetes mellitus with hyperglycemia, without long-term current use of insulin      Mr. Rodriguez is a 59-year-old male who presented to [facility] on 5/25 for bleeding right lower extremity chronic wound. He is scheduled to have a right amputation below  "the knee with Dr. Hawk on Nasra 3, 2024. He states the wound started bleeding a lot, and he wanted to come and have it checked.  He had some nausea, vomiting and diarrhea prior to admission.  Of note, he was hospitalized in March for hypervolemia/anasarca.  Echocardiogram revealed pulmonary hypertension with no evidence of left heart failure and no diastolic dysfunction. He was sent home with high-dose loop diuretic, metolazone and ACE inhibitor.  He does take Eliquis for history of paroxysmal atrial fibrillation.  In the ED, creatinine 4.59.  He was started on IV fluid.    Treatment Plan  Acute kidney injury with creatinine 4.59.  Creatinine on 4/30 was 1.27.  Creatinine today improved with IV fluid administration at 2.96.  He is scheduled for renal ultrasound today.    He is followed by wound care clinic.  He was started on 10-day course of Omnicef and doxycycline on 5/17 for cellulitis to right foot.  X-ray on 5/17 showed \"interval removal of fusion hardware with placement of intramedullary sebastian at the tibia and cage in the region of the talus. There is  surrounding lucency in the region of the talus around the cage which was  present on the prior examination but increased. However lucency  surrounding the intramedullary sebastian is new from the prior examination.  This is worrisome for loosening/infection.\"  Continue doxycycline and ceftriaxone.  WBC normal.  No fever.    Hemoglobin A1c 7.0 in April 2024.  Metformin on hold.  Continue Accu-Cheks with sliding scale insulin.    He has a history of atrial fibrillation and is chronically anticoagulated on Eliquis.  Eliquis held on admission given significant bleeding to right lower extremity wound.  Start lovenox. Hemoglobin 8.1 on admission.  Hemoglobin 7.4 today.  Iron panel reviewed -gave IV iron.    Medical Decision Making  Number and Complexity of problems: 1 acute problem  Differential Diagnosis: None considered at present    Conditions and Status        Condition " is improving.     [facility] Data  External documents reviewed: Prior epic records  Cardiac tracing (EKG, telemetry) interpretation: none  Radiology interpretation: Interpreted by radiology  Labs reviewed: As above  Any tests that were considered but not ordered: None considered at present     Decision rules/scores evaluated (example [id number], [place], etc): None considered at present     Discussed with: Patient and Dr. Trujillo     Care Planning  Shared decision making: Patient is agreeable to ongoing workup and treatment  Code status and discussions: Full code with full interventions    Disposition  Social Determinants of Health that impact treatment or disposition: home  I expect the patient to be discharged to home in 1-2 days.     Electronically signed by MEGHANN Mckeon, 05/27/24, 09:27 CDT.

## 2024-05-27 NOTE — PLAN OF CARE
Goal Outcome Evaluation: Patient complaints of right leg pain this shift. Patient to have surgery next Monday to have a right leg amputation.  Patient getting IV iron infusion at this time. Patient safety to be maintained this shift, continue to monitor and report abnormal to provider.

## 2024-05-28 PROBLEM — Z01.818 PREOP TESTING: Status: ACTIVE | Noted: 2024-05-22

## 2024-05-28 LAB
ALBUMIN SERPL-MCNC: 2.9 G/DL (ref 3.5–5.2)
ALBUMIN/GLOB SERPL: 0.7 G/DL
ALP SERPL-CCNC: 134 U/L (ref 39–117)
ALT SERPL W P-5'-P-CCNC: 15 U/L (ref 1–41)
ANION GAP SERPL CALCULATED.3IONS-SCNC: 7 MMOL/L (ref 5–15)
AST SERPL-CCNC: 11 U/L (ref 1–40)
BASOPHILS # BLD AUTO: 0.03 10*3/MM3 (ref 0–0.2)
BASOPHILS NFR BLD AUTO: 0.4 % (ref 0–1.5)
BILIRUB SERPL-MCNC: 0.3 MG/DL (ref 0–1.2)
BUN SERPL-MCNC: 72 MG/DL (ref 6–20)
BUN/CREAT SERPL: 34.3 (ref 7–25)
CALCIUM SPEC-SCNC: 8.7 MG/DL (ref 8.6–10.5)
CHLORIDE SERPL-SCNC: 101 MMOL/L (ref 98–107)
CO2 SERPL-SCNC: 33 MMOL/L (ref 22–29)
CREAT SERPL-MCNC: 2.1 MG/DL (ref 0.76–1.27)
DEPRECATED RDW RBC AUTO: 48.4 FL (ref 37–54)
EGFRCR SERPLBLD CKD-EPI 2021: 35.8 ML/MIN/1.73
EOSINOPHIL # BLD AUTO: 0.18 10*3/MM3 (ref 0–0.4)
EOSINOPHIL NFR BLD AUTO: 2.2 % (ref 0.3–6.2)
ERYTHROCYTE [DISTWIDTH] IN BLOOD BY AUTOMATED COUNT: 15.1 % (ref 12.3–15.4)
GLOBULIN UR ELPH-MCNC: 3.9 GM/DL
GLUCOSE BLDC GLUCOMTR-MCNC: 193 MG/DL (ref 70–130)
GLUCOSE BLDC GLUCOMTR-MCNC: 253 MG/DL (ref 70–130)
GLUCOSE BLDC GLUCOMTR-MCNC: 286 MG/DL (ref 70–130)
GLUCOSE BLDC GLUCOMTR-MCNC: 292 MG/DL (ref 70–130)
GLUCOSE SERPL-MCNC: 206 MG/DL (ref 65–99)
HCT VFR BLD AUTO: 26 % (ref 37.5–51)
HGB BLD-MCNC: 7.8 G/DL (ref 13–17.7)
IMM GRANULOCYTES # BLD AUTO: 0.17 10*3/MM3 (ref 0–0.05)
IMM GRANULOCYTES NFR BLD AUTO: 2.1 % (ref 0–0.5)
LYMPHOCYTES # BLD AUTO: 1.59 10*3/MM3 (ref 0.7–3.1)
LYMPHOCYTES NFR BLD AUTO: 19.2 % (ref 19.6–45.3)
MCH RBC QN AUTO: 26.3 PG (ref 26.6–33)
MCHC RBC AUTO-ENTMCNC: 30 G/DL (ref 31.5–35.7)
MCV RBC AUTO: 87.5 FL (ref 79–97)
MONOCYTES # BLD AUTO: 0.48 10*3/MM3 (ref 0.1–0.9)
MONOCYTES NFR BLD AUTO: 5.8 % (ref 5–12)
NEUTROPHILS NFR BLD AUTO: 5.81 10*3/MM3 (ref 1.7–7)
NEUTROPHILS NFR BLD AUTO: 70.3 % (ref 42.7–76)
NRBC BLD AUTO-RTO: 0 /100 WBC (ref 0–0.2)
PLATELET # BLD AUTO: 275 10*3/MM3 (ref 140–450)
PMV BLD AUTO: 9.2 FL (ref 6–12)
POTASSIUM SERPL-SCNC: 4.2 MMOL/L (ref 3.5–5.2)
PROT SERPL-MCNC: 6.8 G/DL (ref 6–8.5)
RBC # BLD AUTO: 2.97 10*6/MM3 (ref 4.14–5.8)
SODIUM SERPL-SCNC: 141 MMOL/L (ref 136–145)
WBC NRBC COR # BLD AUTO: 8.26 10*3/MM3 (ref 3.4–10.8)

## 2024-05-28 PROCEDURE — 63710000001 INSULIN LISPRO (HUMAN) PER 5 UNITS: Performed by: NURSE PRACTITIONER

## 2024-05-28 PROCEDURE — 25010000002 CEFTRIAXONE PER 250 MG: Performed by: NURSE PRACTITIONER

## 2024-05-28 PROCEDURE — 85025 COMPLETE CBC W/AUTO DIFF WBC: CPT | Performed by: INTERNAL MEDICINE

## 2024-05-28 PROCEDURE — 63710000001 INSULIN LISPRO (HUMAN) PER 5 UNITS: Performed by: INTERNAL MEDICINE

## 2024-05-28 PROCEDURE — 25010000002 ENOXAPARIN PER 10 MG: Performed by: NURSE PRACTITIONER

## 2024-05-28 PROCEDURE — 82948 REAGENT STRIP/BLOOD GLUCOSE: CPT

## 2024-05-28 PROCEDURE — 80053 COMPREHEN METABOLIC PANEL: CPT | Performed by: INTERNAL MEDICINE

## 2024-05-28 PROCEDURE — 25810000003 SODIUM CHLORIDE 0.9 % SOLUTION: Performed by: FAMILY MEDICINE

## 2024-05-28 PROCEDURE — 97166 OT EVAL MOD COMPLEX 45 MIN: CPT

## 2024-05-28 RX ORDER — ZINC SULFATE 50(220)MG
220 CAPSULE ORAL DAILY
Status: DISCONTINUED | OUTPATIENT
Start: 2024-05-29 | End: 2024-05-29 | Stop reason: HOSPADM

## 2024-05-28 RX ORDER — INSULIN LISPRO 100 [IU]/ML
2-9 INJECTION, SOLUTION INTRAVENOUS; SUBCUTANEOUS
Status: DISCONTINUED | OUTPATIENT
Start: 2024-05-28 | End: 2024-05-29 | Stop reason: HOSPADM

## 2024-05-28 RX ORDER — ASCORBIC ACID 500 MG
500 TABLET ORAL DAILY
Status: DISCONTINUED | OUTPATIENT
Start: 2024-05-29 | End: 2024-05-29 | Stop reason: HOSPADM

## 2024-05-28 RX ADMIN — ENOXAPARIN SODIUM 80 MG: 100 INJECTION SUBCUTANEOUS at 04:27

## 2024-05-28 RX ADMIN — SODIUM CHLORIDE 1000 MG: 900 INJECTION INTRAVENOUS at 12:46

## 2024-05-28 RX ADMIN — HYDROCODONE BITARTRATE AND ACETAMINOPHEN 1 TABLET: 10; 325 TABLET ORAL at 23:34

## 2024-05-28 RX ADMIN — SODIUM CHLORIDE 100 ML/HR: 9 INJECTION, SOLUTION INTRAVENOUS at 06:51

## 2024-05-28 RX ADMIN — DOXYCYCLINE 100 MG: 100 TABLET, FILM COATED ORAL at 09:39

## 2024-05-28 RX ADMIN — HYDROCODONE BITARTRATE AND ACETAMINOPHEN 1 TABLET: 10; 325 TABLET ORAL at 04:32

## 2024-05-28 RX ADMIN — HYDROCODONE BITARTRATE AND ACETAMINOPHEN 1 TABLET: 10; 325 TABLET ORAL at 19:34

## 2024-05-28 RX ADMIN — DOXYCYCLINE 100 MG: 100 TABLET, FILM COATED ORAL at 20:45

## 2024-05-28 RX ADMIN — SODIUM CHLORIDE 100 ML/HR: 9 INJECTION, SOLUTION INTRAVENOUS at 17:51

## 2024-05-28 RX ADMIN — ENOXAPARIN SODIUM 80 MG: 100 INJECTION SUBCUTANEOUS at 17:56

## 2024-05-28 RX ADMIN — PREGABALIN 100 MG: 100 CAPSULE ORAL at 20:45

## 2024-05-28 RX ADMIN — INSULIN LISPRO 6 UNITS: 100 INJECTION, SOLUTION INTRAVENOUS; SUBCUTANEOUS at 21:34

## 2024-05-28 RX ADMIN — PREGABALIN 100 MG: 100 CAPSULE ORAL at 09:39

## 2024-05-28 RX ADMIN — INSULIN LISPRO 2 UNITS: 100 INJECTION, SOLUTION INTRAVENOUS; SUBCUTANEOUS at 09:39

## 2024-05-28 RX ADMIN — INSULIN LISPRO 6 UNITS: 100 INJECTION, SOLUTION INTRAVENOUS; SUBCUTANEOUS at 17:56

## 2024-05-28 RX ADMIN — INSULIN LISPRO 6 UNITS: 100 INJECTION, SOLUTION INTRAVENOUS; SUBCUTANEOUS at 12:25

## 2024-05-28 NOTE — PROGRESS NOTES
Nephrology ([facility]) Progress Note      Patient:  Garrett Rodriguez  YOB: 1965  Date of Service: 5/28/2024  MRN: [id number]   Acct: [id number]   Primary Care Physician: Mehul Andersen MD  Advance Directive:   Code Status and Medical Interventions:   Ordered at: 05/26/24 0131     Level Of Support Discussed With:    Patient     Code Status (Patient has no pulse and is not breathing):    CPR (Attempt to Resuscitate)     Medical Interventions (Patient has pulse or is breathing):    Full Support     Admit Date: 5/25/2024       Hospital Day: 2  Referring Provider: Colin Patrick,       Patient personally seen and examined.  Complete chart including Consults, Notes, Operative Reports, Labs, Cardiology, and Radiology studies reviewed as able.        Subjective:  Garrett Rodriguez is a 59 y.o. male for whom we were consulted for evaluation and treatment of acute kidney injury.  Baseline creatinine approximately 1.0.  History of right lower extremity wound with recurrent cellulitis. Has had multiple rounds of antibiotics and debridements. Now scheduled for below knee amputation in the near future. Presented to ER with draining from wound. Labs revealed creatinine 4.0. blood pressure has been low recently. Did have some n/v/d prior to admission. Denies changes in urination. Hospital course remarkable for improving renal function with IV fluid administration.    Today feeling better overall but still has right leg pain. No new overnight issues. Urine output nonoliguric    Allergies:  Patient has no known allergies.    Home Meds:  Medications Prior to Admission   Medication Sig Dispense Refill Last Dose    apixaban (ELIQUIS) 5 MG tablet tablet Take 1 tablet by mouth 2 (Two) Times a Day. 60 tablet 2 5/25/2024    cefdinir (OMNICEF) 300 MG capsule Take 1 capsule by mouth 2 (Two) Times a Day.   5/25/2024    doxycycline (VIBRAMYCIN) 100 MG capsule Take 1 capsule by mouth  Every 12 (Twelve) Hours.   5/25/2024    furosemide (LASIX) 40 MG tablet Take 1 tablet by mouth 2 (Two) Times a Day.       HYDROcodone-acetaminophen (NORCO)  MG per tablet Take 1 tablet by mouth Every 8 (Eight) Hours As Needed for Severe Pain.   5/25/2024    lisinopril (PRINIVIL,ZESTRIL) 20 MG tablet Take 1 tablet by mouth Daily. 30 tablet 2 5/25/2024    metFORMIN (GLUCOPHAGE) 1000 MG tablet Take 1 tablet by mouth 2 (Two) Times a Day With Meals.   5/25/2024    metOLazone (ZAROXOLYN) 2.5 MG tablet Take 1 tablet by mouth Daily. 30 tablet 11 5/25/2024    pregabalin (LYRICA) 100 MG capsule Take 1 capsule by mouth 3 (Three) Times a Day.       naloxone (NARCAN) 4 MG/0.1ML nasal spray Call 911. Don't prime. [place] in 1 nostril for overdose. Repeat in 2-3 minutes in other nostril if no or minimal breathing/responsiveness. 2 each 0        Medicines:  Current Facility-Administered Medications   Medication Dose Route Frequency Provider Last Rate Last Admin    acetaminophen (TYLENOL) tablet 650 mg  650 mg Oral Q4H PRN Colin Patrick DO        sennosides-docusate (PERICOLACE) 8.6-50 MG per tablet 2 tablet  2 tablet Oral BID PRN Colin Patrick DO        And    polyethylene glycol (MIRALAX) packet 17 g  17 g Oral Daily PRN Colin Patrick DO        And    bisacodyl (DULCOLAX) EC tablet 5 mg  5 mg Oral Daily PRN Colin Patrick DO        And    bisacodyl (DULCOLAX) suppository 10 mg  10 mg Rectal Daily PRN Colin Patrick DO        cefTRIAXone (ROCEPHIN) 1,000 mg in sodium chloride 0.9 % 100 mL MBP  1,000 mg Intravenous Q24H Camilla Rider APRN 200 mL/hr at 05/27/24 1502 1,000 mg at 05/27/24 1502    dextrose (D50W) (25 g/50 mL) IV injection 25 g  25 g Intravenous Q15 Min PRN Colin Patrick DO        dextrose (GLUTOSE) oral gel 15 g  15 g Oral Q15 Min PRN Colin Patrick DO        doxycycline (ADOXA) tablet 100 mg  100 mg Oral Q12H Colin Patrick DO   100 mg at 05/28/24 0939    Enoxaparin Sodium  (LOVENOX) syringe 80 mg  1 mg/kg Subcutaneous Q12H Camilla Rider APRN   80 mg at 05/28/24 0427    glucagon (GLUCAGEN) injection 1 mg  1 mg Intramuscular Q15 Min PRN Colin Patrick DO        HYDROcodone-acetaminophen (NORCO)  MG per tablet 1 tablet  1 tablet Oral Q4H PRN Ba Cosme DO   1 tablet at 05/28/24 0432    HYDROcodone-acetaminophen (NORCO) 7.5-325 MG per tablet 1 tablet  1 tablet Oral Q4H PRN Ba Cosme DO        Insulin Lispro (humaLOG) injection 2-7 Units  2-7 Units Subcutaneous 4x Daily AC & at Bedtime Colin Patrick DO   2 Units at 05/28/24 0939    ondansetron (ZOFRAN) injection 4 mg  4 mg Intravenous Q6H PRN Colin Patrick DO        pregabalin (LYRICA) capsule 100 mg  100 mg Oral Q12H Colin Patrick DO   100 mg at 05/28/24 0939    sodium chloride 0.9 % flush 10 mL  10 mL Intravenous PRN Roderick Todd MD        sodium chloride 0.9 % flush 10 mL  10 mL Intravenous Q12H Colin Patrick DO   10 mL at 05/27/24 2117    sodium chloride 0.9 % flush 10 mL  10 mL Intravenous PRN Colin Patrick DO        sodium chloride 0.9 % infusion 40 mL  40 mL Intravenous PRN Colin Patrick DO        sodium chloride 0.9 % infusion  100 mL/hr Intravenous Continuous Ba Cosme  mL/hr at 05/28/24 0651 100 mL/hr at 05/28/24 0651       Past Medical History:  Past Medical History:   Diagnosis Date    Atrial fibrillation 3/11/2024    Chronic pain in right foot     Diabetes mellitus     Hyperlipidemia Feb 2024    Hypertension     PFO (patent foramen ovale)     RP at age 6       Past Surgical History:  Past Surgical History:   Procedure Laterality Date    ANKLE FUSION Right 07/11/2023    Procedure: ANKLE ARTHRODESIS;  Surgeon: Shahbaz Reddy DPM;  Location: [facility] OR;  Service: Podiatry;  Laterality: Right;    BACK SURGERY      CARDIAC SURGERY      EXTERNAL FIXATION ANKLE FRACTURE Right 07/11/2023    Procedure: POSSIBLE EXTERNAL FIXATION, RIGHT ANKLE;   Surgeon: Shahbaz Reddy DPM;  Location:  [facility] OR;  Service: Podiatry;  Laterality: Right;    HARDWARE REMOVAL Right 07/11/2023    Procedure: REMOVAL OF HARDWARE, DEEP; ANKLE ARTHRODESIS; SUBTALAR ARTHRODESIS; POSSIBLE EXTERNAL FIXATION, RIGHT ANKLE;  Surgeon: Shahbaz Reddy DPM;  Location:  [facility] OR;  Service: Podiatry;  Laterality: Right;    PATENT FORAMEN OVALE CLOSURE      ROTATOR CUFF REPAIR Right     SUBTALAR ARTHRODESIS Right 07/11/2023    Procedure: SUBTALAR ARTHRODESIS;  Surgeon: Shahbaz Reddy DPM;  Location:  [facility] OR;  Service: Podiatry;  Laterality: Right;       Family History  Family History   Problem Relation Age of Onset    Hypertension Mother     Hypertension Father        Social History  Social History     Socioeconomic History    Marital status:    Tobacco Use    Smoking status: Never    Smokeless tobacco: Never   Vaping Use    Vaping status: Never Used   Substance and Sexual Activity    Alcohol use: Never    Drug use: Never    Sexual activity: Not Currently     Partners: Female       Review of Systems:  History obtained from chart review and the patient  General ROS: No fever or chills  Respiratory ROS: No cough, shortness of breath, wheezing  Cardiovascular ROS: No chest pain or palpitations  Gastrointestinal ROS: No abdominal pain or melena  Genito-Urinary ROS: No dysuria or hematuria  Psych ROS: No anxiety and depression  14 point ROS reviewed with the patient and negative except as noted above and in the HPI unless unable to obtain.    Objective:  Patient Vitals for the past 24 hrs:   BP Temp Temp src Pulse Resp SpO2   05/28/24 0751 132/90 97.9 °F (36.6 °C) Oral 72 16 98 %   05/28/24 0346 116/63 97.4 °F (36.3 °C) Oral 69 16 95 %   05/28/24 0044 101/51 97.8 °F (36.6 °C) Oral 71 18 93 %   05/27/24 2045 127/61 97.8 °F (36.6 °C) Oral 83 16 91 %       Intake/Output Summary (Last 24 hours) at 5/28/2024 1023  Last data filed at 5/28/2024 0900  Gross per 24 hour   Intake 480 ml    Output --   Net 480 ml     General: awake/alert   Chest:  clear to auscultation bilaterally without respiratory distress  CVS: regular rate and rhythm  Abdominal: soft, nontender, positive bowel sounds  Extremities: no cyanosis or edema  Skin: warm and dry without rash      Labs:  Results from last 7 days   Lab Units 05/28/24  0526 05/27/24  0344 05/26/24  0605   WBC 10*3/mm3 8.26 6.43 8.66   HEMOGLOBIN g/dL 7.8* 7.4* 9.1*   HEMATOCRIT % 26.0* 24.0* 29.7*   PLATELETS 10*3/mm3 275 258 346         Results from last 7 days   Lab Units 05/28/24  0526 05/27/24  0344 05/26/24  0605   SODIUM mmol/L 141 143 138   POTASSIUM mmol/L 4.2 4.1 4.2   CHLORIDE mmol/L 101 101 93*   CO2 mmol/L 33.0* 34.0* 32.0*   BUN mg/dL 72* 90* 108*   CREATININE mg/dL 2.10* 2.96* 4.05*   CALCIUM mg/dL 8.7 9.0 9.5   EGFR mL/min/1.73 35.8* 23.7* 16.3*   BILIRUBIN mg/dL 0.3 0.2 0.4   ALK PHOS U/L 134* 123* 162*   ALT (SGPT) U/L 15 14 18   AST (SGOT) U/L 11 11 12   GLUCOSE mg/dL 206* 126* 289*       Radiology:   Imaging Results (Last 72 Hours)       Procedure Component Value Units Date/Time    US Renal Bilateral [[id number]] Collected: 05/27/24 1259     Updated: 05/27/24 1303    Narrative:      US RENAL BILATERAL- 5/27/2024 11:11 AM     HISTORY: Acute kidney injury; N17.9-Acute kidney failure, unspecified;  S91.301S-Unspecified open wound, right foot, sequela     COMPARISON: None available.       TECHNIQUE: Multiple longitudinal and transverse real-time sonographic  images of the kidneys and urinary bladder are obtained. Report and  images stored per institutional and state regulations.           FINDINGS:     Visualized proximal abdominal aorta and IVC are unremarkable.        RIGHT KIDNEY: Right kidney is 11.5 cm in length. Renal cortex is  unremarkable. There is no hydronephrosis..     LEFT KIDNEY: Left kidney is 11.5 cm in length. Left renal cortex is  unremarkable. There is no left hydronephrosis.     PELVIS: Urinary bladder is grossly  "unremarkable.          Impression:      Unremarkable kidneys with no hydronephrosis..           This report was signed and finalized on 5/27/2024 1:00 PM by Kuldip Mcclendon.               Culture:  No results found for: \"BLOODCX\", \"URINECX\", \"WOUNDCX\", \"MRSACX\", \"RESPCX\", \"STOOLCX\"      Assessment    Acute kidney injury, prerenal--improving  New onset atrial fibrillation   Type 2 diabetes  Iron deficiency anemia  Right lowe extremity recurrent infection    Plan:   Continue IV fluids  Monitor labs      Chon Sanders, APRN  5/28/2024  10:23 CDT    "

## 2024-05-28 NOTE — NURSING NOTE
[facility]  INPATIENT WOUND & OSTOMY CARE    Today's Date: 05/28/24    Patient Name: Garrett Rodriguez  MRN: [id number]  [facility]: [id number]  PCP: Mehul Andersen MD  Attending Provider: Moy Trujillo DO  Length of Stay: 2    Wound care consulted for wound to right foot. These were present on admission. Pictures have been uploaded to the chart. Patient follows with Dr. aDmico regarding these chronic wounds. I discussed this case with MEGHANN Sanz before placing any orders.     Wound care orders placed for his three wounds.     Inpatient wound care will continue to follow during hospital stay.  Please contact if any issues or concerns arise.     This document has been electronically signed by Charline Perez RN on 5/28/2024 16:33 CDT

## 2024-05-28 NOTE — PLAN OF CARE
"Goal Outcome Evaluation:  Plan of Care Reviewed With: patient        Progress: no change  Outcome Evaluation: VSS. PRN pain meds as ordered. Safety maintained. Drsg CDI. Voiding without difficulty. Pt repeatedly voiced concern of feeling screws moving and \"feels that they will pop out of the skin soon\"                               "

## 2024-05-28 NOTE — THERAPY DISCHARGE NOTE
Acute Care - Occupational Therapy Discharge  [facility]    Patient Name: Garrett Rodriguez  : 1965    MRN: [id number]                              Today's Date: 2024       Admit Date: 2024    Visit Dx:     ICD-10-CM ICD-9-CM   1. TARSHA (acute kidney injury)  N17.9 584.9   2. Open wound of foot, right, sequela  S91.301S 906.1     Patient Active Problem List   Diagnosis    Type 2 diabetes mellitus with hyperglycemia, without long-term current use of insulin    HTN (hypertension)    Retained orthopedic hardware    Anasarca    Iron deficiency anemia    PAF (paroxysmal atrial fibrillation) new onset    Scrotal edema    Gait instability    Pulmonary HTN    Surgical wound, non healing    TARSHA (acute kidney injury)    Bleeding from wound    Diarrhea of presumed infectious origin    Preop testing     Past Medical History:   Diagnosis Date    Atrial fibrillation 3/11/2024    Chronic pain in right foot     Diabetes mellitus     Hyperlipidemia 2024    Hypertension     PFO (patent foramen ovale)     RP at age 6     Past Surgical History:   Procedure Laterality Date    ANKLE FUSION Right 2023    Procedure: ANKLE ARTHRODESIS;  Surgeon: Shahbaz Reddy DPM;  Location: [facility] OR;  Service: Podiatry;  Laterality: Right;    BACK SURGERY      CARDIAC SURGERY      EXTERNAL FIXATION ANKLE FRACTURE Right 2023    Procedure: POSSIBLE EXTERNAL FIXATION, RIGHT ANKLE;  Surgeon: Shahbaz Reddy DPM;  Location: [facility] OR;  Service: Podiatry;  Laterality: Right;    HARDWARE REMOVAL Right 2023    Procedure: REMOVAL OF HARDWARE, DEEP; ANKLE ARTHRODESIS; SUBTALAR ARTHRODESIS; POSSIBLE EXTERNAL FIXATION, RIGHT ANKLE;  Surgeon: Shahbaz Reddy DPM;  Location: [facility] OR;  Service: Podiatry;  Laterality: Right;    PATENT FORAMEN OVALE CLOSURE      ROTATOR CUFF REPAIR Right     SUBTALAR ARTHRODESIS Right 2023    Procedure: SUBTALAR ARTHRODESIS;  Surgeon: Shahbaz Reddy DPM;  Location:  [facility]  OR;  Service: Podiatry;  Laterality: Right;      General Information       Row Name 05/28/24 0715          OT Time and Intention    Document Type evaluation  Admitted with RLE foot bleeding, TARSHA, hx of multiple ankle surgeries, BKA scheduled 6/3/24.  -CS (r) KJ (t) CS (c)     Mode of Treatment individual therapy;occupational therapy  -CS (r) KJ (t) CS (c)       Row Name 05/28/24 0715          General Information    Patient Profile Reviewed yes  -CS (r) KJ (t) CS (c)     Prior Level of Function independent:;all household mobility;ADL's  -CS (r) KJ (t) CS (c)     Existing Precautions/Restrictions non-weight bearing;fall  NWB RLE  -CS (r) KJ (t) CS (c)     Barriers to Rehab medically complex;physical barrier  -CS (r) KJ (t) CS (c)       Row Name 05/28/24 0715          Living Environment    People in Home spouse  -CS (r) KJ (t) CS (c)       Row Name 05/28/24 0715          Home Main Entrance    Number of Stairs, Main Entrance other (see comments)  ramp  -CS (r) KJ (t) CS (c)       Row Name 05/28/24 0715          Cognition    Orientation Status (Cognition) oriented x 4  -CS (r) KJ (t) CS (c)       Row Name 05/28/24 0715          Safety Issues, Functional Mobility    Impairments Affecting Function (Mobility) endurance/activity tolerance;pain;other (see comments)  RLE hardware protruding from bottom of heel.  -CS (r) KJ (t) CS (c)               User Key  (r) = Recorded By, (t) = Taken By, (c) = Cosigned By      Initials Name Provider Type    Patrizia Yanez S, OTR/L, CNT Occupational Therapist    Jolly Duenas, OT Student OT Student                   Mobility/ADL's       Row Name 05/28/24 0715          Mobility    Extremity Weight-bearing Status right lower extremity  -CS (r) KJ (t) CS (c)     Right Lower Extremity (Weight-bearing Status) non weight-bearing (NWB)  -CS (r) KJ (t) CS (c)               User Key  (r) = Recorded By, (t) = Taken By, (c) = Cosigned By      Initials Name Provider Type    Patrizia Yanez  S, OTR/L, MOE Occupational Therapist    Jolly Duenas, OT Student OT Student                   Obj/Interventions       Row Name 05/28/24 0715          Sensory Assessment (Somatosensory)    Sensory Assessment (Somatosensory) sensation intact  -CS (r) KJ (t) CS (c)       Row Name 05/28/24 0715          Vision Assessment/Intervention    Visual Impairment/Limitations WFL  -CS (r) KJ (t) CS (c)       Row Name 05/28/24 0715          Range of Motion Comprehensive    General Range of Motion no range of motion deficits identified  -CS (r) KJ (t) CS (c)     Comment, General Range of Motion BUE ROM WFL  -CS (r) KJ (t) CS (c)       Row Name 05/28/24 0715          Strength Comprehensive (MMT)    General Manual Muscle Testing (MMT) Assessment no strength deficits identified  -CS (r) KJ (t) CS (c)     Comment, General Manual Muscle Testing (MMT) Assessment BUE MMT 5/5  -CS (r) KJ (t) CS (c)               User Key  (r) = Recorded By, (t) = Taken By, (c) = Cosigned By      Initials Name Provider Type    CS Edson Patrizia S, OTR/L, MOE Occupational Therapist    Jolly Duenas, OT Student OT Student                   Goals/Plan    No documentation.                  Clinical Impression       Row Name 05/28/24 0715          Pain Assessment    Pretreatment Pain Rating 8/10  -CS (r) KJ (t) CS (c)     Posttreatment Pain Rating 9/10  -CS (r) KJ (t) CS (c)     Pain Location - Side/Orientation Right  -CS (r) KJ (t) CS (c)     Pain Location - ankle  -CS (r) KJ (t) CS (c)     Pain Intervention(s) Medication (See MAR)  -CS (r) KJ (t) CS (c)       Row Name 05/28/24 0715          Plan of Care Review    Plan of Care Reviewed With patient;spouse  -CS (r) KJ (t) CS (c)     Progress no change  -CS (r) KJ (t) CS (c)     Outcome Evaluation OT evaluation completed this date. Pt A&O x4. Pt was agreeable to therapy, however reported due to pain and signficant fear of moving RLE pt refuses any functional mobility this date. Pt reports surgical  hardware from previous ankle surgeries has protruded through bottom of heel and reports fearing  ''the screws will come out of my leg'' if pt moves the RLE. Pt is reported to be undergoing BKA 6/3/24. BUE ROM WFL and BUE MMT 5/5. ADL not completed in this session, however due to PT report taken yesterday 5/27/24, pt completed bed mobility Mod I and functional transfers were Independent. It is anticipated pt would require Min A for LB dressing due to size of dressing on RLE, pt pain and ferar of further njury that limit his independence. Pt is Independent for toileting, abthing, and UB dressing. Pt reports desire to stay in hospital until BKA surgery on 6/3/24, and fears returning to home due to RLE bleeding from hardware protrusion. Pt requests bedside commode and wheelchair if he returns home to ensure safe ADL participation until surgery. Pt educated on HEP theraband exercises to promote strength ROM needed post-op and energy conservation techniques to promote fall prevention. OT signing off this date due to pt being at baseline for current level of function due to precautions. OT recs include return to home with assistance and AE bedside commode and wheelchair for ADL participation. OT POC discussed with pt and spouse.  -CS (r) KJ (t) CS (c)       Row Name 05/28/24 0715          Therapy Plan Review/Discharge Plan (OT)    Equipment Needs Upon Discharge (OT) commode chair;wheelchair;wheelchair cushion;reacher  -CS (r) KJ (t) CS (c)       Row Name 05/28/24 0715          Vital Signs    O2 Delivery Pre Treatment room air  -CS (r) KJ (t) CS (c)     O2 Delivery Intra Treatment room air  -CS (r) KJ (t) CS (c)     O2 Delivery Post Treatment room air  -CS (r) KJ (t) CS (c)     Pre Patient Position Supine  -CS (r) KJ (t) CS (c)     Post Patient Position Supine  -CS (r) KJ (t) CS (c)       Row Name 05/28/24 0715          Positioning and Restraints    Pre-Treatment Position in bed  -CS (r) KJ (t) CS (c)     Post Treatment  Position bed  -CS (r) KJ (t) CS (c)     In Bed supine;call light within reach;encouraged to call for assist;with family/caregiver  -CS (r) KJ (t) CS (c)               User Key  (r) = Recorded By, (t) = Taken By, (c) = Cosigned By      Initials Name Provider Type    CS Patrizia Sandhu, OTR/L, CNT Occupational Therapist    Jolly Duenas, OT Student OT Student                   Outcome Measures       Row Name 05/28/24 0715          How much help from another is currently needed...    Putting on and taking off regular lower body clothing? 3  -CS (r) KJ (t) CS (c)     Bathing (including washing, rinsing, and drying) 3  -CS (r) KJ (t) CS (c)     Toileting (which includes using toilet bed pan or urinal) 4  -CS (r) KJ (t) CS (c)     Putting on and taking off regular upper body clothing 4  -CS (r) KJ (t) CS (c)     Taking care of personal grooming (such as brushing teeth) 4  -CS (r) KJ (t) CS (c)     Eating meals 4  -CS (r) KJ (t) CS (c)     AM-PAC 6 Clicks Score (OT) 22  -CS (r) KJ (t)       Row Name 05/28/24 0735          How much help from another person do you currently need...    Turning from your back to your side while in flat bed without using bedrails? 4  -AO     Moving from lying on back to sitting on the side of a flat bed without bedrails? 4  -AO     Moving to and from a bed to a chair (including a wheelchair)? 4  -AO     Standing up from a chair using your arms (e.g., wheelchair, bedside chair)? 2  -AO     Climbing 3-5 steps with a railing? 2  -AO     To walk in hospital room? 2  -AO     AM-PAC 6 Clicks Score (PT) 18  -AO     Highest Level of Mobility Goal 6 --> Walk 10 steps or more  -AO       Row Name 05/28/24 0715          Functional Assessment    Outcome Measure Options AM-PAC 6 Clicks Daily Activity (OT)  -CS (r) KJ (t) CS (c)               User Key  (r) = Recorded By, (t) = Taken By, (c) = Cosigned By      Initials Name Provider Type    CS Patrizia Sandhu, OTR/L, CNT Occupational Therapist    AO  Cynthia Jacobson, RN Registered Nurse    Jolly Duenas, OT Student OT Student                  Occupational Therapy Education       Title: PT OT SLP Therapies (Done)       Topic: Occupational Therapy (Done)       Point: ADL training (Done)       Description:   Instruct learner(s) on proper safety adaptation and remediation techniques during self care or transfers.   Instruct in proper use of assistive devices.                  Learning Progress Summary             Patient Acceptance, E, VU by ADALBERTO at 5/28/2024 0829   Family Acceptance, E, VU by ADALBERTO at 5/28/2024 0829                         Point: Home exercise program (Done)       Description:   Instruct learner(s) on appropriate technique for monitoring, assisting and/or progressing therapeutic exercises/activities.                  Learning Progress Summary             Patient Acceptance, E, VU by ADALBERTO at 5/28/2024 0829   Family Acceptance, E, VU by ADALBERTO at 5/28/2024 0829                         Point: Precautions (Done)       Description:   Instruct learner(s) on prescribed precautions during self-care and functional transfers.                  Learning Progress Summary             Patient Acceptance, E, VU by ADALBERTO at 5/28/2024 0829   Family Acceptance, E, VU by ADALBERTO at 5/28/2024 0829                         Point: Body mechanics (Done)       Description:   Instruct learner(s) on proper positioning and spine alignment during self-care, functional mobility activities and/or exercises.                  Learning Progress Summary             Patient Acceptance, E, VU by ADALBERTO at 5/28/2024 0829   Family Acceptance, E, VU by ADALBERTO at 5/28/2024 0829                                         User Key       Initials Effective Dates Name Provider Type Discipline    ADALBERTO 04/15/24 -  Jolly Robert OT Student OT Student OT                  OT Recommendation and Plan     Plan of Care Review  Plan of Care Reviewed With: patient, spouse  Progress: no change  Outcome Evaluation: OT  evaluation completed this date. Pt A&O x4. Pt was agreeable to therapy, however reported due to pain and signficant fear of moving RLE pt refuses any functional mobility this date. Pt reports surgical hardware from previous ankle surgeries has protruded through bottom of heel and reports fearing  ''the screws will come out of my leg'' if pt moves the RLE. Pt is reported to be undergoing BKA 6/3/24. BUE ROM WFL and BUE MMT 5/5. ADL not completed in this session, however due to PT report taken yesterday 5/27/24, pt completed bed mobility Mod I and functional transfers were Independent. It is anticipated pt would require Min A for LB dressing due to size of dressing on RLE, pt pain and ferar of further njury that limit his independence. Pt is Independent for toileting, abthing, and UB dressing. Pt reports desire to stay in hospital until BKA surgery on 6/3/24, and fears returning to home due to RLE bleeding from hardware protrusion. Pt requests bedside commode and wheelchair if he returns home to ensure safe ADL participation until surgery. Pt educated on HEP theraband exercises to promote strength ROM needed post-op and energy conservation techniques to promote fall prevention. OT signing off this date due to pt being at baseline for current level of function due to precautions. OT recs include return to home with assistance and AE bedside commode and wheelchair for ADL participation. OT POC discussed with pt and spouse.  Plan of Care Reviewed With: patient, spouse  Outcome Evaluation: OT evaluation completed this date. Pt A&O x4. Pt was agreeable to therapy, however reported due to pain and signficant fear of moving RLE pt refuses any functional mobility this date. Pt reports surgical hardware from previous ankle surgeries has protruded through bottom of heel and reports fearing  ''the screws will come out of my leg'' if pt moves the RLE. Pt is reported to be undergoing BKA 6/3/24. BUE ROM WFL and BUE MMT 5/5. ADL  not completed in this session, however due to PT report taken yesterday 5/27/24, pt completed bed mobility Mod I and functional transfers were Independent. It is anticipated pt would require Min A for LB dressing due to size of dressing on RLE, pt pain and ferar of further njury that limit his independence. Pt is Independent for toileting, abthing, and UB dressing. Pt reports desire to stay in hospital until BKA surgery on 6/3/24, and fears returning to home due to RLE bleeding from hardware protrusion. Pt requests bedside commode and wheelchair if he returns home to ensure safe ADL participation until surgery. Pt educated on HEP theraband exercises to promote strength ROM needed post-op and energy conservation techniques to promote fall prevention. OT signing off this date due to pt being at baseline for current level of function due to precautions. OT recs include return to home with assistance and AE bedside commode and wheelchair for ADL participation. OT POC discussed with pt and spouse.     Time Calculation:         Time Calculation- OT       Row Name 05/28/24 0715             Time Calculation- OT    OT Start Time 0715  -CS (r) KJ (t) CS (c)      OT Stop Time 0815  -CS (r) KJ (t) CS (c)      OT Time Calculation (min) 60 min  -CS (r) KJ (t)      OT Received On 05/28/24  -CS (r) KJ (t) CS (c)         Untimed Charges    OT Eval/Re-eval Minutes 60  -CS (r) KJ (t) CS (c)         Total Minutes    Untimed Charges Total Minutes 60  -CS (r) KJ (t)       Total Minutes 60  -CS (r) KJ (t)                User Key  (r) = Recorded By, (t) = Taken By, (c) = Cosigned By      Initials Name Provider Type    CS Patrizia Sandhu, OTR/L, CNT Occupational Therapist    Jolly Duenas, OT Student OT Student                              Jolly Robert OT Student  5/28/2024

## 2024-05-28 NOTE — PLAN OF CARE
Goal Outcome Evaluation:  Patient complaints of pain to right foot. Sarmad is coming through the bottom of patients right foot. Dressing to be changed when patient gets finished bathing. IV antibiotics given as ordered. Patient to have right below the knee amputation on Monday. Patient safety to be maintained this shift, continue to monitor and report abnormal to provider.

## 2024-05-28 NOTE — PLAN OF CARE
Goal Outcome Evaluation:  Plan of Care Reviewed With: patient, spouse        Progress: no change  Outcome Evaluation: OT evaluation completed this date. Pt A&O x4. Pt was agreeable to therapy, however reported due to pain and signficant fear of moving RLE pt refuses any functional mobility this date. Pt reports surgical hardware from previous ankle surgeries has protruded through bottom of heel and reports fearing  ''the screws will come out of my leg'' if pt moves the RLE. Pt is reported to be undergoing BKA 6/3/24. BUE ROM WFL and BUE MMT 5/5. ADL not completed in this session, however due to PT report taken yesterday 5/27/24, pt completed bed mobility Mod I and functional transfers were Independent. It is anticipated pt would require Min A for LB dressing due to size of dressing on RLE, pt pain and ferar of further njury that limit his independence. Pt is Independent for toileting, abthing, and UB dressing. Pt reports desire to stay in hospital until BKA surgery on 6/3/24, and fears returning to home due to RLE bleeding from hardware protrusion. Pt requests bedside commode and wheelchair if he returns home to ensure safe ADL participation until surgery. Pt educated on HEP theraband exercises to promote strength ROM needed post-op and energy conservation techniques to promote fall prevention. OT signing off this date due to pt being at baseline for current level of function due to precautions. OT recs include return to home with assistance and AE bedside commode and wheelchair for ADL participation. OT POC discussed with pt and spouse.

## 2024-05-28 NOTE — PAYOR COMM NOTE
"Garrett Coates (59 y.o. Male)  [id number]     Cont Stay  Please Review Clinical for Additional days    [facility] phone    fax            Date of Birth   1965    Social Security Number       Address   [address] [place] [address]    Home Phone   [phone]    MRN   [id number]       Zoroastrian   Congregational    Marital Status                               Admission Date   5/25/24    Admission Type   Emergency    Admitting Provider   Moy Trujillo DO    Attending Provider   Moy Trujillo DO    Department, Room/Bed   [facility] 3C, 371/1       Discharge Date       Discharge Disposition       Discharge Destination                                 Attending Provider: Moy Trujillo DO    Allergies: No Known Allergies    Isolation: None   Infection: None   Code Status: CPR    Ht: 177.8 cm (70\")   Wt: 80.1 kg (176 lb 9.6 oz)    Admission Cmt: None   Principal Problem: TARSHA (acute kidney injury) [N17.9]                   Active Insurance as of 5/25/2024       Primary Coverage       Payor Plan Insurance Group Employer/Plan Group    [id number] [id number]       Payor Plan Address Payor Plan Phone Number Payor Plan Fax Number Effective Dates    [address] [phone]  6/1/2022 - None Entered    [place] [address]         Subscriber Name Subscriber Birth Date Member ID       GARRETT COATES 1965 [id number]                     Emergency Contacts        (Rel.) Home Phone Work Phone Mobile Phone    Gwen Coates (Spouse) -- -- [phone]              Vital Signs (last day)       Date/Time Temp Temp src Pulse Resp BP Patient Position SpO2    05/28/24 1153 97.7 (36.5) Oral 70 16 146/91 Lying 96    05/28/24 0751 97.9 (36.6) Oral 72 16 132/90 Lying 98    05/28/24 0346 97.4 (36.3) Oral 69 16 116/63 Lying 95    05/28/24 0044 97.8 (36.6) Oral 71 18 101/51 Lying 93    05/27/24 " 2045 97.8 (36.6) Oral 83 16 127/61 Lying 91    05/27/24 0752 98.5 (36.9) -- 71 16 130/68 Lying 95    05/27/24 0352 98 (36.7) Oral 74 16 106/58 Lying 92          Current Facility-Administered Medications   Medication Dose Route Frequency Provider Last Rate Last Admin    acetaminophen (TYLENOL) tablet 650 mg  650 mg Oral Q4H PRN Colin Patrick DO        sennosides-docusate (PERICOLACE) 8.6-50 MG per tablet 2 tablet  2 tablet Oral BID PRN Colin Patrick DO        And    polyethylene glycol (MIRALAX) packet 17 g  17 g Oral Daily PRN Colin Patrick DO        And    bisacodyl (DULCOLAX) EC tablet 5 mg  5 mg Oral Daily PRN Colin Patrick DO        And    bisacodyl (DULCOLAX) suppository 10 mg  10 mg Rectal Daily PRN Colin Patrick DO        cefTRIAXone (ROCEPHIN) 1,000 mg in sodium chloride 0.9 % 100 mL MBP  1,000 mg Intravenous Q24H Camilla Rider, APRN 200 mL/hr at 05/28/24 1246 1,000 mg at 05/28/24 1246    dextrose (D50W) (25 g/50 mL) IV injection 25 g  25 g Intravenous Q15 Min PRN Colin Patrick DO        dextrose (GLUTOSE) oral gel 15 g  15 g Oral Q15 Min PRN Colin Patrick DO        doxycycline (ADOXA) tablet 100 mg  100 mg Oral Q12H Colin Patrick DO   100 mg at 05/28/24 0939    Enoxaparin Sodium (LOVENOX) syringe 80 mg  1 mg/kg Subcutaneous Q12H Rider, Camilla, APRN   80 mg at 05/28/24 0427    glucagon (GLUCAGEN) injection 1 mg  1 mg Intramuscular Q15 Min PRN Colin Patrick DO        HYDROcodone-acetaminophen (NORCO)  MG per tablet 1 tablet  1 tablet Oral Q4H PRN Ba Cosme DO   1 tablet at 05/28/24 0432    HYDROcodone-acetaminophen (NORCO) 7.5-325 MG per tablet 1 tablet  1 tablet Oral Q4H PRN Ba Cosme, DO        Insulin Lispro (humaLOG) injection 2-9 Units  2-9 Units Subcutaneous 4x Daily AC & at Bedtime Camilla Rider APRN   6 Units at 05/28/24 1225    ondansetron (ZOFRAN) injection 4 mg  4 mg Intravenous Q6H PRN Colin Patrick DO         pregabalin (LYRICA) capsule 100 mg  100 mg Oral Q12H DarnellColin serna, DO   100 mg at 05/28/24 0939    sodium chloride 0.9 % flush 10 mL  10 mL Intravenous PRN Roderick Todd MD        sodium chloride 0.9 % flush 10 mL  10 mL Intravenous Q12H DarnellColin serna, DO   10 mL at 05/27/24 2117    sodium chloride 0.9 % flush 10 mL  10 mL Intravenous PRN Colin Patrickell, DO        sodium chloride 0.9 % infusion 40 mL  40 mL Intravenous PRN Colin Patrick, DO        sodium chloride 0.9 % infusion  100 mL/hr Intravenous Continuous Ba Cosme,  mL/hr at 05/28/24 0651 100 mL/hr at 05/28/24 0651        Physician Progress Notes (last 48 hours)        Camilla Rider APRN at 05/28/24 1036              [facility] Medicine Services  INPATIENT PROGRESS NOTE    Patient Name: Garrett Rodriguez  Date of Admission: 5/25/2024  Today's Date: 05/28/24  Length of Stay: 2  Primary Care Physician: Mehul Andersen MD    Subjective   Chief Complaint: none offered  HPI   He was lying in bed resting comfortabl with spouse at bedside.  He tells me that he is feeling better.  No nausea, vomiting, abdominal pain, diarrhea.  He is tolerating diet.  He can tell his urine output is improving. Creatinine 2.10 (2.96).    Review of Systems   All pertinent negatives and positives are as above. All other systems have been reviewed and are negative unless otherwise stated.     Objective    Temp:  [97.4 °F (36.3 °C)-97.9 °F (36.6 °C)] 97.9 °F (36.6 °C)  Heart Rate:  [69-83] 72  Resp:  [16-18] 16  BP: (101-132)/(51-90) 132/90  Physical Exam  Vitals reviewed.   Constitutional:       General: He is not in acute distress.     Appearance: He is not toxic-appearing.      Comments: Lying in bed.  No acute distress.  On room air.  No family at bedside.   HENT:      Head: Normocephalic and atraumatic.      Mouth/Throat:      Mouth: Mucous membranes are moist.      Pharynx: Oropharynx is clear.   Eyes:       Extraocular Movements: Extraocular movements intact.      Conjunctiva/sclera: Conjunctivae normal.      Pupils: Pupils are equal, round, and reactive to light.   Cardiovascular:      Rate and Rhythm: Normal rate and regular rhythm.      Pulses: Normal pulses.   Pulmonary:      Effort: Pulmonary effort is normal. No respiratory distress.      Breath sounds: Normal breath sounds. No wheezing or rhonchi.   Abdominal:      General: Bowel sounds are normal. There is no distension.      Palpations: Abdomen is soft.      Tenderness: There is no abdominal tenderness.   Musculoskeletal:         General: No swelling or tenderness. Normal range of motion.      Cervical back: Normal range of motion and neck supple. No muscular tenderness.   Skin:     General: Skin is warm and dry.      Findings: No erythema or rash.   Neurological:      General: No focal deficit present.      Mental Status: He is alert and oriented to person, place, and time.      Cranial Nerves: No cranial nerve deficit.      Motor: No weakness.   Psychiatric:         Mood and Affect: Mood normal.         Behavior: Behavior normal.               Results Review:  I have reviewed the labs, radiology results, and diagnostic studies.    Laboratory Data:   Results from last 7 days   Lab Units 05/28/24  0526 05/27/24  0344 05/26/24  0605   WBC 10*3/mm3 8.26 6.43 8.66   HEMOGLOBIN g/dL 7.8* 7.4* 9.1*   HEMATOCRIT % 26.0* 24.0* 29.7*   PLATELETS 10*3/mm3 275 258 346        Results from last 7 days   Lab Units 05/28/24  0526 05/27/24  0344 05/26/24  0605   SODIUM mmol/L 141 143 138   POTASSIUM mmol/L 4.2 4.1 4.2   CHLORIDE mmol/L 101 101 93*   CO2 mmol/L 33.0* 34.0* 32.0*   BUN mg/dL 72* 90* 108*   CREATININE mg/dL 2.10* 2.96* 4.05*   CALCIUM mg/dL 8.7 9.0 9.5   BILIRUBIN mg/dL 0.3 0.2 0.4   ALK PHOS U/L 134* 123* 162*   ALT (SGPT) U/L 15 14 18   AST (SGOT) U/L 11 11 12   GLUCOSE mg/dL 206* 126* 289*       Culture Data:   Microbiology Results (last 10 days)       ** No  results found for the last 240 hours. **          Radiology Data:   Imaging Results (Last 24 Hours)       Procedure Component Value Units Date/Time    US Renal Bilateral [[id number]] Collected: 05/27/24 1259     Updated: 05/27/24 1303    Narrative:      US RENAL BILATERAL- 5/27/2024 11:11 AM     HISTORY: Acute kidney injury; N17.9-Acute kidney failure, unspecified;  S91.301S-Unspecified open wound, right foot, sequela     COMPARISON: None available.       TECHNIQUE: Multiple longitudinal and transverse real-time sonographic  images of the kidneys and urinary bladder are obtained. Report and  images stored per institutional and state regulations.           FINDINGS:     Visualized proximal abdominal aorta and IVC are unremarkable.        RIGHT KIDNEY: Right kidney is 11.5 cm in length. Renal cortex is  unremarkable. There is no hydronephrosis..     LEFT KIDNEY: Left kidney is 11.5 cm in length. Left renal cortex is  unremarkable. There is no left hydronephrosis.     PELVIS: Urinary bladder is grossly unremarkable.          Impression:      Unremarkable kidneys with no hydronephrosis..           This report was signed and finalized on 5/27/2024 1:00 PM by Kuldip Mcclendon.               I have reviewed the patient's current medications.     Assessment/Plan   Assessment  Active Hospital Problems    Diagnosis     **TARSHA (acute kidney injury)     Bleeding from wound     Diarrhea of presumed infectious origin     Pulmonary HTN     PAF (paroxysmal atrial fibrillation) new onset     Type 2 diabetes mellitus with hyperglycemia, without long-term current use of insulin      Mr. Rodriguez is a 59-year-old male who presented to [facility] on 5/25 for bleeding right lower extremity chronic wound. He is scheduled to have a right amputation below the knee with Dr. Hawk on Nasra 3, 2024. He states the wound started bleeding a lot, and he wanted to come and have it checked.  He had some nausea, vomiting and diarrhea  "prior to admission.  Of note, he was hospitalized in March for hypervolemia/anasarca.  Echocardiogram revealed pulmonary hypertension with no evidence of left heart failure and no diastolic dysfunction. He was sent home with high-dose loop diuretic, metolazone and ACE inhibitor.  He does take Eliquis for history of paroxysmal atrial fibrillation.  In the ED, creatinine 4.59.  He was started on IV fluid.    Treatment Plan  Acute kidney injury with creatinine 4.59.  Creatinine on 4/30 was 1.27.  Creatinine today improved with IV fluid administration at 2.10.  Renal ultrasound showed no hydronephrosis.    He is followed by wound care clinic.  He was started on 10-day course of Omnicef and doxycycline on 5/17 for cellulitis to right foot.  X-ray on 5/17 showed \"interval removal of fusion hardware with placement of intramedullary sebastian at the tibia and cage in the region of the talus. There is  surrounding lucency in the region of the talus around the cage which was  present on the prior examination but increased. However lucency surrounding the intramedullary sebastian is new from the prior examination. This is worrisome for loosening/infection.\"  Continue doxycycline and ceftriaxone.  WBC normal.  No fever.    Hemoglobin A1c 7.0 in April 2024.  Metformin on hold.  Continue Accu-Cheks with sliding scale insulin.    He has a history of atrial fibrillation and is chronically anticoagulated on Eliquis.  Eliquis held on admission given significant bleeding to right lower extremity wound.  Continue lovenox. Hemoglobin 8.1 on admission.  Hemoglobin 7.8 today.  Iron panel reviewed -IV iron x 1    Medical Decision Making  Number and Complexity of problems: 1 acute problem  Differential Diagnosis: None considered at present    Conditions and Status        Condition is improving.     [facility] Data  External documents reviewed: Prior [facility] records  Cardiac tracing (EKG, telemetry) interpretation: none  Radiology interpretation: Interpreted by " radiology  Labs reviewed: As above  Any tests that were considered but not ordered: None considered at present     Decision rules/scores evaluated (example [id number], Wells, etc): None considered at present     Discussed with: Patient and Dr. Trujillo     Care Planning  Shared decision making: Patient is agreeable to ongoing workup and treatment  Code status and discussions: Full code with full interventions    Disposition  Social Determinants of Health that impact treatment or disposition: home  I expect the patient to be discharged to home in 1-2 days.     Electronically signed by MEGHANN Mckeon, 05/28/24, 10:37 CDT.      Electronically signed by Camilla Rider APRN at 05/28/24 1200       Chon Sanders APRN at 05/28/24 1023       Attestation signed by Martínez Shaw MD at 05/28/24 1405    I have personally examined the patient and reviewed all the data.  I also agree with history and physical examination as well as plan generated by nurse practitioner.    Chief complaint: Abnormal labs.    59 years old gentleman who has no prior history of chronic kidney disease with baseline creatinine of 1.0 mg.  Patient is currently battling with recurrent cellulitis of right lower extremities with deformity.  He has work-related injury leading to right ankle fracture about 2 years ago required multiple surgeries and nonhealing surgical wound, now scheduled for right BKA.  About a month ago he was hospitalized with new onset of congestive heart failure and was sent home with loop diuretics as well as metolazone.  He presented to the emergency room with profound weakness, lack of energy, lab data revealed serum creatinine of 4.0 mg.  Hospital course markable for discontinuation of diuretics and treatment with IV fluid and has slow improvement of renal function.    This morning he feels much better.    Assessment:  1.  Acute kidney injury/improving.  2.  Prerenal azotemia related to use of diuretics.  3.  Type 2  diabetes.  4.  Iron deficiency anemia.  5.  Right ankle deformities with multiple infections.    Plan:  1.  Continue IV fluid.  2.  Continue intravenous iron.  3.  Continue to monitor renal function.  4.  Possible discharge to home soon.                Nephrology ([facility]) Progress Note      Patient:  Garrett Rodriguez  YOB: 1965  Date of Service: 5/28/2024  MRN: [id number]   Acct: [id number]   Primary Care Physician: Mehul Andersen MD  Advance Directive:   Code Status and Medical Interventions:   Ordered at: 05/26/24 0131     Level Of Support Discussed With:    Patient     Code Status (Patient has no pulse and is not breathing):    CPR (Attempt to Resuscitate)     Medical Interventions (Patient has pulse or is breathing):    Full Support     Admit Date: 5/25/2024       Hospital Day: 2  Referring Provider: Colin Patrick DO      Patient personally seen and examined.  Complete chart including Consults, Notes, Operative Reports, Labs, Cardiology, and Radiology studies reviewed as able.        Subjective:  Garrett Rodriguez is a 59 y.o. male for whom we were consulted for evaluation and treatment of acute kidney injury.  Baseline creatinine approximately 1.0.  History of right lower extremity wound with recurrent cellulitis. Has had multiple rounds of antibiotics and debridements. Now scheduled for below knee amputation in the near future. Presented to ER with draining from wound. Labs revealed creatinine 4.0. blood pressure has been low recently. Did have some n/v/d prior to admission. Denies changes in urination. Hospital course remarkable for improving renal function with IV fluid administration.    Today feeling better overall but still has right leg pain. No new overnight issues. Urine output nonoliguric    Allergies:  Patient has no known allergies.    Home Meds:  Medications Prior to Admission   Medication Sig Dispense Refill Last Dose    apixaban  (ELIQUIS) 5 MG tablet tablet Take 1 tablet by mouth 2 (Two) Times a Day. 60 tablet 2 5/25/2024    cefdinir (OMNICEF) 300 MG capsule Take 1 capsule by mouth 2 (Two) Times a Day.   5/25/2024    doxycycline (VIBRAMYCIN) 100 MG capsule Take 1 capsule by mouth Every 12 (Twelve) Hours.   5/25/2024    furosemide (LASIX) 40 MG tablet Take 1 tablet by mouth 2 (Two) Times a Day.       HYDROcodone-acetaminophen (NORCO)  MG per tablet Take 1 tablet by mouth Every 8 (Eight) Hours As Needed for Severe Pain.   5/25/2024    lisinopril (PRINIVIL,ZESTRIL) 20 MG tablet Take 1 tablet by mouth Daily. 30 tablet 2 5/25/2024    metFORMIN (GLUCOPHAGE) 1000 MG tablet Take 1 tablet by mouth 2 (Two) Times a Day With Meals.   5/25/2024    metOLazone (ZAROXOLYN) 2.5 MG tablet Take 1 tablet by mouth Daily. 30 tablet 11 5/25/2024    pregabalin (LYRICA) 100 MG capsule Take 1 capsule by mouth 3 (Three) Times a Day.       naloxone (NARCAN) 4 MG/0.1ML nasal spray Call 911. Don't prime. [place] in 1 nostril for overdose. Repeat in 2-3 minutes in other nostril if no or minimal breathing/responsiveness. 2 each 0        Medicines:  Current Facility-Administered Medications   Medication Dose Route Frequency Provider Last Rate Last Admin    acetaminophen (TYLENOL) tablet 650 mg  650 mg Oral Q4H PRN Colin Patrick DO        sennosides-docusate (PERICOLACE) 8.6-50 MG per tablet 2 tablet  2 tablet Oral BID PRN Colin Patrick DO        And    polyethylene glycol (MIRALAX) packet 17 g  17 g Oral Daily PRN Colin Patrick DO        And    bisacodyl (DULCOLAX) EC tablet 5 mg  5 mg Oral Daily PRN Colin Patrick DO        And    bisacodyl (DULCOLAX) suppository 10 mg  10 mg Rectal Daily PRN Colin Patrick DO        cefTRIAXone (ROCEPHIN) 1,000 mg in sodium chloride 0.9 % 100 mL MBP  1,000 mg Intravenous Q24H Camilla Rider APRN 200 mL/hr at 05/27/24 1502 1,000 mg at 05/27/24 1502    dextrose (D50W) (25 g/50 mL) IV injection 25 g  25 g  Intravenous Q15 Min PRN Colin Patrick DO        dextrose (GLUTOSE) oral gel 15 g  15 g Oral Q15 Min PRN Colin Patrick DO        doxycycline (ADOXA) tablet 100 mg  100 mg Oral Q12H Colin Patrick DO   100 mg at 05/28/24 0939    Enoxaparin Sodium (LOVENOX) syringe 80 mg  1 mg/kg Subcutaneous Q12H Camilla Rider APRN   80 mg at 05/28/24 0427    glucagon (GLUCAGEN) injection 1 mg  1 mg Intramuscular Q15 Min PRN Colin Patrick DO        HYDROcodone-acetaminophen (NORCO)  MG per tablet 1 tablet  1 tablet Oral Q4H PRN Ba Cosme DO   1 tablet at 05/28/24 0432    HYDROcodone-acetaminophen (NORCO) 7.5-325 MG per tablet 1 tablet  1 tablet Oral Q4H PRN Ba Cosme DO        Insulin Lispro (humaLOG) injection 2-7 Units  2-7 Units Subcutaneous 4x Daily AC & at Bedtime Colin Patrick DO   2 Units at 05/28/24 0939    ondansetron (ZOFRAN) injection 4 mg  4 mg Intravenous Q6H PRN Colin Patrick DO        pregabalin (LYRICA) capsule 100 mg  100 mg Oral Q12H Colin Patrick DO   100 mg at 05/28/24 0939    sodium chloride 0.9 % flush 10 mL  10 mL Intravenous PRN Roderick Todd MD        sodium chloride 0.9 % flush 10 mL  10 mL Intravenous Q12H Colin Patrick DO   10 mL at 05/27/24 2117    sodium chloride 0.9 % flush 10 mL  10 mL Intravenous PRN Colin Patrick DO        sodium chloride 0.9 % infusion 40 mL  40 mL Intravenous PRN Colin Patirck DO        sodium chloride 0.9 % infusion  100 mL/hr Intravenous Continuous Ba Cosme  mL/hr at 05/28/24 0651 100 mL/hr at 05/28/24 0651       Past Medical History:  Past Medical History:   Diagnosis Date    Atrial fibrillation 3/11/2024    Chronic pain in right foot     Diabetes mellitus     Hyperlipidemia Feb 2024    Hypertension     PFO (patent foramen ovale)     RP at age 6       Past Surgical History:  Past Surgical History:   Procedure Laterality Date    ANKLE FUSION Right 07/11/2023    Procedure: ANKLE  ARTHRODESIS;  Surgeon: Shahbaz Reddy DPM;  Location:  [facility] OR;  Service: Podiatry;  Laterality: Right;    BACK SURGERY      CARDIAC SURGERY      EXTERNAL FIXATION ANKLE FRACTURE Right 07/11/2023    Procedure: POSSIBLE EXTERNAL FIXATION, RIGHT ANKLE;  Surgeon: Shahbaz Reddy DPM;  Location:  [facility] OR;  Service: Podiatry;  Laterality: Right;    HARDWARE REMOVAL Right 07/11/2023    Procedure: REMOVAL OF HARDWARE, DEEP; ANKLE ARTHRODESIS; SUBTALAR ARTHRODESIS; POSSIBLE EXTERNAL FIXATION, RIGHT ANKLE;  Surgeon: Shahbaz Reddy DPM;  Location:  [facility] OR;  Service: Podiatry;  Laterality: Right;    PATENT FORAMEN OVALE CLOSURE      ROTATOR CUFF REPAIR Right     SUBTALAR ARTHRODESIS Right 07/11/2023    Procedure: SUBTALAR ARTHRODESIS;  Surgeon: Shahbaz Reddy DPM;  Location:  [facility] OR;  Service: Podiatry;  Laterality: Right;       Family History  Family History   Problem Relation Age of Onset    Hypertension Mother     Hypertension Father        Social History  Social History     Socioeconomic History    Marital status:    Tobacco Use    Smoking status: Never    Smokeless tobacco: Never   Vaping Use    Vaping status: Never Used   Substance and Sexual Activity    Alcohol use: Never    Drug use: Never    Sexual activity: Not Currently     Partners: Female       Review of Systems:  History obtained from chart review and the patient  General ROS: No fever or chills  Respiratory ROS: No cough, shortness of breath, wheezing  Cardiovascular ROS: No chest pain or palpitations  Gastrointestinal ROS: No abdominal pain or melena  Genito-Urinary ROS: No dysuria or hematuria  Psych ROS: No anxiety and depression  14 point ROS reviewed with the patient and negative except as noted above and in the HPI unless unable to obtain.    Objective:  Patient Vitals for the past 24 hrs:   BP Temp Temp src Pulse Resp SpO2   05/28/24 0751 132/90 97.9 °F (36.6 °C) Oral 72 16 98 %   05/28/24 0346 116/63 97.4 °F (36.3 °C) Oral 69  16 95 %   05/28/24 0044 101/51 97.8 °F (36.6 °C) Oral 71 18 93 %   05/27/24 2045 127/61 97.8 °F (36.6 °C) Oral 83 16 91 %       Intake/Output Summary (Last 24 hours) at 5/28/2024 1023  Last data filed at 5/28/2024 0900  Gross per 24 hour   Intake 480 ml   Output --   Net 480 ml     General: awake/alert   Chest:  clear to auscultation bilaterally without respiratory distress  CVS: regular rate and rhythm  Abdominal: soft, nontender, positive bowel sounds  Extremities: no cyanosis or edema  Skin: warm and dry without rash      Labs:  Results from last 7 days   Lab Units 05/28/24  0526 05/27/24  0344 05/26/24  0605   WBC 10*3/mm3 8.26 6.43 8.66   HEMOGLOBIN g/dL 7.8* 7.4* 9.1*   HEMATOCRIT % 26.0* 24.0* 29.7*   PLATELETS 10*3/mm3 275 258 346         Results from last 7 days   Lab Units 05/28/24 0526 05/27/24  0344 05/26/24  0605   SODIUM mmol/L 141 143 138   POTASSIUM mmol/L 4.2 4.1 4.2   CHLORIDE mmol/L 101 101 93*   CO2 mmol/L 33.0* 34.0* 32.0*   BUN mg/dL 72* 90* 108*   CREATININE mg/dL 2.10* 2.96* 4.05*   CALCIUM mg/dL 8.7 9.0 9.5   EGFR mL/min/1.73 35.8* 23.7* 16.3*   BILIRUBIN mg/dL 0.3 0.2 0.4   ALK PHOS U/L 134* 123* 162*   ALT (SGPT) U/L 15 14 18   AST (SGOT) U/L 11 11 12   GLUCOSE mg/dL 206* 126* 289*       Radiology:   Imaging Results (Last 72 Hours)       Procedure Component Value Units Date/Time    US Renal Bilateral [[id number]] Collected: 05/27/24 1259     Updated: 05/27/24 1303    Narrative:      US RENAL BILATERAL- 5/27/2024 11:11 AM     HISTORY: Acute kidney injury; N17.9-Acute kidney failure, unspecified;  S91.301S-Unspecified open wound, right foot, sequela     COMPARISON: None available.       TECHNIQUE: Multiple longitudinal and transverse real-time sonographic  images of the kidneys and urinary bladder are obtained. Report and  images stored per institutional and state regulations.           FINDINGS:     Visualized proximal abdominal aorta and IVC are unremarkable.        RIGHT KIDNEY: Right  "kidney is 11.5 cm in length. Renal cortex is  unremarkable. There is no hydronephrosis..     LEFT KIDNEY: Left kidney is 11.5 cm in length. Left renal cortex is  unremarkable. There is no left hydronephrosis.     PELVIS: Urinary bladder is grossly unremarkable.          Impression:      Unremarkable kidneys with no hydronephrosis..           This report was signed and finalized on 5/27/2024 1:00 PM by Kuldip Mcclendon.               Culture:  No results found for: \"BLOODCX\", \"URINECX\", \"WOUNDCX\", \"MRSACX\", \"RESPCX\", \"STOOLCX\"      Assessment    Acute kidney injury, prerenal--improving  New onset atrial fibrillation   Type 2 diabetes  Iron deficiency anemia  Right lowe extremity recurrent infection    Plan:   Continue IV fluids  Monitor labs      Chon Sanders, MEGHANN  5/28/2024  10:23 CDT      Electronically signed by Martínez Shaw MD at 05/28/24 1405       Martínez Shaw MD at 05/27/24 1339          Nephrology ([facility]) Progress Note      Patient:  Garrett Rodriguez  YOB: 1965  Date of Service: 5/27/2024  MRN: [id number]   Acct: [id number]   Primary Care Physician: Mehul Andersen MD  Advance Directive:   Code Status and Medical Interventions:   Ordered at: 05/26/24 0131     Level Of Support Discussed With:    Patient     Code Status (Patient has no pulse and is not breathing):    CPR (Attempt to Resuscitate)     Medical Interventions (Patient has pulse or is breathing):    Full Support     Admit Date: 5/25/2024       Hospital Day: 1  Referring Provider: Colin Patrick DO      Patient personally seen and examined.  Complete chart including Consults, Notes, Operative Reports, Labs, Cardiology, and Radiology studies reviewed as able.    Chief complaint: Abnormal labs.    Subjective:    Garrett Rodriguez is a 59 y.o. male  whom we were consulted for acute kidney injury.  His baseline creatinine usually 1.0 mg.  Patient has been fighting with right lower " extremity wound which all started after an accident leading to fracture of right leg/ankle about 2 years ago. Patient has open reduction internal fixation of his leg.  He has recurrent cellulitis/infection, required multiple rounds of IV antibiotics and debridements.  Finally he is scheduled to have right below the knee amputation in early part of June.  He presented to the emergency room with bleeding from his right leg wound.  Routine lab data in ER indicated his serum creatinine now 4.0 mg.  About a month ago he was hospitalized with new onset of congestive heart failure.  He was sent home with high-dose loop diuretics and metolazone and ACE inhibitor was initiated as well.  Patient has known routine labs within the last several weeks.  Now his creatinine is 4.0 mg.  He is now admitted for treatment of acute kidney injury likely caused by excessive diuresis.  He denies any shortness of breath or swelling of legs.  His blood pressure has been relatively low as well.  Patient denies any nausea vomiting or diarrhea.  He denies any use of nonsteroidal agents.  Hospital course remarkable for discontinuation of diuretics and IV fluid was initiated.    Patient has been responding to IV fluid and renal function is slowly improving.  This afternoon he is feeling much better.      Allergies:  Patient has no known allergies.    Home Meds:  Medications Prior to Admission   Medication Sig Dispense Refill Last Dose    apixaban (ELIQUIS) 5 MG tablet tablet Take 1 tablet by mouth 2 (Two) Times a Day. 60 tablet 2 5/25/2024    cefdinir (OMNICEF) 300 MG capsule Take 1 capsule by mouth 2 (Two) Times a Day.   5/25/2024    doxycycline (VIBRAMYCIN) 100 MG capsule Take 1 capsule by mouth Every 12 (Twelve) Hours.   5/25/2024    furosemide (LASIX) 40 MG tablet Take 1 tablet by mouth 2 (Two) Times a Day.       HYDROcodone-acetaminophen (NORCO)  MG per tablet Take 1 tablet by mouth Every 8 (Eight) Hours As Needed for Severe Pain.    5/25/2024    lisinopril (PRINIVIL,ZESTRIL) 20 MG tablet Take 1 tablet by mouth Daily. 30 tablet 2 5/25/2024    metFORMIN (GLUCOPHAGE) 1000 MG tablet Take 1 tablet by mouth 2 (Two) Times a Day With Meals.   5/25/2024    metOLazone (ZAROXOLYN) 2.5 MG tablet Take 1 tablet by mouth Daily. 30 tablet 11 5/25/2024    pregabalin (LYRICA) 100 MG capsule Take 1 capsule by mouth 3 (Three) Times a Day.       naloxone (NARCAN) 4 MG/0.1ML nasal spray Call 911. Don't prime. Elton in 1 nostril for overdose. Repeat in 2-3 minutes in other nostril if no or minimal breathing/responsiveness. 2 each 0        Medicines:  Current Facility-Administered Medications   Medication Dose Route Frequency Provider Last Rate Last Admin    acetaminophen (TYLENOL) tablet 650 mg  650 mg Oral Q4H PRN Colin Patrick DO        sennosides-docusate (PERICOLACE) 8.6-50 MG per tablet 2 tablet  2 tablet Oral BID PRN Colin Patrick DO        And    polyethylene glycol (MIRALAX) packet 17 g  17 g Oral Daily PRN Colin Patrick DO        And    bisacodyl (DULCOLAX) EC tablet 5 mg  5 mg Oral Daily PRN Colin Patrick DO        And    bisacodyl (DULCOLAX) suppository 10 mg  10 mg Rectal Daily PRN Colin Patrick DO        cefTRIAXone (ROCEPHIN) 1,000 mg in sodium chloride 0.9 % 100 mL MBP  1,000 mg Intravenous Q24H Camilla Rider APRN        dextrose (D50W) (25 g/50 mL) IV injection 25 g  25 g Intravenous Q15 Min PRN Colin Patrick DO        dextrose (GLUTOSE) oral gel 15 g  15 g Oral Q15 Min PRN Colin Patrick DO        doxycycline (ADOXA) tablet 100 mg  100 mg Oral Q12H Colin Patrick DO   100 mg at 05/27/24 0927    Enoxaparin Sodium (LOVENOX) syringe 80 mg  1 mg/kg Subcutaneous Once Camilla Rider APRN        [START ON 5/28/2024] Enoxaparin Sodium (LOVENOX) syringe 80 mg  1 mg/kg Subcutaneous Q12H Camilla Rider APRN        ferric gluconate (FERRLECIT) 250 mg in sodium chloride 0.9 % 250 mL IVPB  250 mg Intravenous Once Tereso,  MEGHANN Sampson        glucagon (GLUCAGEN) injection 1 mg  1 mg Intramuscular Q15 Min PRN Colin Patrick DO        HYDROcodone-acetaminophen (NORCO)  MG per tablet 1 tablet  1 tablet Oral Q4H PRN Ba Cosme DO   1 tablet at 05/27/24 0515    HYDROcodone-acetaminophen (NORCO) 7.5-325 MG per tablet 1 tablet  1 tablet Oral Q4H PRN Ba Cosme DO        Insulin Lispro (humaLOG) injection 2-7 Units  2-7 Units Subcutaneous 4x Daily AC & at Bedtime Colin Patrick DO   2 Units at 05/26/24 2116    ondansetron (ZOFRAN) injection 4 mg  4 mg Intravenous Q6H PRN Colin Patrick DO        pregabalin (LYRICA) capsule 100 mg  100 mg Oral Q12H Colin Patrick DO   100 mg at 05/27/24 0927    sodium chloride 0.9 % flush 10 mL  10 mL Intravenous PRN Roderick Todd MD        sodium chloride 0.9 % flush 10 mL  10 mL Intravenous Q12H Colin Patrick DO   10 mL at 05/26/24 2110    sodium chloride 0.9 % flush 10 mL  10 mL Intravenous PRN Colin Patrick DO        sodium chloride 0.9 % infusion 40 mL  40 mL Intravenous PRN Colin Patrick DO        sodium chloride 0.9 % infusion  100 mL/hr Intravenous Continuous Ba Cosme  mL/hr at 05/26/24 1316 100 mL/hr at 05/26/24 1316       Past Medical History:  Past Medical History:   Diagnosis Date    Atrial fibrillation 3/11/2024    Chronic pain in right foot     Diabetes mellitus     Hyperlipidemia Feb 2024    Hypertension     PFO (patent foramen ovale)     RP at age 6       Past Surgical History:  Past Surgical History:   Procedure Laterality Date    ANKLE FUSION Right 07/11/2023    Procedure: ANKLE ARTHRODESIS;  Surgeon: Shahbaz Reddy DPM;  Location:  [facility];  Service: Podiatry;  Laterality: Right;    BACK SURGERY      CARDIAC SURGERY      EXTERNAL FIXATION ANKLE FRACTURE Right 07/11/2023    Procedure: POSSIBLE EXTERNAL FIXATION, RIGHT ANKLE;  Surgeon: Shahbaz Reddy DPM;  Location:  [facility];  Service: Podiatry;   Laterality: Right;    HARDWARE REMOVAL Right 07/11/2023    Procedure: REMOVAL OF HARDWARE, DEEP; ANKLE ARTHRODESIS; SUBTALAR ARTHRODESIS; POSSIBLE EXTERNAL FIXATION, RIGHT ANKLE;  Surgeon: Shahbaz Reddy DPM;  Location:  [facility];  Service: Podiatry;  Laterality: Right;    PATENT FORAMEN OVALE CLOSURE      ROTATOR CUFF REPAIR Right     SUBTALAR ARTHRODESIS Right 07/11/2023    Procedure: SUBTALAR ARTHRODESIS;  Surgeon: Shahbaz Reddy DPM;  Location:  [facility];  Service: Podiatry;  Laterality: Right;       Family History  Family History   Problem Relation Age of Onset    Hypertension Mother     Hypertension Father        Social History  Social History     Socioeconomic History    Marital status:    Tobacco Use    Smoking status: Never    Smokeless tobacco: Never   Vaping Use    Vaping status: Never Used   Substance and Sexual Activity    Alcohol use: Never    Drug use: Never    Sexual activity: Not Currently     Partners: Female       Review of Systems:  History obtained from chart review and the patient  General ROS: No fever or chills  Respiratory ROS: No cough, shortness of breath, wheezing  Cardiovascular ROS: No chest pain or palpitations  Gastrointestinal ROS: No abdominal pain or melena  Genito-Urinary ROS: No dysuria or hematuria  Psych ROS: No anxiety and depression  14 point ROS reviewed with the patient and negative except as noted above and in the HPI unless unable to obtain.    Objective:  Patient Vitals for the past 24 hrs:   BP Temp Temp src Pulse Resp SpO2 Weight   05/27/24 0752 130/68 98.5 °F (36.9 °C) -- 71 16 95 % --   05/27/24 0352 106/58 98 °F (36.7 °C) Oral 74 16 92 % 80.1 kg (176 lb 9.6 oz)   05/26/24 2341 108/54 98 °F (36.7 °C) Oral 69 16 92 % --   05/26/24 1948 115/65 97.9 °F (36.6 °C) Oral 73 16 94 % --   05/26/24 1520 143/80 97.6 °F (36.4 °C) Oral 73 16 96 % --       Intake/Output Summary (Last 24 hours) at 5/27/2024 1339  Last data filed at 5/26/2024 1600  Gross per 24 hour    Intake --   Output 200 ml   Net -200 ml     General: awake/alert   HEENT: Normocephalic atraumatic head  Chest:  clear to auscultation bilaterally without respiratory distress  CVS: regular rate and rhythm  Abdominal: soft, nontender, positive bowel sounds  Extremities:      Right leg deformity  Skin: warm and dry without rash      Labs:  Results from last 7 days   Lab Units 05/27/24  0344 05/26/24  0605 05/26/24  0021   WBC 10*3/mm3 6.43 8.66 9.85   HEMOGLOBIN g/dL 7.4* 9.1* 8.1*   HEMATOCRIT % 24.0* 29.7* 26.1*   PLATELETS 10*3/mm3 258 346 294         Results from last 7 days   Lab Units 05/27/24  0344 05/26/24  0605 05/26/24  0021   SODIUM mmol/L 143 138 136   POTASSIUM mmol/L 4.1 4.2 4.1   CHLORIDE mmol/L 101 93* 93*   CO2 mmol/L 34.0* 32.0* 29.0   BUN mg/dL 90* 108* 112*   CREATININE mg/dL 2.96* 4.05* 4.59*   CALCIUM mg/dL 9.0 9.5 9.1   EGFR mL/min/1.73 23.7* 16.3* 14.0*   BILIRUBIN mg/dL 0.2 0.4 0.3   ALK PHOS U/L 123* 162* 149*   ALT (SGPT) U/L 14 18 17   AST (SGOT) U/L 11 12 16   GLUCOSE mg/dL 126* 289* 276*       Radiology:   Imaging Results (Last 72 Hours)       Procedure Component Value Units Date/Time    US Renal Bilateral [[id number]] Collected: 05/27/24 1259     Updated: 05/27/24 1303    Narrative:      US RENAL BILATERAL- 5/27/2024 11:11 AM     HISTORY: Acute kidney injury; N17.9-Acute kidney failure, unspecified;  S91.301S-Unspecified open wound, right foot, sequela     COMPARISON: None available.       TECHNIQUE: Multiple longitudinal and transverse real-time sonographic  images of the kidneys and urinary bladder are obtained. Report and  images stored per institutional and state regulations.           FINDINGS:     Visualized proximal abdominal aorta and IVC are unremarkable.        RIGHT KIDNEY: Right kidney is 11.5 cm in length. Renal cortex is  unremarkable. There is no hydronephrosis..     LEFT KIDNEY: Left kidney is 11.5 cm in length. Left renal cortex is  unremarkable. There is no left  "hydronephrosis.     PELVIS: Urinary bladder is grossly unremarkable.          Impression:      Unremarkable kidneys with no hydronephrosis..           This report was signed and finalized on 5/27/2024 1:00 PM by Kuldip Mcclendon.               Culture:  No results found for: \"BLOODCX\", \"URINECX\", \"WOUNDCX\", \"MRSACX\", \"RESPCX\", \"STOOLCX\"      Assessment   1.  Acute kidney injury improving.  2.  Intravascular volume depletion due to excessive diuresis.  3.  New onset of congestive heart failure/diastolic.  4.  Insulin-dependent type 2 diabetes.  5.  Severe iron deficiency anemia  6.  Right lower extremity deformity/recurrent infection    Plan:  1.  Patient has shown good improvement of renal function.  I would recommend to continue IV fluid.  Clinical exam did not suggest any volume overload or CHF.  2.  Renal ultrasound was reviewed  3.  Intravenous iron needed.      Martínez Shaw MD  5/27/2024  13:39 CDT      Electronically signed by Martínez Shaw MD at 05/27/24 1344       Camilla Rider APRN at 05/27/24 9438       Attestation signed by Moy Trujillo DO at 05/27/24 2610    I performed a substantive part of the MDM during the patient’s E/M visit. I personally evaluated   and examined the patient. I personally made or approved the documented management plan and acknowledge its risk   of complications.   (Independent Interpretation) My (EKG/X-Ray/US/CT) interpretation - reviewed  (Discussion) Management/test interpretation discussed with MEGHANN Clarke.     Patient seen and evaluated.  Family at bedside.  He appears stable nontoxic.  Overall feels a little better.  His right foot bandage does have a little bit of blood on the bottom but not saturated.  Started back on Lovenox today with history of A-fib.  Was on Eliquis as an outpatient.  Planned upcoming amputation by vascular.  Renal function overall is improving.  Likely will be better for discharge home and return later for surgery when appropriate.  Continue " antibiotics to cover as planned prior for OR/preop.      Electronically signed by Moy Trujillo DO, 5/27/2024, 19:47 CDT.                      [facility]  INPATIENT PROGRESS NOTE    Patient Name: Garrett Rodriguez  Date of Admission: 5/25/2024  Today's Date: 05/27/24  Length of Stay: 1  Primary Care Physician: Mehul Andersen MD    Subjective   Chief Complaint: none offered  HPI   He was lying in bed resting comfortably.  He tells me that he is feeling better.  No nausea, vomiting, abdominal pain, diarrhea.  He is tolerating diet.  He can tell his urine output is improving. Creatinine 2.96 (4.05).    Review of Systems   All pertinent negatives and positives are as above. All other systems have been reviewed and are negative unless otherwise stated.     Objective    Temp:  [97.5 °F (36.4 °C)-98.5 °F (36.9 °C)] 98.5 °F (36.9 °C)  Heart Rate:  [69-77] 71  Resp:  [16] 16  BP: (106-143)/(54-80) 130/68  Physical Exam  Vitals reviewed.   Constitutional:       General: He is not in acute distress.     Appearance: He is not toxic-appearing.      Comments: Lying in bed.  No acute distress.  On room air.  No family at bedside.   HENT:      Head: Normocephalic and atraumatic.      Mouth/Throat:      Mouth: Mucous membranes are moist.      Pharynx: Oropharynx is clear.   Eyes:      Extraocular Movements: Extraocular movements intact.      Conjunctiva/sclera: Conjunctivae normal.      Pupils: Pupils are equal, round, and reactive to light.   Cardiovascular:      Rate and Rhythm: Normal rate and regular rhythm.      Pulses: Normal pulses.   Pulmonary:      Effort: Pulmonary effort is normal. No respiratory distress.      Breath sounds: Normal breath sounds. No wheezing or rhonchi.   Abdominal:      General: Bowel sounds are normal. There is no distension.      Palpations: Abdomen is soft.      Tenderness: There is no abdominal tenderness.   Musculoskeletal:          General: No swelling or tenderness. Normal range of motion.      Cervical back: Normal range of motion and neck supple. No muscular tenderness.   Skin:     General: Skin is warm and dry.      Findings: No erythema or rash.   Neurological:      General: No focal deficit present.      Mental Status: He is alert and oriented to person, place, and time.      Cranial Nerves: No cranial nerve deficit.      Motor: No weakness.   Psychiatric:         Mood and Affect: Mood normal.         Behavior: Behavior normal.                 Results Review:  I have reviewed the labs, radiology results, and diagnostic studies.    Laboratory Data:   Results from last 7 days   Lab Units 05/27/24  0344 05/26/24  0605 05/26/24  0021   WBC 10*3/mm3 6.43 8.66 9.85   HEMOGLOBIN g/dL 7.4* 9.1* 8.1*   HEMATOCRIT % 24.0* 29.7* 26.1*   PLATELETS 10*3/mm3 258 346 294        Results from last 7 days   Lab Units 05/27/24  0344 05/26/24  0605 05/26/24  0021   SODIUM mmol/L 143 138 136   POTASSIUM mmol/L 4.1 4.2 4.1   CHLORIDE mmol/L 101 93* 93*   CO2 mmol/L 34.0* 32.0* 29.0   BUN mg/dL 90* 108* 112*   CREATININE mg/dL 2.96* 4.05* 4.59*   CALCIUM mg/dL 9.0 9.5 9.1   BILIRUBIN mg/dL 0.2 0.4 0.3   ALK PHOS U/L 123* 162* 149*   ALT (SGPT) U/L 14 18 17   AST (SGOT) U/L 11 12 16   GLUCOSE mg/dL 126* 289* 276*       Culture Data:   Microbiology Results (last 10 days)       ** No results found for the last 240 hours. **          Radiology Data:   Imaging Results (Last 24 Hours)       ** No results found for the last 24 hours. **            I have reviewed the patient's current medications.     Assessment/Plan   Assessment  Active Hospital Problems    Diagnosis     **TARSHA (acute kidney injury)     Bleeding from wound     Diarrhea of presumed infectious origin     Pulmonary HTN     PAF (paroxysmal atrial fibrillation) new onset     Type 2 diabetes mellitus with hyperglycemia, without long-term current use of insulin      Mr. Rodriguez is a 59-year-old male who  "presented to [facility] 5/[facility] for bleeding right lower extremity chronic wound. He is scheduled to have a right amputation below the knee with Dr. Hawk on Nasra 3, 2024. He states the wound started bleeding a lot, and he wanted to come and have it checked.  He had some nausea, vomiting and diarrhea prior to admission.  Of note, he was hospitalized in March for hypervolemia/anasarca.  Echocardiogram revealed pulmonary hypertension with no evidence of left heart failure and no diastolic dysfunction. He was sent home with high-dose loop diuretic, metolazone and ACE inhibitor.  He does take Eliquis for history of paroxysmal atrial fibrillation.  In the ED, creatinine 4.59.  He was started on IV fluid.    Treatment Plan  Acute kidney injury with creatinine 4.59.  Creatinine on 4/30 was 1.27.  Creatinine today improved with IV fluid administration at 2.96.  He is scheduled for renal ultrasound today.    He is followed by wound care clinic.  He was started on 10-day course of Omnicef and doxycycline on 5/17 for cellulitis to right foot.  X-ray on 5/17 showed \"interval removal of fusion hardware with placement of intramedullary sebastian at the tibia and cage in the region of the talus. There is  surrounding lucency in the region of the talus around the cage which was  present on the prior examination but increased. However lucency  surrounding the intramedullary sebastian is new from the prior examination.  This is worrisome for loosening/infection.\"  Continue doxycycline and ceftriaxone.  WBC normal.  No fever.    Hemoglobin A1c 7.0 in April 2024.  Metformin on hold.  Continue Accu-Cheks with sliding scale insulin.    He has a history of atrial fibrillation and is chronically anticoagulated on Eliquis.  Eliquis held on admission given significant bleeding to right lower extremity wound.  Start lovenox. Hemoglobin 8.1 on admission.  Hemoglobin 7.4 today.  Iron panel reviewed -gave IV iron.    Medical Decision " Making  Number and Complexity of problems: 1 acute problem  Differential Diagnosis: None considered at present    Conditions and Status        Condition is improving.     [facility] Data  External documents reviewed: Prior epic records  Cardiac tracing (EKG, telemetry) interpretation: none  Radiology interpretation: Interpreted by radiology  Labs reviewed: As above  Any tests that were considered but not ordered: None considered at present     Decision rules/scores evaluated (example [id number], Wells, etc): None considered at present     Discussed with: Patient and Dr. Trujillo     Care Planning  Shared decision making: Patient is agreeable to ongoing workup and treatment  Code status and discussions: Full code with full interventions    Disposition  Social Determinants of Health that impact treatment or disposition: home  I expect the patient to be discharged to home in 1-2 days.     Electronically signed by MEGHANN Mckeon, 05/27/24, 09:27 CDT.      Electronically signed by Moy Trujillo DO at 05/27/24 1947         5/27  Patient complaints of right leg pain this shift. Patient to have surgery next Monday to have a right leg amputation. Patient getting IV iron infusion at this time. Patient safety to be maintained this shift, continue to monitor and report abnormal to provider.

## 2024-05-28 NOTE — PLAN OF CARE
Goal Outcome Evaluation:  Plan of Care Reviewed With: patient, spouse        Progress: improving  Outcome Evaluation: Initial nutrition assessment secondary to wound care provider consult. Pt is admitted with TARSHA secondary to being on diuretics for new CHF ~ 1 month ago. Pt has hx of R ankle fx ~2 years ago that has required multiple surgeries and has had recurrent infections. Per he and his wife, he has been following a C.CHO, low Na+ diet for quite some time in order to improve his BS control to save his foot. He did get his BS under control and had intentional weight loss from improved nutrition intake. Unfortunately he is now scheduled for R BKA on 6/3. He has had some N/V/D prior to admission. He is ordered a C.CHO, healthy heart diet. He reports he is tolerating his diet well. He consumed 100% of breakfast this morning. He does take Vit C at home. Will start Vit C and zinc to aid with skin health. Pt is aware of ordered diet and this RD advised him of alternate selections as needed. Will follow.

## 2024-05-28 NOTE — CASE MANAGEMENT/SOCIAL WORK
Continued Stay Note  LEANNE Zamora     Patient Name: Garrett Rodriguez  MRN: [id number]  Today's Date: 5/28/2024    Admit Date: 5/25/2024    Plan: Home   Discharge Plan       Row Name 05/28/24 1153       Plan    Plan Home    Patient/Family in Agreement with Plan yes    Plan Comments Spoke with pt about dme. He is needing a w/c and bsc for home. Verified with him that it will need to be sent to workman's comp. Spoke with workmans comp rep Dotty [phone] and orders sent to her at [phone].                   Discharge Codes    No documentation.                       COLIN Huerta

## 2024-05-28 NOTE — PAYOR COMM NOTE
"Garrett Coates (59 y.o. Male)    [id number]      admit  5/25  [facility] phone    fax            Date of Birth   1965    Social Security Number       Address   [address] [place] [address]    Home Phone   [phone]    MRN   [id number]       Restorationist   Adventism    Marital Status                               Admission Date   5/25/24    Admission Type   Emergency    Admitting Provider   Moy Trujillo DO    Attending Provider   Moy Trujillo DO    Department, Room/Bed   [facility] 3C, 371/1       Discharge Date       Discharge Disposition       Discharge Destination                                 Attending Provider: Moy Trujillo DO    Allergies: No Known Allergies    Isolation: None   Infection: None   Code Status: CPR    Ht: 177.8 cm (70\")   Wt: 80.1 kg (176 lb 9.6 oz)    Admission Cmt: None   Principal Problem: TARSHA (acute kidney injury) [N17.9]                   Active Insurance as of 5/25/2024       Primary Coverage       Payor Plan Insurance Group Employer/Plan Group    [employer] ANTH PATHW[employer] HMO [id number]       Payor Plan Address Payor Plan Phone Number Payor Plan Fax Number Effective Dates    [address] [phone]  6/1/2022 - None Entered    [place] [address]         Subscriber Name Subscriber Birth Date Member ID       GARRETT COATES 1965 [id number]                     Emergency Contacts        (Rel.) Home Phone Work Phone Mobile Phone    Jennifer,Tammy (Spouse) -- -- [phone]                 History & Physical        Colin Patrick DO at 05/26/24 0131              [facility] Hospital Medicine Services  HISTORY AND PHYSICAL    Date of Admission: 5/25/2024  Primary Care Physician: Mehul Andersen MD    Subjective   Primary Historian: Patient     Chief Complaint: Wound bleeding    Wound Check    59-year-old male who " presents emergency department with right lower extremity wound bleeding.  Patient has a chronic wound on the right lower extremity.  He is scheduled to have a right lower extremity amputation with Dr. Hawk next week.  He states the wound started bleeding a lot, and he wanted to come and have it checked.  He takes Eliquis.  Hemoglobin appears to be at his baseline, but he was found to have an TARSHA.  The patient takes multiple diuretics as well as an ACE inhibitor.  The patient's 2-month creatinine review: 1.08, 1.69, 1.28 with a GFR between 31 and 41%.  Patient denies chest pain.  He denies shortness of breath.  He does not feel like he has been dehydrated, no nausea, vomiting, or diarrhea.  He states he has not noticed any change in his urine output.        Review of Systems   Skin:         Wound bleeding, right lower extremity      Otherwise complete ROS reviewed and negative except as mentioned in the HPI.    Past Medical History:   Past Medical History:   Diagnosis Date    Atrial fibrillation 3/11/2024    Chronic pain in right foot     Diabetes mellitus     Hyperlipidemia Feb 2024    Hypertension     PFO (patent foramen ovale)     RP at age 6     Past Surgical History:  Past Surgical History:   Procedure Laterality Date    ANKLE FUSION Right 07/11/2023    Procedure: ANKLE ARTHRODESIS;  Surgeon: Shahbaz Reddy DPM;  Location:  [facility] OR;  Service: Podiatry;  Laterality: Right;    BACK SURGERY      CARDIAC SURGERY      EXTERNAL FIXATION ANKLE FRACTURE Right 07/11/2023    Procedure: POSSIBLE EXTERNAL FIXATION, RIGHT ANKLE;  Surgeon: Shahbaz Reddy DPM;  Location:  [facility] OR;  Service: Podiatry;  Laterality: Right;    HARDWARE REMOVAL Right 07/11/2023    Procedure: REMOVAL OF HARDWARE, DEEP; ANKLE ARTHRODESIS; SUBTALAR ARTHRODESIS; POSSIBLE EXTERNAL FIXATION, RIGHT ANKLE;  Surgeon: Shahbaz Reddy DPM;  Location:  [facility] OR;  Service: Podiatry;  Laterality: Right;    PATENT FORAMEN OVALE CLOSURE       ROTATOR CUFF REPAIR Right     SUBTALAR ARTHRODESIS Right 07/11/2023    Procedure: SUBTALAR ARTHRODESIS;  Surgeon: Shahbaz Reddy DPM;  Location: [facility];  Service: Podiatry;  Laterality: Right;     Social History:  reports that he has never smoked. He has never used smokeless tobacco. He reports that he does not drink alcohol and does not use drugs.    Family History: family history includes Hypertension in his father and mother.       Allergies:  No Known Allergies    Medications:  Prior to Admission medications    Medication Sig Start Date End Date Taking? Authorizing Provider   apixaban (ELIQUIS) 5 MG tablet tablet Take 1 tablet by mouth 2 (Two) Times a Day. 3/15/24   Ba Cosme DO   cefdinir (OMNICEF) 300 MG capsule Take 1 capsule by mouth 2 (Two) Times a Day. 5/17/24   Lula Irizarry MD   doxycycline (VIBRAMYCIN) 100 MG capsule Take 1 capsule by mouth Every 12 (Twelve) Hours. 5/17/24   Lula Irizarry MD   furosemide (LASIX) 40 MG tablet Take 1 tablet by mouth Daily. Take an extra dose for any 2-3 pound weight gain  Patient taking differently: Take 1 tablet by mouth 2 (Two) Times a Day. Take an extra dose for any 2-3 pound weight gain 3/16/24   Ba Cosme DO   HYDROcodone-acetaminophen (NORCO)  MG per tablet Take 1 tablet by mouth Every 8 (Eight) Hours As Needed for Moderate Pain.    ProviderLula MD   lisinopril (PRINIVIL,ZESTRIL) 20 MG tablet Take 1 tablet by mouth Daily. 3/16/24   Ba Cosme DO   metFORMIN (GLUCOPHAGE) 1000 MG tablet Take 1 tablet by mouth 2 (Two) Times a Day With Meals.    Lula Irizarry MD   metOLazone (ZAROXOLYN) 2.5 MG tablet Take 1 tablet by mouth Daily. 4/1/24   Rigo Kathleen MD   naloxone (NARCAN) 4 MG/0.1ML nasal spray Call 911. Don't prime. [place] in 1 nostril for overdose. Repeat in 2-3 minutes in other nostril if no or minimal breathing/responsiveness. 7/11/23   Shahbaz Reddy DPM   pregabalin (LYRICA) 100 MG  "capsule Take 1 capsule by mouth 3 (Three) Times a Day.    Provider, MD ALINA Cotton have utilized all available immediate resources to obtain, update, or review the patient's current medications (including all prescriptions, over-the-counter products, herbals, cannabis/cannabidiol products, and vitamin/mineral/dietary (nutritional) supplements).    Objective     Vital Signs: /62 (BP Location: Right arm, Patient Position: Sitting)   Pulse 83   Temp 98.2 °F (36.8 °C) (Oral)   Resp 20   Ht 177.8 cm (70\")   Wt 74.8 kg (165 lb)   SpO2 93%   BMI 23.68 kg/m²   Physical Exam  Vitals reviewed.   Constitutional:       Appearance: Normal appearance. He is not ill-appearing.   HENT:      Head: Normocephalic and atraumatic.      Right Ear: External ear normal.      Left Ear: External ear normal.      Nose: Nose normal.   Eyes:      General: No scleral icterus.     Conjunctiva/sclera: Conjunctivae normal.   Cardiovascular:      Rate and Rhythm: Normal rate and regular rhythm.      Heart sounds: Normal heart sounds.   Pulmonary:      Effort: Pulmonary effort is normal.      Breath sounds: Normal breath sounds.   Abdominal:      General: There is no distension.      Palpations: Abdomen is soft.   Musculoskeletal:         General: No tenderness.      Cervical back: Normal range of motion and neck supple.      Comments: RLE bandaged   Skin:     General: Skin is warm and dry.   Neurological:      Mental Status: He is alert and oriented to person, place, and time.      Cranial Nerves: No cranial nerve deficit.   Psychiatric:         Mood and Affect: Mood normal.         Behavior: Behavior normal.          Results Reviewed:  Lab Results (last 24 hours)       Procedure Component Value Units Date/Time    Comprehensive Metabolic Panel [[id number]]  (Abnormal) Collected: 05/26/24 0021    Specimen: Blood Updated: 05/26/24 0046     Glucose 276 mg/dL       mg/dL      Creatinine 4.59 mg/dL      Sodium 136 mmol/L      " Potassium 4.1 mmol/L      Chloride 93 mmol/L      CO2 29.0 mmol/L      Calcium 9.1 mg/dL      Total Protein 6.9 g/dL      Albumin 2.9 g/dL      ALT (SGPT) 17 U/L      AST (SGOT) 16 U/L      Alkaline Phosphatase 149 U/L      Total Bilirubin 0.3 mg/dL      Globulin 4.0 gm/dL      A/G Ratio 0.7 g/dL      BUN/Creatinine Ratio 24.4     Anion Gap 14.0 mmol/L      eGFR 14.0 mL/min/1.73      Comment: <15 Indicative of kidney failure       Narrative:      GFR Normal >60  Chronic Kidney Disease <60  Kidney Failure <15      aPTT [[id number]]  (Abnormal) Collected: 05/26/24 0021    Specimen: Blood Updated: 05/26/24 0038     PTT 38.3 seconds     Protime-INR [[id number]]  (Abnormal) Collected: 05/26/24 0021    Specimen: Blood Updated: 05/26/24 0038     Protime 16.6 Seconds      INR 1.29    CBC & Differential [[id number]]  (Abnormal) Collected: 05/26/24 0021    Specimen: Blood Updated: 05/26/24 0033    Narrative:      The following orders were created for panel order CBC & Differential.  Procedure                               Abnormality         Status                     ---------                               -----------         ------                     CBC Auto Differential[[id number]]        Abnormal            Final result                 Please view results for these tests on the individual orders.    CBC Auto Differential [[id number]]  (Abnormal) Collected: 05/26/24 0021    Specimen: Blood Updated: 05/26/24 0033     WBC 9.85 10*3/mm3      RBC 3.04 10*6/mm3      Hemoglobin 8.1 g/dL      Hematocrit 26.1 %      MCV 85.9 fL      MCH 26.6 pg      MCHC 31.0 g/dL      RDW 14.9 %      RDW-SD 46.9 fl      MPV 9.8 fL      Platelets 294 10*3/mm3      Neutrophil % 67.1 %      Lymphocyte % 19.0 %      Monocyte % 9.1 %      Eosinophil % 1.9 %      Basophil % 0.5 %      Immature Grans % 2.4 %      Neutrophils, Absolute 6.60 10*3/mm3      Lymphocytes, Absolute 1.87 10*3/mm3      Monocytes, Absolute 0.90 10*3/mm3      Eosinophils, Absolute  0.19 10*3/mm3      Basophils, Absolute 0.05 10*3/mm3      Immature Grans, Absolute 0.24 10*3/mm3           Imaging Results (Last 24 Hours)       ** No results found for the last 24 hours. **          I have personally reviewed and interpreted the radiology studies and ECG obtained at time of admission.     Assessment / Plan   Assessment:   Active Hospital Problems    Diagnosis     **TARSHA (acute kidney injury)      Impression:  Acute kidney injury  Hypertension  Non-insulin-dependent diabetes  Right lower extremity wound    Treatment Plan  Admit to observation  As needed wound care to the right lower extremity  Hold diuretics and ACE inhibitor secondary to kidney injury  Sliding-scale insulin with Accu-Cheks, hold Glucophage  Gentle IV hydration  Follow-up labs in the morning  Hold Eliquis secondary to increased bleeding and follow-up hemoglobin in the morning    The patient will be admitted to my service here at [facility].  Primary team to take over in the morning    Medical Decision Making  Number and Complexity of problems: 4, moderate  Differential Diagnosis: BPH    Conditions and Status        Condition is unchanged.     MDM Data  External documents reviewed: None  Cardiac tracing (EKG, telemetry) interpretation: None  Radiology interpretation: None  Labs reviewed: Reviewed  Any tests that were considered but not ordered: None     Decision rules/scores evaluated (example [id number], Wells, etc): Chronically anticoagulated     Discussed with: Patient and family at bedside     Care Planning  Shared decision making: Patient, family, and ED staff  Code status and discussions: Full    Disposition  Social Determinants of Health that impact treatment or disposition: None  Estimated length of stay is overnight.     I confirmed that the patient's advanced care plan is present, code status is documented, and a surrogate decision maker is listed in the patient's medical record.     The patient's surrogate  decision maker is family.     The patient was seen and examined by me on 5/26/2024 at 1.    Electronically signed by Colin Patrick DO, 05/26/24, 01:31 CDT.                Electronically signed by Colin Patrick DO at 05/26/24 0135          Emergency Department Notes        Patrizia Dill RN at 05/26/24 0034          Cleaned pts foot and rewrapped it with nonadherent guaze, ABD pads, and kerlex with medipore tape. Extra supplies given to the family.      Electronically signed by Patrizia Dill RN at 05/26/24 0036       Roderick Todd MD at 05/25/24 2343          EMERGENCY DEPARTMENT ATTENDING NOTE    Patient Name: Garrett Rodriguez    Chief Complaint   Patient presents with    Wound Check       PATIENT PRESENTATION:  Garrett Rodriguez is a 59 y.o. male with PMH significant for diabetes, A-fib on Eliquis, hypertension, hyperlipidemia, chronic right lower extremity scheduled for amputation below the knee on 3 Nasra who presents to the ED worsening bleeding from necrotic ulcer from the bottom of his right foot.  He is scheduled for his knee amputation due to the lesions.  He has prior hardware in his foot and with walking with his significant neuropathy suspect that his hardware has been pushing through the bottom of his foot.  Family reports that he started bleeding around 7 PM tonight after walking to the bathroom in the house.  Patient has a scooter but does not use crutches or any other additional assistance for moving.  He normally wears boots.  The bleeding started from the ulcer and has worsened in the were using pads that the patient was bleeding through.  Has not held his anticoagulation for surgery.  Discussed with his cardiologist and the cardiologist deferred to vascular surgery based off their scheduling.  Patient is on cefdinir and doxycycline for his lower leg wounds.  He has regular wound cares.      PHYSICAL EXAM:   VS: /62 (BP Location: Right arm, Patient Position: Sitting)   " Pulse 83   Temp 98.2 °F (36.8 °C) (Oral)   Resp 20   Ht 177.8 cm (70\")   Wt 74.8 kg (165 lb)   SpO2 93%   BMI 23.68 kg/m²   GENERAL: well-nourished, well-developed, awake, alert, no acute distress, nontoxic appearing, comfortable  EYES: PERRL, sclerae anicteric, extraocular movements grossly intact, symmetric lids  EARS, NOSE, MOUTH, THROAT: atraumatic external nose and ears, moist mucous membranes  NECK: symmetric, trachea midline  RESPIRATORY: unlabored respiratory effort, clear to auscultation bilaterally, good air movement  CARDIOVASCULAR: no murmurs, 2+ dorsalis pedis and posterior tibial pulses  GI: soft, nontender, nondistended  MUSCULOSKELETAL/EXTREMITIES: Necrotic 5 cm radius lesion on the plantar surface of the right leg with venous oozing when bandage was removed, swelling of the right lower mid leg down with skin discoloration and necrotic wounds on the plantar and medial malleolus surface of the right leg  SKIN: warm and dry with no obvious rashes  NEUROLOGIC: moving all 4 extremities symmetrically, CN II-XII grossly intact  PSYCHIATRIC: alert, pleasant and cooperative. Appropriate mood and affect.      MEDICAL DECISION MAKING:    Garrett Rodriguez is a 59 y.o. male who presented to the ED bleeding from his necrotic wound    Procedures    Differential Diagnosis Considered: Arterial bleed, anemia, coagulopathy    Labs Ordered:  Labs Reviewed   COMPREHENSIVE METABOLIC PANEL - Abnormal; Notable for the following components:       Result Value    Glucose 276 (*)      (*)     Creatinine 4.59 (*)     Chloride 93 (*)     Albumin 2.9 (*)     Alkaline Phosphatase 149 (*)     eGFR 14.0 (*)     All other components within normal limits    Narrative:     GFR Normal >60  Chronic Kidney Disease <60  Kidney Failure <15     APTT - Abnormal; Notable for the following components:    PTT 38.3 (*)     All other components within normal limits   PROTIME-INR - Abnormal; Notable for the following " components:    Protime 16.6 (*)     INR 1.29 (*)     All other components within normal limits   CBC WITH AUTO DIFFERENTIAL - Abnormal; Notable for the following components:    RBC 3.04 (*)     Hemoglobin 8.1 (*)     Hematocrit 26.1 (*)     MCHC 31.0 (*)     Lymphocyte % 19.0 (*)     Immature Grans % 2.4 (*)     Immature Grans, Absolute 0.24 (*)     All other components within normal limits   CBC AND DIFFERENTIAL    Narrative:     The following orders were created for panel order CBC & Differential.  Procedure                               Abnormality         Status                     ---------                               -----------         ------                     CBC Auto Differential[[id number]]        Abnormal            Final result                 Please view results for these tests on the individual orders.        Imaging Ordered:   No orders to display       Internal chart review:   Past Medical History:   Diagnosis Date    Atrial fibrillation 3/11/2024    Chronic pain in right foot     Diabetes mellitus     Hyperlipidemia Feb 2024    Hypertension     PFO (patent foramen ovale)     RP at age 6       Past Surgical History:   Procedure Laterality Date    ANKLE FUSION Right 07/11/2023    Procedure: ANKLE ARTHRODESIS;  Surgeon: Shahbaz Reddy DPM;  Location:  [facility] OR;  Service: Podiatry;  Laterality: Right;    BACK SURGERY      CARDIAC SURGERY      EXTERNAL FIXATION ANKLE FRACTURE Right 07/11/2023    Procedure: POSSIBLE EXTERNAL FIXATION, RIGHT ANKLE;  Surgeon: Shahbaz Reddy DPM;  [facility]: [facility] OR;  Service: Podiatry;  Laterality: Right;    HARDWARE REMOVAL Right 07/11/2023    Procedure: REMOVAL OF HARDWARE, DEEP; ANKLE ARTHRODESIS; SUBTALAR ARTHRODESIS; POSSIBLE EXTERNAL FIXATION, RIGHT ANKLE;  Surgeon: Shahbaz Reddy DPM;  Location:  [facility] OR;  Service: Podiatry;  Laterality: Right;    PATENT FORAMEN OVALE CLOSURE      ROTATOR CUFF REPAIR Right     SUBTALAR ARTHRODESIS Right 07/11/2023     Procedure: SUBTALAR ARTHRODESIS;  Surgeon: Shahbaz Reddy DPM;  Location: [facility];  Service: Podiatry;  Laterality: Right;       No Known Allergies      Current Facility-Administered Medications:     [COMPLETED] Insert Peripheral IV, , , Once **AND** sodium chloride 0.9 % flush 10 mL, 10 mL, Intravenous, PRNPerez Joseph, MD    Current Outpatient Medications:     apixaban (ELIQUIS) 5 MG tablet tablet, Take 1 tablet by mouth 2 (Two) Times a Day., Disp: 60 tablet, Rfl: 2    cefdinir (OMNICEF) 300 MG capsule, Take 1 capsule by mouth 2 (Two) Times a Day., Disp: , Rfl:     doxycycline (VIBRAMYCIN) 100 MG capsule, Take 1 capsule by mouth Every 12 (Twelve) Hours., Disp: , Rfl:     furosemide (LASIX) 40 MG tablet, Take 1 tablet by mouth Daily. Take an extra dose for any 2-3 pound weight gain (Patient taking differently: Take 1 tablet by mouth 2 (Two) Times a Day. Take an extra dose for any 2-3 pound weight gain), Disp: 50 tablet, Rfl: 2    HYDROcodone-acetaminophen (NORCO)  MG per tablet, Take 1 tablet by mouth Every 8 (Eight) Hours As Needed for Moderate Pain., Disp: , Rfl:     lisinopril (PRINIVIL,ZESTRIL) 20 MG tablet, Take 1 tablet by mouth Daily., Disp: 30 tablet, Rfl: 2    metFORMIN (GLUCOPHAGE) 1000 MG tablet, Take 1 tablet by mouth 2 (Two) Times a Day With Meals., Disp: , Rfl:     metOLazone (ZAROXOLYN) 2.5 MG tablet, Take 1 tablet by mouth Daily., Disp: 30 tablet, Rfl: 11    naloxone (NARCAN) 4 MG/0.1ML nasal spray, Call 911. Don't prime. [place] in 1 nostril for overdose. Repeat in 2-3 minutes in other nostril if no or minimal breathing/responsiveness., Disp: 2 each, Rfl: 0    pregabalin (LYRICA) 100 MG capsule, Take 1 capsule by mouth 3 (Three) Times a Day., Disp: , Rfl:     External documents reviewed: Reviewed chart with Dr. Hawk having a scheduled surgery for 03 June for right amputation below the knee for nonhealing surgical wound.    My lab interpretation: Hemoglobin at baseline of 8.1, patient  with an TARSHA with GFR 14 and creatinine over 4    Discussed with: Patient and wife, discussed with Dr. Chase and patient be admitted for observation    ED Course and Re-evaluation: Patient had wound recovered and packed by nursing without increased bleeding.  No significant coagulopathy on lab work.  Stable chronic anemia.  Patient did have a significant TARSHA and meets observation criteria.  Due to platelet dysfunction with renal failure patient to be admitted for observation due to this wound and bleeding.  Patient has been getting aggressively diuresed with Lasix due to heart failure and suspect this is as the cause for his severe TARSHA.      ED Diagnosis:  (N17.9) TARSHA (acute kidney injury)    (S91.301S) Open wound of foot, right, sequela     Disposition: Admit for observation        Signed:  Roderick Todd MD  Emergency Medicine Physician    Please note that portions of this note were completed with a voice recognition program.      Roderick Todd MD  05/26/24 0118      Electronically signed by Roderick Todd MD at 05/26/24 0118       Current Facility-Administered Medications   Medication Dose Route Frequency Provider Last Rate Last Admin    acetaminophen (TYLENOL) tablet 650 mg  650 mg Oral Q4H PRN Colin Patrick DO        sennosides-docusate (PERICOLACE) 8.6-50 MG per tablet 2 tablet  2 tablet Oral BID PRN Colin Patrick DO        And    polyethylene glycol (MIRALAX) packet 17 g  17 g Oral Daily PRN Colin Patrick DO        And    bisacodyl (DULCOLAX) EC tablet 5 mg  5 mg Oral Daily PRN Colin Patrick DO        And    bisacodyl (DULCOLAX) suppository 10 mg  10 mg Rectal Daily PRN Colin Patrick DO        cefTRIAXone (ROCEPHIN) 1,000 mg in sodium chloride 0.9 % 100 mL MBP  1,000 mg Intravenous Q24H RiderCamilla hunter, APRN 200 mL/hr at 05/27/24 1502 1,000 mg at 05/27/24 1502    dextrose (D50W) (25 g/50 mL) IV injection 25 g  25 g Intravenous Q15 Min PRN Colin Patrick DO        dextrose  (GLUTOSE) oral gel 15 g  15 g Oral Q15 Min PRN Colin Patrick DO        doxycycline (ADOXA) tablet 100 mg  100 mg Oral Q12H Colin Patrick DO   100 mg at 05/27/24 2114    Enoxaparin Sodium (LOVENOX) syringe 80 mg  1 mg/kg Subcutaneous Q12H Rider Camilla, APRN   80 mg at 05/28/24 0427    glucagon (GLUCAGEN) injection 1 mg  1 mg Intramuscular Q15 Min PRN Colin Patrick DO        HYDROcodone-acetaminophen (NORCO)  MG per tablet 1 tablet  1 tablet Oral Q4H PRN Ba Cosme DO   1 tablet at 05/28/24 0432    HYDROcodone-acetaminophen (NORCO) 7.5-325 MG per tablet 1 tablet  1 tablet Oral Q4H PRN Ba Cosme DO        Insulin Lispro (humaLOG) injection 2-7 Units  2-7 Units Subcutaneous 4x Daily AC & at Bedtime Colin Patrick DO   6 Units at 05/27/24 2116    ondansetron (ZOFRAN) injection 4 mg  4 mg Intravenous Q6H PRN Colin Patrick DO        pregabalin (LYRICA) capsule 100 mg  100 mg Oral Q12H Colin Patrick DO   100 mg at 05/27/24 2114    sodium chloride 0.9 % flush 10 mL  10 mL Intravenous PRN Roderick Todd MD        sodium chloride 0.9 % flush 10 mL  10 mL Intravenous Q12H Colin Patrick DO   10 mL at 05/27/24 2117    sodium chloride 0.9 % flush 10 mL  10 mL Intravenous PRN Colin Patrick DO        sodium chloride 0.9 % infusion 40 mL  40 mL Intravenous PRN Colin Patrick DO        sodium chloride 0.9 % infusion  100 mL/hr Intravenous Continuous Ba Cosme  mL/hr at 05/28/24 0651 100 mL/hr at 05/28/24 0651        Physician Progress Notes (last 48 hours)        Martínez Shaw MD at 05/27/24 1339          Nephrology ([facility]) Progress Note      Patient:  Garrett Rodriguez  YOB: 1965  Date of Service: 5/27/2024  MRN: [id number]   Acct: [id number]   Primary Care Physician: Mehul Andersen MD  Advance Directive:   Code Status and Medical Interventions:   Ordered at: 05/26/24 0131     Level Of  Support Discussed With:    Patient     Code Status (Patient has no pulse and is not breathing):    CPR (Attempt to Resuscitate)     Medical Interventions (Patient has pulse or is breathing):    Full Support     Admit Date: 5/25/2024       Hospital Day: 1  Referring Provider: Cloin Patrick,       Patient personally seen and examined.  Complete chart including Consults, Notes, Operative Reports, Labs, Cardiology, and Radiology studies reviewed as able.    Chief complaint: Abnormal labs.    Subjective:    Garrett Rodriguez is a 59 y.o. male  whom we were consulted for acute kidney injury.  His baseline creatinine usually 1.0 mg.  Patient has been fighting with right lower extremity wound which all started after an accident leading to fracture of right leg/ankle about 2 years ago. Patient has open reduction internal fixation of his leg.  He has recurrent cellulitis/infection, required multiple rounds of IV antibiotics and debridements.  Finally he is scheduled to have right below the knee amputation in early part of June.  He presented to the emergency room with bleeding from his right leg wound.  Routine lab data in ER indicated his serum creatinine now 4.0 mg.  About a month ago he was hospitalized with new onset of congestive heart failure.  He was sent home with high-dose loop diuretics and metolazone and ACE inhibitor was initiated as well.  Patient has known routine labs within the last several weeks.  Now his creatinine is 4.0 mg.  He is now admitted for treatment of acute kidney injury likely caused by excessive diuresis.  He denies any shortness of breath or swelling of legs.  His blood pressure has been relatively low as well.  Patient denies any nausea vomiting or diarrhea.  He denies any use of nonsteroidal agents.  Hospital course remarkable for discontinuation of diuretics and IV fluid was initiated.    Patient has been responding to IV fluid and renal function is slowly improving.  This  afternoon he is feeling much better.      Allergies:  Patient has no known allergies.    Home Meds:  Medications Prior to Admission   Medication Sig Dispense Refill Last Dose    apixaban (ELIQUIS) 5 MG tablet tablet Take 1 tablet by mouth 2 (Two) Times a Day. 60 tablet 2 5/25/2024    cefdinir (OMNICEF) 300 MG capsule Take 1 capsule by mouth 2 (Two) Times a Day.   5/25/2024    doxycycline (VIBRAMYCIN) 100 MG capsule Take 1 capsule by mouth Every 12 (Twelve) Hours.   5/25/2024    furosemide (LASIX) 40 MG tablet Take 1 tablet by mouth 2 (Two) Times a Day.       HYDROcodone-acetaminophen (NORCO)  MG per tablet Take 1 tablet by mouth Every 8 (Eight) Hours As Needed for Severe Pain.   5/25/2024    lisinopril (PRINIVIL,ZESTRIL) 20 MG tablet Take 1 tablet by mouth Daily. 30 tablet 2 5/25/2024    metFORMIN (GLUCOPHAGE) 1000 MG tablet Take 1 tablet by mouth 2 (Two) Times a Day With Meals.   5/25/2024    metOLazone (ZAROXOLYN) 2.5 MG tablet Take 1 tablet by mouth Daily. 30 tablet 11 5/25/2024    pregabalin (LYRICA) 100 MG capsule Take 1 capsule by mouth 3 (Three) Times a Day.       naloxone (NARCAN) 4 MG/0.1ML nasal spray Call 911. Don't prime. [place] in 1 nostril for overdose. Repeat in 2-3 minutes in other nostril if no or minimal breathing/responsiveness. 2 each 0        Medicines:  Current Facility-Administered Medications   Medication Dose Route Frequency Provider Last Rate Last Admin    acetaminophen (TYLENOL) tablet 650 mg  650 mg Oral Q4H PRN Colin Patrick DO        sennosides-docusate (PERICOLACE) 8.6-50 MG per tablet 2 tablet  2 tablet Oral BID PRN Colin Patrick DO        And    polyethylene glycol (MIRALAX) packet 17 g  17 g Oral Daily PRN Colin Patrick DO        And    bisacodyl (DULCOLAX) EC tablet 5 mg  5 mg Oral Daily PRN Colin Patrick DO        And    bisacodyl (DULCOLAX) suppository 10 mg  10 mg Rectal Daily PRN Colin Patrick,         cefTRIAXone (ROCEPHIN) 1,000 mg in  sodium chloride 0.9 % 100 mL MBP  1,000 mg Intravenous Q24H RiderCamilla, APROLGA        dextrose (D50W) (25 g/50 mL) IV injection 25 g  25 g Intravenous Q15 Min PRN Colin Patrick DO        dextrose (GLUTOSE) oral gel 15 g  15 g Oral Q15 Min PRN Colin Patrick DO        doxycycline (ADOXA) tablet 100 mg  100 mg Oral Q12H Colin Patrick DO   100 mg at 05/27/24 0927    Enoxaparin Sodium (LOVENOX) syringe 80 mg  1 mg/kg Subcutaneous Once Camilla Rider APRN        [START ON 5/28/2024] Enoxaparin Sodium (LOVENOX) syringe 80 mg  1 mg/kg Subcutaneous Q12H RiderCamilla, APROLGA        ferric gluconate (FERRLECIT) 250 mg in sodium chloride 0.9 % 250 mL IVPB  250 mg Intravenous Once Camilla Rider, APROLGA        glucagon (GLUCAGEN) injection 1 mg  1 mg Intramuscular Q15 Min PRN Colin Patrick DO        HYDROcodone-acetaminophen (NORCO)  MG per tablet 1 tablet  1 tablet Oral Q4H PRN Ba Cosme DO   1 tablet at 05/27/24 0515    HYDROcodone-acetaminophen (NORCO) 7.5-325 MG per tablet 1 tablet  1 tablet Oral Q4H PRN Ba Cosme DO        Insulin Lispro (humaLOG) injection 2-7 Units  2-7 Units Subcutaneous 4x Daily AC & at Bedtime Colin Patrick DO   2 Units at 05/26/24 2116    ondansetron (ZOFRAN) injection 4 mg  4 mg Intravenous Q6H PRN Colin Patrick DO        pregabalin (LYRICA) capsule 100 mg  100 mg Oral Q12H Colin Patrick DO   100 mg at 05/27/24 0927    sodium chloride 0.9 % flush 10 mL  10 mL Intravenous PRN Roderick Todd MD        sodium chloride 0.9 % flush 10 mL  10 mL Intravenous Q12H Colin Patrick DO   10 mL at 05/26/24 2110    sodium chloride 0.9 % flush 10 mL  10 mL Intravenous PRN Colin Patrick DO        sodium chloride 0.9 % infusion 40 mL  40 mL Intravenous AALIYAHN Colin Patrick DO        sodium chloride 0.9 % infusion  100 mL/hr Intravenous Continuous Ba Cosme  mL/hr at 05/26/24 1316 100 mL/hr at 05/26/24 1316       Past Medical  History:  Past Medical History:   Diagnosis Date    Atrial fibrillation 3/11/2024    Chronic pain in right foot     Diabetes mellitus     Hyperlipidemia Feb 2024    Hypertension     PFO (patent foramen ovale)     RP at age 6       Past Surgical History:  Past Surgical History:   Procedure Laterality Date    ANKLE FUSION Right 07/11/2023    Procedure: ANKLE ARTHRODESIS;  Surgeon: Shahbaz Reddy DPM;  Location:  [facility];  Service: Podiatry;  Laterality: Right;    BACK SURGERY      CARDIAC SURGERY      EXTERNAL FIXATION ANKLE FRACTURE Right 07/11/2023    Procedure: POSSIBLE EXTERNAL FIXATION, RIGHT ANKLE;  Surgeon: Shahbaz Reddy DPM;  Location:  [facility];  Service: Podiatry;  Laterality: Right;    HARDWARE REMOVAL Right 07/11/2023    Procedure: REMOVAL OF HARDWARE, DEEP; ANKLE ARTHRODESIS; SUBTALAR ARTHRODESIS; POSSIBLE EXTERNAL FIXATION, RIGHT ANKLE;  Surgeon: Shahbaz Reddy DPM;  Location:  [facility];  Service: Podiatry;  Laterality: Right;    PATENT FORAMEN OVALE CLOSURE      ROTATOR CUFF REPAIR Right     SUBTALAR ARTHRODESIS Right 07/11/2023    Procedure: SUBTALAR ARTHRODESIS;  Surgeon: Shahbaz Reddy DPM;  Location:  [facility];  Service: Podiatry;  Laterality: Right;       Family History  Family History   Problem Relation Age of Onset    Hypertension Mother     Hypertension Father        Social History  Social History     Socioeconomic History    Marital status:    Tobacco Use    Smoking status: Never    Smokeless tobacco: Never   Vaping Use    Vaping status: Never Used   Substance and Sexual Activity    Alcohol use: Never    Drug use: Never    Sexual activity: Not Currently     Partners: Female       Review of Systems:  History obtained from chart review and the patient  General ROS: No fever or chills  Respiratory ROS: No cough, shortness of breath, wheezing  Cardiovascular ROS: No chest pain or palpitations  Gastrointestinal ROS: No abdominal pain or melena  Genito-Urinary ROS: No dysuria  or hematuria  Psych ROS: No anxiety and depression  14 point ROS reviewed with the patient and negative except as noted above and in the HPI unless unable to obtain.    Objective:  Patient Vitals for the past 24 hrs:   BP Temp Temp src Pulse Resp SpO2 Weight   05/27/24 0752 130/68 98.5 °F (36.9 °C) -- 71 16 95 % --   05/27/24 0352 106/58 98 °F (36.7 °C) Oral 74 16 92 % 80.1 kg (176 lb 9.6 oz)   05/26/24 2341 108/54 98 °F (36.7 °C) Oral 69 16 92 % --   05/26/24 1948 115/65 97.9 °F (36.6 °C) Oral 73 16 94 % --   05/26/24 1520 143/80 97.6 °F (36.4 °C) Oral 73 16 96 % --       Intake/Output Summary (Last 24 hours) at 5/27/2024 1339  Last data filed at 5/26/2024 1600  Gross per 24 hour   Intake --   Output 200 ml   Net -200 ml     General: awake/alert   HEENT: Normocephalic atraumatic head  Chest:  clear to auscultation bilaterally without respiratory distress  CVS: regular rate and rhythm  Abdominal: soft, nontender, positive bowel sounds  Extremities:      Right leg deformity  Skin: warm and dry without rash      Labs:  Results from last 7 days   Lab Units 05/27/24  0344 05/26/24  0605 05/26/24  0021   WBC 10*3/mm3 6.43 8.66 9.85   HEMOGLOBIN g/dL 7.4* 9.1* 8.1*   HEMATOCRIT % 24.0* 29.7* 26.1*   PLATELETS 10*3/mm3 258 346 294         Results from last 7 days   Lab Units 05/27/24  0344 05/26/24  0605 05/26/24  0021   SODIUM mmol/L 143 138 136   POTASSIUM mmol/L 4.1 4.2 4.1   CHLORIDE mmol/L 101 93* 93*   CO2 mmol/L 34.0* 32.0* 29.0   BUN mg/dL 90* 108* 112*   CREATININE mg/dL 2.96* 4.05* 4.59*   CALCIUM mg/dL 9.0 9.5 9.1   EGFR mL/min/1.73 23.7* 16.3* 14.0*   BILIRUBIN mg/dL 0.2 0.4 0.3   ALK PHOS U/L 123* 162* 149*   ALT (SGPT) U/L 14 18 17   AST (SGOT) U/L 11 12 16   GLUCOSE mg/dL 126* 289* 276*       Radiology:   Imaging Results (Last 72 Hours)       Procedure Component Value Units Date/Time    US Renal Bilateral [[id number]] Collected: 05/27/24 1259     Updated: 05/27/24 1303    Narrative:      US RENAL BILATERAL-  "5/27/2024 11:11 AM     HISTORY: Acute kidney injury; N17.9-Acute kidney failure, unspecified;  S91.301S-Unspecified open wound, right foot, sequela     COMPARISON: None available.       TECHNIQUE: Multiple longitudinal and transverse real-time sonographic  images of the kidneys and urinary bladder are obtained. Report and  images stored per institutional and state regulations.           FINDINGS:     Visualized proximal abdominal aorta and IVC are unremarkable.        RIGHT KIDNEY: Right kidney is 11.5 cm in length. Renal cortex is  unremarkable. There is no hydronephrosis..     LEFT KIDNEY: Left kidney is 11.5 cm in length. Left renal cortex is  unremarkable. There is no left hydronephrosis.     PELVIS: Urinary bladder is grossly unremarkable.          Impression:      Unremarkable kidneys with no hydronephrosis..           This report was signed and finalized on 5/27/2024 1:00 PM by Kuldip Mcclendon.               Culture:  No results found for: \"BLOODCX\", \"URINECX\", \"WOUNDCX\", \"MRSACX\", \"RESPCX\", \"STOOLCX\"      Assessment   1.  Acute kidney injury improving.  2.  Intravascular volume depletion due to excessive diuresis.  3.  New onset of congestive heart failure/diastolic.  4.  Insulin-dependent type 2 diabetes.  5.  Severe iron deficiency anemia  6.  Right lower extremity deformity/recurrent infection    Plan:  1.  Patient has shown good improvement of renal function.  I would recommend to continue IV fluid.  Clinical exam did not suggest any volume overload or CHF.  2.  Renal ultrasound was reviewed  3.  Intravenous iron needed.      Martínez Shaw MD  5/27/2024  13:39 CDT      Electronically signed by Martínez Shaw MD at 05/27/24 1344       Camilla Rider APRN at 05/27/24 0273       Attestation signed by Moy Trujillo DO at 05/27/24 7174    I performed a substantive part of the MDM during the patient’s E/M visit. I personally evaluated   and examined the patient. I personally made or approved the documented " management plan and acknowledge its risk   of complications.   (Independent Interpretation) My (EKG/X-Ray/US/CT) interpretation - reviewed  (Discussion) Management/test interpretation discussed with MEGHANN Clarke.     Patient seen and evaluated.  Family at bedside.  He appears stable nontoxic.  Overall feels a little better.  His right foot bandage does have a little bit of blood on the bottom but not saturated.  Started back on Lovenox today with history of A-fib.  Was on Eliquis as an outpatient.  Planned upcoming amputation by vascular.  Renal function overall is improving.  Likely will be better for discharge home and return later for surgery when appropriate.  Continue antibiotics to cover as planned prior for OR/preop.      Electronically signed by Moy Trujillo DO, 5/27/2024, 19:47 CDT.                      [facility] Medicine Services  INPATIENT PROGRESS NOTE    Patient Name: Garrett Rodriguez  Date of Admission: 5/25/2024  Today's Date: 05/27/24  Length of Stay: 1  Primary Care Physician: Mehul Andersen MD    Subjective   Chief Complaint: none offered  HPI   He was lying in bed resting comfortably.  He tells me that he is feeling better.  No nausea, vomiting, abdominal pain, diarrhea.  He is tolerating diet.  He can tell his urine output is improving. Creatinine 2.96 (4.05).    Review of Systems   All pertinent negatives and positives are as above. All other systems have been reviewed and are negative unless otherwise stated.     Objective    Temp:  [97.5 °F (36.4 °C)-98.5 °F (36.9 °C)] 98.5 °F (36.9 °C)  Heart Rate:  [69-77] 71  Resp:  [16] 16  BP: (106-143)/(54-80) 130/68  Physical Exam  Vitals reviewed.   Constitutional:       General: He is not in acute distress.     Appearance: He is not toxic-appearing.      Comments: Lying in bed.  No acute distress.  On room air.  No family at bedside.   HENT:      Head: Normocephalic and atraumatic.      Mouth/Throat:       Mouth: Mucous membranes are moist.      Pharynx: Oropharynx is clear.   Eyes:      Extraocular Movements: Extraocular movements intact.      Conjunctiva/sclera: Conjunctivae normal.      Pupils: Pupils are equal, round, and reactive to light.   Cardiovascular:      Rate and Rhythm: Normal rate and regular rhythm.      Pulses: Normal pulses.   Pulmonary:      Effort: Pulmonary effort is normal. No respiratory distress.      Breath sounds: Normal breath sounds. No wheezing or rhonchi.   Abdominal:      General: Bowel sounds are normal. There is no distension.      Palpations: Abdomen is soft.      Tenderness: There is no abdominal tenderness.   Musculoskeletal:         General: No swelling or tenderness. Normal range of motion.      Cervical back: Normal range of motion and neck supple. No muscular tenderness.   Skin:     General: Skin is warm and dry.      Findings: No erythema or rash.   Neurological:      General: No focal deficit present.      Mental Status: He is alert and oriented to person, place, and time.      Cranial Nerves: No cranial nerve deficit.      Motor: No weakness.   Psychiatric:         Mood and Affect: Mood normal.         Behavior: Behavior normal.                 Results Review:  I have reviewed the labs, radiology results, and diagnostic studies.    Laboratory Data:   Results from last 7 days   Lab Units 05/27/24  0344 05/26/24  0605 05/26/24  0021   WBC 10*3/mm3 6.43 8.66 9.85   HEMOGLOBIN g/dL 7.4* 9.1* 8.1*   HEMATOCRIT % 24.0* 29.7* 26.1*   PLATELETS 10*3/mm3 258 346 294        Results from last 7 days   Lab Units 05/27/24  0344 05/26/24  0605 05/26/24  0021   SODIUM mmol/L 143 138 136   POTASSIUM mmol/L 4.1 4.2 4.1   CHLORIDE mmol/L 101 93* 93*   CO2 mmol/L 34.0* 32.0* 29.0   BUN mg/dL 90* 108* 112*   CREATININE mg/dL 2.96* 4.05* 4.59*   CALCIUM mg/dL 9.0 9.5 9.1   BILIRUBIN mg/dL 0.2 0.4 0.3   ALK PHOS U/L 123* 162* 149*   ALT (SGPT) U/L 14 18 17   AST (SGOT) U/L 11 12 16   GLUCOSE  "mg/dL 126* 289* 276*       Culture Data:   Microbiology Results (last 10 days)       ** No results found for the last 240 hours. **          Radiology Data:   Imaging Results (Last 24 Hours)       ** No results found for the last 24 hours. **            I have reviewed the patient's current medications.     Assessment/Plan   Assessment  Active Hospital Problems    Diagnosis     **TARSHA (acute kidney injury)     Bleeding from wound     Diarrhea of presumed infectious origin     Pulmonary HTN     PAF (paroxysmal atrial fibrillation) new onset     Type 2 diabetes mellitus with hyperglycemia, without long-term current use of insulin      Mr. Rodriguez is a 59-year-old male who presented to [facility] on 5/25 for bleeding right lower extremity chronic wound. He is scheduled to have a right amputation below the knee with Dr. Hawk on Nasra 3, 2024. He states the wound started bleeding a lot, and he wanted to come and have it checked.  He had some nausea, vomiting and diarrhea prior to admission.  Of note, he was hospitalized in March for hypervolemia/anasarca.  Echocardiogram revealed pulmonary hypertension with no evidence of left heart failure and no diastolic dysfunction. He was sent home with high-dose loop diuretic, metolazone and ACE inhibitor.  He does take Eliquis for history of paroxysmal atrial fibrillation.  In the ED, creatinine 4.59.  He was started on IV fluid.    Treatment Plan  Acute kidney injury with creatinine 4.59.  Creatinine on 4/30 was 1.27.  Creatinine today improved with IV fluid administration at 2.96.  He is scheduled for renal ultrasound today.    He is followed by wound care clinic.  He was started on 10-day course of Omnicef and doxycycline on 5/17 for cellulitis to right foot.  X-ray on 5/17 showed \"interval removal of fusion hardware with placement of intramedullary sebastian at the tibia and cage in the region of the talus. There is  surrounding lucency in the region of the talus " "around the cage which was  present on the prior examination but increased. However lucency  surrounding the intramedullary sebastian is new from the prior examination.  This is worrisome for loosening/infection.\"  Continue doxycycline and ceftriaxone.  WBC normal.  No fever.    Hemoglobin A1c 7.0 in April 2024.  Metformin on hold.  Continue Accu-Cheks with sliding scale insulin.    He has a history of atrial fibrillation and is chronically anticoagulated on Eliquis.  Eliquis held on admission given significant bleeding to right lower extremity wound.  Start lovenox. Hemoglobin 8.1 on admission.  Hemoglobin 7.4 today.  Iron panel reviewed -gave IV iron.    Medical Decision Making  Number and Complexity of problems: 1 acute problem  Differential Diagnosis: None considered at present    Conditions and Status        Condition is improving.     [facility] Data  External documents reviewed: Prior [facility] records  Cardiac tracing (EKG, telemetry) interpretation: none  Radiology interpretation: Interpreted by radiology  Labs reviewed: As above  Any tests that were considered but not ordered: None considered at present     Decision rules/scores evaluated (example [id number], [place], etc): None considered at present     Discussed with: Patient and Dr. Trujillo     Care Planning  Shared decision making: Patient is agreeable to ongoing workup and treatment  Code status and discussions: Full code with full interventions    Disposition  Social Determinants of Health that impact treatment or disposition: home  I expect the patient to be discharged to home in 1-2 days.     Electronically signed by MEGHANN Mckeon, 05/27/24, 09:27 CDT.      Electronically signed by Moy Trujillo DO at 05/27/24 1947       Ba Cosme DO at 05/26/24 1428          This 59-year-old male was admitted early this morning by Dr. Patrick.  The patient had presented to the emergency department because of continued bleeding from the right lower extremity.  The " patient's foot has been bandaged and bleeding has been stopped.  The patient has been having occasional vomiting and frequent diarrhea.  Creatinine was quite elevated at the time of presentation at 4.59 with repeat this a.m. and 4.05.  The patient remains on IV fluids.  Nephrology will be consulted.  White blood cell count is within normal limits.  Hemoglobin is 9.1.  GI panel will be ordered.  Labs will be repeated in AM.    Electronically signed by Ba Cosme DO, 05/26/24, 14:30 CDT.      Electronically signed by Ba Cosme DO at 05/26/24 1431          Consult Notes (last 48 hours)        Martínez Shaw MD at 05/26/24 1441          Nephrology ([facility]) Consult Note      Patient:  Garrett Rodriguez  YOB: 1965  Date of Service: 5/26/2024  MRN: [id number]   Acct: [id number]   Primary Care Physician: Mehul Andersen MD  Advance Directive:   Code Status and Medical Interventions:   Ordered at: 05/26/24 0131     Level Of Support Discussed With:    Patient     Code Status (Patient has no pulse and is not breathing):    CPR (Attempt to Resuscitate)     Medical Interventions (Patient has pulse or is breathing):    Full Support     Admit Date: 5/25/2024       Hospital Day: 0  Referring Provider: Colin Patrick DO      Patient Seen, Chart, Consults, Notes, Labs, Radiology studies reviewed.    Chief complaint: Abnormal labs.    Subjective:  Garrett Rodriguez is a 59 y.o. male  whom we were consulted for acute kidney injury.  His baseline creatinine usually 1.0 mg.  Patient has been fighting with right lower extremity wound which all started after an accident and fracture of his ankle.  Patient has open reduction internal fixation of his leg.  He has recurrent cellulitis/infection, required multiple rounds of IV antibiotics and debridements.  Finally he is scheduled to have right below the knee amputation in early part of June.  He presented to the  emergency room with bleeding from his right leg wound.  Routine lab data in ER indicated his serum creatinine now 4.0 mg.  About a month ago he was hospitalized with new onset of congestive heart failure.  He was sent home with high-dose loop diuretics and metolazone and ACE inhibitor was initiated as well.  Patient has known routine labs within the last several weeks.  Now his creatinine is 4.0 mg.  He is now admitted for treatment of acute kidney injury likely caused by excessive diuresis.  He denies any shortness of breath or swelling of legs.  His blood pressure has been relatively low as well.  Patient denies any nausea vomiting or diarrhea.  He denies any use of nonsteroidal agents.    Allergies:  Patient has no known allergies.    Home Meds:  Medications Prior to Admission   Medication Sig Dispense Refill Last Dose    apixaban (ELIQUIS) 5 MG tablet tablet Take 1 tablet by mouth 2 (Two) Times a Day. 60 tablet 2 5/25/2024    cefdinir (OMNICEF) 300 MG capsule Take 1 capsule by mouth 2 (Two) Times a Day.   5/25/2024    doxycycline (VIBRAMYCIN) 100 MG capsule Take 1 capsule by mouth Every 12 (Twelve) Hours.   5/25/2024    furosemide (LASIX) 40 MG tablet Take 1 tablet by mouth Daily. Take an extra dose for any 2-3 pound weight gain (Patient taking differently: Take 1 tablet by mouth 2 (Two) Times a Day. Take an extra dose for any 2-3 pound weight gain) 50 tablet 2 5/25/2024    gabapentin (NEURONTIN) 100 MG capsule Take 1 capsule by mouth 3 (Three) Times a Day.   5/25/2024    HYDROcodone-acetaminophen (NORCO)  MG per tablet Take 1 tablet by mouth Every 8 (Eight) Hours As Needed for Moderate Pain.   5/25/2024    lisinopril (PRINIVIL,ZESTRIL) 20 MG tablet Take 1 tablet by mouth Daily. 30 tablet 2 5/25/2024    metFORMIN (GLUCOPHAGE) 1000 MG tablet Take 1 tablet by mouth 2 (Two) Times a Day With Meals.   5/25/2024    metOLazone (ZAROXOLYN) 2.5 MG tablet Take 1 tablet by mouth Daily. 30 tablet 11 5/25/2024     naloxone (NARCAN) 4 MG/0.1ML nasal spray Call 911. Don't prime. [place] in 1 nostril for overdose. Repeat in 2-3 minutes in other nostril if no or minimal breathing/responsiveness. 2 each 0     pregabalin (LYRICA) 100 MG capsule Take 1 capsule by mouth 3 (Three) Times a Day. No longer taking. Patient states he takes gabapentin instead   Unknown       Medicines:  Current Facility-Administered Medications   Medication Dose Route Frequency Provider Last Rate Last Admin    acetaminophen (TYLENOL) tablet 650 mg  650 mg Oral Q4H PRN Colin Patrick DO        sennosides-docusate (PERICOLACE) 8.6-50 MG per tablet 2 tablet  2 tablet Oral BID PRN Colin Patrick DO        And    polyethylene glycol (MIRALAX) packet 17 g  17 g Oral Daily PRN Colin Patrick DO        And    bisacodyl (DULCOLAX) EC tablet 5 mg  5 mg Oral Daily PRN Colin Patrick DO        And    bisacodyl (DULCOLAX) suppository 10 mg  10 mg Rectal Daily PRN Colin Patrick DO        dextrose (D50W) (25 g/50 mL) IV injection 25 g  25 g Intravenous Q15 Min PRN Colin Patrick DO        dextrose (GLUTOSE) oral gel 15 g  15 g Oral Q15 Min PRN Colin Patrick DO        doxycycline (ADOXA) tablet 100 mg  100 mg Oral Q12H Colin Patrick DO   100 mg at 05/26/24 0816    glucagon (GLUCAGEN) injection 1 mg  1 mg Intramuscular Q15 Min PRN Colin Patrick DO        HYDROcodone-acetaminophen (NORCO)  MG per tablet 1 tablet  1 tablet Oral Q4H PRN Ba Cosme DO   1 tablet at 05/26/24 1325    HYDROcodone-acetaminophen (NORCO) 7.5-325 MG per tablet 1 tablet  1 tablet Oral Q4H PRN Ba Cosme DO        Insulin Lispro (humaLOG) injection 2-7 Units  2-7 Units Subcutaneous 4x Daily AC & at Bedtime Colin Patrick DO   3 Units at 05/26/24 1120    ondansetron (ZOFRAN) injection 4 mg  4 mg Intravenous Q6H PRN Colin Patrick DO        pregabalin (LYRICA) capsule 100 mg  100 mg Oral Q12H Colin Patrick DO   100 mg  at 05/26/24 0816    sodium chloride 0.9 % flush 10 mL  10 mL Intravenous PRN Roderick Todd MD        sodium chloride 0.9 % flush 10 mL  10 mL Intravenous Q12H Colin Patrick, DO   10 mL at 05/26/24 0206    sodium chloride 0.9 % flush 10 mL  10 mL Intravenous PRN Colin Patrick,         sodium chloride 0.9 % infusion 40 mL  40 mL Intravenous PRN Colin Patrick,         sodium chloride 0.9 % infusion  100 mL/hr Intravenous Continuous Ba Cosme  mL/hr at 05/26/24 1316 100 mL/hr at 05/26/24 1316       Past Medical History:  Past Medical History:   Diagnosis Date    Atrial fibrillation 3/11/2024    Chronic pain in right foot     Diabetes mellitus     Hyperlipidemia Feb 2024    Hypertension     PFO (patent foramen ovale)     RP at age 6       Past Surgical History:  Past Surgical History:   Procedure Laterality Date    ANKLE FUSION Right 07/11/2023    Procedure: ANKLE ARTHRODESIS;  Surgeon: Shahbaz Reddy DPM;  Location:  [facility];  Service: Podiatry;  Laterality: Right;    BACK SURGERY      CARDIAC SURGERY      EXTERNAL FIXATION ANKLE FRACTURE Right 07/11/2023    Procedure: POSSIBLE EXTERNAL FIXATION, RIGHT ANKLE;  Surgeon: Shahbaz Reddy DPM;  Location:  [facility];  Service: Podiatry;  Laterality: Right;    HARDWARE REMOVAL Right 07/11/2023    Procedure: REMOVAL OF HARDWARE, DEEP; ANKLE ARTHRODESIS; SUBTALAR ARTHRODESIS; POSSIBLE EXTERNAL FIXATION, RIGHT ANKLE;  Surgeon: Shahbaz Reddy DPM;  Location:  [facility];  Service: Podiatry;  Laterality: Right;    PATENT FORAMEN OVALE CLOSURE      ROTATOR CUFF REPAIR Right     SUBTALAR ARTHRODESIS Right 07/11/2023    Procedure: SUBTALAR ARTHRODESIS;  Surgeon: Shahbaz Reddy DPM;  Location:  [facility];  Service: Podiatry;  Laterality: Right;       Family History  Family History   Problem Relation Age of Onset    Hypertension Mother     Hypertension Father        Social History  Social History     Socioeconomic History    Marital  "status:    Tobacco Use    Smoking status: Never    Smokeless tobacco: Never   Vaping Use    Vaping status: Never Used   Substance and Sexual Activity    Alcohol use: Never    Drug use: Never    Sexual activity: Not Currently     Partners: Female         Review of Systems:  History obtained from chart review and the patient  General ROS: No fever or chills  Respiratory ROS: No cough, shortness of breath, wheezing  Cardiovascular ROS: no chest pain or dyspnea on exertion  Gastrointestinal ROS: No abdominal pain or melena  Genito-Urinary ROS: No dysuria or hematuria  14 point ROS reviewed with the patient and negative except as noted above and in the HPI unless unable to obtain.    Objective:  /68 (BP Location: Left arm, Patient Position: Lying)   Pulse 77   Temp 97.5 °F (36.4 °C)   Resp 16   Ht 177.8 cm (70\")   Wt 79.8 kg (175 lb 14.8 oz)   SpO2 96%   BMI 25.24 kg/m²     Intake/Output Summary (Last 24 hours) at 5/26/2024 1441  Last data filed at 5/26/2024 0805  Gross per 24 hour   Intake 597.5 ml   Output 800 ml   Net -202.5 ml     General: awake/alert   HEENT: Normocephalic atraumatic head  Neck: Supple no JVD or carotid bruits.  Chest:  clear to auscultation bilaterally without respiratory distress  CVS: regular rate and rhythm  Abdominal: soft, nontender, normal bowel sounds  Extremities:  Right lower extremity deformity/no edema  Skin: warm and dry without rash      Labs:    Results from last 7 days   Lab Units 05/26/24  0605 05/26/24  0021   WBC 10*3/mm3 8.66 9.85   HEMOGLOBIN g/dL 9.1* 8.1*   HEMATOCRIT % 29.7* 26.1*   PLATELETS 10*3/mm3 346 294       Results from last 7 days   Lab Units 05/26/24  0605 05/26/24  0021   SODIUM mmol/L 138 136   POTASSIUM mmol/L 4.2 4.1   CHLORIDE mmol/L 93* 93*   CO2 mmol/L 32.0* 29.0   BUN mg/dL 108* 112*   CREATININE mg/dL 4.05* 4.59*   CALCIUM mg/dL 9.5 9.1   BILIRUBIN mg/dL 0.4 0.3   ALK PHOS U/L 162* 149*   ALT (SGPT) U/L 18 17   AST (SGOT) U/L 12 16 " "  GLUCOSE mg/dL 289* 276*   EGFR mL/min/1.73 16.3* 14.0*         Radiology:   Imaging Results (Last 24 Hours)       ** No results found for the last 24 hours. **            Culture:  No components found for: \"WOUNDCUL\", \"3\"  No components found for: \"CSFCUL\", \"3\"  No components found for: \"BC\", \"3\"  No components found for: \"URINECUL\", \"3\"      Assessment   1.  Acute kidney injury/improving.  2.  Intravascular volume depletion due to excessive diuresis.  3.  New onset of diastolic CHF.  4.  Type 2 diabetes/non-insulin-dependent  5.  Anemia of chronic disease.  6.  Right lower extremity deformity/recurrent infections.    Plan:  1.  Agree to hold loop diuretics/metolazone.  2.  Agree with IV fluid administration.  3.  Urinary electrolytes.  4.  Urinary protein to creatinine ratio/UACR.  5.  Baseline renal ultrasound.  6.  Renal prognosis looks very good      Thank you for the consult, we appreciate the opportunity to provide care to your patients.  Feel free to contact me if I can be of any further assistance.      Martínez Shaw MD  5/26/2024  14:41 CDT    Electronically signed by Martínez Shaw MD at 05/26/24 7738       "

## 2024-05-28 NOTE — PROGRESS NOTES
[facility]  INPATIENT PROGRESS NOTE    Patient Name: Garrett Rodriguez  Date of Admission: 5/25/2024  Today's Date: 05/28/24  Length of Stay: 2  Primary Care Physician: Mehul Andersen MD    Subjective   Chief Complaint: none offered  HPI   He was lying in bed resting comfortabl with spouse at bedside.  He tells me that he is feeling better.  No nausea, vomiting, abdominal pain, diarrhea.  He is tolerating diet.  He can tell his urine output is improving. Creatinine 2.10 (2.96).    Review of Systems   All pertinent negatives and positives are as above. All other systems have been reviewed and are negative unless otherwise stated.     Objective    Temp:  [97.4 °F (36.3 °C)-97.9 °F (36.6 °C)] 97.9 °F (36.6 °C)  Heart Rate:  [69-83] 72  Resp:  [16-18] 16  BP: (101-132)/(51-90) 132/90  Physical Exam  Vitals reviewed.   Constitutional:       General: He is not in acute distress.     Appearance: He is not toxic-appearing.      Comments: Lying in bed.  No acute distress.  On room air.  No family at bedside.   HENT:      Head: Normocephalic and atraumatic.      Mouth/Throat:      Mouth: Mucous membranes are moist.      Pharynx: Oropharynx is clear.   Eyes:      Extraocular Movements: Extraocular movements intact.      Conjunctiva/sclera: Conjunctivae normal.      Pupils: Pupils are equal, round, and reactive to light.   Cardiovascular:      Rate and Rhythm: Normal rate and regular rhythm.      Pulses: Normal pulses.   Pulmonary:      Effort: Pulmonary effort is normal. No respiratory distress.      Breath sounds: Normal breath sounds. No wheezing or rhonchi.   Abdominal:      General: Bowel sounds are normal. There is no distension.      Palpations: Abdomen is soft.      Tenderness: There is no abdominal tenderness.   Musculoskeletal:         General: No swelling or tenderness. Normal range of motion.      Cervical back: Normal range of motion and neck supple. No  muscular tenderness.   Skin:     General: Skin is warm and dry.      Findings: No erythema or rash.   Neurological:      General: No focal deficit present.      Mental Status: He is alert and oriented to person, place, and time.      Cranial Nerves: No cranial nerve deficit.      Motor: No weakness.   Psychiatric:         Mood and Affect: Mood normal.         Behavior: Behavior normal.               Results Review:  I have reviewed the labs, radiology results, and diagnostic studies.    Laboratory Data:   Results from last 7 days   Lab Units 05/28/24  0526 05/27/24  0344 05/26/24  0605   WBC 10*3/mm3 8.26 6.43 8.66   HEMOGLOBIN g/dL 7.8* 7.4* 9.1*   HEMATOCRIT % 26.0* 24.0* 29.7*   PLATELETS 10*3/mm3 275 258 346        Results from last 7 days   Lab Units 05/28/24  0526 05/27/24  0344 05/26/24  0605   SODIUM mmol/L 141 143 138   POTASSIUM mmol/L 4.2 4.1 4.2   CHLORIDE mmol/L 101 101 93*   CO2 mmol/L 33.0* 34.0* 32.0*   BUN mg/dL 72* 90* 108*   CREATININE mg/dL 2.10* 2.96* 4.05*   CALCIUM mg/dL 8.7 9.0 9.5   BILIRUBIN mg/dL 0.3 0.2 0.4   ALK PHOS U/L 134* 123* 162*   ALT (SGPT) U/L 15 14 18   AST (SGOT) U/L 11 11 12   GLUCOSE mg/dL 206* 126* 289*       Culture Data:   Microbiology Results (last 10 days)       ** No results found for the last 240 hours. **          Radiology Data:   Imaging Results (Last 24 Hours)       Procedure Component Value Units Date/Time    US Renal Bilateral [[id number]] Collected: 05/27/24 1259     Updated: 05/27/24 1303    Narrative:      US RENAL BILATERAL- 5/27/2024 11:11 AM     HISTORY: Acute kidney injury; N17.9-Acute kidney failure, unspecified;  S91.301S-Unspecified open wound, right foot, sequela     COMPARISON: None available.       TECHNIQUE: Multiple longitudinal and transverse real-time sonographic  images of the kidneys and urinary bladder are obtained. Report and  images stored per institutional and state regulations.           FINDINGS:     Visualized proximal abdominal aorta  and IVC are unremarkable.        RIGHT KIDNEY: Right kidney is 11.5 cm in length. Renal cortex is  unremarkable. There is no hydronephrosis..     LEFT KIDNEY: Left kidney is 11.5 cm in length. Left renal cortex is  unremarkable. There is no left hydronephrosis.     PELVIS: Urinary bladder is grossly unremarkable.          Impression:      Unremarkable kidneys with no hydronephrosis..           This report was signed and finalized on 5/27/2024 1:00 PM by Kuldip Mcclendon.               I have reviewed the patient's current medications.     Assessment/Plan   Assessment  Active Hospital Problems    Diagnosis     **TARSHA (acute kidney injury)     Bleeding from wound     Diarrhea of presumed infectious origin     Pulmonary HTN     PAF (paroxysmal atrial fibrillation) new onset     Type 2 diabetes mellitus with hyperglycemia, without long-term current use of insulin      Mr. Rodriguez is a 59-year-old male who presented to [facility] on 5/25 for bleeding right lower extremity chronic wound. He is scheduled to have a right amputation below the knee with Dr. Hawk on Nasra 3, 2024. He states the wound started bleeding a lot, and he wanted to come and have it checked.  He had some nausea, vomiting and diarrhea prior to admission.  Of note, he was hospitalized in March for hypervolemia/anasarca.  Echocardiogram revealed pulmonary hypertension with no evidence of left heart failure and no diastolic dysfunction. He was sent home with high-dose loop diuretic, metolazone and ACE inhibitor.  He does take Eliquis for history of paroxysmal atrial fibrillation.  In the ED, creatinine 4.59.  He was started on IV fluid.    Treatment Plan  Acute kidney injury with creatinine 4.59.  Creatinine on 4/30 was 1.27.  Creatinine today improved with IV fluid administration at 2.10.  Renal ultrasound showed no hydronephrosis.    He is followed by wound care clinic.  He was started on 10-day course of Omnicef and doxycycline on 5/17  "for cellulitis to right foot.  X-ray on 5/17 showed \"interval removal of fusion hardware with placement of intramedullary sebastian at the tibia and cage in the region of the talus. There is  surrounding lucency in the region of the talus around the cage which was  present on the prior examination but increased. However lucency surrounding the intramedullary sebastian is new from the prior examination. This is worrisome for loosening/infection.\"  Continue doxycycline and ceftriaxone.  WBC normal.  No fever.    Hemoglobin A1c 7.0 in April 2024.  Metformin on hold.  Continue Accu-Cheks with sliding scale insulin.    He has a history of atrial fibrillation and is chronically anticoagulated on Eliquis.  Eliquis held on admission given significant bleeding to right lower extremity wound.  Continue lovenox. Hemoglobin 8.1 on admission.  Hemoglobin 7.8 today.  Iron panel reviewed -IV iron x 1    Medical Decision Making  Number and Complexity of problems: 1 acute problem  Differential Diagnosis: None considered at present    Conditions and Status        Condition is improving.     [facility] Data  External documents reviewed: Prior [facility] records  Cardiac tracing (EKG, telemetry) interpretation: none  Radiology interpretation: Interpreted by radiology  Labs reviewed: As above  Any tests that were considered but not ordered: None considered at present     Decision rules/scores evaluated (example [id number], Wells, etc): None considered at present     Discussed with: Patient and Dr. Trujillo     Care Planning  Shared decision making: Patient is agreeable to ongoing workup and treatment  Code status and discussions: Full code with full interventions    Disposition  Social Determinants of Health that impact treatment or disposition: home  I expect the patient to be discharged to home in 1-2 days.     Electronically signed by MEGHANN Mckeon, 05/28/24, 10:37 CDT.    "

## 2024-05-29 ENCOUNTER — READMISSION MANAGEMENT (OUTPATIENT)
Dept: CALL CENTER | Facility: HOSPITAL | Age: 59
End: 2024-05-29
Payer: COMMERCIAL

## 2024-05-29 VITALS
SYSTOLIC BLOOD PRESSURE: 153 MMHG | DIASTOLIC BLOOD PRESSURE: 86 MMHG | BODY MASS INDEX: 25.28 KG/M2 | HEIGHT: 70 IN | RESPIRATION RATE: 16 BRPM | HEART RATE: 73 BPM | OXYGEN SATURATION: 95 % | TEMPERATURE: 97.8 F | WEIGHT: 176.6 LBS

## 2024-05-29 LAB
ABO GROUP BLD: NORMAL
ALBUMIN SERPL-MCNC: 2.7 G/DL (ref 3.5–5.2)
ALBUMIN/GLOB SERPL: 0.7 G/DL
ALP SERPL-CCNC: 120 U/L (ref 39–117)
ALT SERPL W P-5'-P-CCNC: 11 U/L (ref 1–41)
ANION GAP SERPL CALCULATED.3IONS-SCNC: 6 MMOL/L (ref 5–15)
AST SERPL-CCNC: 10 U/L (ref 1–40)
BASOPHILS # BLD AUTO: 0.03 10*3/MM3 (ref 0–0.2)
BASOPHILS NFR BLD AUTO: 0.4 % (ref 0–1.5)
BILIRUB SERPL-MCNC: 0.2 MG/DL (ref 0–1.2)
BLD GP AB SCN SERPL QL: NEGATIVE
BUN SERPL-MCNC: 55 MG/DL (ref 6–20)
BUN/CREAT SERPL: 34.2 (ref 7–25)
CALCIUM SPEC-SCNC: 8.2 MG/DL (ref 8.6–10.5)
CHLORIDE SERPL-SCNC: 105 MMOL/L (ref 98–107)
CO2 SERPL-SCNC: 32 MMOL/L (ref 22–29)
CREAT SERPL-MCNC: 1.61 MG/DL (ref 0.76–1.27)
DEPRECATED RDW RBC AUTO: 48.2 FL (ref 37–54)
EGFRCR SERPLBLD CKD-EPI 2021: 49.3 ML/MIN/1.73
EOSINOPHIL # BLD AUTO: 0.17 10*3/MM3 (ref 0–0.4)
EOSINOPHIL NFR BLD AUTO: 2.3 % (ref 0.3–6.2)
ERYTHROCYTE [DISTWIDTH] IN BLOOD BY AUTOMATED COUNT: 15 % (ref 12.3–15.4)
GLOBULIN UR ELPH-MCNC: 3.8 GM/DL
GLUCOSE BLDC GLUCOMTR-MCNC: 133 MG/DL (ref 70–130)
GLUCOSE BLDC GLUCOMTR-MCNC: 202 MG/DL (ref 70–130)
GLUCOSE BLDC GLUCOMTR-MCNC: 212 MG/DL (ref 70–130)
GLUCOSE SERPL-MCNC: 95 MG/DL (ref 65–99)
HCT VFR BLD AUTO: 24.2 % (ref 37.5–51)
HCT VFR BLD AUTO: 29 % (ref 37.5–51)
HGB BLD-MCNC: 7.2 G/DL (ref 13–17.7)
HGB BLD-MCNC: 8.9 G/DL (ref 13–17.7)
IMM GRANULOCYTES # BLD AUTO: 0.13 10*3/MM3 (ref 0–0.05)
IMM GRANULOCYTES NFR BLD AUTO: 1.7 % (ref 0–0.5)
LYMPHOCYTES # BLD AUTO: 1.84 10*3/MM3 (ref 0.7–3.1)
LYMPHOCYTES NFR BLD AUTO: 24.8 % (ref 19.6–45.3)
MCH RBC QN AUTO: 26.4 PG (ref 26.6–33)
MCHC RBC AUTO-ENTMCNC: 29.8 G/DL (ref 31.5–35.7)
MCV RBC AUTO: 88.6 FL (ref 79–97)
MONOCYTES # BLD AUTO: 0.57 10*3/MM3 (ref 0.1–0.9)
MONOCYTES NFR BLD AUTO: 7.7 % (ref 5–12)
NEUTROPHILS NFR BLD AUTO: 4.69 10*3/MM3 (ref 1.7–7)
NEUTROPHILS NFR BLD AUTO: 63.1 % (ref 42.7–76)
NRBC BLD AUTO-RTO: 0 /100 WBC (ref 0–0.2)
PLATELET # BLD AUTO: 241 10*3/MM3 (ref 140–450)
PMV BLD AUTO: 9.6 FL (ref 6–12)
POTASSIUM SERPL-SCNC: 3.8 MMOL/L (ref 3.5–5.2)
PROT SERPL-MCNC: 6.5 G/DL (ref 6–8.5)
RBC # BLD AUTO: 2.73 10*6/MM3 (ref 4.14–5.8)
RH BLD: POSITIVE
SODIUM SERPL-SCNC: 143 MMOL/L (ref 136–145)
T&S EXPIRATION DATE: NORMAL
WBC NRBC COR # BLD AUTO: 7.43 10*3/MM3 (ref 3.4–10.8)

## 2024-05-29 PROCEDURE — 80053 COMPREHEN METABOLIC PANEL: CPT | Performed by: INTERNAL MEDICINE

## 2024-05-29 PROCEDURE — 86850 RBC ANTIBODY SCREEN: CPT | Performed by: NURSE PRACTITIONER

## 2024-05-29 PROCEDURE — 86920 COMPATIBILITY TEST SPIN: CPT

## 2024-05-29 PROCEDURE — 36430 TRANSFUSION BLD/BLD COMPNT: CPT

## 2024-05-29 PROCEDURE — 86901 BLOOD TYPING SEROLOGIC RH(D): CPT | Performed by: NURSE PRACTITIONER

## 2024-05-29 PROCEDURE — 25010000002 ENOXAPARIN PER 10 MG: Performed by: NURSE PRACTITIONER

## 2024-05-29 PROCEDURE — 25810000003 SODIUM CHLORIDE 0.9 % SOLUTION: Performed by: FAMILY MEDICINE

## 2024-05-29 PROCEDURE — P9016 RBC LEUKOCYTES REDUCED: HCPCS

## 2024-05-29 PROCEDURE — 86900 BLOOD TYPING SEROLOGIC ABO: CPT

## 2024-05-29 PROCEDURE — 85025 COMPLETE CBC W/AUTO DIFF WBC: CPT | Performed by: INTERNAL MEDICINE

## 2024-05-29 PROCEDURE — 82948 REAGENT STRIP/BLOOD GLUCOSE: CPT

## 2024-05-29 PROCEDURE — 85018 HEMOGLOBIN: CPT | Performed by: NURSE PRACTITIONER

## 2024-05-29 PROCEDURE — 85014 HEMATOCRIT: CPT | Performed by: NURSE PRACTITIONER

## 2024-05-29 PROCEDURE — 63710000001 INSULIN LISPRO (HUMAN) PER 5 UNITS: Performed by: NURSE PRACTITIONER

## 2024-05-29 PROCEDURE — 86900 BLOOD TYPING SEROLOGIC ABO: CPT | Performed by: NURSE PRACTITIONER

## 2024-05-29 RX ORDER — ASCORBIC ACID 500 MG
500 TABLET ORAL DAILY
Qty: 30 TABLET | Refills: 0 | Status: SHIPPED | OUTPATIENT
Start: 2024-05-30 | End: 2024-06-29

## 2024-05-29 RX ORDER — LISINOPRIL 20 MG/1
10 TABLET ORAL
Start: 2024-05-29

## 2024-05-29 RX ORDER — ZINC SULFATE 50(220)MG
220 CAPSULE ORAL DAILY
Qty: 30 CAPSULE | Refills: 0 | Status: SHIPPED | OUTPATIENT
Start: 2024-05-30 | End: 2024-06-29

## 2024-05-29 RX ORDER — FUROSEMIDE 40 MG/1
40 TABLET ORAL DAILY PRN
Start: 2024-05-29

## 2024-05-29 RX ADMIN — ZINC SULFATE 220 MG (50 MG) CAPSULE 220 MG: CAPSULE at 08:21

## 2024-05-29 RX ADMIN — OXYCODONE HYDROCHLORIDE AND ACETAMINOPHEN 500 MG: 500 TABLET ORAL at 08:21

## 2024-05-29 RX ADMIN — HYDROCODONE BITARTRATE AND ACETAMINOPHEN 1 TABLET: 10; 325 TABLET ORAL at 13:04

## 2024-05-29 RX ADMIN — PREGABALIN 100 MG: 100 CAPSULE ORAL at 08:21

## 2024-05-29 RX ADMIN — HYDROCODONE BITARTRATE AND ACETAMINOPHEN 1 TABLET: 10; 325 TABLET ORAL at 16:59

## 2024-05-29 RX ADMIN — ENOXAPARIN SODIUM 80 MG: 100 INJECTION SUBCUTANEOUS at 06:33

## 2024-05-29 RX ADMIN — SODIUM CHLORIDE 100 ML/HR: 9 INJECTION, SOLUTION INTRAVENOUS at 03:41

## 2024-05-29 RX ADMIN — ENOXAPARIN SODIUM 80 MG: 100 INJECTION SUBCUTANEOUS at 17:00

## 2024-05-29 RX ADMIN — INSULIN LISPRO 4 UNITS: 100 INJECTION, SOLUTION INTRAVENOUS; SUBCUTANEOUS at 12:07

## 2024-05-29 RX ADMIN — HYDROCODONE BITARTRATE AND ACETAMINOPHEN 1 TABLET: 10; 325 TABLET ORAL at 03:41

## 2024-05-29 RX ADMIN — HYDROCODONE BITARTRATE AND ACETAMINOPHEN 1 TABLET: 10; 325 TABLET ORAL at 08:20

## 2024-05-29 RX ADMIN — INSULIN LISPRO 4 UNITS: 100 INJECTION, SOLUTION INTRAVENOUS; SUBCUTANEOUS at 16:41

## 2024-05-29 RX ADMIN — DOXYCYCLINE 100 MG: 100 TABLET, FILM COATED ORAL at 08:21

## 2024-05-29 NOTE — PLAN OF CARE
Goal Outcome Evaluation:  Plan of Care Reviewed With: patient           Outcome Evaluation: HGB = 7.2  PT RECEIVING BLOOD AT PRESENT THEN PT. WILL BE DISCHARGED AFTER BLOOD TRANSFUSION. ROOM AIR. PT. VOIDING PER URINAL. ACCUCHECKS WITH SS COVERAGE AVAILABLE AND PT. HAS REQUIRED THIS SHIFT. NO DISTRESS NOTED.

## 2024-05-29 NOTE — PLAN OF CARE
Goal Outcome Evaluation:  Plan of Care Reviewed With: patient        Progress: improving  Outcome Evaluation: Pt A&O X4. Dressing changed last night to R. ankle and plantar per wound care orders. ACHS accuchecks. Spouse at bsd. RBKA scheduled for next week. IVF infusing per orders. Cons carb diet. C/o pain; see mar. VSS safety maintained.

## 2024-05-29 NOTE — PROGRESS NOTES
Nephrology ([facility]) Progress Note      Patient:  Garrett Rodriguez  YOB: 1965  Date of Service: 5/29/2024  MRN: [id number]   Acct: [id number]   Primary Care Physician: Mehul Andersen MD  Advance Directive:   Code Status and Medical Interventions:   Ordered at: 05/26/24 0131     Level Of Support Discussed With:    Patient     Code Status (Patient has no pulse and is not breathing):    CPR (Attempt to Resuscitate)     Medical Interventions (Patient has pulse or is breathing):    Full Support     Admit Date: 5/25/2024       Hospital Day: 3  Referring Provider: Colin Patrick,       Patient personally seen and examined.  Complete chart including Consults, Notes, Operative Reports, Labs, Cardiology, and Radiology studies reviewed as able.        Subjective:  Garrett Rodriguez is a 59 y.o. male for whom we were consulted for evaluation and treatment of acute kidney injury.  Baseline creatinine approximately 1.0.  History of right lower extremity wound with recurrent cellulitis. Has had multiple rounds of antibiotics and debridements. Now scheduled for below knee amputation in the near future. Presented to ER with draining from wound. Labs revealed creatinine 4.0. blood pressure has been low recently. Did have some n/v/d prior to admission. Denies changes in urination. Hospital course remarkable for improving renal function with IV fluid administration.    Today has no new complaint. No new overnight issues. Urine output nonoliguric    Allergies:  Patient has no known allergies.    Home Meds:  Medications Prior to Admission   Medication Sig Dispense Refill Last Dose    apixaban (ELIQUIS) 5 MG tablet tablet Take 1 tablet by mouth 2 (Two) Times a Day. 60 tablet 2 5/25/2024    cefdinir (OMNICEF) 300 MG capsule Take 1 capsule by mouth 2 (Two) Times a Day.   5/25/2024    doxycycline (VIBRAMYCIN) 100 MG capsule Take 1 capsule by mouth Every 12 (Twelve) Hours.    5/25/2024    furosemide (LASIX) 40 MG tablet Take 1 tablet by mouth 2 (Two) Times a Day.       HYDROcodone-acetaminophen (NORCO)  MG per tablet Take 1 tablet by mouth Every 8 (Eight) Hours As Needed for Severe Pain.   5/25/2024    lisinopril (PRINIVIL,ZESTRIL) 20 MG tablet Take 1 tablet by mouth Daily. 30 tablet 2 5/25/2024    metFORMIN (GLUCOPHAGE) 1000 MG tablet Take 1 tablet by mouth 2 (Two) Times a Day With Meals.   5/25/2024    metOLazone (ZAROXOLYN) 2.5 MG tablet Take 1 tablet by mouth Daily. 30 tablet 11 5/25/2024    pregabalin (LYRICA) 100 MG capsule Take 1 capsule by mouth 3 (Three) Times a Day.       naloxone (NARCAN) 4 MG/0.1ML nasal spray Call 911. Don't prime. [place] in 1 nostril for overdose. Repeat in 2-3 minutes in other nostril if no or minimal breathing/responsiveness. 2 each 0        Medicines:  Current Facility-Administered Medications   Medication Dose Route Frequency Provider Last Rate Last Admin    acetaminophen (TYLENOL) tablet 650 mg  650 mg Oral Q4H PRN Colin Patrick DO        ascorbic acid (VITAMIN C) tablet 500 mg  500 mg Oral Daily Moy Trujillo DO   500 mg at 05/29/24 0821    sennosides-docusate (PERICOLACE) 8.6-50 MG per tablet 2 tablet  2 tablet Oral BID PRN Colin Patrick DO        And    polyethylene glycol (MIRALAX) packet 17 g  17 g Oral Daily PRN Colin Patrick DO        And    bisacodyl (DULCOLAX) EC tablet 5 mg  5 mg Oral Daily PRN Colin Patrick DO        And    bisacodyl (DULCOLAX) suppository 10 mg  10 mg Rectal Daily PRN Colin Patrick DO        cefTRIAXone (ROCEPHIN) 1,000 mg in sodium chloride 0.9 % 100 mL MBP  1,000 mg Intravenous Q24H Camilla Rider APRN 200 mL/hr at 05/28/24 1246 1,000 mg at 05/28/24 1246    dextrose (D50W) (25 g/50 mL) IV injection 25 g  25 g Intravenous Q15 Min PRN Colin Patrick DO        dextrose (GLUTOSE) oral gel 15 g  15 g Oral Q15 Min PRN Colin Patrick DO        doxycycline (ADOXA) tablet 100 mg  100  mg Oral Q12H Colin Patrick DO   100 mg at 05/29/24 0821    Enoxaparin Sodium (LOVENOX) syringe 80 mg  1 mg/kg Subcutaneous Q12H RiderCamilla huntre, APRN   80 mg at 05/29/24 0633    glucagon (GLUCAGEN) injection 1 mg  1 mg Intramuscular Q15 Min PRN Colin Patrick DO        HYDROcodone-acetaminophen (NORCO)  MG per tablet 1 tablet  1 tablet Oral Q4H PRN Ba Cosme DO   1 tablet at 05/29/24 0820    HYDROcodone-acetaminophen (NORCO) 7.5-325 MG per tablet 1 tablet  1 tablet Oral Q4H PRN Ba Cosme DO        Insulin Lispro (humaLOG) injection 2-9 Units  2-9 Units Subcutaneous 4x Daily AC & at Bedtime RiderCamilla hunter, APRN   6 Units at 05/28/24 2134    ondansetron (ZOFRAN) injection 4 mg  4 mg Intravenous Q6H PRN Colin Patrick DO        pregabalin (LYRICA) capsule 100 mg  100 mg Oral Q12H Colin Patrick DO   100 mg at 05/29/24 0821    sodium chloride 0.9 % flush 10 mL  10 mL Intravenous PRN Roderick Todd MD        sodium chloride 0.9 % flush 10 mL  10 mL Intravenous Q12H Colin Patrick DO   10 mL at 05/27/24 2117    sodium chloride 0.9 % flush 10 mL  10 mL Intravenous PRN Colin Patrick DO        sodium chloride 0.9 % infusion 40 mL  40 mL Intravenous PRN Colin Patrick DO        sodium chloride 0.9 % infusion  100 mL/hr Intravenous Continuous Ba Cosme  mL/hr at 05/29/24 0341 100 mL/hr at 05/29/24 0341    zinc sulfate (ZINCATE) capsule 220 mg  220 mg Oral Daily Moy Trujillo DO   220 mg at 05/29/24 0821       Past Medical History:  Past Medical History:   Diagnosis Date    Atrial fibrillation 3/11/2024    Chronic pain in right foot     Diabetes mellitus     Hyperlipidemia Feb 2024    Hypertension     PFO (patent foramen ovale)     RP at age 6       Past Surgical History:  Past Surgical History:   Procedure Laterality Date    ANKLE FUSION Right 07/11/2023    Procedure: ANKLE ARTHRODESIS;  Surgeon: Shahbaz Reddy DPM;  Location: [facility] OR;   Service: Podiatry;  Laterality: Right;    BACK SURGERY      CARDIAC SURGERY      EXTERNAL FIXATION ANKLE FRACTURE Right 07/11/2023    Procedure: POSSIBLE EXTERNAL FIXATION, RIGHT ANKLE;  Surgeon: Shahbaz Reddy DPM;  Location:  [facility] OR;  Service: Podiatry;  Laterality: Right;    HARDWARE REMOVAL Right 07/11/2023    Procedure: REMOVAL OF HARDWARE, DEEP; ANKLE ARTHRODESIS; SUBTALAR ARTHRODESIS; POSSIBLE EXTERNAL FIXATION, RIGHT ANKLE;  Surgeon: Shahbaz Reddy DPM;  Location:  [facility] OR;  Service: Podiatry;  Laterality: Right;    PATENT FORAMEN OVALE CLOSURE      ROTATOR CUFF REPAIR Right     SUBTALAR ARTHRODESIS Right 07/11/2023    Procedure: SUBTALAR ARTHRODESIS;  Surgeon: Shahbaz Reddy DPM;  Location:  [facility] OR;  Service: Podiatry;  Laterality: Right;       Family History  Family History   Problem Relation Age of Onset    Hypertension Mother     Hypertension Father        Social History  Social History     Socioeconomic History    Marital status:    Tobacco Use    Smoking status: Never    Smokeless tobacco: Never   Vaping Use    Vaping status: Never Used   Substance and Sexual Activity    Alcohol use: Never    Drug use: Never    Sexual activity: Not Currently     Partners: Female       Review of Systems:  History obtained from chart review and the patient  General ROS: No fever or chills  Respiratory ROS: No cough, shortness of breath, wheezing  Cardiovascular ROS: No chest pain or palpitations  Gastrointestinal ROS: No abdominal pain or melena  Genito-Urinary ROS: No dysuria or hematuria  Psych ROS: No anxiety and depression  14 point ROS reviewed with the patient and negative except as noted above and in the HPI unless unable to obtain.    Objective:  Patient Vitals for the past 24 hrs:   BP Temp Temp src Pulse Resp SpO2   05/29/24 0723 142/73 98.1 °F (36.7 °C) Oral 70 16 96 %   05/29/24 0503 136/73 97.6 °F (36.4 °C) Oral 72 16 97 %   05/29/24 0010 127/63 97.9 °F (36.6 °C) Oral 71 16 96 %    05/28/24 1946 162/82 97.8 °F (36.6 °C) Oral 79 16 97 %   05/28/24 1446 152/80 97.9 °F (36.6 °C) Oral 79 16 99 %   05/28/24 1153 146/91 97.7 °F (36.5 °C) Oral 70 16 96 %       Intake/Output Summary (Last 24 hours) at 5/29/2024 0921  Last data filed at 5/29/2024 0851  Gross per 24 hour   Intake 1800 ml   Output --   Net 1800 ml     General: awake/alert   Chest:  clear to auscultation bilaterally without respiratory distress  CVS: regular rate and rhythm  Abdominal: soft, nontender, positive bowel sounds  Extremities: no cyanosis or edema  Skin: warm and dry without rash      Labs:  Results from last 7 days   Lab Units 05/29/24  0433 05/28/24  0526 05/27/24  0344   WBC 10*3/mm3 7.43 8.26 6.43   HEMOGLOBIN g/dL 7.2* 7.8* 7.4*   HEMATOCRIT % 24.2* 26.0* 24.0*   PLATELETS 10*3/mm3 241 275 258         Results from last 7 days   Lab Units 05/29/24  0432 05/28/24  0526 05/27/24  0344   SODIUM mmol/L 143 141 143   POTASSIUM mmol/L 3.8 4.2 4.1   CHLORIDE mmol/L 105 101 101   CO2 mmol/L 32.0* 33.0* 34.0*   BUN mg/dL 55* 72* 90*   CREATININE mg/dL 1.61* 2.10* 2.96*   CALCIUM mg/dL 8.2* 8.7 9.0   EGFR mL/min/1.73 49.3* 35.8* 23.7*   BILIRUBIN mg/dL 0.2 0.3 0.2   ALK PHOS U/L 120* 134* 123*   ALT (SGPT) U/L 11 15 14   AST (SGOT) U/L 10 11 11   GLUCOSE mg/dL 95 206* 126*       Radiology:   Imaging Results (Last 72 Hours)       Procedure Component Value Units Date/Time    US Renal Bilateral [[id number]] Collected: 05/27/24 1259     Updated: 05/27/24 1303    Narrative:      US RENAL BILATERAL- 5/27/2024 11:11 AM     HISTORY: Acute kidney injury; N17.9-Acute kidney failure, unspecified;  S91.301S-Unspecified open wound, right foot, sequela     COMPARISON: None available.       TECHNIQUE: Multiple longitudinal and transverse real-time sonographic  images of the kidneys and urinary bladder are obtained. Report and  images stored per institutional and state regulations.           FINDINGS:     Visualized proximal abdominal aorta and IVC  "are unremarkable.        RIGHT KIDNEY: Right kidney is 11.5 cm in length. Renal cortex is  unremarkable. There is no hydronephrosis..     LEFT KIDNEY: Left kidney is 11.5 cm in length. Left renal cortex is  unremarkable. There is no left hydronephrosis.     PELVIS: Urinary bladder is grossly unremarkable.          Impression:      Unremarkable kidneys with no hydronephrosis..           This report was signed and finalized on 5/27/2024 1:00 PM by Kuldip Mcclendon.               Culture:  No results found for: \"BLOODCX\", \"URINECX\", \"WOUNDCX\", \"MRSACX\", \"RESPCX\", \"STOOLCX\"      Assessment    Acute kidney injury, prerenal--improving  New onset atrial fibrillation   Type 2 diabetes  Iron deficiency anemia  Right lowe extremity recurrent infection    Plan:   Wean\ IV fluids, encourage PO intake  Monitor labs, renal function with steady improvement   Noted plans for upcoming right BKA      Chon Sanders, APRN  5/29/2024  09:21 CDT    "

## 2024-05-29 NOTE — DISCHARGE SUMMARY
[facility]  DISCHARGE SUMMARY       Date of Admission: 5/25/2024  Date of Discharge:  5/29/2024  Primary Care Physician: Mehul Andersen MD    Presenting Problem/History of Present Illness:  bleeding right lower extremity chronic wound     Final Discharge Diagnoses:  Active Hospital Problems    Diagnosis     **TARSHA (acute kidney injury)     Bleeding from wound     Diarrhea of presumed infectious origin     Pulmonary HTN     PAF (paroxysmal atrial fibrillation) new onset     Type 2 diabetes mellitus with hyperglycemia, without long-term current use of insulin        Consults: Dr. Shaw with nephrology.    Procedures Performed: none.    Pertinent Test Results:   Results for orders placed during the hospital encounter of 03/11/24    Adult Transthoracic Echo Complete w/ Color, Spectral and Contrast if Necessary Per Protocol    Interpretation Summary    Left ventricular ejection fraction appears to be 56 - 60%.    Left ventricular wall thickness is consistent with mild concentric hypertrophy.    The left atrial cavity is moderately dilated.    Left atrial volume is severely increased.    Moderate pulmonary hypertension is present.    Apical RV free wall hypokinesis      Imaging Results (All)       Procedure Component Value Units Date/Time    US Renal Bilateral [[id number]] Collected: 05/27/24 1259     Updated: 05/27/24 1303    Narrative:      US RENAL BILATERAL- 5/27/2024 11:11 AM     HISTORY: Acute kidney injury; N17.9-Acute kidney failure, unspecified;  S91.301S-Unspecified open wound, right foot, sequela     COMPARISON: None available.       TECHNIQUE: Multiple longitudinal and transverse real-time sonographic  images of the kidneys and urinary bladder are obtained. Report and  images stored per institutional and state regulations.           FINDINGS:     Visualized proximal abdominal aorta and IVC are unremarkable.        RIGHT KIDNEY: Right kidney is 11.5 cm in  length. Renal cortex is  unremarkable. There is no hydronephrosis..     LEFT KIDNEY: Left kidney is 11.5 cm in length. Left renal cortex is  unremarkable. There is no left hydronephrosis.     PELVIS: Urinary bladder is grossly unremarkable.          Impression:      Unremarkable kidneys with no hydronephrosis..           This report was signed and finalized on 5/27/2024 1:00 PM by Kuldip Mcclendon.             LAB RESULTS:      Lab 05/29/24  0433 05/28/24  0526 05/27/24  0344 05/26/24  0605 05/26/24  0021   WBC 7.43 8.26 6.43 8.66 9.85   HEMOGLOBIN 7.2* 7.8* 7.4* 9.1* 8.1*   HEMATOCRIT 24.2* 26.0* 24.0* 29.7* 26.1*   PLATELETS 241 275 258 346 294   NEUTROS ABS 4.69 5.81 3.75 5.67 6.60   IMMATURE GRANS (ABS) 0.13* 0.17* 0.11* 0.22* 0.24*   LYMPHS ABS 1.84 1.59 1.74 1.87 1.87   MONOS ABS 0.57 0.48 0.58 0.70 0.90   EOS ABS 0.17 0.18 0.22 0.16 0.19   MCV 88.6 87.5 86.6 86.8 85.9   PROTIME  --   --   --   --  16.6*   APTT  --   --   --   --  38.3*         Lab 05/29/24  0432 05/28/24  0526 05/27/24  0344 05/26/24  0605 05/26/24  0021   SODIUM 143 141 143 138 136   POTASSIUM 3.8 4.2 4.1 4.2 4.1   CHLORIDE 105 101 101 93* 93*   CO2 32.0* 33.0* 34.0* 32.0* 29.0   ANION GAP 6.0 7.0 8.0 13.0 14.0   BUN 55* 72* 90* 108* 112*   CREATININE 1.61* 2.10* 2.96* 4.05* 4.59*   EGFR 49.3* 35.8* 23.7* 16.3* 14.0*   GLUCOSE 95 206* 126* 289* 276*   CALCIUM 8.2* 8.7 9.0 9.5 9.1         Lab 05/29/24  0432 05/28/24  0526 05/27/24  0344 05/26/24  0605 05/26/24  0021   TOTAL PROTEIN 6.5 6.8 6.4 7.7 6.9   ALBUMIN 2.7* 2.9* 2.7* 3.3* 2.9*   GLOBULIN 3.8 3.9 3.7 4.4 4.0   ALT (SGPT) 11 15 14 18 17   AST (SGOT) 10 11 11 12 16   BILIRUBIN 0.2 0.3 0.2 0.4 0.3   ALK PHOS 120* 134* 123* 162* 149*         Lab 05/26/24  0021   PROTIME 16.6*   INR 1.29*             Lab 05/26/24  0605   IRON 18*   IRON SATURATION (TSAT) 6*   TIBC 277*   TRANSFERRIN 186*         Brief Urine Lab Results  (Last result in the past 365 days)        Color   Clarity   Blood    "Leuk Est   Nitrite   Protein   CREAT   Urine HCG        05/26/24 1558             29.2               Microbiology Results (last 10 days)       ** No results found for the last 240 hours. **            Hospital Course:   Mr. Rodriguez is a 59-year-old male who presented to [facility] on 5/25 for bleeding right lower extremity chronic wound. He is scheduled to have a right amputation below the knee with Dr. Hawk on Nasra 3, 2024. He states the wound started bleeding a lot, and he wanted to come and have it checked.  He had some nausea, vomiting and diarrhea prior to admission.  Of note, he was hospitalized in March for hypervolemia/anasarca.  Echocardiogram revealed pulmonary hypertension with no evidence of left heart failure and no diastolic dysfunction. He was sent home with high-dose loop diuretic, metolazone and ACE inhibitor.  He does take Eliquis for history of paroxysmal atrial fibrillation.  In the ED, creatinine 4.59.  He was started on IV fluid.     Acute kidney injury with creatinine 4.59.  Prerenal azotemia related to use of diuretic. Creatinine on 4/30 was 1.27.  Creatinine today improved with IV fluid administration at 1.61.  Renal ultrasound showed no hydronephrosis.  Recommend to discontinue metolazone, Lasix daily as needed for weight gain.  Will resume lisinopril at decreased dose 10 mg daily.     He is followed by wound care clinic.  He was started on 10-day course of Omnicef and doxycycline on 5/17 for cellulitis to right foot.  X-ray on 5/17 showed \"interval removal of fusion hardware with placement of intramedullary sebastian at the tibia and cage in the region of the talus. There is  surrounding lucency in the region of the talus around the cage which was  present on the prior examination but increased. However lucency surrounding the intramedullary sebastian is new from the prior examination. This is worrisome for loosening/infection.\"  Continue doxycycline and ceftriaxone.  WBC normal.  No " "fever.     He has a history of atrial fibrillation and is chronically anticoagulated on Eliquis.  Eliquis held on admission given significant bleeding to right lower extremity wound. Hemoglobin 8.1 on admission.  He was given 1 dose of IV iron.  Hemoglobin up to 7.8 with repeat 7.2 today.  Bleeding from wound has improved.  In the setting of surgery next week, will plan for 1 unit PRBC today.  He needs to discuss with Dr. Hawk regarding when to hold Eliquis prior to surgery -discussed with patient and his wife Ms. Rodriguez at bedside.    Patient states overall he is feeling well.  He denies any acute complaints.  He has reached maximum benefit of hospitalization.  He is medically stable and appropriate for discharge today.    Physical Exam on Discharge:  /73 (BP Location: Right arm, Patient Position: Lying)   Pulse 70   Temp 98.1 °F (36.7 °C) (Oral)   Resp 16   Ht 177.8 cm (70\")   Wt 80.1 kg (176 lb 9.6 oz)   SpO2 96%   BMI 25.34 kg/m²   Physical Exam  Vitals reviewed.   Constitutional:       General: He is not in acute distress.     Appearance: He is not toxic-appearing.      Comments: Lying in bed.  No acute distress.  On room air.  No family at bedside.   HENT:      Head: Normocephalic and atraumatic.      Mouth/Throat:      Mouth: Mucous membranes are moist.      Pharynx: Oropharynx is clear.   Eyes:      Extraocular Movements: Extraocular movements intact.      Conjunctiva/sclera: Conjunctivae normal.      Pupils: Pupils are equal, round, and reactive to light.   Cardiovascular:      Rate and Rhythm: Normal rate and regular rhythm.      Pulses: Normal pulses.   Pulmonary:      Effort: Pulmonary effort is normal. No respiratory distress.      Breath sounds: Normal breath sounds. No wheezing or rhonchi.   Abdominal:      General: Bowel sounds are normal. There is no distension.      Palpations: Abdomen is soft.      Tenderness: There is no abdominal tenderness.   Musculoskeletal:         " General: No swelling or tenderness. Normal range of motion.      Cervical back: Normal range of motion and neck supple. No muscular tenderness.   Skin:     General: Skin is warm and dry.      Findings: No erythema or rash.   Neurological:      General: No focal deficit present.      Mental Status: He is alert and oriented to person, place, and time.      Cranial Nerves: No cranial nerve deficit.      Motor: No weakness.   Psychiatric:         Mood and Affect: Mood normal.         Behavior: Behavior normal.                Condition on Discharge: medically stable.    Discharge Disposition:  Home or Self Care    Discharge Medications:     Discharge Medications        New Medications        Instructions Start Date   ascorbic acid 500 MG tablet  Commonly known as: VITAMIN C   500 mg, Oral, Daily   Start Date: May 30, 2024     zinc sulfate 220 (50 Zn) MG capsule  Commonly known as: ZINCATE   220 mg, Oral, Daily   Start Date: May 30, 2024            Changes to Medications        Instructions Start Date   furosemide 40 MG tablet  Commonly known as: LASIX  What changed:   when to take this  reasons to take this   40 mg, Oral, Daily PRN      lisinopril 20 MG tablet  Commonly known as: PRINIVILZESTRIL  What changed: how much to take   10 mg, Oral, Every 24 Hours Scheduled             Continue These Medications        Instructions Start Date   apixaban 5 MG tablet tablet  Commonly known as: ELIQUIS   5 mg, Oral, 2 Times Daily      cefdinir 300 MG capsule  Commonly known as: OMNICEF   300 mg, Oral, 2 Times Daily      doxycycline 100 MG capsule  Commonly known as: VIBRAMYCIN   1 capsule, Oral, Every 12 Hours Scheduled      HYDROcodone-acetaminophen  MG per tablet  Commonly known as: NORCO   1 tablet, Oral, Every 8 Hours PRN      metFORMIN 1000 MG tablet  Commonly known as: GLUCOPHAGE   1,000 mg, Oral, 2 Times Daily With Meals   Start Date: May 30, 2024     naloxone 4 MG/0.1ML nasal spray  Commonly known as: NARCAN   Call  911. Don't prime. Spray in 1 nostril for overdose. Repeat in 2-3 minutes in other nostril if no or minimal breathing/responsiveness.      pregabalin 100 MG capsule  Commonly known as: LYRICA   100 mg, Oral, 3 Times Daily             Stop These Medications      metOLazone 2.5 MG tablet  Commonly known as: ZAROXOLYN            Discharge Diet:   Diet Instructions       Diet: Regular/House Diet, Cardiac Diets, Diabetic Diets; Healthy Heart (2-3 Na+); Regular (IDDSI 7); Thin (IDDSI 0); Consistent Carbohydrate      Discharge Diet:  Regular/House Diet  Cardiac Diets  Diabetic Diets       Cardiac Diet: Healthy Heart (2-3 Na+)    Texture: Regular (IDDSI 7)    Fluid Consistency: Thin (IDDSI 0)    Diabetic Diet: Consistent Carbohydrate            Activity at Discharge:   Activity Instructions       Activity as Tolerated              Follow-up Appointments:   1.  Follow-up with primary care provider 1 week.  2.  Follow-up with Dr. Hawk as planned for right BKA.  Future Appointments   Date Time Provider Department Center   5/30/2024 10:00 AM Violetta Fritz APRN [facility] CD  [facility]       Test Results Pending at Discharge: none.    Electronically signed by MEGHANN Mckeon, 05/29/24, 11:13 CDT.    Time: 35 minutes.

## 2024-05-29 NOTE — PAYOR COMM NOTE
"JenniferGarrett kan (59 y.o. Male)   [id number]       PLEASE  check for me admit date should be  5/25  [facility] phone    fax      Today 5/29 planning d/c    but has not left yet          Date of Birth   1965    Social Security Number       Address   [facility] [address] [place] [id number]    Home Phone   [phone]    MRN   [id number]       L[employer]-day   [employer]    Marital Status                               Admission Date   5/25/24    Admission Type   Emergency    Admitting Provider   Moy Trujillo,     Attending Provider   Moy Trujillo DO    Department, Room/Bed   [facility] 3C, 371/1       Discharge Date       Discharge Disposition   Home or Self Care    Discharge Destination                                 Attending Provider: Moy Trujillo DO    Allergies: No Known Allergies    Isolation: None   Infection: None   Code Status: CPR    Ht: 177.8 cm (70\")   Wt: 80.1 kg (176 lb 9.6 oz)    Admission Cmt: None   Principal Problem: TARSHA (acute kidney injury) [N17.9]                   Active Insurance as of 5/25/2024       Primary Coverage       Payor Plan Insurance Group Employer/Plan Group    [employer] [id number]       Payor Plan Address Payor Plan Phone Number Payor Plan Fax Number Effective Dates    [address] [phone]  6/1/2022 - None Entered    [place] [address]         Subscriber Name Subscriber Birth Date Member ID       GARRETT COATES 1965 [id number]                     Emergency Contacts        (Rel.) Home Phone Work Phone Mobile Phone    JenniferGwen (Spouse) -- -- [phone]                 Discharge Summary        Camilla Rider APRN at 05/29/24 1110                [facility] Hospital Medicine Services  DISCHARGE SUMMARY       Date of Admission: 5/25/2024  Date of Discharge:  5/29/2024  Primary Care Physician: Ganesh" Mehul LOPEZ MD    Presenting Problem/History of Present Illness:  bleeding right lower extremity chronic wound     Final Discharge Diagnoses:  Active Hospital Problems    Diagnosis     **TARSHA (acute kidney injury)     Bleeding from wound     Diarrhea of presumed infectious origin     Pulmonary HTN     PAF (paroxysmal atrial fibrillation) new onset     Type 2 diabetes mellitus with hyperglycemia, without long-term current use of insulin        Consults: Dr. Shaw with nephrology.    Procedures Performed: none.    Pertinent Test Results:   Results for orders placed during the hospital encounter of 03/11/24    Adult Transthoracic Echo Complete w/ Color, Spectral and Contrast if Necessary Per Protocol    Interpretation Summary    Left ventricular ejection fraction appears to be 56 - 60%.    Left ventricular wall thickness is consistent with mild concentric hypertrophy.    The left atrial cavity is moderately dilated.    Left atrial volume is severely increased.    Moderate pulmonary hypertension is present.    Apical RV free wall hypokinesis      Imaging Results (All)       Procedure Component Value Units Date/Time    US Renal Bilateral [[id number]] Collected: 05/27/24 1259     Updated: 05/27/24 1303    Narrative:      US RENAL BILATERAL- 5/27/2024 11:11 AM     HISTORY: Acute kidney injury; N17.9-Acute kidney failure, unspecified;  S91.301S-Unspecified open wound, right foot, sequela     COMPARISON: None available.       TECHNIQUE: Multiple longitudinal and transverse real-time sonographic  images of the kidneys and urinary bladder are obtained. Report and  images stored per institutional and state regulations.           FINDINGS:     Visualized proximal abdominal aorta and IVC are unremarkable.        RIGHT KIDNEY: Right kidney is 11.5 cm in length. Renal cortex is  unremarkable. There is no hydronephrosis..     LEFT KIDNEY: Left kidney is 11.5 cm in length. Left renal cortex is  unremarkable. There is no left  hydronephrosis.     PELVIS: Urinary bladder is grossly unremarkable.          Impression:      Unremarkable kidneys with no hydronephrosis..           This report was signed and finalized on 5/27/2024 1:00 PM by Kuldip Mcclendon.             LAB RESULTS:      Lab 05/29/24 0433 05/28/24 0526 05/27/24 0344 05/26/24  0605 05/26/24  0021   WBC 7.43 8.26 6.43 8.66 9.85   HEMOGLOBIN 7.2* 7.8* 7.4* 9.1* 8.1*   HEMATOCRIT 24.2* 26.0* 24.0* 29.7* 26.1*   PLATELETS 241 275 258 346 294   NEUTROS ABS 4.69 5.81 3.75 5.67 6.60   IMMATURE GRANS (ABS) 0.13* 0.17* 0.11* 0.22* 0.24*   LYMPHS ABS 1.84 1.59 1.74 1.87 1.87   MONOS ABS 0.57 0.48 0.58 0.70 0.90   EOS ABS 0.17 0.18 0.22 0.16 0.19   MCV 88.6 87.5 86.6 86.8 85.9   PROTIME  --   --   --   --  16.6*   APTT  --   --   --   --  38.3*         Lab 05/29/24 0432 05/28/24 0526 05/27/24 0344 05/26/24  0605 05/26/24  0021   SODIUM 143 141 143 138 136   POTASSIUM 3.8 4.2 4.1 4.2 4.1   CHLORIDE 105 101 101 93* 93*   CO2 32.0* 33.0* 34.0* 32.0* 29.0   ANION GAP 6.0 7.0 8.0 13.0 14.0   BUN 55* 72* 90* 108* 112*   CREATININE 1.61* 2.10* 2.96* 4.05* 4.59*   EGFR 49.3* 35.8* 23.7* 16.3* 14.0*   GLUCOSE 95 206* 126* 289* 276*   CALCIUM 8.2* 8.7 9.0 9.5 9.1         Lab 05/29/24 0432 05/28/24 0526 05/27/24 0344 05/26/24  0605 05/26/24  0021   TOTAL PROTEIN 6.5 6.8 6.4 7.7 6.9   ALBUMIN 2.7* 2.9* 2.7* 3.3* 2.9*   GLOBULIN 3.8 3.9 3.7 4.4 4.0   ALT (SGPT) 11 15 14 18 17   AST (SGOT) 10 11 11 12 16   BILIRUBIN 0.2 0.3 0.2 0.4 0.3   ALK PHOS 120* 134* 123* 162* 149*         Lab 05/26/24  0021   PROTIME 16.6*   INR 1.29*             Lab 05/26/24  0605   IRON 18*   IRON SATURATION (TSAT) 6*   TIBC 277*   TRANSFERRIN 186*         Brief Urine Lab Results  (Last result in the past 365 days)        Color   Clarity   Blood   Leuk Est   Nitrite   Protein   CREAT   Urine HCG        05/26/24 1558             29.2               Microbiology Results (last 10 days)       ** No results found for the  "last 240 hours. **            Hospital Course:   Mr. Rodriguez is a 59-year-old male who presented to [facility] on 5/25 for bleeding right lower extremity chronic wound. He is scheduled to have a right amputation below the knee with Dr. Hawk on Nasra 3, 2024. He states the wound started bleeding a lot, and he wanted to come and have it checked.  He had some nausea, vomiting and diarrhea prior to admission.  Of note, he was hospitalized in March for hypervolemia/anasarca.  Echocardiogram revealed pulmonary hypertension with no evidence of left heart failure and no diastolic dysfunction. He was sent home with high-dose loop diuretic, metolazone and ACE inhibitor.  He does take Eliquis for history of paroxysmal atrial fibrillation.  In the ED, creatinine 4.59.  He was started on IV fluid.     Acute kidney injury with creatinine 4.59.  Prerenal azotemia related to use of diuretic. Creatinine on 4/30 was 1.27.  Creatinine today improved with IV fluid administration at 1.61.  Renal ultrasound showed no hydronephrosis.  Recommend to discontinue metolazone, Lasix daily as needed for weight gain.  Will resume lisinopril at decreased dose 10 mg daily.     He is followed by wound care clinic.  He was started on 10-day course of Omnicef and doxycycline on 5/17 for cellulitis to right foot.  X-ray on 5/17 showed \"interval removal of fusion hardware with placement of intramedullary sebastian at the tibia and cage in the region of the talus. There is  surrounding lucency in the region of the talus around the cage which was  present on the prior examination but increased. However lucency surrounding the intramedullary sebastian is new from the prior examination. This is worrisome for loosening/infection.\"  Continue doxycycline and ceftriaxone.  WBC normal.  No fever.     He has a history of atrial fibrillation and is chronically anticoagulated on Eliquis.  Eliquis held on admission given significant bleeding to right lower " "extremity wound. Hemoglobin 8.1 on admission.  He was given 1 dose of IV iron.  Hemoglobin up to 7.8 with repeat 7.2 today.  Bleeding from wound has improved.  In the setting of surgery next week, will plan for 1 unit PRBC today.  He needs to discuss with Dr. Hawk regarding when to hold Eliquis prior to surgery -discussed with patient and his wife Ms. Rodriguez at bedside.    Patient states overall he is feeling well.  He denies any acute complaints.  He has reached maximum benefit of hospitalization.  He is medically stable and appropriate for discharge today.    Physical Exam on Discharge:  /73 (BP Location: Right arm, Patient Position: Lying)   Pulse 70   Temp 98.1 °F (36.7 °C) (Oral)   Resp 16   Ht 177.8 cm (70\")   Wt 80.1 kg (176 lb 9.6 oz)   SpO2 96%   BMI 25.34 kg/m²   Physical Exam  Vitals reviewed.   Constitutional:       General: He is not in acute distress.     Appearance: He is not toxic-appearing.      Comments: Lying in bed.  No acute distress.  On room air.  No family at bedside.   HENT:      Head: Normocephalic and atraumatic.      Mouth/Throat:      Mouth: Mucous membranes are moist.      Pharynx: Oropharynx is clear.   Eyes:      Extraocular Movements: Extraocular movements intact.      Conjunctiva/sclera: Conjunctivae normal.      Pupils: Pupils are equal, round, and reactive to light.   Cardiovascular:      Rate and Rhythm: Normal rate and regular rhythm.      Pulses: Normal pulses.   Pulmonary:      Effort: Pulmonary effort is normal. No respiratory distress.      Breath sounds: Normal breath sounds. No wheezing or rhonchi.   Abdominal:      General: Bowel sounds are normal. There is no distension.      Palpations: Abdomen is soft.      Tenderness: There is no abdominal tenderness.   Musculoskeletal:         General: No swelling or tenderness. Normal range of motion.      Cervical back: Normal range of motion and neck supple. No muscular tenderness.   Skin:     General: Skin is " warm and dry.      Findings: No erythema or rash.   Neurological:      General: No focal deficit present.      Mental Status: He is alert and oriented to person, place, and time.      Cranial Nerves: No cranial nerve deficit.      Motor: No weakness.   Psychiatric:         Mood and Affect: Mood normal.         Behavior: Behavior normal.                Condition on Discharge: medically stable.    Discharge Disposition:  Home or Self Care    Discharge Medications:     Discharge Medications        New Medications        Instructions Start Date   ascorbic acid 500 MG tablet  Commonly known as: VITAMIN C   500 mg, Oral, Daily   Start Date: May 30, 2024     zinc sulfate 220 (50 Zn) MG capsule  Commonly known as: ZINCATE   220 mg, Oral, Daily   Start Date: May 30, 2024            Changes to Medications        Instructions Start Date   furosemide 40 MG tablet  Commonly known as: LASIX  What changed:   when to take this  reasons to take this   40 mg, Oral, Daily PRN      lisinopril 20 MG tablet  Commonly known as: PRINIVILZESTRIL  What changed: how much to take   10 mg, Oral, Every 24 Hours Scheduled             Continue These Medications        Instructions Start Date   apixaban 5 MG tablet tablet  Commonly known as: ELIQUIS   5 mg, Oral, 2 Times Daily      cefdinir 300 MG capsule  Commonly known as: OMNICEF   300 mg, Oral, 2 Times Daily      doxycycline 100 MG capsule  Commonly known as: VIBRAMYCIN   1 capsule, Oral, Every 12 Hours Scheduled      HYDROcodone-acetaminophen  MG per tablet  Commonly known as: NORCO   1 tablet, Oral, Every 8 Hours PRN      metFORMIN 1000 MG tablet  Commonly known as: GLUCOPHAGE   1,000 mg, Oral, 2 Times Daily With Meals   Start Date: May 30, 2024     naloxone 4 MG/0.1ML nasal spray  Commonly known as: NARCAN   Call 911. Don't prime. [place] in 1 nostril for overdose. Repeat in 2-3 minutes in other nostril if no or minimal breathing/responsiveness.      pregabalin 100 MG capsule  Commonly  known as: LYRICA   100 mg, Oral, 3 Times Daily             Stop These Medications      metOLazone 2.5 MG tablet  Commonly known as: ZAROXOLYN            Discharge Diet:   Diet Instructions       Diet: Regular/House Diet, Cardiac Diets, Diabetic Diets; Healthy Heart (2-3 Na+); Regular (IDDSI 7); Thin (IDDSI 0); Consistent Carbohydrate      Discharge Diet:  Regular/House Diet  Cardiac Diets  Diabetic Diets       Cardiac Diet: Healthy Heart (2-3 Na+)    Texture: Regular (IDDSI 7)    Fluid Consistency: Thin (IDDSI 0)    Diabetic Diet: Consistent Carbohydrate            Activity at Discharge:   Activity Instructions       Activity as Tolerated              Follow-up Appointments:   1.  Follow-up with primary care provider 1 week.  2.  Follow-up with Dr. Hawk as planned for right BKA.  Future Appointments   Date Time Provider Department Center   5/30/2024 10:00 AM Violetta Fritz APRN [facility] CD  [facility]       Test Results Pending at Discharge: none.    Electronically signed by MEGHANN Mckeon, 05/29/24, 11:13 CDT.    Time: 35 minutes.           Electronically signed by Camilla Rider APRN at 05/29/24 3276

## 2024-05-30 LAB
BH BB BLOOD EXPIRATION DATE: NORMAL
BH BB BLOOD TYPE BARCODE: 6200
BH BB DISPENSE STATUS: NORMAL
BH BB PRODUCT CODE: NORMAL
BH BB UNIT NUMBER: NORMAL
CROSSMATCH INTERPRETATION: NORMAL
UNIT  ABO: NORMAL
UNIT  RH: NORMAL

## 2024-05-30 NOTE — OUTREACH NOTE
Prep Survey      Flowsheet Row Responses   [facility] facility patient discharged from? [facility]   Is LACE score < 7 ? No   Eligibility Readm Mgmt   Discharge diagnosis TARSHA (acute kidney injury)   Does the patient have one of the following disease processes/diagnoses(primary or secondary)? Other   Does the patient have Home health ordered? No   Is there a DME ordered? No   Prep survey completed? Yes            Katherine SHAW - Registered Nurse

## 2024-05-30 NOTE — PAYOR COMM NOTE
"REF:   [id number]     [facility]  FAX  [phone]     Garrett Coates (59 y.o. Male)       Date of Birth   1965    Social Security Number       Address   [address] [place] [address]    Home Phone   [phone]    MRN   [id number]       Mormon   Yazdanism    Marital Status                               Admission Date   5/25/24    Admission Type   Emergency    Admitting Provider   Moy Trujillo DO    Attending Provider       Department, Room/Bed   [facility] 3C, 371/1       Discharge Date   5/29/2024    Discharge Disposition   Home or Self Care    Discharge Destination                                 Attending Provider: (none)   Allergies: No Known Allergies    Isolation: None   Infection: None   Code Status: Prior    Ht: 177.8 cm (70\")   Wt: 80.1 kg (176 lb 9.6 oz)    Admission Cmt: None   Principal Problem: TARSHA (acute kidney injury) [N17.9]                   Active Insurance as of 5/25/2024       Primary Coverage       Payor Plan Insurance Group Employer/Plan Group    ANTH SWYF ANTH PATHWAY HMO [id number]       Payor Plan Address Payor Plan Phone Number Payor Plan Fax Number Effective Dates    [address] [phone]  6/1/2022 - None Entered    [place] [address]         Subscriber Name Subscriber Birth Date Member ID       GARRETT COATES 1965 [id number]                     Emergency Contacts        (Rel.) Home Phone Work Phone Mobile Phone    Gwen Coates (Spouse) -- -- [phone]                 Discharge Summary        Camilla Rider APRN at 05/29/24 1110       Attestation signed by Moy Trujillo DO at 05/29/24 1906    I performed a substantive part of the MDM during the patient’s E/M visit. I personally evaluated   and examined the patient. I personally made or approved the documented management plan and acknowledge its risk   of complications.   (Independent Interpretation) My (EKG/X-Ray/US/CT) " interpretation - reviewed prior  (Discussion) Management/test interpretation discussed with MEGHANN Clarke.     Patient seen and examined with nursing at bedside.  Doing well today.  Eating lunch.  Feels well.  Was getting a unit of PRBC prior to discharge.  No signs of any significant active ongoing bleeding with his foot but hemoglobins have been stable in the low sevens with plans for amputation on Monday giving an extra unit of blood to help prep for OR.  Further blood monitoring and transfusion needs per surgery team in follow-up.  Continue antibiotics as prior.  Okay for discharge home today.    Electronically signed by Moy Trujillo DO, 5/29/2024, 19:05 CDT.                        [facility]  DISCHARGE SUMMARY       Date of Admission: 5/25/2024  Date of Discharge:  5/29/2024  Primary Care Physician: Mehul Andersen MD    Presenting Problem/History of Present Illness:  bleeding right lower extremity chronic wound     Final Discharge Diagnoses:  Active Hospital Problems    Diagnosis     **TARSHA (acute kidney injury)     Bleeding from wound     Diarrhea of presumed infectious origin     Pulmonary HTN     PAF (paroxysmal atrial fibrillation) new onset     Type 2 diabetes mellitus with hyperglycemia, without long-term current use of insulin        Consults: Dr. Shaw with nephrology.    Procedures Performed: none.    Pertinent Test Results:   Results for orders placed during the hospital encounter of 03/11/24    Adult Transthoracic Echo Complete w/ Color, Spectral and Contrast if Necessary Per Protocol    Interpretation Summary    Left ventricular ejection fraction appears to be 56 - 60%.    Left ventricular wall thickness is consistent with mild concentric hypertrophy.    The left atrial cavity is moderately dilated.    Left atrial volume is severely increased.    Moderate pulmonary hypertension is present.    Apical RV free wall hypokinesis      Imaging  Results (All)       Procedure Component Value Units Date/Time    US Renal Bilateral [[id number]] Collected: 05/27/24 1259     Updated: 05/27/24 1303    Narrative:      US RENAL BILATERAL- 5/27/2024 11:11 AM     HISTORY: Acute kidney injury; N17.9-Acute kidney failure, unspecified;  S91.301S-Unspecified open wound, right foot, sequela     COMPARISON: None available.       TECHNIQUE: Multiple longitudinal and transverse real-time sonographic  images of the kidneys and urinary bladder are obtained. Report and  images stored per institutional and state regulations.           FINDINGS:     Visualized proximal abdominal aorta and IVC are unremarkable.        RIGHT KIDNEY: Right kidney is 11.5 cm in length. Renal cortex is  unremarkable. There is no hydronephrosis..     LEFT KIDNEY: Left kidney is 11.5 cm in length. Left renal cortex is  unremarkable. There is no left hydronephrosis.     PELVIS: Urinary bladder is grossly unremarkable.          Impression:      Unremarkable kidneys with no hydronephrosis..           This report was signed and finalized on 5/27/2024 1:00 PM by Kuldip Mcclendon.             LAB RESULTS:      Lab 05/29/24 0433 05/28/24 0526 05/27/24  0344 05/26/24  0605 05/26/24  0021   WBC 7.43 8.26 6.43 8.66 9.85   HEMOGLOBIN 7.2* 7.8* 7.4* 9.1* 8.1*   HEMATOCRIT 24.2* 26.0* 24.0* 29.7* 26.1*   PLATELETS 241 275 258 346 294   NEUTROS ABS 4.69 5.81 3.75 5.67 6.60   IMMATURE GRANS (ABS) 0.13* 0.17* 0.11* 0.22* 0.24*   LYMPHS ABS 1.84 1.59 1.74 1.87 1.87   MONOS ABS 0.57 0.48 0.58 0.70 0.90   EOS ABS 0.17 0.18 0.22 0.16 0.19   MCV 88.6 87.5 86.6 86.8 85.9   PROTIME  --   --   --   --  16.6*   APTT  --   --   --   --  38.3*         Lab 05/29/24  0432 05/28/24 0526 05/27/24  0344 05/26/24  0605 05/26/24  0021   SODIUM 143 141 143 138 136   POTASSIUM 3.8 4.2 4.1 4.2 4.1   CHLORIDE 105 101 101 93* 93*   CO2 32.0* 33.0* 34.0* 32.0* 29.0   ANION GAP 6.0 7.0 8.0 13.0 14.0   BUN 55* 72* 90* 108* 112*   CREATININE  1.61* 2.10* 2.96* 4.05* 4.59*   EGFR 49.3* 35.8* 23.7* 16.3* 14.0*   GLUCOSE 95 206* 126* 289* 276*   CALCIUM 8.2* 8.7 9.0 9.5 9.1         Lab 05/29/24  0432 05/28/24  0526 05/27/24  0344 05/26/24  0605 05/26/24  0021   TOTAL PROTEIN 6.5 6.8 6.4 7.7 6.9   ALBUMIN 2.7* 2.9* 2.7* 3.3* 2.9*   GLOBULIN 3.8 3.9 3.7 4.4 4.0   ALT (SGPT) 11 15 14 18 17   AST (SGOT) 10 11 11 12 16   BILIRUBIN 0.2 0.3 0.2 0.4 0.3   ALK PHOS 120* 134* 123* 162* 149*         Lab 05/26/24  0021   PROTIME 16.6*   INR 1.29*             Lab 05/26/24  0605   IRON 18*   IRON SATURATION (TSAT) 6*   TIBC 277*   TRANSFERRIN 186*         Brief Urine Lab Results  (Last result in the past 365 days)        Color   Clarity   Blood   Leuk Est   Nitrite   Protein   CREAT   Urine HCG        05/26/24 1558             29.2               Microbiology Results (last 10 days)       ** No results found for the last 240 hours. **            Hospital Course:   Mr. Rodriguez is a 59-year-old male who presented to [facility] on 5/25 for bleeding right lower extremity chronic wound. He is scheduled to have a right amputation below the knee with Dr. Hawk on Nasra 3, 2024. He states the wound started bleeding a lot, and he wanted to come and have it checked.  He had some nausea, vomiting and diarrhea prior to admission.  Of note, he was hospitalized in March for hypervolemia/anasarca.  Echocardiogram revealed pulmonary hypertension with no evidence of left heart failure and no diastolic dysfunction. He was sent home with high-dose loop diuretic, metolazone and ACE inhibitor.  He does take Eliquis for history of paroxysmal atrial fibrillation.  In the ED, creatinine 4.59.  He was started on IV fluid.     Acute kidney injury with creatinine 4.59.  Prerenal azotemia related to use of diuretic. Creatinine on 4/30 was 1.27.  Creatinine today improved with IV fluid administration at 1.61.  Renal ultrasound showed no hydronephrosis.  Recommend to discontinue  "metolazone, Lasix daily as needed for weight gain.  Will resume lisinopril at decreased dose 10 mg daily.     He is followed by wound care clinic.  He was started on 10-day course of Omnicef and doxycycline on 5/17 for cellulitis to right foot.  X-ray on 5/17 showed \"interval removal of fusion hardware with placement of intramedullary sebastian at the tibia and cage in the region of the talus. There is  surrounding lucency in the region of the talus around the cage which was  present on the prior examination but increased. However lucency surrounding the intramedullary sebastian is new from the prior examination. This is worrisome for loosening/infection.\"  Continue doxycycline and ceftriaxone.  WBC normal.  No fever.     He has a history of atrial fibrillation and is chronically anticoagulated on Eliquis.  Eliquis held on admission given significant bleeding to right lower extremity wound. Hemoglobin 8.1 on admission.  He was given 1 dose of IV iron.  Hemoglobin up to 7.8 with repeat 7.2 today.  Bleeding from wound has improved.  In the setting of surgery next week, will plan for 1 unit PRBC today.  He needs to discuss with Dr. Hawk regarding when to hold Eliquis prior to surgery -discussed with patient and his wife Ms. Rodriguez at bedside.    Patient states overall he is feeling well.  He denies any acute complaints.  He has reached maximum benefit of hospitalization.  He is medically stable and appropriate for discharge today.    Physical Exam on Discharge:  /73 (BP Location: Right arm, Patient Position: Lying)   Pulse 70   Temp 98.1 °F (36.7 °C) (Oral)   Resp 16   Ht 177.8 cm (70\")   Wt 80.1 kg (176 lb 9.6 oz)   SpO2 96%   BMI 25.34 kg/m²   Physical Exam  Vitals reviewed.   Constitutional:       General: He is not in acute distress.     Appearance: He is not toxic-appearing.      Comments: Lying in bed.  No acute distress.  On room air.  No family at bedside.   HENT:      Head: Normocephalic and " atraumatic.      Mouth/Throat:      Mouth: Mucous membranes are moist.      Pharynx: Oropharynx is clear.   Eyes:      Extraocular Movements: Extraocular movements intact.      Conjunctiva/sclera: Conjunctivae normal.      Pupils: Pupils are equal, round, and reactive to light.   Cardiovascular:      Rate and Rhythm: Normal rate and regular rhythm.      Pulses: Normal pulses.   Pulmonary:      Effort: Pulmonary effort is normal. No respiratory distress.      Breath sounds: Normal breath sounds. No wheezing or rhonchi.   Abdominal:      General: Bowel sounds are normal. There is no distension.      Palpations: Abdomen is soft.      Tenderness: There is no abdominal tenderness.   Musculoskeletal:         General: No swelling or tenderness. Normal range of motion.      Cervical back: Normal range of motion and neck supple. No muscular tenderness.   Skin:     General: Skin is warm and dry.      Findings: No erythema or rash.   Neurological:      General: No focal deficit present.      Mental Status: He is alert and oriented to person, place, and time.      Cranial Nerves: No cranial nerve deficit.      Motor: No weakness.   Psychiatric:         Mood and Affect: Mood normal.         Behavior: Behavior normal.                Condition on Discharge: medically stable.    Discharge Disposition:  Home or Self Care    Discharge Medications:     Discharge Medications        New Medications        Instructions Start Date   ascorbic acid 500 MG tablet  Commonly known as: VITAMIN C   500 mg, Oral, Daily   Start Date: May 30, 2024     zinc sulfate 220 (50 Zn) MG capsule  Commonly known as: ZINCATE   220 mg, Oral, Daily   Start Date: May 30, 2024            Changes to Medications        Instructions Start Date   furosemide 40 MG tablet  Commonly known as: LASIX  What changed:   when to take this  reasons to take this   40 mg, Oral, Daily PRN      lisinopril 20 MG tablet  Commonly known as: ROBIN CARRANZA  What changed: how much to  take   10 mg, Oral, Every 24 Hours Scheduled             Continue These Medications        Instructions Start Date   apixaban 5 MG tablet tablet  Commonly known as: ELIQUIS   5 mg, Oral, 2 Times Daily      cefdinir 300 MG capsule  Commonly known as: OMNICEF   300 mg, Oral, 2 Times Daily      doxycycline 100 MG capsule  Commonly known as: VIBRAMYCIN   1 capsule, Oral, Every 12 Hours Scheduled      HYDROcodone-acetaminophen  MG per tablet  Commonly known as: NORCO   1 tablet, Oral, Every 8 Hours PRN      metFORMIN 1000 MG tablet  Commonly known as: GLUCOPHAGE   1,000 mg, Oral, 2 Times Daily With Meals   Start Date: May 30, 2024     naloxone 4 MG/0.1ML nasal spray  Commonly known as: NARCAN   Call 911. Don't prime. [place] in 1 nostril for overdose. Repeat in 2-3 minutes in other nostril if no or minimal breathing/responsiveness.      pregabalin 100 MG capsule  Commonly known as: LYRICA   100 mg, Oral, 3 Times Daily             Stop These Medications      metOLazone 2.5 MG tablet  Commonly known as: ZAROXOLYN            Discharge Diet:   Diet Instructions       Diet: Regular/House Diet, Cardiac Diets, Diabetic Diets; Healthy Heart (2-3 Na+); Regular (IDDSI 7); Thin (IDDSI 0); Consistent Carbohydrate      Discharge Diet:  Regular/House Diet  Cardiac Diets  Diabetic Diets       Cardiac Diet: Healthy Heart (2-3 Na+)    Texture: Regular (IDDSI 7)    Fluid Consistency: Thin (IDDSI 0)    Diabetic Diet: Consistent Carbohydrate            Activity at Discharge:   Activity Instructions       Activity as Tolerated              Follow-up Appointments:   1.  Follow-up with primary care provider 1 week.  2.  Follow-up with Dr. Hawk as planned for right BKA.  Future Appointments   Date Time Provider Department Center   5/30/2024 10:00 AM Violetta Fritz APRN [facility] CD  [facility]       Test Results Pending at Discharge: none.    Electronically signed by MEGHANN Mckeon, 05/29/24, 11:13 CDT.    Time: 35 minutes.            Electronically signed by Teresa Trujillo,  at 05/29/24 1906       Discharge Order (From admission, onward)       Start     Ordered    05/29/24 1053  Discharge patient  Once        Expected Discharge Date: 05/29/24   Discharge Disposition: Home or Self Care   Physician of Record for Attribution - Please select from Treatment Team: TERESA TRUJILLO [[id number]]   Review needed by CMO to determine Physician of Record: No      Question Answer Comment   Physician of Record for Attribution - Please select from Treatment Team TERESA TRUJILLO    Review needed by CMO to determine Physician of Record No        05/29/24 1054

## 2024-05-30 NOTE — THERAPY DISCHARGE NOTE
Acute Care - Occupational Therapy Discharge Summary  [facility]     Patient Name: Garrett Rodriguez  : 1965  MRN: [id number]    Today's Date: 2024                 Admit Date: 2024        OT Recommendation and Plan    Visit Dx:    ICD-10-CM ICD-9-CM   1. TARSHA (acute kidney injury)  N17.9 584.9   2. Open wound of foot, right, sequela  S91.301S 906.1   3. Gait instability  R26.81 781.2               1x OT eval completed prior to discharge. No goals created.                OT Discharge Summary  Anticipated Discharge Disposition (OT): home with assist  Reason for Discharge: Discharge from facility  Outcomes Achieved: Other (1x OT eval completed.)  Discharge Destination: Home with assist      Patrizia Sandhu, OTJOHN/L, MOE  2024

## 2024-05-31 ENCOUNTER — TELEPHONE (OUTPATIENT)
Dept: VASCULAR SURGERY | Facility: CLINIC | Age: 59
End: 2024-05-31
Payer: COMMERCIAL

## 2024-05-31 ENCOUNTER — TELEPHONE (OUTPATIENT)
Dept: CARDIOLOGY | Facility: CLINIC | Age: 59
End: 2024-05-31
Payer: COMMERCIAL

## 2024-05-31 NOTE — TELEPHONE ENCOUNTER
Pt expressed understanding of arrival time of 9 am for procedure scheduled with Dr. Hawk on 06/03/24.  Pt advised NPO after midnight.  Pt is to hold eliquis for 2 days prior to procedure.  Pt expressed understanding at this time.

## 2024-05-31 NOTE — TELEPHONE ENCOUNTER
Caller: Gwen Rodriguez    Relationship: Emergency Contact    Best call back number: [phone]    What is the best time to reach you:     Who are you requesting to speak with (clinical staff, provider,  specific staff member):       What was the call regarding: PATIENT WAS IN THE HOSPITAL UNTIL WEDNESDAY NIGHT AND MISSED HIS THURSDAY APPT.  PATIENT WAS TAKEN OFF OF LASIX IN THE HOSPITAL BUT IF HE STARTS GAINING WEIGHT TO START TAKING AGAIN.   PATIENT IS HAVING HIS LEG AMPUTATED ON MONDAY.  THEY ARE NOT SURE WHEN HE WOULD BE ABLE TO RESCHEDULE THE MISSED APPT.    PATIENTS WIFE WOULD LIKE TO KNOW IF THE BLOOD WORK FROM THE HOSPITAL SHOWS IF HE STILL HAS CONGESTIVE HEART FAILURE OR IF IT IS HEALING?    PLEASE REACH OUT TO HER AND ADVISE.    Is it okay if the provider responds through MyChart:

## 2024-06-01 ENCOUNTER — NURSE TRIAGE (OUTPATIENT)
Dept: CALL CENTER | Facility: HOSPITAL | Age: 59
End: 2024-06-01
Payer: COMMERCIAL

## 2024-06-01 NOTE — TELEPHONE ENCOUNTER
"Reason for Disposition   Unable to have a bowel movement (BM) without laxative or enema    Additional Information   Negative: [1] Abdomen pain is main symptom AND [2] male   Negative: [1] Abdomen pain is main symptom AND [2] adult female   Negative: Rectal bleeding or blood in stool is main symptom   Negative: Rectal pain or itching is main symptom   Negative: Constipation in a cancer patient who is currently (or recently) receiving chemotherapy or radiation therapy, or cancer patient who has metastatic or end-stage cancer and is receiving palliative care   Negative: [1] Vomiting AND [2] contains bile (green color)   Negative: Patient sounds very sick or weak to the triager   Negative: [1] Vomiting AND [2] abdomen looks much more swollen than usual   Negative: [1] Constant abdominal pain AND [2] present > 2 hours   Negative: [1] Rectal pain or fullness from fecal impaction (rectum full of stool) AND [2] NOT better after SITZ bath, suppository or enema   Negative: [1] Intermittent mild abdominal pain AND [2] fever   Negative: Abdomen is more swollen than usual   Negative: Last bowel movement (BM) > 4 days ago   Negative: Leaking stool   Negative: Unable to have a bowel movement (BM) without manually removing stool (using finger to pull out stool or perform disimpaction)    Answer Assessment - Initial Assessment Questions  1. STOOL PATTERN OR FREQUENCY: \"How often do you have a bowel movement (BM)?\"  (Normal range: 3 times a day to every 3 days)  \"When was your last BM?\"        When he is well he has 1-2 bm per day  2. STRAINING: \"Do you have to strain to have a BM?\"       yes  3. RECTAL PAIN: \"Does your rectum hurt when the stool comes out?\" If Yes, ask: \"Do you have hemorrhoids? How bad is the pain?\"  (Scale 1-10; or mild, moderate, severe)      yes  4. STOOL COMPOSITION: \"Are the stools hard?\"       hard  5. BLOOD ON STOOLS: \"Has there been any blood on the toilet tissue or on the surface of the BM?\" If Yes, ask: " "\"When was the last time?\"      none  6. CHRONIC CONSTIPATION: \"Is this a new problem for you?\"  If No, ask: \"How long have you had this problem?\" (days, weeks, months)       No this is new  7. CHANGES IN DIET OR HYDRATION: \"Have there been any recent changes in your diet?\" \"How much fluids are you drinking on a daily basis?\"  \"How much have you had to drink today?\"      He has been eating watermelon and cantelope and had TARSHA in the hospital  5/25-5/29  8. MEDICINES: \"Have you been taking any new medicines?\" \"Are you taking any narcotic pain medicines?\" (e.g., Dilaudid, morphine, Percocet, Vicodin)      Norco for two years with no issues of constipation  9. LAXATIVES: \"Have you been using any stool softeners, laxatives, or enemas?\"  If Yes, ask \"What, how often, and when was the last time?\"      Exlax 5/31, watermelon and canelope 5/31  10. ACTIVITY:  \"How much walking do you do every day?\"  \"Has your activity level decreased in the past week?\"         Unable to walk due to PAD, having BKA on 6/3 is in a wheelchair  11. CAUSE: \"What do you think is causing the constipation?\"         unknown  12. OTHER SYMPTOMS: \"Do you have any other symptoms?\" (e.g., abdomen pain, bloating, fever, vomiting)        Rectal pain no abdominal pain, vomited two days ago but none since, is not nauseated.  Wife states when he did have bm two days ago, he had to wipe many times to get clean it was very thick and sticky.   13. MEDICAL HISTORY: \"Do you have a history of hemorrhoids, rectal fissures, or rectal surgery or rectal abscess?\"          No rectal issues or colon problems  14. PREGNANCY: \"Is there any chance you are pregnant?\" \"When was your last menstrual period?\"        na    Protocols used: Constipation-ADULT-AH    "

## 2024-06-02 NOTE — H&P
6/2/2024          Shahbaz Reddy DPM  [address] [place] [address] okay 1 second     Garrett Rodriguez  1965          Chief Complaint   Patient presents with    Follow-up       2 week follow up. Patient is here to discuss surgery. Last seen 4/30/24. Dr. Reddy is recommending amp on right.  Patient denies any stroke type symptoms.          Dear Shahbaz Reddy DPM     HPI  I had the pleasure of seeing your patient Garrett Rodriguez in the office today.  As you recall, Garrett Rodriguez is a 59 y.o.  male who you are currently following for congestive heart failure.  He did have anasarca, but now fluid has be removed.  He has had 5 right ankle surgeries.  He has a small healing wound to his right heel. He does have a brace to his right ankle with chronic right leg swelling since his injury at work necessitating multiple surgical intervention.  He is maintained on Eliquis.  He is taking lasix to keep swelling down.  He is wearing compression stockings.  He has seen Dr. Reddy who recommends right below-knee amputation.  He is here to further discuss.     Past Medical History:   Diagnosis Date    Atrial fibrillation 3/11/2024    Chronic pain in right foot     Diabetes mellitus     Hyperlipidemia Feb 2024    Hypertension     Open wound of right foot 05/2024    PAF (paroxysmal atrial fibrillation)     PFO (patent foramen ovale)     RP at age 6     Past Surgical History:   Procedure Laterality Date    ANKLE FUSION Right 07/11/2023    Procedure: ANKLE ARTHRODESIS;  Surgeon: Shahbaz Reddy DPM;  Location:  [facility];  Service: Podiatry;  Laterality: Right;    BACK SURGERY      CARDIAC SURGERY      EXTERNAL FIXATION ANKLE FRACTURE Right 07/11/2023    Procedure: POSSIBLE EXTERNAL FIXATION, RIGHT ANKLE;  Surgeon: Shahbaz Reddy DPM;  Location:  [facility];  Service: Podiatry;  Laterality: Right;    HARDWARE REMOVAL Right 07/11/2023    Procedure: REMOVAL OF HARDWARE, DEEP; ANKLE  ARTHRODESIS; SUBTALAR ARTHRODESIS; POSSIBLE EXTERNAL FIXATION, RIGHT ANKLE;  Surgeon: Shahbaz Reddy DPM;  Location:  [facility];  Service: Podiatry;  Laterality: Right;    PATENT FORAMEN OVALE CLOSURE      ROTATOR CUFF REPAIR Right     SUBTALAR ARTHRODESIS Right 07/11/2023    Procedure: SUBTALAR ARTHRODESIS;  Surgeon: Shahbaz Reddy DPM;  Location:  [facility];  Service: Podiatry;  Laterality: Right;     Social History     Socioeconomic History    Marital status:    Tobacco Use    Smoking status: Never    Smokeless tobacco: Never   Vaping Use    Vaping status: Never Used   Substance and Sexual Activity    Alcohol use: Never    Drug use: Never    Sexual activity: Not Currently     Partners: Female     Family History   Problem Relation Age of Onset    Hypertension Mother     Hypertension Father      No Known Allergies  Current Outpatient Medications   Medication Instructions    apixaban (ELIQUIS) 5 mg, Oral, 2 Times Daily    ascorbic acid (VITAMIN C) 500 mg, Oral, Daily    cefdinir (OMNICEF) 300 mg, Oral, 2 Times Daily    doxycycline (VIBRAMYCIN) 100 MG capsule 1 capsule, Oral, Every 12 Hours Scheduled    furosemide (LASIX) 40 mg, Oral, Daily PRN    HYDROcodone-acetaminophen (NORCO)  MG per tablet 1 tablet, Oral, Every 8 Hours PRN    lisinopril (PRINIVIL,ZESTRIL) 10 mg, Oral, Every 24 Hours Scheduled    metFORMIN (GLUCOPHAGE) 1,000 mg, Oral, 2 Times Daily With Meals    naloxone (NARCAN) 4 MG/0.1ML nasal spray Call 911. Don't prime. [place] in 1 nostril for overdose. Repeat in 2-3 minutes in other nostril if no or minimal breathing/responsiveness.    pregabalin (LYRICA) 100 mg, Oral, 3 Times Daily    zinc sulfate (ZINCATE) 220 mg, Oral, Daily        Review of Systems   Constitutional: Negative.    HENT: Negative.     Eyes: Negative.    Respiratory: Negative.     Cardiovascular:  Positive for leg swelling.   Gastrointestinal: Negative.    Endocrine: Negative.    Genitourinary: Negative.   "  Musculoskeletal: Negative.    Skin:  Positive for wound.   Allergic/Immunologic: Negative.    Neurological: Negative.    Hematological: Negative.    Psychiatric/Behavioral: Negative.     All other systems reviewed and are negative.     /50   Pulse 76   Ht 177.8 cm (70\")   Wt 77.1 kg (170 lb)   SpO2 94%   BMI 24.39 kg/m²      Physical Exam  Vitals and nursing note reviewed.   Constitutional:       Appearance: Normal appearance. He is well-developed.   HENT:      Head: Normocephalic and atraumatic.   Eyes:      General: No scleral icterus.     Pupils: Pupils are equal, round, and reactive to light.   Neck:      Thyroid: No thyromegaly.      Vascular: No carotid bruit or JVD.   Cardiovascular:      Rate and Rhythm: Normal rate and regular rhythm.      Pulses:           Carotid pulses are 2+ on the right side and 2+ on the left side.       Femoral pulses are 2+ on the right side and 2+ on the left side.       Popliteal pulses are 2+ on the right side and 2+ on the left side.        Dorsalis pedis pulses are 2+ on the right side and 2+ on the left side.        Posterior tibial pulses are 2+ on the right side and 2+ on the left side.      Heart sounds: Normal heart sounds.   Pulmonary:      Effort: Pulmonary effort is normal.      Breath sounds: Normal breath sounds.   Abdominal:      General: Bowel sounds are normal. There is no distension or abdominal bruit.      Palpations: Abdomen is soft. There is no mass.      Tenderness: There is no abdominal tenderness.   Musculoskeletal:         General: Swelling (right greater than left) present. Normal range of motion.      Cervical back: Neck supple.      Comments: Lymphedema to right leg   Lymphadenopathy:      Cervical: No cervical adenopathy.   Skin:     General: Skin is warm and dry.   Neurological:      Mental Status: He is alert and oriented to person, place, and time.      Cranial Nerves: No cranial nerve deficit.      Sensory: No sensory deficit. "   Psychiatric:         Mood and Affect: Mood normal.         Behavior: Behavior normal.         Thought Content: Thought content normal.         Judgment: Judgment normal.         Diagnostic Data:  Noninvasive testing including ABIs shows no arterial insufficiency to bilateral lower extremities.      Problem List       Patient Active Problem List   Diagnosis    Type 2 diabetes mellitus with hyperglycemia, without long-term current use of insulin    HTN (hypertension)    Retained orthopedic hardware    Anasarca    Iron deficiency anemia    PAF (paroxysmal atrial fibrillation) new onset    Scrotal edema    Gait instability    Pulmonary HTN    Surgical wound, non healing    TARSHA (acute kidney injury)    Bleeding from wound    Diarrhea of presumed infectious origin    Preop testing              Diagnosis Plan   1. Non-healing surgical wound, initial encounter  CBC and Differential     Basic metabolic panel     APTT     Protime-INR     XR chest 2 vw     ECG 12 Lead     Case Request     ceFAZolin (ANCEF) 2,000 mg in sodium chloride 0.9 % 100 mL IVPB     Case Request       2. Preop testing  CBC and Differential     Basic metabolic panel     APTT     Protime-INR     XR chest 2 vw     ECG 12 Lead     Case Request     ceFAZolin (ANCEF) 2,000 mg in sodium chloride 0.9 % 100 mL IVPB     Case Request       3. Encounter for monitoring antiplatelet therapy  APTT             Plan: After thoroughly evaluating Garrett Glovereynolds, I believe the best course of action is to proceed with a right below-knee amputation.  Dr. Reddy has recommended this for healing.  Risks/benefits were explained at great length to the patient which include but are not limited to bleeding, infection, vessel damage, nerve damage, failure of wound to heal, MI, stroke, and death.  The patient understands all the risks and wishes for me to proceed.  I did discuss vascular risk factors as they pertain to the progression of vascular disease including  controlling his hypertension and diabetes.  His blood pressure stable on his current medications.  The patient is to continue taking their medications as previously discussed.   This was all discussed in full with complete understanding.  Thank you for allowing me to participate in the care of your patient.  Please do not hesitate to call with any questions or concerns.  We will keep you aware of any further encounters with Garrett Rodriguez.         Sincerely yours,           DO Ganesh Saab David R, MD

## 2024-06-03 ENCOUNTER — ANESTHESIA (OUTPATIENT)
Dept: PERIOP | Facility: HOSPITAL | Age: 59
End: 2024-06-03
Payer: OTHER MISCELLANEOUS

## 2024-06-03 ENCOUNTER — READMISSION MANAGEMENT (OUTPATIENT)
Dept: CALL CENTER | Facility: HOSPITAL | Age: 59
End: 2024-06-03
Payer: COMMERCIAL

## 2024-06-03 ENCOUNTER — HOSPITAL ENCOUNTER (INPATIENT)
Facility: HOSPITAL | Age: 59
LOS: 4 days | Discharge: NURSING, REHAB OR LONG-TERM CARE | End: 2024-06-07
Attending: SURGERY | Admitting: SURGERY
Payer: OTHER MISCELLANEOUS

## 2024-06-03 ENCOUNTER — ANESTHESIA EVENT (OUTPATIENT)
Dept: PERIOP | Facility: HOSPITAL | Age: 59
End: 2024-06-03
Payer: OTHER MISCELLANEOUS

## 2024-06-03 DIAGNOSIS — Z79.02 ENCOUNTER FOR MONITORING ANTIPLATELET THERAPY: ICD-10-CM

## 2024-06-03 DIAGNOSIS — Z01.818 PREOP TESTING: ICD-10-CM

## 2024-06-03 DIAGNOSIS — Z51.81 ENCOUNTER FOR MONITORING ANTIPLATELET THERAPY: ICD-10-CM

## 2024-06-03 DIAGNOSIS — T81.89XA NON-HEALING SURGICAL WOUND, INITIAL ENCOUNTER: ICD-10-CM

## 2024-06-03 LAB
ABO GROUP BLD: NORMAL
ANION GAP SERPL CALCULATED.3IONS-SCNC: 10 MMOL/L (ref 5–15)
APTT PPP: 37.8 SECONDS (ref 24.5–36)
BASOPHILS # BLD AUTO: 0.05 10*3/MM3 (ref 0–0.2)
BASOPHILS NFR BLD AUTO: 0.5 % (ref 0–1.5)
BLD GP AB SCN SERPL QL: NEGATIVE
BUN SERPL-MCNC: 47 MG/DL (ref 6–20)
BUN/CREAT SERPL: 31.1 (ref 7–25)
CALCIUM SPEC-SCNC: 8.3 MG/DL (ref 8.6–10.5)
CHLORIDE SERPL-SCNC: 103 MMOL/L (ref 98–107)
CO2 SERPL-SCNC: 30 MMOL/L (ref 22–29)
CREAT SERPL-MCNC: 1.51 MG/DL (ref 0.76–1.27)
DEPRECATED RDW RBC AUTO: 49.1 FL (ref 37–54)
EGFRCR SERPLBLD CKD-EPI 2021: 53.2 ML/MIN/1.73
EOSINOPHIL # BLD AUTO: 0.09 10*3/MM3 (ref 0–0.4)
EOSINOPHIL NFR BLD AUTO: 0.9 % (ref 0.3–6.2)
ERYTHROCYTE [DISTWIDTH] IN BLOOD BY AUTOMATED COUNT: 15.4 % (ref 12.3–15.4)
GLUCOSE BLDC GLUCOMTR-MCNC: 105 MG/DL (ref 70–130)
GLUCOSE BLDC GLUCOMTR-MCNC: 118 MG/DL (ref 70–130)
GLUCOSE SERPL-MCNC: 115 MG/DL (ref 65–99)
HCT VFR BLD AUTO: 31.7 % (ref 37.5–51)
HGB BLD-MCNC: 9.8 G/DL (ref 13–17.7)
IMM GRANULOCYTES # BLD AUTO: 0.07 10*3/MM3 (ref 0–0.05)
IMM GRANULOCYTES NFR BLD AUTO: 0.7 % (ref 0–0.5)
INR PPP: 1.08 (ref 0.91–1.09)
LYMPHOCYTES # BLD AUTO: 1.6 10*3/MM3 (ref 0.7–3.1)
LYMPHOCYTES NFR BLD AUTO: 16.5 % (ref 19.6–45.3)
MCH RBC QN AUTO: 26.8 PG (ref 26.6–33)
MCHC RBC AUTO-ENTMCNC: 30.9 G/DL (ref 31.5–35.7)
MCV RBC AUTO: 86.8 FL (ref 79–97)
MONOCYTES # BLD AUTO: 0.51 10*3/MM3 (ref 0.1–0.9)
MONOCYTES NFR BLD AUTO: 5.3 % (ref 5–12)
NEUTROPHILS NFR BLD AUTO: 7.39 10*3/MM3 (ref 1.7–7)
NEUTROPHILS NFR BLD AUTO: 76.1 % (ref 42.7–76)
NRBC BLD AUTO-RTO: 0 /100 WBC (ref 0–0.2)
PLATELET # BLD AUTO: 214 10*3/MM3 (ref 140–450)
PMV BLD AUTO: 10.1 FL (ref 6–12)
POTASSIUM SERPL-SCNC: 4 MMOL/L (ref 3.5–5.2)
PROTHROMBIN TIME: 14.4 SECONDS (ref 11.8–14.8)
RBC # BLD AUTO: 3.65 10*6/MM3 (ref 4.14–5.8)
RH BLD: POSITIVE
SODIUM SERPL-SCNC: 143 MMOL/L (ref 136–145)
T&S EXPIRATION DATE: NORMAL
WBC NRBC COR # BLD AUTO: 9.71 10*3/MM3 (ref 3.4–10.8)

## 2024-06-03 PROCEDURE — 25010000002 CEFAZOLIN PER 500 MG: Performed by: NURSE PRACTITIONER

## 2024-06-03 PROCEDURE — 88307 TISSUE EXAM BY PATHOLOGIST: CPT | Performed by: SURGERY

## 2024-06-03 PROCEDURE — 0Y6H0Z1 DETACHMENT AT RIGHT LOWER LEG, HIGH, OPEN APPROACH: ICD-10-PCS | Performed by: SURGERY

## 2024-06-03 PROCEDURE — 25010000002 CEFAZOLIN PER 500 MG: Performed by: SURGERY

## 2024-06-03 PROCEDURE — 25010000002 FENTANYL CITRATE (PF) 250 MCG/5ML SOLUTION

## 2024-06-03 PROCEDURE — 85610 PROTHROMBIN TIME: CPT | Performed by: NURSE PRACTITIONER

## 2024-06-03 PROCEDURE — 86900 BLOOD TYPING SEROLOGIC ABO: CPT

## 2024-06-03 PROCEDURE — 25010000002 HYDROMORPHONE PER 4 MG: Performed by: SURGERY

## 2024-06-03 PROCEDURE — 25010000002 PROPOFOL 10 MG/ML EMULSION

## 2024-06-03 PROCEDURE — 80048 BASIC METABOLIC PNL TOTAL CA: CPT | Performed by: NURSE PRACTITIONER

## 2024-06-03 PROCEDURE — 88311 DECALCIFY TISSUE: CPT | Performed by: SURGERY

## 2024-06-03 PROCEDURE — 25010000002 GLYCOPYRROLATE 0.4 MG/2ML SOLUTION

## 2024-06-03 PROCEDURE — 25010000002 ONDANSETRON PER 1 MG: Performed by: SURGERY

## 2024-06-03 PROCEDURE — 86850 RBC ANTIBODY SCREEN: CPT | Performed by: NURSE PRACTITIONER

## 2024-06-03 PROCEDURE — 25810000003 LACTATED RINGERS PER 1000 ML: Performed by: ANESTHESIOLOGY

## 2024-06-03 PROCEDURE — 85730 THROMBOPLASTIN TIME PARTIAL: CPT | Performed by: NURSE PRACTITIONER

## 2024-06-03 PROCEDURE — 86901 BLOOD TYPING SEROLOGIC RH(D): CPT | Performed by: NURSE PRACTITIONER

## 2024-06-03 PROCEDURE — 82948 REAGENT STRIP/BLOOD GLUCOSE: CPT

## 2024-06-03 PROCEDURE — 86901 BLOOD TYPING SEROLOGIC RH(D): CPT

## 2024-06-03 PROCEDURE — 25010000002 FENTANYL CITRATE (PF) 50 MCG/ML SOLUTION: Performed by: ANESTHESIOLOGY

## 2024-06-03 PROCEDURE — 86923 COMPATIBILITY TEST ELECTRIC: CPT

## 2024-06-03 PROCEDURE — 25010000002 DROPERIDOL PER 5 MG: Performed by: ANESTHESIOLOGY

## 2024-06-03 PROCEDURE — L1830 KO IMMOB CANVAS LONG PRE OTS: HCPCS | Performed by: SURGERY

## 2024-06-03 PROCEDURE — 85025 COMPLETE CBC W/AUTO DIFF WBC: CPT | Performed by: NURSE PRACTITIONER

## 2024-06-03 PROCEDURE — 25010000002 ONDANSETRON PER 1 MG

## 2024-06-03 PROCEDURE — 25810000003 LACTATED RINGERS PER 1000 ML: Performed by: SURGERY

## 2024-06-03 PROCEDURE — 27880 AMPUTATION OF LOWER LEG: CPT | Performed by: SURGERY

## 2024-06-03 PROCEDURE — 86900 BLOOD TYPING SEROLOGIC ABO: CPT | Performed by: NURSE PRACTITIONER

## 2024-06-03 PROCEDURE — 25010000002 NALOXONE PER 1 MG: Performed by: SURGERY

## 2024-06-03 RX ORDER — NEOSTIGMINE METHYLSULFATE 5 MG/5 ML
SYRINGE (ML) INTRAVENOUS AS NEEDED
Status: DISCONTINUED | OUTPATIENT
Start: 2024-06-03 | End: 2024-06-03 | Stop reason: SURG

## 2024-06-03 RX ORDER — SODIUM CHLORIDE, SODIUM LACTATE, POTASSIUM CHLORIDE, CALCIUM CHLORIDE 600; 310; 30; 20 MG/100ML; MG/100ML; MG/100ML; MG/100ML
1000 INJECTION, SOLUTION INTRAVENOUS CONTINUOUS
Status: DISCONTINUED | OUTPATIENT
Start: 2024-06-03 | End: 2024-06-03

## 2024-06-03 RX ORDER — SODIUM CHLORIDE 9 MG/ML
40 INJECTION, SOLUTION INTRAVENOUS AS NEEDED
Status: DISCONTINUED | OUTPATIENT
Start: 2024-06-03 | End: 2024-06-07 | Stop reason: HOSPADM

## 2024-06-03 RX ORDER — DEXTROSE MONOHYDRATE 25 G/50ML
12.5 INJECTION, SOLUTION INTRAVENOUS AS NEEDED
Status: DISCONTINUED | OUTPATIENT
Start: 2024-06-03 | End: 2024-06-03 | Stop reason: HOSPADM

## 2024-06-03 RX ORDER — FENTANYL CITRATE 50 UG/ML
INJECTION, SOLUTION INTRAMUSCULAR; INTRAVENOUS AS NEEDED
Status: DISCONTINUED | OUTPATIENT
Start: 2024-06-03 | End: 2024-06-03 | Stop reason: SURG

## 2024-06-03 RX ORDER — DROPERIDOL 2.5 MG/ML
0.62 INJECTION, SOLUTION INTRAMUSCULAR; INTRAVENOUS ONCE AS NEEDED
Status: COMPLETED | OUTPATIENT
Start: 2024-06-03 | End: 2024-06-03

## 2024-06-03 RX ORDER — LIDOCAINE HYDROCHLORIDE 10 MG/ML
0.5 INJECTION, SOLUTION EPIDURAL; INFILTRATION; INTRACAUDAL; PERINEURAL ONCE AS NEEDED
Status: DISCONTINUED | OUTPATIENT
Start: 2024-06-03 | End: 2024-06-03 | Stop reason: HOSPADM

## 2024-06-03 RX ORDER — HYDROCODONE BITARTRATE AND ACETAMINOPHEN 10; 325 MG/1; MG/1
1 TABLET ORAL EVERY 4 HOURS PRN
Status: DISCONTINUED | OUTPATIENT
Start: 2024-06-03 | End: 2024-06-03 | Stop reason: HOSPADM

## 2024-06-03 RX ORDER — ONDANSETRON 2 MG/ML
4 INJECTION INTRAMUSCULAR; INTRAVENOUS EVERY 6 HOURS PRN
Status: DISCONTINUED | OUTPATIENT
Start: 2024-06-03 | End: 2024-06-07 | Stop reason: HOSPADM

## 2024-06-03 RX ORDER — NALOXONE HCL 0.4 MG/ML
0.4 VIAL (ML) INJECTION AS NEEDED
Status: DISCONTINUED | OUTPATIENT
Start: 2024-06-03 | End: 2024-06-03 | Stop reason: HOSPADM

## 2024-06-03 RX ORDER — LISINOPRIL 20 MG/1
20 TABLET ORAL
Status: DISCONTINUED | OUTPATIENT
Start: 2024-06-03 | End: 2024-06-07 | Stop reason: HOSPADM

## 2024-06-03 RX ORDER — SODIUM CHLORIDE, SODIUM LACTATE, POTASSIUM CHLORIDE, CALCIUM CHLORIDE 600; 310; 30; 20 MG/100ML; MG/100ML; MG/100ML; MG/100ML
9 INJECTION, SOLUTION INTRAVENOUS CONTINUOUS
Status: DISCONTINUED | OUTPATIENT
Start: 2024-06-03 | End: 2024-06-03

## 2024-06-03 RX ORDER — SODIUM CHLORIDE 0.9 % (FLUSH) 0.9 %
10 SYRINGE (ML) INJECTION EVERY 12 HOURS SCHEDULED
Status: DISCONTINUED | OUTPATIENT
Start: 2024-06-03 | End: 2024-06-07 | Stop reason: HOSPADM

## 2024-06-03 RX ORDER — HYDROMORPHONE HCL/0.9% NACL/PF 6 MG/30 ML
PATIENT CONTROLLED ANALGESIA SYRINGE INTRAVENOUS CONTINUOUS
Status: DISCONTINUED | OUTPATIENT
Start: 2024-06-03 | End: 2024-06-04

## 2024-06-03 RX ORDER — SODIUM CHLORIDE 0.9 % (FLUSH) 0.9 %
10 SYRINGE (ML) INJECTION EVERY 12 HOURS SCHEDULED
Status: DISCONTINUED | OUTPATIENT
Start: 2024-06-03 | End: 2024-06-03 | Stop reason: HOSPADM

## 2024-06-03 RX ORDER — HYDROCODONE BITARTRATE AND ACETAMINOPHEN 5; 325 MG/1; MG/1
1 TABLET ORAL EVERY 4 HOURS PRN
Status: DISCONTINUED | OUTPATIENT
Start: 2024-06-03 | End: 2024-06-03 | Stop reason: HOSPADM

## 2024-06-03 RX ORDER — SODIUM CHLORIDE 0.9 % (FLUSH) 0.9 %
10 SYRINGE (ML) INJECTION AS NEEDED
Status: DISCONTINUED | OUTPATIENT
Start: 2024-06-03 | End: 2024-06-07 | Stop reason: HOSPADM

## 2024-06-03 RX ORDER — IBUPROFEN 600 MG/1
600 TABLET ORAL EVERY 6 HOURS PRN
Status: DISCONTINUED | OUTPATIENT
Start: 2024-06-03 | End: 2024-06-03 | Stop reason: HOSPADM

## 2024-06-03 RX ORDER — FLUMAZENIL 0.1 MG/ML
0.2 INJECTION INTRAVENOUS AS NEEDED
Status: DISCONTINUED | OUTPATIENT
Start: 2024-06-03 | End: 2024-06-03 | Stop reason: HOSPADM

## 2024-06-03 RX ORDER — NALOXONE HYDROCHLORIDE 4 MG/.1ML
1 SPRAY NASAL AS NEEDED
Status: DISCONTINUED | OUTPATIENT
Start: 2024-06-03 | End: 2024-06-03

## 2024-06-03 RX ORDER — ACETAMINOPHEN 325 MG/1
650 TABLET ORAL EVERY 4 HOURS PRN
Status: DISCONTINUED | OUTPATIENT
Start: 2024-06-03 | End: 2024-06-07 | Stop reason: HOSPADM

## 2024-06-03 RX ORDER — GLYCOPYRROLATE 0.2 MG/ML
INJECTION INTRAMUSCULAR; INTRAVENOUS AS NEEDED
Status: DISCONTINUED | OUTPATIENT
Start: 2024-06-03 | End: 2024-06-03 | Stop reason: SURG

## 2024-06-03 RX ORDER — PROPOFOL 10 MG/ML
VIAL (ML) INTRAVENOUS AS NEEDED
Status: DISCONTINUED | OUTPATIENT
Start: 2024-06-03 | End: 2024-06-03 | Stop reason: SURG

## 2024-06-03 RX ORDER — SODIUM CHLORIDE 9 MG/ML
30 INJECTION, SOLUTION INTRAVENOUS CONTINUOUS PRN
Status: DISCONTINUED | OUTPATIENT
Start: 2024-06-03 | End: 2024-06-07 | Stop reason: HOSPADM

## 2024-06-03 RX ORDER — ONDANSETRON 2 MG/ML
4 INJECTION INTRAMUSCULAR; INTRAVENOUS ONCE AS NEEDED
Status: DISCONTINUED | OUTPATIENT
Start: 2024-06-03 | End: 2024-06-03 | Stop reason: HOSPADM

## 2024-06-03 RX ORDER — FENTANYL CITRATE 50 UG/ML
50 INJECTION, SOLUTION INTRAMUSCULAR; INTRAVENOUS
Status: COMPLETED | OUTPATIENT
Start: 2024-06-03 | End: 2024-06-03

## 2024-06-03 RX ORDER — HYDROMORPHONE HCL/0.9% NACL/PF 6 MG/30 ML
PATIENT CONTROLLED ANALGESIA SYRINGE INTRAVENOUS CONTINUOUS
Status: DISCONTINUED | OUTPATIENT
Start: 2024-06-03 | End: 2024-06-03

## 2024-06-03 RX ORDER — FENTANYL CITRATE 50 UG/ML
25 INJECTION, SOLUTION INTRAMUSCULAR; INTRAVENOUS
Status: DISCONTINUED | OUTPATIENT
Start: 2024-06-03 | End: 2024-06-03 | Stop reason: HOSPADM

## 2024-06-03 RX ORDER — ROCURONIUM BROMIDE 10 MG/ML
INJECTION, SOLUTION INTRAVENOUS AS NEEDED
Status: DISCONTINUED | OUTPATIENT
Start: 2024-06-03 | End: 2024-06-03 | Stop reason: SURG

## 2024-06-03 RX ORDER — LABETALOL HYDROCHLORIDE 5 MG/ML
5 INJECTION, SOLUTION INTRAVENOUS
Status: DISCONTINUED | OUTPATIENT
Start: 2024-06-03 | End: 2024-06-03 | Stop reason: HOSPADM

## 2024-06-03 RX ORDER — ASCORBIC ACID 500 MG
500 TABLET ORAL DAILY
Qty: 26 TABLET | Refills: 0 | Status: DISCONTINUED | OUTPATIENT
Start: 2024-06-03 | End: 2024-06-07 | Stop reason: HOSPADM

## 2024-06-03 RX ORDER — ONDANSETRON 2 MG/ML
INJECTION INTRAMUSCULAR; INTRAVENOUS AS NEEDED
Status: DISCONTINUED | OUTPATIENT
Start: 2024-06-03 | End: 2024-06-03 | Stop reason: SURG

## 2024-06-03 RX ORDER — MIDAZOLAM HYDROCHLORIDE 1 MG/ML
1 INJECTION INTRAMUSCULAR; INTRAVENOUS
Status: DISCONTINUED | OUTPATIENT
Start: 2024-06-03 | End: 2024-06-03 | Stop reason: HOSPADM

## 2024-06-03 RX ORDER — NALOXONE HCL 0.4 MG/ML
0.1 VIAL (ML) INJECTION
Status: DISCONTINUED | OUTPATIENT
Start: 2024-06-03 | End: 2024-06-07 | Stop reason: HOSPADM

## 2024-06-03 RX ORDER — LIDOCAINE HYDROCHLORIDE 20 MG/ML
INJECTION, SOLUTION EPIDURAL; INFILTRATION; INTRACAUDAL; PERINEURAL AS NEEDED
Status: DISCONTINUED | OUTPATIENT
Start: 2024-06-03 | End: 2024-06-03 | Stop reason: SURG

## 2024-06-03 RX ORDER — SODIUM CHLORIDE 0.9 % (FLUSH) 0.9 %
10 SYRINGE (ML) INJECTION AS NEEDED
Status: DISCONTINUED | OUTPATIENT
Start: 2024-06-03 | End: 2024-06-03 | Stop reason: HOSPADM

## 2024-06-03 RX ORDER — FUROSEMIDE 40 MG/1
40 TABLET ORAL DAILY PRN
Status: DISCONTINUED | OUTPATIENT
Start: 2024-06-03 | End: 2024-06-07 | Stop reason: HOSPADM

## 2024-06-03 RX ORDER — ONDANSETRON 4 MG/1
4 TABLET, ORALLY DISINTEGRATING ORAL EVERY 6 HOURS PRN
Status: DISCONTINUED | OUTPATIENT
Start: 2024-06-03 | End: 2024-06-07 | Stop reason: HOSPADM

## 2024-06-03 RX ORDER — SODIUM CHLORIDE 0.9 % (FLUSH) 0.9 %
3 SYRINGE (ML) INJECTION AS NEEDED
Status: DISCONTINUED | OUTPATIENT
Start: 2024-06-03 | End: 2024-06-03 | Stop reason: HOSPADM

## 2024-06-03 RX ORDER — HYDROCODONE BITARTRATE AND ACETAMINOPHEN 10; 325 MG/1; MG/1
1 TABLET ORAL ONCE AS NEEDED
Status: COMPLETED | OUTPATIENT
Start: 2024-06-03 | End: 2024-06-03

## 2024-06-03 RX ORDER — ZINC SULFATE 50(220)MG
220 CAPSULE ORAL DAILY
Qty: 26 CAPSULE | Refills: 0 | Status: DISCONTINUED | OUTPATIENT
Start: 2024-06-03 | End: 2024-06-07 | Stop reason: HOSPADM

## 2024-06-03 RX ORDER — SODIUM CHLORIDE 9 MG/ML
50 INJECTION, SOLUTION INTRAVENOUS CONTINUOUS
Status: DISCONTINUED | OUTPATIENT
Start: 2024-06-03 | End: 2024-06-07 | Stop reason: HOSPADM

## 2024-06-03 RX ADMIN — Medication 3 MG: at 15:54

## 2024-06-03 RX ADMIN — Medication 10 ML: at 20:57

## 2024-06-03 RX ADMIN — NALOXONE HYDROCHLORIDE 0.1 MG: 0.4 INJECTION, SOLUTION INTRAMUSCULAR; INTRAVENOUS; SUBCUTANEOUS at 22:33

## 2024-06-03 RX ADMIN — ONDANSETRON 4 MG: 2 INJECTION INTRAMUSCULAR; INTRAVENOUS at 15:51

## 2024-06-03 RX ADMIN — ONDANSETRON 4 MG: 2 INJECTION INTRAMUSCULAR; INTRAVENOUS at 22:03

## 2024-06-03 RX ADMIN — CEFAZOLIN 2000 MG: 2 INJECTION, POWDER, FOR SOLUTION INTRAMUSCULAR; INTRAVENOUS at 14:26

## 2024-06-03 RX ADMIN — GLYCOPYRROLATE 0.4 MG: 0.2 INJECTION INTRAMUSCULAR; INTRAVENOUS at 15:54

## 2024-06-03 RX ADMIN — FENTANYL CITRATE 100 MCG: 0.05 INJECTION, SOLUTION INTRAMUSCULAR; INTRAVENOUS at 14:19

## 2024-06-03 RX ADMIN — DROPERIDOL 0.62 MG: 2.5 INJECTION, SOLUTION INTRAMUSCULAR; INTRAVENOUS at 17:19

## 2024-06-03 RX ADMIN — NALOXONE HYDROCHLORIDE 0.1 MG: 0.4 INJECTION, SOLUTION INTRAMUSCULAR; INTRAVENOUS; SUBCUTANEOUS at 21:56

## 2024-06-03 RX ADMIN — FENTANYL CITRATE 50 MCG: 50 INJECTION, SOLUTION INTRAMUSCULAR; INTRAVENOUS at 16:39

## 2024-06-03 RX ADMIN — HYDROCODONE BITARTRATE AND ACETAMINOPHEN 1 TABLET: 10; 325 TABLET ORAL at 16:33

## 2024-06-03 RX ADMIN — ROCURONIUM BROMIDE 50 MG: 10 INJECTION, SOLUTION INTRAVENOUS at 14:19

## 2024-06-03 RX ADMIN — HYDROCODONE BITARTRATE AND ACETAMINOPHEN 1 TABLET: 10; 325 TABLET ORAL at 11:01

## 2024-06-03 RX ADMIN — LIDOCAINE HYDROCHLORIDE 100 MG: 20 INJECTION, SOLUTION EPIDURAL; INFILTRATION; INTRACAUDAL; PERINEURAL at 14:19

## 2024-06-03 RX ADMIN — NALOXONE HYDROCHLORIDE 0.1 MG: 0.4 INJECTION, SOLUTION INTRAMUSCULAR; INTRAVENOUS; SUBCUTANEOUS at 23:30

## 2024-06-03 RX ADMIN — CEFAZOLIN 2000 MG: 2 INJECTION, POWDER, FOR SOLUTION INTRAMUSCULAR; INTRAVENOUS at 20:50

## 2024-06-03 RX ADMIN — IBUPROFEN 600 MG: 600 TABLET, FILM COATED ORAL at 16:56

## 2024-06-03 RX ADMIN — FENTANYL CITRATE 50 MCG: 50 INJECTION, SOLUTION INTRAMUSCULAR; INTRAVENOUS at 16:50

## 2024-06-03 RX ADMIN — SODIUM CHLORIDE, POTASSIUM CHLORIDE, SODIUM LACTATE AND CALCIUM CHLORIDE 9 ML/HR: 600; 310; 30; 20 INJECTION, SOLUTION INTRAVENOUS at 16:46

## 2024-06-03 RX ADMIN — PREGABALIN 100 MG: 25 CAPSULE ORAL at 20:48

## 2024-06-03 RX ADMIN — SODIUM CHLORIDE, POTASSIUM CHLORIDE, SODIUM LACTATE AND CALCIUM CHLORIDE 1000 ML: 600; 310; 30; 20 INJECTION, SOLUTION INTRAVENOUS at 10:54

## 2024-06-03 RX ADMIN — PROPOFOL 200 MG: 10 INJECTION, EMULSION INTRAVENOUS at 14:19

## 2024-06-03 RX ADMIN — HYDROMORPHONE HYDROCHLORIDE: 2 INJECTION INTRAMUSCULAR; INTRAVENOUS; SUBCUTANEOUS at 21:17

## 2024-06-03 RX ADMIN — HYDROMORPHONE HYDROCHLORIDE: 2 INJECTION INTRAMUSCULAR; INTRAVENOUS; SUBCUTANEOUS at 17:52

## 2024-06-03 NOTE — ANESTHESIA PROCEDURE NOTES
Airway  Urgency: elective    Date/Time: 6/3/2024 2:20 PM  Airway not difficult    General Information and Staff    Patient location during procedure: OR  CRNA/CAA: Shahbaz Peralta CRNA    Indications and Patient Condition  Indications for airway management: airway protection    Preoxygenated: yes  Mask difficulty assessment: 1 - vent by mask    Final Airway Details  Final airway type: endotracheal airway      Successful airway: ETT  Cuffed: yes   Successful intubation technique: direct laryngoscopy  Facilitating devices/methods: intubating stylet  Endotracheal tube insertion site: oral  Blade: Sen  Blade size: 2  ETT size (mm): 7.5  Cormack-Lehane Classification: grade I - full view of glottis  Placement verified by: capnometry   Cuff volume (mL): 9  Measured from: lips  ETT/EBT  to lips (cm): 22  Number of attempts at approach: 1  Assessment: lips, teeth, and gum same as pre-op and atraumatic intubation

## 2024-06-03 NOTE — ANESTHESIA PREPROCEDURE EVALUATION
Anesthesia Evaluation     Patient summary reviewed and Nursing notes reviewed   NPO Solid Status: > 8 hours  NPO Liquid Status: > 8 hours           Airway   Mallampati: II  TM distance: >3 FB  Neck ROM: full  No difficulty expected  Dental      Pulmonary    (-) COPD, asthma, sleep apnea, not a smoker  Cardiovascular   Exercise tolerance: good (4-7 METS)    (+) hypertension, dysrhythmias Atrial Fib  (-) pacemaker, past MI, angina, cardiac stents      Neuro/Psych  (-) seizures, TIA, CVA  GI/Hepatic/Renal/Endo    (+) diabetes mellitus  (-) GERD, liver disease, no renal disease    Musculoskeletal     Abdominal    Substance History      OB/GYN          Other   blood dyscrasia anemia,                   Anesthesia Plan    ASA 3     general     intravenous induction     Anesthetic plan, risks, benefits, and alternatives have been provided, discussed and informed consent has been obtained with: patient.      CODE STATUS:

## 2024-06-03 NOTE — OP NOTE
Garrett Rodriguez  6/3/2024     PREOPERATIVE DIAGNOSIS: Non-healing surgical wound, initial encounter [T81.89XA]  Preop testing [Z01.818]     POSTOPERATIVE DIAGNOSIS: Post-Op Diagnosis Codes:     * Non-healing surgical wound, initial encounter [T81.89XA]     * Preop testing [Z01.818]     PROCEDURE PERFORMED:   1.  Right below-knee amputation     SURGEON: Ricardo Hawk DO      ANESTHESIA: General.    PREPARATION: Routine.    STAFF: Circulator: Keily Bradford RN  Scrub Person: Manuela Parra; Daniele Truong; Kaleb Vega  Assistant: Alice Hong    Estimated Blood Loss: 200ml    SPECIMENS: Right lower extremity    COMPLICATIONS: None    INDICATIONS: Garrett Rodriguez is a 59 y.o. male who you are currently following for congestive heart failure. He did have anasarca, but now fluid has be removed. He has had 5 right ankle surgeries. He has a small healing wound to his right heel. He does have a brace to his right ankle with chronic right leg swelling since his injury at work necessitating multiple surgical intervention. He is maintained on Eliquis. He is taking lasix to keep swelling down. He is wearing compression stockings. He has seen Dr. Reddy who recommends right below-knee amputation. He is here to further discuss. The indications, risks, and possible complications of the procedure were explained to the patient, who voiced understanding and wished to proceed with surgery.     PROCEDURE IN DETAIL:     The patient was taken to the operating room and placed on the operating table in a supine position. After general anesthesia was obtained, the right lower extremity was prepped and draped in a sterile manner.  Approximately 4 finger breaths below the tibial plateau the standard below-knee amputation incision was made.  Careful dissection was made down through the subcutaneous tissues using the Bovie cautery to ensure hemostasis.  Any crossing veins were ligated with 3-0 silk suture.   The anterior compartment was taken down first encountering the anterior tibial artery and vein.  They were carefully dissected free with the Metzenbaum scissors and then separately ligated with 2-0 silk suture ligatures.  Next, the fibula was identified and freed from its local attachments with the periosteal elevator.  Once it was freed up proximally it was transected with the bone saw.  Next the periosteal elevator was taken to the tibia where was fully cleaned off.  A lap sponge was placed posteriorly.  The pneumatic saw was then used to transect the tibia successfully.  The tibia was lifted up on and the posterior flap was created with the amputation knife.  The popliteal vessels were quickly clamped off.  The lower leg was removed as specimen.  Any loose bleeding was clamped with hemostats.  Each of the hemostats were ligated with 2-0 and 3-0 silk suture ligatures.  The anterior part of the tibia was then transected with the bone saw and smoothed out with the bone file.  The wound bed was copiously irrigated with antibiotic saline and hemostasis was observed.  The sciatic nerve was also ligated with 2-0 silk suture.  The fascial layers were then closed with a 2-0 Vicryl in an interrupted fashion.  The skin was then reapproximated using staples.  The wound was then cleaned.  Sterile dressings were applied. The patient tolerated the procedure well. Sponge and needle counts were correct. The patient was then awakened and extubated in the operating room and taken to the recovery room in good condition.    Ricardo Hawk DO  6/3/2024  16:19 CDT

## 2024-06-03 NOTE — OUTREACH NOTE
Medical Week 1 Survey      Flowsheet Row Responses   [facility], [place], operated by [facility] patient discharged from? [facility]   Does the patient have one of the following disease processes/diagnoses(primary or secondary)? Other   Week 1 attempt successful? No   Unsuccessful attempts Attempt 1   Revoke Readmitted            Roro LOPEZ - Registered Nurse

## 2024-06-04 LAB
ANION GAP SERPL CALCULATED.3IONS-SCNC: 11 MMOL/L (ref 5–15)
BASOPHILS # BLD AUTO: 0.02 10*3/MM3 (ref 0–0.2)
BASOPHILS NFR BLD AUTO: 0.1 % (ref 0–1.5)
BUN SERPL-MCNC: 50 MG/DL (ref 6–20)
BUN/CREAT SERPL: 26.7 (ref 7–25)
CALCIUM SPEC-SCNC: 7.4 MG/DL (ref 8.6–10.5)
CHLORIDE SERPL-SCNC: 101 MMOL/L (ref 98–107)
CO2 SERPL-SCNC: 24 MMOL/L (ref 22–29)
CREAT SERPL-MCNC: 1.87 MG/DL (ref 0.76–1.27)
DEPRECATED RDW RBC AUTO: 51.5 FL (ref 37–54)
EGFRCR SERPLBLD CKD-EPI 2021: 41.2 ML/MIN/1.73
EOSINOPHIL # BLD AUTO: 0.06 10*3/MM3 (ref 0–0.4)
EOSINOPHIL NFR BLD AUTO: 0.4 % (ref 0.3–6.2)
ERYTHROCYTE [DISTWIDTH] IN BLOOD BY AUTOMATED COUNT: 15.5 % (ref 12.3–15.4)
GLUCOSE SERPL-MCNC: 212 MG/DL (ref 65–99)
HCT VFR BLD AUTO: 27.6 % (ref 37.5–51)
HGB BLD-MCNC: 8.1 G/DL (ref 13–17.7)
IMM GRANULOCYTES # BLD AUTO: 0.1 10*3/MM3 (ref 0–0.05)
IMM GRANULOCYTES NFR BLD AUTO: 0.7 % (ref 0–0.5)
LYMPHOCYTES # BLD AUTO: 0.49 10*3/MM3 (ref 0.7–3.1)
LYMPHOCYTES NFR BLD AUTO: 3.6 % (ref 19.6–45.3)
MCH RBC QN AUTO: 26.9 PG (ref 26.6–33)
MCHC RBC AUTO-ENTMCNC: 29.3 G/DL (ref 31.5–35.7)
MCV RBC AUTO: 91.7 FL (ref 79–97)
MONOCYTES # BLD AUTO: 0.66 10*3/MM3 (ref 0.1–0.9)
MONOCYTES NFR BLD AUTO: 4.8 % (ref 5–12)
NEUTROPHILS NFR BLD AUTO: 12.41 10*3/MM3 (ref 1.7–7)
NEUTROPHILS NFR BLD AUTO: 90.4 % (ref 42.7–76)
NRBC BLD AUTO-RTO: 0 /100 WBC (ref 0–0.2)
PLATELET # BLD AUTO: 175 10*3/MM3 (ref 140–450)
PMV BLD AUTO: 10.4 FL (ref 6–12)
POTASSIUM SERPL-SCNC: 4.5 MMOL/L (ref 3.5–5.2)
RBC # BLD AUTO: 3.01 10*6/MM3 (ref 4.14–5.8)
SODIUM SERPL-SCNC: 136 MMOL/L (ref 136–145)
WBC NRBC COR # BLD AUTO: 13.74 10*3/MM3 (ref 3.4–10.8)

## 2024-06-04 PROCEDURE — 97162 PT EVAL MOD COMPLEX 30 MIN: CPT | Performed by: PHYSICAL THERAPIST

## 2024-06-04 PROCEDURE — 99024 POSTOP FOLLOW-UP VISIT: CPT | Performed by: NURSE PRACTITIONER

## 2024-06-04 PROCEDURE — 25010000002 CEFAZOLIN PER 500 MG: Performed by: SURGERY

## 2024-06-04 PROCEDURE — 80048 BASIC METABOLIC PNL TOTAL CA: CPT | Performed by: SURGERY

## 2024-06-04 PROCEDURE — 85025 COMPLETE CBC W/AUTO DIFF WBC: CPT | Performed by: SURGERY

## 2024-06-04 PROCEDURE — 97166 OT EVAL MOD COMPLEX 45 MIN: CPT

## 2024-06-04 RX ORDER — NALOXONE HYDROCHLORIDE 4 MG/.1ML
1 SPRAY NASAL AS NEEDED
COMMUNITY

## 2024-06-04 RX ORDER — HYDROCODONE BITARTRATE AND ACETAMINOPHEN 7.5; 325 MG/1; MG/1
1 TABLET ORAL EVERY 4 HOURS PRN
Status: DISCONTINUED | OUTPATIENT
Start: 2024-06-04 | End: 2024-06-07 | Stop reason: HOSPADM

## 2024-06-04 RX ADMIN — ZINC SULFATE 220 MG (50 MG) CAPSULE 220 MG: CAPSULE at 09:05

## 2024-06-04 RX ADMIN — OXYCODONE HYDROCHLORIDE AND ACETAMINOPHEN 500 MG: 500 TABLET ORAL at 09:04

## 2024-06-04 RX ADMIN — PREGABALIN 100 MG: 25 CAPSULE ORAL at 20:09

## 2024-06-04 RX ADMIN — PREGABALIN 100 MG: 25 CAPSULE ORAL at 16:54

## 2024-06-04 RX ADMIN — HYDROCODONE BITARTRATE AND ACETAMINOPHEN 1 TABLET: 7.5; 325 TABLET ORAL at 09:04

## 2024-06-04 RX ADMIN — PREGABALIN 100 MG: 25 CAPSULE ORAL at 09:04

## 2024-06-04 RX ADMIN — APIXABAN 5 MG: 5 TABLET, FILM COATED ORAL at 20:09

## 2024-06-04 RX ADMIN — METFORMIN HCL 1000 MG: 500 TABLET ORAL at 17:46

## 2024-06-04 RX ADMIN — HYDROCODONE BITARTRATE AND ACETAMINOPHEN 1 TABLET: 7.5; 325 TABLET ORAL at 20:09

## 2024-06-04 RX ADMIN — HYDROCODONE BITARTRATE AND ACETAMINOPHEN 1 TABLET: 7.5; 325 TABLET ORAL at 16:29

## 2024-06-04 RX ADMIN — APIXABAN 5 MG: 5 TABLET, FILM COATED ORAL at 09:04

## 2024-06-04 RX ADMIN — CEFAZOLIN 2000 MG: 2 INJECTION, POWDER, FOR SOLUTION INTRAMUSCULAR; INTRAVENOUS at 06:30

## 2024-06-04 RX ADMIN — HYDROCODONE BITARTRATE AND ACETAMINOPHEN 1 TABLET: 7.5; 325 TABLET ORAL at 04:23

## 2024-06-04 RX ADMIN — METFORMIN HCL 1000 MG: 500 TABLET ORAL at 09:04

## 2024-06-04 NOTE — NURSING NOTE
PCA taken down. 30mL hydromorphone PCA syringe wasted with IVETTE Stoner. Switching to oral pain medication per patient request.

## 2024-06-04 NOTE — PAYOR COMM NOTE
"Jennifer Garrett Shirley (59 y.o. Male)  [id number]   admit 6/3  [facility] phone    fax          Date of Birth   1965    Social Security Number       Address   [address] [place] [address]    Home Phone   [phone]    MRN   [id number]       Sikhism   Roman Catholic    Marital Status                               Admission Date   6/3/24    Admission Type   Elective    Admitting Provider   Ricardo Hawk DO    Attending Provider   Ricardo Hawk DO    Department, Room/Bed   [facility] 3C, 361/1       Discharge Date       Discharge Disposition       Discharge Destination                                 Attending Provider: Ricardo Hawk DO    Allergies: Morphine, Percocet [Oxycodone-acetaminophen]    Isolation: None   Infection: None   Code Status: CPR    Ht: 178 cm (70.08\")   Wt: 81.5 kg (179 lb 10.8 oz)    Admission Cmt: None   Principal Problem: Surgical wound, non healing [T81.89XA]                   Active Insurance as of 6/3/2024       Primary Coverage       Payor Plan Insurance Group Employer/Plan Group    WORKERS COMPENSATION MISC WORKERS COMPENSATION        Coverage Address Coverage Phone Number Coverage Fax Number Effective Dates    [address] [phone]  9/8/2021 - None Entered    [place] [address]         Subscriber Name Subscriber Birth Date Member ID       GARRETT COATES [birth year] [id number]               Secondary Coverage       Payor Plan Insurance Group Employer/Plan Group    [facility] CROSS ANTHEM PATHWAY O [id number]       Payor Plan Address Payor Plan Phone Number Payor Plan Fax Number Effective Dates    [address] [phone]  6/1/2022 - None Entered    [facility] [id number]         Subscriber Name Subscriber Birth Date Member ID       GARRETT COATES 1965 [id number]                     Emergency Contacts        (Rel.) Home Phone Work Phone Mobile Phone    " Gwen Rodriguez (Spouse) -- -- [phone]              Vital Signs (last day)       Date/Time Temp Temp src Pulse Resp BP Patient Position SpO2    06/04/24 1126 98.3 (36.8) Oral 75 16 124/68 Lying 94    06/04/24 0907 98.4 (36.9) -- 80 12 131/66 Lying 93    06/04/24 0458 98 (36.7) Oral 79 12 129/61 Lying 99    06/04/24 0429 -- -- 78 -- -- -- 100    06/04/24 0008 97.6 (36.4) Oral 74 12 131/61 Lying 99    06/03/24 2345 -- -- 79 13 -- -- 100    06/03/24 2330 -- -- -- 5 -- -- --    06/03/24 2313 -- -- 80 9 -- -- 99    06/03/24 2233 -- -- -- 7 -- -- --    06/03/24 2156 -- -- -- 6 -- -- --    06/03/24 2154 -- -- 76 -- 184/106 -- --    06/03/24 2024 97.5 (36.4) Oral 70 14 97/57 Lying 98    06/03/24 1855 -- -- 71 12 110/55 -- 99    06/03/24 1821 -- -- 70 12 102/53 -- 98    06/03/24 1745 97.7 (36.5) -- 69 12 107/85 -- 100    06/03/24 1719 98.1 (36.7) -- 76 12 152/81 -- 100    06/03/24 1704 -- -- 74 14 147/76 -- 97    06/03/24 1649 -- -- 75 12 142/74 -- 96    06/03/24 1634 -- -- 73 12 147/80 -- 98    06/03/24 1629 -- -- 72 14 148/78 -- 100    06/03/24 1624 -- -- 72 18 148/78 -- 100    06/03/24 1619 97.8 (36.6) Temporal 71 11 144/77 Lying 100    06/03/24 1355 -- -- 78 -- -- -- 94    06/03/24 1031 98 (36.7) Temporal 81 18 167/79 Sitting 96          Current Facility-Administered Medications   Medication Dose Route Frequency Provider Last Rate Last Admin    acetaminophen (TYLENOL) tablet 650 mg  650 mg Oral Q4H PRN Ricardo Hawk DO        apixaban (ELIQUIS) tablet 5 mg  5 mg Oral BID Ricardo Hawk DO   5 mg at 06/04/24 0904    ascorbic acid (VITAMIN C) tablet 500 mg  500 mg Oral Daily Ricardo Hawk DO   500 mg at 06/04/24 0904    furosemide (LASIX) tablet 40 mg  40 mg Oral Daily PRN Ricardo Hawk DO        HYDROcodone-acetaminophen (NORCO) 7.5-325 MG per tablet 1 tablet  1 tablet Oral Q4H PRN Deborah Monk APRN   1 tablet at 06/04/24 0904    lisinopril (PRINIVIL,ZESTRIL) tablet 20 mg  20 mg Oral  Q24H BickingRicardo, DO        metFORMIN (GLUCOPHAGE) tablet 1,000 mg  1,000 mg Oral BID With Meals Ricardo Hawk DO   1,000 mg at 06/04/24 0904    naloxone (NARCAN) injection 0.1 mg  0.1 mg Intravenous Q5 Min PRN BicRicardo elizabeth, DO   0.1 mg at 06/03/24 2330    ondansetron ODT (ZOFRAN-ODT) disintegrating tablet 4 mg  4 mg Oral Q6H PRN BicRicardo elizabeth DO        Or    ondansetron (ZOFRAN) injection 4 mg  4 mg Intravenous Q6H PRN BickingRicardo, DO   4 mg at 06/03/24 2203    pregabalin (LYRICA) capsule 100 mg  100 mg Oral TID Ricardo Hawk DO   100 mg at 06/04/24 0904    sodium chloride 0.9 % flush 10 mL  10 mL Intravenous Q12H Ricardo Hawk, DO   10 mL at 06/03/24 2057    sodium chloride 0.9 % flush 10 mL  10 mL Intravenous PRN Ricardo Hawk, DO        sodium chloride 0.9 % infusion 40 mL  40 mL Intravenous PRN BicRicardo elizabeth, DO        sodium chloride 0.9 % infusion  30 mL/hr Intravenous Continuous PRN BickingRicardo, DO        sodium chloride 0.9 % infusion  50 mL/hr Intravenous Continuous BickingRicardo, DO        zinc sulfate (ZINCATE) capsule 220 mg  220 mg Oral Daily Ricardo Hawk, DO   220 mg at 06/04/24 0905        Operative/Procedure Notes (last 48 hours)        Ricardo Hawk DO at 06/03/24 1450          Garrett Shirley Jennifer  6/3/2024     PREOPERATIVE DIAGNOSIS: Non-healing surgical wound, initial encounter [T81.89XA]  Preop testing [Z01.818]     POSTOPERATIVE DIAGNOSIS: Post-Op Diagnosis Codes:     * Non-healing surgical wound, initial encounter [T81.89XA]     * Preop testing [Z01.818]     PROCEDURE PERFORMED:   1.  Right below-knee amputation     SURGEON: Ricardo Hawk DO      ANESTHESIA: General.    PREPARATION: Routine.    STAFF: Circulator: Keily Bradford RN  Scrub Person: Manuela Parra; Daniele Truong; Kaleb Vega  Assistant: Alice Hong    Estimated Blood Loss: 200ml    SPECIMENS: Right lower  extremity    COMPLICATIONS: None    INDICATIONS: Garrett Rodriguez is a 59 y.o. male who you are currently following for congestive heart failure. He did have anasarca, but now fluid has be removed. He has had 5 right ankle surgeries. He has a small healing wound to his right heel. He does have a brace to his right ankle with chronic right leg swelling since his injury at work necessitating multiple surgical intervention. He is maintained on Eliquis. He is taking lasix to keep swelling down. He is wearing compression stockings. He has seen Dr. Reddy who recommends right below-knee amputation. He is here to further discuss. The indications, risks, and possible complications of the procedure were explained to the patient, who voiced understanding and wished to proceed with surgery.     PROCEDURE IN DETAIL:     The patient was taken to the operating room and placed on the operating table in a supine position. After general anesthesia was obtained, the right lower extremity was prepped and draped in a sterile manner.  Approximately 4 finger breaths below the tibial plateau the standard below-knee amputation incision was made.  Careful dissection was made down through the subcutaneous tissues using the Bovie cautery to ensure hemostasis.  Any crossing veins were ligated with 3-0 silk suture.  The anterior compartment was taken down first encountering the anterior tibial artery and vein.  They were carefully dissected free with the Metzenbaum scissors and then separately ligated with 2-0 silk suture ligatures.  Next, the fibula was identified and freed from its local attachments with the periosteal elevator.  Once it was freed up proximally it was transected with the bone saw.  Next the periosteal elevator was taken to the tibia where was fully cleaned off.  A lap sponge was placed posteriorly.  The pneumatic saw was then used to transect the tibia successfully.  The tibia was lifted up on and the posterior flap was  created with the amputation knife.  The popliteal vessels were quickly clamped off.  The lower leg was removed as specimen.  Any loose bleeding was clamped with hemostats.  Each of the hemostats were ligated with 2-0 and 3-0 silk suture ligatures.  The anterior part of the tibia was then transected with the bone saw and smoothed out with the bone file.  The wound bed was copiously irrigated with antibiotic saline and hemostasis was observed.  The sciatic nerve was also ligated with 2-0 silk suture.  The fascial layers were then closed with a 2-0 Vicryl in an interrupted fashion.  The skin was then reapproximated using staples.  The wound was then cleaned.  Sterile dressings were applied. The patient tolerated the procedure well. Sponge and needle counts were correct. The patient was then awakened and extubated in the operating room and taken to the recovery room in good condition.    iRcardo Hawk DO  6/3/2024  16:19 CDT    Electronically signed by Ricardo Hawk DO at 06/03/24 1620          Physician Progress Notes (last 24 hours)        Aleah Dixon APRN at 06/04/24 0849             LOS: 1 day   Patient Care Team:  Mehul Andersen MD as PCP - General (Family Medicine)    Chief Complaint: Right BKA, postop day #1    Subjective     Patient Complaints: Patient seen/examined sitting up in bed.  Denies any complaints at this time.  He did state he does not want to go through what he went through last night, his Dilaudid PCA caused him some respiratory depression and he had to be Narcan 3 different times.  Dilaudid PCA was DC'd and he is now on p.o. Norco for pain control.  He is experiencing phantom pain, but it is manageable.  He did ask if he could use a bedside commode instead of a bedpan, I asked him to work with therapy first to see how strong he was before we made that transition.  His wife is with him at bedside, and she asked when he would be going home.  I explained that he would be kept in an Ace  wrap for about 4 to 5 days, then we would take the Ace wrap down look at the incision and go from there.  She stated that they were picking up their foster children on Saturday, that is why she asked.  BUN 50, creatinine 1.87, WBCs 13.74, Hgb 8.1.  His VSS except during his episodes of respiratory depression.      Objective     Vital Signs  Temp:  [97.5 °F (36.4 °C)-98.1 °F (36.7 °C)] 98 °F (36.7 °C)  Heart Rate:  [69-81] 79  Resp:  [5-18] 12  BP: ()/() 129/61    Physical Exam  Vitals and nursing note reviewed.   Constitutional:       General: He is not in acute distress.     Appearance: Normal appearance. He is not diaphoretic.   HENT:      Head: Normocephalic. No right periorbital erythema or left periorbital erythema.      Nose: Nose normal.   Eyes:      General: No scleral icterus.     Pupils: Pupils are equal.   Cardiovascular:      Rate and Rhythm: Normal rate and regular rhythm.      Heart sounds: Normal heart sounds. No murmur heard.  Pulmonary:      Effort: Pulmonary effort is normal. No respiratory distress.      Breath sounds: Normal breath sounds.   Abdominal:      General: Bowel sounds are normal. There is no distension.      Palpations: Abdomen is soft.      Tenderness: There is no abdominal tenderness. There is no guarding.   Musculoskeletal:         General: No swelling or tenderness. Normal range of motion.      Cervical back: Normal range of motion and neck supple.      Right lower leg: No edema.      Left lower leg: No edema.      Right Lower Extremity: Right leg is amputated below knee.   Feet:      Right foot:      Skin integrity: Skin integrity normal.      Left foot:      Skin integrity: Skin integrity normal.   Skin:     General: Skin is warm and dry.      Findings: No erythema or rash.   Neurological:      General: No focal deficit present.      Mental Status: He is alert and oriented to person, place, and time. Mental status is at baseline.      Cranial Nerves: No cranial nerve  deficit.      Gait: Gait normal.   Psychiatric:         Attention and Perception: Attention normal.         Mood and Affect: Mood normal.       Laboratory Data:   Results from last 7 days   Lab Units 06/04/24  0404 06/03/24  1046 05/29/24  1655 05/29/24  0433   WBC 10*3/mm3 13.74* 9.71  --  7.43   HEMOGLOBIN g/dL 8.1* 9.8* 8.9* 7.2*   HEMATOCRIT % 27.6* 31.7* 29.0* 24.2*   PLATELETS 10*3/mm3 175 214  --  241       Results from last 7 days   Lab Units 06/04/24  0404 06/03/24  1046 05/29/24  0432   SODIUM mmol/L 136 143 143   POTASSIUM mmol/L 4.5 4.0 3.8   CHLORIDE mmol/L 101 103 105   CO2 mmol/L 24.0 30.0* 32.0*   BUN mg/dL 50* 47* 55*   CREATININE mg/dL 1.87* 1.51* 1.61*   CALCIUM mg/dL 7.4* 8.3* 8.2*   BILIRUBIN mg/dL  --   --  0.2   ALK PHOS U/L  --   --  120*   ALT (SGPT) U/L  --   --  11   AST (SGOT) U/L  --   --  10   GLUCOSE mg/dL 212* 115* 95     Results from last 7 days   Lab Units 06/03/24  1046   PROTIME Seconds 14.4   INR  1.08   APTT seconds 37.8*            Medication Review: Reviewed    Assessment & Plan       Surgical wound, non healing    Preop testing    Nonhealing surgical wound, initial encounter          Plan for disposition:    Leave dressing in place  Monitor labs  PO Norco for pain control  PT/OT    MEGHANN Wisdom  Vascular Surgery  [phone]  06/04/24  08:49 CDT      Electronically signed by Aleah Dixon APRN at 06/04/24 5379

## 2024-06-04 NOTE — PLAN OF CARE
Goal Outcome Evaluation:  Plan of Care Reviewed With: patient, spouse           Outcome Evaluation: Physical therapy evaluation performed. Pt able to performe bed mobility and sit to/from stand with SBA, CGA for transfers and hopping from bed to chair. Pt showed good balance with standing and use of RW. Skilled PT needed to improve tranfers, ambulation and balance. Pt will be able to d/c home with assist with potential for OP physical therapy depending on progressiong acutely.      Anticipated Discharge Disposition (PT): home with assist, home with outpatient therapy services

## 2024-06-04 NOTE — SIGNIFICANT NOTE
At approximately 2150, this nurse saw that patient oxygen saturation was 35% on the spo2 nurses station monitor. When this nurse entered patient room, the patient was roper and o2 sat was reading 4%. Staff assist button hit. After sternal rubbing patient, the patient came to. He was A&Ox4. PCA pump stopped and Narcan administered as ordered. Patient then began vomiting; zofran administered. Patient is on ETCO2 monitoring and continuous pulse ox. 3L NC.   MEGHANN Cabrera notified.

## 2024-06-04 NOTE — PLAN OF CARE
Goal Outcome Evaluation:  Plan of Care Reviewed With: patient        Progress: no change  Outcome Evaluation: OT evaluation completed. Pt is A&Ox4.  Pt reports 9/10 pain in RLE.  Pt completed bed mobility with S with HOB elevated and with use of bedrails.  Pt completed functional transfers and was able to take 3-4 hops to the bedside chair with CGA and use of RWX.  Pt able to simulate LB dressing task in standing position while maintaining 1 UE support on walker at a time with CGA.  Pt does demo some mild UE weakness and would benefit from continued OT services to increase BUE strength, balance, endurance, and to learn adaptive ADL techniques.  OT will cont to follow.  Rec discharge home with assist and HH OT and PT.      Anticipated Discharge Disposition (OT): home with assist, home with home health

## 2024-06-04 NOTE — TELEPHONE ENCOUNTER
I have contacted the patient's wife, Gwen, and made her aware.  She voiced understanding.  The patient is currently in the hospital due to having his amputation yesterday.  She is concerned because they took him off his Lasix as of Sunday.  She was wanting to know if they could possibly do a telehealth visit as his next follow up.  Thank you.

## 2024-06-04 NOTE — PLAN OF CARE
Goal Outcome Evaluation:              Outcome Evaluation: See previous nursing note. A&Ox4. 3.5L NC. ETCO2 monitoring/continuous pulse ox. PCA on hold; denies pain. Resting well. Kramer. Diabetic diet. Stump with ace wrap and immobilizer. Eliquis to start tomorrow. Safety maintained.

## 2024-06-04 NOTE — THERAPY EVALUATION
Patient Name: Garrett Rodriguez  : 1965    MRN: [id number]                              Today's Date: 2024       Admit Date: 6/3/2024    Visit Dx:     ICD-10-CM ICD-9-CM   1. Non-healing surgical wound, initial encounter  T81.89XA 998.83   2. Preop testing  Z01.818 V72.84   3. Encounter for monitoring antiplatelet therapy  Z51.81 V58.83    Z79.02 V58.63     Patient Active Problem List   Diagnosis    Type 2 diabetes mellitus with hyperglycemia, without long-term current use of insulin    HTN (hypertension)    Retained orthopedic hardware    Anasarca    Iron deficiency anemia    PAF (paroxysmal atrial fibrillation) new onset    Scrotal edema    Gait instability    Pulmonary HTN    Surgical wound, non healing    TARSHA (acute kidney injury)    Bleeding from wound    Diarrhea of presumed infectious origin    Preop testing    Nonhealing surgical wound, initial encounter     Past Medical History:   Diagnosis Date    Atrial fibrillation 3/11/2024    Chronic pain in right foot     Diabetes mellitus     History of transfusion     Hyperlipidemia 2024    Hypertension     Open wound of right foot 2024    PAF (paroxysmal atrial fibrillation)     PFO (patent foramen ovale)     RP at age 6     Past Surgical History:   Procedure Laterality Date    ANKLE FUSION Right 2023    Procedure: ANKLE ARTHRODESIS;  Surgeon: Shahbaz Reddy DPM;  Location:  [facility];  Service: Podiatry;  Laterality: Right;    BACK SURGERY      BELOW KNEE AMPUTATION Right 6/3/2024    Procedure: RIGHT AMPUTATION BELOW KNEE;  Surgeon: Ricardo Hawk DO;  Location:  [facility];  Service: Vascular;  Laterality: Right;    CARDIAC SURGERY      EXTERNAL FIXATION ANKLE FRACTURE Right 2023    Procedure: POSSIBLE EXTERNAL FIXATION, RIGHT ANKLE;  Surgeon: Shahbaz Reddy DPM;  Location:  [facility];  Service: Podiatry;  Laterality: Right;    HARDWARE REMOVAL Right 2023    Procedure: REMOVAL OF HARDWARE, DEEP; ANKLE  ARTHRODESIS; SUBTALAR ARTHRODESIS; POSSIBLE EXTERNAL FIXATION, RIGHT ANKLE;  Surgeon: Shahbaz Reddy DPM;  Location:  [facility];  Service: Podiatry;  Laterality: Right;    PATENT FORAMEN OVALE CLOSURE      ROTATOR CUFF REPAIR Right     SUBTALAR ARTHRODESIS Right 07/11/2023    Procedure: SUBTALAR ARTHRODESIS;  Surgeon: Shahbaz Reddy DPM;  Location:  [facility];  Service: Podiatry;  Laterality: Right;      General Information       Row Name 06/04/24 0800          Physical Therapy Time and Intention    Document Type evaluation  -KR     Mode of Treatment physical therapy  -KR       Row Name 06/04/24 0800          General Information    Patient Profile Reviewed yes  -KR     Prior Level of Function independent:;transfer  -KR     Existing Precautions/Restrictions fall  -KR     Barriers to Rehab medically complex  -KR       Row Name 06/04/24 0800          Living Environment    People in Home spouse  -KR       Row Name 06/04/24 0800          Home Main Entrance    Number of Stairs, Main Entrance none  ramp  -KR       Row Name 06/04/24 0800          Cognition    Orientation Status (Cognition) oriented x 4  -KR       Row Name 06/04/24 0800          Safety Issues, Functional Mobility    Impairments Affecting Function (Mobility) pain;strength;endurance/activity tolerance  -KR               User Key  (r) = Recorded By, (t) = Taken By, (c) = Cosigned By      Initials Name Provider Type    KR Lacey Cosme PT DPT Physical Therapist                   Mobility       Row Name 06/04/24 0800          Bed Mobility    Bed Mobility supine-sit  -KR     Supine-Sit [place] (Bed Mobility) supervision  -KR     Assistive Device (Bed Mobility) head of bed elevated  -KR       Row Name 06/04/24 0800          Bed-Chair Transfer    Bed-Chair [place] (Transfers) contact guard  -KR     Assistive Device (Bed-Chair Transfers) walker, front-wheeled  -KR       Row Name 06/04/24 0800          Sit-Stand Transfer    Sit-Stand  [place] (Transfers) contact guard;verbal cues  -KR     Assistive Device (Sit-Stand Transfers) walker, front-wheeled  -KR       Row Name 06/04/24 0800          Gait/Stairs (Locomotion)    [place] Level (Gait) contact guard  -KR     Assistive Device (Gait) walker, front-wheeled  -KR     Patient was able to Ambulate yes  -KR     Distance in Feet (Gait) 3  hopped from bed to chair  -KR               User Key  (r) = Recorded By, (t) = Taken By, (c) = Cosigned By      Initials Name Provider Type    Lacey Degroot, PT DPT Physical Therapist                   Obj/Interventions       Row Name 06/04/24 0800          Range of Motion Comprehensive    Comment, General Range of Motion RLE limited by post op BKA/knee immobilizer  -KR       Row Name 06/04/24 0800          Strength Comprehensive (MMT)    Comment, General Manual Muscle Testing (MMT) Assessment LLE functionally 4/5  -KR       Row Name 06/04/24 0800          Balance    Balance Assessment sitting static balance;sitting dynamic balance;standing static balance;standing dynamic balance  -KR     Static Sitting Balance independent  -KR     Dynamic Sitting Balance independent  -KR     Position, Sitting Balance unsupported;sitting edge of bed  -KR     Static Standing Balance contact guard  -KR     Dynamic Standing Balance contact guard  -KR     Position/Device Used, Standing Balance walker, front-wheeled  -KR     Comment, Balance Pt able to standing and take one arm at a time off RW with SBA/CGA  -KR               User Key  (r) = Recorded By, (t) = Taken By, (c) = Cosigned By      Initials Name Provider Type    Lacey Degroot PT DPT Physical Therapist                   Goals/Plan       Row Name 06/04/24 0800          Bed Mobility Goal 1 (PT)    Activity/Assistive Device (Bed Mobility Goal 1, PT) bed mobility activities, all  -KR     [place] Level/Cues Needed (Bed Mobility Goal 1, PT) independent  -KR     Time Frame (Bed Mobility Goal 1, PT) by  discharge  -KR     Progress/Outcomes (Bed Mobility Goal 1, PT) new goal  -KR       Row Name 06/04/24 0800          Transfer Goal 1 (PT)    Activity/Assistive Device (Transfer Goal 1, PT) transfers, all  -KR     [place] Level/Cues Needed (Transfer Goal 1, PT) modified independence  -KR     Time Frame (Transfer Goal 1, PT) by discharge  -KR     Progress/Outcome (Transfer Goal 1, PT) new goal  -KR       Row Name 06/04/24 0800          Gait Training Goal 1 (PT)    Activity/Assistive Device (Gait Training Goal 1, PT) gait (walking locomotion)  -KR     [place] Level (Gait Training Goal 1, PT) modified independence  -KR     Distance (Gait Training Goal 1, PT) 25  -KR     Time Frame (Gait Training Goal 1, PT) by discharge  -KR     Progress/Outcome (Gait Training Goal 1, PT) new goal  -KR       Row Name 06/04/24 0800          Problem Specific Goal 1 (PT)    Problem Specific Goal 1 (PT) Pt will self propel w/c 200 feet with SBA.  -KR     Time Frame (Problem Specific Goal 1, PT) by discharge  -KR     Progress/Outcome (Problem Specific Goal 1, PT) new goal  -KR       Row Name 06/04/24 0800          Therapy Assessment/Plan (PT)    Planned Therapy Interventions (PT) balance training;bed mobility training;gait training;home exercise program;postural re-education;patient/family education;neuromuscular re-education;strengthening;stretching;wheelchair management/propulsion training;vestibular therapy  -KR               User Key  (r) = Recorded By, (t) = Taken By, (c) = Cosigned By      Initials Name Provider Type    Lacey Dergoot, PT DPT Physical Therapist                   Clinical Impression       Row Name 06/04/24 0800          Pain    Pretreatment Pain Rating 9/10  -KR     Pain Location - Side/Orientation Right  -KR     Pain Location lower  -KR     Pain Location - --  leg  -KR     Pain Intervention(s) Medication (See MAR);Ambulation/increased activity;Repositioned  -KR       Row Name 06/04/24 0800          Plan  of Care Review    Plan of Care Reviewed With patient;spouse  -KR     Outcome Evaluation Physical therapy evaluation performed. Pt able to performe bed mobility and sit to/from stand with SBA, CGA for transfers and hopping from bed to chair. Pt showed good balance with standing and use of RW. Skilled PT needed to improve tranfers, ambulation and balance. Pt will be able to d/c home with assist with potential for OP physical therapy depending on progressiong acutely.  -KR       Row Name 06/04/24 0800          Therapy Assessment/Plan (PT)    Patient/Family Therapy Goals Statement (PT) return home  -KR     Rehab Potential (PT) good, to achieve stated therapy goals  -KR     Criteria for Skilled Interventions Met (PT) yes  -KR     Therapy Frequency (PT) 2 times/day  -KR     Predicted Duration of Therapy Intervention (PT) until d/c  -KR       Row Name 06/04/24 0800          Vital Signs    Post SpO2 (%) 95  -KR     O2 Delivery Post Treatment room air  -KR     Pre Patient Position Supine  -KR     Post Patient Position Sitting  -KR       Row Name 06/04/24 0800          Positioning and Restraints    Pre-Treatment Position in bed  -KR     Post Treatment Position chair  -KR     In Chair notified nsg;call light within reach;encouraged to call for assist;with family/caregiver;reclined  -KR               User Key  (r) = Recorded By, (t) = Taken By, (c) = Cosigned By      Initials Name Provider Type    Lacey Degroot, PT DPT Physical Therapist                   Outcome Measures       Row Name 06/04/24 0800          How much help from another person do you currently need...    Turning from your back to your side while in flat bed without using bedrails? 4  -KR     Moving from lying on back to sitting on the side of a flat bed without bedrails? 4  -KR     Moving to and from a bed to a chair (including a wheelchair)? 3  -KR     Standing up from a chair using your arms (e.g., wheelchair, bedside chair)? 3  -KR     Climbing 3-5  steps with a railing? 2  -KR     To walk in hospital room? 3  -KR     AM-PAC 6 Clicks Score (PT) 19  -KR     Highest Level of Mobility Goal 6 --> Walk 10 steps or more  -KR       Row Name 06/04/24 0800 06/04/24 0750       Functional Assessment    Outcome Measure Options AM-PAC 6 Clicks Basic Mobility (PT)  -KR AM-PAC 6 Clicks Daily Activity (OT)  -CS              User Key  (r) = Recorded By, (t) = Taken By, (c) = Cosigned By      Initials Name Provider Type    KR Lacey Cosme, PT DPT Physical Therapist    CS Patrizia Sandhu, OTR/L, CNT Occupational Therapist                                 Physical Therapy Education       Title: PT OT SLP Therapies (In Progress)       Topic: Physical Therapy (In Progress)       Point: Mobility training (Done)       Learning Progress Summary             Patient Acceptance, E, VU by KR at 6/4/2024 0927   Family Acceptance, E, VU by KR at 6/4/2024 0927                         Point: Home exercise program (Not Started)       Learner Progress:  Not documented in this visit.              Point: Body mechanics (Done)       Learning Progress Summary             Patient Acceptance, E, VU by KR at 6/4/2024 0927   Family Acceptance, E, VU by KR at 6/4/2024 0927                         Point: Precautions (Done)       Learning Progress Summary             Patient Acceptance, E, VU by KR at 6/4/2024 0927   Family Acceptance, E, VU by KR at 6/4/2024 0927                                         User Key       Initials Effective Dates Name Provider Type [facility] 06/03/24 -  Lacey Cosme, PT DPT Physical Therapist PT                  PT Recommendation and Plan  Planned Therapy Interventions (PT): balance training, bed mobility training, gait training, home exercise program, postural re-education, patient/family education, neuromuscular re-education, strengthening, stretching, wheelchair management/propulsion training, vestibular therapy  Plan of Care Reviewed With: patient,  spouse  Outcome Evaluation: Physical therapy evaluation performed. Pt able to performe bed mobility and sit to/from stand with SBA, CGA for transfers and hopping from bed to chair. Pt showed good balance with standing and use of RW. Skilled PT needed to improve tranfers, ambulation and balance. Pt will be able to d/c home with assist with potential for OP physical therapy depending on progressiong acutely.     Time Calculation:         PT Charges       Row Name 06/04/24 0800             Time Calculation    Start Time 0750  -KR      Stop Time 0830  -KR      Time Calculation (min) 40 min  -KR      PT Received On 06/04/24  -KR      PT Goal Re-Cert Due Date 06/14/24  -KR                User Key  (r) = Recorded By, (t) = Taken By, (c) = Cosigned By      Initials Name Provider Type    Lacey Degroot PT DPT Physical Therapist                  Therapy Charges for Today       Code Description Service Date Service Provider Modifiers Qty    [id number] HC PT EVAL MOD COMPLEXITY 3 6/4/2024 Lacey Cosme PT DPT GP 1            PT G-Codes  Outcome Measure Options: AM-PAC 6 Clicks Basic Mobility (PT)  AM-PAC 6 Clicks Score (PT): 19  AM-PAC 6 Clicks Score (OT): 21  PT Discharge Summary  Anticipated Discharge Disposition (PT): home with assist, home with outpatient therapy services    Lacey Cosme PT DPT  6/4/2024

## 2024-06-04 NOTE — THERAPY EVALUATION
Patient Name: Garrett Rodriguez  : 1965    MRN: [id number]                              Today's Date: 2024       Admit Date: 6/3/2024    Visit Dx:     ICD-10-CM ICD-9-CM   1. Non-healing surgical wound, initial encounter  T81.89XA 998.83   2. Preop testing  Z01.818 V72.84   3. Encounter for monitoring antiplatelet therapy  Z51.81 V58.83    Z79.02 V58.63     Patient Active Problem List   Diagnosis    Type 2 diabetes mellitus with hyperglycemia, without long-term current use of insulin    HTN (hypertension)    Retained orthopedic hardware    Anasarca    Iron deficiency anemia    PAF (paroxysmal atrial fibrillation) new onset    Scrotal edema    Gait instability    Pulmonary HTN    Surgical wound, non healing    TARSHA (acute kidney injury)    Bleeding from wound    Diarrhea of presumed infectious origin    Preop testing    Nonhealing surgical wound, initial encounter     Past Medical History:   Diagnosis Date    Atrial fibrillation 3/11/2024    Chronic pain in right foot     Diabetes mellitus     History of transfusion     Hyperlipidemia 2024    Hypertension     Open wound of right foot 2024    PAF (paroxysmal atrial fibrillation)     PFO (patent foramen ovale)     RP at age 6     Past Surgical History:   Procedure Laterality Date    ANKLE FUSION Right 2023    Procedure: ANKLE ARTHRODESIS;  Surgeon: Shahbaz Reddy DPM;  Location:  [facility];  Service: Podiatry;  Laterality: Right;    BACK SURGERY      BELOW KNEE AMPUTATION Right 6/3/2024    Procedure: RIGHT AMPUTATION BELOW KNEE;  Surgeon: Ricardo Hawk DO;  Location:  [facility];  Service: Vascular;  Laterality: Right;    CARDIAC SURGERY      EXTERNAL FIXATION ANKLE FRACTURE Right 2023    Procedure: POSSIBLE EXTERNAL FIXATION, RIGHT ANKLE;  Surgeon: Shabhaz Reddy DPM;  Location:  [facility];  Service: Podiatry;  Laterality: Right;    HARDWARE REMOVAL Right 2023    Procedure: REMOVAL OF HARDWARE, DEEP; ANKLE  ARTHRODESIS; SUBTALAR ARTHRODESIS; POSSIBLE EXTERNAL FIXATION, RIGHT ANKLE;  Surgeon: Shahbaz Reddy DPM;  Location:  [facility];  Service: Podiatry;  Laterality: Right;    PATENT FORAMEN OVALE CLOSURE      ROTATOR CUFF REPAIR Right     SUBTALAR ARTHRODESIS Right 07/11/2023    Procedure: SUBTALAR ARTHRODESIS;  Surgeon: Shahbaz Reddy DPM;  Location:  [facility];  Service: Podiatry;  Laterality: Right;      General Information       Row Name 06/04/24 [facility]0          OT Time and Intention    Document Type evaluation  Pt presents with non-healing wound to R ankle.  Dx: s/p R BKA  -CS     Mode of Treatment occupational therapy;co-treatment  -CS       Row Name 06/04/24 0750          General Information    Patient Profile Reviewed yes  -CS     Prior Level of Function independent:;transfer;min assist:;dressing;bathing  -CS     Existing Precautions/Restrictions fall  -CS     Barriers to Rehab medically complex  -CS       Row Name 06/04/24 0750          Occupational Profile    Environmental Supports and Barriers (Occupational Profile) tub shower, tub bench, wheelchair, BSC  -CS       Row Name 06/04/24 0750          Living Environment    People in Home spouse  -CS       Row Name 06/04/24 0750          Home Main Entrance    Number of Stairs, Main Entrance none  ramp  -CS       Row Name 06/04/24 0750          Cognition    Orientation Status (Cognition) oriented x 4  -CS       Row Name 06/04/24 0750          Safety Issues, Functional Mobility    Impairments Affecting Function (Mobility) pain;strength;endurance/activity tolerance  -CS               User Key  (r) = Recorded By, (t) = Taken By, (c) = Cosigned By      Initials Name Provider Type    CS Patrizia Sandhu S, OTR/L, CNT Occupational Therapist                     Mobility/ADL's       Row Name 06/04/24 0750          Bed Mobility    Bed Mobility supine-sit  -CS     Supine-Sit [place] (Bed Mobility) supervision  -CS     Assistive Device (Bed Mobility) head of bed  elevated  -[facility] Name 06/04/24 0750          Transfers    Transfers sit-stand transfer;stand-sit transfer  -[facility] Name 06/04/24 0750          Sit-Stand Transfer    Sit-Stand [place] (Transfers) contact guard;verbal cues  -CS     Assistive Device (Sit-Stand Transfers) walker, front-wheeled  -CS       Row Name 06/04/24 0750          Stand-Sit Transfer    Stand-Sit [place] (Transfers) contact guard;verbal cues  -CS     Assistive Device (Stand-Sit Transfers) walker, front-wheeled  -[facility] Name 06/04/24 0750          Functional Mobility    Functional Mobility- Ind. Level contact guard assist;verbal cues required  -     Functional Mobility- Device walker, front-wheeled  -     Functional Mobility- Comment Pt able to hop 3-4 steps to the bedside chair  -[facility] Name 06/04/24 0750          Activities of Daily Living    BADL Assessment/Intervention lower body dressing  -[facility] Name 06/04/24 0750          Lower Body Dressing Assessment/Training    Shannon Level (Lower Body Dressing) don;doff;pants/bottoms;contact guard assist  -     Position (Lower Body Dressing) unsupported sitting;supported standing  -     Comment, (Lower Body Dressing) simulated task  -               User Key  (r) = Recorded By, (t) = Taken By, (c) = Cosigned By      Initials Name Provider Type     Patrizia Sandhu, OTR/L, CNT Occupational Therapist                   Obj/Interventions       [facility] Name 06/04/24 0750          Sensory Assessment (Somatosensory)    Sensory Assessment (Somatosensory) UE sensation intact  -[facility] Name 06/04/24 0750          Vision Assessment/Intervention    Visual Impairment/Limitations WFL  -[facility] Name 06/04/24 0750          Range of Motion Comprehensive    General Range of Motion bilateral upper extremity ROM WFL  -[facility] Name 06/04/24 0750          Strength Comprehensive (MMT)    Comment, General Manual Muscle Testing (MMT) Assessment BUE strength: 4/5   -CS       Row Name 06/04/24 0750          Balance    Balance Assessment sitting static balance;sitting dynamic balance;standing static balance;standing dynamic balance  -CS     Static Sitting Balance independent  -CS     Dynamic Sitting Balance independent  -CS     Position, Sitting Balance sitting edge of bed  -CS     Static Standing Balance contact guard  -CS     Dynamic Standing Balance contact guard  -CS     Position/Device Used, Standing Balance walker, front-wheeled  -CS               User Key  (r) = Recorded By, (t) = Taken By, (c) = Cosigned By      Initials Name Provider Type    CS Patrizia Sandhu, OTR/L, CNT Occupational Therapist                   Goals/Plan       Row Name 06/04/24 0750          Transfer Goal 1 (OT)    Activity/Assistive Device (Transfer Goal 1, OT) toilet;bed-to-chair/chair-to-bed  -CS     Alexander Level/Cues Needed (Transfer Goal 1, OT) supervision required  -CS     Time Frame (Transfer Goal 1, OT) long term goal (LTG)  -CS     Progress/Outcome (Transfer Goal 1, OT) new goal  -CS       Row Name 06/04/24 0750          Dressing Goal 1 (OT)    Activity/Device (Dressing Goal 1, OT) lower body dressing  -CS     Alexander/Cues Needed (Dressing Goal 1, OT) supervision required  -CS     Time Frame (Dressing Goal 1, OT) long term goal (LTG)  -CS     Progress/Outcome (Dressing Goal 1, OT) new goal  -CS       Row Name 06/04/24 0750          Toileting Goal 1 (OT)    Activity/Device (Toileting Goal 1, OT) toileting skills, all  -CS     Alexander Level/Cues Needed (Toileting Goal 1, OT) supervision required  -CS     Time Frame (Toileting Goal 1, OT) long term goal (LTG)  -CS     Progress/Outcome (Toileting Goal 1, OT) new goal  -CS       Row Name 06/04/24 0750          Strength Goal 1 (OT)    Strength Goal 1 (OT) Pt will increase BUE strength to 4+/5 to increase I with ADLs and functional mobility.  -CS     Time Frame (Strength Goal 1, OT) long term goal (LTG)  -CS     Progress/Outcome  (Strength Goal 1, OT) new goal  -CS       Row Name 06/04/24 0750          Therapy Assessment/Plan (OT)    Planned Therapy Interventions (OT) activity tolerance training;BADL retraining;functional balance retraining;occupation/activity based interventions;patient/caregiver education/training;ROM/therapeutic exercise;strengthening exercise;transfer/mobility retraining;adaptive equipment training  -CS               User Key  (r) = Recorded By, (t) = Taken By, (c) = Cosigned By      Initials Name Provider Type    CS Patrizia Sandhu, OTR/L, CNT Occupational Therapist                   Clinical Impression       Row Name 06/04/24 0750          Pain Assessment    Pretreatment Pain Rating 9/10  -CS     Pain Location - Side/Orientation Right  -CS     Pain Location lower  -CS     Pain Location - extremity  -CS     Pain Intervention(s) Medication (See MAR);Repositioned;Ambulation/increased activity  -CS       Row Name 06/04/24 0750          Plan of Care Review    Plan of Care Reviewed With patient  -CS     Progress no change  -CS     Outcome Evaluation OT evaluation completed. Pt is A&Ox4.  Pt reports 9/10 pain in RLE.  Pt completed bed mobility with S with HOB elevated and with use of bedrails.  Pt completed functional transfers and was able to take 3-4 hops to the bedside chair with CGA and use of RWX.  Pt able to simulate LB dressing task in standing position while maintaining 1 UE support on walker at a time with CGA.  Pt does demo some mild UE weakness and would benefit from continued OT services to increase BUE strength, balance, endurance, and to learn adaptive ADL techniques.  OT will cont to follow.  Rec discharge home with assist and HH OT and PT.  -CS       Row Name 06/04/24 0750          Therapy Assessment/Plan (OT)    Patient/Family Therapy Goal Statement (OT) to go home  -CS     Rehab Potential (OT) good, to achieve stated therapy goals  -CS     Criteria for Skilled Therapeutic Interventions Met (OT) yes;skilled  treatment is necessary  -CS     Therapy Frequency (OT) 5 times/wk  -CS     Predicted Duration of Therapy Intervention (OT) until hospital discharge  -CS       Row Name 06/04/24 0750          Therapy Plan Review/Discharge Plan (OT)    Anticipated Discharge Disposition (OT) home with assist;home with home health  -CS       Row Name 06/04/24 0750          Vital Signs    Post SpO2 (%) 95  -CS     O2 Delivery Post Treatment room air  -CS     Post Patient Position Sitting  -CS       Row Name 06/04/24 0750          Positioning and Restraints    Pre-Treatment Position in bed  -CS     Post Treatment Position chair  -CS     In Chair sitting;call light within reach;encouraged to call for assist;with family/caregiver;notified nsg;legs elevated  -CS               User Key  (r) = Recorded By, (t) = Taken By, (c) = Cosigned By      Initials Name Provider Type    Patrizia Yanez OTR/L, MOE Occupational Therapist                   Outcome Measures       Row Name 06/04/24 [facility]0          How much help from another is currently needed...    Putting on and taking off regular lower body clothing? 3  -CS     Bathing (including washing, rinsing, and drying) 3  -CS     Toileting (which includes using toilet bed pan or urinal) 3  -CS     Putting on and taking off regular upper body clothing 4  -CS     Taking care of personal grooming (such as brushing teeth) 4  -CS     Eating meals 4  -CS     AM-PAC 6 Clicks Score (OT) 21  -CS       Row Name 06/04/24 [facility]0          Functional Assessment    Outcome Measure Options AM-PAC 6 Clicks Daily Activity (OT)  -CS               User Key  (r) = Recorded By, (t) = Taken By, (c) = Cosigned By      Initials Name Provider Type    Patrizia Yanez OTR/L, MOE Occupational Therapist                    Occupational Therapy Education       Title: PT OT SLP Therapies (Done)       Topic: Occupational Therapy (Done)       Point: ADL training (Done)       Description:   Instruct learner(s) on proper safety  adaptation and remediation techniques during self care or transfers.   Instruct in proper use of assistive devices.                  Learning Progress Summary             Patient Acceptance, E, VU,NR by CS at 6/4/2024 0841   Significant Other Acceptance, E, VU,NR by CS at 6/4/2024 0841                         Point: Home exercise program (Done)       Description:   Instruct learner(s) on appropriate technique for monitoring, assisting and/or progressing therapeutic exercises/activities.                  Learning Progress Summary             Patient Acceptance, E, VU,NR by CS at 6/4/2024 0841   Significant Other Acceptance, E, VU,NR by CS at 6/4/2024 0841                         Point: Precautions (Done)       Description:   Instruct learner(s) on prescribed precautions during self-care and functional transfers.                  Learning Progress Summary             Patient Acceptance, E, VU,NR by CS at 6/4/2024 0841   Significant Other Acceptance, E, VU,NR by CS at 6/4/2024 0841                         Point: Body mechanics (Done)       Description:   Instruct learner(s) on proper positioning and spine alignment during self-care, functional mobility activities and/or exercises.                  Learning Progress Summary             Patient Acceptance, E, VU,NR by  at 6/4/2024 0841   Significant Other Acceptance, E, VU,NR by CS at 6/4/2024 0841                                         User Key       Initials Effective Dates Name Provider Type Discipline     02/03/23 -  Patrizia Sandhu, OTR/L, CNT Occupational Therapist OT                  OT Recommendation and Plan  Planned Therapy Interventions (OT): activity tolerance training, BADL retraining, functional balance retraining, occupation/activity based interventions, patient/caregiver education/training, ROM/therapeutic exercise, strengthening exercise, transfer/mobility retraining, adaptive equipment training  Therapy Frequency (OT): 5 times/wk  Plan of Care  Review  Plan of Care Reviewed With: patient  Progress: no change  Outcome Evaluation: OT evaluation completed. Pt is A&Ox4.  Pt reports 9/10 pain in RLE.  Pt completed bed mobility with S with HOB elevated and with use of bedrails.  Pt completed functional transfers and was able to take 3-4 hops to the bedside chair with CGA and use of RWX.  Pt able to simulate LB dressing task in standing position while maintaining 1 UE support on walker at a time with CGA.  Pt does demo some mild UE weakness and would benefit from continued OT services to increase BUE strength, balance, endurance, and to learn adaptive ADL techniques.  OT will cont to follow.  Rec discharge home with assist and HH OT and PT.     Time Calculation:         Time Calculation- OT       Row Name 06/04/24 0840             Time Calculation- OT    OT Start Time 0750  -CS      OT Stop Time 0831  -CS      OT Time Calculation (min) 41 min  -CS      OT Received On 06/04/24  -CS      OT Goal Re-Cert Due Date 06/14/24  -CS         Untimed Charges    OT Eval/Re-eval Minutes 41  -CS         Total Minutes    Untimed Charges Total Minutes 41  -CS       Total Minutes 41  -CS                User Key  (r) = Recorded By, (t) = Taken By, (c) = Cosigned By      Initials Name Provider Type    CS Patrizia Sandhu OTR/L, MOE Occupational Therapist                  Therapy Charges for Today       Code Description Service Date Service Provider Modifiers Qty    10827242584 HC OT EVAL MOD COMPLEXITY 3 6/4/2024 Patrizia Sandhu OTR/L, MOE GO 1                 ZARINA Hernandez/L, CNT  6/4/2024

## 2024-06-04 NOTE — PROGRESS NOTES
LOS: 1 day   Patient Care Team:  Mehul Andersen MD as PCP - General (Family Medicine)    Chief Complaint: Right BKA, postop day #1    Subjective     Patient Complaints: Patient seen/examined sitting up in bed.  Denies any complaints at this time.  He did state he does not want to go through what he went through last night, his Dilaudid PCA caused him some respiratory depression and he had to be Narcan 3 different times.  Dilaudid PCA was DC'd and he is now on p.o. Norco for pain control.  He is experiencing phantom pain, but it is manageable.  He did ask if he could use a bedside commode instead of a bedpan, I asked him to work with therapy first to see how strong he was before we made that transition.  His wife is with him at bedside, and she asked when he would be going home.  I explained that he would be kept in an Ace wrap for about 4 to 5 days, then we would take the Ace wrap down look at the incision and go from there.  She stated that they were picking up their foster children on Saturday, that is why she asked.  BUN 50, creatinine 1.87, WBCs 13.74, Hgb 8.1.  His VSS except during his episodes of respiratory depression.      Objective     Vital Signs  Temp:  [97.5 °F (36.4 °C)-98.1 °F (36.7 °C)] 98 °F (36.7 °C)  Heart Rate:  [69-81] 79  Resp:  [5-18] 12  BP: ()/() 129/61    Physical Exam  Vitals and nursing note reviewed.   Constitutional:       General: He is not in acute distress.     Appearance: Normal appearance. He is not diaphoretic.   HENT:      Head: Normocephalic. No right periorbital erythema or left periorbital erythema.      Nose: Nose normal.   Eyes:      General: No scleral icterus.     Pupils: Pupils are equal.   Cardiovascular:      Rate and Rhythm: Normal rate and regular rhythm.      Heart sounds: Normal heart sounds. No murmur heard.  Pulmonary:      Effort: Pulmonary effort is normal. No respiratory distress.      Breath sounds: Normal breath sounds.   Abdominal:       General: Bowel sounds are normal. There is no distension.      Palpations: Abdomen is soft.      Tenderness: There is no abdominal tenderness. There is no guarding.   Musculoskeletal:         General: No swelling or tenderness. Normal range of motion.      Cervical back: Normal range of motion and neck supple.      Right lower leg: No edema.      Left lower leg: No edema.      Right Lower Extremity: Right leg is amputated below knee.   Feet:      Right foot:      Skin integrity: Skin integrity normal.      Left foot:      Skin integrity: Skin integrity normal.   Skin:     General: Skin is warm and dry.      Findings: No erythema or rash.   Neurological:      General: No focal deficit present.      Mental Status: He is alert and oriented to person, place, and time. Mental status is at baseline.      Cranial Nerves: No cranial nerve deficit.      Gait: Gait normal.   Psychiatric:         Attention and Perception: Attention normal.         Mood and Affect: Mood normal.       Laboratory Data:   Results from last 7 days   Lab Units 06/04/24  0404 06/03/24  1046 05/29/24  1655 05/29/24  0433   WBC 10*3/mm3 13.74* 9.71  --  7.43   HEMOGLOBIN g/dL 8.1* 9.8* 8.9* 7.2*   HEMATOCRIT % 27.6* 31.7* 29.0* 24.2*   PLATELETS 10*3/mm3 175 214  --  241       Results from last 7 days   Lab Units 06/04/24  0404 06/03/24  1046 05/29/24  0432   SODIUM mmol/L 136 143 143   POTASSIUM mmol/L 4.5 4.0 3.8   CHLORIDE mmol/L 101 103 105   CO2 mmol/L 24.0 30.0* 32.0*   BUN mg/dL 50* 47* 55*   CREATININE mg/dL 1.87* 1.51* 1.61*   CALCIUM mg/dL 7.4* 8.3* 8.2*   BILIRUBIN mg/dL  --   --  0.2   ALK PHOS U/L  --   --  120*   ALT (SGPT) U/L  --   --  11   AST (SGOT) U/L  --   --  10   GLUCOSE mg/dL 212* 115* 95     Results from last 7 days   Lab Units 06/03/24  1046   PROTIME Seconds 14.4   INR  1.08   APTT seconds 37.8*            Medication Review: Reviewed    Assessment & Plan       Surgical wound, non healing    Preop testing    Nonhealing  surgical wound, initial encounter          Plan for disposition:    Leave dressing in place  Monitor labs  PO Norco for pain control  PT/OT    MEGHANN Wisdom  Vascular Surgery  [phone]  06/04/24  08:49 CDT

## 2024-06-04 NOTE — ANESTHESIA POSTPROCEDURE EVALUATION
"Patient: Garrett Rodriguez    Procedure Summary       Date: 06/03/24 Room / Location:  [facility]  /  [facility]    Anesthesia Start: 1416 Anesthesia Stop: 1620    Procedure: RIGHT AMPUTATION BELOW KNEE (Right: Leg Lower) Diagnosis:       Non-healing surgical wound, initial encounter      Preop testing      (Non-healing surgical wound, initial encounter [T81.89XA])      (Preop testing [Z01.818])    Surgeons: Ricardo Hawk DO Provider: Shahbaz Peralta CRNA    Anesthesia Type: general ASA Status: 3            Anesthesia Type: general    Vitals  Vitals Value Taken Time   /81 06/03/24 1724   Temp 98.1 °F (36.7 °C) 06/03/24 1719   Pulse 75 06/03/24 1730   Resp 12 06/03/24 1719   SpO2 99 % 06/03/24 1730   Vitals shown include unfiled device data.        Post Anesthesia Care and Evaluation    Patient location during evaluation: PACU  Patient participation: complete - patient participated  Level of consciousness: awake and awake and alert  Pain score: 0  Pain management: adequate    Airway patency: patent  Anesthetic complications: No anesthetic complications  PONV Status: none  Cardiovascular status: acceptable  Respiratory status: acceptable  Hydration status: acceptable    Comments: Patient discharged according to acceptable Jefe score per RN assessment. See nursing records for further information.     Blood pressure 129/61, pulse 79, temperature 98 °F (36.7 °C), temperature source Oral, resp. rate 12, height 178 cm (70.08\"), weight 81.5 kg (179 lb 10.8 oz), SpO2 99%.      "

## 2024-06-04 NOTE — TELEPHONE ENCOUNTER
I have contacted the patient's wife and made her aware.  She voiced understanding.  Toshia-- can you please schedule the patient for a telehealth visit?  The patient will be discharged from the hospital on 5/11.  Thank you.

## 2024-06-04 NOTE — PLAN OF CARE
Goal Outcome Evaluation:                 Patient alert and oriented. Pain controlled with medication regimen. Kramer removed and voiding per urinal. No needs at this time.

## 2024-06-05 LAB
ANION GAP SERPL CALCULATED.3IONS-SCNC: 10 MMOL/L (ref 5–15)
BASOPHILS # BLD AUTO: 0.02 10*3/MM3 (ref 0–0.2)
BASOPHILS NFR BLD AUTO: 0.2 % (ref 0–1.5)
BUN SERPL-MCNC: 55 MG/DL (ref 6–20)
BUN/CREAT SERPL: 29.9 (ref 7–25)
CALCIUM SPEC-SCNC: 7.1 MG/DL (ref 8.6–10.5)
CHLORIDE SERPL-SCNC: 101 MMOL/L (ref 98–107)
CO2 SERPL-SCNC: 27 MMOL/L (ref 22–29)
CREAT SERPL-MCNC: 1.84 MG/DL (ref 0.76–1.27)
CYTO UR: NORMAL
DEPRECATED RDW RBC AUTO: 49.8 FL (ref 37–54)
EGFRCR SERPLBLD CKD-EPI 2021: 42 ML/MIN/1.73
EOSINOPHIL # BLD AUTO: 0.24 10*3/MM3 (ref 0–0.4)
EOSINOPHIL NFR BLD AUTO: 2.7 % (ref 0.3–6.2)
ERYTHROCYTE [DISTWIDTH] IN BLOOD BY AUTOMATED COUNT: 15.8 % (ref 12.3–15.4)
GLUCOSE SERPL-MCNC: 198 MG/DL (ref 65–99)
HCT VFR BLD AUTO: 24.2 % (ref 37.5–51)
HGB BLD-MCNC: 7.5 G/DL (ref 13–17.7)
IMM GRANULOCYTES # BLD AUTO: 0.05 10*3/MM3 (ref 0–0.05)
IMM GRANULOCYTES NFR BLD AUTO: 0.6 % (ref 0–0.5)
LAB AP CASE REPORT: NORMAL
LYMPHOCYTES # BLD AUTO: 1.31 10*3/MM3 (ref 0.7–3.1)
LYMPHOCYTES NFR BLD AUTO: 15 % (ref 19.6–45.3)
Lab: NORMAL
MCH RBC QN AUTO: 27 PG (ref 26.6–33)
MCHC RBC AUTO-ENTMCNC: 31 G/DL (ref 31.5–35.7)
MCV RBC AUTO: 87.1 FL (ref 79–97)
MONOCYTES # BLD AUTO: 0.55 10*3/MM3 (ref 0.1–0.9)
MONOCYTES NFR BLD AUTO: 6.3 % (ref 5–12)
NEUTROPHILS NFR BLD AUTO: 6.59 10*3/MM3 (ref 1.7–7)
NEUTROPHILS NFR BLD AUTO: 75.2 % (ref 42.7–76)
NRBC BLD AUTO-RTO: 0 /100 WBC (ref 0–0.2)
PATH REPORT.FINAL DX SPEC: NORMAL
PATH REPORT.GROSS SPEC: NORMAL
PLATELET # BLD AUTO: 162 10*3/MM3 (ref 140–450)
PMV BLD AUTO: 10.4 FL (ref 6–12)
POTASSIUM SERPL-SCNC: 4.1 MMOL/L (ref 3.5–5.2)
RBC # BLD AUTO: 2.78 10*6/MM3 (ref 4.14–5.8)
SODIUM SERPL-SCNC: 138 MMOL/L (ref 136–145)
WBC NRBC COR # BLD AUTO: 8.76 10*3/MM3 (ref 3.4–10.8)

## 2024-06-05 PROCEDURE — 99024 POSTOP FOLLOW-UP VISIT: CPT | Performed by: NURSE PRACTITIONER

## 2024-06-05 PROCEDURE — 97116 GAIT TRAINING THERAPY: CPT

## 2024-06-05 PROCEDURE — 80048 BASIC METABOLIC PNL TOTAL CA: CPT | Performed by: NURSE PRACTITIONER

## 2024-06-05 PROCEDURE — 85025 COMPLETE CBC W/AUTO DIFF WBC: CPT | Performed by: NURSE PRACTITIONER

## 2024-06-05 PROCEDURE — 97535 SELF CARE MNGMENT TRAINING: CPT

## 2024-06-05 RX ADMIN — HYDROCODONE BITARTRATE AND ACETAMINOPHEN 1 TABLET: 7.5; 325 TABLET ORAL at 13:25

## 2024-06-05 RX ADMIN — HYDROCODONE BITARTRATE AND ACETAMINOPHEN 1 TABLET: 7.5; 325 TABLET ORAL at 05:32

## 2024-06-05 RX ADMIN — HYDROCODONE BITARTRATE AND ACETAMINOPHEN 1 TABLET: 7.5; 325 TABLET ORAL at 09:52

## 2024-06-05 RX ADMIN — ZINC SULFATE 220 MG (50 MG) CAPSULE 220 MG: CAPSULE at 09:56

## 2024-06-05 RX ADMIN — HYDROCODONE BITARTRATE AND ACETAMINOPHEN 1 TABLET: 7.5; 325 TABLET ORAL at 20:08

## 2024-06-05 RX ADMIN — APIXABAN 5 MG: 5 TABLET, FILM COATED ORAL at 09:51

## 2024-06-05 RX ADMIN — APIXABAN 5 MG: 5 TABLET, FILM COATED ORAL at 20:08

## 2024-06-05 RX ADMIN — PREGABALIN 100 MG: 25 CAPSULE ORAL at 09:52

## 2024-06-05 RX ADMIN — OXYCODONE HYDROCHLORIDE AND ACETAMINOPHEN 500 MG: 500 TABLET ORAL at 09:52

## 2024-06-05 RX ADMIN — METFORMIN HCL 1000 MG: 500 TABLET ORAL at 17:59

## 2024-06-05 RX ADMIN — Medication 10 ML: at 09:57

## 2024-06-05 RX ADMIN — HYDROCODONE BITARTRATE AND ACETAMINOPHEN 1 TABLET: 7.5; 325 TABLET ORAL at 00:00

## 2024-06-05 RX ADMIN — PREGABALIN 100 MG: 25 CAPSULE ORAL at 17:59

## 2024-06-05 RX ADMIN — METFORMIN HCL 1000 MG: 500 TABLET ORAL at 09:51

## 2024-06-05 RX ADMIN — PREGABALIN 100 MG: 25 CAPSULE ORAL at 20:08

## 2024-06-05 NOTE — PLAN OF CARE
Goal Outcome Evaluation:              Outcome Evaluation: Patient A&Ox4. 2L NC while sleeping. VSS. Pain controlled with PO. IVF. Up x1 with walker. Bottom red; boarder drsg applied. Voiding per urinal. Spouse participating in care. Tolerating diet. Right stump c/d/i with ace bandage and immobilizer. Eliquis for VTE prevention. Call light within reach; safety maintained.

## 2024-06-05 NOTE — PLAN OF CARE
Goal Outcome Evaluation:                 Patient alert and oriented. Experiencing moderate pain controlled by medication regimen. No needs at this time.

## 2024-06-05 NOTE — THERAPY TREATMENT NOTE
Acute Care - Physical Therapy Treatment Note  [facility]     Patient Name: Garrett Rodriguez  : 1965  MRN: [id number]  Today's Date: 2024      Visit Dx:     ICD-10-CM ICD-9-CM   1. Non-healing surgical wound, initial encounter  T81.89XA 998.83   2. Preop testing  Z01.818 V72.84   3. Encounter for monitoring antiplatelet therapy  Z51.81 V58.83    Z79.02 V58.63     Patient Active Problem List   Diagnosis    Type 2 diabetes mellitus with hyperglycemia, without long-term current use of insulin    HTN (hypertension)    Retained orthopedic hardware    Anasarca    Iron deficiency anemia    PAF (paroxysmal atrial fibrillation) new onset    Scrotal edema    Gait instability    Pulmonary HTN    Surgical wound, non healing    TARSHA (acute kidney injury)    Bleeding from wound    Diarrhea of presumed infectious origin    Preop testing    Nonhealing surgical wound, initial encounter     Past Medical History:   Diagnosis Date    Atrial fibrillation 3/11/2024    Chronic pain in right foot     Diabetes mellitus     History of transfusion     Hyperlipidemia 2024    Hypertension     Open wound of right foot 2024    PAF (paroxysmal atrial fibrillation)     PFO (patent foramen ovale)     RP at age 6     Past Surgical History:   Procedure Laterality Date    ANKLE FUSION Right 2023    Procedure: ANKLE ARTHRODESIS;  Surgeon: Shahbaz Reddy DPM;  Location:  [facility];  Service: Podiatry;  Laterality: Right;    BACK SURGERY      BELOW KNEE AMPUTATION Right 6/3/2024    Procedure: RIGHT AMPUTATION BELOW KNEE;  Surgeon: Ricardo Hawk DO;  Location:  [facility]BRID [facility];  Service: Vascular;  Laterality: Right;    CARDIAC SURGERY      EXTERNAL FIXATION ANKLE FRACTURE Right 2023    Procedure: POSSIBLE EXTERNAL FIXATION, RIGHT ANKLE;  Surgeon: Shahbaz Reddy DPM;  Location:  [facility];  Service: Podiatry;  Laterality: Right;    HARDWARE REMOVAL Right 2023    Procedure: REMOVAL OF HARDWARE, DEEP;  ANKLE ARTHRODESIS; SUBTALAR ARTHRODESIS; POSSIBLE EXTERNAL FIXATION, RIGHT ANKLE;  Surgeon: Shahbaz Reddy DPM;  Location:  [facility];  Service: Podiatry;  Laterality: Right;    PATENT FORAMEN OVALE CLOSURE      ROTATOR CUFF REPAIR Right     SUBTALAR ARTHRODESIS Right 07/11/2023    Procedure: SUBTALAR ARTHRODESIS;  Surgeon: Shahbaz Reddy DPM;  Location:  [facility];  Service: Podiatry;  Laterality: Right;     PT Assessment (Last 12 Hours)       PT Evaluation and Treatment       Row Name 06/05/24 0903          Physical Therapy Time and Intention    Subjective Information complains of;pain  -WK     Document Type therapy note (daily note)  -WK     Mode of Treatment physical therapy  -WK       Row Name 06/05/24 0903          General Information    Existing Precautions/Restrictions fall  R BKA  -WK       Row Name 06/05/24 0903          Pain    Pretreatment Pain Rating 7/10  -WK     Posttreatment Pain Rating 7/10  -WK     Pain Location - Side/Orientation Right  -WK     Pain Location lower  -WK     Pain Intervention(s) Medication (See MAR);Repositioned;Ambulation/increased activity  -WK       Row Name 06/05/24 0903          Bed Mobility    Supine-Sit [place] (Bed Mobility) supervision  -WK     Assistive Device (Bed Mobility) head of bed elevated;bed rails  -WK       Row Name 06/05/24 0903          Transfers    Transfers stand pivot/stand step transfer  -WK       Row Name 06/05/24 0903          Sit-Stand Transfer    Sit-Stand [place] (Transfers) verbal cues;contact guard  -WK     Assistive Device (Sit-Stand Transfers) walker, front-wheeled  -WK       Row Name 06/05/24 0903          Stand-Sit Transfer    Stand-Sit [place] (Transfers) verbal cues;contact guard  -WK     Assistive Device (Stand-Sit Transfers) walker, front-wheeled  -WK       Row Name 06/05/24 0903          Stand Pivot/Stand Step Transfer    Stand Pivot/Stand Step [place] (Transfers) verbal cues;contact guard;minimum assist (75% patient  effort)  -WK     Assistive Device (Stand Pivot Stand Step Transfer) walker, front-wheeled  -WK     Comment, (Stand Pivot Transfer) chair to Wc to Recliner  -WK       Row Name 06/05/24 0903          Gait/Stairs (Locomotion)    [place] Level (Gait) contact guard  -WK     Assistive Device (Gait) walker, front-wheeled  -WK     Distance in Feet (Gait) 10  hopping, sitting 10'  -WK     Comment, (Gait/Stairs) educated on POC, safety and home safety  -WK       Row Name             Wound 06/03/24 1625 Right proximal leg Amputation stump    Wound - Properties Group Placement Date: 06/03/24  -AC Placement Time: 1625  -AC Present on Original Admission: N  -AC Side: Right  -AC Orientation: proximal  -AC Location: leg  -AC Primary Wound Type: Amputation s  -AC Additional Comments: xeroform fluffs kerlix ace wrap knee immobilizer  -AC    Retired Wound - Properties Group Placement Date: 06/03/24  -AC Placement Time: 1625  -AC Present on Original Admission: N  -AC Side: Right  -AC Orientation: proximal  -AC Location: leg  -AC Primary Wound Type: Amputation s  -AC Additional Comments: xeroform fluffs kerlix ace wrap knee immobilizer  -AC    Retired Wound - Properties Group Date first assessed: 06/03/24  -AC Time first assessed: 1625  -AC Present on Original Admission: N  -AC Side: Right  -AC Location: leg  -AC Primary Wound Type: Amputation s  -AC Additional Comments: xeroform fluffs kerlix ace wrap knee immobilizer  -AC      Row Name 06/05/24 0903          Plan of Care Review    Plan of Care Reviewed With patient  -WK     Progress improving  -WK     Outcome Evaluation Bed mobility SPV. Sit to stand CGA. Amb 10' at a times CGA with RW. Pt fatigues quickly and required sitting rest. Due to fatigue pt performed sitting rest and transferred to WC and then to recline.  -WK       Row Name 06/05/24 0903          Positioning and Restraints    Pre-Treatment Position in bed  -WK     Post Treatment Position chair  -WK     In Chair  reclined;call light within reach;encouraged to call for assist;with family/caregiver  -WK               User Key  (r) = Recorded By, (t) = Taken By, (c) = Cosigned By      Initials Name Provider Type    AC Keily Bradford, RN Registered Nurse    Krystina Lin PTA Physical Therapist Assistant                    Physical Therapy Education       Title: PT OT SLP Therapies (In Progress)       Topic: Physical Therapy (In Progress)       Point: Mobility training (Done)       Learning Progress Summary             Patient Acceptance, E, VU by KR at 6/4/2024 0927   Family Acceptance, E, VU by KR at 6/4/2024 0927                         Point: Home exercise program (Not Started)       Learner Progress:  Not documented in this visit.              Point: Body mechanics (Done)       Learning Progress Summary             Patient Acceptance, E, VU by KR at 6/4/2024 0927   Family Acceptance, E, VU by KR at 6/4/2024 0927                         Point: Precautions (Done)       Learning Progress Summary             Patient Acceptance, E, VU by KR at 6/4/2024 0927   Family Acceptance, E, VU by KR at 6/4/2024 0927                                         User Key       Initials Effective Dates Name Provider Type Discipline     06/03/24 -  Lacey Cosme, PT DPT Physical Therapist PT                  PT Recommendation and Plan     Plan of Care Reviewed With: patient  Progress: improving  Outcome Evaluation: Bed mobility SPV. Sit to stand CGA. Amb 10' at a times CGA with RW. Pt fatigues quickly and required sitting rest. Due to fatigue pt performed sitting rest and transferred to  and then to recline.   Outcome Measures       Row Name 06/05/24 0903             How much help from another person do you currently need...    Turning from your back to your side while in flat bed without using bedrails? 3  -WK      Moving from lying on back to sitting on the side of a flat bed without bedrails? 3  -WK      Moving to and from a  bed to a chair (including a wheelchair)? 3  -WK      Standing up from a chair using your arms (e.g., wheelchair, bedside chair)? 3  -WK      Climbing 3-5 steps with a railing? 2  -WK      To walk in hospital room? 3  -WK      AM-PAC 6 Clicks Score (PT) 17  -WK      Highest Level of Mobility Goal 5 --> Static standing  -WK         Functional Assessment    Outcome Measure Options AM-PAC 6 Clicks Basic Mobility (PT)  -WK                User Key  (r) = Recorded By, (t) = Taken By, (c) = Cosigned By      Initials Name Provider Type    WK Krystina Matt PTA Physical Therapist Assistant                     Time Calculation:    PT Charges       Row Name 06/05/24 0954             Time Calculation    Start Time 0903  -WK      Stop Time 0946  -WK      Time Calculation (min) 43 min  -WK      PT Received On 06/05/24  -WK         Time Calculation- PT    Total Timed Code Minutes- PT 43 minute(s)  -WK         Timed Charges    58953 - Gait Training Minutes  43  -WK         Total Minutes    Timed Charges Total Minutes 43  -WK       Total Minutes 43  -WK                User Key  (r) = Recorded By, (t) = Taken By, (c) = Cosigned By      Initials Name Provider Type    WK Krystina Matt PTA Physical Therapist Assistant                  Therapy Charges for Today       Code Description Service Date Service Provider Modifiers Qty    01466640273 HC GAIT TRAINING EA 15 MIN 6/5/2024 Krystina Matt PTA GP 3            PT G-Codes  Outcome Measure Options: AM-PAC 6 Clicks Basic Mobility (PT)  AM-PAC 6 Clicks Score (PT): 17  AM-PAC 6 Clicks Score (OT): 21    Krystina Matt PTA  6/5/2024

## 2024-06-05 NOTE — THERAPY TREATMENT NOTE
Acute Care - Occupational Therapy Treatment Note   [place]     Patient Name: Garrett Rodriguez  : 1965  MRN: [id number]  Today's Date: 2024             Admit Date: 6/3/2024       ICD-10-CM ICD-9-CM   1. Non-healing surgical wound, initial encounter  T81.89XA 998.83   2. Preop testing  Z01.818 V72.84   3. Encounter for monitoring antiplatelet therapy  Z51.81 V58.83    Z79.02 V58.63     Patient Active Problem List   Diagnosis    Type 2 diabetes mellitus with hyperglycemia, without long-term current use of insulin    HTN (hypertension)    Retained orthopedic hardware    Anasarca    Iron deficiency anemia    PAF (paroxysmal atrial fibrillation) new onset    Scrotal edema    Gait instability    Pulmonary HTN    Surgical wound, non healing    TARSHA (acute kidney injury)    Bleeding from wound    Diarrhea of presumed infectious origin    Preop testing    Nonhealing surgical wound, initial encounter     Past Medical History:   Diagnosis Date    Atrial fibrillation 3/11/2024    Chronic pain in right foot     Diabetes mellitus     History of transfusion     Hyperlipidemia 2024    Hypertension     Open wound of right foot 2024    PAF (paroxysmal atrial fibrillation)     PFO (patent foramen ovale)     RP at age 6     Past Surgical History:   Procedure Laterality Date    ANKLE FUSION Right 2023    Procedure: ANKLE ARTHRODESIS;  Surgeon: Shahbaz Reddy DPM;  Location:  [facility];  Service: Podiatry;  Laterality: Right;    BACK SURGERY      BELOW KNEE AMPUTATION Right 6/3/2024    Procedure: RIGHT AMPUTATION BELOW KNEE;  Surgeon: Ricardo Hawk DO;  Location:  [facility];  Service: Vascular;  Laterality: Right;    CARDIAC SURGERY      EXTERNAL FIXATION ANKLE FRACTURE Right 2023    Procedure: POSSIBLE EXTERNAL FIXATION, RIGHT ANKLE;  Surgeon: Shahbaz Reddy DPM;  Location:  [facility];  Service: Podiatry;  Laterality: Right;    HARDWARE REMOVAL Right 2023    Procedure: REMOVAL  OF HARDWARE, DEEP; ANKLE ARTHRODESIS; SUBTALAR ARTHRODESIS; POSSIBLE EXTERNAL FIXATION, RIGHT ANKLE;  Surgeon: Shahbaz Reddy DPM;  Location:  [facility];  Service: Podiatry;  Laterality: Right;    PATENT FORAMEN OVALE CLOSURE      ROTATOR CUFF REPAIR Right     SUBTALAR ARTHRODESIS Right 07/11/2023    Procedure: SUBTALAR ARTHRODESIS;  Surgeon: Shahbaz Reddy DPM;  Location:  [facility];  Service: Podiatry;  Laterality: Right;         OT ASSESSMENT FLOWSHEET (Last 12 Hours)       OT Evaluation and Treatment       Row Name 06/05/24 1405                   OT Time and Intention    Subjective Information complains of;pain  -TS        Document Type therapy note (daily note)  -TS        Mode of Treatment occupational therapy  -TS        Patient Effort excellent  -TS           General Information    Existing Precautions/Restrictions fall  R BKA  -TS           Pain Assessment    Pretreatment Pain Rating 3/10  -TS        Posttreatment Pain Rating 3/10  -TS        Pain Location - Side/Orientation Right  -TS        Pain Location lower  -TS        Pain Location - residual limb  -TS        Pain Intervention(s) Repositioned  -TS           Cognition    Personal Safety Interventions fall prevention program maintained;gait belt;nonskid shoes/slippers when out of bed  -TS           Activities of Daily Living    BADL Assessment/Intervention upper body dressing;lower body dressing  -TS           Upper Body Dressing Assessment/Training    [place] Level (Upper Body Dressing) don;pull-over garment;set up  -TS        Position (Upper Body Dressing) unsupported sitting  -TS           Lower Body Dressing Assessment/Training    [place] Level (Lower Body Dressing) don;pants/bottoms;minimum assist (75% patient effort)  -TS        Position (Lower Body Dressing) unsupported sitting;long sitting  -TS           Transfer Assessment/Treatment    Transfers sit-stand transfer;stand-sit transfer;wheelchair transfer  -TS           Sit-Stand  Transfer    Sit-Stand [place] (Transfers) standby assist  -TS        Assistive Device (Sit-Stand Transfers) walker, front-wheeled  -TS           Stand-Sit Transfer    Stand-Sit [place] (Transfers) standby assist  -TS        Assistive Device (Stand-Sit Transfers) walker, front-wheeled  -TS           Wheelchair Transfer    Type (Wheelchair Transfer) squat pivot  -TS        [place] Level (Wheelchair Transfer) standby assist  -TS        Assistive Device (Wheelchair Transfer) wheelchair  -TS           Orthotics & Prosthetics Management    Orthosis Location lower extremity orthosis  -TS        Additional Documentation Orthosis Location (Row)  -TS           Lower Extremity Orthosis Management    Type (Lower Extremity Orthosis) knee immobilizer  -TS        Wearing Schedule (Lower Extremity Orthosis) wear full-time  -TS        Orthosis Training (Lower Extremity Orthosis) patient and caregiver;purpose/goals of orthosis  -TS           Wound 06/03/24 1625 Right proximal leg Amputation stump    Wound - Properties Group Placement Date: 06/03/24  -AC Placement Time: 1625  -AC Present on Original Admission: N  -AC Side: Right  -AC Orientation: proximal  -AC Location: leg  -AC Primary Wound Type: Amputation s  -AC Additional Comments: xeroform fluffs kerlix ace wrap knee immobilizer  -AC    Retired Wound - Properties Group Placement Date: 06/03/24  -AC Placement Time: 1625  -AC Present on Original Admission: N  -AC Side: Right  -AC Orientation: proximal  -AC Location: leg  -AC Primary Wound Type: Amputation s  -AC Additional Comments: xeroform fluffs kerlix ace wrap knee immobilizer  -AC    Retired Wound - Properties Group Date first assessed: 06/03/24  -AC Time first assessed: 1625  -AC Present on Original Admission: N  -AC Side: Right  -AC Location: leg  -AC Primary Wound Type: Amputation s  -AC Additional Comments: xeroform fluffs kerlix ace wrap knee immobilizer  -AC       Plan of Care Review    Plan of Care  Reviewed With patient;spouse  -TS        Progress improving  -TS           Positioning and Restraints    Pre-Treatment Position sitting in chair/recliner  -TS        Post Treatment Position wheelchair  -TS        In Wheelchair sitting;with family/caregiver;with brace  -TS                  User Key  (r) = Recorded By, (t) = Taken By, (c) = Cosigned By      Initials Name Effective Dates    TS Pamela Tesha N, ZACH 02/03/23 -     AC Keily Bradford RN 02/17/22 -                      Occupational Therapy Education       Title: PT OT SLP Therapies (In Progress)       Topic: Occupational Therapy (Done)       Point: ADL training (Done)       Description:   Instruct learner(s) on proper safety adaptation and remediation techniques during self care or transfers.   Instruct in proper use of assistive devices.                  Learning Progress Summary             Patient Acceptance, E, VU,NR by CS at 6/4/2024 0841   Significant Other Acceptance, E, VU,NR by CS at 6/4/2024 0841                         Point: Home exercise program (Done)       Description:   Instruct learner(s) on appropriate technique for monitoring, assisting and/or progressing therapeutic exercises/activities.                  Learning Progress Summary             Patient Acceptance, E, VU,NR by CS at 6/4/2024 0841   Significant Other Acceptance, E, VU,NR by CS at 6/4/2024 0841                         Point: Precautions (Done)       Description:   Instruct learner(s) on prescribed precautions during self-care and functional transfers.                  Learning Progress Summary             Patient Acceptance, E, VU,NR by CS at 6/4/2024 0841   Significant Other Acceptance, E, VU,NR by CS at 6/4/2024 0841                         Point: Body mechanics (Done)       Description:   Instruct learner(s) on proper positioning and spine alignment during self-care, functional mobility activities and/or exercises.                  Learning Progress Summary              Patient Acceptance, E, VU,NR by  at 6/4/2024 0841   Significant Other Acceptance, E, VU,NR by  at 6/4/2024 0841                                         User Key       Initials Effective Dates Name Provider Type Discipline     02/03/23 -  Patrizia Sandhu S, OTR/L, CNT Occupational Therapist OT                      OT Recommendation and Plan     Plan of Care Review  Plan of Care Reviewed With: patient, spouse  Progress: improving  Plan of Care Reviewed With: patient, spouse     Outcome Measures       Row Name 06/05/24 1500 06/05/24 0903          How much help from another person do you currently need...    Turning from your back to your side while in flat bed without using bedrails? -- 3  -WK     Moving from lying on back to sitting on the side of a flat bed without bedrails? -- 3  -WK     Moving to and from a bed to a chair (including a wheelchair)? -- 3  -WK     Standing up from a chair using your arms (e.g., wheelchair, bedside chair)? -- 3  -WK     Climbing 3-5 steps with a railing? -- 2  -WK     To walk in hospital room? -- 3  -WK     AM-PAC 6 Clicks Score (PT) -- 17  -WK     Highest Level of Mobility Goal -- 5 --> Static standing  -WK        How much help from another is currently needed...    Putting on and taking off regular lower body clothing? 3  -TS --     Bathing (including washing, rinsing, and drying) 3  -TS --     Toileting (which includes using toilet bed pan or urinal) 3  -TS --     Putting on and taking off regular upper body clothing 4  -TS --     Taking care of personal grooming (such as brushing teeth) 4  -TS --     Eating meals 4  -TS --     AM-PAC 6 Clicks Score (OT) 21  -TS --        Functional Assessment    Outcome Measure Options -- AM-PAC 6 Clicks Basic Mobility (PT)  -WK               User Key  (r) = Recorded By, (t) = Taken By, (c) = Cosigned By      Initials Name Provider Type    TS Tesha Santiago COTA Occupational Therapist Assistant    WK Krystina Matt, PTA Physical  Therapist Assistant                    Time Calculation:    Time Calculation- OT       Row Name 06/05/24 1556 06/05/24 0954          Time Calculation- OT    OT Start Time 1405  -TS --     OT Stop Time 1500  -TS --     OT Time Calculation (min) 55 min  -TS --     Total Timed Code Minutes- OT 55 minute(s)  -TS --     OT Received On 06/05/24  -TS --        Timed Charges    84765 - Gait Training Minutes  -- 43  -WK     08876 - OT Self Care/Mgmt Minutes 55  -TS --        Total Minutes    Timed Charges Total Minutes 55  -TS 43  -WK      Total Minutes 55  -TS 43  -WK               User Key  (r) = Recorded By, (t) = Taken By, (c) = Cosigned By      Initials Name Provider Type    TS Tesha Santiago COTA Occupational Therapist Assistant    WK Krystina Matt, RICH Physical Therapist Assistant                  Therapy Charges for Today       Code Description Service Date Service Provider Modifiers Qty    50441500244 HC OT SELF CARE/MGMT/TRAIN EA 15 MIN 6/5/2024 Tesha Santiago COTA GO 4                 ZACH Rangel  6/5/2024

## 2024-06-05 NOTE — PLAN OF CARE
Goal Outcome Evaluation:  Plan of Care Reviewed With: patient        Progress: improving  Outcome Evaluation: Bed mobility SPV. Sit to stand CGA. Amb 10' at a times CGA with RW. Pt fatigues quickly and required sitting rest. Due to fatigue pt performed sitting rest and transferred to  and then to [facility].

## 2024-06-05 NOTE — PROGRESS NOTES
LOS: 2 days   Patient Care Team:  Mehul Andersen MD as PCP - General (Family Medicine)    Chief Complaint: Right BKA, postop day #2    Subjective     Patient Complaints: Patient seen/examined sitting up in bed.  He is doing well, he has no complaints.  He has gotten up with physical therapy.  He is using the bedside commode.  He said that his pain is being controlled with the Norco.  He says that the pain is no worse than it was prior to surgery.  He did ask if he needed to go to a rehab facility, before going home.  He does have a wheelchair and a bedside commode at home.  Will consult  for discharge planning.    Objective     Vital Signs  Temp:  [98 °F (36.7 °C)-98.4 °F (36.9 °C)] 98.1 °F (36.7 °C)  Heart Rate:  [75-91] 76  Resp:  [12-18] 16  BP: (101-131)/(58-79) 128/67    Physical Exam  Vitals and nursing note reviewed.   Constitutional:       General: He is not in acute distress.     Appearance: Normal appearance. He is not diaphoretic.   HENT:      Head: Normocephalic. No right periorbital erythema or left periorbital erythema.      Nose: Nose normal.   Eyes:      General: No scleral icterus.     Pupils: Pupils are equal.   Cardiovascular:      Rate and Rhythm: Normal rate and regular rhythm.      Pulses: Normal pulses.      Heart sounds: Normal heart sounds. No murmur heard.  Pulmonary:      Effort: Pulmonary effort is normal. No respiratory distress.      Breath sounds: Normal breath sounds.   Abdominal:      General: Bowel sounds are normal. There is no distension.      Palpations: Abdomen is soft.      Tenderness: There is no abdominal tenderness. There is no guarding.   Musculoskeletal:         General: No swelling or tenderness. Normal range of motion.      Cervical back: Normal range of motion and neck supple.      Right lower leg: No edema.      Left lower leg: No edema.      Right Lower Extremity: Right leg is amputated below knee.   Feet:      Right foot:      Skin integrity: Skin  integrity normal.      Left foot:      Skin integrity: Skin integrity normal.   Skin:     General: Skin is warm and dry.      Findings: No erythema or rash.   Neurological:      General: No focal deficit present.      Mental Status: He is alert and oriented to person, place, and time. Mental status is at baseline.      Cranial Nerves: No cranial nerve deficit.      Gait: Gait normal.   Psychiatric:         Attention and Perception: Attention normal.         Mood and Affect: Mood normal.         Laboratory Data:   Results from last 7 days   Lab Units 06/04/24  0404 06/03/24  1046 05/29/24  1655   WBC 10*3/mm3 13.74* 9.71  --    HEMOGLOBIN g/dL 8.1* 9.8* 8.9*   HEMATOCRIT % 27.6* 31.7* 29.0*   PLATELETS 10*3/mm3 175 214  --        Results from last 7 days   Lab Units 06/04/24  0404 06/03/24  1046   SODIUM mmol/L 136 143   POTASSIUM mmol/L 4.5 4.0   CHLORIDE mmol/L 101 103   CO2 mmol/L 24.0 30.0*   BUN mg/dL 50* 47*   CREATININE mg/dL 1.87* 1.51*   CALCIUM mg/dL 7.4* 8.3*   GLUCOSE mg/dL 212* 115*     Results from last 7 days   Lab Units 06/03/24  1046   PROTIME Seconds 14.4   INR  1.08   APTT seconds 37.8*            Medication Review: Reviewed    Assessment & Plan       Surgical wound, non healing    Preop testing    Nonhealing surgical wound, initial encounter          Plan for disposition:  Leave dressing in place  Monitor labs.  Consult  for discharge planning  Continue PT and OT  Norco for pain control    MEGHANN Wisdom  Vascular Surgery  [phone]  06/05/24  08:40 CDT

## 2024-06-05 NOTE — CASE MANAGEMENT/SOCIAL WORK
Discharge Planning Assessment  [facility]     Patient Name: Garrett Rodriguez  MRN: [id number]  Today's Date: 6/5/2024    Admit Date: 6/3/2024        Discharge Needs Assessment       Row Name 06/05/24 1512       Living Environment    People in Home spouse    Current Living Arrangements home    Potentially Unsafe Housing Conditions none    Primary Care Provided by self    Provides Primary Care For no one    Family Caregiver if Needed spouse    Quality of Family Relationships helpful;involved;supportive       Resource/Environmental Concerns    Resource/Environmental Concerns none       Transition Planning    Patient/Family Anticipates Transition to inpatient rehabilitation facility    Patient/Family Anticipated Services at Transition rehabilitation services    Transportation Anticipated family or friend will provide       Discharge Needs Assessment    Equipment Currently Used at Home commode;wheelchair    Concerns to be Addressed care coordination/care conferences;discharge planning    Outpatient/Agency/Support Group Needs inpatient rehabilitation facility    Discharge Facility/Level of Care Needs rehabilitation facility    Provided Post Acute Provider List? Yes    Post Acute Provider List Inpatient Rehab    Current Discharge Risk physical impairment    Discharge Coordination/Progress Spoke with pt and spouse. Pt lives at home with his spouse. They are requesting [facility] at d/c. Referral has been sent to them at [phone]. Pt is worker's comp so spoke with his nurse , Dotty [phone], and she needs an order sent to her at [phone] so they can approve. Will send.                   Discharge Plan    No documentation.                 Continued Care and Services - Admitted Since 6/3/2024    No active coordination exists for this encounter.       Expected Discharge Date and Time       Expected Discharge Date Expected Discharge Time    Jun 6, 2024            Demographic Summary    No  documentation.                  Functional Status    No documentation.                  Psychosocial    No documentation.                  Abuse/Neglect    No documentation.                  Legal    No documentation.                  Substance Abuse    No documentation.                  Patient Forms    No documentation.                     COLIN Huerta

## 2024-06-06 LAB
ANION GAP SERPL CALCULATED.3IONS-SCNC: 7 MMOL/L (ref 5–15)
BASOPHILS # BLD AUTO: 0.01 10*3/MM3 (ref 0–0.2)
BASOPHILS NFR BLD AUTO: 0.1 % (ref 0–1.5)
BUN SERPL-MCNC: 55 MG/DL (ref 6–20)
BUN/CREAT SERPL: 33.3 (ref 7–25)
CALCIUM SPEC-SCNC: 6.9 MG/DL (ref 8.6–10.5)
CHLORIDE SERPL-SCNC: 105 MMOL/L (ref 98–107)
CO2 SERPL-SCNC: 28 MMOL/L (ref 22–29)
CREAT SERPL-MCNC: 1.65 MG/DL (ref 0.76–1.27)
DEPRECATED RDW RBC AUTO: 50.3 FL (ref 37–54)
EGFRCR SERPLBLD CKD-EPI 2021: 47.8 ML/MIN/1.73
EOSINOPHIL # BLD AUTO: 0.21 10*3/MM3 (ref 0–0.4)
EOSINOPHIL NFR BLD AUTO: 2.9 % (ref 0.3–6.2)
ERYTHROCYTE [DISTWIDTH] IN BLOOD BY AUTOMATED COUNT: 15.6 % (ref 12.3–15.4)
GLUCOSE SERPL-MCNC: 109 MG/DL (ref 65–99)
HCT VFR BLD AUTO: 21.1 % (ref 37.5–51)
HCT VFR BLD AUTO: 24.1 % (ref 37.5–51)
HGB BLD-MCNC: 6.4 G/DL (ref 13–17.7)
HGB BLD-MCNC: 7.5 G/DL (ref 13–17.7)
IMM GRANULOCYTES # BLD AUTO: 0.05 10*3/MM3 (ref 0–0.05)
IMM GRANULOCYTES NFR BLD AUTO: 0.7 % (ref 0–0.5)
LYMPHOCYTES # BLD AUTO: 1.47 10*3/MM3 (ref 0.7–3.1)
LYMPHOCYTES NFR BLD AUTO: 20.4 % (ref 19.6–45.3)
MCH RBC QN AUTO: 27 PG (ref 26.6–33)
MCHC RBC AUTO-ENTMCNC: 30.3 G/DL (ref 31.5–35.7)
MCV RBC AUTO: 89 FL (ref 79–97)
MONOCYTES # BLD AUTO: 0.61 10*3/MM3 (ref 0.1–0.9)
MONOCYTES NFR BLD AUTO: 8.5 % (ref 5–12)
NEUTROPHILS NFR BLD AUTO: 4.84 10*3/MM3 (ref 1.7–7)
NEUTROPHILS NFR BLD AUTO: 67.4 % (ref 42.7–76)
NRBC BLD AUTO-RTO: 0 /100 WBC (ref 0–0.2)
PLATELET # BLD AUTO: 152 10*3/MM3 (ref 140–450)
PMV BLD AUTO: 10.8 FL (ref 6–12)
POTASSIUM SERPL-SCNC: 4 MMOL/L (ref 3.5–5.2)
RBC # BLD AUTO: 2.37 10*6/MM3 (ref 4.14–5.8)
SODIUM SERPL-SCNC: 140 MMOL/L (ref 136–145)
WBC NRBC COR # BLD AUTO: 7.19 10*3/MM3 (ref 3.4–10.8)

## 2024-06-06 PROCEDURE — 99024 POSTOP FOLLOW-UP VISIT: CPT | Performed by: NURSE PRACTITIONER

## 2024-06-06 PROCEDURE — 36430 TRANSFUSION BLD/BLD COMPNT: CPT

## 2024-06-06 PROCEDURE — 97535 SELF CARE MNGMENT TRAINING: CPT

## 2024-06-06 PROCEDURE — 85018 HEMOGLOBIN: CPT | Performed by: SURGERY

## 2024-06-06 PROCEDURE — 85025 COMPLETE CBC W/AUTO DIFF WBC: CPT | Performed by: NURSE PRACTITIONER

## 2024-06-06 PROCEDURE — 80048 BASIC METABOLIC PNL TOTAL CA: CPT | Performed by: NURSE PRACTITIONER

## 2024-06-06 PROCEDURE — 86900 BLOOD TYPING SEROLOGIC ABO: CPT

## 2024-06-06 PROCEDURE — 85014 HEMATOCRIT: CPT | Performed by: SURGERY

## 2024-06-06 PROCEDURE — 97110 THERAPEUTIC EXERCISES: CPT

## 2024-06-06 PROCEDURE — P9016 RBC LEUKOCYTES REDUCED: HCPCS

## 2024-06-06 RX ADMIN — HYDROCODONE BITARTRATE AND ACETAMINOPHEN 1 TABLET: 7.5; 325 TABLET ORAL at 20:54

## 2024-06-06 RX ADMIN — HYDROCODONE BITARTRATE AND ACETAMINOPHEN 1 TABLET: 7.5; 325 TABLET ORAL at 16:04

## 2024-06-06 RX ADMIN — PREGABALIN 100 MG: 25 CAPSULE ORAL at 20:54

## 2024-06-06 RX ADMIN — HYDROCODONE BITARTRATE AND ACETAMINOPHEN 1 TABLET: 7.5; 325 TABLET ORAL at 05:46

## 2024-06-06 RX ADMIN — HYDROCODONE BITARTRATE AND ACETAMINOPHEN 1 TABLET: 7.5; 325 TABLET ORAL at 00:41

## 2024-06-06 RX ADMIN — METFORMIN HCL 1000 MG: 500 TABLET ORAL at 18:04

## 2024-06-06 RX ADMIN — OXYCODONE HYDROCHLORIDE AND ACETAMINOPHEN 500 MG: 500 TABLET ORAL at 09:25

## 2024-06-06 RX ADMIN — APIXABAN 5 MG: 5 TABLET, FILM COATED ORAL at 09:25

## 2024-06-06 RX ADMIN — Medication 10 ML: at 09:26

## 2024-06-06 RX ADMIN — ZINC SULFATE 220 MG (50 MG) CAPSULE 220 MG: CAPSULE at 09:26

## 2024-06-06 RX ADMIN — METFORMIN HCL 1000 MG: 500 TABLET ORAL at 09:24

## 2024-06-06 RX ADMIN — LISINOPRIL 20 MG: 20 TABLET ORAL at 09:25

## 2024-06-06 RX ADMIN — PREGABALIN 100 MG: 25 CAPSULE ORAL at 09:25

## 2024-06-06 RX ADMIN — APIXABAN 5 MG: 5 TABLET, FILM COATED ORAL at 20:54

## 2024-06-06 RX ADMIN — PREGABALIN 100 MG: 25 CAPSULE ORAL at 16:03

## 2024-06-06 NOTE — THERAPY TREATMENT NOTE
Acute Care - Physical Therapy Treatment Note  [facility]     Patient Name: Garrett Rodriguez  : 1965  MRN: [id number]  Today's Date: 2024      Visit Dx:     ICD-10-CM ICD-9-CM   1. Non-healing surgical wound, initial encounter  T81.89XA 998.83   2. Preop testing  Z01.818 V72.84   3. Encounter for monitoring antiplatelet therapy  Z51.81 V58.83    Z79.02 V58.63     Patient Active Problem List   Diagnosis    Type 2 diabetes mellitus with hyperglycemia, without long-term current use of insulin    HTN (hypertension)    Retained orthopedic hardware    Anasarca    Iron deficiency anemia    PAF (paroxysmal atrial fibrillation) new onset    Scrotal edema    Gait instability    Pulmonary HTN    Surgical wound, non healing    TARSHA (acute kidney injury)    Bleeding from wound    Diarrhea of presumed infectious origin    Preop testing    Nonhealing surgical wound, initial encounter     Past Medical History:   Diagnosis Date    Atrial fibrillation 3/11/2024    Chronic pain in right foot     Diabetes mellitus     History of transfusion     Hyperlipidemia 2024    Hypertension     Open wound of right foot 2024    PAF (paroxysmal atrial fibrillation)     PFO (patent foramen ovale)     RP at age 6     Past Surgical History:   Procedure Laterality Date    ANKLE FUSION Right 2023    Procedure: ANKLE ARTHRODESIS;  Surgeon: Shahbaz Reddy DPM;  Location:  [facility] OR;  Service: Podiatry;  Laterality: Right;    BACK SURGERY      BELOW KNEE AMPUTATION Right 6/3/2024    Procedure: RIGHT AMPUTATION BELOW KNEE;  Surgeon: Ricardo Hawk DO;  Location:  [facility] HYBRID OR;  Service: Vascular;  Laterality: Right;    CARDIAC SURGERY      EXTERNAL FIXATION ANKLE FRACTURE Right 2023    Procedure: POSSIBLE EXTERNAL FIXATION, RIGHT ANKLE;  Surgeon: Shahbaz Reddy DPM;  Location:  [facility] OR;  Service: Podiatry;  Laterality: Right;    HARDWARE REMOVAL Right 2023    Procedure: REMOVAL OF HARDWARE, DEEP;  ANKLE ARTHRODESIS; SUBTALAR ARTHRODESIS; POSSIBLE EXTERNAL FIXATION, RIGHT ANKLE;  Surgeon: Shahbaz Reddy DPM;  Location:  [facility] OR;  Service: Podiatry;  Laterality: Right;    PATENT FORAMEN OVALE CLOSURE      ROTATOR CUFF REPAIR Right     SUBTALAR ARTHRODESIS Right 07/11/2023    Procedure: SUBTALAR ARTHRODESIS;  Surgeon: Shahbaz Reddy DPM;  Location:  [facility] OR;  Service: Podiatry;  Laterality: Right;     PT Assessment (Last 12 Hours)       PT Evaluation and Treatment       Row Name 06/06/24 0805          Physical Therapy Time and Intention    Subjective Information complains of;fatigue  -WK     Document Type therapy note (daily note)  -WK     Mode of Treatment physical therapy  -WK       Row Name 06/06/24 0805          General Information    Existing Precautions/Restrictions fall  R BKA  -WK       Row Name 06/06/24 0805          Pain    Pretreatment Pain Rating 2/10  -WK     Posttreatment Pain Rating 2/10  -WK     Pain Location - Side/Orientation Right  -WK     Pain Location lower  -WK     Pain Location - residual limb  -WK       Row Name 06/06/24 0805          Bed Mobility    Comment, (Bed Mobility) Pt receiving blood, will hold on bed mobility, sit to stand and gait  -WK       Row Name 06/06/24 0805          Motor Skills    Comments, Therapeutic Exercise AROM LLE, RLE SLR and hip abd/add 20 reps  -WK     Additional Documentation Comments, Therapeutic Exercise (Row)  -WK       Row Name             Wound 06/03/24 1625 Right proximal leg Amputation stump    Wound - Properties Group Placement Date: 06/03/24  -AC Placement Time: 1625 -AC Present on Original Admission: N  -AC Side: Right  -AC Orientation: proximal  -AC Location: leg  -AC Primary Wound Type: Amputation s  -AC Additional Comments: xeroform fluffs kerlix ace wrap knee immobilizer  -AC    Retired Wound - Properties Group Placement Date: 06/03/24  -AC Placement Time: 1625 -AC Present on Original Admission: N  -AC Side: Right  -AC Orientation:  proximal  -AC Location: leg  -AC Primary Wound Type: Amputation s  -AC Additional Comments: xeroform fluffs kerlix ace wrap knee immobilizer  -AC    Retired Wound - Properties Group Date first assessed: 06/03/24  -AC Time first assessed: 1625  -AC Present on Original Admission: N  -AC Side: Right  -AC Location: leg  -AC Primary Wound Type: Amputation s  -AC Additional Comments: xeroform fluffs kerlix ace wrap knee immobilizer  -AC      Row Name 06/06/24 0805          Plan of Care Review    Plan of Care Reviewed With patient  -WK     Outcome Evaluation Pt fatigued today and receiving blood. Pt performed BLE exercises AROM. Did not attempt further activity due to fatigue and low Hgb. Recommend inpatient rehab at this time for increase endurance and strength.  -WK       Row Name 06/06/24 0805          Positioning and Restraints    Pre-Treatment Position in bed  -WK     Post Treatment Position bed  -WK     In Bed fowlers;call light within reach;encouraged to call for assist;with family/caregiver  -WK               User Key  (r) = Recorded By, (t) = Taken By, (c) = Cosigned By      Initials Name Provider Type    AC Keily Bradford, RN Registered Nurse    Krystina Lin PTA Physical Therapist Assistant                    Physical Therapy Education       Title: PT OT SLP Therapies (In Progress)       Topic: Physical Therapy (In Progress)       Point: Mobility training (Done)       Learning Progress Summary             Patient Acceptance, E, VU by KR at 6/4/2024 0927   Family Acceptance, E, VU by KR at 6/4/2024 0927                         Point: Home exercise program (Not Started)       Learner Progress:  Not documented in this visit.              Point: Body mechanics (Done)       Learning Progress Summary             Patient Acceptance, E, VU by KR at 6/4/2024 0927   Family Acceptance, E, VU by KR at 6/4/2024 0927                         Point: Precautions (Done)       Learning Progress Summary              Patient Acceptance, E, VU by CATHERINE at 6/4/2024 0927   Family Acceptance, E, VU by CATHERINE at 6/4/2024 0927                                         User Key       Initials Effective Dates Name Provider Type Discipline     06/03/24 -  Lacey Cosme, PT DPT Physical Therapist PT                  PT Recommendation and Plan     Plan of Care Reviewed With: patient  Progress: improving  Outcome Evaluation: Pt fatigued today and receiving blood. Pt performed BLE exercises AROM. Did not attempt further activity due to fatigue and low Hgb. Recommend inpatient rehab at this time for increase endurance and strength.   Outcome Measures       Row Name 06/06/24 0805 06/05/24 1500 06/05/24 0903       How much help from another person do you currently need...    Turning from your back to your side while in flat bed without using bedrails? 3  -WK -- 3  -WK    Moving from lying on back to sitting on the side of a flat bed without bedrails? 3  -WK -- 3  -WK    Moving to and from a bed to a chair (including a wheelchair)? 3  -WK -- 3  -WK    Standing up from a chair using your arms (e.g., wheelchair, bedside chair)? 3  -WK -- 3  -WK    Climbing 3-5 steps with a railing? 2  -WK -- 2  -WK    To walk in hospital room? 3  -WK -- 3  -WK    AM-PAC 6 Clicks Score (PT) 17  -WK -- 17  -WK    Highest Level of Mobility Goal 5 --> Static standing  -WK -- 5 --> Static standing  -WK       How much help from another is currently needed...    Putting on and taking off regular lower body clothing? -- 3  -TS --    Bathing (including washing, rinsing, and drying) -- 3  -TS --    Toileting (which includes using toilet bed pan or urinal) -- 3  -TS --    Putting on and taking off regular upper body clothing -- 4  -TS --    Taking care of personal grooming (such as brushing teeth) -- 4  -TS --    Eating meals -- 4  -TS --    AM-PAC 6 Clicks Score (OT) -- 21  -TS --       Functional Assessment    Outcome Measure Options AM-PAC 6 Clicks Basic Mobility (PT)   -WK -- AM-PAC 6 Clicks Basic Mobility (PT)  -WK              User Key  (r) = Recorded By, (t) = Taken By, (c) = Cosigned By      Initials Name Provider Type    Tesha Cardenas COTA Occupational Therapist Assistant    WK Krystina Matt PTA Physical Therapist Assistant                     Time Calculation:    PT Charges       Row Name 06/06/24 0830             Time Calculation    Start Time 0805  -WK      Stop Time 0822  -WK      Time Calculation (min) 17 min  -WK      PT Received On 06/06/24  -WK         Time Calculation- PT    Total Timed Code Minutes- PT 17 minute(s)  -WK         Timed Charges    41641 - PT Therapeutic Exercise Minutes 17  -WK         Total Minutes    Timed Charges Total Minutes 17  -WK       Total Minutes 17  -WK                User Key  (r) = Recorded By, (t) = Taken By, (c) = Cosigned By      Initials Name Provider Type    WK Krystina Matt PTA Physical Therapist Assistant                  Therapy Charges for Today       Code Description Service Date Service Provider Modifiers Qty    26872769489 HC GAIT TRAINING EA 15 MIN 6/5/2024 Krystina Matt PTA GP 3    88965104738 HC PT THER PROC EA 15 MIN 6/6/2024 Krystina Matt PTA GP 1            PT G-Codes  Outcome Measure Options: AM-PAC 6 Clicks Basic Mobility (PT)  AM-PAC 6 Clicks Score (PT): 17  AM-PAC 6 Clicks Score (OT): 21    Krystina Matt PTA  6/6/2024

## 2024-06-06 NOTE — PLAN OF CARE
Goal Outcome Evaluation:  Plan of Care Reviewed With: patient; family        Progress: improving  Outcome Evaluation: A&Ox4. 2L NC at night. VSS. Stump remains dry and intact. Up x1 with a walker. Voiding. BM x1. C/o phantom pain; see MAR. Tolerating diet. IVF @ 50. Eliquis for VTE prevention. Spouse remains at bedside. Call light within reach, safety maintained.

## 2024-06-06 NOTE — PLAN OF CARE
Goal Outcome Evaluation:  Plan of Care Reviewed With: patient        Progress: improving  Outcome Evaluation: Pt accepted and planning to [facility] 6[facility]. SERRANO discussed with pt and spouse home safety, DME and transfer safety during tx. Continue OT POC

## 2024-06-06 NOTE — PROGRESS NOTES
LOS: 3 days   Patient Care Team:  Mehul Andersen MD as PCP - General (Family Medicine)    Chief Complaint: Right BKA, postop day #3    Subjective     Patient Complaints: Pt seen/examined lying in bed.  Pain overall controlled with Norco.  Hemoglobin this a.m. 6.4.  1 unit of PRBCs ordered.  He does continue to work with PT/OT.  Dressing clean dry and intact.   working on discharge planning  Objective     Vital Signs  Temp:  [97.6 °F (36.4 °C)-98.3 °F (36.8 °C)] 98.3 °F (36.8 °C)  Heart Rate:  [60-90] 77  Resp:  [16] 16  BP: (136-164)/(65-89) 164/89    Physical Exam  Vitals and nursing note reviewed.   Constitutional:       General: He is not in acute distress.     Appearance: Normal appearance. He is not diaphoretic.   HENT:      Head: Normocephalic. No right periorbital erythema or left periorbital erythema.      Nose: Nose normal.   Eyes:      General: No scleral icterus.     Pupils: Pupils are equal.   Cardiovascular:      Rate and Rhythm: Normal rate and regular rhythm.      Pulses: Normal pulses.      Heart sounds: Normal heart sounds. No murmur heard.     Comments: Right BKA dressing clean dry and intact.  Pulmonary:      Effort: Pulmonary effort is normal. No respiratory distress.      Breath sounds: Normal breath sounds.   Abdominal:      General: Bowel sounds are normal. There is no distension.      Palpations: Abdomen is soft.      Tenderness: There is no abdominal tenderness. There is no guarding.   Musculoskeletal:         General: No swelling or tenderness. Normal range of motion.      Cervical back: Normal range of motion and neck supple.      Right lower leg: No edema.      Left lower leg: No edema.      Right Lower Extremity: Right leg is amputated below knee.   Feet:      Right foot:      Skin integrity: Skin integrity normal.      Left foot:      Skin integrity: Skin integrity normal.   Skin:     General: Skin is warm and dry.      Coloration: Skin is pale.      Findings: No  erythema or rash.   Neurological:      General: No focal deficit present.      Mental Status: He is alert and oriented to person, place, and time. Mental status is at baseline.      Cranial Nerves: No cranial nerve deficit.      Gait: Gait normal.   Psychiatric:         Attention and Perception: Attention normal.         Mood and Affect: Mood normal.         Laboratory Data:   Results from last 7 days   Lab Units 06/06/24  1037 06/06/24  0329 06/05/24  0905 06/04/24  0404   WBC 10*3/mm3  --  7.19 8.76 13.74*   HEMOGLOBIN g/dL 7.5* 6.4* 7.5* 8.1*   HEMATOCRIT % 24.1* 21.1* 24.2* 27.6*   PLATELETS 10*3/mm3  --  152 162 175       Results from last 7 days   Lab Units 06/06/24  0329 06/05/24 0905 06/04/24  0404   SODIUM mmol/L 140 138 136   POTASSIUM mmol/L 4.0 4.1 4.5   CHLORIDE mmol/L 105 101 101   CO2 mmol/L 28.0 27.0 24.0   BUN mg/dL 55* 55* 50*   CREATININE mg/dL 1.65* 1.84* 1.87*   CALCIUM mg/dL 6.9* 7.1* 7.4*   GLUCOSE mg/dL 109* 198* 212*     Results from last 7 days   Lab Units 06/03/24  1046   PROTIME Seconds 14.4   INR  1.08   APTT seconds 37.8*            Medication Review: Reviewed    Assessment & Plan       Surgical wound, non healing    Preop testing    Nonhealing surgical wound, initial encounter        Plan for disposition:  Leave dressing in place and will remove in a.m.     working on discharge planning  Repeat lab work in a.m.  Continue with PT/OT  Continue Norco for pain    MEGHANN Baltazar  Vascular Surgery  [phone]  06/06/24  16:32 CDT

## 2024-06-06 NOTE — PLAN OF CARE
Goal Outcome Evaluation:  Plan of Care Reviewed With: patient        Progress: improving  Outcome Evaluation: Pt fatigued today and receiving blood. Pt performed BLE exercises AROM. Did not attempt further activity due to fatigue and low Hgb. Recommend inpatient rehab at this time for increase endurance and strength.

## 2024-06-06 NOTE — CASE MANAGEMENT/SOCIAL WORK
Continued Stay Note  [facility]     Patient Name: Garrett Rodriguez  MRN: [id number]  Today's Date: 6/6/2024    Admit Date: 6/3/2024    Plan: [facility] pending approval   Discharge Plan       Row Name 06/06/24 1359       Plan    Final Discharge Disposition Code 62 - inpatient rehab facility      Row Name 06/06/24 1357       Plan    Plan [facility] pending approval    Patient/Family in Agreement with Plan yes    Plan Comments Sent the order yesterday to Dotty with worker's comp at [phone] for acute rehab. Spoke with Daniel [phone] from [facility] and they will accept pt pending workers comp. They have initiated the process.                   Discharge Codes    No documentation.                 Expected Discharge Date and Time       Expected Discharge Date Expected Discharge Time    Jun 6, 2024               COLIN Huerta

## 2024-06-06 NOTE — PAYOR COMM NOTE
"Jennifer Garrett Shirley (59 y.o. Male)  [id number]   cont stay  please review clinical  pt still in hospital  [facility] phone   fax          Date of Birth   1965    Social Security Number       Address   [address] [facility] [place] [address]    Home Phone   [phone]    MRN   [id number]       Buddhism   Roman Catholic    Marital Status                               Admission Date   6/3/24    Admission Type   Elective    Admitting Provider   Ricardo Hawk DO    Attending Provider   Ricardo Hawk DO    Department, Room/Bed   [facility] 3C, 361/1       Discharge Date       Discharge Disposition       Discharge Destination                                 Attending Provider: Ricardo Hawk DO    Allergies: Morphine, Percocet [Oxycodone-acetaminophen]    Isolation: None   Infection: None   Code Status: CPR    Ht: 178 cm (70.08\")   Wt: 81.5 kg (179 lb 10.8 oz)    Admission Cmt: None   Principal Problem: Surgical wound, non healing [T81.89XA]                   Active Insurance as of 6/3/2024       Primary Coverage       Payor Plan Insurance Group Employer/Plan Group    WORKERS COMPENSATION MISC WORKERS COMPENSATION        Coverage Address Coverage Phone Number Coverage Fax Number Effective Dates    [address] [phone]  9/8/2021 - None Entered    [place] [id number]         Subscriber Name Subscriber Birth Date Member ID       GARRETT COATES [birth year] [id number]               Secondary Coverage       Payor Plan Insurance Group Employer/Plan Group    ANTHEM BLUE CROSS ANTHEM PATHWAY O [id number]       Payor Plan Address Payor Plan Phone Number Payor Plan Fax Number Effective Dates    [address] [phone]  6/1/2022 - None Entered    [facility] [id number]         Subscriber Name Subscriber Birth Date Member ID       GARRETT COATES 1965 [id number]                     Emergency Contacts        " (Rel.) Home Phone Work Phone Mobile Phone    Gwen Rodriguez (Spouse) -- -- [phone]              Vital Signs (last day)       Date/Time Temp Temp src Pulse Resp BP Patient Position SpO2    06/06/24 1100 98.2 (36.8) Oral 71 16 141/75 -- 95    06/06/24 0818 97.8 (36.6) -- 69 16 148/79 Lying 93    06/06/24 0704 97.9 (36.6) Oral 70 16 140/76 -- 97    06/06/24 0625 97.6 (36.4) Oral 78 16 143/72 -- 96    06/06/24 0411 97.9 (36.6) Oral 60 16 136/72 Lying 100    06/05/24 2303 -- -- 72 -- -- -- 97    06/05/24 1929 98.2 (36.8) Oral 90 16 139/65 Lying 95    06/05/24 1506 98 (36.7) Oral 80 16 126/63 Sitting 95    06/05/24 1118 98.2 (36.8) Oral 77 16 132/69 Sitting 96    06/05/24 0739 98.1 (36.7) Oral 76 16 128/67 Lying 95    06/05/24 0434 98.3 (36.8) Oral 75 16 101/79 Lying 98          Current Facility-Administered Medications   Medication Dose Route Frequency Provider Last Rate Last Admin    acetaminophen (TYLENOL) tablet 650 mg  650 mg Oral Q4H PRN Ricardo Hawk,         apixaban (ELIQUIS) tablet 5 mg  5 mg Oral BID Ricardo Hawk DO   5 mg at 06/06/24 0925    ascorbic acid (VITAMIN C) tablet 500 mg  500 mg Oral Daily BicRicardo elizabeth DO   500 mg at 06/06/24 0925    furosemide (LASIX) tablet 40 mg  40 mg Oral Daily PRN Ricardo Hawk DO        HYDROcodone-acetaminophen (NORCO) 7.5-325 MG per tablet 1 tablet  1 tablet Oral Q4H PRN Deborah Monk APRN   1 tablet at 06/06/24 0546    lisinopril (PRINIVIL,ZESTRIL) tablet 20 mg  20 mg Oral Q24H Ricardo Hawk DO   20 mg at 06/06/24 0925    metFORMIN (GLUCOPHAGE) tablet 1,000 mg  1,000 mg Oral BID With Meals Ricardo Hawk DO   1,000 mg at 06/06/24 0924    naloxone (NARCAN) injection 0.1 mg  0.1 mg Intravenous Q5 Min PRN Ricardo Hawk DO   0.1 mg at 06/03/24 2330    ondansetron ODT (ZOFRAN-ODT) disintegrating tablet 4 mg  4 mg Oral Q6H PRN Ricardo Hawk DO        Or    ondansetron (ZOFRAN) injection 4 mg  4 mg Intravenous Q6H PRN  Bicking, Ricardo K, DO   4 mg at 06/03/24 2203    pregabalin (LYRICA) capsule 100 mg  100 mg Oral TID Bicking, Ricardo K, DO   100 mg at 06/06/24 0925    sodium chloride 0.9 % flush 10 mL  10 mL Intravenous Q12H Bicking, Ricardo K, DO   10 mL at 06/06/24 0926    sodium chloride 0.9 % flush 10 mL  10 mL Intravenous PRN Bicking, Ricardo K, DO        sodium chloride 0.9 % infusion 40 mL  40 mL Intravenous PRN Bicking, Ricardo K, DO        sodium chloride 0.9 % infusion  30 mL/hr Intravenous Continuous PRN Bicking, Ricardo K, DO        sodium chloride 0.9 % infusion  50 mL/hr Intravenous Continuous Bicking, Ricardo K, DO        zinc sulfate (ZINCATE) capsule 220 mg  220 mg Oral Daily Bicking, Ricardo K, DO   220 mg at 06/06/24 0926        Physician Progress Notes (last 48 hours)        Aleah Dixon APRN at 06/05/24 0840             LOS: 2 days   Patient Care Team:  Mehul Andersen MD as PCP - General (Family Medicine)    Chief Complaint: Right BKA, postop day #2    Subjective     Patient Complaints: Patient seen/examined sitting up in bed.  He is doing well, he has no complaints.  He has gotten up with physical therapy.  He is using the bedside commode.  He said that his pain is being controlled with the Norco.  He says that the pain is no worse than it was prior to surgery.  He did ask if he needed to go to a rehab facility, before going home.  He does have a wheelchair and a bedside commode at home.  Will consult  for discharge planning.    Objective     Vital Signs  Temp:  [98 °F (36.7 °C)-98.4 °F (36.9 °C)] 98.1 °F (36.7 °C)  Heart Rate:  [75-91] 76  Resp:  [12-18] 16  BP: (101-131)/(58-79) 128/67    Physical Exam  Vitals and nursing note reviewed.   Constitutional:       General: He is not in acute distress.     Appearance: Normal appearance. He is not diaphoretic.   HENT:      Head: Normocephalic. No right periorbital erythema or left periorbital erythema.      Nose: Nose normal.   Eyes:       General: No scleral icterus.     Pupils: Pupils are equal.   Cardiovascular:      Rate and Rhythm: Normal rate and regular rhythm.      Pulses: Normal pulses.      Heart sounds: Normal heart sounds. No murmur heard.  Pulmonary:      Effort: Pulmonary effort is normal. No respiratory distress.      Breath sounds: Normal breath sounds.   Abdominal:      General: Bowel sounds are normal. There is no distension.      Palpations: Abdomen is soft.      Tenderness: There is no abdominal tenderness. There is no guarding.   Musculoskeletal:         General: No swelling or tenderness. Normal range of motion.      Cervical back: Normal range of motion and neck supple.      Right lower leg: No edema.      Left lower leg: No edema.      Right Lower Extremity: Right leg is amputated below knee.   Feet:      Right foot:      Skin integrity: Skin integrity normal.      Left foot:      Skin integrity: Skin integrity normal.   Skin:     General: Skin is warm and dry.      Findings: No erythema or rash.   Neurological:      General: No focal deficit present.      Mental Status: He is alert and oriented to person, place, and time. Mental status is at baseline.      Cranial Nerves: No cranial nerve deficit.      Gait: Gait normal.   Psychiatric:         Attention and Perception: Attention normal.         Mood and Affect: Mood normal.         Laboratory Data:   Results from last 7 days   Lab Units 06/04/24  0404 06/03/24  1046 05/29/24  1655   WBC 10*3/mm3 13.74* 9.71  --    HEMOGLOBIN g/dL 8.1* 9.8* 8.9*   HEMATOCRIT % 27.6* 31.7* 29.0*   PLATELETS 10*3/mm3 175 214  --        Results from last 7 days   Lab Units 06/04/24  0404 06/03/24  1046   SODIUM mmol/L 136 143   POTASSIUM mmol/L 4.5 4.0   CHLORIDE mmol/L 101 103   CO2 mmol/L 24.0 30.0*   BUN mg/dL 50* 47*   CREATININE mg/dL 1.87* 1.51*   CALCIUM mg/dL 7.4* 8.3*   GLUCOSE mg/dL 212* 115*     Results from last 7 days   Lab Units 06/03/24  1046   PROTIME Seconds 14.4   INR  1.08    APTT seconds 37.8*            Medication Review: Reviewed    Assessment & Plan       Surgical wound, non healing    Preop testing    Nonhealing surgical wound, initial encounter          Plan for disposition:  Leave dressing in place  Monitor labs.  Consult  for discharge planning  Continue PT and OT  Norco for pain control    MEGHANN Wisdom  Vascular Surgery  [phone]  06/05/24  08:40 CDT      Electronically signed by Aleah Dixon APRN at 06/05/24 2892

## 2024-06-06 NOTE — CASE MANAGEMENT/SOCIAL WORK
Continued Stay Note  [facility]     Patient Name: Garrett Rodriguez  MRN: [id number]  Today's Date: 6/6/2024    Admit Date: 6/3/2024    Plan: [facility]   Discharge Plan       Row Name 06/06/24 1417       Plan    Plan [facility]    Patient/Family in Agreement with Plan yes    Plan Comments Workers comp has approved pt to go to [facility]. They can take him tomorrow in room 826. He will need a covid swab. Report needs to be called to [phone]. Left a message for pt's wife Gwen [phone] to update.      Row Name 06/06/24 135       Plan    Final Discharge Disposition Code 62 - inpatient rehab facility      Row Name 06/06/24 1357       Plan    Plan [facility] pending approval    Patient/Family in Agreement with Plan yes    Plan Comments Sent the order yesterday to Dotty with worker's comp at [phone] for acute rehab. Spoke with Daniel [phone] from [facility] and they will accept pt pending workers comp. They have initiated the process.                   Discharge Codes    No documentation.                 Expected Discharge Date and Time       Expected Discharge Date Expected Discharge Time    Jun 6, 2024               COLIN Huerta

## 2024-06-06 NOTE — PLAN OF CARE
Goal Outcome Evaluation:           Progress: improving     Pt getting norco for pain, vss, on room air.  Plans for d/c to [facility] tomorrow.  Safety maintained.

## 2024-06-06 NOTE — THERAPY TREATMENT NOTE
Acute Care - Occupational Therapy Treatment Note   [place]     Patient Name: Garrett Rodriguez  : 1965  MRN: [id number]  Today's Date: 2024             Admit Date: 6/3/2024       ICD-10-CM ICD-9-CM   1. Non-healing surgical wound, initial encounter  T81.89XA 998.83   2. Preop testing  Z01.818 V72.84   3. Encounter for monitoring antiplatelet therapy  Z51.81 V58.83    Z79.02 V58.63     Patient Active Problem List   Diagnosis    Type 2 diabetes mellitus with hyperglycemia, without long-term current use of insulin    HTN (hypertension)    Retained orthopedic hardware    Anasarca    Iron deficiency anemia    PAF (paroxysmal atrial fibrillation) new onset    Scrotal edema    Gait instability    Pulmonary HTN    Surgical wound, non healing    TARSHA (acute kidney injury)    Bleeding from wound    Diarrhea of presumed infectious origin    Preop testing    Nonhealing surgical wound, initial encounter     Past Medical History:   Diagnosis Date    Atrial fibrillation 3/11/2024    Chronic pain in right foot     Diabetes mellitus     History of transfusion     Hyperlipidemia 2024    Hypertension     Open wound of right foot 2024    PAF (paroxysmal atrial fibrillation)     PFO (patent foramen ovale)     RP at age 6     Past Surgical History:   Procedure Laterality Date    ANKLE FUSION Right 2023    Procedure: ANKLE ARTHRODESIS;  Surgeon: Shahbaz Reddy DPM;  Location:  [facility] OR;  Service: Podiatry;  Laterality: Right;    BACK SURGERY      BELOW KNEE AMPUTATION Right 6/3/2024    Procedure: RIGHT AMPUTATION BELOW KNEE;  Surgeon: Ricardo Hawk DO;  Location:  [facility] HYBRID OR;  Service: Vascular;  Laterality: Right;    CARDIAC SURGERY      EXTERNAL FIXATION ANKLE FRACTURE Right 2023    Procedure: POSSIBLE EXTERNAL FIXATION, RIGHT ANKLE;  Surgeon: Shahbaz Reddy DPM;  Location:  [facility] OR;  Service: Podiatry;  Laterality: Right;    HARDWARE REMOVAL Right 2023    Procedure: REMOVAL  OF HARDWARE, DEEP; ANKLE ARTHRODESIS; SUBTALAR ARTHRODESIS; POSSIBLE EXTERNAL FIXATION, RIGHT ANKLE;  Surgeon: Shahbaz Reddy DPM;  Location:  [facility] OR;  Service: Podiatry;  Laterality: Right;    PATENT FORAMEN OVALE CLOSURE      ROTATOR CUFF REPAIR Right     SUBTALAR ARTHRODESIS Right 07/11/2023    Procedure: SUBTALAR ARTHRODESIS;  Surgeon: Shahbaz Reddy DPM;  Location:  [facility] OR;  Service: Podiatry;  Laterality: Right;         OT ASSESSMENT FLOWSHEET (Last 12 Hours)       OT Evaluation and Treatment       Row Name 06/06/24 1412                   OT Time and Intention    Subjective Information complains of;weakness;fatigue  -TS        Document Type therapy note (daily note)  -TS        Mode of Treatment occupational therapy  -TS        Patient Effort good  -TS        Comment BKA RLE  -TS           General Information    Existing Precautions/Restrictions fall  -TS           Pain Assessment    Pretreatment Pain Rating 0/10 - no pain  -TS        Posttreatment Pain Rating 0/10 - no pain  -TS           Cognition    Orientation Status (Cognition) oriented x 4  -TS        Personal Safety Interventions fall prevention program maintained;gait belt;nonskid shoes/slippers when out of bed  -TS           Activities of Daily Living    BADL Assessment/Intervention toileting  -TS           Toileting Assessment/Training    [place] Level (Toileting) toileting skills;perform perineal hygiene;supervision;adjust/manage clothing;moderate assist (50% patient effort)  -TS        Assistive Devices (Toileting) commode;grab bar/safety frame  -TS        Position (Toileting) unsupported sitting;supported standing  -TS           Bed Mobility    Bed Mobility sit-supine  -TS        Sit-Supine [place] (Bed Mobility) supervision  -TS           Transfer Assessment/Treatment    Transfers toilet transfer;chair-bed transfer  -TS           Chair-Bed Transfer    Chair-Bed [place] (Transfers) standby assist  -TS        Assistive  Device (Chair-Bed Transfers) walker, front-wheeled  -TS        Comment, (Chair-Bed Transfer) w/c to EOB t/f'ing to L side  -TS           Toilet Transfer    Type (Toilet Transfer) sit-stand;stand-sit  -TS        [place] Level (Toilet Transfer) moderate assist (50% patient effort)  -TS        Assistive Device (Toilet Transfer) commode;grab bars/safety frame  -TS           Wound 06/03/24 1625 Right proximal leg Amputation stump    Wound - Properties Group Placement Date: 06/03/24  -AC Placement Time: 1625  -AC Present on Original Admission: N  -AC Side: Right  -AC Orientation: proximal  -AC Location: leg  -AC Primary Wound Type: Amputation s  -AC Additional Comments: xeroform fluffs kerlix ace wrap knee immobilizer  -AC    Retired Wound - Properties Group Placement Date: 06/03/24  -AC Placement Time: 1625  -AC Present on Original Admission: N  -AC Side: Right  -AC Orientation: proximal  -AC Location: leg  -AC Primary Wound Type: Amputation s  -AC Additional Comments: xeroform fluffs kerlix ace wrap knee immobilizer  -AC    Retired Wound - Properties Group Date first assessed: 06/03/24  -AC Time first assessed: 1625  -AC Present on Original Admission: N  -AC Side: Right  -AC Location: leg  -AC Primary Wound Type: Amputation s  -AC Additional Comments: xeroform fluffs kerlix ace wrap knee immobilizer  -AC       Plan of Care Review    Plan of Care Reviewed With patient  -TS        Progress improving  -TS        Outcome Evaluation Pt accepted and planning [facility] SERRANO discussed with pt and spouse home safety, DME and transfer safety during tx. Continue OT POC  -TS           Positioning and Restraints    Pre-Treatment Position bathroom  -TS        Post Treatment Position bed  -TS        In Bed fowlers;call light within reach;encouraged to call for assist;with family/caregiver;side rails up x2  on waffle mattress  -TS                  User Key  (r) = Recorded By, (t) = Taken By, (c) = Cosigned  By      Initials Name Effective Dates    Tesha Cardenas COTA 02/03/23 -     AC Keily Bradford RN 02/17/22 -                      Occupational Therapy Education       Title: PT OT SLP Therapies (In Progress)       Topic: Occupational Therapy (Done)       Point: ADL training (Done)       Description:   Instruct learner(s) on proper safety adaptation and remediation techniques during self care or transfers.   Instruct in proper use of assistive devices.                  Learning Progress Summary             Patient Acceptance, E, VU,NR by CS at 6/4/2024 0841   Significant Other Acceptance, E, VU,NR by CS at 6/4/2024 0841                         Point: Home exercise program (Done)       Description:   Instruct learner(s) on appropriate technique for monitoring, assisting and/or progressing therapeutic exercises/activities.                  Learning Progress Summary             Patient Acceptance, E, VU,NR by  at 6/4/2024 0841   Significant Other Acceptance, E, VU,NR by CS at 6/4/2024 0841                         Point: Precautions (Done)       Description:   Instruct learner(s) on prescribed precautions during self-care and functional transfers.                  Learning Progress Summary             Patient Acceptance, E, VU,NR by CS at 6/4/2024 0841   Significant Other Acceptance, E, VU,NR by CS at 6/4/2024 0841                         Point: Body mechanics (Done)       Description:   Instruct learner(s) on proper positioning and spine alignment during self-care, functional mobility activities and/or exercises.                  Learning Progress Summary             Patient Acceptance, E, VU,NR by CS at 6/4/2024 0841   Significant Other Acceptance, E, VU,NR by CS at 6/4/2024 0841                                         User Key       Initials Effective Dates Name Provider Type Discipline     02/03/23 -  Patrizia Sandhu, OTR/L, CNT Occupational Therapist OT                      OT Recommendation and  Plan     Plan of Care Review  Plan of Care Reviewed With: patient  Progress: improving  Outcome Evaluation: Pt accepted and planning to discharge 6/7 to [facility]. SERRANO discussed with pt and spouse home safety, DME and transfer safety during tx. Continue OT POC  Plan of Care Reviewed With: patient  Outcome Evaluation: Pt accepted and planning to discharge 6/7 to [facility]. SERRANO discussed with pt and spouse home safety, DME and transfer safety during tx. Continue OT POC     Outcome Measures       Row Name 06/06/24 0805 06/05/24 1500 06/05/24 0903       How much help from another person do you currently need...    Turning from your back to your side while in flat bed without using bedrails? 3  -WK -- 3  -WK    Moving from lying on back to sitting on the side of a flat bed without bedrails? 3  -WK -- 3  -WK    Moving to and from a bed to a chair (including a wheelchair)? 3  -WK -- 3  -WK    Standing up from a chair using your arms (e.g., wheelchair, bedside chair)? 3  -WK -- 3  -WK    Climbing 3-5 steps with a railing? 2  -WK -- 2  -WK    To walk in hospital room? 3  -WK -- 3  -WK    AM-PAC 6 Clicks Score (PT) 17  -WK -- 17  -WK    Highest Level of Mobility Goal 5 --> Static standing  -WK -- 5 --> Static standing  -WK       How much help from another is currently needed...    Putting on and taking off regular lower body clothing? -- 3  -TS --    Bathing (including washing, rinsing, and drying) -- 3  -TS --    Toileting (which includes using toilet bed pan or urinal) -- 3  -TS --    Putting on and taking off regular upper body clothing -- 4  -TS --    Taking care of personal grooming (such as brushing teeth) -- 4  -TS --    Eating meals -- 4  -TS --    AM-PAC 6 Clicks Score (OT) -- 21  -TS --       Functional Assessment    Outcome Measure Options AM-PAC 6 Clicks Basic Mobility (PT)  -WK -- AM-PAC 6 Clicks Basic Mobility (PT)  -WK              User Key  (r) = Recorded By, (t) = Taken By, (c) = Cosigned By       Initials Name Provider Type    TS Tesha Santiago COTA Occupational Therapist Assistant    Krystina Lin, PTA Physical Therapist Assistant                    Time Calculation:    Time Calculation- OT       Row Name 06/06/24 1543             Time Calculation- OT    OT Start Time 1412  -TS      OT Stop Time 1520  -TS      OT Time Calculation (min) 68 min  -TS      Total Timed Code Minutes- OT 68 minute(s)  -TS      OT Received On 06/06/24  -TS         Timed Charges    24184 - OT Self Care/Mgmt Minutes 68  -TS         Total Minutes    Timed Charges Total Minutes 68  -TS       Total Minutes 68  -TS                User Key  (r) = Recorded By, (t) = Taken By, (c) = Cosigned By      Initials Name Provider Type    TS Tesha Santiago COTA Occupational Therapist Assistant                  Therapy Charges for Today       Code Description Service Date Service Provider Modifiers Qty    14958718657 HC OT SELF CARE/MGMT/TRAIN EA 15 MIN 6/5/2024 Tesha Santiago COTA GO 4    41784490775 HC OT SELF CARE/MGMT/TRAIN EA 15 MIN 6/6/2024 Tesha Santiago COTA GO 5                 Tesha ESPINOZA. ZACH Santiago  6/6/2024

## 2024-06-07 ENCOUNTER — HOSPITAL ENCOUNTER (INPATIENT)
Age: 59
LOS: 11 days | Discharge: ANOTHER HOSPITAL | DRG: 559 | End: 2024-06-18
Attending: PSYCHIATRY & NEUROLOGY | Admitting: PSYCHIATRY & NEUROLOGY
Payer: COMMERCIAL

## 2024-06-07 ENCOUNTER — TELEPHONE (OUTPATIENT)
Dept: VASCULAR SURGERY | Facility: CLINIC | Age: 59
End: 2024-06-07
Payer: COMMERCIAL

## 2024-06-07 VITALS
BODY MASS INDEX: 25.72 KG/M2 | HEIGHT: 70 IN | HEART RATE: 68 BPM | OXYGEN SATURATION: 99 % | DIASTOLIC BLOOD PRESSURE: 77 MMHG | RESPIRATION RATE: 16 BRPM | SYSTOLIC BLOOD PRESSURE: 143 MMHG | WEIGHT: 179.68 LBS | TEMPERATURE: 98.1 F

## 2024-06-07 DIAGNOSIS — Z89.511 S/P BKA (BELOW KNEE AMPUTATION) UNILATERAL, RIGHT (HCC): Primary | ICD-10-CM

## 2024-06-07 PROBLEM — E11.8 TYPE 2 DIABETES MELLITUS WITH COMPLICATION, WITHOUT LONG-TERM CURRENT USE OF INSULIN (HCC): Status: ACTIVE | Noted: 2024-06-07

## 2024-06-07 PROBLEM — I10 ESSENTIAL HYPERTENSION: Status: ACTIVE | Noted: 2024-06-07

## 2024-06-07 LAB
ANION GAP SERPL CALCULATED.3IONS-SCNC: 6 MMOL/L (ref 5–15)
BASOPHILS # BLD AUTO: 0.03 10*3/MM3 (ref 0–0.2)
BASOPHILS NFR BLD AUTO: 0.4 % (ref 0–1.5)
BH BB BLOOD EXPIRATION DATE: NORMAL
BH BB BLOOD TYPE BARCODE: 6200
BH BB DISPENSE STATUS: NORMAL
BH BB PRODUCT CODE: NORMAL
BH BB UNIT NUMBER: NORMAL
BUN SERPL-MCNC: 46 MG/DL (ref 6–20)
BUN/CREAT SERPL: 36.2 (ref 7–25)
CALCIUM SPEC-SCNC: 7.2 MG/DL (ref 8.6–10.5)
CHLORIDE SERPL-SCNC: 107 MMOL/L (ref 98–107)
CO2 SERPL-SCNC: 28 MMOL/L (ref 22–29)
CREAT SERPL-MCNC: 1.27 MG/DL (ref 0.76–1.27)
CROSSMATCH INTERPRETATION: NORMAL
DEPRECATED RDW RBC AUTO: 48.7 FL (ref 37–54)
EGFRCR SERPLBLD CKD-EPI 2021: 65.5 ML/MIN/1.73
EOSINOPHIL # BLD AUTO: 0.31 10*3/MM3 (ref 0–0.4)
EOSINOPHIL NFR BLD AUTO: 4.1 % (ref 0.3–6.2)
ERYTHROCYTE [DISTWIDTH] IN BLOOD BY AUTOMATED COUNT: 15.2 % (ref 12.3–15.4)
GLUCOSE BLD-MCNC: 160 MG/DL (ref 70–99)
GLUCOSE BLD-MCNC: 236 MG/DL (ref 70–99)
GLUCOSE SERPL-MCNC: 111 MG/DL (ref 65–99)
HCT VFR BLD AUTO: 24.5 % (ref 37.5–51)
HGB BLD-MCNC: 7.6 G/DL (ref 13–17.7)
IMM GRANULOCYTES # BLD AUTO: 0.03 10*3/MM3 (ref 0–0.05)
IMM GRANULOCYTES NFR BLD AUTO: 0.4 % (ref 0–0.5)
LYMPHOCYTES # BLD AUTO: 1.85 10*3/MM3 (ref 0.7–3.1)
LYMPHOCYTES NFR BLD AUTO: 24.5 % (ref 19.6–45.3)
MCH RBC QN AUTO: 27.3 PG (ref 26.6–33)
MCHC RBC AUTO-ENTMCNC: 31 G/DL (ref 31.5–35.7)
MCV RBC AUTO: 88.1 FL (ref 79–97)
MONOCYTES # BLD AUTO: 0.51 10*3/MM3 (ref 0.1–0.9)
MONOCYTES NFR BLD AUTO: 6.8 % (ref 5–12)
NEUTROPHILS NFR BLD AUTO: 4.81 10*3/MM3 (ref 1.7–7)
NEUTROPHILS NFR BLD AUTO: 63.8 % (ref 42.7–76)
NRBC BLD AUTO-RTO: 0 /100 WBC (ref 0–0.2)
PERFORMED ON: ABNORMAL
PERFORMED ON: ABNORMAL
PLATELET # BLD AUTO: 180 10*3/MM3 (ref 140–450)
PMV BLD AUTO: 10.3 FL (ref 6–12)
POTASSIUM SERPL-SCNC: 3.9 MMOL/L (ref 3.5–5.2)
PREALB SERPL-MCNC: 12 MG/DL (ref 20–40)
RBC # BLD AUTO: 2.78 10*6/MM3 (ref 4.14–5.8)
SARS-COV-2 AG RESP QL IA.RAPID: NORMAL
SODIUM SERPL-SCNC: 141 MMOL/L (ref 136–145)
TSH SERPL DL<=0.005 MIU/L-ACNC: 2.69 UIU/ML (ref 0.27–4.2)
UNIT  ABO: NORMAL
UNIT  RH: NORMAL
VIT B12 SERPL-MCNC: 511 PG/ML (ref 211–946)
WBC NRBC COR # BLD AUTO: 7.54 10*3/MM3 (ref 3.4–10.8)

## 2024-06-07 PROCEDURE — 84134 ASSAY OF PREALBUMIN: CPT

## 2024-06-07 PROCEDURE — 6370000000 HC RX 637 (ALT 250 FOR IP): Performed by: PSYCHIATRY & NEUROLOGY

## 2024-06-07 PROCEDURE — 82607 VITAMIN B-12: CPT

## 2024-06-07 PROCEDURE — 99024 POSTOP FOLLOW-UP VISIT: CPT | Performed by: SURGERY

## 2024-06-07 PROCEDURE — 80048 BASIC METABOLIC PNL TOTAL CA: CPT | Performed by: NURSE PRACTITIONER

## 2024-06-07 PROCEDURE — 36415 COLL VENOUS BLD VENIPUNCTURE: CPT

## 2024-06-07 PROCEDURE — 82962 GLUCOSE BLOOD TEST: CPT

## 2024-06-07 PROCEDURE — 97110 THERAPEUTIC EXERCISES: CPT

## 2024-06-07 PROCEDURE — 84443 ASSAY THYROID STIM HORMONE: CPT

## 2024-06-07 PROCEDURE — 97530 THERAPEUTIC ACTIVITIES: CPT

## 2024-06-07 PROCEDURE — 94150 VITAL CAPACITY TEST: CPT

## 2024-06-07 PROCEDURE — 85025 COMPLETE CBC W/AUTO DIFF WBC: CPT | Performed by: NURSE PRACTITIONER

## 2024-06-07 PROCEDURE — 1180000000 HC REHAB R&B

## 2024-06-07 PROCEDURE — 87426 SARSCOV CORONAVIRUS AG IA: CPT | Performed by: SURGERY

## 2024-06-07 PROCEDURE — 94760 N-INVAS EAR/PLS OXIMETRY 1: CPT

## 2024-06-07 RX ORDER — INSULIN LISPRO 100 [IU]/ML
0-4 INJECTION, SOLUTION INTRAVENOUS; SUBCUTANEOUS
Status: DISCONTINUED | OUTPATIENT
Start: 2024-06-07 | End: 2024-06-18 | Stop reason: HOSPADM

## 2024-06-07 RX ORDER — NALOXONE HYDROCHLORIDE 4 MG/.1ML
1 SPRAY NASAL PRN
Status: ON HOLD | COMMUNITY

## 2024-06-07 RX ORDER — FUROSEMIDE 40 MG/1
40 TABLET ORAL DAILY PRN
Status: ON HOLD | COMMUNITY
Start: 2024-05-29

## 2024-06-07 RX ORDER — ACETAMINOPHEN 325 MG/1
650 TABLET ORAL EVERY 4 HOURS PRN
Status: DISCONTINUED | OUTPATIENT
Start: 2024-06-07 | End: 2024-06-18 | Stop reason: HOSPADM

## 2024-06-07 RX ORDER — ZINC SULFATE 50(220)MG
220 CAPSULE ORAL DAILY
Status: ON HOLD | COMMUNITY
Start: 2024-05-30 | End: 2024-06-30

## 2024-06-07 RX ORDER — PREGABALIN 50 MG/1
100 CAPSULE ORAL 3 TIMES DAILY
Status: DISCONTINUED | OUTPATIENT
Start: 2024-06-07 | End: 2024-06-18 | Stop reason: HOSPADM

## 2024-06-07 RX ORDER — PREGABALIN 100 MG/1
100 CAPSULE ORAL 3 TIMES DAILY
Status: ON HOLD | COMMUNITY

## 2024-06-07 RX ORDER — HYDROCODONE BITARTRATE AND ACETAMINOPHEN 10; 325 MG/1; MG/1
1 TABLET ORAL EVERY 8 HOURS PRN
Status: DISCONTINUED | OUTPATIENT
Start: 2024-06-07 | End: 2024-06-08

## 2024-06-07 RX ORDER — ZINC SULFATE 50(220)MG
50 CAPSULE ORAL DAILY
Status: DISCONTINUED | OUTPATIENT
Start: 2024-06-07 | End: 2024-06-18

## 2024-06-07 RX ORDER — DEXTROSE MONOHYDRATE 100 MG/ML
INJECTION, SOLUTION INTRAVENOUS CONTINUOUS PRN
Status: DISCONTINUED | OUTPATIENT
Start: 2024-06-07 | End: 2024-06-18 | Stop reason: HOSPADM

## 2024-06-07 RX ORDER — MULTIVIT WITH MINERALS/LUTEIN
500 TABLET ORAL DAILY
Status: DISCONTINUED | OUTPATIENT
Start: 2024-06-08 | End: 2024-06-18

## 2024-06-07 RX ORDER — POLYETHYLENE GLYCOL 3350 17 G/17G
17 POWDER, FOR SOLUTION ORAL DAILY PRN
Status: DISCONTINUED | OUTPATIENT
Start: 2024-06-07 | End: 2024-06-18 | Stop reason: HOSPADM

## 2024-06-07 RX ORDER — ASCORBIC ACID 500 MG
500 TABLET ORAL DAILY
Status: ON HOLD | COMMUNITY
Start: 2024-05-30 | End: 2024-06-30

## 2024-06-07 RX ORDER — NALOXONE HYDROCHLORIDE 0.4 MG/ML
0.4 INJECTION, SOLUTION INTRAMUSCULAR; INTRAVENOUS; SUBCUTANEOUS PRN
Status: DISCONTINUED | OUTPATIENT
Start: 2024-06-07 | End: 2024-06-18 | Stop reason: HOSPADM

## 2024-06-07 RX ORDER — NALOXONE HYDROCHLORIDE 4 MG/.1ML
1 SPRAY NASAL PRN
Status: DISCONTINUED | OUTPATIENT
Start: 2024-06-07 | End: 2024-06-07

## 2024-06-07 RX ORDER — LISINOPRIL 10 MG/1
10 TABLET ORAL DAILY
Status: ON HOLD | COMMUNITY

## 2024-06-07 RX ORDER — INSULIN LISPRO 100 [IU]/ML
0-4 INJECTION, SOLUTION INTRAVENOUS; SUBCUTANEOUS NIGHTLY
Status: DISCONTINUED | OUTPATIENT
Start: 2024-06-07 | End: 2024-06-18 | Stop reason: HOSPADM

## 2024-06-07 RX ORDER — HYDROCODONE BITARTRATE AND ACETAMINOPHEN 10; 325 MG/1; MG/1
1 TABLET ORAL EVERY 8 HOURS PRN
Status: ON HOLD | COMMUNITY

## 2024-06-07 RX ORDER — LISINOPRIL 10 MG/1
10 TABLET ORAL DAILY
Status: DISCONTINUED | OUTPATIENT
Start: 2024-06-07 | End: 2024-06-13

## 2024-06-07 RX ADMIN — ZINC SULFATE 220 MG (50 MG) CAPSULE 220 MG: CAPSULE at 08:58

## 2024-06-07 RX ADMIN — OXYCODONE HYDROCHLORIDE AND ACETAMINOPHEN 500 MG: 500 TABLET ORAL at 08:58

## 2024-06-07 RX ADMIN — METFORMIN HCL 1000 MG: 500 TABLET ORAL at 08:58

## 2024-06-07 RX ADMIN — METFORMIN HYDROCHLORIDE 1000 MG: 500 TABLET ORAL at 21:02

## 2024-06-07 RX ADMIN — PREGABALIN 100 MG: 25 CAPSULE ORAL at 08:57

## 2024-06-07 RX ADMIN — HYDROCODONE BITARTRATE AND ACETAMINOPHEN 1 TABLET: 10; 325 TABLET ORAL at 21:04

## 2024-06-07 RX ADMIN — LISINOPRIL 20 MG: 20 TABLET ORAL at 08:58

## 2024-06-07 RX ADMIN — APIXABAN 5 MG: 5 TABLET, FILM COATED ORAL at 08:58

## 2024-06-07 RX ADMIN — APIXABAN 5 MG: 5 TABLET, FILM COATED ORAL at 21:02

## 2024-06-07 RX ADMIN — PREGABALIN 100 MG: 50 CAPSULE ORAL at 21:02

## 2024-06-07 RX ADMIN — HYDROCODONE BITARTRATE AND ACETAMINOPHEN 1 TABLET: 7.5; 325 TABLET ORAL at 11:54

## 2024-06-07 RX ADMIN — HYDROCODONE BITARTRATE AND ACETAMINOPHEN 1 TABLET: 7.5; 325 TABLET ORAL at 02:12

## 2024-06-07 RX ADMIN — PREGABALIN 100 MG: 25 CAPSULE ORAL at 15:29

## 2024-06-07 RX ADMIN — HYDROCODONE BITARTRATE AND ACETAMINOPHEN 1 TABLET: 7.5; 325 TABLET ORAL at 06:18

## 2024-06-07 ASSESSMENT — PAIN - FUNCTIONAL ASSESSMENT: PAIN_FUNCTIONAL_ASSESSMENT: PREVENTS OR INTERFERES SOME ACTIVE ACTIVITIES AND ADLS

## 2024-06-07 ASSESSMENT — PAIN DESCRIPTION - DESCRIPTORS: DESCRIPTORS: STABBING

## 2024-06-07 ASSESSMENT — LIFESTYLE VARIABLES
HOW OFTEN DO YOU HAVE A DRINK CONTAINING ALCOHOL: NEVER
HOW MANY STANDARD DRINKS CONTAINING ALCOHOL DO YOU HAVE ON A TYPICAL DAY: PATIENT DOES NOT DRINK

## 2024-06-07 ASSESSMENT — PAIN DESCRIPTION - LOCATION: LOCATION: LEG

## 2024-06-07 ASSESSMENT — PAIN SCALES - GENERAL: PAINLEVEL_OUTOF10: 9

## 2024-06-07 ASSESSMENT — PAIN DESCRIPTION - ORIENTATION: ORIENTATION: RIGHT

## 2024-06-07 NOTE — PLAN OF CARE
Goal Outcome Evaluation:  Plan of Care Reviewed With: patient        Progress: no change       Pt with complaints of pain during shift; see MAR. No IV access. Right leg with ACE wrap and immobilizer. Eliquis for VTE prevention. VSS. Safety maintained. Bed alarm on. Possible discharge later today.

## 2024-06-07 NOTE — PAYOR COMM NOTE
"Jennifer Garrett Shirley (59 y.o. Male)  [id number]   cont stay  please review clinical  pt still in hospital  [facility] phone   fax          Date of Birth   1965    Social Security Number       Address   [address] [facility] [place] [address]    Home Phone   [phone]    MRN   [id number]       Church   [employer]    Marital Status                               Admission Date   6/3/24    Admission Type   Elective    Admitting Provider   Ricardo Hawk DO    Attending Provider   Ricardo Hawk DO    Department, Room/Bed   [facility] 3C, 361/1       Discharge Date       Discharge Disposition       Discharge Destination                                 Attending Provider: Ricardo Hawk DO    Allergies: Morphine, Percocet [Oxycodone-acetaminophen]    Isolation: None   Infection: None   Code Status: CPR    Ht: 178 cm (70.08\")   Wt: 81.5 kg (179 lb 10.8 oz)    Admission Cmt: None   Principal Problem: Surgical wound, non healing [T81.89XA]                   Active Insurance as of 6/3/2024       Primary Coverage       Payor Plan Insurance Group Employer/Plan Group    WORKERS COMPENSATION MISC WORKERS COMPENSATION        Coverage Address Coverage Phone Number Coverage Fax Number Effective Dates    [address] [phone]  9/8/2021 - None Entered    [place] [address]         Subscriber Name Subscriber Birth Date Member ID       GARRETT COATES [birth year] [id number]               Secondary Coverage       Payor Plan Insurance Group Employer/Plan Group    ANTHEM BLUE CROSS ANTHEM PATHWAY O [id number]       Payor Plan Address Payor Plan Phone Number Payor Plan Fax Number Effective Dates    [address] [phone]  6/1/2022 - None Entered    [facility] [address]         Subscriber Name Subscriber Birth Date Member ID       GARRETT COATES 1965 [id number]                     Emergency Contacts        " (Rel.) Home Phone Work Phone Mobile Phone    Gwen Rodriguez (Spouse) -- -- [phone]                 Physician Progress Notes (last 24 hours)        Deborah Monk, MEGHANN at 06/06/24 1632             LOS: 3 days   Patient Care Team:  Mehul Andersen MD as PCP - General (Family Medicine)    Chief Complaint: Right BKA, postop day #3    Subjective     Patient Complaints: Pt seen/examined lying in bed.  Pain overall controlled with Norco.  Hemoglobin this a.m. 6.4.  1 unit of PRBCs ordered.  He does continue to work with PT/OT.  Dressing clean dry and intact.   working on discharge planning  Objective     Vital Signs  Temp:  [97.6 °F (36.4 °C)-98.3 °F (36.8 °C)] 98.3 °F (36.8 °C)  Heart Rate:  [60-90] 77  Resp:  [16] 16  BP: (136-164)/(65-89) 164/89    Physical Exam  Vitals and nursing note reviewed.   Constitutional:       General: He is not in acute distress.     Appearance: Normal appearance. He is not diaphoretic.   HENT:      Head: Normocephalic. No right periorbital erythema or left periorbital erythema.      Nose: Nose normal.   Eyes:      General: No scleral icterus.     Pupils: Pupils are equal.   Cardiovascular:      Rate and Rhythm: Normal rate and regular rhythm.      Pulses: Normal pulses.      Heart sounds: Normal heart sounds. No murmur heard.     Comments: Right BKA dressing clean dry and intact.  Pulmonary:      Effort: Pulmonary effort is normal. No respiratory distress.      Breath sounds: Normal breath sounds.   Abdominal:      General: Bowel sounds are normal. There is no distension.      Palpations: Abdomen is soft.      Tenderness: There is no abdominal tenderness. There is no guarding.   Musculoskeletal:         General: No swelling or tenderness. Normal range of motion.      Cervical back: Normal range of motion and neck supple.      Right lower leg: No edema.      Left lower leg: No edema.      Right Lower Extremity: Right leg is amputated below knee.   Feet:      Right  foot:      Skin integrity: Skin integrity normal.      Left foot:      Skin integrity: Skin integrity normal.   Skin:     General: Skin is warm and dry.      Coloration: Skin is pale.      Findings: No erythema or rash.   Neurological:      General: No focal deficit present.      Mental Status: He is alert and oriented to person, place, and time. Mental status is at baseline.      Cranial Nerves: No cranial nerve deficit.      Gait: Gait normal.   Psychiatric:         Attention and Perception: Attention normal.         Mood and Affect: Mood normal.         Laboratory Data:   Results from last 7 days   Lab Units 06/06/24  1037 06/06/24  0329 06/05/24  0905 06/04/24  0404   WBC 10*3/mm3  --  7.19 8.76 13.74*   HEMOGLOBIN g/dL 7.5* 6.4* 7.5* 8.1*   HEMATOCRIT % 24.1* 21.1* 24.2* 27.6*   PLATELETS 10*3/mm3  --  152 162 175       Results from last 7 days   Lab Units 06/06/24  0329 06/05/24  0905 06/04/24  0404   SODIUM mmol/L 140 138 136   POTASSIUM mmol/L 4.0 4.1 4.5   CHLORIDE mmol/L 105 101 101   CO2 mmol/L 28.0 27.0 24.0   BUN mg/dL 55* 55* 50*   CREATININE mg/dL 1.65* 1.84* 1.87*   CALCIUM mg/dL 6.9* 7.1* 7.4*   GLUCOSE mg/dL 109* 198* 212*     Results from last 7 days   Lab Units 06/03/24  1046   PROTIME Seconds 14.4   INR  1.08   APTT seconds 37.8*            Medication Review: Reviewed    Assessment & Plan       Surgical wound, non healing    Preop testing    Nonhealing surgical wound, initial encounter        Plan for disposition:  Leave dressing in place and will remove in a.m.     working on discharge planning  Repeat lab work in a.m.  Continue with PT/OT  Continue Norco for pain    MEGHANN Baltazar  Vascular Surgery  [phone]  06/06/24  16:32 CDT      Electronically signed by Deborah Monk APRN at 06/06/24 3926

## 2024-06-07 NOTE — CASE MANAGEMENT/SOCIAL WORK
Continued Stay Note   [place]     Patient Name: Garrett Rodriguez  MRN: [id number]  Today's Date: 6/7/2024    Admit Date: 6/3/2024    Plan: [facility]   Discharge Plan       Row Name 06/07/24 1014       Plan    Plan [facility]    Final Discharge Disposition Code 62 - inpatient rehab facility    Final Note Plan discharge to [facility]. Covid swab and dc summary fax: [phone]. RN report [phone].           ZOE Garnica

## 2024-06-07 NOTE — TELEPHONE ENCOUNTER
Spoke with Areli at [facility] requesting right BKA stump protector and compression. Patient is being discharged to [facility] and personally requested Surendra. Areli stated that Surendra had patients up to 3pm and is currently at lunch. Will speak with supervisor and give us a call back.

## 2024-06-07 NOTE — THERAPY DISCHARGE NOTE
Acute Care - Occupational Therapy Initial Evaluation/Discharge   [place]     Patient Name: Garrett Rodriguez  : 1965  MRN: [id number]  Today's Date: 2024               Admit Date: 6/3/2024       ICD-10-CM ICD-9-CM   1. Non-healing surgical wound, initial encounter  T81.89XA 998.83   2. Preop testing  Z01.818 V72.84   3. Encounter for monitoring antiplatelet therapy  Z51.81 V58.83    Z79.02 V58.63     Patient Active Problem List   Diagnosis    Type 2 diabetes mellitus with hyperglycemia, without long-term current use of insulin    HTN (hypertension)    Retained orthopedic hardware    Anasarca    Iron deficiency anemia    PAF (paroxysmal atrial fibrillation) new onset    Scrotal edema    Gait instability    Pulmonary HTN    Surgical wound, non healing    TARSHA (acute kidney injury)    Bleeding from wound    Diarrhea of presumed infectious origin    Preop testing    Nonhealing surgical wound, initial encounter     Past Medical History:   Diagnosis Date    Atrial fibrillation 3/11/2024    Chronic pain in right foot     Diabetes mellitus     History of transfusion     Hyperlipidemia 2024    Hypertension     Open wound of right foot 2024    PAF (paroxysmal atrial fibrillation)     PFO (patent foramen ovale)     RP at age 6     Past Surgical History:   Procedure Laterality Date    ANKLE FUSION Right 2023    Procedure: ANKLE ARTHRODESIS;  Surgeon: Shahbaz Reddy DPM;  Location:  [facility] OR;  Service: Podiatry;  Laterality: Right;    BACK SURGERY      BELOW KNEE AMPUTATION Right 6/3/2024    Procedure: RIGHT AMPUTATION BELOW KNEE;  Surgeon: Ricardo Hawk DO;  Location:  [facility] HYBRID OR;  Service: Vascular;  Laterality: Right;    CARDIAC SURGERY      EXTERNAL FIXATION ANKLE FRACTURE Right 2023    Procedure: POSSIBLE EXTERNAL FIXATION, RIGHT ANKLE;  Surgeon: Shahbaz Reddy DPM;  Location:  [facility] OR;  Service: Podiatry;  Laterality: Right;    HARDWARE REMOVAL Right 2023     Procedure: REMOVAL OF HARDWARE, DEEP; ANKLE ARTHRODESIS; SUBTALAR ARTHRODESIS; POSSIBLE EXTERNAL FIXATION, RIGHT ANKLE;  Surgeon: Shahbaz Reddy DPM;  Location:  [facility] OR;  Service: Podiatry;  Laterality: Right;    PATENT FORAMEN OVALE CLOSURE      ROTATOR CUFF REPAIR Right     SUBTALAR ARTHRODESIS Right 07/11/2023    Procedure: SUBTALAR ARTHRODESIS;  Surgeon: Shahbaz Reddy DPM;  Location:  [facility] OR;  Service: Podiatry;  Laterality: Right;       OT ASSESSMENT FLOWSHEET (Last 12 Hours)       OT Evaluation and Treatment       Row Name 06/07/24 1023                   OT Time and Intention    Subjective Information complains of;pain  -EC        Document Type therapy note (daily note)  -EC        Mode of Treatment occupational therapy  -EC           General Information    Patient Profile Reviewed yes  -EC        Existing Precautions/Restrictions fall  R BKA  -EC           Pain Assessment    Pretreatment Pain Rating 9/10  -EC        Posttreatment Pain Rating 9/10  -EC        Pain Location - Side/Orientation Right  -EC        Pain Location lower  -EC        Pain Location - residual limb  -EC        Pain Intervention(s) Repositioned  -EC           Bed Mobility    Bed Mobility supine-sit;sit-supine  -EC        Supine-Sit [place] (Bed Mobility) minimum assist (75% patient effort)  -EC        Sit-Supine [place] (Bed Mobility) supervision  -EC        Assistive Device (Bed Mobility) bed rails  -EC           Motor Skills    Therapeutic Exercise shoulder;elbow/forearm  -EC        Comments, Therapeutic Exercise 2 x 10 reps strengthening exs in all planes, AROM for R shoulder d/t previous surgeries  -EC           Residual Limb Assessment 06/06/24 2248 transtibial (below knee), right    Residual Limb Assessment - Properties Group Placement Date: 06/06/24  - Placement Time: 2248 -MK Amputation Date: 06/03/24  - Location: transtibial (below knee), right  -MK    Retired Residual Limb Assessment - Properties Group  Placement Date: 06/06/24  - Placement Time: 2248 -MK Amputation Date: 06/03/24  -MK Location: transtibial (below knee), right  -MK    Retired Residual Limb Assessment - Properties Group Date first assessed: 06/06/24  - Time first assessed: 2248 -MK Amputation Date: 06/03/24  - Location: transtibial (below knee), right  -MK       Wound 06/03/24 1625 Right proximal leg Amputation stump    Wound - Properties Group Placement Date: 06/03/24  -AC Placement Time: 1625  -AC Present on Original Admission: N  -AC Side: Right  -AC Orientation: proximal  -AC Location: leg  -AC Primary Wound Type: Amputation s  -AC Additional Comments: xeroform fluffs kerlix ace wrap knee immobilizer  -AC    Retired Wound - Properties Group Placement Date: 06/03/24  -AC Placement Time: 1625  -AC Present on Original Admission: N  -AC Side: Right  -AC Orientation: proximal  -AC Location: leg  -AC Primary Wound Type: Amputation s  -AC Additional Comments: xeroform fluffs kerlix ace wrap knee immobilizer  -AC    Retired Wound - Properties Group Date first assessed: 06/03/24  -AC Time first assessed: 1625  -AC Present on Original Admission: N  -AC Side: Right  -AC Location: leg  -AC Primary Wound Type: Amputation s  -AC Additional Comments: xeroform fluffs kerlix ace wrap knee immobilizer  -AC       Plan of Care Review    Plan of Care Reviewed With patient;spouse  -EC        Progress improving  -EC        Outcome Evaluation OT tx completed. Pt agreeable to strengthening exs as he feels his UE is weak & he needs to strengthen them for transfers. Pt performs bed mob with Junior today. Performed 2 x 10 reps various BUE strengthening in all planes with good effort noted. Discussed POC & pt is hopeful to d/c to inpatient rehab today  -EC           Positioning and Restraints    Pre-Treatment Position in bed  -EC        Post Treatment Position bed  -EC        In Bed fowlers;call light within reach;encouraged to call for assist;with  family/caregiver;side rails up x3  -EC                  User Key  (r) = Recorded By, (t) = Taken By, (c) = Cosigned By      Initials Name Effective Dates    AC Keily Bradford RN 02/17/22 -     Jaclyn Pires RN 06/17/22 -     EC Natasha Rodriguez, OTR/L 10/13/23 -                     Occupational Therapy Education       Title: PT OT SLP Therapies (In Progress)       Topic: Occupational Therapy (Done)       Point: ADL training (Done)       Description:   Instruct learner(s) on proper safety adaptation and remediation techniques during self care or transfers.   Instruct in proper use of assistive devices.                  Learning Progress Summary             Patient Acceptance, E, VU,NR by CS at 6/4/2024 0841   Significant Other Acceptance, E, VU,NR by CS at 6/4/2024 0841                         Point: Home exercise program (Done)       Description:   Instruct learner(s) on appropriate technique for monitoring, assisting and/or progressing therapeutic exercises/activities.                  Learning Progress Summary             Patient Acceptance, E, VU,NR by CS at 6/4/2024 0841   Significant Other Acceptance, E, VU,NR by CS at 6/4/2024 0841                         Point: Precautions (Done)       Description:   Instruct learner(s) on prescribed precautions during self-care and functional transfers.                  Learning Progress Summary             Patient Acceptance, E, VU,NR by CS at 6/4/2024 0841   Significant Other Acceptance, E, VU,NR by CS at 6/4/2024 0841                         Point: Body mechanics (Done)       Description:   Instruct learner(s) on proper positioning and spine alignment during self-care, functional mobility activities and/or exercises.                  Learning Progress Summary             Patient Acceptance, E, VU,NR by CS at 6/4/2024 0841   Significant Other Acceptance, E, VU,NR by CS at 6/4/2024 0841                                         User Key       Initials Effective  Dates Name Provider Type Discipline     02/03/23 -  Patrizia Sandhu S, OTR/L, CNT Occupational Therapist OT                    OT Recommendation and Plan     Plan of Care Review  Plan of Care Reviewed With: patient, spouse  Progress: improving  Outcome Evaluation: OT tx completed. Pt agreeable to strengthening exs as he feels his UE is weak & he needs to strengthen them for transfers. Pt performs bed mob with Junior today. Performed 2 x 10 reps various BUE strengthening in all planes with good effort noted. Discussed POC & pt is hopeful to d/c to inpatient rehab today  Plan of Care Reviewed With: patient, spouse  Outcome Evaluation: OT tx completed. Pt agreeable to strengthening exs as he feels his UE is weak & he needs to strengthen them for transfers. Pt performs bed mob with Junior today. Performed 2 x 10 reps various BUE strengthening in all planes with good effort noted. Discussed POC & pt is hopeful to d/c to inpatient rehab today          Outcome Measures       Row Name 06/07/24 1000 06/06/24 0805 06/05/24 1500       How much help from another person do you currently need...    Turning from your back to your side while in flat bed without using bedrails? -- 3  -WK --    Moving from lying on back to sitting on the side of a flat bed without bedrails? -- 3  -WK --    Moving to and from a bed to a chair (including a wheelchair)? -- 3  -WK --    Standing up from a chair using your arms (e.g., wheelchair, bedside chair)? -- 3  -WK --    Climbing 3-5 steps with a railing? -- 2  -WK --    To walk in hospital room? -- 3  -WK --    AM-PAC 6 Clicks Score (PT) -- 17  -WK --    Highest Level of Mobility Goal -- 5 --> Static standing  -WK --       How much help from another is currently needed...    Putting on and taking off regular lower body clothing? 3  -EC -- 3  -TS    Bathing (including washing, rinsing, and drying) 3  -EC -- 3  -TS    Toileting (which includes using toilet bed pan or urinal) 3  -EC -- 3  -TS    Putting  on and taking off regular upper body clothing 4  -EC -- 4  -TS    Taking care of personal grooming (such as brushing teeth) 4  -EC -- 4  -TS    Eating meals 4  -EC -- 4  -TS    AM-PAC 6 Clicks Score (OT) 21  -EC -- 21  -TS       Functional Assessment    Outcome Measure Options -- AM-PAC 6 Clicks Basic Mobility (PT)  -WK --      Row Name 06/05/24 0903             How much help from another person do you currently need...    Turning from your back to your side while in flat bed without using bedrails? 3  -WK      Moving from lying on back to sitting on the side of a flat bed without bedrails? 3  -WK      Moving to and from a bed to a chair (including a wheelchair)? 3  -WK      Standing up from a chair using your arms (e.g., wheelchair, bedside chair)? 3  -WK      Climbing 3-5 steps with a railing? 2  -WK      To walk in hospital room? 3  -WK      AM-PAC 6 Clicks Score (PT) 17  -WK      Highest Level of Mobility Goal 5 --> Static standing  -WK         Functional Assessment    Outcome Measure Options AM-PAC 6 Clicks Basic Mobility (PT)  -WK                User Key  (r) = Recorded By, (t) = Taken By, (c) = Cosigned By      Initials Name Provider Type    TS Tesha Santiago COTA Occupational Therapist Assistant    WK Krystina Matt PTA Physical Therapist Assistant    EC Natasha Rodriguez, OTR/L Occupational Therapist                    Time Calculation:    Time Calculation- OT       Row Name 06/07/24 1042             Time Calculation- OT    OT Start Time 1024  -EC      OT Stop Time 1041  -EC      OT Time Calculation (min) 17 min  -EC      Total Timed Code Minutes- OT 17 minute(s)  -EC      OT Received On 06/07/24  -EC         Timed Charges    58939 - OT Therapeutic Exercise Minutes 17  -EC         Total Minutes    Timed Charges Total Minutes 17  -EC       Total Minutes 17  -EC                User Key  (r) = Recorded By, (t) = Taken By, (c) = Cosigned By      Initials Name Provider Type    EC Natasha Rodriguez, OTR/L  Occupational Therapist                  Timed Therapy Charges  Total Units: 1      Suggested Charges  Total Units: 1      Procedure Name Documented Minutes Units Code    HC OT THER PROC EA 15 MIN 17 1   76232 (CPT®)                 Documented Minutes  Total Minutes: 17      Therapy Provided Minutes    50611 - OT Therapeutic Exercise Minutes 17                  Therapy Charges for Today       Code Description Service Date Service Provider Modifiers Qty    [id number]  OT THER PROC EA 15 MIN 6/7/2024 Natasha Rodriguez OTR/L GO 1                 OT Discharge Summary  Anticipated Discharge Disposition (OT): inpatient rehabilitation facility  Reason for Discharge: Discharge from facility  Outcomes Achieved: Refer to plan of care for updates on goals achieved  Discharge Destination: Inpatient rehabilitation facility    HA Ashley  6/7/2024

## 2024-06-07 NOTE — PLAN OF CARE
Goal Outcome Evaluation:           Progress: improving     Pt being [facility] this afternoon.  Pt up with therapy.  Dressing removed this am by APRN, mepilex applied, c/d/I.  Safety maintained.

## 2024-06-07 NOTE — DISCHARGE SUMMARY
Date of Discharge:  6/7/2024    Discharge Diagnosis: Nonhealing surgical wound, initial encounter [T81.89XA]   Right below-knee amputation    Presenting Problem/History of Present Illness  Non-healing surgical wound, initial encounter [T81.89XA]  Preop testing [Z01.818]  Nonhealing surgical wound, initial encounter [T81.89XA]       Hospital Course  Patient is a 59 y.o. male who has had multiple ankle surgeries which failed to heal.  It was ultimately recommended that he undergo right below-knee amputation which she did without incident.  He has been working with physical and Occupational Therapy and overall doing well but did recommend further rehab and Worker's Comp. has approved to go to [facility] rehab.  He did have a COVID swab today that was negative.  He is pain is being controlled with Norco.  First night following surgery he did have a Dilaudid PCA and required Narcan.  He has had no further problems since switching to oral pain medication.  His hemoglobin did drop to 6.4 and he received 1 unit of PRBCs yesterday and repeat hemoglobin 7.6.  His creatinine also increased slightly but is down to 1.2 today.  He does take Lasix as needed for fluid accumulation.  Dressing was removed and incision is intact with staples.  There is some mild swelling to his stump with small fluid blisters.  I did replace with Mepilex dressing and finger prosthetics will place compression sleeve and bring a stump protector as well prior to discharge.  He will follow-up in my office in 1 month for staple removal.  Patient and his wife were present for discharge instructions.  Written instructions were also given.  This was discussed in full with complete understanding.    Procedures Performed  Procedure(s):  RIGHT AMPUTATION BELOW KNEE       Consults:   Consults       No orders found from 5/5/2024 to 6/4/2024.              Condition on Discharge: Stable    Discharge Medications     Discharge Medications        Changes to Medications         Instructions Start Date   furosemide 40 MG tablet  Commonly known as: LASIX  What changed: reasons to take this   40 mg, Oral, Daily PRN      lisinopril 20 MG tablet  Commonly known as: PRINULISSESZESTRIL  What changed: how much to take   10 mg, Oral, Every 24 Hours Scheduled             Continue These Medications        Instructions Start Date   apixaban 5 MG tablet tablet  Commonly known as: ELIQUIS   5 mg, Oral, 2 Times Daily      ascorbic acid 500 MG tablet  Commonly known as: VITAMIN C   500 mg, Oral, Daily      HYDROcodone-acetaminophen  MG per tablet  Commonly known as: NORCO   1 tablet, Oral, Every 8 Hours PRN      metFORMIN 1000 MG tablet  Commonly known as: GLUCOPHAGE   1,000 mg, Oral, 2 Times Daily With Meals      naloxone 4 MG/0.1ML nasal spray  Commonly known as: NARCAN   1 spray, Nasal, As Needed, Call 911. Don't prime. [place] in 1 nostril for overdose. Repeat in 2-3 minutes in other nostril if no or minimal breathing/responsiveness.      pregabalin 100 MG capsule  Commonly known as: LYRICA   100 mg, Oral, 3 Times Daily      zinc sulfate 220 (50 Zn) MG capsule  Commonly known as: ZINCATE   220 mg, Oral, Daily             Stop These Medications      cefdinir 300 MG capsule  Commonly known as: OMNICEF     doxycycline 100 MG capsule  Commonly known as: VIBRAMYCIN              Discharge Diet:   Diet Instructions       Diet: Diabetic Diets; Consistent Carbohydrate; Regular (IDDSI 7); Thin (IDDSI 0)      Discharge Diet: Diabetic Diets    Diabetic Diet: Consistent Carbohydrate    Texture: Regular (IDDSI 7)    Fluid Consistency: Thin (IDDSI 0)            Activity at Discharge:   Activity Instructions       Bathing Restrictions      Type of Restriction: Bathing    Bathing Restrictions: Other    Explain Bathing Restrictions: may shower    Other Activity Restrictions      Type of Restriction: Other    Explain Other Restrictions: per therapy            Follow-up Appointments  Future Appointments   Date  Time Provider Department Center   6/21/2024  9:00 AM Violetta Fritz APRN [facility] CD  [facility]   6/27/2024  9:00 AM  [facility] - MD  [facility] None   7/3/2024  9:00 AM Deborah Monk APRN [facility] VS [facility]   7/15/2024  9:00 AM  [facility]  [facility]   7/15/2024  9:30 AM Dotty Cruz APRN [facility] ONC [facility]   10/11/2024  9:30 AM Melanie Arauz MD [facility] CD [facility]     Additional Instructions for the Follow-ups that You Need to Schedule       Discharge Follow-up with Specialty: Deborah ZAVALETA; 1 Month   As directed      Specialty: Deborah ZAVALETA   Follow Up: 1 Month   Follow Up Details: post op left bka                 I did spend more than 30 minutes reviewing the chart, face to face encounter, and organizing discharge.    MEGHANN Baltazar  06/07/24  12:06 CDT

## 2024-06-07 NOTE — THERAPY TREATMENT NOTE
Acute Care - Physical Therapy Treatment Note  [facility]     Patient Name: Garrett Rodriguez  : 1965  MRN: [id number]  Today's Date: 2024      Visit Dx:     ICD-10-CM ICD-9-CM   1. Non-healing surgical wound, initial encounter  T81.89XA 998.83   2. Preop testing  Z01.818 V72.84   3. Encounter for monitoring antiplatelet therapy  Z51.81 V58.83    Z79.02 V58.63     Patient Active Problem List   Diagnosis    Type 2 diabetes mellitus with hyperglycemia, without long-term current use of insulin    HTN (hypertension)    Retained orthopedic hardware    Anasarca    Iron deficiency anemia    PAF (paroxysmal atrial fibrillation) new onset    Scrotal edema    Gait instability    Pulmonary HTN    Surgical wound, non healing    TARSHA (acute kidney injury)    Bleeding from wound    Diarrhea of presumed infectious origin    Preop testing    Nonhealing surgical wound, initial encounter     Past Medical History:   Diagnosis Date    Atrial fibrillation 3/11/2024    Chronic pain in right foot     Diabetes mellitus     History of transfusion     Hyperlipidemia 2024    Hypertension     Open wound of right foot 2024    PAF (paroxysmal atrial fibrillation)     PFO (patent foramen ovale)     RP at age 6     Past Surgical History:   Procedure Laterality Date    ANKLE FUSION Right 2023    Procedure: ANKLE ARTHRODESIS;  Surgeon: Shahbaz Reddy DPM;  Location:  [facility] OR;  Service: Podiatry;  Laterality: Right;    BACK SURGERY      BELOW KNEE AMPUTATION Right 6/3/2024    Procedure: RIGHT AMPUTATION BELOW KNEE;  Surgeon: Ricardo Hawk DO;  Location:  [facility] HYBRID OR;  Service: Vascular;  Laterality: Right;    CARDIAC SURGERY      EXTERNAL FIXATION ANKLE FRACTURE Right 2023    Procedure: POSSIBLE EXTERNAL FIXATION, RIGHT ANKLE;  Surgeon: Shahbaz Reddy DPM;  Location:  [facility] OR;  Service: Podiatry;  Laterality: Right;    HARDWARE REMOVAL Right 2023    Procedure: REMOVAL OF HARDWARE, DEEP;  ANKLE ARTHRODESIS; SUBTALAR ARTHRODESIS; POSSIBLE EXTERNAL FIXATION, RIGHT ANKLE;  Surgeon: Shahbaz Reddy DPM;  Location:  [facility] OR;  Service: Podiatry;  Laterality: Right;    PATENT FORAMEN OVALE CLOSURE      ROTATOR CUFF REPAIR Right     SUBTALAR ARTHRODESIS Right 07/11/2023    Procedure: SUBTALAR ARTHRODESIS;  Surgeon: Shahbaz Reddy DPM;  Location:  [facility] OR;  Service: Podiatry;  Laterality: Right;     PT Assessment (Last 12 Hours)       PT Evaluation and Treatment       Row Name 06/07/24 1130          Physical Therapy Time and Intention    Subjective Information no complaints  -KJ     Document Type therapy note (daily note)  -KJ     Mode of Treatment physical therapy  -KJ     Patient Effort good  -KJ       Row Name 06/07/24 1130          General Information    Existing Precautions/Restrictions fall  R BKA  -KJ       Row Name 06/07/24 1130          Pain    Pretreatment Pain Rating 7/10  -KJ     Posttreatment Pain Rating 7/10  -KJ     Pain Location - Side/Orientation Right  -KJ     Pain Location lower  -KJ     Pain Location - residual limb  -KJ       Row Name 06/07/24 1130          Bed Mobility    Supine-Sit Rush (Bed Mobility) modified independence  -KJ     Sit-Supine Rush (Bed Mobility) modified independence  -KJ       Row Name 06/07/24 1130          Sit-Stand Transfer    Sit-Stand Rush (Transfers) minimum assist (75% patient effort);contact guard  -KJ       Row Name 06/07/24 1130          Stand-Sit Transfer    Stand-Sit Rush (Transfers) contact guard  -KJ       Row Name 06/07/24 1130          Gait/Stairs (Locomotion)    Rush Level (Gait) contact guard;minimum assist (75% patient effort)  -KJ     Assistive Device (Gait) walker, front-wheeled  -KJ     Distance in Feet (Gait) 10  -KJ       Row Name 06/07/24 1130          Motor Skills    Comments, Therapeutic Exercise AROM BLE  -KJ       Row Name             Residual Limb Assessment 06/06/24 2248 transtibial (below  knee), right    Residual Limb Assessment - Properties Group Placement Date: 06/06/24  - Placement Time: 2248 -MK Amputation Date: 06/03/24  - Location: transtibial (below knee), right  -MK    Retired Residual Limb Assessment - Properties Group Placement Date: 06/06/24  - Placement Time: 2248 -MK Amputation Date: 06/03/24  - Location: transtibial (below knee), right  -MK    Retired Residual Limb Assessment - Properties Group Date first assessed: 06/06/24  - Time first assessed: 2248 -MK Amputation Date: 06/03/24  - Location: transtibial (below knee), right  -MK      Row Name             Wound 06/03/24 1625 Right proximal leg Amputation stump    Wound - Properties Group Placement Date: 06/03/24  -AC Placement Time: 1625  -AC Present on Original Admission: N  -AC Side: Right  -AC Orientation: proximal  -AC Location: leg  -AC Primary Wound Type: Amputation s  -AC Additional Comments: xeroform fluffs kerlix ace wrap knee immobilizer  -AC    Retired Wound - Properties Group Placement Date: 06/03/24  -AC Placement Time: 1625  -AC Present on Original Admission: N  -AC Side: Right  -AC Orientation: proximal  -AC Location: leg  -AC Primary Wound Type: Amputation s  -AC Additional Comments: xeroform fluffs kerlix ace wrap knee immobilizer  -AC    Retired Wound - Properties Group Date first assessed: 06/03/24  - Time first assessed: 1625  -AC Present on Original Admission: N  -AC Side: Right  -AC Location: leg  -AC Primary Wound Type: Amputation s  -AC Additional Comments: xeroform fluffs kerlix ace wrap knee immobilizer  -AC      Row Name 06/07/24 1130          Positioning and Restraints    Pre-Treatment Position in bed  -KJ     Post Treatment Position bed  -KJ     In Bed call light within reach  -KJ               User Key  (r) = Recorded By, (t) = Taken By, (c) = Cosigned By      Initials Name Provider Type    Vernell Adan, PTA Physical Therapist Assistant    AC Keily Bradford RN Registered Nurse     Jaclyn Pires, RN Registered Nurse                    Physical Therapy Education       Title: PT OT SLP Therapies (In Progress)       Topic: Physical Therapy (In Progress)       Point: Mobility training (Done)       Learning Progress Summary             Patient Acceptance, E, VU by KR at 6/4/2024 0927   Family Acceptance, E, VU by KR at 6/4/2024 0927                         Point: Home exercise program (Not Started)       Learner Progress:  Not documented in this visit.              Point: Body mechanics (Done)       Learning Progress Summary             Patient Acceptance, E, VU by KR at 6/4/2024 0927   Family Acceptance, E, VU by KR at 6/4/2024 0927                         Point: Precautions (Done)       Learning Progress Summary             Patient Acceptance, E, VU by KR at 6/4/2024 0927   Family Acceptance, E, VU by KR at 6/4/2024 0927                                         User Key       Initials Effective Dates Name Provider Type Discipline     06/03/24 -  Lacey Cosme, PT DPT Physical Therapist PT                  PT Recommendation and Plan         Outcome Measures       Row Name 06/07/24 1000 06/06/24 0805 06/05/24 1500       How much help from another person do you currently need...    Turning from your back to your side while in flat bed without using bedrails? -- 3  -WK --    Moving from lying on back to sitting on the side of a flat bed without bedrails? -- 3  -WK --    Moving to and from a bed to a chair (including a wheelchair)? -- 3  -WK --    Standing up from a chair using your arms (e.g., wheelchair, bedside chair)? -- 3  -WK --    Climbing 3-5 steps with a railing? -- 2  -WK --    To walk in hospital room? -- 3  -WK --    AM-PAC 6 Clicks Score (PT) -- 17  -WK --    Highest Level of Mobility Goal -- 5 --> Static standing  -WK --       How much help from another is currently needed...    Putting on and taking off regular lower body clothing? 3  -EC -- 3  -TS    Bathing (including  washing, rinsing, and drying) 3  -EC -- 3  -TS    Toileting (which includes using toilet bed pan or urinal) 3  -EC -- 3  -TS    Putting on and taking off regular upper body clothing 4  -EC -- 4  -TS    Taking care of personal grooming (such as brushing teeth) 4  -EC -- 4  -TS    Eating meals 4  -EC -- 4  -TS    AM-PAC 6 Clicks Score (OT) 21  -EC -- 21  -TS       Functional Assessment    Outcome Measure Options -- AM-PAC 6 Clicks Basic Mobility (PT)  -WK --      Row Name 06/05/24 0903             How much help from another person do you currently need...    Turning from your back to your side while in flat bed without using bedrails? 3  -WK      Moving from lying on back to sitting on the side of a flat bed without bedrails? 3  -WK      Moving to and from a bed to a chair (including a wheelchair)? 3  -WK      Standing up from a chair using your arms (e.g., wheelchair, bedside chair)? 3  -WK      Climbing 3-5 steps with a railing? 2  -WK      To walk in hospital room? 3  -WK      AM-PAC 6 Clicks Score (PT) 17  -WK      Highest Level of Mobility Goal 5 --> Static standing  -WK         Functional Assessment    Outcome Measure Options AM-PAC 6 Clicks Basic Mobility (PT)  -WK                User Key  (r) = Recorded By, (t) = Taken By, (c) = Cosigned By      Initials Name Provider Type    TS Tesha Santiago COTA Occupational Therapist Assistant    WK Krystina Matt, RICH Physical Therapist Assistant    EC Natasha Rodriguez, OTR/L Occupational Therapist                     Time Calculation:    PT Charges       Row Name 06/07/24 1410             Time Calculation    Start Time 1130  -KJ      Stop Time 1200  -KJ      Time Calculation (min) 30 min  -KJ      PT Received On 06/07/24  -KJ      PT Goal Re-Cert Due Date 06/14/24  -KJ         Time Calculation- PT    Total Timed Code Minutes- PT 30 minute(s)  -KJ                User Key  (r) = Recorded By, (t) = Taken By, (c) = Cosigned By      Initials Name Provider Type    KJ  Vernell Rothman, RICH Physical Therapist Assistant                  Therapy Charges for Today       Code Description Service Date Service Provider Modifiers Qty    96687070193  PT THER PROC EA 15 MIN 6/7/2024 Vernell Rothman, RICH GP 1    90670623573 HC PT THERAPEUTIC ACT EA 15 MIN 6/7/2024 Vernell Rothman, RICH GP 1            PT G-Codes  Outcome Measure Options: AM-PAC 6 Clicks Basic Mobility (PT)  AM-PAC 6 Clicks Score (PT): 17  AM-PAC 6 Clicks Score (OT): 21    Vernell Rothman PTA  6/7/2024

## 2024-06-07 NOTE — PLAN OF CARE
Goal Outcome Evaluation:  Plan of Care Reviewed With: patient, spouse        Progress: improving  Outcome Evaluation: OT tx completed. Pt agreeable to strengthening exs as he feels his UE is weak & he needs to strengthen them for transfers. Pt performs bed mob with Junior today. Performed 2 x 10 reps various BUE strengthening in all planes with good effort noted. Discussed POC & pt is hopeful to d/c to inpatient rehab today      Anticipated Discharge Disposition (OT): inpatient rehabilitation facility

## 2024-06-08 PROBLEM — N28.9 RENAL DYSFUNCTION: Status: ACTIVE | Noted: 2024-06-08

## 2024-06-08 PROBLEM — I50.9 CHF (CONGESTIVE HEART FAILURE) (HCC): Status: ACTIVE | Noted: 2024-06-08

## 2024-06-08 PROBLEM — I48.91 ATRIAL FIBRILLATION (HCC): Status: ACTIVE | Noted: 2024-06-08

## 2024-06-08 PROBLEM — D64.9 ANEMIA: Status: ACTIVE | Noted: 2024-06-08

## 2024-06-08 LAB
ALBUMIN SERPL-MCNC: 2.4 G/DL (ref 3.5–5.2)
ALP SERPL-CCNC: 336 U/L (ref 40–130)
ALT SERPL-CCNC: 10 U/L (ref 5–41)
ANION GAP SERPL CALCULATED.3IONS-SCNC: 9 MMOL/L (ref 7–19)
AST SERPL-CCNC: 12 U/L (ref 5–40)
BILIRUB SERPL-MCNC: 0.3 MG/DL (ref 0.2–1.2)
BUN SERPL-MCNC: 43 MG/DL (ref 6–20)
CALCIUM SERPL-MCNC: 7.1 MG/DL (ref 8.6–10)
CHLORIDE SERPL-SCNC: 107 MMOL/L (ref 98–111)
CO2 SERPL-SCNC: 25 MMOL/L (ref 22–29)
CREAT SERPL-MCNC: 1.2 MG/DL (ref 0.5–1.2)
ERYTHROCYTE [DISTWIDTH] IN BLOOD BY AUTOMATED COUNT: 15.3 % (ref 11.5–14.5)
GLUCOSE BLD-MCNC: 110 MG/DL (ref 70–99)
GLUCOSE BLD-MCNC: 153 MG/DL (ref 70–99)
GLUCOSE BLD-MCNC: 156 MG/DL (ref 70–99)
GLUCOSE BLD-MCNC: 212 MG/DL (ref 70–99)
GLUCOSE SERPL-MCNC: 171 MG/DL (ref 74–109)
HCT VFR BLD AUTO: 23.5 % (ref 42–52)
HGB BLD-MCNC: 7.4 G/DL (ref 14–18)
MCH RBC QN AUTO: 27.8 PG (ref 27–31)
MCHC RBC AUTO-ENTMCNC: 31.5 G/DL (ref 33–37)
MCV RBC AUTO: 88.3 FL (ref 80–94)
PERFORMED ON: ABNORMAL
PLATELET # BLD AUTO: 181 K/UL (ref 130–400)
PMV BLD AUTO: 10.1 FL (ref 9.4–12.4)
POTASSIUM SERPL-SCNC: 4 MMOL/L (ref 3.5–5)
PREALB SERPL-MCNC: 12 MG/DL (ref 20–40)
PROT SERPL-MCNC: 6.1 G/DL (ref 6.6–8.7)
RBC # BLD AUTO: 2.66 M/UL (ref 4.7–6.1)
SODIUM SERPL-SCNC: 141 MMOL/L (ref 136–145)
WBC # BLD AUTO: 6.5 K/UL (ref 4.8–10.8)

## 2024-06-08 PROCEDURE — 84134 ASSAY OF PREALBUMIN: CPT

## 2024-06-08 PROCEDURE — 94760 N-INVAS EAR/PLS OXIMETRY 1: CPT

## 2024-06-08 PROCEDURE — 97530 THERAPEUTIC ACTIVITIES: CPT

## 2024-06-08 PROCEDURE — 97535 SELF CARE MNGMENT TRAINING: CPT

## 2024-06-08 PROCEDURE — 97116 GAIT TRAINING THERAPY: CPT

## 2024-06-08 PROCEDURE — 85027 COMPLETE CBC AUTOMATED: CPT

## 2024-06-08 PROCEDURE — 1180000000 HC REHAB R&B

## 2024-06-08 PROCEDURE — 97165 OT EVAL LOW COMPLEX 30 MIN: CPT

## 2024-06-08 PROCEDURE — 97161 PT EVAL LOW COMPLEX 20 MIN: CPT

## 2024-06-08 PROCEDURE — 6370000000 HC RX 637 (ALT 250 FOR IP): Performed by: PSYCHIATRY & NEUROLOGY

## 2024-06-08 PROCEDURE — 80053 COMPREHEN METABOLIC PANEL: CPT

## 2024-06-08 PROCEDURE — 99222 1ST HOSP IP/OBS MODERATE 55: CPT | Performed by: PSYCHIATRY & NEUROLOGY

## 2024-06-08 PROCEDURE — 97110 THERAPEUTIC EXERCISES: CPT

## 2024-06-08 PROCEDURE — 82962 GLUCOSE BLOOD TEST: CPT

## 2024-06-08 PROCEDURE — 36415 COLL VENOUS BLD VENIPUNCTURE: CPT

## 2024-06-08 RX ORDER — HYDROCODONE BITARTRATE AND ACETAMINOPHEN 10; 325 MG/1; MG/1
1 TABLET ORAL EVERY 4 HOURS PRN
Status: DISCONTINUED | OUTPATIENT
Start: 2024-06-08 | End: 2024-06-18 | Stop reason: HOSPADM

## 2024-06-08 RX ADMIN — HYDROCODONE BITARTRATE AND ACETAMINOPHEN 1 TABLET: 10; 325 TABLET ORAL at 13:39

## 2024-06-08 RX ADMIN — ZINC SULFATE 220 MG (50 MG) CAPSULE 50 MG: CAPSULE at 08:16

## 2024-06-08 RX ADMIN — PREGABALIN 100 MG: 50 CAPSULE ORAL at 08:16

## 2024-06-08 RX ADMIN — METFORMIN HYDROCHLORIDE 1000 MG: 500 TABLET ORAL at 08:16

## 2024-06-08 RX ADMIN — PREGABALIN 100 MG: 50 CAPSULE ORAL at 13:39

## 2024-06-08 RX ADMIN — HYDROCODONE BITARTRATE AND ACETAMINOPHEN 1 TABLET: 10; 325 TABLET ORAL at 09:26

## 2024-06-08 RX ADMIN — LISINOPRIL 10 MG: 10 TABLET ORAL at 08:16

## 2024-06-08 RX ADMIN — PREGABALIN 100 MG: 50 CAPSULE ORAL at 21:20

## 2024-06-08 RX ADMIN — APIXABAN 5 MG: 5 TABLET, FILM COATED ORAL at 08:16

## 2024-06-08 RX ADMIN — Medication 500 MG: at 08:16

## 2024-06-08 RX ADMIN — HYDROCODONE BITARTRATE AND ACETAMINOPHEN 1 TABLET: 10; 325 TABLET ORAL at 17:43

## 2024-06-08 RX ADMIN — HYDROCODONE BITARTRATE AND ACETAMINOPHEN 1 TABLET: 10; 325 TABLET ORAL at 05:27

## 2024-06-08 RX ADMIN — APIXABAN 5 MG: 5 TABLET, FILM COATED ORAL at 21:19

## 2024-06-08 RX ADMIN — HYDROCODONE BITARTRATE AND ACETAMINOPHEN 1 TABLET: 10; 325 TABLET ORAL at 21:46

## 2024-06-08 RX ADMIN — METFORMIN HYDROCHLORIDE 1000 MG: 500 TABLET ORAL at 16:43

## 2024-06-08 ASSESSMENT — PAIN SCALES - GENERAL
PAINLEVEL_OUTOF10: 5
PAINLEVEL_OUTOF10: 8
PAINLEVEL_OUTOF10: 8
PAINLEVEL_OUTOF10: 9
PAINLEVEL_OUTOF10: 10
PAINLEVEL_OUTOF10: 7
PAINLEVEL_OUTOF10: 6

## 2024-06-08 ASSESSMENT — PAIN DESCRIPTION - LOCATION
LOCATION: LEG
LOCATION: LEG;INCISION
LOCATION: LEG

## 2024-06-08 ASSESSMENT — PAIN - FUNCTIONAL ASSESSMENT
PAIN_FUNCTIONAL_ASSESSMENT: PREVENTS OR INTERFERES SOME ACTIVE ACTIVITIES AND ADLS
PAIN_FUNCTIONAL_ASSESSMENT: ACTIVITIES ARE NOT PREVENTED

## 2024-06-08 ASSESSMENT — PAIN DESCRIPTION - ORIENTATION
ORIENTATION: RIGHT

## 2024-06-08 ASSESSMENT — PAIN DESCRIPTION - DESCRIPTORS
DESCRIPTORS: STABBING;TIGHTNESS
DESCRIPTORS: THROBBING
DESCRIPTORS: THROBBING;NUMBNESS
DESCRIPTORS: THROBBING

## 2024-06-08 NOTE — THERAPY DISCHARGE NOTE
Acute Care - Physical Therapy Discharge Summary  [facility]       Patient Name: Garrett Rodriguez  : 1965  MRN: [id number]    Today's Date: 2024                 Admit Date: 6/3/2024      PT Recommendation and Plan    Visit Dx:    ICD-10-CM ICD-9-CM   1. Non-healing surgical wound, initial encounter  T81.89XA 998.83   2. Preop testing  Z01.818 V72.84   3. Encounter for monitoring antiplatelet therapy  Z51.81 V58.83    Z79.02 V58.63        Outcome Measures       Row Name 24 1000 24 0805 24 1500       How much help from another person do you currently need...    Turning from your back to your side while in flat bed without using bedrails? -- 3  -WK --    Moving from lying on back to sitting on the side of a flat bed without bedrails? -- 3  -WK --    Moving to and from a bed to a chair (including a wheelchair)? -- 3  -WK --    Standing up from a chair using your arms (e.g., wheelchair, bedside chair)? -- 3  -WK --    Climbing 3-5 steps with a railing? -- 2  -WK --    To walk in hospital room? -- 3  -WK --    AM-PAC 6 Clicks Score (PT) -- 17  -WK --    Highest Level of Mobility Goal -- 5 --> Static standing  -WK --       How much help from another is currently needed...    Putting on and taking off regular lower body clothing? 3  -EC -- 3  -TS    Bathing (including washing, rinsing, and drying) 3  -EC -- 3  -TS    Toileting (which includes using toilet bed pan or urinal) 3  -EC -- 3  -TS    Putting on and taking off regular upper body clothing 4  -EC -- 4  -TS    Taking care of personal grooming (such as brushing teeth) 4  -EC -- 4  -TS    Eating meals 4  -EC -- 4  -TS    AM-PAC 6 Clicks Score (OT) 21  -EC -- 21  -TS       Functional Assessment    Outcome Measure Options -- AM-PAC 6 Clicks Basic Mobility (PT)  -WK --      Row Name 24 0903             How much help from another person do you currently need...    Turning from your back to your side while in flat bed without using  bedrails? 3  -WK      Moving from lying on back to sitting on the side of a flat bed without bedrails? 3  -WK      Moving to and from a bed to a chair (including a wheelchair)? 3  -WK      Standing up from a chair using your arms (e.g., wheelchair, bedside chair)? 3  -WK      Climbing 3-5 steps with a railing? 2  -WK      To walk in hospital room? 3  -WK      AM-PAC 6 Clicks Score (PT) 17  -WK      Highest Level of Mobility Goal 5 --> Static standing  -WK         Functional Assessment    Outcome Measure Options AM-PAC 6 Clicks Basic Mobility (PT)  -WK                User Key  (r) = Recorded By, (t) = Taken By, (c) = Cosigned By      Initials Name Provider Type    Tesha Cardenas COTA Occupational Therapist Assistant    WK Krystina Matt, RICH Physical Therapist Assistant    EC Natasha Rodriguez, OTR/L Occupational Therapist                         PT Rehab Goals       Row Name 06/08/24 0700             Bed Mobility Goal 1 (PT)    Activity/Assistive Device (Bed Mobility Goal 1, PT) bed mobility activities, all  -AB      [place] Level/Cues Needed (Bed Mobility Goal 1, PT) independent  -AB      Time Frame (Bed Mobility Goal 1, PT) by discharge  -AB      Progress/Outcomes (Bed Mobility Goal 1, PT) goal not met  -AB         Transfer Goal 1 (PT)    Activity/Assistive Device (Transfer Goal 1, PT) transfers, all  -AB      [place] Level/Cues Needed (Transfer Goal 1, PT) modified independence  -AB      Time Frame (Transfer Goal 1, PT) by discharge  -AB      Progress/Outcome (Transfer Goal 1, PT) goal not met  -AB         Gait Training Goal 1 (PT)    Activity/Assistive Device (Gait Training Goal 1, PT) gait (walking locomotion)  -AB      [place] Level (Gait Training Goal 1, PT) modified independence  -AB      Distance (Gait Training Goal 1, PT) 25  -AB      Time Frame (Gait Training Goal 1, PT) by discharge  -AB      Progress/Outcome (Gait Training Goal 1, PT) goal not met  -AB         Problem Specific  Goal 1 (PT)    Problem Specific Goal 1 (PT) Pt will self propel w/c 200 feet with SBA.  -AB      Time Frame (Problem Specific Goal 1, PT) by discharge  -AB      Progress/Outcome (Problem Specific Goal 1, PT) goal not met  -AB                User Key  (r) = Recorded By, (t) = Taken By, (c) = Cosigned By      Initials Name Provider Type Discipline    Georgiana Delgado, PTA Physical Therapist Assistant PT                        PT Discharge Summary  Anticipated Discharge Disposition (PT): home with assist, home with outpatient therapy services  Reason for Discharge: Discharge from facility  Outcomes Achieved: Refer to plan of care for updates on goals achieved  Discharge Destination: Inpatient rehabilitation facility      Georgiana Dover PTA   6/8/2024

## 2024-06-08 NOTE — THERAPY EVALUATION
TO  Distance: 75 FT  WHEELCHAIR PROPULSION 2       GOALS:  Short Term Goals  Time Frame for Short Term Goals: 1 WEEK  Short Term Goal 1: BED MOBILITY SBA WITH RAILS  Short Term Goal 2: TF SURFACE TO SURFACE CGA  Short Term Goal 3: AMBULATE 10 FT WITH RW MOD A  Short Term Goal 4: PROPEL  FT SBA  Short Term Goal 5: CAR TF MOD A WITH OR WITHOUT A.D.    Long Term Goals  Time Frame for Long Term Goals : 2-3 WEEKS  Long Term Goal 1: INDEP BED MOB WITHOUT RAILS  Long Term Goal 2: TF SURFACE TO SURFACE INDEP WITHOUT BED RAILS  Long Term Goal 3: WUHPHCPY12 FT WITH RW MIN A  Long Term Goal 4: PROPEL 150 FT INDEP  Long Term Goal 5: CAR TF WITH A.D. MIN A  HOME LIVING:     Type of Home: House  Home Layout: One level  Home Access: Ramped entrance        ASSESSMENT (IMPAIRMENTS/BARRIERS):  Assessment  Assessment: PATIENT PRESENTS WITH SIGNS A ND SYMPTOMS CONSISTENT WITH RECENT RIGHT BKA.  PATIENT WILL BENEFIT FROM SKILLED TO IMPROVE STRENGTH, ENDURANCE, BALANCE TO ALL FOR INDEPENDENT RETURN TO HOME WITH SPOUSE.  Performance Deficits/Impairments: Decreased functional mobility , Decreased ADL status, Decreased ROM, Decreased body mechanics, Decreased strength, Decreased safe awareness, Decreased endurance, Decreased sensation, Decreased balance, Decreased posture, Increased pain  Treatment Diagnosis: INTERFERENCE WITH ACTIVIYT  Therapy Prognosis: Good  Decision Making: Low Complexity  History: RIGHT BKA  Exam: IMPAIRED STRENGTH, BALANCE, ENDURANCE, ROM, PAIN  Clinical Presentation: STABLE  Discharge Recommendations: Continue to assess pending progress    PLAN:  Physical Therapy Plan  General Plan:  minutes of therapy at least 5 out of 7 days a week  Therapy Duration: 3 Weeks  Current Treatment Recommendations: Strengthening, ROM, Balance training, Functional mobility training, Transfer training, Endurance training, Wheelchair mobility training, Gait training, Return to work related activity, Home exercise program,

## 2024-06-08 NOTE — PAYOR COMM NOTE
"[place] 6-7-24   [phone]    Garrett Coates (59 y.o. Male)       Date of Birth   1965    Social Security Number       Address   [address] [place] [id number]    Home Phone   [phone]    MRN   [id number]       Orthodoxy   Protestant    Marital Status                               Admission Date   6/3/24    Admission Type   Elective    Admitting Provider   Ricardo Hawk DO    Attending Provider       Department, Room/Bed   [facility] 3C, 361/1       Discharge Date   6/7/2024    Discharge Disposition   Rehab Facility or Unit (DC - External)    Discharge Destination                                 Attending Provider: (none)   Allergies: Morphine, Percocet [Oxycodone-acetaminophen]    Isolation: None   Infection: None   Code Status: Prior    Ht: 178 cm (70.08\")   Wt: 81.5 kg (179 lb 10.8 oz)    Admission Cmt: None   Principal Problem: Surgical wound, non healing [T81.89XA]                   Active Insurance as of 6/3/2024       Primary Coverage       Payor Plan Insurance Group Employer/Plan Group    WORKERS COMPENSATION MISC WORKERS COMPENSATION        Coverage Address Coverage Phone Number Coverage Fax Number Effective Dates    [address] [phone]  9/8/2021 - None Entered    [place] [address]         Subscriber Name Subscriber Birth Date Member ID       GARRETT COATES [birth year] [id number]               Secondary Coverage       Payor Plan Insurance Group Employer/Plan Group    [employer]TH [employer] HMO [id number]       Payor Plan Address Payor Plan Phone Number Payor Plan Fax Number Effective Dates    [address] [phone]  6/1/2022 - None Entered    [facility] [id number]         Subscriber Name Subscriber Birth Date Member ID       GARRETT COATES 1965 [id number]                     Emergency Contacts        (Rel.) Home Phone Work Phone Mobile Phone    Jennifer,Tammy (Spouse) -- -- [phone]               "   Discharge Summary        Deborah Monk APRN at 06/07/24 1206       Attestation signed by Ricardo Hawk DO at 06/07/24 1222    I have reviewed this documentation and agree.  The patient was seen/examined at bedside.  Agree with above evaluation.  [facility]  His right below-knee amputation site is clean/dry/intact.  There is no signs of infection.  There is some mild swelling of the stump with some small fluid blisters.  We placed a Mepilex dressing over the staple site.  [employer] [employer] will place a compression sleeve and stump protector.  I will see him back in 1 month's time for continued follow-up and care.                    Date of Discharge:  6/7/2024    Discharge Diagnosis: Nonhealing surgical wound, initial encounter [T81.89XA]   Right below-knee amputation    Presenting Problem/History of Present Illness  Non-healing surgical wound, initial encounter [T81.89XA]  Preop testing [Z01.818]  Nonhealing surgical wound, initial encounter [T81.89XA]       Hospital Course  Patient is a 59 y.o. male who has had multiple ankle surgeries which failed to heal.  It was ultimately recommended that he undergo right below-knee amputation which she did without incident.  He has been working with physical and Occupational Therapy and overall doing well but did recommend further rehab and Worker's Comp. has approved to go to [facility] rehab.  He did have a COVID swab today that was negative.  He is pain is being controlled with Norco.  First night following surgery he did have a Dilaudid PCA and required Narcan.  He has had no further problems since switching to oral pain medication.  His hemoglobin did drop to 6.4 and he received 1 unit of PRBCs yesterday and repeat hemoglobin 7.6.  His creatinine also increased slightly but is down to 1.2 today.  He does take Lasix as needed for fluid accumulation.  Dressing was removed and incision is intact with staples.  There is some  mild swelling to his stump with small fluid blisters.  I did replace with Mepilex dressing and finger prosthetics will place compression sleeve and bring a stump protector as well prior to discharge.  He will follow-up in my office in 1 month for staple removal.  Patient and his wife were present for discharge instructions.  Written instructions were also given.  This was discussed in full with complete understanding.    Procedures Performed  Procedure(s):  RIGHT AMPUTATION BELOW KNEE       Consults:   Consults       No orders found from 5/5/2024 to 6/4/2024.              Condition on Discharge: Stable    Discharge Medications     Discharge Medications        Changes to Medications        Instructions Start Date   furosemide 40 MG tablet  Commonly known as: LASIX  What changed: reasons to take this   40 mg, Oral, Daily PRN      lisinopril 20 MG tablet  Commonly known as: PRINIVILZESTRIL  What changed: how much to take   10 mg, Oral, Every 24 Hours Scheduled             Continue These Medications        Instructions Start Date   apixaban 5 MG tablet tablet  Commonly known as: ELIQUIS   5 mg, Oral, 2 Times Daily      ascorbic acid 500 MG tablet  Commonly known as: VITAMIN C   500 mg, Oral, Daily      HYDROcodone-acetaminophen  MG per tablet  Commonly known as: NORCO   1 tablet, Oral, Every 8 Hours PRN      metFORMIN 1000 MG tablet  Commonly known as: GLUCOPHAGE   1,000 mg, Oral, 2 Times Daily With Meals      naloxone 4 MG/0.1ML nasal spray  Commonly known as: NARCAN   1 spray, Nasal, As Needed, Call 911. Don't prime. [place] in 1 nostril for overdose. Repeat in 2-3 minutes in other nostril if no or minimal breathing/responsiveness.      pregabalin 100 MG capsule  Commonly known as: LYRICA   100 mg, Oral, 3 Times Daily      zinc sulfate 220 (50 Zn) MG capsule  Commonly known as: ZINCATE   220 mg, Oral, Daily             Stop These Medications      cefdinir 300 MG capsule  Commonly known as: OMNICEF     doxycycline  100 MG capsule  Commonly known as: VIBRAMYCIN              Discharge Diet:   Diet Instructions       Diet: Diabetic Diets; Consistent Carbohydrate; Regular (IDDSI 7); Thin (IDDSI 0)      Discharge Diet: Diabetic Diets    Diabetic Diet: Consistent Carbohydrate    Texture: Regular (IDDSI 7)    Fluid Consistency: Thin (IDDSI 0)            Activity at Discharge:   Activity Instructions       Bathing Restrictions      Type of Restriction: Bathing    Bathing Restrictions: Other    Explain Bathing Restrictions: may shower    Other Activity Restrictions      Type of Restriction: Other    Explain Other Restrictions: per therapy            Follow-up Appointments  Future Appointments   Date Time Provider Department Center   6/21/2024  9:00 AM Violetta Fritz APRN [facility] CD  PAD   6/27/2024  9:00 AM  [facility] - MD  PAD HFC None   7/3/2024  9:00 AM Deborah Monk APRN [facility] VS PAD PAD   7/15/2024  9:00 AM  [facility] CANCER CTR LAB  PAD CCLAB PAD   7/15/2024  9:30 AM Dotty Cruz APRN [facility] ONC PAD PAD   10/11/2024  9:30 AM Melanie Arauz MD [facility] CD PAD PAD     Additional Instructions for the Follow-ups that You Need to Schedule       Discharge Follow-up with Specialty: Deborah ZAVALETA; 1 Month   As directed      Specialty: Deborah ZAVALETA   Follow Up: 1 Month   Follow Up Details: post op left bka                 I did spend more than 30 minutes reviewing the chart, face to face encounter, and organizing discharge.    MEGHANN Baltazar  06/07/24  12:06 CDT        Electronically signed by Ricardo Hawk DO at 06/07/24 1281

## 2024-06-09 LAB
GLUCOSE BLD-MCNC: 120 MG/DL (ref 70–99)
GLUCOSE BLD-MCNC: 163 MG/DL (ref 70–99)
GLUCOSE BLD-MCNC: 169 MG/DL (ref 70–99)
GLUCOSE BLD-MCNC: 179 MG/DL (ref 70–99)
PERFORMED ON: ABNORMAL

## 2024-06-09 PROCEDURE — 6370000000 HC RX 637 (ALT 250 FOR IP): Performed by: PSYCHIATRY & NEUROLOGY

## 2024-06-09 PROCEDURE — 82962 GLUCOSE BLOOD TEST: CPT

## 2024-06-09 PROCEDURE — 1180000000 HC REHAB R&B

## 2024-06-09 PROCEDURE — 99232 SBSQ HOSP IP/OBS MODERATE 35: CPT | Performed by: PSYCHIATRY & NEUROLOGY

## 2024-06-09 PROCEDURE — 94760 N-INVAS EAR/PLS OXIMETRY 1: CPT

## 2024-06-09 RX ADMIN — APIXABAN 5 MG: 5 TABLET, FILM COATED ORAL at 21:21

## 2024-06-09 RX ADMIN — METFORMIN HYDROCHLORIDE 1000 MG: 500 TABLET ORAL at 08:11

## 2024-06-09 RX ADMIN — HYDROCODONE BITARTRATE AND ACETAMINOPHEN 1 TABLET: 10; 325 TABLET ORAL at 01:58

## 2024-06-09 RX ADMIN — METFORMIN HYDROCHLORIDE 1000 MG: 500 TABLET ORAL at 16:52

## 2024-06-09 RX ADMIN — APIXABAN 5 MG: 5 TABLET, FILM COATED ORAL at 08:11

## 2024-06-09 RX ADMIN — PREGABALIN 100 MG: 50 CAPSULE ORAL at 16:52

## 2024-06-09 RX ADMIN — HYDROCODONE BITARTRATE AND ACETAMINOPHEN 1 TABLET: 10; 325 TABLET ORAL at 08:11

## 2024-06-09 RX ADMIN — HYDROCODONE BITARTRATE AND ACETAMINOPHEN 1 TABLET: 10; 325 TABLET ORAL at 21:25

## 2024-06-09 RX ADMIN — Medication 500 MG: at 08:11

## 2024-06-09 RX ADMIN — LISINOPRIL 10 MG: 10 TABLET ORAL at 08:11

## 2024-06-09 RX ADMIN — ZINC SULFATE 220 MG (50 MG) CAPSULE 50 MG: CAPSULE at 08:11

## 2024-06-09 RX ADMIN — PREGABALIN 100 MG: 50 CAPSULE ORAL at 21:21

## 2024-06-09 RX ADMIN — PREGABALIN 100 MG: 50 CAPSULE ORAL at 08:11

## 2024-06-09 RX ADMIN — HYDROCODONE BITARTRATE AND ACETAMINOPHEN 1 TABLET: 10; 325 TABLET ORAL at 11:58

## 2024-06-09 RX ADMIN — HYDROCODONE BITARTRATE AND ACETAMINOPHEN 1 TABLET: 10; 325 TABLET ORAL at 16:52

## 2024-06-09 ASSESSMENT — PAIN SCALES - GENERAL
PAINLEVEL_OUTOF10: 7
PAINLEVEL_OUTOF10: 4
PAINLEVEL_OUTOF10: 3
PAINLEVEL_OUTOF10: 3
PAINLEVEL_OUTOF10: 5
PAINLEVEL_OUTOF10: 8
PAINLEVEL_OUTOF10: 7
PAINLEVEL_OUTOF10: 9
PAINLEVEL_OUTOF10: 9

## 2024-06-09 ASSESSMENT — PAIN DESCRIPTION - LOCATION
LOCATION: LEG;KNEE
LOCATION: LEG
LOCATION: LEG;INCISION

## 2024-06-09 ASSESSMENT — PAIN DESCRIPTION - ORIENTATION
ORIENTATION: RIGHT

## 2024-06-09 ASSESSMENT — PAIN DESCRIPTION - DESCRIPTORS
DESCRIPTORS: ACHING;DISCOMFORT
DESCRIPTORS: ACHING;THROBBING
DESCRIPTORS: THROBBING
DESCRIPTORS: THROBBING;ACHING
DESCRIPTORS: THROBBING;DISCOMFORT

## 2024-06-09 ASSESSMENT — PAIN - FUNCTIONAL ASSESSMENT
PAIN_FUNCTIONAL_ASSESSMENT: ACTIVITIES ARE NOT PREVENTED
PAIN_FUNCTIONAL_ASSESSMENT: PREVENTS OR INTERFERES SOME ACTIVE ACTIVITIES AND ADLS

## 2024-06-10 LAB
ALBUMIN SERPL-MCNC: 2.7 G/DL (ref 3.5–5.2)
ALP SERPL-CCNC: 301 U/L (ref 40–130)
ALT SERPL-CCNC: 9 U/L (ref 5–41)
ANION GAP SERPL CALCULATED.3IONS-SCNC: 8 MMOL/L (ref 7–19)
AST SERPL-CCNC: 11 U/L (ref 5–40)
BASOPHILS # BLD: 0 K/UL (ref 0–0.2)
BASOPHILS NFR BLD: 0.4 % (ref 0–1)
BILIRUB SERPL-MCNC: 0.3 MG/DL (ref 0.2–1.2)
BUN SERPL-MCNC: 43 MG/DL (ref 6–20)
CALCIUM SERPL-MCNC: 7.8 MG/DL (ref 8.6–10)
CHLORIDE SERPL-SCNC: 110 MMOL/L (ref 98–111)
CO2 SERPL-SCNC: 25 MMOL/L (ref 22–29)
CREAT SERPL-MCNC: 1.3 MG/DL (ref 0.5–1.2)
EOSINOPHIL # BLD: 0.3 K/UL (ref 0–0.6)
EOSINOPHIL NFR BLD: 3.7 % (ref 0–5)
ERYTHROCYTE [DISTWIDTH] IN BLOOD BY AUTOMATED COUNT: 15.5 % (ref 11.5–14.5)
GLUCOSE BLD-MCNC: 108 MG/DL (ref 70–99)
GLUCOSE BLD-MCNC: 135 MG/DL (ref 70–99)
GLUCOSE BLD-MCNC: 145 MG/DL (ref 70–99)
GLUCOSE BLD-MCNC: 178 MG/DL (ref 70–99)
GLUCOSE SERPL-MCNC: 115 MG/DL (ref 74–109)
HCT VFR BLD AUTO: 24.2 % (ref 42–52)
HGB BLD-MCNC: 7.3 G/DL (ref 14–18)
IMM GRANULOCYTES # BLD: 0 K/UL
LYMPHOCYTES # BLD: 2 K/UL (ref 1.1–4.5)
LYMPHOCYTES NFR BLD: 21.3 % (ref 20–40)
MCH RBC QN AUTO: 27.3 PG (ref 27–31)
MCHC RBC AUTO-ENTMCNC: 30.2 G/DL (ref 33–37)
MCV RBC AUTO: 90.6 FL (ref 80–94)
MONOCYTES # BLD: 0.7 K/UL (ref 0–0.9)
MONOCYTES NFR BLD: 7.5 % (ref 0–10)
NEUTROPHILS # BLD: 6.1 K/UL (ref 1.5–7.5)
NEUTS SEG NFR BLD: 66.9 % (ref 50–65)
PERFORMED ON: ABNORMAL
PLATELET # BLD AUTO: 190 K/UL (ref 130–400)
PMV BLD AUTO: 10.4 FL (ref 9.4–12.4)
POTASSIUM SERPL-SCNC: 4.9 MMOL/L (ref 3.5–5)
PROT SERPL-MCNC: 6.2 G/DL (ref 6.6–8.7)
RBC # BLD AUTO: 2.67 M/UL (ref 4.7–6.1)
SODIUM SERPL-SCNC: 143 MMOL/L (ref 136–145)
WBC # BLD AUTO: 9.1 K/UL (ref 4.8–10.8)

## 2024-06-10 PROCEDURE — 94760 N-INVAS EAR/PLS OXIMETRY 1: CPT

## 2024-06-10 PROCEDURE — 97116 GAIT TRAINING THERAPY: CPT

## 2024-06-10 PROCEDURE — 97535 SELF CARE MNGMENT TRAINING: CPT

## 2024-06-10 PROCEDURE — 97530 THERAPEUTIC ACTIVITIES: CPT

## 2024-06-10 PROCEDURE — 80053 COMPREHEN METABOLIC PANEL: CPT

## 2024-06-10 PROCEDURE — 1180000000 HC REHAB R&B

## 2024-06-10 PROCEDURE — 97110 THERAPEUTIC EXERCISES: CPT

## 2024-06-10 PROCEDURE — 85025 COMPLETE CBC W/AUTO DIFF WBC: CPT

## 2024-06-10 PROCEDURE — 82962 GLUCOSE BLOOD TEST: CPT

## 2024-06-10 PROCEDURE — 99232 SBSQ HOSP IP/OBS MODERATE 35: CPT | Performed by: PSYCHIATRY & NEUROLOGY

## 2024-06-10 PROCEDURE — 6370000000 HC RX 637 (ALT 250 FOR IP): Performed by: PSYCHIATRY & NEUROLOGY

## 2024-06-10 PROCEDURE — 36415 COLL VENOUS BLD VENIPUNCTURE: CPT

## 2024-06-10 RX ADMIN — APIXABAN 5 MG: 5 TABLET, FILM COATED ORAL at 07:41

## 2024-06-10 RX ADMIN — ZINC SULFATE 220 MG (50 MG) CAPSULE 50 MG: CAPSULE at 07:42

## 2024-06-10 RX ADMIN — HYDROCODONE BITARTRATE AND ACETAMINOPHEN 1 TABLET: 10; 325 TABLET ORAL at 07:42

## 2024-06-10 RX ADMIN — HYDROCODONE BITARTRATE AND ACETAMINOPHEN 1 TABLET: 10; 325 TABLET ORAL at 11:56

## 2024-06-10 RX ADMIN — APIXABAN 5 MG: 5 TABLET, FILM COATED ORAL at 20:55

## 2024-06-10 RX ADMIN — PREGABALIN 100 MG: 50 CAPSULE ORAL at 20:55

## 2024-06-10 RX ADMIN — METFORMIN HYDROCHLORIDE 1000 MG: 500 TABLET ORAL at 16:15

## 2024-06-10 RX ADMIN — PREGABALIN 100 MG: 50 CAPSULE ORAL at 16:09

## 2024-06-10 RX ADMIN — HYDROCODONE BITARTRATE AND ACETAMINOPHEN 1 TABLET: 10; 325 TABLET ORAL at 16:09

## 2024-06-10 RX ADMIN — LISINOPRIL 10 MG: 10 TABLET ORAL at 07:41

## 2024-06-10 RX ADMIN — METFORMIN HYDROCHLORIDE 1000 MG: 500 TABLET ORAL at 07:41

## 2024-06-10 RX ADMIN — Medication 500 MG: at 07:41

## 2024-06-10 RX ADMIN — PREGABALIN 100 MG: 50 CAPSULE ORAL at 08:08

## 2024-06-10 ASSESSMENT — PAIN DESCRIPTION - ORIENTATION
ORIENTATION: RIGHT

## 2024-06-10 ASSESSMENT — PAIN DESCRIPTION - DESCRIPTORS
DESCRIPTORS: ACHING;THROBBING
DESCRIPTORS: ACHING;THROBBING
DESCRIPTORS: THROBBING;ACHING

## 2024-06-10 ASSESSMENT — PAIN SCALES - GENERAL
PAINLEVEL_OUTOF10: 5
PAINLEVEL_OUTOF10: 5
PAINLEVEL_OUTOF10: 8
PAINLEVEL_OUTOF10: 6
PAINLEVEL_OUTOF10: 8
PAINLEVEL_OUTOF10: 9

## 2024-06-10 ASSESSMENT — PAIN DESCRIPTION - LOCATION
LOCATION: LEG;INCISION;KNEE

## 2024-06-11 LAB
GLUCOSE BLD-MCNC: 132 MG/DL (ref 70–99)
GLUCOSE BLD-MCNC: 190 MG/DL (ref 70–99)
GLUCOSE BLD-MCNC: 224 MG/DL (ref 70–99)
GLUCOSE BLD-MCNC: 83 MG/DL (ref 70–99)
PERFORMED ON: ABNORMAL
PERFORMED ON: NORMAL

## 2024-06-11 PROCEDURE — 97535 SELF CARE MNGMENT TRAINING: CPT

## 2024-06-11 PROCEDURE — 6370000000 HC RX 637 (ALT 250 FOR IP): Performed by: PSYCHIATRY & NEUROLOGY

## 2024-06-11 PROCEDURE — 97530 THERAPEUTIC ACTIVITIES: CPT

## 2024-06-11 PROCEDURE — 97116 GAIT TRAINING THERAPY: CPT

## 2024-06-11 PROCEDURE — 99232 SBSQ HOSP IP/OBS MODERATE 35: CPT | Performed by: PSYCHIATRY & NEUROLOGY

## 2024-06-11 PROCEDURE — 82962 GLUCOSE BLOOD TEST: CPT

## 2024-06-11 PROCEDURE — 97110 THERAPEUTIC EXERCISES: CPT

## 2024-06-11 PROCEDURE — 94760 N-INVAS EAR/PLS OXIMETRY 1: CPT

## 2024-06-11 PROCEDURE — 1180000000 HC REHAB R&B

## 2024-06-11 RX ADMIN — ZINC SULFATE 220 MG (50 MG) CAPSULE 50 MG: CAPSULE at 08:58

## 2024-06-11 RX ADMIN — APIXABAN 5 MG: 5 TABLET, FILM COATED ORAL at 20:57

## 2024-06-11 RX ADMIN — PREGABALIN 100 MG: 50 CAPSULE ORAL at 08:58

## 2024-06-11 RX ADMIN — PREGABALIN 100 MG: 50 CAPSULE ORAL at 14:22

## 2024-06-11 RX ADMIN — HYDROCODONE BITARTRATE AND ACETAMINOPHEN 1 TABLET: 10; 325 TABLET ORAL at 17:41

## 2024-06-11 RX ADMIN — Medication 500 MG: at 08:58

## 2024-06-11 RX ADMIN — METFORMIN HYDROCHLORIDE 1000 MG: 500 TABLET ORAL at 16:56

## 2024-06-11 RX ADMIN — PREGABALIN 100 MG: 50 CAPSULE ORAL at 20:57

## 2024-06-11 RX ADMIN — LISINOPRIL 10 MG: 10 TABLET ORAL at 08:58

## 2024-06-11 RX ADMIN — HYDROCODONE BITARTRATE AND ACETAMINOPHEN 1 TABLET: 10; 325 TABLET ORAL at 13:22

## 2024-06-11 RX ADMIN — HYDROCODONE BITARTRATE AND ACETAMINOPHEN 1 TABLET: 10; 325 TABLET ORAL at 09:07

## 2024-06-11 RX ADMIN — APIXABAN 5 MG: 5 TABLET, FILM COATED ORAL at 08:58

## 2024-06-11 RX ADMIN — METFORMIN HYDROCHLORIDE 1000 MG: 500 TABLET ORAL at 08:58

## 2024-06-11 RX ADMIN — HYDROCODONE BITARTRATE AND ACETAMINOPHEN 1 TABLET: 10; 325 TABLET ORAL at 23:16

## 2024-06-11 RX ADMIN — INSULIN LISPRO 1 UNITS: 100 INJECTION, SOLUTION INTRAVENOUS; SUBCUTANEOUS at 12:33

## 2024-06-11 ASSESSMENT — PAIN DESCRIPTION - ORIENTATION
ORIENTATION: RIGHT

## 2024-06-11 ASSESSMENT — PAIN DESCRIPTION - DESCRIPTORS
DESCRIPTORS: ACHING;DISCOMFORT
DESCRIPTORS: ACHING;DISCOMFORT
DESCRIPTORS: ACHING;SORE
DESCRIPTORS: DISCOMFORT;ACHING

## 2024-06-11 ASSESSMENT — PAIN DESCRIPTION - LOCATION
LOCATION: LEG

## 2024-06-11 ASSESSMENT — PAIN SCALES - GENERAL
PAINLEVEL_OUTOF10: 3
PAINLEVEL_OUTOF10: 9
PAINLEVEL_OUTOF10: 9
PAINLEVEL_OUTOF10: 8
PAINLEVEL_OUTOF10: 10

## 2024-06-11 ASSESSMENT — PAIN - FUNCTIONAL ASSESSMENT
PAIN_FUNCTIONAL_ASSESSMENT: ACTIVITIES ARE NOT PREVENTED
PAIN_FUNCTIONAL_ASSESSMENT: ACTIVITIES ARE NOT PREVENTED

## 2024-06-11 NOTE — ACP (ADVANCE CARE PLANNING)
Advance Care Planning     Advance Care Planning Inpatient Note  [facility] Department    Today's Date: 6/11/2024  Unit: [facility] 8 REHAB UNIT    Received request from HealthCare Provider.  Upon review of chart and communication with care team, patient's decision making abilities are not in question.. Healthcare Decision Maker was/were present in the room during visit.    Goals of ACP Conversation:  Discuss advance care planning documents    Health Care Decision Makers:       Primary Decision Maker: Kristin Jimenez - [facility] - [phone]  Summary:  Verified Healthcare Decision Maker    Advance Care Planning Documents (Patient Wishes):  None     Assessment:  Patient was in PT and OT. Needed a follow-up visit to complete a LW.     Interventions:  Provided education on documents for clarity and greater understanding      Electronically signed by Chaplain Treviño Mai on 6/11/2024 at 11:00 AM

## 2024-06-12 ENCOUNTER — TELEPHONE (OUTPATIENT)
Dept: NEUROLOGY | Age: 59
End: 2024-06-12

## 2024-06-12 LAB
GLUCOSE BLD-MCNC: 149 MG/DL (ref 70–99)
GLUCOSE BLD-MCNC: 166 MG/DL (ref 70–99)
GLUCOSE BLD-MCNC: 190 MG/DL (ref 70–99)
GLUCOSE BLD-MCNC: 99 MG/DL (ref 70–99)
PERFORMED ON: ABNORMAL
PERFORMED ON: NORMAL

## 2024-06-12 PROCEDURE — 1180000000 HC REHAB R&B

## 2024-06-12 PROCEDURE — 6370000000 HC RX 637 (ALT 250 FOR IP): Performed by: PSYCHIATRY & NEUROLOGY

## 2024-06-12 PROCEDURE — 97530 THERAPEUTIC ACTIVITIES: CPT

## 2024-06-12 PROCEDURE — 97116 GAIT TRAINING THERAPY: CPT

## 2024-06-12 PROCEDURE — 94760 N-INVAS EAR/PLS OXIMETRY 1: CPT

## 2024-06-12 PROCEDURE — 99232 SBSQ HOSP IP/OBS MODERATE 35: CPT | Performed by: PSYCHIATRY & NEUROLOGY

## 2024-06-12 PROCEDURE — 82962 GLUCOSE BLOOD TEST: CPT

## 2024-06-12 PROCEDURE — 97110 THERAPEUTIC EXERCISES: CPT

## 2024-06-12 RX ADMIN — APIXABAN 5 MG: 5 TABLET, FILM COATED ORAL at 08:10

## 2024-06-12 RX ADMIN — PREGABALIN 100 MG: 50 CAPSULE ORAL at 21:31

## 2024-06-12 RX ADMIN — HYDROCODONE BITARTRATE AND ACETAMINOPHEN 1 TABLET: 10; 325 TABLET ORAL at 21:30

## 2024-06-12 RX ADMIN — APIXABAN 5 MG: 5 TABLET, FILM COATED ORAL at 21:31

## 2024-06-12 RX ADMIN — LISINOPRIL 10 MG: 10 TABLET ORAL at 08:10

## 2024-06-12 RX ADMIN — METFORMIN HYDROCHLORIDE 1000 MG: 500 TABLET ORAL at 17:09

## 2024-06-12 RX ADMIN — HYDROCODONE BITARTRATE AND ACETAMINOPHEN 1 TABLET: 10; 325 TABLET ORAL at 08:09

## 2024-06-12 RX ADMIN — ZINC SULFATE 220 MG (50 MG) CAPSULE 50 MG: CAPSULE at 08:10

## 2024-06-12 RX ADMIN — PREGABALIN 100 MG: 50 CAPSULE ORAL at 08:09

## 2024-06-12 RX ADMIN — HYDROCODONE BITARTRATE AND ACETAMINOPHEN 1 TABLET: 10; 325 TABLET ORAL at 12:23

## 2024-06-12 RX ADMIN — METFORMIN HYDROCHLORIDE 1000 MG: 500 TABLET ORAL at 08:09

## 2024-06-12 RX ADMIN — HYDROCODONE BITARTRATE AND ACETAMINOPHEN 1 TABLET: 10; 325 TABLET ORAL at 17:09

## 2024-06-12 RX ADMIN — Medication 500 MG: at 08:10

## 2024-06-12 RX ADMIN — PREGABALIN 100 MG: 50 CAPSULE ORAL at 14:52

## 2024-06-12 ASSESSMENT — PAIN DESCRIPTION - DESCRIPTORS
DESCRIPTORS: THROBBING

## 2024-06-12 ASSESSMENT — PAIN SCALES - GENERAL
PAINLEVEL_OUTOF10: 4
PAINLEVEL_OUTOF10: 8
PAINLEVEL_OUTOF10: 9
PAINLEVEL_OUTOF10: 9
PAINLEVEL_OUTOF10: 3
PAINLEVEL_OUTOF10: 9
PAINLEVEL_OUTOF10: 4

## 2024-06-12 ASSESSMENT — PAIN DESCRIPTION - ORIENTATION
ORIENTATION: RIGHT

## 2024-06-12 ASSESSMENT — PAIN DESCRIPTION - LOCATION
LOCATION: LEG

## 2024-06-12 ASSESSMENT — PAIN - FUNCTIONAL ASSESSMENT
PAIN_FUNCTIONAL_ASSESSMENT: ACTIVITIES ARE NOT PREVENTED

## 2024-06-12 NOTE — TELEPHONE ENCOUNTER
Received a referral for this patient. Called patient to see about getting him scheduled an appointment with Dr. Elaine. No answer No VM. Seen patient is admitted in the hospital.

## 2024-06-13 LAB
ALBUMIN SERPL-MCNC: 2.5 G/DL (ref 3.5–5.2)
ALP SERPL-CCNC: 243 U/L (ref 40–130)
ALT SERPL-CCNC: 9 U/L (ref 5–41)
ANION GAP SERPL CALCULATED.3IONS-SCNC: 8 MMOL/L (ref 7–19)
AST SERPL-CCNC: 12 U/L (ref 5–40)
BASOPHILS # BLD: 0.1 K/UL (ref 0–0.2)
BASOPHILS NFR BLD: 0.8 % (ref 0–1)
BILIRUB SERPL-MCNC: 0.3 MG/DL (ref 0.2–1.2)
BUN SERPL-MCNC: 51 MG/DL (ref 6–20)
CALCIUM SERPL-MCNC: 8.1 MG/DL (ref 8.6–10)
CHLORIDE SERPL-SCNC: 106 MMOL/L (ref 98–111)
CO2 SERPL-SCNC: 24 MMOL/L (ref 22–29)
CREAT SERPL-MCNC: 1.5 MG/DL (ref 0.5–1.2)
EOSINOPHIL # BLD: 0.5 K/UL (ref 0–0.6)
EOSINOPHIL NFR BLD: 6.2 % (ref 0–5)
ERYTHROCYTE [DISTWIDTH] IN BLOOD BY AUTOMATED COUNT: 16.8 % (ref 11.5–14.5)
GLUCOSE BLD-MCNC: 100 MG/DL (ref 70–99)
GLUCOSE BLD-MCNC: 112 MG/DL (ref 70–99)
GLUCOSE BLD-MCNC: 145 MG/DL (ref 70–99)
GLUCOSE BLD-MCNC: 146 MG/DL (ref 70–99)
GLUCOSE SERPL-MCNC: 97 MG/DL (ref 74–109)
HCT VFR BLD AUTO: 21.6 % (ref 42–52)
HGB BLD-MCNC: 6.5 G/DL (ref 14–18)
IMM GRANULOCYTES # BLD: 0.1 K/UL
LYMPHOCYTES # BLD: 1.8 K/UL (ref 1.1–4.5)
LYMPHOCYTES NFR BLD: 23.1 % (ref 20–40)
MCH RBC QN AUTO: 27.4 PG (ref 27–31)
MCHC RBC AUTO-ENTMCNC: 30.1 G/DL (ref 33–37)
MCV RBC AUTO: 91.1 FL (ref 80–94)
MONOCYTES # BLD: 0.5 K/UL (ref 0–0.9)
MONOCYTES NFR BLD: 6.4 % (ref 0–10)
NEUTROPHILS # BLD: 4.9 K/UL (ref 1.5–7.5)
NEUTS SEG NFR BLD: 62.9 % (ref 50–65)
PERFORMED ON: ABNORMAL
PLATELET # BLD AUTO: 214 K/UL (ref 130–400)
PMV BLD AUTO: 10.2 FL (ref 9.4–12.4)
POTASSIUM SERPL-SCNC: 5.3 MMOL/L (ref 3.5–5)
POTASSIUM SERPL-SCNC: 5.5 MMOL/L (ref 3.5–5)
PROT SERPL-MCNC: 5.9 G/DL (ref 6.6–8.7)
RBC # BLD AUTO: 2.37 M/UL (ref 4.7–6.1)
SODIUM SERPL-SCNC: 138 MMOL/L (ref 136–145)
WBC # BLD AUTO: 7.7 K/UL (ref 4.8–10.8)

## 2024-06-13 PROCEDURE — 80053 COMPREHEN METABOLIC PANEL: CPT

## 2024-06-13 PROCEDURE — 84132 ASSAY OF SERUM POTASSIUM: CPT

## 2024-06-13 PROCEDURE — 36415 COLL VENOUS BLD VENIPUNCTURE: CPT

## 2024-06-13 PROCEDURE — 6370000000 HC RX 637 (ALT 250 FOR IP): Performed by: PSYCHIATRY & NEUROLOGY

## 2024-06-13 PROCEDURE — 94760 N-INVAS EAR/PLS OXIMETRY 1: CPT

## 2024-06-13 PROCEDURE — 1180000000 HC REHAB R&B

## 2024-06-13 PROCEDURE — 85025 COMPLETE CBC W/AUTO DIFF WBC: CPT

## 2024-06-13 PROCEDURE — 97530 THERAPEUTIC ACTIVITIES: CPT

## 2024-06-13 PROCEDURE — 99232 SBSQ HOSP IP/OBS MODERATE 35: CPT | Performed by: PSYCHIATRY & NEUROLOGY

## 2024-06-13 PROCEDURE — 97110 THERAPEUTIC EXERCISES: CPT

## 2024-06-13 PROCEDURE — 97116 GAIT TRAINING THERAPY: CPT

## 2024-06-13 PROCEDURE — 82962 GLUCOSE BLOOD TEST: CPT

## 2024-06-13 RX ADMIN — Medication 500 MG: at 08:42

## 2024-06-13 RX ADMIN — HYDROCODONE BITARTRATE AND ACETAMINOPHEN 1 TABLET: 10; 325 TABLET ORAL at 12:37

## 2024-06-13 RX ADMIN — HYDROCODONE BITARTRATE AND ACETAMINOPHEN 1 TABLET: 10; 325 TABLET ORAL at 20:44

## 2024-06-13 RX ADMIN — HYDROCODONE BITARTRATE AND ACETAMINOPHEN 1 TABLET: 10; 325 TABLET ORAL at 08:42

## 2024-06-13 RX ADMIN — PREGABALIN 100 MG: 50 CAPSULE ORAL at 12:37

## 2024-06-13 RX ADMIN — HYDROCODONE BITARTRATE AND ACETAMINOPHEN 1 TABLET: 10; 325 TABLET ORAL at 16:37

## 2024-06-13 RX ADMIN — METFORMIN HYDROCHLORIDE 1000 MG: 500 TABLET ORAL at 08:42

## 2024-06-13 RX ADMIN — HYDROCODONE BITARTRATE AND ACETAMINOPHEN 1 TABLET: 10; 325 TABLET ORAL at 04:43

## 2024-06-13 RX ADMIN — APIXABAN 5 MG: 5 TABLET, FILM COATED ORAL at 08:42

## 2024-06-13 RX ADMIN — APIXABAN 5 MG: 5 TABLET, FILM COATED ORAL at 20:43

## 2024-06-13 RX ADMIN — METFORMIN HYDROCHLORIDE 1000 MG: 500 TABLET ORAL at 16:37

## 2024-06-13 RX ADMIN — PREGABALIN 100 MG: 50 CAPSULE ORAL at 20:43

## 2024-06-13 RX ADMIN — ZINC SULFATE 220 MG (50 MG) CAPSULE 50 MG: CAPSULE at 08:42

## 2024-06-13 RX ADMIN — PREGABALIN 100 MG: 50 CAPSULE ORAL at 08:42

## 2024-06-13 ASSESSMENT — PAIN SCALES - GENERAL
PAINLEVEL_OUTOF10: 4
PAINLEVEL_OUTOF10: 3
PAINLEVEL_OUTOF10: 3
PAINLEVEL_OUTOF10: 8
PAINLEVEL_OUTOF10: 4
PAINLEVEL_OUTOF10: 9
PAINLEVEL_OUTOF10: 8
PAINLEVEL_OUTOF10: 8
PAINLEVEL_OUTOF10: 9

## 2024-06-13 ASSESSMENT — PAIN DESCRIPTION - ORIENTATION
ORIENTATION: RIGHT

## 2024-06-13 ASSESSMENT — PAIN DESCRIPTION - DESCRIPTORS
DESCRIPTORS: THROBBING
DESCRIPTORS: ACHING;THROBBING
DESCRIPTORS: CRUSHING;THROBBING
DESCRIPTORS: ACHING;THROBBING

## 2024-06-13 ASSESSMENT — PAIN DESCRIPTION - LOCATION
LOCATION: LEG;KNEE;INCISION
LOCATION: LEG;KNEE;INCISION
LOCATION: KNEE;LEG
LOCATION: LEG;KNEE;INCISION
LOCATION: LEG

## 2024-06-14 LAB
ALBUMIN SERPL-MCNC: 2.7 G/DL (ref 3.5–5.2)
ALP SERPL-CCNC: 242 U/L (ref 40–130)
ALT SERPL-CCNC: 9 U/L (ref 5–41)
ANION GAP SERPL CALCULATED.3IONS-SCNC: 8 MMOL/L (ref 7–19)
AST SERPL-CCNC: 15 U/L (ref 5–40)
BILIRUB SERPL-MCNC: 0.3 MG/DL (ref 0.2–1.2)
BUN SERPL-MCNC: 47 MG/DL (ref 6–20)
CALCIUM SERPL-MCNC: 8 MG/DL (ref 8.6–10)
CHLORIDE SERPL-SCNC: 107 MMOL/L (ref 98–111)
CO2 SERPL-SCNC: 23 MMOL/L (ref 22–29)
CREAT SERPL-MCNC: 1.2 MG/DL (ref 0.5–1.2)
ERYTHROCYTE [DISTWIDTH] IN BLOOD BY AUTOMATED COUNT: 16.8 % (ref 11.5–14.5)
GLUCOSE BLD-MCNC: 132 MG/DL (ref 70–99)
GLUCOSE BLD-MCNC: 140 MG/DL (ref 70–99)
GLUCOSE BLD-MCNC: 229 MG/DL (ref 70–99)
GLUCOSE BLD-MCNC: 95 MG/DL (ref 70–99)
GLUCOSE SERPL-MCNC: 90 MG/DL (ref 74–109)
HCT VFR BLD AUTO: 23.7 % (ref 42–52)
HGB BLD-MCNC: 7.1 G/DL (ref 14–18)
MCH RBC QN AUTO: 27.5 PG (ref 27–31)
MCHC RBC AUTO-ENTMCNC: 30 G/DL (ref 33–37)
MCV RBC AUTO: 91.9 FL (ref 80–94)
PERFORMED ON: ABNORMAL
PERFORMED ON: NORMAL
PLATELET # BLD AUTO: 234 K/UL (ref 130–400)
PMV BLD AUTO: 10.4 FL (ref 9.4–12.4)
POTASSIUM SERPL-SCNC: 5.4 MMOL/L (ref 3.5–5)
POTASSIUM SERPL-SCNC: 5.4 MMOL/L (ref 3.5–5)
PROT SERPL-MCNC: 6.4 G/DL (ref 6.6–8.7)
RBC # BLD AUTO: 2.58 M/UL (ref 4.7–6.1)
SODIUM SERPL-SCNC: 138 MMOL/L (ref 136–145)
WBC # BLD AUTO: 6.3 K/UL (ref 4.8–10.8)

## 2024-06-14 PROCEDURE — 97530 THERAPEUTIC ACTIVITIES: CPT

## 2024-06-14 PROCEDURE — 6370000000 HC RX 637 (ALT 250 FOR IP): Performed by: PSYCHIATRY & NEUROLOGY

## 2024-06-14 PROCEDURE — 94760 N-INVAS EAR/PLS OXIMETRY 1: CPT

## 2024-06-14 PROCEDURE — 85027 COMPLETE CBC AUTOMATED: CPT

## 2024-06-14 PROCEDURE — 80053 COMPREHEN METABOLIC PANEL: CPT

## 2024-06-14 PROCEDURE — 82962 GLUCOSE BLOOD TEST: CPT

## 2024-06-14 PROCEDURE — 1180000000 HC REHAB R&B

## 2024-06-14 PROCEDURE — 99232 SBSQ HOSP IP/OBS MODERATE 35: CPT | Performed by: PSYCHIATRY & NEUROLOGY

## 2024-06-14 PROCEDURE — 84132 ASSAY OF SERUM POTASSIUM: CPT

## 2024-06-14 PROCEDURE — 97110 THERAPEUTIC EXERCISES: CPT

## 2024-06-14 PROCEDURE — 97116 GAIT TRAINING THERAPY: CPT

## 2024-06-14 PROCEDURE — 36415 COLL VENOUS BLD VENIPUNCTURE: CPT

## 2024-06-14 RX ORDER — SODIUM POLYSTYRENE SULFONATE 15 G/60ML
15 SUSPENSION ORAL; RECTAL ONCE
Status: COMPLETED | OUTPATIENT
Start: 2024-06-14 | End: 2024-06-14

## 2024-06-14 RX ORDER — CLONIDINE HYDROCHLORIDE 0.1 MG/1
0.1 TABLET ORAL EVERY 4 HOURS PRN
Status: DISCONTINUED | OUTPATIENT
Start: 2024-06-14 | End: 2024-06-18 | Stop reason: HOSPADM

## 2024-06-14 RX ADMIN — INSULIN LISPRO 1 UNITS: 100 INJECTION, SOLUTION INTRAVENOUS; SUBCUTANEOUS at 12:22

## 2024-06-14 RX ADMIN — APIXABAN 5 MG: 5 TABLET, FILM COATED ORAL at 08:04

## 2024-06-14 RX ADMIN — PREGABALIN 100 MG: 50 CAPSULE ORAL at 08:04

## 2024-06-14 RX ADMIN — SODIUM POLYSTYRENE SULFONATE 15 G: 15 SUSPENSION ORAL; RECTAL at 08:04

## 2024-06-14 RX ADMIN — ZINC SULFATE 220 MG (50 MG) CAPSULE 50 MG: CAPSULE at 08:04

## 2024-06-14 RX ADMIN — METFORMIN HYDROCHLORIDE 1000 MG: 500 TABLET ORAL at 08:04

## 2024-06-14 RX ADMIN — PREGABALIN 100 MG: 50 CAPSULE ORAL at 21:03

## 2024-06-14 RX ADMIN — CLONIDINE HYDROCHLORIDE 0.1 MG: 0.1 TABLET ORAL at 17:57

## 2024-06-14 RX ADMIN — PREGABALIN 100 MG: 50 CAPSULE ORAL at 14:55

## 2024-06-14 RX ADMIN — METFORMIN HYDROCHLORIDE 1000 MG: 500 TABLET ORAL at 17:57

## 2024-06-14 RX ADMIN — Medication 500 MG: at 08:03

## 2024-06-14 RX ADMIN — APIXABAN 5 MG: 5 TABLET, FILM COATED ORAL at 21:03

## 2024-06-14 RX ADMIN — HYDROCODONE BITARTRATE AND ACETAMINOPHEN 1 TABLET: 10; 325 TABLET ORAL at 08:03

## 2024-06-14 RX ADMIN — HYDROCODONE BITARTRATE AND ACETAMINOPHEN 1 TABLET: 10; 325 TABLET ORAL at 12:22

## 2024-06-14 RX ADMIN — HYDROCODONE BITARTRATE AND ACETAMINOPHEN 1 TABLET: 10; 325 TABLET ORAL at 17:57

## 2024-06-14 ASSESSMENT — PAIN DESCRIPTION - LOCATION
LOCATION: LEG

## 2024-06-14 ASSESSMENT — PAIN SCALES - GENERAL
PAINLEVEL_OUTOF10: 9
PAINLEVEL_OUTOF10: 9
PAINLEVEL_OUTOF10: 8

## 2024-06-14 ASSESSMENT — PAIN DESCRIPTION - ORIENTATION
ORIENTATION: RIGHT

## 2024-06-14 ASSESSMENT — PAIN DESCRIPTION - DESCRIPTORS
DESCRIPTORS: ACHING
DESCRIPTORS: ACHING;DISCOMFORT
DESCRIPTORS: ACHING;DISCOMFORT

## 2024-06-15 LAB
ALBUMIN SERPL-MCNC: 2.8 G/DL (ref 3.5–5.2)
ALP SERPL-CCNC: 246 U/L (ref 40–130)
ALT SERPL-CCNC: 10 U/L (ref 5–41)
ANION GAP SERPL CALCULATED.3IONS-SCNC: 7 MMOL/L (ref 7–19)
AST SERPL-CCNC: 14 U/L (ref 5–40)
BILIRUB SERPL-MCNC: 0.3 MG/DL (ref 0.2–1.2)
BUN SERPL-MCNC: 44 MG/DL (ref 6–20)
CALCIUM SERPL-MCNC: 8.3 MG/DL (ref 8.6–10)
CHLORIDE SERPL-SCNC: 105 MMOL/L (ref 98–111)
CO2 SERPL-SCNC: 24 MMOL/L (ref 22–29)
CREAT SERPL-MCNC: 1.1 MG/DL (ref 0.5–1.2)
ERYTHROCYTE [DISTWIDTH] IN BLOOD BY AUTOMATED COUNT: 16.4 % (ref 11.5–14.5)
GLUCOSE BLD-MCNC: 114 MG/DL (ref 70–99)
GLUCOSE BLD-MCNC: 152 MG/DL (ref 70–99)
GLUCOSE BLD-MCNC: 170 MG/DL (ref 70–99)
GLUCOSE BLD-MCNC: 207 MG/DL (ref 70–99)
GLUCOSE SERPL-MCNC: 103 MG/DL (ref 74–109)
HCT VFR BLD AUTO: 23.2 % (ref 42–52)
HGB BLD-MCNC: 6.9 G/DL (ref 14–18)
MCH RBC QN AUTO: 27.2 PG (ref 27–31)
MCHC RBC AUTO-ENTMCNC: 29.7 G/DL (ref 33–37)
MCV RBC AUTO: 91.3 FL (ref 80–94)
PERFORMED ON: ABNORMAL
PLATELET # BLD AUTO: 235 K/UL (ref 130–400)
PMV BLD AUTO: 9.7 FL (ref 9.4–12.4)
POTASSIUM SERPL-SCNC: 5.2 MMOL/L (ref 3.5–5)
PROT SERPL-MCNC: 6.4 G/DL (ref 6.6–8.7)
RBC # BLD AUTO: 2.54 M/UL (ref 4.7–6.1)
SODIUM SERPL-SCNC: 136 MMOL/L (ref 136–145)
WBC # BLD AUTO: 6.7 K/UL (ref 4.8–10.8)

## 2024-06-15 PROCEDURE — 94760 N-INVAS EAR/PLS OXIMETRY 1: CPT

## 2024-06-15 PROCEDURE — 6370000000 HC RX 637 (ALT 250 FOR IP): Performed by: PSYCHIATRY & NEUROLOGY

## 2024-06-15 PROCEDURE — 36415 COLL VENOUS BLD VENIPUNCTURE: CPT

## 2024-06-15 PROCEDURE — 99232 SBSQ HOSP IP/OBS MODERATE 35: CPT | Performed by: PSYCHIATRY & NEUROLOGY

## 2024-06-15 PROCEDURE — 1180000000 HC REHAB R&B

## 2024-06-15 PROCEDURE — 85027 COMPLETE CBC AUTOMATED: CPT

## 2024-06-15 PROCEDURE — 80053 COMPREHEN METABOLIC PANEL: CPT

## 2024-06-15 PROCEDURE — 82962 GLUCOSE BLOOD TEST: CPT

## 2024-06-15 RX ADMIN — HYDROCODONE BITARTRATE AND ACETAMINOPHEN 1 TABLET: 10; 325 TABLET ORAL at 14:47

## 2024-06-15 RX ADMIN — APIXABAN 5 MG: 5 TABLET, FILM COATED ORAL at 08:23

## 2024-06-15 RX ADMIN — PREGABALIN 100 MG: 50 CAPSULE ORAL at 14:45

## 2024-06-15 RX ADMIN — ZINC SULFATE 220 MG (50 MG) CAPSULE 50 MG: CAPSULE at 08:23

## 2024-06-15 RX ADMIN — HYDROCODONE BITARTRATE AND ACETAMINOPHEN 1 TABLET: 10; 325 TABLET ORAL at 09:35

## 2024-06-15 RX ADMIN — Medication 500 MG: at 08:23

## 2024-06-15 RX ADMIN — METFORMIN HYDROCHLORIDE 1000 MG: 500 TABLET ORAL at 17:54

## 2024-06-15 RX ADMIN — HYDROCODONE BITARTRATE AND ACETAMINOPHEN 1 TABLET: 10; 325 TABLET ORAL at 20:17

## 2024-06-15 RX ADMIN — METFORMIN HYDROCHLORIDE 1000 MG: 500 TABLET ORAL at 08:23

## 2024-06-15 RX ADMIN — APIXABAN 5 MG: 5 TABLET, FILM COATED ORAL at 20:15

## 2024-06-15 RX ADMIN — PREGABALIN 100 MG: 50 CAPSULE ORAL at 20:15

## 2024-06-15 RX ADMIN — PREGABALIN 100 MG: 50 CAPSULE ORAL at 09:35

## 2024-06-15 ASSESSMENT — PAIN DESCRIPTION - ORIENTATION
ORIENTATION: RIGHT

## 2024-06-15 ASSESSMENT — PAIN DESCRIPTION - LOCATION
LOCATION: LEG

## 2024-06-15 ASSESSMENT — PAIN - FUNCTIONAL ASSESSMENT
PAIN_FUNCTIONAL_ASSESSMENT: ACTIVITIES ARE NOT PREVENTED
PAIN_FUNCTIONAL_ASSESSMENT: PREVENTS OR INTERFERES SOME ACTIVE ACTIVITIES AND ADLS
PAIN_FUNCTIONAL_ASSESSMENT: ACTIVITIES ARE NOT PREVENTED

## 2024-06-15 ASSESSMENT — PAIN SCALES - GENERAL
PAINLEVEL_OUTOF10: 8
PAINLEVEL_OUTOF10: 10
PAINLEVEL_OUTOF10: 5
PAINLEVEL_OUTOF10: 9
PAINLEVEL_OUTOF10: 0

## 2024-06-15 ASSESSMENT — PAIN DESCRIPTION - DESCRIPTORS
DESCRIPTORS: THROBBING

## 2024-06-16 LAB
ALBUMIN SERPL-MCNC: 2.8 G/DL (ref 3.5–5.2)
ALP SERPL-CCNC: 315 U/L (ref 40–130)
ALT SERPL-CCNC: 14 U/L (ref 5–41)
ANION GAP SERPL CALCULATED.3IONS-SCNC: 11 MMOL/L (ref 7–19)
AST SERPL-CCNC: 19 U/L (ref 5–40)
BILIRUB SERPL-MCNC: 0.4 MG/DL (ref 0.2–1.2)
BUN SERPL-MCNC: 50 MG/DL (ref 6–20)
CALCIUM SERPL-MCNC: 8.1 MG/DL (ref 8.6–10)
CHLORIDE SERPL-SCNC: 105 MMOL/L (ref 98–111)
CO2 SERPL-SCNC: 20 MMOL/L (ref 22–29)
CREAT SERPL-MCNC: 1.4 MG/DL (ref 0.5–1.2)
ERYTHROCYTE [DISTWIDTH] IN BLOOD BY AUTOMATED COUNT: 17.1 % (ref 11.5–14.5)
GLUCOSE BLD-MCNC: 145 MG/DL (ref 70–99)
GLUCOSE BLD-MCNC: 171 MG/DL (ref 70–99)
GLUCOSE BLD-MCNC: 175 MG/DL (ref 70–99)
GLUCOSE BLD-MCNC: 221 MG/DL (ref 70–99)
GLUCOSE SERPL-MCNC: 140 MG/DL (ref 74–109)
HCT VFR BLD AUTO: 27 % (ref 42–52)
HGB BLD-MCNC: 8 G/DL (ref 14–18)
MCH RBC QN AUTO: 27.8 PG (ref 27–31)
MCHC RBC AUTO-ENTMCNC: 29.6 G/DL (ref 33–37)
MCV RBC AUTO: 93.8 FL (ref 80–94)
PERFORMED ON: ABNORMAL
PLATELET # BLD AUTO: 284 K/UL (ref 130–400)
PMV BLD AUTO: 9.7 FL (ref 9.4–12.4)
POTASSIUM SERPL-SCNC: 5.3 MMOL/L (ref 3.5–5)
POTASSIUM SERPL-SCNC: 5.5 MMOL/L (ref 3.5–5)
PROT SERPL-MCNC: 7 G/DL (ref 6.6–8.7)
RBC # BLD AUTO: 2.88 M/UL (ref 4.7–6.1)
SODIUM SERPL-SCNC: 136 MMOL/L (ref 136–145)
WBC # BLD AUTO: 9.9 K/UL (ref 4.8–10.8)

## 2024-06-16 PROCEDURE — 84132 ASSAY OF SERUM POTASSIUM: CPT

## 2024-06-16 PROCEDURE — 99232 SBSQ HOSP IP/OBS MODERATE 35: CPT | Performed by: PSYCHIATRY & NEUROLOGY

## 2024-06-16 PROCEDURE — 6370000000 HC RX 637 (ALT 250 FOR IP): Performed by: PSYCHIATRY & NEUROLOGY

## 2024-06-16 PROCEDURE — 1180000000 HC REHAB R&B

## 2024-06-16 PROCEDURE — 36415 COLL VENOUS BLD VENIPUNCTURE: CPT

## 2024-06-16 PROCEDURE — 80053 COMPREHEN METABOLIC PANEL: CPT

## 2024-06-16 PROCEDURE — 85027 COMPLETE CBC AUTOMATED: CPT

## 2024-06-16 PROCEDURE — 94760 N-INVAS EAR/PLS OXIMETRY 1: CPT

## 2024-06-16 PROCEDURE — 82962 GLUCOSE BLOOD TEST: CPT

## 2024-06-16 RX ADMIN — Medication 500 MG: at 08:32

## 2024-06-16 RX ADMIN — METFORMIN HYDROCHLORIDE 1000 MG: 500 TABLET ORAL at 08:33

## 2024-06-16 RX ADMIN — HYDROCODONE BITARTRATE AND ACETAMINOPHEN 1 TABLET: 10; 325 TABLET ORAL at 13:18

## 2024-06-16 RX ADMIN — APIXABAN 5 MG: 5 TABLET, FILM COATED ORAL at 21:17

## 2024-06-16 RX ADMIN — ZINC SULFATE 220 MG (50 MG) CAPSULE 50 MG: CAPSULE at 08:33

## 2024-06-16 RX ADMIN — INSULIN LISPRO 1 UNITS: 100 INJECTION, SOLUTION INTRAVENOUS; SUBCUTANEOUS at 08:31

## 2024-06-16 RX ADMIN — APIXABAN 5 MG: 5 TABLET, FILM COATED ORAL at 08:32

## 2024-06-16 RX ADMIN — ACETAMINOPHEN 650 MG: 325 TABLET ORAL at 21:38

## 2024-06-16 RX ADMIN — PREGABALIN 100 MG: 50 CAPSULE ORAL at 21:17

## 2024-06-16 RX ADMIN — PREGABALIN 100 MG: 50 CAPSULE ORAL at 14:31

## 2024-06-16 RX ADMIN — METFORMIN HYDROCHLORIDE 1000 MG: 500 TABLET ORAL at 16:42

## 2024-06-16 RX ADMIN — PREGABALIN 100 MG: 50 CAPSULE ORAL at 08:32

## 2024-06-16 RX ADMIN — HYDROCODONE BITARTRATE AND ACETAMINOPHEN 1 TABLET: 10; 325 TABLET ORAL at 06:21

## 2024-06-16 RX ADMIN — HYDROCODONE BITARTRATE AND ACETAMINOPHEN 1 TABLET: 10; 325 TABLET ORAL at 18:41

## 2024-06-16 ASSESSMENT — PAIN DESCRIPTION - ORIENTATION
ORIENTATION: ANTERIOR
ORIENTATION: RIGHT
ORIENTATION: RIGHT

## 2024-06-16 ASSESSMENT — PAIN DESCRIPTION - LOCATION
LOCATION: HEAD
LOCATION: LEG
LOCATION: LEG

## 2024-06-16 ASSESSMENT — PAIN SCALES - GENERAL
PAINLEVEL_OUTOF10: 9
PAINLEVEL_OUTOF10: 6
PAINLEVEL_OUTOF10: 9
PAINLEVEL_OUTOF10: 4

## 2024-06-16 ASSESSMENT — PAIN - FUNCTIONAL ASSESSMENT
PAIN_FUNCTIONAL_ASSESSMENT: ACTIVITIES ARE NOT PREVENTED

## 2024-06-16 ASSESSMENT — PAIN DESCRIPTION - DESCRIPTORS
DESCRIPTORS: THROBBING
DESCRIPTORS: ACHING;SORE
DESCRIPTORS: ACHING

## 2024-06-17 ENCOUNTER — APPOINTMENT (OUTPATIENT)
Dept: GENERAL RADIOLOGY | Age: 59
DRG: 559 | End: 2024-06-17
Attending: PSYCHIATRY & NEUROLOGY
Payer: COMMERCIAL

## 2024-06-17 ENCOUNTER — APPOINTMENT (OUTPATIENT)
Dept: ULTRASOUND IMAGING | Age: 59
DRG: 559 | End: 2024-06-17
Attending: PSYCHIATRY & NEUROLOGY
Payer: COMMERCIAL

## 2024-06-17 LAB
ALBUMIN SERPL-MCNC: 2.6 G/DL (ref 3.5–5.2)
ALP SERPL-CCNC: 265 U/L (ref 40–130)
ALT SERPL-CCNC: 12 U/L (ref 5–41)
ANION GAP SERPL CALCULATED.3IONS-SCNC: 12 MMOL/L (ref 7–19)
AST SERPL-CCNC: 15 U/L (ref 5–40)
B PARAP IS1001 DNA NPH QL NAA+NON-PROBE: NOT DETECTED
B PERT.PT PRMT NPH QL NAA+NON-PROBE: NOT DETECTED
BACTERIA URNS QL MICRO: NEGATIVE /HPF
BASOPHILS # BLD: 0.1 K/UL (ref 0–0.2)
BASOPHILS NFR BLD: 1 % (ref 0–1)
BILIRUB SERPL-MCNC: 0.4 MG/DL (ref 0.2–1.2)
BILIRUB UR QL STRIP: NEGATIVE
BNP BLD-MCNC: ABNORMAL PG/ML (ref 0–124)
BUN SERPL-MCNC: 53 MG/DL (ref 6–20)
C PNEUM DNA NPH QL NAA+NON-PROBE: NOT DETECTED
CALCIUM SERPL-MCNC: 8.3 MG/DL (ref 8.6–10)
CHLORIDE SERPL-SCNC: 107 MMOL/L (ref 98–111)
CLARITY UR: CLEAR
CO2 SERPL-SCNC: 20 MMOL/L (ref 22–29)
COLOR UR: YELLOW
CREAT SERPL-MCNC: 1.5 MG/DL (ref 0.5–1.2)
CREAT UR-MCNC: 31.5 MG/DL (ref 39–259)
CRYSTALS URNS MICRO: ABNORMAL /HPF
EKG P AXIS: 63 DEGREES
EKG P-R INTERVAL: 200 MS
EKG Q-T INTERVAL: 386 MS
EKG QRS DURATION: 150 MS
EKG QTC CALCULATION (BAZETT): 422 MS
EKG T AXIS: 62 DEGREES
EOSINOPHIL # BLD: 0.3 K/UL (ref 0–0.6)
EOSINOPHIL NFR BLD: 2.8 % (ref 0–5)
EPI CELLS #/AREA URNS AUTO: 3 /HPF (ref 0–5)
ERYTHROCYTE [DISTWIDTH] IN BLOOD BY AUTOMATED COUNT: 17.7 % (ref 11.5–14.5)
FLUAV RNA NPH QL NAA+NON-PROBE: NOT DETECTED
FLUBV RNA NPH QL NAA+NON-PROBE: NOT DETECTED
GLUCOSE BLD-MCNC: 173 MG/DL (ref 70–99)
GLUCOSE BLD-MCNC: 230 MG/DL (ref 70–99)
GLUCOSE BLD-MCNC: 232 MG/DL (ref 70–99)
GLUCOSE BLD-MCNC: 257 MG/DL (ref 70–99)
GLUCOSE SERPL-MCNC: 135 MG/DL (ref 74–109)
GLUCOSE UR STRIP.AUTO-MCNC: 100 MG/DL
HADV DNA NPH QL NAA+NON-PROBE: NOT DETECTED
HCOV 229E RNA NPH QL NAA+NON-PROBE: NOT DETECTED
HCOV HKU1 RNA NPH QL NAA+NON-PROBE: NOT DETECTED
HCOV NL63 RNA NPH QL NAA+NON-PROBE: NOT DETECTED
HCOV OC43 RNA NPH QL NAA+NON-PROBE: NOT DETECTED
HCT VFR BLD AUTO: 26.5 % (ref 42–52)
HGB BLD-MCNC: 8.1 G/DL (ref 14–18)
HGB UR STRIP.AUTO-MCNC: ABNORMAL MG/L
HMPV RNA NPH QL NAA+NON-PROBE: NOT DETECTED
HPIV1 RNA NPH QL NAA+NON-PROBE: NOT DETECTED
HPIV2 RNA NPH QL NAA+NON-PROBE: NOT DETECTED
HPIV3 RNA NPH QL NAA+NON-PROBE: DETECTED
HPIV4 RNA NPH QL NAA+NON-PROBE: NOT DETECTED
HYALINE CASTS #/AREA URNS AUTO: 12 /HPF (ref 0–8)
IMM GRANULOCYTES # BLD: 0.1 K/UL
KETONES UR STRIP.AUTO-MCNC: NEGATIVE MG/DL
LACTATE BLDV-SCNC: 2.1 MMOL/L (ref 0.5–1.9)
LEUKOCYTE ESTERASE UR QL STRIP.AUTO: NEGATIVE
LYMPHOCYTES # BLD: 1.6 K/UL (ref 1.1–4.5)
LYMPHOCYTES NFR BLD: 13.6 % (ref 20–40)
M PNEUMO DNA NPH QL NAA+NON-PROBE: NOT DETECTED
MCH RBC QN AUTO: 28.7 PG (ref 27–31)
MCHC RBC AUTO-ENTMCNC: 30.6 G/DL (ref 33–37)
MCV RBC AUTO: 94 FL (ref 80–94)
MICROALBUMIN UR-MCNC: 242.3 MG/DL (ref 0–19)
MICROALBUMIN/CREAT UR-RTO: 7692.1 MG/G
MONOCYTES # BLD: 1 K/UL (ref 0–0.9)
MONOCYTES NFR BLD: 8.7 % (ref 0–10)
NEUTROPHILS # BLD: 8.6 K/UL (ref 1.5–7.5)
NEUTS SEG NFR BLD: 73.1 % (ref 50–65)
NITRITE UR QL STRIP.AUTO: NEGATIVE
PERFORMED ON: ABNORMAL
PH UR STRIP.AUTO: 5.5 [PH] (ref 5–8)
PLATELET # BLD AUTO: 261 K/UL (ref 130–400)
PMV BLD AUTO: 9.9 FL (ref 9.4–12.4)
POTASSIUM SERPL-SCNC: 5.2 MMOL/L (ref 3.5–5)
PROCALCITONIN: 0.77 NG/ML (ref 0–0.09)
PROT SERPL-MCNC: 6.8 G/DL (ref 6.6–8.7)
PROT UR STRIP.AUTO-MCNC: 300 MG/DL
RBC # BLD AUTO: 2.82 M/UL (ref 4.7–6.1)
RBC #/AREA URNS AUTO: 139 /HPF (ref 0–4)
RSV RNA NPH QL NAA+NON-PROBE: NOT DETECTED
RV+EV RNA NPH QL NAA+NON-PROBE: NOT DETECTED
SARS-COV-2 RNA NPH QL NAA+NON-PROBE: NOT DETECTED
SODIUM SERPL-SCNC: 139 MMOL/L (ref 136–145)
SODIUM UR-SCNC: 96 MMOL/L
SP GR UR STRIP.AUTO: 1.01 (ref 1–1.03)
UROBILINOGEN UR STRIP.AUTO-MCNC: 1 E.U./DL
WBC # BLD AUTO: 11.8 K/UL (ref 4.8–10.8)
WBC #/AREA URNS AUTO: 9 /HPF (ref 0–5)

## 2024-06-17 PROCEDURE — 87077 CULTURE AEROBIC IDENTIFY: CPT

## 2024-06-17 PROCEDURE — 94640 AIRWAY INHALATION TREATMENT: CPT

## 2024-06-17 PROCEDURE — 82306 VITAMIN D 25 HYDROXY: CPT

## 2024-06-17 PROCEDURE — 82043 UR ALBUMIN QUANTITATIVE: CPT

## 2024-06-17 PROCEDURE — 87070 CULTURE OTHR SPECIMN AEROBIC: CPT

## 2024-06-17 PROCEDURE — 6360000002 HC RX W HCPCS: Performed by: NURSE PRACTITIONER

## 2024-06-17 PROCEDURE — 99232 SBSQ HOSP IP/OBS MODERATE 35: CPT | Performed by: PSYCHIATRY & NEUROLOGY

## 2024-06-17 PROCEDURE — 36415 COLL VENOUS BLD VENIPUNCTURE: CPT

## 2024-06-17 PROCEDURE — 87186 SC STD MICRODIL/AGAR DIL: CPT

## 2024-06-17 PROCEDURE — 87205 SMEAR GRAM STAIN: CPT

## 2024-06-17 PROCEDURE — 83880 ASSAY OF NATRIURETIC PEPTIDE: CPT

## 2024-06-17 PROCEDURE — 93005 ELECTROCARDIOGRAM TRACING: CPT | Performed by: PSYCHIATRY & NEUROLOGY

## 2024-06-17 PROCEDURE — 82570 ASSAY OF URINE CREATININE: CPT

## 2024-06-17 PROCEDURE — 1180000000 HC REHAB R&B

## 2024-06-17 PROCEDURE — 83540 ASSAY OF IRON: CPT

## 2024-06-17 PROCEDURE — 71045 X-RAY EXAM CHEST 1 VIEW: CPT

## 2024-06-17 PROCEDURE — 85025 COMPLETE CBC W/AUTO DIFF WBC: CPT

## 2024-06-17 PROCEDURE — 83550 IRON BINDING TEST: CPT

## 2024-06-17 PROCEDURE — 87040 BLOOD CULTURE FOR BACTERIA: CPT

## 2024-06-17 PROCEDURE — 80053 COMPREHEN METABOLIC PANEL: CPT

## 2024-06-17 PROCEDURE — 6370000000 HC RX 637 (ALT 250 FOR IP): Performed by: NURSE PRACTITIONER

## 2024-06-17 PROCEDURE — 81001 URINALYSIS AUTO W/SCOPE: CPT

## 2024-06-17 PROCEDURE — 84145 PROCALCITONIN (PCT): CPT

## 2024-06-17 PROCEDURE — 82728 ASSAY OF FERRITIN: CPT

## 2024-06-17 PROCEDURE — 2700000000 HC OXYGEN THERAPY PER DAY

## 2024-06-17 PROCEDURE — 82962 GLUCOSE BLOOD TEST: CPT

## 2024-06-17 PROCEDURE — 83605 ASSAY OF LACTIC ACID: CPT

## 2024-06-17 PROCEDURE — 51798 US URINE CAPACITY MEASURE: CPT

## 2024-06-17 PROCEDURE — 0202U NFCT DS 22 TRGT SARS-COV-2: CPT

## 2024-06-17 PROCEDURE — 76775 US EXAM ABDO BACK WALL LIM: CPT

## 2024-06-17 PROCEDURE — 2580000003 HC RX 258: Performed by: PSYCHIATRY & NEUROLOGY

## 2024-06-17 PROCEDURE — 84300 ASSAY OF URINE SODIUM: CPT

## 2024-06-17 PROCEDURE — 94760 N-INVAS EAR/PLS OXIMETRY 1: CPT

## 2024-06-17 PROCEDURE — 6370000000 HC RX 637 (ALT 250 FOR IP): Performed by: PSYCHIATRY & NEUROLOGY

## 2024-06-17 PROCEDURE — 6370000000 HC RX 637 (ALT 250 FOR IP): Performed by: INTERNAL MEDICINE

## 2024-06-17 RX ORDER — BENZONATATE 100 MG/1
100 CAPSULE ORAL 3 TIMES DAILY PRN
Status: DISCONTINUED | OUTPATIENT
Start: 2024-06-17 | End: 2024-06-18 | Stop reason: HOSPADM

## 2024-06-17 RX ORDER — AMLODIPINE BESYLATE 5 MG/1
5 TABLET ORAL DAILY
Status: DISCONTINUED | OUTPATIENT
Start: 2024-06-17 | End: 2024-06-18 | Stop reason: HOSPADM

## 2024-06-17 RX ORDER — FUROSEMIDE 10 MG/ML
40 INJECTION INTRAMUSCULAR; INTRAVENOUS ONCE
Status: COMPLETED | OUTPATIENT
Start: 2024-06-17 | End: 2024-06-17

## 2024-06-17 RX ORDER — SODIUM CHLORIDE 0.9 % (FLUSH) 0.9 %
5-40 SYRINGE (ML) INJECTION 2 TIMES DAILY
Status: DISCONTINUED | OUTPATIENT
Start: 2024-06-17 | End: 2024-06-18 | Stop reason: HOSPADM

## 2024-06-17 RX ORDER — IPRATROPIUM BROMIDE AND ALBUTEROL SULFATE 2.5; .5 MG/3ML; MG/3ML
1 SOLUTION RESPIRATORY (INHALATION)
Status: DISCONTINUED | OUTPATIENT
Start: 2024-06-17 | End: 2024-06-18 | Stop reason: HOSPADM

## 2024-06-17 RX ORDER — SODIUM CHLORIDE 9 MG/ML
INJECTION, SOLUTION INTRAVENOUS CONTINUOUS
Status: DISCONTINUED | OUTPATIENT
Start: 2024-06-17 | End: 2024-06-17

## 2024-06-17 RX ORDER — OSELTAMIVIR PHOSPHATE 75 MG/1
75 CAPSULE ORAL 2 TIMES DAILY
Status: DISCONTINUED | OUTPATIENT
Start: 2024-06-17 | End: 2024-06-17

## 2024-06-17 RX ADMIN — HYDROCODONE BITARTRATE AND ACETAMINOPHEN 1 TABLET: 10; 325 TABLET ORAL at 17:17

## 2024-06-17 RX ADMIN — PREGABALIN 100 MG: 50 CAPSULE ORAL at 13:56

## 2024-06-17 RX ADMIN — SODIUM ZIRCONIUM CYCLOSILICATE 10 G: 10 POWDER, FOR SUSPENSION ORAL at 12:19

## 2024-06-17 RX ADMIN — HYDROCODONE BITARTRATE AND ACETAMINOPHEN 1 TABLET: 10; 325 TABLET ORAL at 21:22

## 2024-06-17 RX ADMIN — METFORMIN HYDROCHLORIDE 1000 MG: 500 TABLET ORAL at 09:09

## 2024-06-17 RX ADMIN — SODIUM CHLORIDE, PRESERVATIVE FREE 10 ML: 5 INJECTION INTRAVENOUS at 20:58

## 2024-06-17 RX ADMIN — INSULIN LISPRO 1 UNITS: 100 INJECTION, SOLUTION INTRAVENOUS; SUBCUTANEOUS at 12:19

## 2024-06-17 RX ADMIN — BENZONATATE 100 MG: 100 CAPSULE ORAL at 05:08

## 2024-06-17 RX ADMIN — AMLODIPINE BESYLATE 5 MG: 5 TABLET ORAL at 12:18

## 2024-06-17 RX ADMIN — CLONIDINE HYDROCHLORIDE 0.1 MG: 0.1 TABLET ORAL at 17:13

## 2024-06-17 RX ADMIN — APIXABAN 5 MG: 5 TABLET, FILM COATED ORAL at 09:08

## 2024-06-17 RX ADMIN — PREGABALIN 100 MG: 50 CAPSULE ORAL at 09:08

## 2024-06-17 RX ADMIN — Medication 500 MG: at 09:09

## 2024-06-17 RX ADMIN — FUROSEMIDE 40 MG: 10 INJECTION, SOLUTION INTRAMUSCULAR; INTRAVENOUS at 14:35

## 2024-06-17 RX ADMIN — BENZONATATE 100 MG: 100 CAPSULE ORAL at 13:56

## 2024-06-17 RX ADMIN — PREGABALIN 100 MG: 50 CAPSULE ORAL at 20:58

## 2024-06-17 RX ADMIN — IPRATROPIUM BROMIDE AND ALBUTEROL SULFATE 1 DOSE: 2.5; .5 SOLUTION RESPIRATORY (INHALATION) at 14:46

## 2024-06-17 RX ADMIN — ZINC SULFATE 220 MG (50 MG) CAPSULE 50 MG: CAPSULE at 09:08

## 2024-06-17 RX ADMIN — IPRATROPIUM BROMIDE AND ALBUTEROL SULFATE 1 DOSE: 2.5; .5 SOLUTION RESPIRATORY (INHALATION) at 19:03

## 2024-06-17 RX ADMIN — INSULIN LISPRO 1 UNITS: 100 INJECTION, SOLUTION INTRAVENOUS; SUBCUTANEOUS at 17:13

## 2024-06-17 RX ADMIN — BENZONATATE 100 MG: 100 CAPSULE ORAL at 23:55

## 2024-06-17 RX ADMIN — ACETAMINOPHEN 650 MG: 325 TABLET ORAL at 04:47

## 2024-06-17 RX ADMIN — APIXABAN 5 MG: 5 TABLET, FILM COATED ORAL at 20:58

## 2024-06-17 RX ADMIN — HYDROCODONE BITARTRATE AND ACETAMINOPHEN 1 TABLET: 10; 325 TABLET ORAL at 09:09

## 2024-06-17 ASSESSMENT — PAIN SCALES - GENERAL
PAINLEVEL_OUTOF10: 9
PAINLEVEL_OUTOF10: 3

## 2024-06-17 ASSESSMENT — PAIN DESCRIPTION - ORIENTATION
ORIENTATION: RIGHT

## 2024-06-17 ASSESSMENT — PAIN - FUNCTIONAL ASSESSMENT
PAIN_FUNCTIONAL_ASSESSMENT: ACTIVITIES ARE NOT PREVENTED

## 2024-06-17 ASSESSMENT — PAIN DESCRIPTION - LOCATION
LOCATION: LEG

## 2024-06-17 ASSESSMENT — PAIN DESCRIPTION - DESCRIPTORS
DESCRIPTORS: THROBBING
DESCRIPTORS: THROBBING
DESCRIPTORS: ACHING;DISCOMFORT

## 2024-06-17 NOTE — CONSULTS
Nephrology ([facility]) Consult Note      Patient:  Noe Jimenez  YOB: 1965  Date of Service: 6/17/2024  MRN: [id number]   Acct: [id number]   Primary Care Physician: Tigre Meeks MD  Advance Directive: Full Code  Admit Date: 6/7/2024       Hospital Day: 10  Referring Provider: Eduardo Ryan MD    Patient independently seen and examined, Chart, Consults, Notes, Operative notes, Labs, Cardiology, and Radiology studies reviewed as available.    Chief complaint: Abnormal labs.    Subjective:  Noe Jimenez is a 59 y.o. male for whom we were consulted for evaluation and treatment of acute kidney injury.  Patient denies any history of chronic kidney disease.  However he has 1 episode of acute kidney injury during last hospitalization at [facility] which is resolved, during this hospitalization he underwent right BKA.  Patient also has history of atrial fibrillation, CHF, hypertension, hyperlipidemia, type 2 diabetes and chronic cellulitis/osteomyelitis of right foot.  He is now admitted to [facility] for physical therapy and rehabilitation.    This morning, he feels better.  He denies any shortness of breath or dyspnea on exertion.  He has good urine output and has no difficulty urination.    Allergies:  Dilaudid [hydromorphone], Morphine, and Percocet [oxycodone-acetaminophen]    Medicines:  Current Facility-Administered Medications   Medication Dose Route Frequency Provider Last Rate Last Admin    benzonatate (TESSALON) capsule 100 mg  100 mg Oral TID PRN Eduardo Ryan MD   100 mg at 06/17/24 0508    cloNIDine (CATAPRES) tablet 0.1 mg  0.1 mg Oral Q4H PRN Eduardo Ryan MD   0.1 mg at 06/14/24 1757    HYDROcodone-acetaminophen (NORCO)  MG per tablet 1 tablet  1 tablet Oral Q4H PRN Eduardo Ryan MD   1 tablet at 06/17/24 0909    apixaban (ELIQUIS) tablet 5 mg  5 mg Oral BID Eduardo Ryan MD   5 mg at 06/17/24 0908    vitamin C 
abnormality.  No soft tissue abnormality.  Upper Abdomen: Within normal limits.        No acute cardiopulmonary process    ______________________________________ Electronically signed by: LAWANDA MCALLISTER M.D. Date:     06/17/2024 Time:    11:50     US Renal Bilateral    Result Date: 5/27/2024  US RENAL BILATERAL- 5/27/2024 11:11 AM HISTORY: Acute kidney injury; N17.9-Acute kidney failure, unspecified; S91.301S-Unspecified open wound, right foot, sequela COMPARISON: None available.   TECHNIQUE: Multiple longitudinal and transverse real-time sonographic images of the kidneys and urinary bladder are obtained. Report and images stored per institutional and state regulations. FINDINGS: Visualized proximal abdominal aorta and IVC are unremarkable. RIGHT KIDNEY: Right kidney is 11.5 cm in length. Renal cortex is unremarkable. There is no hydronephrosis.. LEFT KIDNEY: Left kidney is 11.5 cm in length. Left renal cortex is unremarkable. There is no left hydronephrosis. PELVIS: Urinary bladder is grossly unremarkable.    Unremarkable kidneys with no hydronephrosis.. This report was signed and finalized on 5/27/2024 1:00 PM by Julius Wiley.            ASSESSMENT AND PLAN:  Principal Problem:    S/P BKA (below knee amputation) unilateral, right ([facility])  Active Problems:    Lumbar post-laminectomy syndrome    Peripheral neuropathic pain    Type 2 diabetes mellitus with complication, without long-term current use of insulin ([facility])    Essential hypertension    Atrial fibrillation ([facility])    Anemia    CHF (congestive heart failure) ([facility])    Renal dysfunction  Resolved Problems:    * No resolved hospital problems. *  Principal Problem:    S/P BKA (below knee amputation) unilateral, right ([facility])   Wound culture of drainage  Active Problems:    Lumbar post-laminectomy syndrome    Peripheral neuropathic pain   Cont current meds    Type 2 diabetes mellitus with complication, without long-term current use of insulin ([facility])   Low-dose insulin

## 2024-06-17 NOTE — PATIENT CARE CONFERENCE
[facility] ACUTE INPATIENT REHABILITATION  TEAM CONFERENCE NOTE    Date: 2024  Patient Name: Noe Jimenez        MRN: [id number]    : 1965  (59 y.o.)  Gender: male      Diagnosis: RIGHT BKA      PHYSICAL THERAPY  GROSS ASSESSMENT       BED MOBILITY  Bed mobility  Rolling to Left: Stand by assistance  Rolling to Right: Stand by assistance  Supine to Sit: Contact guard assistance, Minimal assistance  Sit to Supine: Contact guard assistance, Minimal assistance       TRANSFERS  Transfers  Sit to Stand: Maximum Assistance, Moderate Assistance (FROM WC AND // BARS.  TENDENCY TO EXTEND KNEE RATHER THAN EXERT DOWNWARD FORCE THROUGH FOOT CAUSING FOOT TO SLIGHT FORWARD.)  Stand to Sit: Moderate Assistance, Minimal Assistance  Bed to Chair: Minimal assistance, Contact guard assistance (PARRTIAL STAND  PIVOT TO LEFT. PATIENT SET UP WC WITH VERBAL CUES)  Comment: BLOCK LEFT KNEE FOR SIT TO STAND  WHEELCHAIR PROPULSION  Propulsion 1  Propulsion: Manual  Level: Level Tile  Method: MARCELINO SMITH  Level of Assistance: Stand by assistance  Description/ Details: pt. able to operate WC with minimal steering assistance  Distance: 200'  AMBULATION  Ambulation  Surface: Level tile  Device: Parallel Bars  Other Apparatus: Wheelchair follow  Assistance: Minimal assistance  Quality of Gait: short step length and height due to poor scapular depression and weightbearing in UE  Gait Deviations: Decreased step length, Decreased step height  Distance: 12ft  Comments: pt. instructed to decrease speed, press up through toe and have sustained scapular depression through LLE swing phase.  More Ambulation?: Yes  STAIRS     GOALS:  Short Term Goals  Time Frame for Short Term Goals: 1 WEEK  Short Term Goal 1: BED MOBILITY SBA WITH RAILS  Short Term Goal 2: TF SURFACE TO SURFACE CGA  Short Term Goal 3: AMBULATE 10 FT WITH RW MOD A  Short Term Goal 4: PROPEL  FT SBA  Short Term Goal 5: CAR TF MOD A WITH OR WITHOUT A.D.    Long Term 
(including prior to admit): 06/14/24    Other Nursing Issues:   Patient is Alert and Oriented.  Incontinent of bladder at times.  IID to left forearm, Accu chek ac & hs wuith sliding scale.  Non productive cough.  Contact Isolation for Flu.  Assist x 1 for transfers.  Clonidine prn for elevated /90.   Patient is on Regular Carb control, low Potassium diet and takes  meds whole with water.        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     Discharge Plan   Estimated Length of Stay: currently pending medical need  Destination:  goal to return to home with wife, has appropriately constructed ramp    Pass: No    Services at Discharge: Home Health  Physical Therapy, Occupational Therapy, and Nursing pending workmans comp case management and Homecare case management negotiations    Equipment at Discharge: RW, bedrail    Progress made in the prior week:  IMPROVED QUALITY AMBULATION WITH RW  2. CGA for overall toileting   3. DME arranged  4.  5.      Goals for following week:  INDEP BED MOB  2. Complete FTD and prepare for discharge  3.   4.   5.     Factors facilitating achievement of predicted outcomes: Family support, Motivated, Cooperative, and Pleasant    Barriers to the achievement of predicted outcomes: Pain, Decreased endurance, Decreased sensation, Decreased proprioception, Upper extremity weakness, and Lower extremity weakness    Team Members Present at Conference:  Medical Director (Team Conference Leader): Eduardo Ryan MD  : LUKASZ Coleman   Occupational Therapist: Elena Calvin, OTR/L  Physical Therapist: López Mars PT,DPT  Speech Therapist: N/A  Nurse: Yennifer Robertson RN,BSN   Nurse Manager:  Yennifer Robertson RN, BSN  Dietitian:  Polly Lozada, MS, LD, RD      As the Medical Director:  I was physically present and lead the team conference discussion, and I approve the established

## 2024-06-18 ENCOUNTER — HOSPITAL ENCOUNTER (INPATIENT)
Age: 59
LOS: 7 days | Discharge: NURSING, REHAB OR LONG-TERM CARE | DRG: 871 | End: 2024-06-25
Attending: STUDENT IN AN ORGANIZED HEALTH CARE EDUCATION/TRAINING PROGRAM | Admitting: INTERNAL MEDICINE
Payer: COMMERCIAL

## 2024-06-18 ENCOUNTER — APPOINTMENT (OUTPATIENT)
Dept: GENERAL RADIOLOGY | Age: 59
DRG: 559 | End: 2024-06-18
Attending: PSYCHIATRY & NEUROLOGY
Payer: COMMERCIAL

## 2024-06-18 ENCOUNTER — HOSPITAL ENCOUNTER (OUTPATIENT)
Dept: NUCLEAR MEDICINE | Age: 59
Discharge: HOME | DRG: 559 | End: 2024-06-20
Attending: PSYCHIATRY & NEUROLOGY | Admitting: STUDENT IN AN ORGANIZED HEALTH CARE EDUCATION/TRAINING PROGRAM
Payer: COMMERCIAL

## 2024-06-18 VITALS
WEIGHT: 208.3 LBS | SYSTOLIC BLOOD PRESSURE: 134 MMHG | TEMPERATURE: 100.3 F | RESPIRATION RATE: 20 BRPM | OXYGEN SATURATION: 89 % | BODY MASS INDEX: 29.82 KG/M2 | DIASTOLIC BLOOD PRESSURE: 80 MMHG | HEIGHT: 70 IN | HEART RATE: 87 BPM

## 2024-06-18 DIAGNOSIS — I48.19 PERSISTENT ATRIAL FIBRILLATION (HCC): Primary | ICD-10-CM

## 2024-06-18 DIAGNOSIS — R06.02 SHORTNESS OF BREATH: ICD-10-CM
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
## 2024-06-18 PROBLEM — J18.9 HAP (HOSPITAL-ACQUIRED PNEUMONIA): Status: ACTIVE | Noted: 2024-06-18

## 2024-06-18 PROBLEM — J18.9 HOSPITAL-ACQUIRED PNEUMONIA: Status: ACTIVE | Noted: 2024-06-18

## 2024-06-18 PROBLEM — E55.9 VITAMIN D DEFICIENCY: Status: ACTIVE | Noted: 2024-06-18

## 2024-06-18 LAB
25(OH)D3 SERPL-MCNC: 9 NG/ML
ALBUMIN SERPL-MCNC: 2.7 G/DL (ref 3.5–5.2)
ALP SERPL-CCNC: 233 U/L (ref 40–130)
ALT SERPL-CCNC: 15 U/L (ref 5–41)
ANION GAP SERPL CALCULATED.3IONS-SCNC: 17 MMOL/L (ref 7–19)
AST SERPL-CCNC: 21 U/L (ref 5–40)
BASOPHILS # BLD: 0.1 K/UL (ref 0–0.2)
BASOPHILS NFR BLD: 0.5 % (ref 0–1)
BILIRUB SERPL-MCNC: 0.5 MG/DL (ref 0.2–1.2)
BNP BLD-MCNC: ABNORMAL PG/ML (ref 0–124)
BUN SERPL-MCNC: 60 MG/DL (ref 6–20)
CALCIUM SERPL-MCNC: 8.1 MG/DL (ref 8.6–10)
CHLORIDE SERPL-SCNC: 101 MMOL/L (ref 98–111)
CO2 SERPL-SCNC: 17 MMOL/L (ref 22–29)
CREAT SERPL-MCNC: 2 MG/DL (ref 0.5–1.2)
D DIMER PPP FEU-MCNC: 8.24 UG/ML FEU (ref 0–0.48)
EOSINOPHIL # BLD: 0 K/UL (ref 0–0.6)
EOSINOPHIL NFR BLD: 0.1 % (ref 0–5)
ERYTHROCYTE [DISTWIDTH] IN BLOOD BY AUTOMATED COUNT: 17.4 % (ref 11.5–14.5)
FERRITIN SERPL-MCNC: 322.8 NG/ML (ref 30–400)
GLUCOSE BLD-MCNC: 184 MG/DL (ref 70–99)
GLUCOSE BLD-MCNC: 257 MG/DL (ref 70–99)
GLUCOSE BLD-MCNC: 268 MG/DL (ref 70–99)
GLUCOSE BLD-MCNC: 308 MG/DL (ref 70–99)
GLUCOSE SERPL-MCNC: 324 MG/DL (ref 74–109)
HCT VFR BLD AUTO: 22.7 % (ref 42–52)
HGB BLD-MCNC: 7.1 G/DL (ref 14–18)
IMM GRANULOCYTES # BLD: 0.1 K/UL
IRON SATN MFR SERPL: 6 % (ref 14–50)
IRON SERPL-MCNC: 13 UG/DL (ref 59–158)
LACTATE BLDV-SCNC: 2.1 MMOL/L (ref 0.5–1.9)
LYMPHOCYTES # BLD: 0.8 K/UL (ref 1.1–4.5)
LYMPHOCYTES NFR BLD: 6.7 % (ref 20–40)
MCH RBC QN AUTO: 28.6 PG (ref 27–31)
MCHC RBC AUTO-ENTMCNC: 31.3 G/DL (ref 33–37)
MCV RBC AUTO: 91.5 FL (ref 80–94)
MONOCYTES # BLD: 1 K/UL (ref 0–0.9)
MONOCYTES NFR BLD: 8.3 % (ref 0–10)
NEUTROPHILS # BLD: 10.3 K/UL (ref 1.5–7.5)
NEUTS SEG NFR BLD: 83.4 % (ref 50–65)
PERFORMED ON: ABNORMAL
PLATELET # BLD AUTO: 213 K/UL (ref 130–400)
PMV BLD AUTO: 10.2 FL (ref 9.4–12.4)
POTASSIUM SERPL-SCNC: 4.6 MMOL/L (ref 3.5–5)
PROCALCITONIN: 4.1 NG/ML (ref 0–0.09)
PROT SERPL-MCNC: 6.6 G/DL (ref 6.6–8.7)
RBC # BLD AUTO: 2.48 M/UL (ref 4.7–6.1)
SODIUM SERPL-SCNC: 135 MMOL/L (ref 136–145)
TIBC SERPL-MCNC: 205 UG/DL (ref 250–400)
WBC # BLD AUTO: 12.4 K/UL (ref 4.8–10.8)

## 2024-06-18 PROCEDURE — 82962 GLUCOSE BLOOD TEST: CPT

## 2024-06-18 PROCEDURE — 94640 AIRWAY INHALATION TREATMENT: CPT

## 2024-06-18 PROCEDURE — 6370000000 HC RX 637 (ALT 250 FOR IP): Performed by: PSYCHIATRY & NEUROLOGY

## 2024-06-18 PROCEDURE — 2580000003 HC RX 258: Performed by: NURSE PRACTITIONER

## 2024-06-18 PROCEDURE — 6360000002 HC RX W HCPCS: Performed by: INTERNAL MEDICINE

## 2024-06-18 PROCEDURE — 6360000002 HC RX W HCPCS: Performed by: NURSE PRACTITIONER

## 2024-06-18 PROCEDURE — 6370000000 HC RX 637 (ALT 250 FOR IP): Performed by: NURSE PRACTITIONER

## 2024-06-18 PROCEDURE — 6360000002 HC RX W HCPCS: Performed by: PSYCHIATRY & NEUROLOGY

## 2024-06-18 PROCEDURE — 80053 COMPREHEN METABOLIC PANEL: CPT

## 2024-06-18 PROCEDURE — 84165 PROTEIN E-PHORESIS SERUM: CPT

## 2024-06-18 PROCEDURE — 85379 FIBRIN DEGRADATION QUANT: CPT

## 2024-06-18 PROCEDURE — 51798 US URINE CAPACITY MEASURE: CPT

## 2024-06-18 PROCEDURE — 78580 LUNG PERFUSION IMAGING: CPT

## 2024-06-18 PROCEDURE — 36415 COLL VENOUS BLD VENIPUNCTURE: CPT

## 2024-06-18 PROCEDURE — 2700000000 HC OXYGEN THERAPY PER DAY

## 2024-06-18 PROCEDURE — 3430000000 HC RX DIAGNOSTIC RADIOPHARMACEUTICAL: Performed by: NURSE PRACTITIONER

## 2024-06-18 PROCEDURE — 85025 COMPLETE CBC W/AUTO DIFF WBC: CPT

## 2024-06-18 PROCEDURE — 84155 ASSAY OF PROTEIN SERUM: CPT

## 2024-06-18 PROCEDURE — 94761 N-INVAS EAR/PLS OXIMETRY MLT: CPT

## 2024-06-18 PROCEDURE — 2580000003 HC RX 258: Performed by: PSYCHIATRY & NEUROLOGY

## 2024-06-18 PROCEDURE — 6370000000 HC RX 637 (ALT 250 FOR IP): Performed by: INTERNAL MEDICINE

## 2024-06-18 PROCEDURE — 84145 PROCALCITONIN (PCT): CPT

## 2024-06-18 PROCEDURE — 1200000000 HC SEMI PRIVATE

## 2024-06-18 PROCEDURE — 94760 N-INVAS EAR/PLS OXIMETRY 1: CPT

## 2024-06-18 PROCEDURE — 99239 HOSP IP/OBS DSCHRG MGMT >30: CPT | Performed by: PSYCHIATRY & NEUROLOGY

## 2024-06-18 PROCEDURE — 83605 ASSAY OF LACTIC ACID: CPT

## 2024-06-18 PROCEDURE — A9540 TC99M MAA: HCPCS | Performed by: NURSE PRACTITIONER

## 2024-06-18 PROCEDURE — 71045 X-RAY EXAM CHEST 1 VIEW: CPT

## 2024-06-18 PROCEDURE — 83880 ASSAY OF NATRIURETIC PEPTIDE: CPT

## 2024-06-18 RX ORDER — GUAIFENESIN 600 MG/1
600 TABLET, EXTENDED RELEASE ORAL 3 TIMES DAILY
Status: DISCONTINUED | OUTPATIENT
Start: 2024-06-18 | End: 2024-06-25 | Stop reason: HOSPADM

## 2024-06-18 RX ORDER — SODIUM CHLORIDE 0.9 % (FLUSH) 0.9 %
5-40 SYRINGE (ML) INJECTION PRN
Status: DISCONTINUED | OUTPATIENT
Start: 2024-06-18 | End: 2024-06-25 | Stop reason: HOSPADM

## 2024-06-18 RX ORDER — ONDANSETRON 2 MG/ML
4 INJECTION INTRAMUSCULAR; INTRAVENOUS EVERY 6 HOURS PRN
Status: DISCONTINUED | OUTPATIENT
Start: 2024-06-18 | End: 2024-06-25 | Stop reason: HOSPADM

## 2024-06-18 RX ORDER — BENZONATATE 100 MG/1
100 CAPSULE ORAL 3 TIMES DAILY PRN
Status: DISCONTINUED | OUTPATIENT
Start: 2024-06-18 | End: 2024-06-21

## 2024-06-18 RX ORDER — PREGABALIN 50 MG/1
100 CAPSULE ORAL 3 TIMES DAILY
Status: CANCELLED | OUTPATIENT
Start: 2024-06-18

## 2024-06-18 RX ORDER — SODIUM CHLORIDE 0.9 % (FLUSH) 0.9 %
5-40 SYRINGE (ML) INJECTION 2 TIMES DAILY
Status: DISCONTINUED | OUTPATIENT
Start: 2024-06-18 | End: 2024-06-19 | Stop reason: SDUPTHER

## 2024-06-18 RX ORDER — POLYETHYLENE GLYCOL 3350 17 G/17G
17 POWDER, FOR SOLUTION ORAL DAILY PRN
Status: DISCONTINUED | OUTPATIENT
Start: 2024-06-18 | End: 2024-06-21

## 2024-06-18 RX ORDER — IPRATROPIUM BROMIDE AND ALBUTEROL SULFATE 2.5; .5 MG/3ML; MG/3ML
1 SOLUTION RESPIRATORY (INHALATION) EVERY 4 HOURS PRN
Status: CANCELLED | OUTPATIENT
Start: 2024-06-18

## 2024-06-18 RX ORDER — INSULIN LISPRO 100 [IU]/ML
0-4 INJECTION, SOLUTION INTRAVENOUS; SUBCUTANEOUS
Status: DISCONTINUED | OUTPATIENT
Start: 2024-06-18 | End: 2024-06-25 | Stop reason: HOSPADM

## 2024-06-18 RX ORDER — INSULIN LISPRO 100 [IU]/ML
0-4 INJECTION, SOLUTION INTRAVENOUS; SUBCUTANEOUS NIGHTLY
Status: DISCONTINUED | OUTPATIENT
Start: 2024-06-18 | End: 2024-06-25 | Stop reason: HOSPADM

## 2024-06-18 RX ORDER — SODIUM CHLORIDE 9 MG/ML
INJECTION, SOLUTION INTRAVENOUS CONTINUOUS
Status: DISCONTINUED | OUTPATIENT
Start: 2024-06-18 | End: 2024-06-18

## 2024-06-18 RX ORDER — DEXTROSE MONOHYDRATE 100 MG/ML
INJECTION, SOLUTION INTRAVENOUS CONTINUOUS PRN
Status: CANCELLED | OUTPATIENT
Start: 2024-06-18

## 2024-06-18 RX ORDER — BENZONATATE 100 MG/1
100 CAPSULE ORAL 3 TIMES DAILY PRN
Status: DISCONTINUED | OUTPATIENT
Start: 2024-06-18 | End: 2024-06-18

## 2024-06-18 RX ORDER — SODIUM CHLORIDE 0.9 % (FLUSH) 0.9 %
5-40 SYRINGE (ML) INJECTION EVERY 12 HOURS SCHEDULED
Status: DISCONTINUED | OUTPATIENT
Start: 2024-06-18 | End: 2024-06-25 | Stop reason: HOSPADM

## 2024-06-18 RX ORDER — ACETYLCYSTEINE 200 MG/ML
600 SOLUTION ORAL; RESPIRATORY (INHALATION)
Status: DISCONTINUED | OUTPATIENT
Start: 2024-06-18 | End: 2024-06-18 | Stop reason: HOSPADM

## 2024-06-18 RX ORDER — PREGABALIN 50 MG/1
100 CAPSULE ORAL 3 TIMES DAILY
Status: DISCONTINUED | OUTPATIENT
Start: 2024-06-18 | End: 2024-06-25 | Stop reason: HOSPADM

## 2024-06-18 RX ORDER — LEVOFLOXACIN 5 MG/ML
750 INJECTION, SOLUTION INTRAVENOUS
Status: DISCONTINUED | OUTPATIENT
Start: 2024-06-18 | End: 2024-06-22

## 2024-06-18 RX ORDER — SODIUM CHLORIDE 9 MG/ML
INJECTION, SOLUTION INTRAVENOUS PRN
Status: DISCONTINUED | OUTPATIENT
Start: 2024-06-18 | End: 2024-06-25 | Stop reason: HOSPADM

## 2024-06-18 RX ORDER — ACETYLCYSTEINE 200 MG/ML
600 SOLUTION ORAL; RESPIRATORY (INHALATION)
Status: CANCELLED | OUTPATIENT
Start: 2024-06-18 | End: 2024-06-21

## 2024-06-18 RX ORDER — POLYETHYLENE GLYCOL 3350 17 G/17G
17 POWDER, FOR SOLUTION ORAL DAILY PRN
Status: DISCONTINUED | OUTPATIENT
Start: 2024-06-18 | End: 2024-06-18

## 2024-06-18 RX ORDER — FUROSEMIDE 10 MG/ML
40 INJECTION INTRAMUSCULAR; INTRAVENOUS 2 TIMES DAILY
Status: DISCONTINUED | OUTPATIENT
Start: 2024-06-18 | End: 2024-06-18 | Stop reason: HOSPADM

## 2024-06-18 RX ORDER — ERGOCALCIFEROL 1.25 MG/1
50000 CAPSULE ORAL WEEKLY
Status: DISCONTINUED | OUTPATIENT
Start: 2024-06-25 | End: 2024-06-25 | Stop reason: HOSPADM

## 2024-06-18 RX ORDER — ACETYLCYSTEINE 200 MG/ML
600 SOLUTION ORAL; RESPIRATORY (INHALATION)
Status: DISCONTINUED | OUTPATIENT
Start: 2024-06-18 | End: 2024-06-21

## 2024-06-18 RX ORDER — INSULIN LISPRO 100 [IU]/ML
0-4 INJECTION, SOLUTION INTRAVENOUS; SUBCUTANEOUS NIGHTLY
Status: CANCELLED | OUTPATIENT
Start: 2024-06-18

## 2024-06-18 RX ORDER — LEVOFLOXACIN 5 MG/ML
500 INJECTION, SOLUTION INTRAVENOUS EVERY 24 HOURS
Status: DISCONTINUED | OUTPATIENT
Start: 2024-06-18 | End: 2024-06-18 | Stop reason: HOSPADM

## 2024-06-18 RX ORDER — AMLODIPINE BESYLATE 5 MG/1
5 TABLET ORAL DAILY
Status: DISCONTINUED | OUTPATIENT
Start: 2024-06-19 | End: 2024-06-19

## 2024-06-18 RX ORDER — ACETAMINOPHEN 325 MG/1
650 TABLET ORAL EVERY 4 HOURS PRN
Status: DISCONTINUED | OUTPATIENT
Start: 2024-06-18 | End: 2024-06-25 | Stop reason: HOSPADM

## 2024-06-18 RX ORDER — INSULIN LISPRO 100 [IU]/ML
0-4 INJECTION, SOLUTION INTRAVENOUS; SUBCUTANEOUS
Status: CANCELLED | OUTPATIENT
Start: 2024-06-18

## 2024-06-18 RX ORDER — NALOXONE HYDROCHLORIDE 0.4 MG/ML
0.4 INJECTION, SOLUTION INTRAMUSCULAR; INTRAVENOUS; SUBCUTANEOUS PRN
Status: CANCELLED | OUTPATIENT
Start: 2024-06-18

## 2024-06-18 RX ORDER — ONDANSETRON 4 MG/1
4 TABLET, ORALLY DISINTEGRATING ORAL EVERY 8 HOURS PRN
Status: DISCONTINUED | OUTPATIENT
Start: 2024-06-18 | End: 2024-06-25 | Stop reason: HOSPADM

## 2024-06-18 RX ORDER — CLONIDINE HYDROCHLORIDE 0.1 MG/1
0.1 TABLET ORAL EVERY 4 HOURS PRN
Status: CANCELLED | OUTPATIENT
Start: 2024-06-18

## 2024-06-18 RX ORDER — LEVOFLOXACIN 5 MG/ML
500 INJECTION, SOLUTION INTRAVENOUS EVERY 24 HOURS
Status: DISCONTINUED | OUTPATIENT
Start: 2024-06-18 | End: 2024-06-18 | Stop reason: DRUGHIGH

## 2024-06-18 RX ORDER — IPRATROPIUM BROMIDE AND ALBUTEROL SULFATE 2.5; .5 MG/3ML; MG/3ML
1 SOLUTION RESPIRATORY (INHALATION)
Status: CANCELLED | OUTPATIENT
Start: 2024-06-18

## 2024-06-18 RX ORDER — CLONIDINE HYDROCHLORIDE 0.1 MG/1
0.1 TABLET ORAL EVERY 4 HOURS PRN
Status: DISCONTINUED | OUTPATIENT
Start: 2024-06-18 | End: 2024-06-21

## 2024-06-18 RX ORDER — FUROSEMIDE 10 MG/ML
40 INJECTION INTRAMUSCULAR; INTRAVENOUS 2 TIMES DAILY
Status: COMPLETED | OUTPATIENT
Start: 2024-06-18 | End: 2024-06-19

## 2024-06-18 RX ORDER — BENZONATATE 100 MG/1
100 CAPSULE ORAL 3 TIMES DAILY PRN
Status: CANCELLED | OUTPATIENT
Start: 2024-06-18

## 2024-06-18 RX ORDER — NALOXONE HYDROCHLORIDE 0.4 MG/ML
0.4 INJECTION, SOLUTION INTRAMUSCULAR; INTRAVENOUS; SUBCUTANEOUS PRN
Status: DISCONTINUED | OUTPATIENT
Start: 2024-06-18 | End: 2024-06-25 | Stop reason: HOSPADM

## 2024-06-18 RX ORDER — DEXTROSE MONOHYDRATE 100 MG/ML
INJECTION, SOLUTION INTRAVENOUS CONTINUOUS PRN
Status: DISCONTINUED | OUTPATIENT
Start: 2024-06-18 | End: 2024-06-25 | Stop reason: HOSPADM

## 2024-06-18 RX ORDER — ERGOCALCIFEROL 1.25 MG/1
50000 CAPSULE ORAL WEEKLY
Status: DISCONTINUED | OUTPATIENT
Start: 2024-06-18 | End: 2024-06-18 | Stop reason: HOSPADM

## 2024-06-18 RX ORDER — FUROSEMIDE 10 MG/ML
40 INJECTION INTRAMUSCULAR; INTRAVENOUS 2 TIMES DAILY
Status: CANCELLED | OUTPATIENT
Start: 2024-06-18 | End: 2024-06-20

## 2024-06-18 RX ORDER — SODIUM CHLORIDE 0.9 % (FLUSH) 0.9 %
5-40 SYRINGE (ML) INJECTION 2 TIMES DAILY
Status: CANCELLED | OUTPATIENT
Start: 2024-06-18

## 2024-06-18 RX ORDER — IPRATROPIUM BROMIDE AND ALBUTEROL SULFATE 2.5; .5 MG/3ML; MG/3ML
1 SOLUTION RESPIRATORY (INHALATION) EVERY 4 HOURS PRN
Status: DISCONTINUED | OUTPATIENT
Start: 2024-06-18 | End: 2024-06-25 | Stop reason: HOSPADM

## 2024-06-18 RX ORDER — ERGOCALCIFEROL 1.25 MG/1
50000 CAPSULE ORAL WEEKLY
Status: CANCELLED | OUTPATIENT
Start: 2024-06-25

## 2024-06-18 RX ORDER — POLYETHYLENE GLYCOL 3350 17 G/17G
17 POWDER, FOR SOLUTION ORAL DAILY PRN
Status: CANCELLED | OUTPATIENT
Start: 2024-06-18

## 2024-06-18 RX ORDER — IPRATROPIUM BROMIDE AND ALBUTEROL SULFATE 2.5; .5 MG/3ML; MG/3ML
1 SOLUTION RESPIRATORY (INHALATION)
Status: DISCONTINUED | OUTPATIENT
Start: 2024-06-18 | End: 2024-06-25

## 2024-06-18 RX ORDER — HYDROCODONE BITARTRATE AND ACETAMINOPHEN 10; 325 MG/1; MG/1
1 TABLET ORAL EVERY 4 HOURS PRN
Status: CANCELLED | OUTPATIENT
Start: 2024-06-18

## 2024-06-18 RX ORDER — ACETAMINOPHEN 325 MG/1
650 TABLET ORAL EVERY 4 HOURS PRN
Status: CANCELLED | OUTPATIENT
Start: 2024-06-18

## 2024-06-18 RX ORDER — AMLODIPINE BESYLATE 5 MG/1
5 TABLET ORAL DAILY
Status: CANCELLED | OUTPATIENT
Start: 2024-06-19

## 2024-06-18 RX ORDER — HYDROCODONE BITARTRATE AND ACETAMINOPHEN 10; 325 MG/1; MG/1
1 TABLET ORAL EVERY 4 HOURS PRN
Status: DISCONTINUED | OUTPATIENT
Start: 2024-06-18 | End: 2024-06-25 | Stop reason: HOSPADM

## 2024-06-18 RX ORDER — IPRATROPIUM BROMIDE AND ALBUTEROL SULFATE 2.5; .5 MG/3ML; MG/3ML
1 SOLUTION RESPIRATORY (INHALATION) EVERY 4 HOURS PRN
Status: DISCONTINUED | OUTPATIENT
Start: 2024-06-18 | End: 2024-06-18 | Stop reason: HOSPADM

## 2024-06-18 RX ADMIN — HYDROCODONE BITARTRATE AND ACETAMINOPHEN 1 TABLET: 10; 325 TABLET ORAL at 20:27

## 2024-06-18 RX ADMIN — APIXABAN 5 MG: 5 TABLET, FILM COATED ORAL at 20:27

## 2024-06-18 RX ADMIN — AMLODIPINE BESYLATE 5 MG: 5 TABLET ORAL at 09:45

## 2024-06-18 RX ADMIN — APIXABAN 5 MG: 5 TABLET, FILM COATED ORAL at 09:45

## 2024-06-18 RX ADMIN — INSULIN LISPRO 3 UNITS: 100 INJECTION, SOLUTION INTRAVENOUS; SUBCUTANEOUS at 11:36

## 2024-06-18 RX ADMIN — PIPERACILLIN AND TAZOBACTAM 4500 MG: 4; .5 INJECTION, POWDER, LYOPHILIZED, FOR SOLUTION INTRAVENOUS at 15:22

## 2024-06-18 RX ADMIN — FUROSEMIDE 40 MG: 10 INJECTION, SOLUTION INTRAMUSCULAR; INTRAVENOUS at 17:24

## 2024-06-18 RX ADMIN — SODIUM ZIRCONIUM CYCLOSILICATE 10 G: 10 POWDER, FOR SUSPENSION ORAL at 09:45

## 2024-06-18 RX ADMIN — PIPERACILLIN AND TAZOBACTAM 3375 MG: 3; .375 INJECTION, POWDER, LYOPHILIZED, FOR SOLUTION INTRAVENOUS at 20:30

## 2024-06-18 RX ADMIN — INSULIN LISPRO 2 UNITS: 100 INJECTION, SOLUTION INTRAVENOUS; SUBCUTANEOUS at 17:27

## 2024-06-18 RX ADMIN — GUAIFENESIN 600 MG: 600 TABLET ORAL at 20:27

## 2024-06-18 RX ADMIN — PREGABALIN 100 MG: 50 CAPSULE ORAL at 20:27

## 2024-06-18 RX ADMIN — ERGOCALCIFEROL 50000 UNITS: 1.25 CAPSULE ORAL at 09:45

## 2024-06-18 RX ADMIN — Medication 5 MILLICURIE: at 12:55

## 2024-06-18 RX ADMIN — IPRATROPIUM BROMIDE AND ALBUTEROL SULFATE 1 DOSE: 2.5; .5 SOLUTION RESPIRATORY (INHALATION) at 06:56

## 2024-06-18 RX ADMIN — HYDROCODONE BITARTRATE AND ACETAMINOPHEN 1 TABLET: 10; 325 TABLET ORAL at 09:44

## 2024-06-18 RX ADMIN — IPRATROPIUM BROMIDE AND ALBUTEROL SULFATE 1 DOSE: 2.5; .5 SOLUTION RESPIRATORY (INHALATION) at 10:11

## 2024-06-18 RX ADMIN — BENZONATATE 100 MG: 100 CAPSULE ORAL at 20:27

## 2024-06-18 RX ADMIN — FUROSEMIDE 40 MG: 10 INJECTION, SOLUTION INTRAMUSCULAR; INTRAVENOUS at 10:32

## 2024-06-18 RX ADMIN — GUAIFENESIN 600 MG: 600 TABLET ORAL at 15:19

## 2024-06-18 RX ADMIN — INSULIN LISPRO 2 UNITS: 100 INJECTION, SOLUTION INTRAVENOUS; SUBCUTANEOUS at 09:46

## 2024-06-18 RX ADMIN — LEVOFLOXACIN 750 MG: 5 INJECTION, SOLUTION INTRAVENOUS at 17:27

## 2024-06-18 RX ADMIN — PREGABALIN 100 MG: 50 CAPSULE ORAL at 15:19

## 2024-06-18 RX ADMIN — PREGABALIN 100 MG: 50 CAPSULE ORAL at 09:44

## 2024-06-18 RX ADMIN — SODIUM CHLORIDE, PRESERVATIVE FREE 10 ML: 5 INJECTION INTRAVENOUS at 09:35

## 2024-06-18 RX ADMIN — ACETYLCYSTEINE 600 MG: 200 SOLUTION ORAL; RESPIRATORY (INHALATION) at 10:11

## 2024-06-18 RX ADMIN — IPRATROPIUM BROMIDE AND ALBUTEROL SULFATE 1 DOSE: 2.5; .5 SOLUTION RESPIRATORY (INHALATION) at 15:19

## 2024-06-18 ASSESSMENT — PAIN SCALES - GENERAL
PAINLEVEL_OUTOF10: 9
PAINLEVEL_OUTOF10: 9

## 2024-06-18 ASSESSMENT — PAIN DESCRIPTION - LOCATION
LOCATION: LEG
LOCATION: LEG

## 2024-06-18 ASSESSMENT — PAIN DESCRIPTION - ORIENTATION
ORIENTATION: RIGHT
ORIENTATION: RIGHT

## 2024-06-18 ASSESSMENT — PAIN DESCRIPTION - DESCRIPTORS
DESCRIPTORS: ACHING;DISCOMFORT
DESCRIPTORS: DISCOMFORT;THROBBING

## 2024-06-18 NOTE — PROGRESS NOTES
06/08/24 1100   Bed mobility   Supine to Sit Contact guard assistance;Stand by assistance  (TRIPLANAR FROM LEFT)   Sit to Supine Minimal assistance;Contact guard assistance  (TRIPLANAR TO LEFT)   Transfers   Bed to Chair Minimal assistance;Contact guard assistance  (PARRTIAL STAND  PIVOT TO LEFT. PATIENT SET UP WC WITH VERBAL CUES)   PT Exercises   Exercise Treatment SUPINE LEFT ANKEL PUMPS, BILAT QUAD SETS, GLUTE SETS, HEEL SLIDES (KNEE FLEXION) SUPINE HIP ABD, LEFT LEG PRESS, SAQ   PROM Exercises RIGHT STRAIGHT LEG Raise X10   Assessment   Assessment TOLERATED SUPINE EX WELL.   Safety Devices   Type of Devices Chair alarm in place;Call light within reach;Left in chair  (also direct transfer of care to PT at 0900)       
   06/11/24 1054   Encounter Summary   Encounter Overview/Reason Advance Care Planning   Service Provided For Patient and family together  (the wife)   Referral/Consult From Nurse   Support System Spouse   Complexity of Encounter Low   Begin Time 0945   End Time  1056   Total Time Calculated 71 min   Spiritual/Emotional needs   Type Spiritual Support   Assessment/Intervention/Outcome   Assessment Concerns with suffering;Hopeful   Intervention Active listening;Discussed belief system/Sikhism practices/aroldo;Explored/Affirmed feelings, thoughts, concerns   Outcome Expressed feelings, needs, and concerns   Plan and Referrals   Plan/Referrals Continue to visit, (comment)     Received referral to assist with LW. Patient was in PT. Paper work and ACP discussion provided for wife in patient's room. Family request a return visit to complete the LW later.     Electronically signed by Chaplain Kamila Maya on 6/11/2024 at 11:00 AM  
 Occupational Therapy Initial Assessment  Date: 2024   Patient Name: Noe Jimenez  MRN: [id number]     : 1965    Date of Service: 2024    Discharge Recommendations:  Home with assist PRN  OT Equipment Recommendations  Other: to be determined    Assessment   Treatment Diagnosis: Right Above Knee Amputation  History: JANICE, ABL anemia, A fib.  Right foot injury at work 2 years ago requiring 5 surgeries.  Hx of right rotator cuff repair  Activity Tolerance  Activity Tolerance: Patient Tolerated treatment well           Patient Diagnosis(es): There were no encounter diagnoses.   has a past medical history of Diabetes mellitus (HCC) and Hypertension.   has a past surgical history that includes Cardiac surgery; back surgery; fracture surgery; and Rotator cuff repair (Right).    Treatment Diagnosis: Right Above Knee Amputation      Restrictions  Restrictions/Precautions  Restrictions/Precautions: Fall Risk, Weight Bearing  Lower Extremity Weight Bearing Restrictions  Right Lower Extremity Weight Bearing: Non Weight Bearing  Left Lower Extremity Weight Bearing: Weight Bearing As Tolerated  Position Activity Restriction  Other position/activity restrictions: [employer]    Subjective   General  Chart Reviewed: Yes  Patient assessed for rehabilitation services?: Yes  Family / Caregiver Present: Yes (spouse)      Social/Functional History  Social/Functional History  Lives With: Spouse  Type of Home: House  Home Layout: One level  Home Access: Ramped entrance  Bathroom Shower/Tub: Tub/Shower unit (Has been approved for walk in bathtub by workman's comp per chart, but hasn't been done yet.  The patient wants the whirlpool properties that this offers.)  Bathroom Toilet: Handicap height  Bathroom Equipment: Safety frame  Has the patient had two or more falls in the past year or any fall with injury in the past year?: No  Additional Comments: Has a wheelchair and a BSC that has been delivered to the home that the patient 
4 Eyes Skin Assessment     NAME:  Noe Jimenez  YOB: 1965  MEDICAL RECORD NUMBER:  [id number]    The patient is being assessed for  Admission    I agree that at least one RN has performed a thorough Head to Toe Skin Assessment on the patient. ALL assessment sites listed below have been assessed.      Areas assessed by both nurses:    Head, Face, Ears, Shoulders, Back, Chest, Arms, Elbows, Hands, Sacrum. Buttock, Coccyx, Ischium, Legs. Feet and Heels, and Under Medical Devices         Does the Patient have a Wound? Yes wound(s) were present on assessment. LDA wound assessment was Initiated and completed by RN       Osmar Prevention initiated by RN: Yes  Wound Care Orders initiated by RN: Yes    Pressure Injury (Stage 3,4, Unstageable, DTI, NWPT, and Complex wounds) if present, place Wound referral order by RN under : No    New Ostomies, if present place, Ostomy referral order under : No     Nurse 1 eSignature: Electronically signed by Sophia Yeh RN on 6/7/24 at 7:40 PM CDT    **SHARE this note so that the co-signing nurse can place an eSignature**    Nurse 2 eSignature: Electronically signed by Yennifer Reis LPN on 6/7/24 at 7:43 PM CDT   
Comprehensive Nutrition Assessment    Type and Reason for Visit:  Initial    Nutrition Recommendations/Plan:   Food preferences updated.     Malnutrition Assessment:  Malnutrition Status:  No malnutrition (06/08/24 1333)    Context:  Acute Illness     Findings of the 6 clinical characteristics of malnutrition:  Energy Intake:  No significant decrease in energy intake  Weight Loss:  No significant weight loss     Body Fat Loss:  No significant body fat loss     Muscle Mass Loss:  No significant muscle mass loss    Fluid Accumulation:  Mild  (L ankle)   Strength:  Not Performed    Nutrition Assessment:    Following for new admission to inpatient rehab unit. Pt is s/p R BKA. He denied any change in his appetite but stated that he did eat better at lunch following therapy compared to breakfast today. Documented intake: 51-75%. Food preferences obtained to support adequate intake. Pt likes cereal at breakfast (Corn Flakes, Cheerios), and he prefers unsweet tea. Wt hx shows no signficant wt changes. Current therapeutic diet appropriate. Will monitor intakes and implement appropriate nutrition intervention prn.    Nutrition Related Findings:    BM 6/7. +1 non-pitting L ankle edema. Glu 171, accuchek's 236, 110.         Current Nutrition Intake & Therapies:    Average Meal Intake: 51-75%, %  Average Supplements Intake: None Ordered  ADULT DIET; Regular; 4 carb choices (60 gm/meal)    Anthropometric Measures:  Height: 177.8 cm (5' 10\")  Ideal Body Weight (IBW): 166 lbs (75 kg)    Current Body Weight: 84.6 kg (186 lb 8.2 oz), 112.4 % IBW.    Current BMI (kg/m2): 26.8  Usual Body Weight: 83 kg (182 lb 15.7 oz) (4/30/24)  % Weight Change (Calculated): 1.9  Weight Adjustment For: Amputation  Total Adjusted Percentage (Calculated): 5.9  Adjusted Ideal Body Weight (lbs) (Calculated): 156.2 lbs  Adjusted Ideal Body Weight (kg) (Calculated): 71 kg  Adjusted % Ideal Body Weight (Calculated): 119.4  Adjusted BMI (kg/m2) 
Durable Medical Equipment   Physician Order     Patient Name Noe Jimenez    Address  [address] [facility] [place] [address] Phone  [phone] (Home)     Patient Height Height: 177.8 cm (5' 10\")  Patient Weight 93.3 kg (205 lb 11.2 oz)   1965     Patient Contacts  Name Relation Home Work Mobile   Kristin Jimenez Spouse [phone]       Coverage Information   F/O Payor/Plan Precert #   GENERIC MCO/GENERIC M[employer]     Subscriber Subscriber #   [employer] [id number]   Address Phone   [address]  [place] [address]        DME NEEDS:  **WALKER WITH WHEELS  **PARTIAL BED RAIL      DIAGNOSIS:    ICD-10-CM Priority Class Noted POA       S/P BKA (below knee amputation) unilateral, right (HCC) Z89.511   2024 Yes   Lumbar post-laminectomy syndrome M96.1   2016 Yes   Peripheral neuropathic pain M79.2   2016 Yes   Type 2 diabetes mellitus with complication, without long-term current use of insulin ([facility]) E11.8   2024 Yes   Essential hypertension I10   2024 Yes   Atrial fibrillation ([facility]) I48.91   2024 Yes   Anemia D64.9   2024 Yes   CHF (congestive heart failure) ([facility]) I50.9   2024 Yes   Renal dysfunction N28.9   2024 Yes     [facility]   History and Physical           Patient:                                    Noe Jimenez  MR#:                                        [id number]  Account Number:                   [id number]              Room:                                      0826/826-02      YOB: 1965  Date of Progress Note:           2024  Time of Note                           8:38 AM  Attending Physician:               Eduardo Ryan MD           Admitting diagnosis:Other complications of procedures, not elsewhere classified, initial encounter status post right BKA     Secondary diagnoses:Acute kidney failure, unspecified,Acute posthemorrhagic anemia,Paroxysmal atrial fibrillation,Essential (primary) 
Facility/Department: [facility] 8 REHAB UNIT  Occupational Therapy Treatment Note    Name: Noe Jimenez  : 1965  MRN: [id number]  Date of Service: 6/10/2024    Discharge Recommendations:  Home with assist PRN          Patient Diagnosis(es): There were no encounter diagnoses.  Past Medical History:  has a past medical history of Diabetes mellitus (HCC) and Hypertension.  Past Surgical History:  has a past surgical history that includes Cardiac surgery; back surgery; fracture surgery; and Rotator cuff repair (Right).    Treatment Diagnosis: Right Above Knee Amputation    Assessment   Performance deficits / Impairments: Decreased functional mobility ;Decreased ADL status;Decreased strength;Decreased endurance;Decreased balance;Decreased high-level IADLs  Treatment Diagnosis: Right Above Knee Amputation  Activity Tolerance  Activity Tolerance: Patient Tolerated treatment well              Plan   Occupational Therapy Plan  Current Treatment Recommendations: Strengthening, Functional mobility training, Wheelchair mobility training, Safety education & training, Patient/Caregiver education & training, Equipment evaluation, education, & procurement, Self-Care / ADL, Home management training     Restrictions  Restrictions/Precautions  Restrictions/Precautions: Fall Risk, Weight Bearing  Lower Extremity Weight Bearing Restrictions  Right Lower Extremity Weight Bearing: Non Weight Bearing  Left Lower Extremity Weight Bearing: Weight Bearing As Tolerated  Position Activity Restriction  Other position/activity restrictions: [employer]    Subjective   General  Chart Reviewed: Yes  Patient assessed for rehabilitation services?: Yes  Family / Caregiver Present: Yes (spouse)     Social/Functional History  Social/Functional History  Lives With: Spouse  Type of Home: House  Home Layout: One level  Home Access: Ramped entrance  Bathroom Shower/Tub: Tub/Shower unit (Has been approved for walk in bathtub by workman's comp per chart, but 
Facility/Department: [facility] 8 REHAB UNIT  Occupational Therapy     Name: Noe Jimenez  : 1965  MRN: [id number]  Date of Service: 2024    Discharge Recommendations:  Home with assist PRN    Patient Diagnosis(es): There were no encounter diagnoses.  Past Medical History:  has a past medical history of Diabetes mellitus (HCC) and Hypertension.  Past Surgical History:  has a past surgical history that includes Cardiac surgery; back surgery; fracture surgery; and Rotator cuff repair (Right).    Treatment Diagnosis: Right Above Knee Amputation    Assessment   Performance deficits / Impairments: Decreased functional mobility ;Decreased ADL status;Decreased strength;Decreased endurance;Decreased balance;Decreased high-level IADLs  Treatment Diagnosis: Right Above Knee Amputation  History: JANICE, ABL anemia, A fib.  Right foot injury at work 2 years ago requiring 5 surgeries.  Hx of right rotator cuff repair  Activity Tolerance  Activity Tolerance: Patient Tolerated treatment well     Plan   Occupational Therapy Plan  Current Treatment Recommendations: Strengthening, Functional mobility training, Wheelchair mobility training, Safety education & training, Patient/Caregiver education & training, Equipment evaluation, education, & procurement, Self-Care / ADL, Home management training     Restrictions  Restrictions/Precautions  Restrictions/Precautions: Fall Risk, Weight Bearing  Lower Extremity Weight Bearing Restrictions  Right Lower Extremity Weight Bearing: Non Weight Bearing  Left Lower Extremity Weight Bearing: Weight Bearing As Tolerated  Position Activity Restriction  Other position/activity restrictions: [employer]    Subjective   General  Chart Reviewed: Yes  Patient assessed for rehabilitation services?: Yes  Family / Caregiver Present: Yes (spouse)    Social/Functional History  Social/Functional History  Lives With: Spouse  Type of Home: House  Home Layout: One level  Home Access: Ramped entrance  Bathroom 
Facility/Department: [facility] 8 REHAB UNIT  Occupational Therapy     Name: Noe Jimenez  : 1965  MRN: [id number]  Date of Service: 2024    Discharge Recommendations:  Home with assist PRN       Patient Diagnosis(es): There were no encounter diagnoses.  Past Medical History:  has a past medical history of Diabetes mellitus (HCC) and Hypertension.  Past Surgical History:  has a past surgical history that includes Cardiac surgery; back surgery; fracture surgery; and Rotator cuff repair (Right).    Treatment Diagnosis: Right Above Knee Amputation      Assessment   Performance deficits / Impairments: Decreased functional mobility ;Decreased ADL status;Decreased strength;Decreased endurance;Decreased balance;Decreased high-level IADLs  Assessment: Pt. reports increased fatigue this AM, poor sleep last night d/t pain, required increased assistance with transfers this date.  Treatment Diagnosis: Right Above Knee Amputation  History: JANICE, ABL anemia, A fib.  Right foot injury at work 2 years ago requiring 5 surgeries.  Hx of right rotator cuff repair  Activity Tolerance  Activity Tolerance: Patient limited by pain;Patient limited by fatigue  Activity Tolerance Comments: Pt. required increased assistance with transfers.        Plan   Occupational Therapy Plan  Current Treatment Recommendations: Strengthening, Functional mobility training, Wheelchair mobility training, Safety education & training, Patient/Caregiver education & training, Equipment evaluation, education, & procurement, Self-Care / ADL, Home management training     Restrictions  Restrictions/Precautions  Restrictions/Precautions: Fall Risk, Weight Bearing  Lower Extremity Weight Bearing Restrictions  Right Lower Extremity Weight Bearing: Non Weight Bearing  Left Lower Extremity Weight Bearing: Weight Bearing As Tolerated  Position Activity Restriction  Other position/activity restrictions: [employer]    Subjective   General  Chart Reviewed: Yes  Patient 
Facility/Department: [facility] 8 REHAB UNIT  Occupational Therapy     Name: Noe Jimenez  : 1965  MRN: [id number]  Date of Service: 2024    Discharge Recommendations:  Home with assist PRN  OT Equipment Recommendations  Equipment Needed: Yes  Mobility Devices: Walker;Wheelchair  Walker: Rolling  Hospital Bed : Bed Rails - Partial  Other: Will need to send Rx to worker's comp.     Patient Diagnosis(es): There were no encounter diagnoses.  Past Medical History:  has a past medical history of Diabetes mellitus (HCC) and Hypertension.  Past Surgical History:  has a past surgical history that includes Cardiac surgery; back surgery; fracture surgery; and Rotator cuff repair (Right).    Treatment Diagnosis: Right Above Knee Amputation    Assessment   Performance deficits / Impairments: Decreased functional mobility ;Decreased ADL status;Decreased strength;Decreased endurance;Decreased balance;Decreased high-level IADLs  Treatment Diagnosis: Right Above Knee Amputation  History: JANICE, ABL anemia, A fib.  Right foot injury at work 2 years ago requiring 5 surgeries.  Hx of right rotator cuff repair  Activity Tolerance  Activity Tolerance: Patient Tolerated treatment well     Plan   Occupational Therapy Plan  Current Treatment Recommendations: Strengthening, Functional mobility training, Wheelchair mobility training, Safety education & training, Patient/Caregiver education & training, Equipment evaluation, education, & procurement, Self-Care / ADL, Home management training     Restrictions  Restrictions/Precautions  Restrictions/Precautions: Fall Risk, Weight Bearing  Lower Extremity Weight Bearing Restrictions  Right Lower Extremity Weight Bearing: Non Weight Bearing  Left Lower Extremity Weight Bearing: Weight Bearing As Tolerated  Position Activity Restriction  Other position/activity restrictions: [employer]    Subjective   General  Chart Reviewed: Yes  Patient assessed for rehabilitation services?: Yes  Family / 
Facility/Department: [facility] 8 REHAB UNIT  Occupational Therapy Treatment Note    Name: Noe Jimenez  : 1965  MRN: [id number]  Date of Service: 6/10/2024    Discharge Recommendations:  Home with assist PRN          Patient Diagnosis(es): There were no encounter diagnoses.  Past Medical History:  has a past medical history of Diabetes mellitus (HCC) and Hypertension.  Past Surgical History:  has a past surgical history that includes Cardiac surgery; back surgery; fracture surgery; and Rotator cuff repair (Right).    Treatment Diagnosis: Right Above Knee Amputation    Assessment   Performance deficits / Impairments: Decreased functional mobility ;Decreased ADL status;Decreased strength;Decreased endurance;Decreased balance;Decreased high-level IADLs  Treatment Diagnosis: Right Above Knee Amputation  Activity Tolerance  Activity Tolerance: Patient Tolerated treatment well              Plan   Occupational Therapy Plan  Current Treatment Recommendations: Strengthening, Functional mobility training, Wheelchair mobility training, Safety education & training, Patient/Caregiver education & training, Equipment evaluation, education, & procurement, Self-Care / ADL, Home management training     Restrictions  Restrictions/Precautions  Restrictions/Precautions: Fall Risk, Weight Bearing  Lower Extremity Weight Bearing Restrictions  Right Lower Extremity Weight Bearing: Non Weight Bearing  Left Lower Extremity Weight Bearing: Weight Bearing As Tolerated  Position Activity Restriction  Other position/activity restrictions: [employer]    Subjective   General  Chart Reviewed: Yes  Patient assessed for rehabilitation services?: Yes  Family / Caregiver Present: Yes (spouse)     Social/Functional History  Social/Functional History  Lives With: Spouse  Type of Home: House  Home Layout: One level  Home Access: Ramped entrance  Bathroom Shower/Tub: Tub/Shower unit (Has been approved for walk in bathtub by workman's comp per chart, but 
Facility/Department: [facility] 8 REHAB UNIT  Occupational Therapy     Name: Noe Jimenez  : 1965  MRN: [id number]  Date of Service: 2024    Discharge Recommendations:  Home with assist PRN    Patient Diagnosis(es): There were no encounter diagnoses.  Past Medical History:  has a past medical history of Diabetes mellitus (HCC) and Hypertension.  Past Surgical History:  has a past surgical history that includes Cardiac surgery; back surgery; fracture surgery; and Rotator cuff repair (Right).    Treatment Diagnosis: Right Above Knee Amputation    Assessment   Performance deficits / Impairments: Decreased functional mobility ;Decreased ADL status;Decreased strength;Decreased endurance;Decreased balance;Decreased high-level IADLs  Assessment: Pt. tolerated tx well, states improved sleep last night, pain better controlled. Pt. required less assistance with transfers and mobility this AM.  Treatment Diagnosis: Right Above Knee Amputation  History: JANICE, ABL anemia, A fib.  Right foot injury at work 2 years ago requiring 5 surgeries.  Hx of right rotator cuff repair  Activity Tolerance  Activity Tolerance: Patient Tolerated treatment well       Plan   Occupational Therapy Plan  Current Treatment Recommendations: Strengthening, Functional mobility training, Wheelchair mobility training, Safety education & training, Patient/Caregiver education & training, Equipment evaluation, education, & procurement, Self-Care / ADL, Home management training     Restrictions  Restrictions/Precautions  Restrictions/Precautions: Fall Risk, Weight Bearing  Lower Extremity Weight Bearing Restrictions  Right Lower Extremity Weight Bearing: Non Weight Bearing  Left Lower Extremity Weight Bearing: Weight Bearing As Tolerated  Position Activity Restriction  Other position/activity restrictions: [employer]    Subjective   General  Chart Reviewed: Yes  Patient assessed for rehabilitation services?: Yes  Family / Caregiver Present: Yes 
Facility/Department: [facility] 8 REHAB UNIT  Occupational Therapy     Name: Noe Jimenez  : 1965  MRN: [id number]  Date of Service: 2024    Discharge Recommendations:  Home with assist PRN  OT Equipment Recommendations  Equipment Needed: Yes  Mobility Devices: Walker;Wheelchair  Walker: Rolling  Hospital Bed : Bed Rails - Partial  Other: Will need to send Rx to worker's comp.    Patient Diagnosis(es): There were no encounter diagnoses.  Past Medical History:  has a past medical history of Diabetes mellitus (HCC) and Hypertension.  Past Surgical History:  has a past surgical history that includes Cardiac surgery; back surgery; fracture surgery; and Rotator cuff repair (Right).    Treatment Diagnosis: Right Above Knee Amputation      Assessment   Performance deficits / Impairments: Decreased functional mobility ;Decreased ADL status;Decreased strength;Decreased endurance;Decreased balance;Decreased high-level IADLs  Treatment Diagnosis: Right Above Knee Amputation  History: JANICE, ABL anemia, A fib.  Right foot injury at work 2 years ago requiring 5 surgeries.  Hx of right rotator cuff repair  Activity Tolerance  Activity Tolerance: Patient Tolerated treatment well         Plan   Occupational Therapy Plan  Current Treatment Recommendations: Strengthening, Functional mobility training, Wheelchair mobility training, Safety education & training, Patient/Caregiver education & training, Equipment evaluation, education, & procurement, Self-Care / ADL, Home management training     Restrictions  Restrictions/Precautions  Restrictions/Precautions: Fall Risk, Weight Bearing  Lower Extremity Weight Bearing Restrictions  Right Lower Extremity Weight Bearing: Non Weight Bearing  Left Lower Extremity Weight Bearing: Weight Bearing As Tolerated  Position Activity Restriction  Other position/activity restrictions: [employer]    Subjective   General  Chart Reviewed: Yes  Patient assessed for rehabilitation services?: Yes  Family / 
Noe Saucedanolds arrived to room # 826.   Presented with: Right BKA  Mental Status: Patient is oriented, alert, coherent, logical, thought processes intact, and able to concentrate and follow conversation.   There were no vitals filed for this visit.  Patient safety contract and falls prevention contract reviewed with patient Yes.  Oriented Patient to room.  Call light within reach. Yes.  Needs, issues or concerns expressed at this time: no.      Electronically signed by Cari Quarles RN on 6/7/2024 at 5:21 PM  
Nutrition Assessment     Type and Reason for Visit: Reassess    Nutrition Recommendations/Plan:   Continue POC.      Malnutrition Assessment:  Malnutrition Status: No malnutrition    Nutrition Assessment:  Pt remains nutritionally stable AEB PO intake avg >50% of meals. BG is well-controlled, ranging 110-179mg/dL. Carb Control diet in place. Significant wt increase 9lbs noted from admission, suspect inaccurate. No nutrition-related intervention needed at this time. Continue POC.    Estimated Daily Nutrient Needs:  Energy (kcal):  3397-9439 (20-25 kcal/kg) Weight Used for Energy Requirements: Current     Protein (g):   (1.3-2 g/kg) Weight Used for Protein Requirements: Ideal        Fluid (ml/day):  4398-1330 Method Used for Fluid Requirements: 1 ml/kcal    Nutrition Related Findings:   AduE9w=0.0% (4/23/24), -179mg/dL Wound Type: Surgical Incision (R BKA)    Current Nutrition Therapies:    ADULT DIET; Regular; 4 carb choices (60 gm/meal)    Anthropometric Measures:  Height: 177.8 cm (5' 10\")  Current Body Wt: 88.9 kg (195 lb 14.4 oz)   BMI: 28.1    Nutrition Diagnosis:   Altered nutrition-related lab values related to endocrine dysfuntion as evidenced by lab values    Nutrition Interventions:   Food and/or Nutrient Delivery: Continue Current Diet  Nutrition Education/Counseling: No recommendation at this time  Coordination of Nutrition Care: Continue to monitor while inpatient       Goals:  Previous Goal Met: Progressing toward Goal(s)  Goals: PO intake 75% or greater, Meet at least 75% of estimated needs       Nutrition Monitoring and Evaluation:   Behavioral-Environmental Outcomes: None Identified  Food/Nutrient Intake Outcomes: Food and Nutrient Intake  Physical Signs/Symptoms Outcomes: Biochemical Data, Fluid Status or Edema, Nutrition Focused Physical Findings, Weight    Discharge Planning:    Continue current diet     Polly Lozada, MS, RD, LD, CDCES  Contact: [phone]    
Nutrition Assessment     Type and Reason for Visit: Reassess    Nutrition Recommendations/Plan:   Modify diet to include Low Potassium.      Malnutrition Assessment:  Malnutrition Status: No malnutrition    Nutrition Assessment:  Pt remains nutritionally stable. PO intake avg >75% of meals. Wt gain 10lbs noted since admission, suspect inaccurate. Will monitor. BG fairly well-controlled, ranging 95-230mg/dL with Carb Control diet in place. Pt has had elevated potassium for several days and medications are being adjusted. Added Low K+ restriction to diet order. Will continue to follow.    Estimated Daily Nutrient Needs:  Energy (kcal):  9083-5471 (15-18kcal/kg) Weight Used for Energy Requirements: Current     Protein (g):   (1.2-2.0g/kg) Weight Used for Protein Requirements: Ideal        Fluid (ml/day):  9320-7715 Method Used for Fluid Requirements: 1 ml/kcal    Nutrition Related Findings:   [id number]=8.0% (4/23/24); BG 95-230mg/dL Wound Type: Surgical Incision (R BKA)    Current Nutrition Therapies:    ADULT DIET; Regular; 4 carb choices (60 gm/meal); Low Potassium (Less than 3000 mg/day)    Anthropometric Measures:  Height: 177.8 cm (5' 10\")  Current Body Wt: 93.4 kg (206 lb)   BMI: 29.6    Nutrition Diagnosis:   Altered nutrition-related lab values related to endocrine dysfuntion as evidenced by lab values    Nutrition Interventions:   Food and/or Nutrient Delivery: Modify Current Diet  Nutrition Education/Counseling: No recommendation at this time  Coordination of Nutrition Care: Continue to monitor while inpatient       Goals:  Previous Goal Met: Progressing toward Goal(s)  Goals: PO intake 75% or greater, Meet at least 75% of estimated needs       Nutrition Monitoring and Evaluation:   Behavioral-Environmental Outcomes: None Identified  Food/Nutrient Intake Outcomes: Food and Nutrient Intake  Physical Signs/Symptoms Outcomes: Biochemical Data, Fluid Status or Edema, Nutrition Focused Physical Findings, 
Occupational Therapy     06/17/24 0900   OT Individual Minutes   Time In 0900   Time Out 0900   Minutes 0   Minute Variance   Variance 60   Reason Med hold  (Med hold per DrJabier)   Time Code Minutes    Timed Code Treatment Minutes 0 Minutes       
Occupational Therapy     06/18/24 0815   OT Individual Minutes   Time In 0815   Time Out 0815   Minutes 0   Minute Variance   Variance 45   Reason Med hold  (Med hold, pt. feeling unwell, unable to participate.)   Time Code Minutes    Timed Code Treatment Minutes 0 Minutes       
Occupational Therapy  Facility/Department: [facility] 8 REHAB UNIT  Rehabilitation Occupational Therapy Daily Treatment Note    Date: 24  Patient Name: Noe Jimenez       Room: 0826/826-02  MRN: [id number]  Account: [id number]   : 1965  (59 y.o.) Gender: male                Treatment Diagnosis: (P) Right Above Knee Amputation   Past Medical History:  has a past medical history of Diabetes mellitus (HCC) and Hypertension.  Past Surgical History:   has a past surgical history that includes Cardiac surgery; back surgery; fracture surgery; and Rotator cuff repair (Right).     24 1300   Restrictions/Precautions   Restrictions/Precautions Fall Risk;Weight Bearing   Lower Extremity Weight Bearing Restrictions   Right Lower Extremity Weight Bearing Non Weight Bearing   Position Activity Restriction   Other position/activity restrictions [employer]   Balance   Standing Balance Contact guard assistance   Functional Mobility   Functional - Mobility Device Wheelchair   Activity To/From therapy gym;Other   Assist Level Supervision   Bed mobility   Bed Mobility Comments has a tall bed, raised mat table to approximate height, pt able to complete with supervision   Transfers   Sit to stand Contact guard assistance   Stand to sit Contact guard assistance   Transfer Comments w/c, mat table, chair with armrests   Tub Transfers   Tub Transfers Comments reviewed tech and demonstrated TTB in case pt's walk in shower is not installed by discharge   OT Exercises   Exercise Treatment Nafisa 13#   Activity Tolerance   Activity Tolerance Patient Tolerated treatment well   Assessment   Performance deficits / Impairments Decreased functional mobility ;Decreased ADL status;Decreased strength;Decreased endurance;Decreased balance;Decreased high-level IADLs   Treatment Diagnosis Right Above Knee Amputation   History JANICE, ABL anemia, A fib.  Right foot injury at work 2 years ago requiring 5 surgeries.  Hx of right rotator cuff repair 
Occupational Therapy  Facility/Department: [facility] 8 REHAB UNIT  Rehabilitation Occupational Therapy Daily Treatment Note    Date: 24  Patient Name: Noe Jimenez       Room: 0826/826-02  MRN: [id number]  Account: [id number]   : 1965  (59 y.o.) Gender: male                Treatment Diagnosis: (P) Right Above Knee Amputation   Past Medical History:  has a past medical history of Diabetes mellitus (HCC) and Hypertension.  Past Surgical History:   has a past surgical history that includes Cardiac surgery; back surgery; fracture surgery; and Rotator cuff repair (Right).     24 1345   Restrictions/Precautions   Restrictions/Precautions Fall Risk;Weight Bearing   Lower Extremity Weight Bearing Restrictions   Right Lower Extremity Weight Bearing Non Weight Bearing   Position Activity Restriction   Other position/activity restrictions [employer]   Balance   Standing Balance Contact guard assistance   Standing Balance   Time 2 mins   Activity 1 hand table top act   Comment less stable freeing up R vs L hand   Functional Mobility   Functional - Mobility Device Wheelchair   Activity To/From therapy gym;Other   Assist Level Supervision   Transfers   Sit to stand Contact guard assistance   Stand to sit Contact guard assistance   OT Exercises   Exercise Treatment Nafisa 15#, 2# Tbar   Activity Tolerance   Activity Tolerance Patient Tolerated treatment well   Assessment   Performance deficits / Impairments Decreased functional mobility ;Decreased ADL status;Decreased strength;Decreased endurance;Decreased balance;Decreased high-level IADLs   Assessment Pt able to manage  legrests this session. Has more difficulty with glasses on than off.   Treatment Diagnosis Right Above Knee Amputation   History JANICE, ABL anemia, A fib.  Right foot injury at work 2 years ago requiring 5 surgeries.  Hx of right rotator cuff repair   Time Code Minutes    Timed Code Treatment Minutes 45 Minutes   OT Individual Minutes   Time In 1345 
Patient:   Noe Jimenez  MR#:    [id number]   Room:    0826/826-02   YOB: 1965  Date of Progress Note: 6/10/2024  Time of Note                           7:25 AM  Consulting Physician:   Eduardo Ryan M.D.  Attending Physician:  Eduardo Ryan MD     Chief complaint Status post right BKA     S:This 59 y.o. male   with history of A-Fib, CHF, HTN, HLD, DM and chronic right foot pain d/t work injury about 2 years ago requiring 5 surgeries. He had a non-healing wound to his right heel. He was seen as outpatient by orthopedic surgeon Dr. Coronado who had performed the past surgeries to his right ankle. Unfortunately, the fusion to his right ankle had failed and Dr. Coronado recommended a right below-the-knee amputation. He was referred to vascular surgeon Dr. Galvan. After evaluation, Dr. Galvan agreed with performing a right BKA. Patient was in agreement and was admitted [facility] on 6/3/24 for surgery. He tolerated the procedure well. He continues to a have significant pain rating at 8/10, which is being controlled with PO medications. During his hospital stay his has had JANICE and acute blood loss anemia requiring transfusion. Hemoglobin currently at 7.6 and creatinine at 1.27. Both of which will need continued monitoring. He is participating in both PT/OT. He is felt to need a stay on Rehab to work towards his goal of returning home with assist of his wife. He is now felt ready to start the Rehab program.  No new issues.  Doing well this morning without complaint.    REVIEW OF SYSTEMS:  Constitutional: No fevers No chills  Neck:No stiffness  Respiratory: No shortness of breath  Cardiovascular: No chest pain No palpitations  Gastrointestinal: No abdominal pain    Genitourinary: No Dysuria  Neurological: No headache, no confusion    Past Medical History:      Diagnosis Date    Diabetes mellitus (HCC)     Hypertension        Past Surgical History:      Procedure Laterality Date    BACK SURGERY      x2 
Patient:   Noe Jimenez  MR#:    [id number]   Room:    0826/826-02   YOB: 1965  Date of Progress Note: 6/11/2024  Time of Note                           8:31 AM  Consulting Physician:   Eduardo Ryan M.D.  Attending Physician:  Eduardo Ryan MD     Chief complaint Status post right BKA     S:This 59 y.o. male   with history of A-Fib, CHF, HTN, HLD, DM and chronic right foot pain d/t work injury about 2 years ago requiring 5 surgeries. He had a non-healing wound to his right heel. He was seen as outpatient by orthopedic surgeon Dr. Coronado who had performed the past surgeries to his right ankle. Unfortunately, the fusion to his right ankle had failed and Dr. Coronado recommended a right below-the-knee amputation. He was referred to vascular surgeon Dr. Galvan. After evaluation, Dr. Galvan agreed with performing a right BKA. Patient was in agreement and was admitted [facility] on 6/3/24 for surgery. He tolerated the procedure well. He continues to a have significant pain rating at 8/10, which is being controlled with PO medications. During his hospital stay his has had JANICE and acute blood loss anemia requiring transfusion. Hemoglobin currently at 7.6 and creatinine at 1.27. Both of which will need continued monitoring. He is participating in both PT/OT. He is felt to need a stay on Rehab to work towards his goal of returning home with assist of his wife. He is now felt ready to start the Rehab program.  No new complaints this morning.  Doing well.    REVIEW OF SYSTEMS:  Constitutional: No fevers No chills  Neck:No stiffness  Respiratory: No shortness of breath  Cardiovascular: No chest pain No palpitations  Gastrointestinal: No abdominal pain    Genitourinary: No Dysuria  Neurological: No headache, no confusion    Past Medical History:      Diagnosis Date    Diabetes mellitus (HCC)     Hypertension        Past Surgical History:      Procedure Laterality Date    BACK SURGERY      x2    CARDIAC 
Physical Therapy     06/10/24 1000   Ambulation   Device Parallel Bars   Assistance Moderate assistance   Quality of Gait pt. instructed to move hands first, depress shoulders and advanve LLE.   Distance 12ft   Comments pt. showed tendancy to move hands too far foward making it more difficult to bear weight through arms.   More Ambulation? Yes   Ambulation 2   Surface - 2 level tile   Device 2 Parallel Bars   Other Apparatus 2 Wheelchair follow   Assistance 2 Moderate assistance   Quality of Gait 2 pt. decreased arm advancement helping him bear weight through arms more easily before advancing LLE   Gait Deviations Slow Linda;Decreased step length;Decreased step height   Distance 12ft   Comments pt. showed good gait mechanics with optimal advancement of arms and good step length. struggled with sit to stand due to fatigue in LLE.   Propulsion 1   Propulsion Manual   Level Level Tile   Method RUE;LUE   Level of Assistance Stand by assistance   Description/ Details pt. able to operate WC with minimal steering assistance   Distance 200'   PT Exercises   Exercise Treatment Seated Hip flx/ext, abd/add, LLE knee ext/flx with manual reistance. LLE Ankle pumps x10, retroproulsion of WC using LLE to build strength for 200'  (pt. showed good strength with manual resistance exercises. Tolerated exercises well.)   Assessment   Assessment Pt. demonstrated fatigue when coming to  LLE. Complained that LLE was getting tired over the treatment session. look to implement sit to stand and other strengthening exercises for LLE in future interventions. Tolerated ambulation in the parallel bars well. Improved gait mechanics with decreased advancement of UE on parallel bars.       
Physical Therapy     06/10/24 1430   Ambulation   Surface Level tile   Device Parallel Bars   Other Apparatus Wheelchair follow   Assistance Minimal assistance   Quality of Gait short step length and height due to poor scapular depression and weightbearing in UE   Gait Deviations Decreased step length;Decreased step height   Distance 12ft   Comments pt. instructed to decrease speed, press up through toe and have sustained scapular depression through LLE swing phase.   More Ambulation? Yes   Ambulation 2   Surface - 2 level tile   Device 2 Parallel Bars   Other Apparatus 2 Wheelchair follow   Assistance 2 Moderate assistance   Quality of Gait 2 pt.  had sustained scapular depression nd smoother swing phase with LLE   Gait Deviations Slow Zach;Decreased step length;Decreased step height   Distance 12ft   Comments Therapist helped with WB in UE by assisting uloading through gait belt. LLE swing phase improved.   Ambulation 3   Surface - 3 level tile   Device 3 Parallel Bars   Other Apparatus 3 Wheelchair follow   Assistance 3 Minimal assistance   Quality of Gait 3 pt. UE began to fatigue resulting with increased zach of LLE swing phase.   Gait Deviations Decreased step length;Decreased step height   Distance 12ft   Comments as pt. UE fatigue unloading pt. weight via gait belt proved to be less effective.   Wheelchair Activities   Propulsion Yes   Propulsion 1   Propulsion Manual   Level Level Tile   Method RUE;LUE   Level of Assistance Stand by assistance   Description/ Details pt. able to operate WC with minimal steering assistance   Distance 200'   Propulsion 2   Propulsion 2 Manual   Level 2 Level Tile   Method 2 RUE;LUE   Level of Assistance 2 Stand by assistance   Distance 2 150'   PT Exercises   Exercise Treatment sit to  // bars 2x5  (pt. came to standing more easily when given verbal cues to shift center of mass forward and over LLE.)   Assessment   Assessment pt. able to come to standing more 
Physical Therapy     06/11/24 1000   Bed mobility   Rolling to Right Minimal assistance   Supine to Sit Contact guard assistance   Sit to Supine Contact guard assistance   Bed Mobility Comments pt. needed minimal assistance to get into bed but could complet bed mobility with CGA   Transfers   Squat Pivot Transfers Modified independent  (pt. required assistance to get onto therapy mat from  using squat pivot transfer to the left with no AD.)   Comment able to transfer to toilet and back to  with minmal assistance.   Propulsion 1   Propulsion Manual   Level Level Tile   Method RUE;LUE   Level of Assistance Stand by assistance   Description/ Details pt. able to operate WC with no steering assistance   Distance 200'   PT Exercises   Exercise Treatment supine RLE hip flexion x20, Knee flx/ext 2x10. Hip flexor stretch [id number] hold, side lying hip abd 3x10. Prone 2x10 hamstring curls with no resistance.   Assessment   Assessment pt. complains of tiredness and fatigue at the beginning of session, did not take pain meds before bed and did not sleep well. pt. required min/mod assist with transfers due to fatigue. no complaint of pain at this date. Pt. able to tolerate supine, side lying and prone exercises during treatment session. pt. exhibits good ROM in RLE hip and knee. Presents with tight RLE hip flexor. able to self propel WC from therapy gym to room without steering assistance.     Electronically signed by GAMALIEL Louis on 6/11/2024 at 10:58 AM   
Physical Therapy     06/11/24 1430   Ambulation   Surface Level tile   Device Parallel Bars   Other Apparatus Wheelchair follow   Assistance Moderate assistance;Minimal assistance   Quality of Gait pt. was able to ambulate with good roll over, presented with low step height and length.   Gait Deviations Slow Linda;Decreased step length;Decreased step height   Distance 12ft   Comments pt. benefited from thrapist assistance of unloading LLE with gait belt during stance phase   More Ambulation? Yes   Ambulation 2   Surface - 2 level tile   Device 2 Parallel Bars   Other Apparatus 2 Wheelchair follow   Assistance 2 Moderate assistance;Minimal assistance   Quality of Gait 2 pt. exhibited good roll over with LLE when therapist helped with unloading LLE during swing phase   Gait Deviations Slow Linda;Decreased step length;Decreased step height   Distance 12ft   Comments Therapist helped with WB in UE by assisting unloading through gait belt. LLE swing phase improved.   Ambulation 3   Surface - 3 level tile   Device 3 Parallel Bars   Assistance 3 Minimal assistance;Moderate assistance   Quality of Gait 3 pt. had less roll over with LLE as scapular depressers began to fatigue throughout ambulation   Gait Deviations Decreased step length;Decreased step height   Distance 24ft   Comments pt. began to fatigue at the end of the ambulation   PT Exercises   Exercise Treatment seated bilat. hip abd/add   Assessment   Assessment pt. benefitted from therapist unloading LLE during swing phase using the gait belt. Pt. exhibited good roll over in L foot during ambulation. As pt. fatigues roll over in L foot decreases and pt. reverts to hop technique inside the parallel bars. Progress to sit to stand with rolling walker at nest session.     Electronically signed by GAMALIEL Louis on 6/11/2024 at 3:18 PM   
Physical Therapy     06/12/24 0900   Bed mobility   Sit to Supine Stand by assistance   Scooting Supervision   Transfers   Sit to Stand Contact guard assistance   Stand to Sit Minimal Assistance   Stand Pivot Transfers Minimal Assistance   Comment pt. able to perform stand pivot transfer to the left with AD.  (pt. required verbal cues to move walker, depress and rotate. experienced LOB at the end of transfer and required minimal assistance to come to sitting on EOB)   Propulsion 1   Propulsion Manual   Level Level Tile   Method RUE;LUE   Level of Assistance Stand by assistance   Description/ Details pt. able to operate WC with no steering assistance   Distance 200'   PT Exercises   Exercise Treatment seated bilat. hip abd/add, knee ext/flexion, LLE PF/DF with manual resistance x10, sit to stand using rolling walker 3x5. sidelying hip flexor stretch [id number]  (knee ext/flx with RLEno resistance.)   Assessment   Assessment pt. able to come to standing with rolling walker against the wall. pt. able to perform stand pivot transfer using AD to the left with VC on sequencing. Experienced LOB at the end of the transfer and required minimal assistance to come to sitting on EOB. Pt. benefitted from hip flexor stretch due to tight hip flexors. Pt. educated on importance of laying down to lengthen hip flexors. pt. shows fear of bed sores and prefers to sit in chair for most of the day.     Electronically signed by GAMALIEL Louis on 6/12/2024 at 9:58 AM   
Physical Therapy     06/13/24 1100   Transfers   Sit to Stand Minimal Assistance   Stand to Sit Minimal Assistance   Bed to Chair Minimal assistance   Stand Pivot Transfers Minimal Assistance  (pt. has difficulty with last pivot of stand pivot transfer)   Comment work with pt. in future on stand pivot transfer   Ambulation   Surface Level tile   Device Parallel Bars   Other Apparatus Wheelchair follow   Assistance Minimal assistance;Moderate assistance   Quality of Gait pt. was able to perform good scapular depression when therapist helped unload via gait belt.   Gait Deviations Slow Linda;Decreased step length;Decreased step height   Distance 12'   Comments pt. struggles with scapular depression. continue to help unload LLE to work on muscular recruitment of scapular depressers   More Ambulation? Yes   Ambulation 2   Surface - 2 level tile   Device 2 Parallel Bars   Other Apparatus 2 Wheelchair follow   Assistance 2 Minimal assistance;Moderate assistance   Quality of Gait 2 pt. had better rollover when therapist helped unload pt. LLE with gait belt.   Gait Deviations Slow Linda;Decreased step length;Decreased step height   Distance 12ft   Comments pt. exhibitted good roll over when dpressing scapulas   Ambulation 3   Surface - 3 level tile   Device 3 Parallel Bars   Other Apparatus 3 Wheelchair follow   Assistance 3 Minimal assistance   Quality of Gait 3 pt. had less roll over with LLE when therapist provided less assistance with unloading.   Gait Deviations Decreased step length;Decreased step height   Distance 12'   Propulsion 1   Propulsion Manual   Level Level Tile   Curbs 4\"   Method RUE;LUE   Level of Assistance Stand by assistance   Description/ Details pt. able to operate WC with no steering assistance   Distance 200'   Propulsion 2   Propulsion 2 Manual   Level 2 Level Tile   Method 2 RUE;LUE   Level of Assistance 2 Stand by assistance   Distance 2 150'   PT Exercises   Exercise Treatment RLE hip 
Physical Therapy     06/13/24 1430   Transfers   Sit to Stand Minimal Assistance   Stand to Sit Minimal Assistance   Bed to Chair Minimal assistance   Stand Pivot Transfers Minimal Assistance;Moderate Assistance   Propulsion 1   Propulsion Manual   Level Level Tile   Method RUE;LUE   Level of Assistance Stand by assistance   Description/ Details pt. able to operate WC with no steering assistance   Distance 200'   Propulsion 2   Propulsion 2 Manual   Level 2 Level Tile   Method 2 RUE;LUE   Level of Assistance 2 Stand by assistance   Distance 2 100'   PT Exercises   Exercise Treatment supine RLE hip flexion 3x10, knee flx/ext 3x10, hip abd 3x10, Prone hamstring curls 3x10, SAQ 3x10, retropropulsion of WC with LLE for 100'   Assessment   Assessment pt. tolerated strengthening exercises well. Struggled with prone hamstring curls, due to lack of ROM stemming from tight hip flexors. complained of fatigue with retropropulsion of WC using LLE.     Electronically signed by GAMALIEL Louis on 6/13/2024 at 3:21 PM   
Physical Therapy     06/14/24 1000   Bed mobility   Rolling to Left Stand by assistance   Supine to Sit Stand by assistance   Transfers   Sit to Stand Contact guard assistance   Stand to Sit Minimal Assistance   Bed to Chair Minimal assistance   Stand Pivot Transfers Minimal Assistance   Comment pt. able to perforrm stand pivot to the left using handrails in the bathroom to get to wheelchair   Ambulation   Surface Level tile   Device Parallel Bars   Other Apparatus Wheelchair follow   Assistance Minimal assistance   Quality of Gait pt. exhibits good scapular depression and good roll over.   Gait Deviations Slow Linda;Decreased step length;Decreased step height   Distance 12'   Comments pt. exhibits good rollover when properly depressing scapulas   More Ambulation? Yes   Ambulation 2   Surface - 2 level tile   Device 2 Parallel Bars   Other Apparatus 2 Wheelchair follow   Assistance 2 Minimal assistance   Quality of Gait 2 pt. able to recruit scapular depressers better resultting in good rollover of LLE   Gait Deviations Slow Linda;Decreased step length;Decreased step height   Distance 12ft   Ambulation 3   Surface - 3 level tile   Device 3 Parallel Bars   Other Apparatus 3 Wheelchair follow   Assistance 3 Minimal assistance   Gait Deviations Decreased step length;Decreased step height   Distance 12'   Propulsion 1   Propulsion Manual   Level Level Tile   Method RUE;LUE   Level of Assistance Independent   Description/ Details pt. able to operate WC with no steering assistance   Distance 200'   Propulsion 2   Propulsion 2 Manual   Level 2 Level Tile   Method 2 RUE;LUE   Level of Assistance 2 Independent   Distance 2 100'   PT Exercises   Exercise Treatment supine RLE hip flexion 3x10, knee flexion 3x10, prone hamstring curl 3x10. sidelying 3x10. retropropulsion 2x200' using LLE   Assessment   Assessment pt. exhibited increase in gait mechanics due to proper muscle recruitment of scapular depressers. was able to 
Physical Therapy     06/14/24 1300   Bed mobility   Rolling to Left Stand by assistance   Supine to Sit Stand by assistance   Transfers   Sit to Stand Contact guard assistance;Minimal Assistance   Stand to Sit Minimal Assistance   Stand Pivot Transfers Contact guard assistance  (worked on stand pivot transfers, able to use proper mechanics using AD to transfer either direction with ease)   Propulsion 1   Propulsion Manual   Level Level Tile   Method RUE;LUE   Level of Assistance Independent   Description/ Details pt. able to operate WC with no steering assistance   Distance 200'   Propulsion 2   Propulsion 2 Manual   Level 2 Level Tile   Method 2 RUE;LUE   Level of Assistance 2 Independent   Distance 2 200'   PT Exercises   Exercise Treatment stand pivot transfer training x8, hip flx/ext 3x10   Assessment   Assessment pt. educated on how to use proper mechanics with AD during stand pivot transfer. Pt. improved transfer mechanics by keeping rolling walker closer to center of mass and moving walker before LLE. Able to perform stand pivot at the end of session with CGA.     Electronically signed by GAMALIEL Louis on 6/14/2024 at 1:47 PM   
Report given to Verónica MORENO on 4 th floor at approximately 1045 am   
Reported lactic acid of 2.1 to Cecile PINTO and informed her radiology called from [phone] wanting to know why she ordered another chest x-ray today when he had one yesterday.   Reported d-dimer and other abnormal lab values to Cecile PINTO   
Spoke with JOSH Morse about portable chest order.  Asked for a reason for exam, Mini didn't seem to understand what I was asking for. \"PCXR\" is not a valid reason for exam when the Radiologist reads the image. I asked Mini to call Cecile Mancilla for order clarification, especially since Mr. Jimenez had a normal chest xray yesterday.    
Spouse was trying to assist patient to use urinal and called for help.  When I got in room patient was lethargic and had already wet the bed.  Got v/s and O2 Sat was down to 64% on O2 at 2L/NC.  Patient was encouraged to deep breath and Sat remained at about 70%.  Oxygen increased to 3L and remained at 74%.  Oxygen increased to 5L/NC to get patient's O2 Sat up to 93%.  Rechecked Sat 30 minutes later and it was at 91% on 5 L/NC.  Will continue to monitor patient.  Patient requested pain med but I encouraged him to wait.  Will follow up with Dr. Ryan in AM.  
This  was asked to assist pt with completing the pt's LW. Pt needed the document notarized. Pt had the document and had completed most of the document except signing. Pt was identified with a [place] drivers license.  Pt signed the document before this notary and this  witnessed and notarized the document. Pt also signed the notary log. Original and copies were given to pt, and a copy was emailed to Mary Leon to be scanned into pt's EMR. Pt and his wife expressed gratitude for assistance and notary services.     Electronically signed by Mary Behrens on 6/11/2024 at 1:05 PM    
[facility]  Durable Medical Equipment  Medical Necessity Note  Wheeled Walker      Date: 2024  Patient Name: Noe Jimenez        MRN: [id number]    Account Number: [id number]  : 1965  (59 y.o.)  Gender: male       Noe Jimenez requires a wheeled walker to ambulate to and from the bathroom to complete bathing, toileting, dressing and grooming tasks. He needs a device for safer movement within the home. These tasks cannot be completed by utilizing a cane secondary to a right lower extremity amputation, a rolling walker is required to  decrease the risk of falling. The patient is unable to bring a wheelchair into the bathroom due to the size. The patients weight of Weight - Scale: 93.4 kg (206 lb) / Body mass index is 29.56 kg/m². indicates that a Bariatric device is not needed.          [facility]  Durable Medical Equipment  Medical Necessity Note  Bedrail      Date: 2024  Patient Name: Noe Jimenez        MRN: [id number]   Account: [id number]   : 1965  (59 y.o.)  Gender: male       Noe Jimenez requires a bedrail to complete bed mobility. He requires a bedrail due to upper extremity weakness and right lower extremity amputation, and is unable to complete bed mobility without assistance.  Without the bedrail, Noe Jimenez is at increased risk for falls and would require increased assistance for ADL's and mobility.        Physician Signature      Physician Name (print)   Physician NPI          Date    
Minutes    Timed Code Treatment Minutes 60 Minutes   OT Individual Minutes   Time In 0900   Time Out 1000   Minutes 60       
less fatigued.     Electronically signed by GAMALIEL Louis on 6/12/2024 at 2:28 PM   
  TOBACCO:   reports that he has never smoked. He does not have any smokeless tobacco history on file.  ETOH:   reports no history of alcohol use.    Family History:       Problem Relation Age of Onset    Diabetes Father          PHYSICAL EXAM:  /81   Pulse 66   Temp (!) 96.6 °F (35.9 °C) (Temporal)   Resp 18   Ht 1.778 m (5' 10\")   Wt 93.3 kg (205 lb 11.2 oz)   SpO2 98%   BMI 29.51 kg/m²     Constitutional - well developed, well nourished.   Eyes - conjunctiva normal.   Ear, nose, throat - No scars, masses, or lesions over external nose or ears, no atrophy of tongue  Neck-symmetric, no masses noted, no jugular vein distension  Respiration- chest wall appears symmetric, good expansion,   normal effort without use of accessory muscles  Musculoskeletal - no significant wasting of muscles noted, no bony deformities  Extremities-no clubbing, cyanosis or edema Right BKA  Skin - warm, dry, and intact. No rash, erythema, or pallor.   Psychiatric - mood, affect, and behavior appear normal.      Neurological exam  Awake, alert, fluent oriented appropriate affect  Attention and concentration appear appropriate  Recent and remote memory appears unremarkable  Speech normal without dysarthria  No clear issues with language of fund of knowledge     Cranial Nerve Exam     CN III, IV,VI-EOMI, No nystagmus, conjugate eye movements, no ptosis    CN VII-no facial assymetry       Motor Exam  antigravity throughout upper extremities bilaterally      Tremors- no tremors in hands or head noted     Gait  Not tested     Nursing/pcp notes, imaging,labs and vitals reviewed.     PT,OT and/or speech notes reviewed    Lab Results   Component Value Date    WBC 6.3 06/14/2024    HGB 7.1 (L) 06/14/2024    HCT 23.7 (L) 06/14/2024    MCV 91.9 06/14/2024     06/14/2024     Lab Results   Component Value Date     06/14/2024    K 5.4 (H) 06/14/2024     06/14/2024    CO2 23 06/14/2024    BUN 47 (H) 06/14/2024    CREATININE 
reports that he has never smoked. He does not have any smokeless tobacco history on file.  ETOH:   reports no history of alcohol use.    Family History:       Problem Relation Age of Onset    Diabetes Father          PHYSICAL EXAM:  /81   Pulse 66   Temp (!) 96.6 °F (35.9 °C) (Temporal)   Resp 18   Ht 1.778 m (5' 10\")   Wt 93.3 kg (205 lb 11.2 oz)   SpO2 98%   BMI 29.51 kg/m²     Constitutional - well developed, well nourished.   Eyes - conjunctiva normal.   Ear, nose, throat - No scars, masses, or lesions over external nose or ears, no atrophy of tongue  Neck-symmetric, no masses noted, no jugular vein distension  Respiration- chest wall appears symmetric, good expansion,   normal effort without use of accessory muscles  Musculoskeletal - no significant wasting of muscles noted, no bony deformities  Extremities-no clubbing, cyanosis or edema Right BKA  Skin - warm, dry, and intact. No rash, erythema, or pallor.   Psychiatric - mood, affect, and behavior appear normal.      Neurological exam  Awake, alert, fluent oriented appropriate affect  Attention and concentration appear appropriate  Recent and remote memory appears unremarkable  Speech normal without dysarthria  No clear issues with language of fund of knowledge     Cranial Nerve Exam     CN III, IV,VI-EOMI, No nystagmus, conjugate eye movements, no ptosis    CN VII-no facial assymetry       Motor Exam  antigravity throughout upper extremities bilaterally      Tremors- no tremors in hands or head noted     Gait  Not tested     Nursing/pcp notes, imaging,labs and vitals reviewed.     PT,OT and/or speech notes reviewed    Lab Results   Component Value Date    WBC 6.3 06/14/2024    HGB 7.1 (L) 06/14/2024    HCT 23.7 (L) 06/14/2024    MCV 91.9 06/14/2024     06/14/2024     Lab Results   Component Value Date     06/14/2024    K 5.4 (H) 06/14/2024     06/14/2024    CO2 23 06/14/2024    BUN 47 (H) 06/14/2024    CREATININE 1.2 
smoked. He does not have any smokeless tobacco history on file.  ETOH:   reports no history of alcohol use.    Family History:       Problem Relation Age of Onset    Diabetes Father          PHYSICAL EXAM:  BP (!) 142/78   Pulse 73   Temp 97.7 °F (36.5 °C) (Temporal)   Resp 18   Ht 1.778 m (5' 10\")   Wt 88.9 kg (195 lb 14.4 oz)   SpO2 96%   BMI 28.11 kg/m²     Constitutional - well developed, well nourished.   Eyes - conjunctiva normal.   Ear, nose, throat - No scars, masses, or lesions over external nose or ears, no atrophy of tongue  Neck-symmetric, no masses noted, no jugular vein distension  Respiration- chest wall appears symmetric, good expansion,   normal effort without use of accessory muscles  Musculoskeletal - no significant wasting of muscles noted, no bony deformities  Extremities-no clubbing, cyanosis or edema Right BKA  Skin - warm, dry, and intact. No rash, erythema, or pallor.   Psychiatric - mood, affect, and behavior appear normal.      Neurological exam  Awake, alert, fluent oriented appropriate affect  Attention and concentration appear appropriate  Recent and remote memory appears unremarkable  Speech normal without dysarthria  No clear issues with language of fund of knowledge     Cranial Nerve Exam     CN III, IV,VI-EOMI, No nystagmus, conjugate eye movements, no ptosis    CN VII-no facial assymetry       Motor Exam  antigravity throughout upper extremities bilaterally      Tremors- no tremors in hands or head noted     Gait  Not tested     Nursing/pcp notes, imaging,labs and vitals reviewed.     PT,OT and/or speech notes reviewed    Lab Results   Component Value Date    WBC 6.5 06/08/2024    HGB 7.4 (L) 06/08/2024    HCT 23.5 (L) 06/08/2024    MCV 88.3 06/08/2024     06/08/2024     Lab Results   Component Value Date     06/08/2024    K 4.0 06/08/2024     06/08/2024    CO2 25 06/08/2024    BUN 43 (H) 06/08/2024    CREATININE 1.2 06/08/2024    GLUCOSE 171 (H) 
06/10/2024    CALCIUM 7.8 (L) 06/10/2024    BILITOT 0.3 06/10/2024    ALKPHOS 301 (H) 06/10/2024    AST 11 06/10/2024    ALT 9 06/10/2024    LABGLOM 64 06/10/2024   No results found for: \"INR\", \"PROTIME\"    López Mars, PT  Physical Therapist  Physical Therapy     Progress Notes      Signed     Date of Service: 6/8/2024  1:13 PM     Signed              06/08/24 1100   Bed mobility   Supine to Sit Contact guard assistance;Stand by assistance  (TRIPLANAR FROM LEFT)   Sit to Supine Minimal assistance;Contact guard assistance  (TRIPLANAR TO LEFT)   Transfers   Bed to Chair Minimal assistance;Contact guard assistance  (PARRTIAL STAND  PIVOT TO LEFT. PATIENT SET UP WC WITH VERBAL CUES)   PT Exercises   Exercise Treatment SUPINE LEFT ANKEL PUMPS, BILAT QUAD SETS, GLUTE SETS, HEEL SLIDES (KNEE FLEXION) SUPINE HIP ABD, LEFT LEG PRESS, SAQ   PROM Exercises RIGHT STRAIGHT LEG Raise X10   Assessment   Assessment TOLERATED SUPINE EX WELL.   Safety Devices   Type of Devices Chair alarm in place;Call light within reach;Left in chair  (also direct transfer of care to PT at 0900)                            RECORD REVIEW: Previous medical records, medications were reviewed at today's visit    IMPRESSION:   1.  Status post right BKA-pain control/mobilization  2.  Atrial fibrillation-Eliquis  3.  Diabetes-on metformin-monitor blood sugar  4.  Hypertension-on meds monitor  5.  Pain control-Lyrica/Norco as needed  6.  GI-bowel regimen  7.  CHF-monitor may need Lasix as needed  8.  Renal dysfunction-monitor  9.  Anemia-monitor  10.  PT/OT    Continue present care    Team conference with PT/OT/speech/nursing/Care coordinator to review in depth patients care and discharge planning. At least 35 minutes spent coordinating care for patient >50% of time spent in coordination of care.       ft SBA  Ambulation 12 ft parallel bars    [facility] June 21st      Expected duration and frequency therapy: 180 minutes per day, 5 days per week    CALL 
06/14/2024    GLUCOSE 90 06/14/2024    CALCIUM 8.0 (L) 06/14/2024    BILITOT 0.3 06/14/2024    ALKPHOS 242 (H) 06/14/2024    AST 15 06/14/2024    ALT 9 06/14/2024    LABGLOM 70 06/14/2024   No results found for: \"INR\", \"PROTIME\"      Mauro Cottrell, GAMALIEL  Student Physical Therapist  Specialty: Physical Therapist     Progress Notes      Cosign Needed     Date of Service: 6/13/2024  3:21 PM     Cosign Needed         Physical Therapy       06/13/24 1430   Transfers   Sit to Stand Minimal Assistance   Stand to Sit Minimal Assistance   Bed to Chair Minimal assistance   Stand Pivot Transfers Minimal Assistance;Moderate Assistance   Propulsion 1   Propulsion Manual   Level Level Tile   Method RUE;LUE   Level of Assistance Stand by assistance   Description/ Details pt. able to operate WC with no steering assistance   Distance 200'   Propulsion 2   Propulsion 2 Manual   Level 2 Level Tile   Method 2 RUE;LUE   Level of Assistance 2 Stand by assistance   Distance 2 100'   PT Exercises   Exercise Treatment supine RLE hip flexion 3x10, knee flx/ext 3x10, hip abd 3x10, Prone hamstring curls 3x10, SAQ 3x10, retropropulsion of WC with LLE for 100'   Assessment   Assessment pt. tolerated strengthening exercises well. Struggled with prone hamstring curls, due to lack of ROM stemming from tight hip flexors. complained of fatigue with retropropulsion of WC using LLE.      Electronically signed by GAMALIEL Louis on 6/13/2024 at 3:21 PM                    RECORD REVIEW: Previous medical records, medications were reviewed at today's visit    IMPRESSION:   1.  Status post right BKA-pain control/mobilization  2.  Atrial fibrillation-Eliquis  3.  Diabetes-on metformin-monitor blood sugar  4.  Hypertension-on meds monitor  5.  Pain control-Lyrica/Norco as needed  6.  GI-bowel regimen  7.  CHF-monitor may need Lasix as needed  8.  Renal dysfunction-monitor  9.  Anemia-monitor  10.  PT/OT      Elevated serum potassium--DC lisinopril repeat serum 
REVIEW: Previous medical records, medications were reviewed at today's visit    IMPRESSION:   1.  Status post right BKA-pain control/mobilization  2.  Atrial fibrillation-Eliquis  3.  Diabetes-on metformin-monitor blood sugar  4.  Hypertension-on meds monitor  5.  Pain control-Lyrica/Norco as needed  6.  GI-bowel regimen  7.  CHF-monitor may need Lasix as needed  8.  Renal dysfunction-monitor  9.  Anemia-monitor  10.  PT/OT      Elevated serum potassium-stat serum potassium-DC lisinopril  Elevated renal function-monitor CMP    Continue present care      [facility] June 21st      Expected duration and frequency therapy: 180 minutes per day, 5 days per week    CALL WITH ANY QUESTIONS  [phone] CELL  Dr Eduardo Ryan  
needed  6.  GI-bowel regimen  7.  CHF-monitor may need Lasix as needed  8.  Renal dysfunction-monitor  9.  Anemia-monitor  10.  PT/OT      Continued elevated serum potassium--DCd lisinopril   Elevated renal function-function nephrology consult     Continue current care      [facility] June 21st      Expected duration and frequency therapy: 180 minutes per day, 5 days per week    CALL WITH ANY QUESTIONS  [phone] CELL  Dr Eduardo Ryan  
potassium  Elevated renal function-monitor CMP    Continue present care as noted      [facility] June 21st      Expected duration and frequency therapy: 180 minutes per day, 5 days per week    CALL WITH ANY QUESTIONS  [phone] CELL  Dr Eduardo Ryan  
hours.      Cultures:   No results for input(s): \"CULTURE\" in the last 72 hours.  No results for input(s): \"BC\", \"BLOODCULT2\" in the last 72 hours.  No results for input(s): \"CXSURG\" in the last 72 hours.    Radiology reports as per the Radiologist  Radiology: No results found.     Assessment   1.  Acute kidney injury/worsening.  2.  Acute tubular necrosis.  3.  Previous episodes of JANICE  4.  Recent history of right BKA.  5.  Metabolic acidemia/hyperkalemia.  6.  Anemia related to recent surgery.  7.  Poorly controlled hypertension  8.  Left lower lobe pneumonia  9.  History of chronic systolic CHF  10.  Iron deficiency anemia.    Plan:  1.  Agree with IV Lasix.  2.  Intravenous Venofer.  3.  Agree with IV antibiotics.  4.  Plan was discussed with the family at the bedside.  Renal ultrasound revealed no hydronephrosis      Chip Costa MD  06/18/24  11:52 AM

## 2024-06-18 NOTE — H&P
[facility]   History and Physical        Patient:   Noe Jimenez  MR#:    [id number]  Account Number:                   [id number]      Room:    [facility]826-02   YOB: 1965  Date of Progress Note: 6/8/2024  Time of Note                           8:38 AM  Attending Physician:  Eduardo Ryan MD        Admitting diagnosis:Other complications of procedures, not elsewhere classified, initial encounter status post right BKA    Secondary diagnoses:Acute kidney failure, unspecified,Acute posthemorrhagic anemia,Paroxysmal atrial fibrillation,Essential (primary) hypertension,Type 2 diabetes mellitus with hyperglycemia,Unspecified systolic (congestive) heart failure    CHIEF COMPLAINT: Status post right BKA      HISTORY OF PRESENT ILLNESS:   This 59 y.o. male with history of A-Fib, CHF, HTN, HLD, DM and chronic right foot pain d/t work injury about 2 years ago requiring 5 surgeries. He had a non-healing wound to his right heel. He was seen as outpatient by orthopedic surgeon Dr. Coronado who had performed the past surgeries to his right ankle. Unfortunately, the fusion to his right ankle had failed and Dr. Coronado recommended a right below-the-knee amputation. He was referred to vascular surgeon Dr. Galvan. After evaluation, Dr. Galvan agreed with performing a right BKA. Patient was in agreement and was admitted [facility] on 6/3/24 for surgery. He tolerated the procedure well. He continues to a have significant pain rating at 8/10, which is being controlled with PO medications. During his hospital stay his has had JANICE and acute blood loss anemia requiring transfusion. Hemoglobin currently at 7.6 and creatinine at 1.27. Both of which will need continued monitoring. He is participating in both PT/OT. He is felt to need a stay on Rehab to work towards his goal of returning home with assist of his wife. He is now felt ready to start the Rehab program.    REVIEW OF SYSTEMS:  Constitutional - No fever or chills.  No 
2 diabetes mellitus with complication, without long-term current use of insulin (HCC)    Essential hypertension    Atrial fibrillation (HCC)    Anemia    CHF (congestive heart failure) (HCC)    Renal dysfunction    Hospital-acquired pneumonia  Resolved Problems:    * No resolved hospital problems. *           Repeat labs in a.m. Electrolyte replacement as per protocol. Patient will be monitored very closely on the floor. Further recommendations as per the hospital course.  [facility].    Patient  is on DVT prophylaxis  Current medications reviewed  Lab work reviewed  Radiology/Chest x-ray films reviewed  Discussed with the nurse and addressed all questions/concerns  Discussed with Patient and/or Family at the bedside in detail ... they verbalize understanding and agree with the management plan.      Attestation:  Inpatient status is used for patients with an expected LOS extending past two midnights due to medical therapy and/or critical care needs  ... all other patients are placed under OBServation status.      CORONA Eli - CNP  11:28 AM 6/18/2024      DISCLAIMER: This note was created with electronic voice recognition which does have occasional errors.  If you have any questions regarding the content within the note please do not hesitate to contact me... Thanks.

## 2024-06-18 NOTE — H&P
[address] [place] [address]    DEPARTMENT OF HOSPITALIST MEDICINE        HISTORY & PHYSICAL:          REASON FOR ADMISSION:  No chief complaint on file.       HISTORY OF PRESENT ILLNESS:  The patient is a 59 y.o. 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  This was done at [facility].  Patient was transferred over to inpatient rehab on 6/8/2024.  Consult called today for fever , cough, acute kidney injury and hyperkalemia..  Nephrology was also consulted today..  Patient denies dyspnea but he desaturated to 83% was placed on 3 L per nasal cannula..Patient lungs were quite congested.  BNP lactic and Pro-Alvin were drawn emergently BNP was 14,973 lactic was 2.1 Pro-Alvin 0.77 patient did test positive for parainfluenza on nasal swab.  He has run a low-grade fever.  Patient gives a history of acute fluid overload previously and reports a 26 pound weight gain since his surgery..  He was on Lasix prior to admission and that has not been resumed.  He was given a dose of 40 mg IV with not a great urine output.  6/18/2024 patient was seen on acute rehab and found to be requiring much more oxygen..  Decision made to move patient to the floor so I can monitor him continuously.  Patient's BNP was more elevated and his lungs sound for bilateral rales and rhonchi.  Chest x-ray showed right greater than left basilar opacities most likely representing the pneumonia.  Zosyn and Levaquin initiated.  Patient moved to medical bed hospital-acquired pneumonia orders . Pts  BNP 22473, PROCAL 4.10, WBC 12.4, HGB7.1, HCT 22.7  D dimer 8.24.  VQ scan low probability of clot.    PAST MEDICAL HISTORY:  Past Medical History:   Diagnosis Date    Diabetes mellitus (HCC)     Hypertension          PAST SURGICAL HISTORY:  Past Surgical History:   Procedure Laterality Date    BACK SURGERY

## 2024-06-18 NOTE — PLAN OF CARE
Problem: Discharge Planning  Goal: Discharge to home or other facility with appropriate resources  6/10/2024 2250 by Eileen Mckeon RN  Outcome: Progressing  Flowsheets (Taken 6/10/2024 2055)  Discharge to home or other facility with appropriate resources: Refer to discharge planning if patient needs post-hospital services based on physician order or complex needs related to functional status, cognitive ability or social support system  6/10/2024 1531 by Carlie Banuelos RN  Outcome: Progressing     Problem: Pain  Goal: Verbalizes/displays adequate comfort level or baseline comfort level  6/10/2024 2250 by Eileen Mckeon RN  Outcome: Progressing  6/10/2024 1531 by Carlie Banuelos RN  Outcome: Progressing     Problem: Neurosensory - Adult  Goal: Achieves stable or improved neurological status  6/10/2024 2250 by Eileen Mckeon RN  Outcome: Progressing  Flowsheets (Taken 6/10/2024 2055)  Achieves stable or improved neurological status: Assess for and report changes in neurological status  6/10/2024 1531 by Carlie Banuelos RN  Outcome: Progressing  Goal: Absence of seizures  6/10/2024 2250 by Eileen Mckeon RN  Outcome: Progressing  6/10/2024 1531 by Carlie Banuelos RN  Outcome: Progressing  Goal: Remains free of injury related to seizures activity  6/10/2024 2250 by Eileen Mckeon RN  Outcome: Progressing  6/10/2024 1531 by Carlie Banuelos RN  Outcome: Progressing  Goal: Achieves maximal functionality and self care  6/10/2024 2250 by Eileen Mckeon RN  Outcome: Progressing  6/10/2024 1531 by Carlie Banuelos RN  Outcome: Progressing     Problem: Respiratory - Adult  Goal: Achieves optimal ventilation and oxygenation  6/10/2024 2250 by Eileen Mckeon RN  Outcome: Progressing  Flowsheets (Taken 6/10/2024 2055)  Achieves optimal ventilation and oxygenation: Assess for changes in respiratory status  6/10/2024 1531 by Carlie Banuelos RN  Outcome: Progressing     Problem: Cardiovascular - Adult  Goal: Maintains optimal cardiac 
  Problem: Discharge Planning  Goal: Discharge to home or other facility with appropriate resources  6/11/2024 1139 by Mini Harley RN  Outcome: Progressing  Flowsheets (Taken 6/11/2024 1107)  Discharge to home or other facility with appropriate resources: Refer to discharge planning if patient needs post-hospital services based on physician order or complex needs related to functional status, cognitive ability or social support system  6/10/2024 2250 by Eileen Mckeon RN  Outcome: Progressing  Flowsheets (Taken 6/10/2024 2055)  Discharge to home or other facility with appropriate resources: Refer to discharge planning if patient needs post-hospital services based on physician order or complex needs related to functional status, cognitive ability or social support system     Problem: Pain  Goal: Verbalizes/displays adequate comfort level or baseline comfort level  6/11/2024 1139 by Mini Harley RN  Outcome: Progressing  6/10/2024 2250 by Eileen Mckeon RN  Outcome: Progressing     Problem: Neurosensory - Adult  Goal: Achieves stable or improved neurological status  6/11/2024 1139 by Mini Harley RN  Outcome: Progressing  Flowsheets (Taken 6/11/2024 1107)  Achieves stable or improved neurological status: Assess for and report changes in neurological status  6/10/2024 2250 by Eileen Mckeon RN  Outcome: Progressing  Flowsheets (Taken 6/10/2024 2055)  Achieves stable or improved neurological status: Assess for and report changes in neurological status  Goal: Absence of seizures  6/11/2024 1139 by Mini Harley RN  Outcome: Progressing  6/10/2024 2250 by Eileen Mckeon RN  Outcome: Progressing  Goal: Remains free of injury related to seizures activity  6/11/2024 1139 by Mini Harley RN  Outcome: Progressing  6/10/2024 2250 by Eileen Mckeon RN  Outcome: Progressing  Goal: Achieves maximal functionality and self care  6/11/2024 1139 by Mini Harley RN  Outcome: Progressing  6/10/2024 2250 by Linda 
  Problem: Discharge Planning  Goal: Discharge to home or other facility with appropriate resources  6/12/2024 2254 by Sandy Grewal LPN  Outcome: Progressing  6/12/2024 1322 by Mary Escalante RN  Outcome: Progressing     Problem: Pain  Goal: Verbalizes/displays adequate comfort level or baseline comfort level  6/12/2024 2254 by Sandy Grewal LPN  Outcome: Progressing  6/12/2024 1322 by Mary Escalante RN  Outcome: Progressing     Problem: Neurosensory - Adult  Goal: Achieves stable or improved neurological status  6/12/2024 2254 by Sandy Grewal LPN  Outcome: Progressing  6/12/2024 1322 by Mary Escalante RN  Outcome: Progressing  Goal: Absence of seizures  6/12/2024 2254 by Sandy Grewal LPN  Outcome: Progressing  6/12/2024 1322 by Mary Escalante RN  Outcome: Progressing  Goal: Remains free of injury related to seizures activity  6/12/2024 2254 by Sandy Grewal LPN  Outcome: Progressing  6/12/2024 1322 by Mary Escalante RN  Outcome: Progressing  Goal: Achieves maximal functionality and self care  6/12/2024 2254 by Sandy Grewal LPN  Outcome: Progressing  6/12/2024 1322 by Mary Escalante RN  Outcome: Progressing     Problem: Respiratory - Adult  Goal: Achieves optimal ventilation and oxygenation  6/12/2024 2254 by Sandy Grewal LPN  Outcome: Progressing  6/12/2024 1322 by Mary Escalante RN  Outcome: Progressing     Problem: Cardiovascular - Adult  Goal: Maintains optimal cardiac output and hemodynamic stability  6/12/2024 2254 by Sandy Grewal LPN  Outcome: Progressing  6/12/2024 1322 by Mary Escalante RN  Outcome: Progressing  Goal: Absence of cardiac dysrhythmias or at baseline  6/12/2024 2254 by Sandy Grewal LPN  Outcome: Progressing  6/12/2024 1322 by Mary Escalante RN  Outcome: Progressing     Problem: Skin/Tissue Integrity - Adult  Goal: Skin integrity remains intact  6/12/2024 2254 by Sandy Grewal LPN  Outcome: Progressing  6/12/2024 1322 by Mary Escalante RN  Outcome: Progressing  Goal: 
  Problem: Discharge Planning  Goal: Discharge to home or other facility with appropriate resources  Outcome: Progressing     Problem: Pain  Goal: Verbalizes/displays adequate comfort level or baseline comfort level  Outcome: Progressing     Problem: Neurosensory - Adult  Goal: Achieves stable or improved neurological status  Outcome: Progressing  Goal: Absence of seizures  Outcome: Progressing  Goal: Remains free of injury related to seizures activity  Outcome: Progressing  Goal: Achieves maximal functionality and self care  Outcome: Progressing     Problem: Respiratory - Adult  Goal: Achieves optimal ventilation and oxygenation  Outcome: Progressing     Problem: Cardiovascular - Adult  Goal: Maintains optimal cardiac output and hemodynamic stability  Outcome: Progressing  Goal: Absence of cardiac dysrhythmias or at baseline  Outcome: Progressing     Problem: Skin/Tissue Integrity - Adult  Goal: Skin integrity remains intact  Outcome: Progressing  Goal: Incisions, wounds, or drain sites healing without S/S of infection  Outcome: Progressing  Goal: Oral mucous membranes remain intact  Outcome: Progressing     Problem: Musculoskeletal - Adult  Goal: Return mobility to safest level of function  Outcome: Progressing  Goal: Maintain proper alignment of affected body part  Outcome: Progressing  Goal: Return ADL status to a safe level of function  Outcome: Progressing     Problem: Gastrointestinal - Adult  Goal: Minimal or absence of nausea and vomiting  Outcome: Progressing  Goal: Maintains or returns to baseline bowel function  Outcome: Progressing  Goal: Maintains adequate nutritional intake  Outcome: Progressing  Goal: Establish and maintain optimal ostomy function  Outcome: Progressing     Problem: Genitourinary - Adult  Goal: Absence of urinary retention  Outcome: Progressing  Goal: Urinary catheter remains patent  Outcome: Progressing     Problem: Infection - Adult  Goal: Absence of infection at discharge  Outcome: 
  Problem: Discharge Planning  Goal: Discharge to home or other facility with appropriate resources  Outcome: Progressing     Problem: Pain  Goal: Verbalizes/displays adequate comfort level or baseline comfort level  Outcome: Progressing     Problem: Neurosensory - Adult  Goal: Achieves stable or improved neurological status  Outcome: Progressing  Goal: Absence of seizures  Outcome: Progressing  Goal: Remains free of injury related to seizures activity  Outcome: Progressing  Goal: Achieves maximal functionality and self care  Outcome: Progressing     Problem: Respiratory - Adult  Goal: Achieves optimal ventilation and oxygenation  Outcome: Progressing     Problem: Cardiovascular - Adult  Goal: Maintains optimal cardiac output and hemodynamic stability  Outcome: Progressing  Goal: Absence of cardiac dysrhythmias or at baseline  Outcome: Progressing     Problem: Skin/Tissue Integrity - Adult  Goal: Skin integrity remains intact  Outcome: Progressing  Goal: Incisions, wounds, or drain sites healing without S/S of infection  Outcome: Progressing  Goal: Oral mucous membranes remain intact  Outcome: Progressing     Problem: Musculoskeletal - Adult  Goal: Return mobility to safest level of function  Outcome: Progressing  Goal: Maintain proper alignment of affected body part  Outcome: Progressing  Goal: Return ADL status to a safe level of function  Outcome: Progressing     Problem: Gastrointestinal - Adult  Goal: Minimal or absence of nausea and vomiting  Outcome: Progressing  Goal: Maintains or returns to baseline bowel function  Outcome: Progressing  Goal: Maintains adequate nutritional intake  Outcome: Progressing  Goal: Establish and maintain optimal ostomy function  Outcome: Progressing     Problem: Genitourinary - Adult  Goal: Absence of urinary retention  Outcome: Progressing  Goal: Urinary catheter remains patent  Outcome: Progressing     Problem: Infection - Adult  Goal: Absence of infection at discharge  Outcome: 
  Problem: Discharge Planning  Goal: Discharge to home or other facility with appropriate resources  Outcome: Progressing  Flowsheets (Taken 6/13/2024 2117)  Discharge to home or other facility with appropriate resources: Refer to discharge planning if patient needs post-hospital services based on physician order or complex needs related to functional status, cognitive ability or social support system     Problem: Pain  Goal: Verbalizes/displays adequate comfort level or baseline comfort level  Outcome: Progressing     Problem: Neurosensory - Adult  Goal: Achieves stable or improved neurological status  Outcome: Progressing  Flowsheets (Taken 6/13/2024 2117)  Achieves stable or improved neurological status: Assess for and report changes in neurological status  Goal: Absence of seizures  Outcome: Progressing  Goal: Remains free of injury related to seizures activity  Outcome: Progressing  Goal: Achieves maximal functionality and self care  Outcome: Progressing  Flowsheets (Taken 6/13/2024 2117)  Achieves maximal functionality and self care: Monitor swallowing and airway patency with patient fatigue and changes in neurological status     Problem: Respiratory - Adult  Goal: Achieves optimal ventilation and oxygenation  Outcome: Progressing  Flowsheets (Taken 6/13/2024 2117)  Achieves optimal ventilation and oxygenation:   Assess for changes in respiratory status   Assess for changes in mentation and behavior   Position to facilitate oxygenation and minimize respiratory effort     Problem: Cardiovascular - Adult  Goal: Maintains optimal cardiac output and hemodynamic stability  Outcome: Progressing  Flowsheets (Taken 6/13/2024 2117)  Maintains optimal cardiac output and hemodynamic stability: Monitor blood pressure and heart rate  Goal: Absence of cardiac dysrhythmias or at baseline  Outcome: Progressing  Flowsheets (Taken 6/13/2024 2117)  Absence of cardiac dysrhythmias or at baseline: Monitor cardiac rate and rhythm   
  Problem: Discharge Planning  Goal: Discharge to home or other facility with appropriate resources  Outcome: Progressing  Flowsheets (Taken 6/14/2024 2100)  Discharge to home or other facility with appropriate resources: Refer to discharge planning if patient needs post-hospital services based on physician order or complex needs related to functional status, cognitive ability or social support system     Problem: Pain  Goal: Verbalizes/displays adequate comfort level or baseline comfort level  Outcome: Progressing     Problem: Neurosensory - Adult  Goal: Achieves stable or improved neurological status  Outcome: Progressing  Flowsheets (Taken 6/14/2024 2100)  Achieves stable or improved neurological status: Assess for and report changes in neurological status  Goal: Absence of seizures  Outcome: Progressing  Goal: Remains free of injury related to seizures activity  Outcome: Progressing  Goal: Achieves maximal functionality and self care  Outcome: Progressing  Flowsheets (Taken 6/14/2024 2100)  Achieves maximal functionality and self care: Monitor swallowing and airway patency with patient fatigue and changes in neurological status     Problem: Respiratory - Adult  Goal: Achieves optimal ventilation and oxygenation  Outcome: Progressing  Flowsheets (Taken 6/14/2024 2100)  Achieves optimal ventilation and oxygenation:   Assess for changes in respiratory status   Assess for changes in mentation and behavior   Position to facilitate oxygenation and minimize respiratory effort     Problem: Cardiovascular - Adult  Goal: Maintains optimal cardiac output and hemodynamic stability  Outcome: Progressing  Flowsheets (Taken 6/14/2024 2100)  Maintains optimal cardiac output and hemodynamic stability: Monitor blood pressure and heart rate  Goal: Absence of cardiac dysrhythmias or at baseline  Outcome: Progressing  Flowsheets (Taken 6/14/2024 2100)  Absence of cardiac dysrhythmias or at baseline: Monitor cardiac rate and rhythm   
Patient was seen and examined by PA/APRN who entered a complete EMR on patient encounter   I have personally interviewed and examined the patient and reviewed patient charts, labs and imaging.  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 seen by nephrology in [facility]  Presently renal function is acceptable    He complain of having fever and chills from yesterday discussed about parainfluenza  Also informed about repeat chest x-ray and on clinical findings started on Lasix    On exam  Inspiratory crackles over lower half of the lungs bilaterally  On 5 L of oxygen  2+ edema over the left leg    A/P  Acute hypoxia likely from CHF/pneumonia  Will repeat chest x-ray> suspected pneumonia over the right upper lung  Started on Lasix 40 mg IV every 12 hours for next 2 days  Continue to monitor intake and output  Wean off oxygen when possible  Continue to monitor BMP to follow-up with renal function  6/18 started on Zosyn and Levaquin  Continue other supportive management for pneumonia  Continue treatment for heart failure if not more than 2 L of urine output over the next 24 hours we may consider holding the Lasix and continue to treat for pneumonia    Vitamin D deficiency-9 started on supplementation with ergocalciferol    Agreed with transferring to the fourth floor  
care: Encourage and assist patient to increase activity and self care with guidance from physical therapy/occupational therapy  6/7/2024 1727 by Cari Quarles RN  Outcome: Progressing     Problem: Respiratory - Adult  Goal: Achieves optimal ventilation and oxygenation  6/7/2024 2331 by Yennifer Reis LPN  Outcome: Progressing  6/7/2024 1727 by Cari Quarles RN  Outcome: Progressing     Problem: Cardiovascular - Adult  Goal: Maintains optimal cardiac output and hemodynamic stability  6/7/2024 2331 by Yennifer Reis LPN  Outcome: Progressing  Flowsheets (Taken 6/7/2024 1854 by Sophia Yeh RN)  Maintains optimal cardiac output and hemodynamic stability:   Monitor blood pressure and heart rate   Monitor urine output and notify Licensed Independent Practitioner for values outside of normal range  6/7/2024 1727 by Cari Quarles RN  Outcome: Progressing  Goal: Absence of cardiac dysrhythmias or at baseline  6/7/2024 2331 by Yennifer Reis LPN  Outcome: Progressing  Flowsheets (Taken 6/7/2024 1854 by Sophia Yeh RN)  Absence of cardiac dysrhythmias or at baseline: Assess for signs of decreased cardiac output  6/7/2024 1727 by Cari Quarles RN  Outcome: Progressing     Problem: Skin/Tissue Integrity - Adult  Goal: Skin integrity remains intact  6/7/2024 2331 by Yennifer Reis LPN  Outcome: Progressing  Flowsheets (Taken 6/7/2024 1854 by Sophia Yeh RN)  Skin Integrity Remains Intact: Monitor for areas of redness and/or skin breakdown  6/7/2024 1727 by Cari Quarles RN  Outcome: Progressing  Goal: Incisions, wounds, or drain sites healing without S/S of infection  6/7/2024 2331 by Yennifer Reis LPN  Outcome: Progressing  Flowsheets (Taken 6/7/2024 1854 by Sophia Yeh RN)  Incisions, Wounds, or Drain Sites Healing Without Sign and Symptoms of Infection:   TWICE DAILY: Assess and document dressing/incision, wound bed, drain sites and surrounding tissue   ADMISSION and DAILY: Assess and 
Progressing  6/9/2024 1421 by Carlie Banuelos RN  Outcome: Progressing     Problem: Gastrointestinal - Adult  Goal: Minimal or absence of nausea and vomiting  6/9/2024 2242 by Eileen Mckeon RN  Outcome: Progressing  6/9/2024 1421 by Carlie Banuelos RN  Outcome: Progressing  Goal: Maintains or returns to baseline bowel function  6/9/2024 2242 by Eileen Mckeon RN  Outcome: Progressing  Flowsheets (Taken 6/9/2024 2121)  Maintains or returns to baseline bowel function: Assess bowel function  6/9/2024 1421 by Carlie Banuelos RN  Outcome: Progressing  Goal: Maintains adequate nutritional intake  6/9/2024 2242 by Eileen Mckeon RN  Outcome: Progressing  6/9/2024 1421 by Carlie Banuelos RN  Outcome: Progressing  Goal: Establish and maintain optimal ostomy function  6/9/2024 2242 by Eileen Mckeon RN  Outcome: Progressing  6/9/2024 1421 by Carlie Banuelos RN  Outcome: Progressing     Problem: Genitourinary - Adult  Goal: Absence of urinary retention  6/9/2024 2242 by Eileen Mckeon RN  Outcome: Progressing  Flowsheets (Taken 6/9/2024 2121)  Absence of urinary retention: Assess patient’s ability to void and empty bladder  6/9/2024 1421 by Carlie Banuelos RN  Outcome: Progressing  Goal: Urinary catheter remains patent  6/9/2024 2242 by Eileen Mckeon RN  Outcome: Progressing  6/9/2024 1421 by Carlie Banuelos RN  Outcome: Progressing     Problem: Infection - Adult  Goal: Absence of infection at discharge  6/9/2024 2242 by Eileen Mckeon RN  Outcome: Progressing  Flowsheets (Taken 6/9/2024 2121)  Absence of infection at discharge: Assess and monitor for signs and symptoms of infection  6/9/2024 1421 by Carlie Banuelos RN  Outcome: Progressing  Goal: Absence of infection during hospitalization  6/9/2024 2242 by Eileen Mckeon RN  Outcome: Progressing  6/9/2024 1421 by Carlie Banuelos RN  Outcome: Progressing  Goal: Absence of fever/infection during anticipated neutropenic period  6/9/2024 2242 by Eileen Mckeon RN  Outcome: 
change in body image, loss of functional status, loss of sense of self, and forgiveness  2. Provide emotional and spiritual support  3. Provide information about the patient's health status with consideration of family and cultural values  4. Communicate willingness to discuss loss and facilitate grief process with patient/family as appropriate  5. Emphasize sustaining relationships within family system and community, or aroldo/spiritual traditions  6. Initiate Spiritual Care, Psychosocial Clinical Specialist consult as needed  Outcome: Progressing     Problem: Depression/Self Harm  Goal: Effect of psychiatric condition will be minimized and patient will be protected from self harm  Description: INTERVENTIONS:  1. Assess impact of patient's symptoms on level of functioning, self care needs and offer support as indicated  2. Assess patient/family knowledge of depression, impact on illness and need for teaching  3. Provide emotional support, presence and reassurance  4. Assess for possible suicidal thoughts or ideation. If patient expresses suicidal thoughts or statements do not leave alone, initiate Suicide Precautions, move to a room close to the nursing station and obtain sitter  5. Initiate consults as appropriate with Mental Health Professional, Spiritual Care, Psychosocial CNS, and consider a recommendation to the [facility] for a Psychiatric Consultation  Outcome: Progressing     Problem: Spiritual Care  Goal: Pt/Family able to move forward in process of forgiving self, others, and/or higher power  Description: INTERVENTIONS:  1. Assist patient/family to overcome blocks to healing by use of spiritual practices (prayer, meditation, guided imagery, reiki, breath work, etc).  2. De-myth guilt and help patient/family identify possible irrational spiritual/cultural beliefs and values.  3. Explore possibilities of making amends & reconciliation with self, others, and/or a greater power.  4. Guide patient/family in 
by Yennifer Reis LPN  Outcome: Progressing  6/8/2024 1557 by Carlie Banuelos RN  Outcome: Progressing     Problem: Genitourinary - Adult  Goal: Absence of urinary retention  6/8/2024 2333 by Yennifer Reis LPN  Outcome: Progressing  Flowsheets (Taken 6/8/2024 2120)  Absence of urinary retention: Assess patient’s ability to void and empty bladder  6/8/2024 1557 by Carlie Banuelos RN  Outcome: Progressing  Goal: Urinary catheter remains patent  6/8/2024 2333 by Yennifer Reis LPN  Outcome: Progressing  6/8/2024 1557 by Carlie Banuelos RN  Outcome: Progressing     Problem: Infection - Adult  Goal: Absence of infection at discharge  6/8/2024 2333 by Yennifer Reis LPN  Outcome: Progressing  Flowsheets (Taken 6/8/2024 2120)  Absence of infection at discharge:   Assess and monitor for signs and symptoms of infection   Monitor lab/diagnostic results  6/8/2024 1557 by Carlie Banuelos RN  Outcome: Progressing  Goal: Absence of infection during hospitalization  6/8/2024 2333 by Yennifer Reis LPN  Outcome: Progressing  Flowsheets (Taken 6/8/2024 2120)  Absence of infection during hospitalization: Assess and monitor for signs and symptoms of infection  6/8/2024 1557 by Carlie Banuelos RN  Outcome: Progressing  Goal: Absence of fever/infection during anticipated neutropenic period  6/8/2024 2333 by Yennifer Reis LPN  Outcome: Progressing  6/8/2024 1557 by Carlie Banuelos RN  Outcome: Progressing     Problem: Metabolic/Fluid and Electrolytes - Adult  Goal: Electrolytes maintained within normal limits  6/8/2024 2333 by Yennifer Reis LPN  Outcome: Progressing  Flowsheets (Taken 6/8/2024 2120)  Electrolytes maintained within normal limits: Monitor labs and assess patient for signs and symptoms of electrolyte imbalances  6/8/2024 1557 by Carlie Banuelos RN  Outcome: Progressing  Goal: Hemodynamic stability and optimal renal function maintained  6/8/2024 2333 by Yennifer Reis LPN  Outcome: 
identifying painful feelings of guilt.  5. Help patient/famiy explore and identify spiritual beliefs, cultural understandings or values that may help or hinder letting go of issue.  6. Help patient/family explore feelings of anger, bitterness, resentment.  7. Help patient/family identify and examine the situation in which these feelings are experienced.  8. Help patient/family identify destructive displacement of feelings onto other individuals.  9. Invite use of sacraments/rituals/ceremonies as appropriate (e.g. - confession, anointing, smudging).  10. Refer patient/family to formal counseling and/or to aroldo community for further support work.  Outcome: Progressing     Problem: Skin/Tissue Integrity  Goal: Absence of new skin breakdown  Description: 1.  Monitor for areas of redness and/or skin breakdown  2.  Assess vascular access sites hourly  3.  Every 4-6 hours minimum:  Change oxygen saturation probe site  4.  Every 4-6 hours:  If on nasal continuous positive airway pressure, respiratory therapy assess nares and determine need for appliance change or resting period.  Outcome: Progressing     
and optimal renal function maintained: Monitor labs and assess for signs and symptoms of volume excess or deficit  Goal: Glucose maintained within prescribed range  6/13/2024 2324 by Yennifer Reis LPN  Outcome: Progressing  6/13/2024 2323 by Yennifer Reis LPN  Outcome: Progressing  Flowsheets (Taken 6/13/2024 2117)  Glucose maintained within prescribed range: Monitor blood glucose as ordered     Problem: Hematologic - Adult  Goal: Maintains hematologic stability  6/13/2024 2324 by Yennifer Reis LPN  Outcome: Progressing  6/13/2024 2323 by Yennifer Reis LPN  Outcome: Progressing  Flowsheets (Taken 6/13/2024 2117)  Maintains hematologic stability: Assess for signs and symptoms of bleeding or hemorrhage     Problem: Safety - Adult  Goal: Free from fall injury  6/13/2024 2324 by Yennifer Reis LPN  Outcome: Progressing  6/13/2024 2323 by Yennifer Reis LPN  Outcome: Progressing     Problem: Chronic Conditions and Co-morbidities  Goal: Patient's chronic conditions and co-morbidity symptoms are monitored and maintained or improved  6/13/2024 2324 by Yennifer Reis LPN  Outcome: Progressing  6/13/2024 2323 by Yennifer Reis LPN  Outcome: Progressing  Flowsheets (Taken 6/13/2024 2117)  Care Plan - Patient's Chronic Conditions and Co-Morbidity Symptoms are Monitored and Maintained or Improved: Monitor and assess patient's chronic conditions and comorbid symptoms for stability, deterioration, or improvement     Problem: Anxiety  Goal: Will report anxiety at manageable levels  Description: INTERVENTIONS:  1. Administer medication as ordered  2. Teach and rehearse alternative coping skills  3. Provide emotional support with 1:1 interaction with staff  6/13/2024 2324 by Yennifer Reis LPN  Outcome: Progressing  6/13/2024 2323 by Yennifer Reis LPN  Outcome: Progressing  Flowsheets (Taken 6/13/2024 2117)  Will report anxiety at manageable levels: Administer medication as ordered     Problem: 
maintained: Monitor labs and assess for signs and symptoms of volume excess or deficit  6/15/2024 1506 by Mary Escalante RN  Outcome: Progressing  Goal: Glucose maintained within prescribed range  6/16/2024 0350 by Yennifer Reis LPN  Outcome: Progressing  Flowsheets (Taken 6/15/2024 2145)  Glucose maintained within prescribed range: Monitor blood glucose as ordered  6/15/2024 1506 by Mary Escalante RN  Outcome: Progressing     Problem: Hematologic - Adult  Goal: Maintains hematologic stability  6/16/2024 0350 by Yennifer Reis LPN  Outcome: Progressing  Flowsheets (Taken 6/15/2024 2145)  Maintains hematologic stability: Assess for signs and symptoms of bleeding or hemorrhage  6/15/2024 1506 by Mary Escalante RN  Outcome: Progressing     Problem: Safety - Adult  Goal: Free from fall injury  6/16/2024 0350 by Yennifer Reis LPN  Outcome: Progressing  6/15/2024 1506 by Mary Escalante RN  Outcome: Progressing     Problem: Chronic Conditions and Co-morbidities  Goal: Patient's chronic conditions and co-morbidity symptoms are monitored and maintained or improved  6/16/2024 0350 by Yennifer Reis LPN  Outcome: Progressing  Flowsheets (Taken 6/15/2024 2145)  Care Plan - Patient's Chronic Conditions and Co-Morbidity Symptoms are Monitored and Maintained or Improved: Monitor and assess patient's chronic conditions and comorbid symptoms for stability, deterioration, or improvement  6/15/2024 1506 by Mary Escalante RN  Outcome: Progressing     Problem: Anxiety  Goal: Will report anxiety at manageable levels  Description: INTERVENTIONS:  1. Administer medication as ordered  2. Teach and rehearse alternative coping skills  3. Provide emotional support with 1:1 interaction with staff  6/16/2024 0350 by Yennifer Reis LPN  Outcome: Progressing  6/15/2024 1506 by Mary Escalante RN  Outcome: Progressing     Problem: Coping  Goal: Pt/Family able to verbalize concerns and demonstrate effective coping 
possibilities of making amends & reconciliation with self, others, and/or a greater power.  4. Guide patient/family in identifying painful feelings of guilt.  5. Help patient/famiy explore and identify spiritual beliefs, cultural understandings or values that may help or hinder letting go of issue.  6. Help patient/family explore feelings of anger, bitterness, resentment.  7. Help patient/family identify and examine the situation in which these feelings are experienced.  8. Help patient/family identify destructive displacement of feelings onto other individuals.  9. Invite use of sacraments/rituals/ceremonies as appropriate (e.g. - confession, anointing, smudging).  10. Refer patient/family to formal counseling and/or to [facility]ld[facility] community for further support work.  Outcome: Progressing     Problem: Skin/Tissue Integrity  Goal: Absence of new skin breakdown  Description: 1.  Monitor for areas of redness and/or skin breakdown  2.  Assess vascular access sites hourly  3.  Every 4-6 hours minimum:  Change oxygen saturation probe site  4.  Every 4-6 hours:  If on nasal continuous positive airway pressure, respiratory therapy assess nares and determine need for appliance change or resting period.  Outcome: Progressing     Problem: ABCDS Injury Assessment  Goal: Absence of physical injury  Outcome: Progressing     Problem: Nutrition Deficit:  Goal: Optimize nutritional status  Outcome: Progressing  Flowsheets (Taken 6/17/2024 1158 by Polly Lozada, MS, RD, LD)  Nutrient intake appropriate for improving, restoring, or maintaining nutritional needs:   Monitor oral intake, labs, and treatment plans   Recommend appropriate diets, oral nutritional supplements, and vitamin/mineral supplements     
Refer patient/family to formal counseling and/or to [facility] for further support work.  6/16/2024 2328 by Sandy Grewal LPN  Outcome: Progressing  6/16/2024 1046 by Mary Escalante, RN  Outcome: Progressing     Problem: Skin/Tissue Integrity  Goal: Absence of new skin breakdown  Description: 1.  Monitor for areas of redness and/or skin breakdown  2.  Assess vascular access sites hourly  3.  Every 4-6 hours minimum:  Change oxygen saturation probe site  4.  Every 4-6 hours:  If on nasal continuous positive airway pressure, respiratory therapy assess nares and determine need for appliance change or resting period.  6/16/2024 2328 by Sandy Grewal LPN  Outcome: Progressing  6/16/2024 1046 by Mary Escalante, RN  Outcome: Progressing     Problem: ABCDS Injury Assessment  Goal: Absence of physical injury  6/16/2024 2328 by Sandy Grewal LPN  Outcome: Progressing  6/16/2024 1046 by Mary Escalante, RN  Outcome: Progressing     Problem: Nutrition Deficit:  Goal: Optimize nutritional status  6/16/2024 2328 by Sandy Grewal LPN  Outcome: Progressing  6/16/2024 1046 by Mary Escalante, RN  Outcome: Progressing     
cognitive training, group therapy, education, and/or dysphagia therapy based on the above goals.           These goals were reviewed with this patient at the time of assessment and Noe Jimenez is in agreement.    CASE MANAGEMENT:  Goals:   Assist patient/family with discharge planning, patient/family counseling,  and coordination with insurance during ARU stay.  Patient Goals: heal, pain managed, learn how to move and care for self, prepare for prosthetic               Activities Prior to Admit:                         Noe Jimenez will be seen a minimum of 3 hours of therapy per day/a minimum of 5 out of 7 days per week.    [] In this rare instance due to the nature of this patient's medical involvement, this patient will be seen 15 hours per week (900 minutes within a 7 day period).    Treatments may include therapeutic exercises, gait training, neuromuscular re-ed, transfer training, community reintegration, bed mobility, w/c mobility and training, self care, home mgmt, cognitive training, energy conservation,dysphagia tx, speech/language/communication therapy, group therapy, and patient/family education. In addition, dietician/nutritionist may monitor calorie count as well as intake and collaboratively work with SLP on dietary upgrades.  Neuropsychology/Psychology may evaluate and provide necessary support.    Medical issues being managed closely and that require 24 hour availability of a physician:   [] Swallowing Precautions  [x] Bowel/Bladder Fx  [x] Weight bearing precautions   [x] Wound Care    [x] Pain Mgmt   [x] Infection Protection   [x] DVT Prophylaxis   [x] Fall Precautions  [x] Fluid/Electrolyte/Nutrition Balance   [] Voice Protection   [] Respiratory  [] Other:    Medical Prognosis: [x] Good  [] Fair    [] Guarded   Total expected IRF days:  13  Anticipated discharge destination:    [] Home Independently   [x] Home with supervision    []SNF     [] Other                                    
Change oxygen saturation probe site  4.  Every 4-6 hours:  If on nasal continuous positive airway pressure, respiratory therapy assess nares and determine need for appliance change or resting period.  Outcome: Progressing     Problem: ABCDS Injury Assessment  Goal: Absence of physical injury  Outcome: Progressing     Problem: Nutrition Deficit:  Goal: Optimize nutritional status  Outcome: Progressing     
access sites hourly  3.  Every 4-6 hours minimum:  Change oxygen saturation probe site  4.  Every 4-6 hours:  If on nasal continuous positive airway pressure, respiratory therapy assess nares and determine need for appliance change or resting period.  6/14/2024 1158 by Mini Harley RN  Outcome: Progressing  6/13/2024 2324 by Yennifer Reis LPN  Outcome: Progressing  6/13/2024 2323 by Yennifer Reis LPN  Outcome: Progressing     Problem: ABCDS Injury Assessment  Goal: Absence of physical injury  6/14/2024 1158 by Mini Harley RN  Outcome: Progressing  6/13/2024 2324 by Yennifer Reis LPN  Outcome: Progressing  6/13/2024 2323 by Yennifer Reis LPN  Outcome: Progressing     Problem: Nutrition Deficit:  Goal: Optimize nutritional status  6/14/2024 1158 by Mini Harley RN  Outcome: Progressing  6/13/2024 2324 by Yennifer Reis LPN  Outcome: Progressing  6/13/2024 2323 by Yennifer Reis LPN  Outcome: Progressing

## 2024-06-19 ENCOUNTER — APPOINTMENT (OUTPATIENT)
Dept: GENERAL RADIOLOGY | Age: 59
DRG: 871 | End: 2024-06-19
Attending: STUDENT IN AN ORGANIZED HEALTH CARE EDUCATION/TRAINING PROGRAM
Payer: COMMERCIAL

## 2024-06-19 ENCOUNTER — APPOINTMENT (OUTPATIENT)
Age: 59
DRG: 871 | End: 2024-06-19
Attending: STUDENT IN AN ORGANIZED HEALTH CARE EDUCATION/TRAINING PROGRAM
Payer: COMMERCIAL

## 2024-06-19 PROBLEM — J96.01 ACUTE HYPOXIC RESPIRATORY FAILURE (HCC): Status: ACTIVE | Noted: 2024-06-19

## 2024-06-19 LAB
ABO/RH: NORMAL
ALBUMIN SERPL-MCNC: 2.7 G/DL (ref 3.5–5.2)
ALLENS TEST: ABNORMAL
ALLENS TEST: ABNORMAL
ALP SERPL-CCNC: 221 U/L (ref 40–130)
ALT SERPL-CCNC: 20 U/L (ref 5–41)
ANION GAP SERPL CALCULATED.3IONS-SCNC: 14 MMOL/L (ref 7–19)
ANTIBODY SCREEN: NORMAL
AST SERPL-CCNC: 29 U/L (ref 5–40)
BASE EXCESS ARTERIAL: -0.3 MMOL/L (ref -2–2)
BASE EXCESS ARTERIAL: 0.4 MMOL/L (ref -2–2)
BASOPHILS # BLD: 0.1 K/UL (ref 0–0.2)
BASOPHILS NFR BLD: 0.8 % (ref 0–1)
BILIRUB SERPL-MCNC: 0.6 MG/DL (ref 0.2–1.2)
BLOOD BANK DISPENSE STATUS: NORMAL
BLOOD BANK PRODUCT CODE: NORMAL
BPU ID: NORMAL
BUN SERPL-MCNC: 58 MG/DL (ref 6–20)
CALCIUM SERPL-MCNC: 7.9 MG/DL (ref 8.6–10)
CARBOXYHEMOGLOBIN ARTERIAL: 0.4 % (ref 0–5)
CARBOXYHEMOGLOBIN ARTERIAL: 0.6 % (ref 0–5)
CHLORIDE SERPL-SCNC: 101 MMOL/L (ref 98–111)
CO2 SERPL-SCNC: 21 MMOL/L (ref 22–29)
CREAT SERPL-MCNC: 2 MG/DL (ref 0.5–1.2)
DESCRIPTION BLOOD BANK: NORMAL
ECHO AO ASC DIAM: 3.4 CM
ECHO AO ASCENDING AORTA INDEX: 1.61 CM/M2
ECHO AO ROOT DIAM: 4.2 CM
ECHO AO ROOT INDEX: 1.99 CM/M2
ECHO AO SINUS VALSALVA DIAM: 4.2 CM
ECHO AO SINUS VALSALVA INDEX: 1.99 CM/M2
ECHO AO ST JNCT DIAM: 3.7 CM
ECHO AV AREA PEAK VELOCITY: 2.3 CM2
ECHO AV AREA VTI: 2.4 CM2
ECHO AV AREA/BSA PEAK VELOCITY: 1.1 CM2/M2
ECHO AV AREA/BSA VTI: 1.1 CM2/M2
ECHO AV MEAN GRADIENT: 6 MMHG
ECHO AV MEAN VELOCITY: 1.2 M/S
ECHO AV PEAK GRADIENT: 10 MMHG
ECHO AV PEAK VELOCITY: 1.6 M/S
ECHO AV VELOCITY RATIO: 0.63
ECHO AV VTI: 28.9 CM
ECHO BSA: 2.15 M2
ECHO IVC PROX: 2.4 CM
ECHO LA AREA 2C: 28.2 CM2
ECHO LA AREA 4C: 30.8 CM2
ECHO LA DIAMETER INDEX: 2.18 CM/M2
ECHO LA DIAMETER: 4.6 CM
ECHO LA MAJOR AXIS: 7.5 CM
ECHO LA MINOR AXIS: 6 CM
ECHO LA TO AORTIC ROOT RATIO: 1.1
ECHO LA VOL BP: 115 ML (ref 18–58)
ECHO LA VOL MOD A2C: 105 ML (ref 18–58)
ECHO LA VOL MOD A4C: 103 ML (ref 18–58)
ECHO LA VOL/BSA BIPLANE: 55 ML/M2 (ref 16–34)
ECHO LA VOLUME INDEX MOD A2C: 50 ML/M2 (ref 16–34)
ECHO LA VOLUME INDEX MOD A4C: 49 ML/M2 (ref 16–34)
ECHO LV E' LATERAL VELOCITY: 15 CM/S
ECHO LV E' SEPTAL VELOCITY: 7 CM/S
ECHO LV EDV A2C: 244 ML
ECHO LV EDV A4C: 183 ML
ECHO LV EDV INDEX A4C: 87 ML/M2
ECHO LV EDV NDEX A2C: 116 ML/M2
ECHO LV EJECTION FRACTION A2C: 60 %
ECHO LV EJECTION FRACTION A4C: 60 %
ECHO LV EJECTION FRACTION BIPLANE: 60 % (ref 55–100)
ECHO LV ESV A2C: 97 ML
ECHO LV ESV A4C: 73 ML
ECHO LV ESV INDEX A2C: 46 ML/M2
ECHO LV ESV INDEX A4C: 35 ML/M2
ECHO LV FRACTIONAL SHORTENING: 38 % (ref 28–44)
ECHO LV INTERNAL DIMENSION DIASTOLE INDEX: 2.46 CM/M2
ECHO LV INTERNAL DIMENSION DIASTOLIC: 5.2 CM (ref 4.2–5.9)
ECHO LV INTERNAL DIMENSION SYSTOLIC INDEX: 1.52 CM/M2
ECHO LV INTERNAL DIMENSION SYSTOLIC: 3.2 CM
ECHO LV ISOVOLUMETRIC RELAXATION TIME (IVRT): 85 MS
ECHO LV IVSD: 1.2 CM (ref 0.6–1)
ECHO LV MASS 2D: 248.8 G (ref 88–224)
ECHO LV MASS INDEX 2D: 117.9 G/M2 (ref 49–115)
ECHO LV POSTERIOR WALL DIASTOLIC: 1.2 CM (ref 0.6–1)
ECHO LV RELATIVE WALL THICKNESS RATIO: 0.46
ECHO LVOT AREA: 3.8 CM2
ECHO LVOT AV VTI INDEX: 0.63
ECHO LVOT DIAM: 2.2 CM
ECHO LVOT MEAN GRADIENT: 2 MMHG
ECHO LVOT PEAK GRADIENT: 4 MMHG
ECHO LVOT PEAK VELOCITY: 1 M/S
ECHO LVOT STROKE VOLUME INDEX: 33 ML/M2
ECHO LVOT SV: 69.5 ML
ECHO LVOT VTI: 18.3 CM
ECHO MV A VELOCITY: 0.66 M/S
ECHO MV ANNULUS DIAMETER: 2.9 CM
ECHO MV AREA VTI: 2.6 CM2
ECHO MV E DECELERATION TIME (DT): 275 MS
ECHO MV E VELOCITY: 0.8 M/S
ECHO MV E/A RATIO: 1.21
ECHO MV E/E' LATERAL: 5.33
ECHO MV E/E' RATIO (AVERAGED): 8.38
ECHO MV E/E' SEPTAL: 11.43
ECHO MV LVOT VTI INDEX: 1.47
ECHO MV MAX VELOCITY: 1 M/S
ECHO MV MEAN GRADIENT: 2 MMHG
ECHO MV MEAN VELOCITY: 0.6 M/S
ECHO MV PEAK GRADIENT: 4 MMHG
ECHO MV VTI: 26.9 CM
ECHO RA AREA 4C: 18.2 CM2
ECHO RA END SYSTOLIC VOLUME APICAL 4 CHAMBER INDEX BSA: 23 ML/M2
ECHO RA MAJOR AXIS INDEX: 2.56 CM/M2
ECHO RA MAJOR AXIS: 5.4 CM
ECHO RA MINOR AXIS INDEX: 1.94 CM/M2
ECHO RA MINOR AXIS: 4.1 CM
ECHO RA VOLUME: 49 ML
ECHO RV BASAL DIMENSION: 2.6 CM
ECHO RV INTERNAL DIMENSION: 5.4 CM
ECHO RV LONGITUDINAL DIMENSION: 9.8 CM
ECHO RV MID DIMENSION: 3.5 CM
ECHO RV TAPSE: 2.4 CM (ref 1.7–?)
ECHO TV MEAN GRADIENT: 5 MMHG
ECHO TV MEAN VELOCITY: 0.9 M/S
ECHO TV PEAK GRADIENT: 11 MMHG
ECHO TV VALVE AREA VTI: 2 CM2
ECHO TV VTI: 35.4 CM
EOSINOPHIL # BLD: 0.1 K/UL (ref 0–0.6)
EOSINOPHIL NFR BLD: 0.6 % (ref 0–5)
ERYTHROCYTE [DISTWIDTH] IN BLOOD BY AUTOMATED COUNT: 17.6 % (ref 11.5–14.5)
FIO2: 70 %
GLUCOSE BLD-MCNC: 197 MG/DL (ref 70–99)
GLUCOSE BLD-MCNC: 210 MG/DL (ref 70–99)
GLUCOSE BLD-MCNC: 223 MG/DL (ref 70–99)
GLUCOSE BLD-MCNC: 229 MG/DL (ref 70–99)
GLUCOSE SERPL-MCNC: 158 MG/DL (ref 74–109)
HCO3 ARTERIAL: 23.9 MMOL/L (ref 22–26)
HCO3 ARTERIAL: 24.2 MMOL/L (ref 22–26)
HCT VFR BLD AUTO: 21.7 % (ref 42–52)
HCT VFR BLD AUTO: 22 % (ref 42–52)
HCT VFR BLD AUTO: 23 % (ref 42–52)
HEMOCCULT SP1 STL QL: NORMAL
HEMOGLOBIN, ART, EXTENDED: 7 G/DL (ref 14–18)
HEMOGLOBIN, ART, EXTENDED: 7.5 G/DL (ref 14–18)
HGB BLD-MCNC: 6.7 G/DL (ref 14–18)
HGB BLD-MCNC: 7 G/DL (ref 14–18)
HGB BLD-MCNC: 7.2 G/DL (ref 14–18)
IMM GRANULOCYTES # BLD: 0.1 K/UL
LACTATE BLDV-SCNC: 1.2 MMOL/L (ref 0.5–1.9)
LYMPHOCYTES # BLD: 0.8 K/UL (ref 1.1–4.5)
LYMPHOCYTES NFR BLD: 8.8 % (ref 20–40)
MCH RBC QN AUTO: 28.7 PG (ref 27–31)
MCHC RBC AUTO-ENTMCNC: 31.8 G/DL (ref 33–37)
MCV RBC AUTO: 90.2 FL (ref 80–94)
METHEMOGLOBIN ARTERIAL: 0 %
METHEMOGLOBIN ARTERIAL: 1.2 %
MONOCYTES # BLD: 1 K/UL (ref 0–0.9)
MONOCYTES NFR BLD: 11 % (ref 0–10)
MRSA DNA SPEC QL NAA+PROBE: NOT DETECTED
NEUTROPHILS # BLD: 6.9 K/UL (ref 1.5–7.5)
NEUTS SEG NFR BLD: 77.7 % (ref 50–65)
O2 CONTENT ARTERIAL: 9.1 ML/DL
O2 CONTENT ARTERIAL: 9.5 ML/DL
O2 DELIVERY DEVICE: ABNORMAL
O2 SAT, ARTERIAL: 89.9 %
O2 SAT, ARTERIAL: 91.2 %
O2 THERAPY: ABNORMAL
O2 THERAPY: ABNORMAL
OXYGEN FLOW: 11
OXYGEN FLOW: 45
PCO2 ARTERIAL: 34 MMHG (ref 35–45)
PCO2 ARTERIAL: 36 MMHG (ref 35–45)
PERFORMED ON: ABNORMAL
PH ARTERIAL: 7.43 (ref 7.35–7.45)
PH ARTERIAL: 7.46 (ref 7.35–7.45)
PLATELET # BLD AUTO: 221 K/UL (ref 130–400)
PMV BLD AUTO: 10.7 FL (ref 9.4–12.4)
PO2 ARTERIAL: 53 MMHG (ref 80–100)
PO2 ARTERIAL: 62 MMHG (ref 80–100)
POTASSIUM BLD-SCNC: 4.2 MMOL/L
POTASSIUM BLD-SCNC: 4.3 MMOL/L
POTASSIUM SERPL-SCNC: 4.4 MMOL/L (ref 3.5–5)
PROT SERPL-MCNC: 6.2 G/DL (ref 6.6–8.7)
RBC # BLD AUTO: 2.44 M/UL (ref 4.7–6.1)
SAMPLE SOURCE: ABNORMAL
SAMPLE SOURCE: ABNORMAL
SODIUM SERPL-SCNC: 136 MMOL/L (ref 136–145)
WBC # BLD AUTO: 8.9 K/UL (ref 4.8–10.8)

## 2024-06-19 PROCEDURE — 6360000004 HC RX CONTRAST MEDICATION: Performed by: NURSE PRACTITIONER

## 2024-06-19 PROCEDURE — 6360000002 HC RX W HCPCS: Performed by: PSYCHIATRY & NEUROLOGY

## 2024-06-19 PROCEDURE — 6370000000 HC RX 637 (ALT 250 FOR IP): Performed by: NURSE PRACTITIONER

## 2024-06-19 PROCEDURE — 86900 BLOOD TYPING SEROLOGIC ABO: CPT

## 2024-06-19 PROCEDURE — 94640 AIRWAY INHALATION TREATMENT: CPT

## 2024-06-19 PROCEDURE — 82962 GLUCOSE BLOOD TEST: CPT

## 2024-06-19 PROCEDURE — 6360000002 HC RX W HCPCS: Performed by: NURSE PRACTITIONER

## 2024-06-19 PROCEDURE — 36415 COLL VENOUS BLD VENIPUNCTURE: CPT

## 2024-06-19 PROCEDURE — 36430 TRANSFUSION BLD/BLD COMPNT: CPT

## 2024-06-19 PROCEDURE — 93306 TTE W/DOPPLER COMPLETE: CPT | Performed by: INTERNAL MEDICINE

## 2024-06-19 PROCEDURE — 2700000000 HC OXYGEN THERAPY PER DAY

## 2024-06-19 PROCEDURE — 82270 OCCULT BLOOD FECES: CPT

## 2024-06-19 PROCEDURE — C8929 TTE W OR WO FOL WCON,DOPPLER: HCPCS

## 2024-06-19 PROCEDURE — 36600 WITHDRAWAL OF ARTERIAL BLOOD: CPT

## 2024-06-19 PROCEDURE — 85025 COMPLETE CBC W/AUTO DIFF WBC: CPT

## 2024-06-19 PROCEDURE — 83605 ASSAY OF LACTIC ACID: CPT

## 2024-06-19 PROCEDURE — 6370000000 HC RX 637 (ALT 250 FOR IP): Performed by: PSYCHIATRY & NEUROLOGY

## 2024-06-19 PROCEDURE — 85014 HEMATOCRIT: CPT

## 2024-06-19 PROCEDURE — 2580000003 HC RX 258: Performed by: NURSE PRACTITIONER

## 2024-06-19 PROCEDURE — 99221 1ST HOSP IP/OBS SF/LOW 40: CPT | Performed by: SURGERY

## 2024-06-19 PROCEDURE — 86901 BLOOD TYPING SEROLOGIC RH(D): CPT

## 2024-06-19 PROCEDURE — 93005 ELECTROCARDIOGRAM TRACING: CPT

## 2024-06-19 PROCEDURE — 30233N1 TRANSFUSION OF NONAUTOLOGOUS RED BLOOD CELLS INTO PERIPHERAL VEIN, PERCUTANEOUS APPROACH: ICD-10-PCS | Performed by: INTERNAL MEDICINE

## 2024-06-19 PROCEDURE — 82803 BLOOD GASES ANY COMBINATION: CPT

## 2024-06-19 PROCEDURE — P9016 RBC LEUKOCYTES REDUCED: HCPCS

## 2024-06-19 PROCEDURE — 85018 HEMOGLOBIN: CPT

## 2024-06-19 PROCEDURE — 86850 RBC ANTIBODY SCREEN: CPT

## 2024-06-19 PROCEDURE — 2000000000 HC ICU R&B

## 2024-06-19 PROCEDURE — 94669 MECHANICAL CHEST WALL OSCILL: CPT

## 2024-06-19 PROCEDURE — 87641 MR-STAPH DNA AMP PROBE: CPT

## 2024-06-19 PROCEDURE — 2500000003 HC RX 250 WO HCPCS: Performed by: NURSE PRACTITIONER

## 2024-06-19 PROCEDURE — 80053 COMPREHEN METABOLIC PANEL: CPT

## 2024-06-19 PROCEDURE — 71045 X-RAY EXAM CHEST 1 VIEW: CPT

## 2024-06-19 PROCEDURE — 86923 COMPATIBILITY TEST ELECTRIC: CPT

## 2024-06-19 RX ORDER — DILTIAZEM HYDROCHLORIDE 5 MG/ML
10 INJECTION INTRAVENOUS ONCE
Status: COMPLETED | OUTPATIENT
Start: 2024-06-19 | End: 2024-06-19

## 2024-06-19 RX ORDER — SODIUM CHLORIDE 9 MG/ML
INJECTION, SOLUTION INTRAVENOUS PRN
Status: DISCONTINUED | OUTPATIENT
Start: 2024-06-19 | End: 2024-06-21 | Stop reason: ALTCHOICE

## 2024-06-19 RX ADMIN — DILTIAZEM HYDROCHLORIDE 10 MG: 5 INJECTION, SOLUTION INTRAVENOUS at 12:08

## 2024-06-19 RX ADMIN — PIPERACILLIN AND TAZOBACTAM 3375 MG: 3; .375 INJECTION, POWDER, LYOPHILIZED, FOR SOLUTION INTRAVENOUS at 11:51

## 2024-06-19 RX ADMIN — PREGABALIN 100 MG: 50 CAPSULE ORAL at 08:46

## 2024-06-19 RX ADMIN — PIPERACILLIN AND TAZOBACTAM 3375 MG: 3; .375 INJECTION, POWDER, LYOPHILIZED, FOR SOLUTION INTRAVENOUS at 04:08

## 2024-06-19 RX ADMIN — GUAIFENESIN 600 MG: 600 TABLET ORAL at 08:46

## 2024-06-19 RX ADMIN — IPRATROPIUM BROMIDE AND ALBUTEROL SULFATE 1 DOSE: 2.5; .5 SOLUTION RESPIRATORY (INHALATION) at 18:38

## 2024-06-19 RX ADMIN — GUAIFENESIN 600 MG: 600 TABLET ORAL at 20:46

## 2024-06-19 RX ADMIN — PREGABALIN 100 MG: 50 CAPSULE ORAL at 20:46

## 2024-06-19 RX ADMIN — IPRATROPIUM BROMIDE AND ALBUTEROL SULFATE 1 DOSE: 2.5; .5 SOLUTION RESPIRATORY (INHALATION) at 10:52

## 2024-06-19 RX ADMIN — APIXABAN 5 MG: 5 TABLET, FILM COATED ORAL at 08:46

## 2024-06-19 RX ADMIN — FUROSEMIDE 40 MG: 10 INJECTION, SOLUTION INTRAMUSCULAR; INTRAVENOUS at 17:34

## 2024-06-19 RX ADMIN — IPRATROPIUM BROMIDE AND ALBUTEROL SULFATE 1 DOSE: 2.5; .5 SOLUTION RESPIRATORY (INHALATION) at 14:17

## 2024-06-19 RX ADMIN — ACETAMINOPHEN 650 MG: 325 TABLET ORAL at 19:33

## 2024-06-19 RX ADMIN — IPRATROPIUM BROMIDE AND ALBUTEROL SULFATE 1 DOSE: 2.5; .5 SOLUTION RESPIRATORY (INHALATION) at 06:28

## 2024-06-19 RX ADMIN — DILTIAZEM HYDROCHLORIDE 5 MG/HR: 5 INJECTION, SOLUTION INTRAVENOUS at 12:45

## 2024-06-19 RX ADMIN — ACETYLCYSTEINE 600 MG: 200 SOLUTION ORAL; RESPIRATORY (INHALATION) at 18:38

## 2024-06-19 RX ADMIN — INSULIN LISPRO 1 UNITS: 100 INJECTION, SOLUTION INTRAVENOUS; SUBCUTANEOUS at 16:21

## 2024-06-19 RX ADMIN — INSULIN LISPRO 1 UNITS: 100 INJECTION, SOLUTION INTRAVENOUS; SUBCUTANEOUS at 11:44

## 2024-06-19 RX ADMIN — HYDROCODONE BITARTRATE AND ACETAMINOPHEN 1 TABLET: 10; 325 TABLET ORAL at 03:00

## 2024-06-19 RX ADMIN — APIXABAN 5 MG: 5 TABLET, FILM COATED ORAL at 20:46

## 2024-06-19 RX ADMIN — SODIUM ZIRCONIUM CYCLOSILICATE 10 G: 10 POWDER, FOR SUSPENSION ORAL at 08:47

## 2024-06-19 RX ADMIN — ACETAMINOPHEN 650 MG: 325 TABLET ORAL at 12:37

## 2024-06-19 RX ADMIN — PREGABALIN 100 MG: 50 CAPSULE ORAL at 12:38

## 2024-06-19 RX ADMIN — ACETYLCYSTEINE 600 MG: 200 SOLUTION ORAL; RESPIRATORY (INHALATION) at 06:28

## 2024-06-19 RX ADMIN — BENZONATATE 100 MG: 100 CAPSULE ORAL at 02:52

## 2024-06-19 RX ADMIN — PIPERACILLIN AND TAZOBACTAM 3375 MG: 3; .375 INJECTION, POWDER, LYOPHILIZED, FOR SOLUTION INTRAVENOUS at 20:13

## 2024-06-19 RX ADMIN — AMLODIPINE BESYLATE 5 MG: 5 TABLET ORAL at 08:46

## 2024-06-19 RX ADMIN — PERFLUTREN 1.5 ML: 6.52 INJECTION, SUSPENSION INTRAVENOUS at 14:37

## 2024-06-19 RX ADMIN — ONDANSETRON 4 MG: 4 TABLET, ORALLY DISINTEGRATING ORAL at 15:39

## 2024-06-19 RX ADMIN — GUAIFENESIN 600 MG: 600 TABLET ORAL at 15:39

## 2024-06-19 RX ADMIN — FUROSEMIDE 40 MG: 10 INJECTION, SOLUTION INTRAMUSCULAR; INTRAVENOUS at 08:46

## 2024-06-19 ASSESSMENT — PAIN SCALES - GENERAL
PAINLEVEL_OUTOF10: 2
PAINLEVEL_OUTOF10: 3
PAINLEVEL_OUTOF10: 9

## 2024-06-19 ASSESSMENT — PAIN DESCRIPTION - DESCRIPTORS: DESCRIPTORS: THROBBING;DISCOMFORT

## 2024-06-19 ASSESSMENT — PAIN DESCRIPTION - ORIENTATION: ORIENTATION: RIGHT

## 2024-06-19 NOTE — CARE COORDINATION
Case Management Assessment  Initial Evaluation    Date/Time of Evaluation: 6/19/2024 8:29 AM  Assessment Completed by: ANTONY Quinteros    If patient is discharged prior to next notation, then this note serves as note for discharge by case management.    Patient Name: Noe Jimenez                   YOB: 1965  Diagnosis: Acute renal failure (HCC) [N17.9]  HAP (hospital-acquired pneumonia) [J18.9, Y95]                   Date / Time: 6/18/2024  1:39 PM    Patient Admission Status: Inpatient   Readmission Risk (Low < 19, Mod (19-27), High > 27): Readmission Risk Score: 20.7    Current PCP: Tigre Meeks MD  PCP verified by CM? Yes    Chart Reviewed: Yes      History Provided by: Patient  Patient Orientation: Alert and Oriented    Patient Cognition: Alert    Hospitalization in the last 30 days (Readmission):  Yes    If yes, Readmission Assessment in CM Navigator will be completed.    Advance Directives:      Code Status: Full Code   Patient's Primary Decision Maker is: Legal Next of Kin    Primary Decision Maker: Kristin Jimenez - Spouse - [phone]    Discharge Planning:    Patient lives with: Spouse/Significant Other Type of Home: Acute Rehab  Primary Care Giver: Spouse  Patient Support Systems include: Family Members, Spouse/Significant Other   Current Financial resources: Other (Comment) (BCBS)  Current community resources: None  Current services prior to admission: Durable Medical Equipment            Current DME: Wheelchair            Type of Home Care services:  None    ADLS  Prior functional level: Assistance with the following:, Housework, Other (see comment), Mobility, Shopping (uses a wheelchair)  Current functional level: Assistance with the following:, Housework, Mobility, Other (see comment), Shopping (uses a wheelchair)    PT AM-PAC:   /24  OT AM-PAC:   /24    Family can provide assistance at DC: Yes (spouse)  Would you like Case Management to discuss the discharge plan with

## 2024-06-19 NOTE — DISCHARGE SUMMARY
Occupational Therapy Discharge Summary         Date: 2024  Patient Name: Noe Jimenez        MRN: [id number]    : 1965  (59 y.o.)  Gender: male      Diagnosis: RIGHT BKA  Restrictions/Precautions  Restrictions/Precautions: Fall Risk, Weight Bearing      Discharge Date:24      UE Functioning:  WFLs    Home Management:  Functional Mobility  Functional - Mobility Device: Wheelchair  Activity: Retrieve items, Other, To/From therapy gym  Assist Level: Supervision  Functional Mobility Comments: Community mobility on first floor of hospital, demonstrated good safety throughout.    Adaptive Equipment/DME Status:  Bathroom Equipment: Safety frame, 3-in-1 commode  Home Equipment: Wheelchair - Manual  RW and bed rail arranged    Pain Assessment:          Remaining Problems:  Decreased functional mobility ;Decreased ADL status;Decreased strength;Decreased endurance;Decreased balance;Decreased high-level IADLs     STGs:  Short Term Goals  Time Frame for Short Term Goals: 1 week  Short Term Goal 1: Perform transfers with supervision  Short Term Goal 2: Perform dressing with supervision  Short Term Goal 3: Perform toileting with supervision  Short Term Goal 4: Perform w/c to standing home management with supervision    LTGs:  Long Term Goals  Time Frame for Long Term Goals : 2 weeks  Long Term Goal 1: Upgrade to modified independent for ADL and mobility  Long Term Goal 2: Independent with therapeutic activity recommendations, AE/DME recommendations as appropriate      Discharge Setting and Recommendations:  Discharged to acute therapy due to medical decline. Recommend Occupational Therapy to address remaining deficits when medically able.     Discharge Care Scores    Eating: CARE Score: 6  Oral Hygiene: CARE Score: 6  Toileting: CARE Score: 4  Shower/Bathe: CARE Score: 4  Upper Body Dressing: CARE Score: 6  Lower Body Dressing: CARE Score: 4  Footwear: CARE Score: 4  Toilet Transfers: CARE Score: 
Neurology Discharge Summary     Patient Identification:  Noe Jimenez is a 59 y.o. male.  :  1965  Admit Date:  2024  Discharge date : 2024   Attending Provider: Eduardo Ryan MD     Account Number: [id number]                                   Admission Diagnoses:   S/P BKA (below knee amputation) unilateral, right ([facility]) [Z89.511]    Discharge Diagnoses:  Principal Problem:    S/P BKA (below knee amputation) unilateral, right ([facility])  Active Problems:    Lumbar post-laminectomy syndrome    Peripheral neuropathic pain    Type 2 diabetes mellitus with complication, without long-term current use of insulin ([facility])    Essential hypertension    Atrial fibrillation (HCC)    Anemia    CHF (congestive heart failure) ([facility])    Renal dysfunction  Resolved Problems:    * No resolved hospital problems. *      Discharge Medications:    Current Discharge Medication List             Details   furosemide (LASIX) 40 MG tablet Take 1 tablet by mouth daily as needed      apixaban (ELIQUIS) 5 MG TABS tablet Take 1 tablet by mouth 2 times daily      ascorbic acid (VITAMIN C) 500 MG tablet Take 1 tablet by mouth daily      zinc sulfate (ZINCATE) 220 (50 Zn)  mg capsule - elemental zinc Take 220 mg by mouth daily      metFORMIN (GLUCOPHAGE) 1000 MG tablet Take 1 tablet by mouth 2 times daily (with meals)      pregabalin (LYRICA) 100 MG capsule Take 1 capsule by mouth 3 times daily. Max Daily Amount: 300 mg      HYDROcodone-acetaminophen (NORCO)  MG per tablet Take 1 tablet by mouth every 8 hours as needed for Pain. Max Daily Amount: 3 tablets      lisinopril (PRINIVIL;ZESTRIL) 10 MG tablet Take 1 tablet by mouth daily      naloxone 4 MG/0.1ML LIQD nasal spray 1 spray by Nasal route as needed for Opioid Reversal           Current Discharge Medication List        STOP taking these medications       canagliflozin (INVOKANA) 100 MG TABS tablet Comments:   Reason for Stopping:         glimepiride (AMARYL) 2 
or safety concerns  CARE Score: 88  Discharge Goal: Supervision or touching assistance    Walk 10 Feet  Reason if not Attempted: Not attempted due to medical condition or safety concerns  CARE Score: 88  Discharge Goal: Supervision or touching assistance    Walk 50 Feet with Two Turns  Reason if not Attempted: Not attempted due to medical condition or safety concerns  CARE Score: 88  Discharge Goal: Not Attempted    Walk 150 Feet  Reason if not Attempted: Not attempted due to medical condition or safety concerns  CARE Score: 88  Discharge Goal: Not Attempted    Walking 10 Feet on Uneven Surfaces  Reason if not Attempted: Not attempted due to medical condition or safety concerns  CARE Score: 88  Discharge Goal: Not Attempted    1 Step (Curb)  Reason if not Attempted: Not attempted due to medical condition or safety concerns  CARE Score: 88  Discharge Goal: Not Attempted    4 Steps  Reason if not Attempted: Not attempted due to medical condition or safety concerns  CARE Score: 88  Discharge Goal: Not Attempted    12 Steps  Reason if not Attempted: Not attempted due to medical condition or safety concerns  CARE Score: 88  Discharge Goal: Not Attempted    Wheel 50 Feet with Two Turns  Assistance Needed: Independent  CARE Score: 6  Discharge Goal: Independent    Wheel 150 Feet  Assistance Needed: Independent  Reason if not Attempted: Not attempted due to medical condition or safety concerns  CARE Score: 6  Discharge Goal: Not Attempted      LAST TREATMENT TIME  PT Individual Minutes  Time In: 1000  Time Out: 1000  Minutes: 0

## 2024-06-19 NOTE — CARE COORDINATION
Not a true readmit    06/19/24 0810   Readmission Assessment   Number of Days since last admission? 1-7 days   Previous Disposition Acute Rehab   Who is being Interviewed Patient   What was the patient's/caregiver's perception as to why they think they needed to return back to the hospital? Other (Comment)  (pneumonia, came from the 8th floor)   Did you visit your Primary Care Physician after you left the hospital, before you returned this time? Yes   Did you see a specialist, such as Cardiac, Pulmonary, Orthopedic Physician, etc. after you left the hospital? Yes   Who advised the patient to return to the hospital? Physician   Does the patient report anything that got in the way of taking their medications? No   In our efforts to provide the best possible care to you and others like you, can you think of anything that we could have done to help you after you left the hospital the first time, so that you might not have needed to return so soon? Additional Community resources available for illness support     Electronically signed by ANTONY Quinteros on 6/19/2024 at 8:11 AM

## 2024-06-19 NOTE — CONSENT
Informed Consent for Blood Component Transfusion Note    I have discussed with the patient and wife the rationale for blood component transfusion; its benefits in treating or preventing fatigue, organ damage, or death; and its risk which includes mild transfusion reactions, rare risk of blood borne infection, or more serious but rare reactions. I have discussed the alternatives to transfusion, including the risk and consequences of not receiving transfusion. The patient and wife had an opportunity to ask questions and had agreed to proceed with transfusion of blood components.   Spoke w3ith pt and wife prior to tx to unit,.  Anticipated transfusion need  Electronically signed by CORONA Eli CNP on 6/19/24 at 1:35 PM CDT

## 2024-06-20 PROBLEM — Z51.5 PALLIATIVE CARE PATIENT: Status: ACTIVE | Noted: 2024-06-20

## 2024-06-20 PROBLEM — E78.5 HYPERLIPIDEMIA LDL GOAL <100: Status: ACTIVE | Noted: 2024-06-20

## 2024-06-20 PROBLEM — I51.7 LVH (LEFT VENTRICULAR HYPERTROPHY): Status: ACTIVE | Noted: 2024-06-20

## 2024-06-20 PROBLEM — Z79.01 LONG TERM (CURRENT) USE OF ANTICOAGULANTS: Status: ACTIVE | Noted: 2024-06-20

## 2024-06-20 PROBLEM — I50.32 CHRONIC DIASTOLIC CONGESTIVE HEART FAILURE: Status: ACTIVE | Noted: 2024-06-20

## 2024-06-20 LAB
ALBUMIN SERPL-MCNC: 2.4 G/DL (ref 3.5–5.2)
ALBUMIN SERPL-MCNC: 2.55 G/DL (ref 3.75–5.01)
ALP SERPL-CCNC: 200 U/L (ref 40–130)
ALPHA1 GLOB SERPL ELPH-MCNC: 0.49 G/DL (ref 0.19–0.46)
ALPHA2 GLOB SERPL ELPH-MCNC: 0.75 G/DL (ref 0.48–1.05)
ALT SERPL-CCNC: 21 U/L (ref 5–41)
ANION GAP SERPL CALCULATED.3IONS-SCNC: 15 MMOL/L (ref 7–19)
AST SERPL-CCNC: 28 U/L (ref 5–40)
B-GLOBULIN SERPL ELPH-MCNC: 0.73 G/DL (ref 0.48–1.1)
BACTERIA SPEC ANAEROBE+AEROBE CULT: ABNORMAL
BASOPHILS # BLD: 0.1 K/UL (ref 0–0.2)
BASOPHILS NFR BLD: 1.1 % (ref 0–1)
BILIRUB SERPL-MCNC: 0.7 MG/DL (ref 0.2–1.2)
BUN SERPL-MCNC: 59 MG/DL (ref 6–20)
CALCIUM SERPL-MCNC: 8 MG/DL (ref 8.6–10)
CHLORIDE SERPL-SCNC: 103 MMOL/L (ref 98–111)
CO2 SERPL-SCNC: 20 MMOL/L (ref 22–29)
CREAT SERPL-MCNC: 2.4 MG/DL (ref 0.5–1.2)
EOSINOPHIL # BLD: 0.1 K/UL (ref 0–0.6)
EOSINOPHIL NFR BLD: 0.6 % (ref 0–5)
ERYTHROCYTE [DISTWIDTH] IN BLOOD BY AUTOMATED COUNT: 17.1 % (ref 11.5–14.5)
GAMMA GLOB SERPL ELPH-MCNC: 1.59 G/DL (ref 0.62–1.51)
GLUCOSE BLD-MCNC: 100 MG/DL (ref 70–99)
GLUCOSE BLD-MCNC: 205 MG/DL (ref 70–99)
GLUCOSE BLD-MCNC: 207 MG/DL (ref 70–99)
GLUCOSE BLD-MCNC: 268 MG/DL (ref 70–99)
GLUCOSE SERPL-MCNC: 161 MG/DL (ref 74–109)
GRAM STN SPEC: ABNORMAL
HCT VFR BLD AUTO: 24.4 % (ref 42–52)
HGB BLD-MCNC: 7.7 G/DL (ref 14–18)
IMM GRANULOCYTES # BLD: 0.1 K/UL
INTERPRETATION SERPL IFE-IMP: ABNORMAL
LYMPHOCYTES # BLD: 1.3 K/UL (ref 1.1–4.5)
LYMPHOCYTES NFR BLD: 15.4 % (ref 20–40)
MCH RBC QN AUTO: 28.1 PG (ref 27–31)
MCHC RBC AUTO-ENTMCNC: 31.6 G/DL (ref 33–37)
MCV RBC AUTO: 89.1 FL (ref 80–94)
MONOCYTES # BLD: 0.9 K/UL (ref 0–0.9)
MONOCYTES NFR BLD: 11.4 % (ref 0–10)
NEUTROPHILS # BLD: 5.7 K/UL (ref 1.5–7.5)
NEUTS SEG NFR BLD: 70.1 % (ref 50–65)
ORGANISM: ABNORMAL
PERFORMED ON: ABNORMAL
PLATELET # BLD AUTO: 209 K/UL (ref 130–400)
PMV BLD AUTO: 10.1 FL (ref 9.4–12.4)
POTASSIUM SERPL-SCNC: 4.2 MMOL/L (ref 3.5–5)
PROT SERPL-MCNC: 6.1 G/DL (ref 6.3–8.2)
PROT SERPL-MCNC: 6.6 G/DL (ref 6.6–8.7)
PROTEIN ELECTROPHORESIS, SERUM: ABNORMAL
RBC # BLD AUTO: 2.74 M/UL (ref 4.7–6.1)
SODIUM SERPL-SCNC: 138 MMOL/L (ref 136–145)
WBC # BLD AUTO: 8.1 K/UL (ref 4.8–10.8)

## 2024-06-20 PROCEDURE — 6370000000 HC RX 637 (ALT 250 FOR IP): Performed by: PSYCHIATRY & NEUROLOGY

## 2024-06-20 PROCEDURE — 6360000002 HC RX W HCPCS: Performed by: NURSE PRACTITIONER

## 2024-06-20 PROCEDURE — 2700000000 HC OXYGEN THERAPY PER DAY

## 2024-06-20 PROCEDURE — 94760 N-INVAS EAR/PLS OXIMETRY 1: CPT

## 2024-06-20 PROCEDURE — 94669 MECHANICAL CHEST WALL OSCILL: CPT

## 2024-06-20 PROCEDURE — 36415 COLL VENOUS BLD VENIPUNCTURE: CPT

## 2024-06-20 PROCEDURE — 6360000002 HC RX W HCPCS: Performed by: PSYCHIATRY & NEUROLOGY

## 2024-06-20 PROCEDURE — 85025 COMPLETE CBC W/AUTO DIFF WBC: CPT

## 2024-06-20 PROCEDURE — 6370000000 HC RX 637 (ALT 250 FOR IP): Performed by: NURSE PRACTITIONER

## 2024-06-20 PROCEDURE — 82962 GLUCOSE BLOOD TEST: CPT

## 2024-06-20 PROCEDURE — 2000000000 HC ICU R&B

## 2024-06-20 PROCEDURE — 2580000003 HC RX 258: Performed by: NURSE PRACTITIONER

## 2024-06-20 PROCEDURE — 80053 COMPREHEN METABOLIC PANEL: CPT

## 2024-06-20 PROCEDURE — 94640 AIRWAY INHALATION TREATMENT: CPT

## 2024-06-20 RX ADMIN — IPRATROPIUM BROMIDE AND ALBUTEROL SULFATE 1 DOSE: 2.5; .5 SOLUTION RESPIRATORY (INHALATION) at 06:41

## 2024-06-20 RX ADMIN — PREGABALIN 100 MG: 50 CAPSULE ORAL at 09:04

## 2024-06-20 RX ADMIN — INSULIN LISPRO 1 UNITS: 100 INJECTION, SOLUTION INTRAVENOUS; SUBCUTANEOUS at 09:04

## 2024-06-20 RX ADMIN — ACETAMINOPHEN 650 MG: 325 TABLET ORAL at 14:47

## 2024-06-20 RX ADMIN — PIPERACILLIN AND TAZOBACTAM 3375 MG: 3; .375 INJECTION, POWDER, LYOPHILIZED, FOR SOLUTION INTRAVENOUS at 20:16

## 2024-06-20 RX ADMIN — GUAIFENESIN 600 MG: 600 TABLET ORAL at 21:01

## 2024-06-20 RX ADMIN — APIXABAN 5 MG: 5 TABLET, FILM COATED ORAL at 09:04

## 2024-06-20 RX ADMIN — IPRATROPIUM BROMIDE AND ALBUTEROL SULFATE 1 DOSE: 2.5; .5 SOLUTION RESPIRATORY (INHALATION) at 18:54

## 2024-06-20 RX ADMIN — APIXABAN 5 MG: 5 TABLET, FILM COATED ORAL at 21:01

## 2024-06-20 RX ADMIN — LEVOFLOXACIN 750 MG: 5 INJECTION, SOLUTION INTRAVENOUS at 16:47

## 2024-06-20 RX ADMIN — GUAIFENESIN 600 MG: 600 TABLET ORAL at 09:04

## 2024-06-20 RX ADMIN — ACETYLCYSTEINE 600 MG: 200 SOLUTION ORAL; RESPIRATORY (INHALATION) at 18:54

## 2024-06-20 RX ADMIN — IPRATROPIUM BROMIDE AND ALBUTEROL SULFATE 1 DOSE: 2.5; .5 SOLUTION RESPIRATORY (INHALATION) at 15:39

## 2024-06-20 RX ADMIN — PREGABALIN 100 MG: 50 CAPSULE ORAL at 14:47

## 2024-06-20 RX ADMIN — PIPERACILLIN AND TAZOBACTAM 3375 MG: 3; .375 INJECTION, POWDER, LYOPHILIZED, FOR SOLUTION INTRAVENOUS at 04:27

## 2024-06-20 RX ADMIN — HYDROCODONE BITARTRATE AND ACETAMINOPHEN 1 TABLET: 10; 325 TABLET ORAL at 16:45

## 2024-06-20 RX ADMIN — INSULIN LISPRO 2 UNITS: 100 INJECTION, SOLUTION INTRAVENOUS; SUBCUTANEOUS at 12:48

## 2024-06-20 RX ADMIN — ACETYLCYSTEINE 600 MG: 200 SOLUTION ORAL; RESPIRATORY (INHALATION) at 06:41

## 2024-06-20 RX ADMIN — HYDROCODONE BITARTRATE AND ACETAMINOPHEN 1 TABLET: 10; 325 TABLET ORAL at 20:16

## 2024-06-20 RX ADMIN — HYDROCODONE BITARTRATE AND ACETAMINOPHEN 1 TABLET: 10; 325 TABLET ORAL at 11:22

## 2024-06-20 RX ADMIN — IPRATROPIUM BROMIDE AND ALBUTEROL SULFATE 1 DOSE: 2.5; .5 SOLUTION RESPIRATORY (INHALATION) at 10:22

## 2024-06-20 RX ADMIN — PREGABALIN 100 MG: 50 CAPSULE ORAL at 21:01

## 2024-06-20 RX ADMIN — GUAIFENESIN 600 MG: 600 TABLET ORAL at 14:47

## 2024-06-20 RX ADMIN — PIPERACILLIN AND TAZOBACTAM 3375 MG: 3; .375 INJECTION, POWDER, LYOPHILIZED, FOR SOLUTION INTRAVENOUS at 13:25

## 2024-06-20 ASSESSMENT — PAIN DESCRIPTION - LOCATION
LOCATION: LEG
LOCATION: LEG
LOCATION: LEG;HIP
LOCATION: LEG;HIP

## 2024-06-20 ASSESSMENT — PAIN - FUNCTIONAL ASSESSMENT
PAIN_FUNCTIONAL_ASSESSMENT: ACTIVITIES ARE NOT PREVENTED

## 2024-06-20 ASSESSMENT — PAIN SCALES - GENERAL
PAINLEVEL_OUTOF10: 7
PAINLEVEL_OUTOF10: 3
PAINLEVEL_OUTOF10: 5
PAINLEVEL_OUTOF10: 10
PAINLEVEL_OUTOF10: 7

## 2024-06-20 ASSESSMENT — PAIN DESCRIPTION - DESCRIPTORS
DESCRIPTORS: ACHING;SHARP;SORE
DESCRIPTORS: SORE;SHARP
DESCRIPTORS: SORE;SHARP

## 2024-06-20 ASSESSMENT — PAIN DESCRIPTION - ORIENTATION
ORIENTATION: RIGHT

## 2024-06-20 NOTE — PLAN OF CARE
Problem: Safety - Adult  Goal: Free from fall injury  Outcome: Progressing     Problem: ABCDS Injury Assessment  Goal: Absence of physical injury  Outcome: Progressing     Problem: Skin/Tissue Integrity  Goal: Absence of new skin breakdown  Description: 1.  Monitor for areas of redness and/or skin breakdown  2.  Assess vascular access sites hourly  3.  Every 4-6 hours minimum:  Change oxygen saturation probe site  4.  Every 4-6 hours:  If on nasal continuous positive airway pressure, respiratory therapy assess nares and determine need for appliance change or resting period.  Outcome: Progressing     Problem: Discharge Planning  Goal: Discharge to home or other facility with appropriate resources  Outcome: Progressing     Problem: Chronic Conditions and Co-morbidities  Goal: Patient's chronic conditions and co-morbidity symptoms are monitored and maintained or improved  Outcome: Progressing

## 2024-06-20 NOTE — ACP (ADVANCE CARE PLANNING)
Advance Care Planning     Palliative Team Advance Care Planning (ACP) Conversation    Date of Conversation: 06/20/24    Individuals present for the conversation: Patient with decision making capacity and Spouse Kristin Jimenez.     ACP documents on file prior to discussion:  -Living Will    Previously completed document/s not on file: Patient / participant reports that there are no previously executed ACP documents.    Healthcare Decision Maker:    Primary Decision Maker (Active): Kristin Jimenez - Spouse - [phone]     Conversation Summary:      Resuscitation Status:   Code Status: Full Code     Documentation Completed:  -No new documents completed.    I spent 30 minutes with the patient and/or surrogate decision maker discussing the patient's wishes and goals.      Margaret Renee RN       Electronically signed by Margaret Renee RN on 6/20/2024 at 11:43 AM

## 2024-06-20 NOTE — PLAN OF CARE
ATTENDING ADDENDUM OF NP/JLUIO NOTE:    I saw and evaluated the patient and discussed the care with APRN    I agree with the findings and plan as documented in the progress note    Summary  Transfer to ICU on 6/19 because of acute hypoxic respiratory failure  Treated for pulmonary edema versus pneumonia    Physical Exam  Inspiratory crackles over lower half of the lungs posteriorly  Presently on 40 L of oxygen at 40% FiO2  Maintaining 95% saturation  No peripheral edema    Assessment/Plan  Bacterial pneumonia MRSA negative  Follow-up chest x-ray improving  Will continue Levaquin and Zosyn    Incidental finding of PFO on echocardiogram  Cardiology evaluated no further intervention    Recent history of amputation  With ongoing anemia evaluated by vascular surgeon  No further intervention    Disposition  Follow-up in ICU unless the oxygen requirement comes down

## 2024-06-21 LAB
ALBUMIN SERPL-MCNC: 2.5 G/DL (ref 3.5–5.2)
ALP SERPL-CCNC: 353 U/L (ref 40–130)
ALT SERPL-CCNC: 43 U/L (ref 5–41)
ANION GAP SERPL CALCULATED.3IONS-SCNC: 13 MMOL/L (ref 7–19)
AST SERPL-CCNC: 98 U/L (ref 5–40)
BASOPHILS # BLD: 0 K/UL (ref 0–0.2)
BASOPHILS NFR BLD: 0.5 % (ref 0–1)
BILIRUB SERPL-MCNC: 0.8 MG/DL (ref 0.2–1.2)
BLOOD BANK DISPENSE STATUS: NORMAL
BLOOD BANK PRODUCT CODE: NORMAL
BPU ID: NORMAL
BUN SERPL-MCNC: 67 MG/DL (ref 6–20)
CALCIUM SERPL-MCNC: 7.8 MG/DL (ref 8.6–10)
CHLORIDE SERPL-SCNC: 99 MMOL/L (ref 98–111)
CO2 SERPL-SCNC: 22 MMOL/L (ref 22–29)
CREAT SERPL-MCNC: 2.9 MG/DL (ref 0.5–1.2)
DESCRIPTION BLOOD BANK: NORMAL
EOSINOPHIL # BLD: 0.1 K/UL (ref 0–0.6)
EOSINOPHIL NFR BLD: 1.9 % (ref 0–5)
ERYTHROCYTE [DISTWIDTH] IN BLOOD BY AUTOMATED COUNT: 17 % (ref 11.5–14.5)
GLUCOSE BLD-MCNC: 193 MG/DL (ref 70–99)
GLUCOSE BLD-MCNC: 197 MG/DL (ref 70–99)
GLUCOSE BLD-MCNC: 273 MG/DL (ref 70–99)
GLUCOSE BLD-MCNC: 326 MG/DL (ref 70–99)
GLUCOSE SERPL-MCNC: 164 MG/DL (ref 74–109)
HCT VFR BLD AUTO: 21.9 % (ref 42–52)
HCT VFR BLD AUTO: 26.9 % (ref 42–52)
HGB BLD-MCNC: 6.9 G/DL (ref 14–18)
HGB BLD-MCNC: 8.5 G/DL (ref 14–18)
IMM GRANULOCYTES # BLD: 0.1 K/UL
LYMPHOCYTES # BLD: 1.9 K/UL (ref 1.1–4.5)
LYMPHOCYTES NFR BLD: 24.6 % (ref 20–40)
MCH RBC QN AUTO: 28 PG (ref 27–31)
MCHC RBC AUTO-ENTMCNC: 31.5 G/DL (ref 33–37)
MCV RBC AUTO: 89 FL (ref 80–94)
MONOCYTES # BLD: 0.8 K/UL (ref 0–0.9)
MONOCYTES NFR BLD: 10.4 % (ref 0–10)
NEUTROPHILS # BLD: 4.6 K/UL (ref 1.5–7.5)
NEUTS SEG NFR BLD: 60.7 % (ref 50–65)
PERFORMED ON: ABNORMAL
PLATELET # BLD AUTO: 191 K/UL (ref 130–400)
PMV BLD AUTO: 9.9 FL (ref 9.4–12.4)
POTASSIUM SERPL-SCNC: 4.1 MMOL/L (ref 3.5–5)
PROT SERPL-MCNC: 6.1 G/DL (ref 6.6–8.7)
RBC # BLD AUTO: 2.46 M/UL (ref 4.7–6.1)
SODIUM SERPL-SCNC: 134 MMOL/L (ref 136–145)
WBC # BLD AUTO: 7.5 K/UL (ref 4.8–10.8)

## 2024-06-21 PROCEDURE — 85014 HEMATOCRIT: CPT

## 2024-06-21 PROCEDURE — 2580000003 HC RX 258: Performed by: NURSE PRACTITIONER

## 2024-06-21 PROCEDURE — 80053 COMPREHEN METABOLIC PANEL: CPT

## 2024-06-21 PROCEDURE — 2580000003 HC RX 258: Performed by: INTERNAL MEDICINE

## 2024-06-21 PROCEDURE — 99223 1ST HOSP IP/OBS HIGH 75: CPT | Performed by: INTERNAL MEDICINE

## 2024-06-21 PROCEDURE — 6370000000 HC RX 637 (ALT 250 FOR IP): Performed by: NURSE PRACTITIONER

## 2024-06-21 PROCEDURE — 2700000000 HC OXYGEN THERAPY PER DAY

## 2024-06-21 PROCEDURE — 36430 TRANSFUSION BLD/BLD COMPNT: CPT

## 2024-06-21 PROCEDURE — 94669 MECHANICAL CHEST WALL OSCILL: CPT

## 2024-06-21 PROCEDURE — 2140000000 HC CCU INTERMEDIATE R&B

## 2024-06-21 PROCEDURE — 85025 COMPLETE CBC W/AUTO DIFF WBC: CPT

## 2024-06-21 PROCEDURE — 6370000000 HC RX 637 (ALT 250 FOR IP): Performed by: INTERNAL MEDICINE

## 2024-06-21 PROCEDURE — 97530 THERAPEUTIC ACTIVITIES: CPT

## 2024-06-21 PROCEDURE — 6370000000 HC RX 637 (ALT 250 FOR IP): Performed by: PSYCHIATRY & NEUROLOGY

## 2024-06-21 PROCEDURE — C9113 INJ PANTOPRAZOLE SODIUM, VIA: HCPCS | Performed by: INTERNAL MEDICINE

## 2024-06-21 PROCEDURE — 94760 N-INVAS EAR/PLS OXIMETRY 1: CPT

## 2024-06-21 PROCEDURE — 82962 GLUCOSE BLOOD TEST: CPT

## 2024-06-21 PROCEDURE — 94640 AIRWAY INHALATION TREATMENT: CPT

## 2024-06-21 PROCEDURE — 6360000002 HC RX W HCPCS: Performed by: NURSE PRACTITIONER

## 2024-06-21 PROCEDURE — 36415 COLL VENOUS BLD VENIPUNCTURE: CPT

## 2024-06-21 PROCEDURE — 97161 PT EVAL LOW COMPLEX 20 MIN: CPT

## 2024-06-21 PROCEDURE — 6360000002 HC RX W HCPCS: Performed by: INTERNAL MEDICINE

## 2024-06-21 PROCEDURE — 85018 HEMOGLOBIN: CPT

## 2024-06-21 PROCEDURE — 94150 VITAL CAPACITY TEST: CPT

## 2024-06-21 PROCEDURE — 51798 US URINE CAPACITY MEASURE: CPT

## 2024-06-21 RX ORDER — 0.9 % SODIUM CHLORIDE 0.9 %
250 INTRAVENOUS SOLUTION INTRAVENOUS ONCE
Status: DISCONTINUED | OUTPATIENT
Start: 2024-06-21 | End: 2024-06-25 | Stop reason: HOSPADM

## 2024-06-21 RX ORDER — POLYETHYLENE GLYCOL 3350 17 G/17G
17 POWDER, FOR SOLUTION ORAL 2 TIMES DAILY
Status: DISCONTINUED | OUTPATIENT
Start: 2024-06-21 | End: 2024-06-25 | Stop reason: HOSPADM

## 2024-06-21 RX ORDER — SODIUM CHLORIDE 9 MG/ML
INJECTION, SOLUTION INTRAVENOUS CONTINUOUS
Status: DISCONTINUED | OUTPATIENT
Start: 2024-06-21 | End: 2024-06-22

## 2024-06-21 RX ORDER — SODIUM CHLORIDE 9 MG/ML
INJECTION, SOLUTION INTRAVENOUS PRN
Status: DISCONTINUED | OUTPATIENT
Start: 2024-06-21 | End: 2024-06-25 | Stop reason: HOSPADM

## 2024-06-21 RX ORDER — DOCUSATE SODIUM 100 MG/1
100 CAPSULE, LIQUID FILLED ORAL 2 TIMES DAILY
Status: DISCONTINUED | OUTPATIENT
Start: 2024-06-21 | End: 2024-06-22

## 2024-06-21 RX ADMIN — IPRATROPIUM BROMIDE AND ALBUTEROL SULFATE 1 DOSE: 2.5; .5 SOLUTION RESPIRATORY (INHALATION) at 14:16

## 2024-06-21 RX ADMIN — PIPERACILLIN AND TAZOBACTAM 3375 MG: 3; .375 INJECTION, POWDER, LYOPHILIZED, FOR SOLUTION INTRAVENOUS at 11:08

## 2024-06-21 RX ADMIN — GUAIFENESIN 600 MG: 600 TABLET ORAL at 08:40

## 2024-06-21 RX ADMIN — PIPERACILLIN AND TAZOBACTAM 3375 MG: 3; .375 INJECTION, POWDER, LYOPHILIZED, FOR SOLUTION INTRAVENOUS at 21:24

## 2024-06-21 RX ADMIN — APIXABAN 5 MG: 5 TABLET, FILM COATED ORAL at 08:41

## 2024-06-21 RX ADMIN — SODIUM CHLORIDE, PRESERVATIVE FREE 40 MG: 5 INJECTION INTRAVENOUS at 12:42

## 2024-06-21 RX ADMIN — APIXABAN 5 MG: 5 TABLET, FILM COATED ORAL at 21:18

## 2024-06-21 RX ADMIN — POLYETHYLENE GLYCOL 3350 17 G: 17 POWDER, FOR SOLUTION ORAL at 08:41

## 2024-06-21 RX ADMIN — SODIUM CHLORIDE, PRESERVATIVE FREE 10 ML: 5 INJECTION INTRAVENOUS at 21:19

## 2024-06-21 RX ADMIN — MAGNESIUM HYDROXIDE 30 ML: 400 SUSPENSION ORAL at 10:55

## 2024-06-21 RX ADMIN — PREGABALIN 100 MG: 50 CAPSULE ORAL at 21:19

## 2024-06-21 RX ADMIN — GUAIFENESIN 600 MG: 600 TABLET ORAL at 21:19

## 2024-06-21 RX ADMIN — IPRATROPIUM BROMIDE AND ALBUTEROL SULFATE 1 DOSE: 2.5; .5 SOLUTION RESPIRATORY (INHALATION) at 19:01

## 2024-06-21 RX ADMIN — PIPERACILLIN AND TAZOBACTAM 3375 MG: 3; .375 INJECTION, POWDER, LYOPHILIZED, FOR SOLUTION INTRAVENOUS at 05:37

## 2024-06-21 RX ADMIN — DOCUSATE SODIUM 100 MG: 100 CAPSULE, LIQUID FILLED ORAL at 21:18

## 2024-06-21 RX ADMIN — DOCUSATE SODIUM 100 MG: 100 CAPSULE, LIQUID FILLED ORAL at 08:40

## 2024-06-21 RX ADMIN — GUAIFENESIN 600 MG: 600 TABLET ORAL at 13:44

## 2024-06-21 RX ADMIN — ONDANSETRON 4 MG: 2 INJECTION INTRAMUSCULAR; INTRAVENOUS at 00:51

## 2024-06-21 RX ADMIN — SODIUM CHLORIDE, PRESERVATIVE FREE 10 ML: 5 INJECTION INTRAVENOUS at 08:41

## 2024-06-21 RX ADMIN — SODIUM CHLORIDE, PRESERVATIVE FREE 40 MG: 5 INJECTION INTRAVENOUS at 05:39

## 2024-06-21 RX ADMIN — HYDROCODONE BITARTRATE AND ACETAMINOPHEN 1 TABLET: 10; 325 TABLET ORAL at 16:23

## 2024-06-21 RX ADMIN — IPRATROPIUM BROMIDE AND ALBUTEROL SULFATE 1 DOSE: 2.5; .5 SOLUTION RESPIRATORY (INHALATION) at 10:01

## 2024-06-21 RX ADMIN — INSULIN LISPRO 2 UNITS: 100 INJECTION, SOLUTION INTRAVENOUS; SUBCUTANEOUS at 16:10

## 2024-06-21 RX ADMIN — IPRATROPIUM BROMIDE AND ALBUTEROL SULFATE 1 DOSE: 2.5; .5 SOLUTION RESPIRATORY (INHALATION) at 06:45

## 2024-06-21 RX ADMIN — METOPROLOL TARTRATE 25 MG: 25 TABLET, FILM COATED ORAL at 12:42

## 2024-06-21 RX ADMIN — METOPROLOL TARTRATE 25 MG: 25 TABLET, FILM COATED ORAL at 21:19

## 2024-06-21 RX ADMIN — POLYETHYLENE GLYCOL 3350 17 G: 17 POWDER, FOR SOLUTION ORAL at 21:18

## 2024-06-21 RX ADMIN — PREGABALIN 100 MG: 50 CAPSULE ORAL at 12:42

## 2024-06-21 RX ADMIN — INSULIN LISPRO 3 UNITS: 100 INJECTION, SOLUTION INTRAVENOUS; SUBCUTANEOUS at 11:09

## 2024-06-21 RX ADMIN — SODIUM CHLORIDE: 9 INJECTION, SOLUTION INTRAVENOUS at 08:41

## 2024-06-21 RX ADMIN — PREGABALIN 100 MG: 50 CAPSULE ORAL at 08:41

## 2024-06-21 ASSESSMENT — PAIN SCALES - GENERAL: PAINLEVEL_OUTOF10: 10

## 2024-06-21 ASSESSMENT — PAIN DESCRIPTION - DESCRIPTORS: DESCRIPTORS: ACHING

## 2024-06-21 ASSESSMENT — PAIN - FUNCTIONAL ASSESSMENT: PAIN_FUNCTIONAL_ASSESSMENT: ACTIVITIES ARE NOT PREVENTED

## 2024-06-21 ASSESSMENT — PAIN DESCRIPTION - ORIENTATION: ORIENTATION: RIGHT

## 2024-06-21 ASSESSMENT — PAIN DESCRIPTION - LOCATION: LOCATION: LEG

## 2024-06-22 LAB
ANION GAP SERPL CALCULATED.3IONS-SCNC: 14 MMOL/L (ref 7–19)
BACTERIA BLD CULT ORG #2: NORMAL
BACTERIA BLD CULT: NORMAL
BASOPHILS # BLD: 0.1 K/UL (ref 0–0.2)
BASOPHILS NFR BLD: 0.8 % (ref 0–1)
BUN SERPL-MCNC: 68 MG/DL (ref 6–20)
CALCIUM SERPL-MCNC: 8.1 MG/DL (ref 8.6–10)
CHLORIDE SERPL-SCNC: 100 MMOL/L (ref 98–111)
CO2 SERPL-SCNC: 21 MMOL/L (ref 22–29)
CREAT SERPL-MCNC: 2.8 MG/DL (ref 0.5–1.2)
EOSINOPHIL # BLD: 0.1 K/UL (ref 0–0.6)
EOSINOPHIL NFR BLD: 1.4 % (ref 0–5)
ERYTHROCYTE [DISTWIDTH] IN BLOOD BY AUTOMATED COUNT: 16.7 % (ref 11.5–14.5)
FERRITIN SERPL-MCNC: 649.6 NG/ML (ref 30–400)
GLUCOSE BLD-MCNC: 214 MG/DL (ref 70–99)
GLUCOSE BLD-MCNC: 244 MG/DL (ref 70–99)
GLUCOSE BLD-MCNC: 301 MG/DL (ref 70–99)
GLUCOSE BLD-MCNC: 313 MG/DL (ref 70–99)
GLUCOSE SERPL-MCNC: 199 MG/DL (ref 74–109)
HCT VFR BLD AUTO: 26.7 % (ref 42–52)
HGB BLD-MCNC: 8.4 G/DL (ref 14–18)
IMM GRANULOCYTES # BLD: 0.3 K/UL
IRON SATN MFR SERPL: 27 % (ref 14–50)
IRON SERPL-MCNC: 57 UG/DL (ref 59–158)
LYMPHOCYTES # BLD: 1.7 K/UL (ref 1.1–4.5)
LYMPHOCYTES NFR BLD: 19 % (ref 20–40)
MAGNESIUM SERPL-MCNC: 2.3 MG/DL (ref 1.6–2.6)
MCH RBC QN AUTO: 28.1 PG (ref 27–31)
MCHC RBC AUTO-ENTMCNC: 31.5 G/DL (ref 33–37)
MCV RBC AUTO: 89.3 FL (ref 80–94)
MONOCYTES # BLD: 0.6 K/UL (ref 0–0.9)
MONOCYTES NFR BLD: 6.6 % (ref 0–10)
NEUTROPHILS # BLD: 6.2 K/UL (ref 1.5–7.5)
NEUTS SEG NFR BLD: 68.5 % (ref 50–65)
PERFORMED ON: ABNORMAL
PHOSPHATE SERPL-MCNC: 4.7 MG/DL (ref 2.5–4.5)
PLATELET # BLD AUTO: 213 K/UL (ref 130–400)
PMV BLD AUTO: 10.3 FL (ref 9.4–12.4)
POTASSIUM SERPL-SCNC: 4.2 MMOL/L (ref 3.5–5)
PTH-INTACT SERPL-MCNC: 122.1 PG/ML (ref 15–65)
RBC # BLD AUTO: 2.99 M/UL (ref 4.7–6.1)
SODIUM SERPL-SCNC: 135 MMOL/L (ref 136–145)
TIBC SERPL-MCNC: 215 UG/DL (ref 250–400)
WBC # BLD AUTO: 9.1 K/UL (ref 4.8–10.8)

## 2024-06-22 PROCEDURE — 82728 ASSAY OF FERRITIN: CPT

## 2024-06-22 PROCEDURE — 36415 COLL VENOUS BLD VENIPUNCTURE: CPT

## 2024-06-22 PROCEDURE — 6360000002 HC RX W HCPCS: Performed by: INTERNAL MEDICINE

## 2024-06-22 PROCEDURE — 6360000002 HC RX W HCPCS: Performed by: NURSE PRACTITIONER

## 2024-06-22 PROCEDURE — 94640 AIRWAY INHALATION TREATMENT: CPT

## 2024-06-22 PROCEDURE — 6370000000 HC RX 637 (ALT 250 FOR IP): Performed by: PSYCHIATRY & NEUROLOGY

## 2024-06-22 PROCEDURE — 85025 COMPLETE CBC W/AUTO DIFF WBC: CPT

## 2024-06-22 PROCEDURE — 83550 IRON BINDING TEST: CPT

## 2024-06-22 PROCEDURE — 2580000003 HC RX 258: Performed by: NURSE PRACTITIONER

## 2024-06-22 PROCEDURE — C9113 INJ PANTOPRAZOLE SODIUM, VIA: HCPCS | Performed by: INTERNAL MEDICINE

## 2024-06-22 PROCEDURE — 2140000000 HC CCU INTERMEDIATE R&B

## 2024-06-22 PROCEDURE — 6370000000 HC RX 637 (ALT 250 FOR IP): Performed by: NURSE PRACTITIONER

## 2024-06-22 PROCEDURE — 83540 ASSAY OF IRON: CPT

## 2024-06-22 PROCEDURE — 83970 ASSAY OF PARATHORMONE: CPT

## 2024-06-22 PROCEDURE — 6370000000 HC RX 637 (ALT 250 FOR IP): Performed by: INTERNAL MEDICINE

## 2024-06-22 PROCEDURE — 80048 BASIC METABOLIC PNL TOTAL CA: CPT

## 2024-06-22 PROCEDURE — 2700000000 HC OXYGEN THERAPY PER DAY

## 2024-06-22 PROCEDURE — 84100 ASSAY OF PHOSPHORUS: CPT

## 2024-06-22 PROCEDURE — 2580000003 HC RX 258: Performed by: INTERNAL MEDICINE

## 2024-06-22 PROCEDURE — 82962 GLUCOSE BLOOD TEST: CPT

## 2024-06-22 PROCEDURE — 83735 ASSAY OF MAGNESIUM: CPT

## 2024-06-22 RX ORDER — LEVOFLOXACIN 750 MG/1
750 TABLET, FILM COATED ORAL
Status: COMPLETED | OUTPATIENT
Start: 2024-06-22 | End: 2024-06-24

## 2024-06-22 RX ORDER — SENNA AND DOCUSATE SODIUM 50; 8.6 MG/1; MG/1
2 TABLET, FILM COATED ORAL 2 TIMES DAILY PRN
Status: DISCONTINUED | OUTPATIENT
Start: 2024-06-22 | End: 2024-06-25 | Stop reason: HOSPADM

## 2024-06-22 RX ORDER — BUMETANIDE 0.25 MG/ML
1 INJECTION INTRAMUSCULAR; INTRAVENOUS 2 TIMES DAILY
Status: DISCONTINUED | OUTPATIENT
Start: 2024-06-22 | End: 2024-06-25 | Stop reason: HOSPADM

## 2024-06-22 RX ORDER — PANTOPRAZOLE SODIUM 40 MG/1
40 TABLET, DELAYED RELEASE ORAL
Status: DISCONTINUED | OUTPATIENT
Start: 2024-06-22 | End: 2024-06-25

## 2024-06-22 RX ADMIN — METOPROLOL TARTRATE 25 MG: 25 TABLET, FILM COATED ORAL at 20:53

## 2024-06-22 RX ADMIN — HYDROCODONE BITARTRATE AND ACETAMINOPHEN 1 TABLET: 10; 325 TABLET ORAL at 20:52

## 2024-06-22 RX ADMIN — MAGNESIUM HYDROXIDE 30 ML: 400 SUSPENSION ORAL at 13:36

## 2024-06-22 RX ADMIN — APIXABAN 5 MG: 5 TABLET, FILM COATED ORAL at 20:53

## 2024-06-22 RX ADMIN — SENNOSIDES AND DOCUSATE SODIUM 2 TABLET: 50; 8.6 TABLET ORAL at 13:36

## 2024-06-22 RX ADMIN — PIPERACILLIN AND TAZOBACTAM 3375 MG: 3; .375 INJECTION, POWDER, LYOPHILIZED, FOR SOLUTION INTRAVENOUS at 20:51

## 2024-06-22 RX ADMIN — GUAIFENESIN 600 MG: 600 TABLET ORAL at 16:35

## 2024-06-22 RX ADMIN — POLYETHYLENE GLYCOL 3350 17 G: 17 POWDER, FOR SOLUTION ORAL at 09:12

## 2024-06-22 RX ADMIN — SODIUM CHLORIDE, PRESERVATIVE FREE 40 MG: 5 INJECTION INTRAVENOUS at 01:43

## 2024-06-22 RX ADMIN — SODIUM CHLORIDE, PRESERVATIVE FREE 10 ML: 5 INJECTION INTRAVENOUS at 20:52

## 2024-06-22 RX ADMIN — INSULIN LISPRO 1 UNITS: 100 INJECTION, SOLUTION INTRAVENOUS; SUBCUTANEOUS at 12:27

## 2024-06-22 RX ADMIN — LEVOFLOXACIN 750 MG: 750 TABLET, FILM COATED ORAL at 17:06

## 2024-06-22 RX ADMIN — PREGABALIN 100 MG: 50 CAPSULE ORAL at 13:36

## 2024-06-22 RX ADMIN — PIPERACILLIN AND TAZOBACTAM 3375 MG: 3; .375 INJECTION, POWDER, LYOPHILIZED, FOR SOLUTION INTRAVENOUS at 12:29

## 2024-06-22 RX ADMIN — GUAIFENESIN 600 MG: 600 TABLET ORAL at 09:12

## 2024-06-22 RX ADMIN — INSULIN LISPRO 4 UNITS: 100 INJECTION, SOLUTION INTRAVENOUS; SUBCUTANEOUS at 20:52

## 2024-06-22 RX ADMIN — IPRATROPIUM BROMIDE AND ALBUTEROL SULFATE 1 DOSE: 2.5; .5 SOLUTION RESPIRATORY (INHALATION) at 14:56

## 2024-06-22 RX ADMIN — INSULIN LISPRO 3 UNITS: 100 INJECTION, SOLUTION INTRAVENOUS; SUBCUTANEOUS at 09:12

## 2024-06-22 RX ADMIN — BUMETANIDE 1 MG: 0.25 INJECTION INTRAMUSCULAR; INTRAVENOUS at 16:35

## 2024-06-22 RX ADMIN — PREGABALIN 100 MG: 50 CAPSULE ORAL at 20:53

## 2024-06-22 RX ADMIN — GUAIFENESIN 600 MG: 600 TABLET ORAL at 20:53

## 2024-06-22 RX ADMIN — METOPROLOL TARTRATE 25 MG: 25 TABLET, FILM COATED ORAL at 09:12

## 2024-06-22 RX ADMIN — IPRATROPIUM BROMIDE AND ALBUTEROL SULFATE 1 DOSE: 2.5; .5 SOLUTION RESPIRATORY (INHALATION) at 10:30

## 2024-06-22 RX ADMIN — IPRATROPIUM BROMIDE AND ALBUTEROL SULFATE 1 DOSE: 2.5; .5 SOLUTION RESPIRATORY (INHALATION) at 06:30

## 2024-06-22 RX ADMIN — PREGABALIN 100 MG: 50 CAPSULE ORAL at 09:12

## 2024-06-22 RX ADMIN — DOCUSATE SODIUM 100 MG: 100 CAPSULE, LIQUID FILLED ORAL at 09:12

## 2024-06-22 RX ADMIN — PIPERACILLIN AND TAZOBACTAM 3375 MG: 3; .375 INJECTION, POWDER, LYOPHILIZED, FOR SOLUTION INTRAVENOUS at 04:39

## 2024-06-22 RX ADMIN — PANTOPRAZOLE SODIUM 40 MG: 40 TABLET, DELAYED RELEASE ORAL at 16:35

## 2024-06-22 RX ADMIN — INSULIN LISPRO 1 UNITS: 100 INJECTION, SOLUTION INTRAVENOUS; SUBCUTANEOUS at 16:35

## 2024-06-22 ASSESSMENT — PAIN DESCRIPTION - LOCATION: LOCATION: LEG

## 2024-06-22 ASSESSMENT — PAIN - FUNCTIONAL ASSESSMENT: PAIN_FUNCTIONAL_ASSESSMENT: PREVENTS OR INTERFERES SOME ACTIVE ACTIVITIES AND ADLS

## 2024-06-22 ASSESSMENT — PAIN SCALES - GENERAL: PAINLEVEL_OUTOF10: 10

## 2024-06-22 ASSESSMENT — PAIN DESCRIPTION - ORIENTATION: ORIENTATION: RIGHT

## 2024-06-22 ASSESSMENT — PAIN DESCRIPTION - DESCRIPTORS: DESCRIPTORS: THROBBING

## 2024-06-22 NOTE — CONSULTS
[facility]  Cardiology Consult      Requesting MD:  Jose A Webb MD   Admit Status:         History obtained from:   [x] Patient  [x] Other (specify): Chart review    Reason for consultation : Abnormal echocardiogram      PRESENTATION: Noe Jimenez is a 59 y.o. 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  Patient underwent echocardiogram which was consistent with positive bubble study.  Also he was found to be anemic with acute blood loss anemia and undergoing treatment for hospital-acquired pneumonia.  Patient was transferred to ICU for acute hypoxic respiratory failure and also acute blood loss anemia.  Patient denies any prior history of CAD or bypass surgery.  Patient denies any prior history of alcohol smoking or illicit drug use.       REVIEW OF SYSTEMS:  As per HPI, all other 12 systems reviewed and negative     Past Medical History:      Diagnosis Date    Diabetes mellitus (HCC)     Hypertension        Past Surgical History:      Procedure Laterality Date    BACK SURGERY      x2    CARDIAC SURGERY      hole in heart as a child    FRACTURE SURGERY      left arm     ROTATOR CUFF REPAIR Right     x2       Allergies:  Dilaudid [hydromorphone], Morphine, and Percocet [oxycodone-acetaminophen]    Past Social History:  Social History     Socioeconomic History    Marital status:      Spouse name: Not on file    Number of children: Not on file    Years of education: Not on file    Highest education level: Not on file   Occupational History    Not on file   Tobacco Use    Smoking status: Never    Smokeless tobacco: Not on file 
Palliative Care: Initiated for support, Goals of care.  Pt new to palliative care.  Recent RBKA at  and transferred to this facility for rehab.   It was expected he would dc on Friday, 6/21.  Noted pt had congestion, not feeling himself. Resp panel reveals parainfluenza.   It was decided he needed med/surg floor for medical management.  On Wednesday pt had worsening resp status and trans to ICU.      This morning he is resting in bed, states resp \"better\".   Pt remains on HHF, currently at 30%.  Pt is alert and oriented talks with me about his hx leading to amputation.    Past Medical History:        Past Medical History:   Diagnosis Date    Diabetes mellitus (HCC)     Hypertension        Advance Directives:   Full Code AD on file. Spouse, Kristin, is pt HCS.            Pain/Other Symptoms:   \"10\". Encouraged pt to ask for pain meds. He tells me he is afraid it will affect his breathing. PC RN did speak with his nurse, Alejandrina, she will reach out to MD for additional pain med order. For now pt has Norco 10/325.            How are symptoms affecting QOL  Due to the nature of 2 yrs of surgeries and an inability to fully use his right leg, pt states \"I've learned how to get around with a walker and or w/c now\".  He states \"I was doing so well on rehab and felt like I made good progress then I got sick\".  Pt talks with me about his desire to hopefully go back to rehab so he can complete what he started, Feels this will help \"a lot\".         Psychological/Spiritual:     Reports good spiritual and family support                  Plan:  Remain in ICU for now, will attempt to continue to wean O2, medical management.          Patient/family discussion r/t goals:           (what does living well look like to you)?  Pt states his goal is to \"get as good as I can and go home\". Spouse adds, \"To set him up for success to be the best he can\".  Plan is to return home once rehab complete(if able to return) and medical issues 
Patient name: Noe Jimenez  MRN: [id number]  YOB: 1965    Date of consultation: 6/19/2024    Reason for consultation: Anemia    HPI: This is a very pleasant 59-year-old gentleman who is status post a right below-knee amputation with Dr. Galvan at [facility].  He was in rehab on the eighth floor here, and was initially doing well with his rehab and was told he would be going home Friday.  However, he developed a fever cough, acute dyspnea and hypoxia.  He was transferred to the ICU for respiratory management.  The patient was noted to have anemia, and we were consulted to evaluate his BKA as a possible source of anemia.  Although it is very unlikely that a BKA would be the source of anemia several weeks postop, I did come see the patient.  His family was at the bedside.  His wife showed me a picture of the BKA from yesterday, and it looks pristine.  I took off his stump immobilizer and his compression sock, and remove the dressing.  The incision is clean dry and intact, as visualized in her photograph.  There is no bleeding or bruising.  There is no fluctuance.  The dressing has a mild amount of serous drainage but is otherwise clear dry and intact.  There is mild erythema at the staple line in certain places, but this does not look like cellulitis, it looks more like irritation from the staples themselves.  This will likely resolve once the staples are removed.  I cleaned the incision and replace the dressing with Adaptic and a border dressing.  The patient's compression sock was replaced and his knee immobilizer was replaced.  He tolerated this without difficulty.    I discussed with the patient and his family that most likely he has anemia related to his chronic illness as well as renal insufficiency.    Allergies: Morphine and Percocet    Medications: Mucomyst 600 mg daily, Eliquis 5 mg twice daily, Tessalon 100 mg 3 times daily, clonidine 0.1 mg every 4 hours, diltiazem drip, ferric 
  06/22/24 0417 -- -- -- -- -- -- 92.4 kg (203 lb 9.6 oz)   06/22/24 0316 111/79 97.6 °F (36.4 °C) Temporal 90 20 95 % --   06/22/24 0029 138/86 97.2 °F (36.2 °C) Temporal 85 16 96 % --   06/21/24 1919 110/69 97.7 °F (36.5 °C) Temporal 88 20 98 % --   06/21/24 1623 -- -- -- -- 18 -- --   06/21/24 1557 -- -- -- 94 -- -- --   06/21/24 1553 128/73 98.2 °F (36.8 °C) Temporal 80 18 98 % --   06/21/24 1520 -- -- -- 93 -- -- --   06/21/24 1500 120/66 -- -- 98 13 94 % --       Intake/Output Summary (Last 24 hours) at 6/22/2024 1457  Last data filed at 6/22/2024 0948  Gross per 24 hour   Intake 0 ml   Output --   Net 0 ml     General: awake/alert   HEENT: Normocephalic atraumatic head  Neck: Supple with no JVD or carotid bruits.  Chest:  clear to auscultation bilaterally  CVS: regular rate and rhythm  Abdominal: soft, nontender, normal bowel sounds  Extremities: 3+ edema left lower extremity/right BKA  Skin: No discoloration  : Scrotal and penile edema      Labs:  BMP:   Recent Labs     06/20/24  0312 06/21/24  0048 06/22/24  1308    134* 135*   K 4.2 4.1 4.2    99 100   CO2 20* 22 21*   PHOS  --   --  4.7*   BUN 59* 67* 68*   CREATININE 2.4* 2.9* 2.8*   CALCIUM 8.0* 7.8* 8.1*     CBC:   Recent Labs     06/20/24 0312 06/21/24  0048 06/21/24  1004 06/22/24  0152   WBC 8.1 7.5  --  9.1   HGB 7.7* 6.9* 8.5* 8.4*   HCT 24.4* 21.9* 26.9* 26.7*   MCV 89.1 89.0  --  89.3    191  --  213     LIVER PROFILE:   Recent Labs     06/20/24  0312 06/21/24  0048   AST 28 98*   ALT 21 43*   BILITOT 0.7 0.8   ALKPHOS 200* 353*     PT/INR: No results for input(s): \"PROTIME\", \"INR\" in the last 72 hours.  APTT: No results for input(s): \"APTT\" in the last 72 hours.  BNP:  No results for input(s): \"BNP\" in the last 72 hours.  Ionized Calcium:Invalid input(s): \"IONCA\"  Magnesium:  Recent Labs     06/22/24  1308   MG 2.3     Phosphorus:  Recent Labs     06/22/24  1308   PHOS 4.7*     HgbA1C: No results for input(s): \"LABA1C\" in

## 2024-06-22 NOTE — PLAN OF CARE
Problem: Safety - Adult  Goal: Free from fall injury  6/21/2024 2344 by Afshan West RN  Outcome: Progressing  6/21/2024 1011 by Amirah Leon RN  Outcome: Progressing  Flowsheets (Taken 6/21/2024 1011)  Free From Fall Injury: Instruct family/caregiver on patient safety     Problem: ABCDS Injury Assessment  Goal: Absence of physical injury  6/21/2024 2344 by Afshan West RN  Outcome: Progressing  6/21/2024 1011 by Amirah Leon RN  Outcome: Progressing  Flowsheets (Taken 6/21/2024 1011)  Absence of Physical Injury: Implement safety measures based on patient assessment     Problem: Skin/Tissue Integrity  Goal: Absence of new skin breakdown  Description: 1.  Monitor for areas of redness and/or skin breakdown  2.  Assess vascular access sites hourly  3.  Every 4-6 hours minimum:  Change oxygen saturation probe site  4.  Every 4-6 hours:  If on nasal continuous positive airway pressure, respiratory therapy assess nares and determine need for appliance change or resting period.  6/21/2024 2344 by Afshan West RN  Outcome: Progressing  6/21/2024 1011 by Amirah Leon RN  Outcome: Progressing     Problem: Discharge Planning  Goal: Discharge to home or other facility with appropriate resources  6/21/2024 2344 by Afshan West RN  Outcome: Progressing  Flowsheets (Taken 6/21/2024 1557 by Brinda Baxter, RN)  Discharge to home or other facility with appropriate resources: Identify barriers to discharge with patient and caregiver  6/21/2024 1011 by Amirah Leon RN  Outcome: Progressing  Flowsheets (Taken 6/21/2024 0800)  Discharge to home or other facility with appropriate resources:   Identify barriers to discharge with patient and caregiver   Arrange for needed discharge resources and transportation as appropriate   Identify discharge learning needs (meds, wound care, etc)     Problem: Chronic Conditions and Co-morbidities  Goal: Patient's chronic conditions and co-morbidity symptoms are monitored

## 2024-06-23 ENCOUNTER — APPOINTMENT (OUTPATIENT)
Dept: ULTRASOUND IMAGING | Age: 59
DRG: 871 | End: 2024-06-23
Attending: STUDENT IN AN ORGANIZED HEALTH CARE EDUCATION/TRAINING PROGRAM
Payer: COMMERCIAL

## 2024-06-23 LAB
ALBUMIN SERPL-MCNC: 2.4 G/DL (ref 3.5–5.2)
ALP SERPL-CCNC: 248 U/L (ref 40–130)
ALT SERPL-CCNC: 27 U/L (ref 5–41)
ANION GAP SERPL CALCULATED.3IONS-SCNC: 14 MMOL/L (ref 7–19)
AST SERPL-CCNC: 20 U/L (ref 5–40)
BILIRUB SERPL-MCNC: 0.5 MG/DL (ref 0.2–1.2)
BUN SERPL-MCNC: 62 MG/DL (ref 6–20)
CALCIUM SERPL-MCNC: 7.8 MG/DL (ref 8.6–10)
CHLORIDE SERPL-SCNC: 104 MMOL/L (ref 98–111)
CO2 SERPL-SCNC: 19 MMOL/L (ref 22–29)
CREAT SERPL-MCNC: 2.4 MG/DL (ref 0.5–1.2)
EKG P AXIS: NORMAL DEGREES
EKG P-R INTERVAL: NORMAL MS
EKG Q-T INTERVAL: 376 MS
EKG QRS DURATION: 138 MS
EKG QTC CALCULATION (BAZETT): 451 MS
EKG T AXIS: 66 DEGREES
ERYTHROCYTE [DISTWIDTH] IN BLOOD BY AUTOMATED COUNT: 17.1 % (ref 11.5–14.5)
GLUCOSE BLD-MCNC: 183 MG/DL (ref 70–99)
GLUCOSE BLD-MCNC: 206 MG/DL (ref 70–99)
GLUCOSE BLD-MCNC: 217 MG/DL (ref 70–99)
GLUCOSE BLD-MCNC: 254 MG/DL (ref 70–99)
GLUCOSE BLD-MCNC: 293 MG/DL (ref 70–99)
GLUCOSE SERPL-MCNC: 198 MG/DL (ref 74–109)
HCT VFR BLD AUTO: 28.8 % (ref 42–52)
HGB BLD-MCNC: 8.8 G/DL (ref 14–18)
MCH RBC QN AUTO: 28.5 PG (ref 27–31)
MCHC RBC AUTO-ENTMCNC: 30.6 G/DL (ref 33–37)
MCV RBC AUTO: 93.2 FL (ref 80–94)
PERFORMED ON: ABNORMAL
PLATELET # BLD AUTO: 220 K/UL (ref 130–400)
PMV BLD AUTO: 10.4 FL (ref 9.4–12.4)
POTASSIUM SERPL-SCNC: 4.1 MMOL/L (ref 3.5–5)
PROT SERPL-MCNC: 6.8 G/DL (ref 6.6–8.7)
RBC # BLD AUTO: 3.09 M/UL (ref 4.7–6.1)
SODIUM SERPL-SCNC: 137 MMOL/L (ref 136–145)
WBC # BLD AUTO: 9 K/UL (ref 4.8–10.8)

## 2024-06-23 PROCEDURE — 94760 N-INVAS EAR/PLS OXIMETRY 1: CPT

## 2024-06-23 PROCEDURE — 6370000000 HC RX 637 (ALT 250 FOR IP): Performed by: INTERNAL MEDICINE

## 2024-06-23 PROCEDURE — 85027 COMPLETE CBC AUTOMATED: CPT

## 2024-06-23 PROCEDURE — 6370000000 HC RX 637 (ALT 250 FOR IP): Performed by: NURSE PRACTITIONER

## 2024-06-23 PROCEDURE — 6360000002 HC RX W HCPCS: Performed by: NURSE PRACTITIONER

## 2024-06-23 PROCEDURE — 6370000000 HC RX 637 (ALT 250 FOR IP): Performed by: PSYCHIATRY & NEUROLOGY

## 2024-06-23 PROCEDURE — 6360000002 HC RX W HCPCS: Performed by: INTERNAL MEDICINE

## 2024-06-23 PROCEDURE — 82962 GLUCOSE BLOOD TEST: CPT

## 2024-06-23 PROCEDURE — 2700000000 HC OXYGEN THERAPY PER DAY

## 2024-06-23 PROCEDURE — 76770 US EXAM ABDO BACK WALL COMP: CPT

## 2024-06-23 PROCEDURE — 80053 COMPREHEN METABOLIC PANEL: CPT

## 2024-06-23 PROCEDURE — 36415 COLL VENOUS BLD VENIPUNCTURE: CPT

## 2024-06-23 PROCEDURE — 2580000003 HC RX 258: Performed by: NURSE PRACTITIONER

## 2024-06-23 PROCEDURE — 1200000000 HC SEMI PRIVATE

## 2024-06-23 PROCEDURE — 94640 AIRWAY INHALATION TREATMENT: CPT

## 2024-06-23 RX ORDER — METOLAZONE 2.5 MG/1
2.5 TABLET ORAL DAILY
Status: DISCONTINUED | OUTPATIENT
Start: 2024-06-23 | End: 2024-06-25 | Stop reason: HOSPADM

## 2024-06-23 RX ORDER — CALCITRIOL 0.25 UG/1
0.25 CAPSULE, LIQUID FILLED ORAL DAILY
Status: DISCONTINUED | OUTPATIENT
Start: 2024-06-23 | End: 2024-06-25 | Stop reason: HOSPADM

## 2024-06-23 RX ADMIN — IPRATROPIUM BROMIDE AND ALBUTEROL SULFATE 1 DOSE: 2.5; .5 SOLUTION RESPIRATORY (INHALATION) at 19:52

## 2024-06-23 RX ADMIN — PIPERACILLIN AND TAZOBACTAM 3375 MG: 3; .375 INJECTION, POWDER, LYOPHILIZED, FOR SOLUTION INTRAVENOUS at 14:30

## 2024-06-23 RX ADMIN — IPRATROPIUM BROMIDE AND ALBUTEROL SULFATE 1 DOSE: 2.5; .5 SOLUTION RESPIRATORY (INHALATION) at 06:05

## 2024-06-23 RX ADMIN — PIPERACILLIN AND TAZOBACTAM 3375 MG: 3; .375 INJECTION, POWDER, LYOPHILIZED, FOR SOLUTION INTRAVENOUS at 22:01

## 2024-06-23 RX ADMIN — PREGABALIN 100 MG: 50 CAPSULE ORAL at 21:58

## 2024-06-23 RX ADMIN — METOPROLOL TARTRATE 25 MG: 25 TABLET, FILM COATED ORAL at 21:58

## 2024-06-23 RX ADMIN — PREGABALIN 100 MG: 50 CAPSULE ORAL at 14:25

## 2024-06-23 RX ADMIN — GUAIFENESIN 600 MG: 600 TABLET ORAL at 14:26

## 2024-06-23 RX ADMIN — BUMETANIDE 1 MG: 0.25 INJECTION INTRAMUSCULAR; INTRAVENOUS at 08:42

## 2024-06-23 RX ADMIN — METOLAZONE 2.5 MG: 2.5 TABLET ORAL at 17:29

## 2024-06-23 RX ADMIN — GUAIFENESIN 600 MG: 600 TABLET ORAL at 21:58

## 2024-06-23 RX ADMIN — PANTOPRAZOLE SODIUM 40 MG: 40 TABLET, DELAYED RELEASE ORAL at 14:26

## 2024-06-23 RX ADMIN — APIXABAN 5 MG: 5 TABLET, FILM COATED ORAL at 08:42

## 2024-06-23 RX ADMIN — PIPERACILLIN AND TAZOBACTAM 3375 MG: 3; .375 INJECTION, POWDER, LYOPHILIZED, FOR SOLUTION INTRAVENOUS at 04:42

## 2024-06-23 RX ADMIN — BUMETANIDE 1 MG: 0.25 INJECTION INTRAMUSCULAR; INTRAVENOUS at 17:30

## 2024-06-23 RX ADMIN — CALCITRIOL CAPSULES 0.25 MCG 0.25 MCG: 0.25 CAPSULE ORAL at 14:25

## 2024-06-23 RX ADMIN — METOPROLOL TARTRATE 25 MG: 25 TABLET, FILM COATED ORAL at 08:42

## 2024-06-23 RX ADMIN — SODIUM CHLORIDE, PRESERVATIVE FREE 10 ML: 5 INJECTION INTRAVENOUS at 21:58

## 2024-06-23 RX ADMIN — APIXABAN 5 MG: 5 TABLET, FILM COATED ORAL at 21:58

## 2024-06-23 RX ADMIN — IPRATROPIUM BROMIDE AND ALBUTEROL SULFATE 1 DOSE: 2.5; .5 SOLUTION RESPIRATORY (INHALATION) at 10:45

## 2024-06-23 RX ADMIN — IPRATROPIUM BROMIDE AND ALBUTEROL SULFATE 1 DOSE: 2.5; .5 SOLUTION RESPIRATORY (INHALATION) at 14:57

## 2024-06-23 RX ADMIN — INSULIN LISPRO 2 UNITS: 100 INJECTION, SOLUTION INTRAVENOUS; SUBCUTANEOUS at 17:30

## 2024-06-23 RX ADMIN — INSULIN LISPRO 1 UNITS: 100 INJECTION, SOLUTION INTRAVENOUS; SUBCUTANEOUS at 14:26

## 2024-06-23 RX ADMIN — PANTOPRAZOLE SODIUM 40 MG: 40 TABLET, DELAYED RELEASE ORAL at 05:49

## 2024-06-23 RX ADMIN — GUAIFENESIN 600 MG: 600 TABLET ORAL at 08:42

## 2024-06-23 RX ADMIN — PREGABALIN 100 MG: 50 CAPSULE ORAL at 08:42

## 2024-06-23 ASSESSMENT — PAIN SCALES - GENERAL: PAINLEVEL_OUTOF10: 0

## 2024-06-23 NOTE — PLAN OF CARE
Problem: Safety - Adult  Goal: Free from fall injury  6/22/2024 2336 by Taty Goddard RN  Outcome: Progressing  6/22/2024 1410 by Chanelle Castro RN  Outcome: Progressing     Problem: ABCDS Injury Assessment  Goal: Absence of physical injury  6/22/2024 2336 by Taty Goddard RN  Outcome: Progressing  6/22/2024 1410 by Chanelle Castro RN  Outcome: Progressing     Problem: Skin/Tissue Integrity  Goal: Absence of new skin breakdown  Description: 1.  Monitor for areas of redness and/or skin breakdown  2.  Assess vascular access sites hourly  3.  Every 4-6 hours minimum:  Change oxygen saturation probe site  4.  Every 4-6 hours:  If on nasal continuous positive airway pressure, respiratory therapy assess nares and determine need for appliance change or resting period.  6/22/2024 2336 by Taty Goddard RN  Outcome: Progressing  6/22/2024 1410 by Chanelle Castro RN  Outcome: Progressing     Problem: Discharge Planning  Goal: Discharge to home or other facility with appropriate resources  6/22/2024 2336 by Taty Goddard RN  Outcome: Progressing  6/22/2024 1410 by Chanelle Castro RN  Outcome: Progressing     Problem: Chronic Conditions and Co-morbidities  Goal: Patient's chronic conditions and co-morbidity symptoms are monitored and maintained or improved  6/22/2024 2336 by Taty Goddard RN  Outcome: Progressing  6/22/2024 1410 by Chanelle Castro RN  Outcome: Progressing     Problem: Pain  Goal: Verbalizes/displays adequate comfort level or baseline comfort level  Outcome: Progressing

## 2024-06-23 NOTE — PLAN OF CARE
Problem: Safety - Adult  Goal: Free from fall injury  6/23/2024 0915 by Vasquez Duffy RN  Outcome: Progressing  6/22/2024 2336 by Taty Goddard RN  Outcome: Progressing     Problem: ABCDS Injury Assessment  Goal: Absence of physical injury  6/23/2024 0915 by Vasquez Duffy RN  Outcome: Progressing  6/22/2024 2336 by Taty Goddard RN  Outcome: Progressing     Problem: Skin/Tissue Integrity  Goal: Absence of new skin breakdown  Description: 1.  Monitor for areas of redness and/or skin breakdown  2.  Assess vascular access sites hourly  3.  Every 4-6 hours minimum:  Change oxygen saturation probe site  4.  Every 4-6 hours:  If on nasal continuous positive airway pressure, respiratory therapy assess nares and determine need for appliance change or resting period.  6/23/2024 0915 by Vasquez Duffy RN  Outcome: Progressing  6/22/2024 2336 by Taty Goddard RN  Outcome: Progressing     Problem: Discharge Planning  Goal: Discharge to home or other facility with appropriate resources  6/23/2024 0915 by Vasquez Duffy RN  Outcome: Progressing  6/22/2024 2336 by Taty Goddard RN  Outcome: Progressing

## 2024-06-24 LAB
ALBUMIN SERPL-MCNC: 2.6 G/DL (ref 3.5–5.2)
ALP SERPL-CCNC: 218 U/L (ref 40–130)
ALT SERPL-CCNC: 21 U/L (ref 5–41)
ANION GAP SERPL CALCULATED.3IONS-SCNC: 10 MMOL/L (ref 7–19)
AST SERPL-CCNC: 15 U/L (ref 5–40)
BILIRUB SERPL-MCNC: 0.5 MG/DL (ref 0.2–1.2)
BUN SERPL-MCNC: 53 MG/DL (ref 6–20)
CALCIUM SERPL-MCNC: 7.8 MG/DL (ref 8.6–10)
CHLORIDE SERPL-SCNC: 106 MMOL/L (ref 98–111)
CO2 SERPL-SCNC: 22 MMOL/L (ref 22–29)
CREAT SERPL-MCNC: 2.3 MG/DL (ref 0.5–1.2)
ERYTHROCYTE [DISTWIDTH] IN BLOOD BY AUTOMATED COUNT: 17.2 % (ref 11.5–14.5)
GLUCOSE BLD-MCNC: 183 MG/DL (ref 70–99)
GLUCOSE BLD-MCNC: 216 MG/DL (ref 70–99)
GLUCOSE BLD-MCNC: 221 MG/DL (ref 70–99)
GLUCOSE BLD-MCNC: 271 MG/DL (ref 70–99)
GLUCOSE SERPL-MCNC: 232 MG/DL (ref 74–109)
HCT VFR BLD AUTO: 27.4 % (ref 42–52)
HGB BLD-MCNC: 8.3 G/DL (ref 14–18)
MCH RBC QN AUTO: 28.5 PG (ref 27–31)
MCHC RBC AUTO-ENTMCNC: 30.3 G/DL (ref 33–37)
MCV RBC AUTO: 94.2 FL (ref 80–94)
PERFORMED ON: ABNORMAL
PLATELET # BLD AUTO: 186 K/UL (ref 130–400)
PMV BLD AUTO: 10.3 FL (ref 9.4–12.4)
POTASSIUM SERPL-SCNC: 4.3 MMOL/L (ref 3.5–5)
POTASSIUM SERPL-SCNC: 4.3 MMOL/L (ref 3.5–5)
PROT SERPL-MCNC: 5.7 G/DL (ref 6.6–8.7)
RBC # BLD AUTO: 2.91 M/UL (ref 4.7–6.1)
SODIUM SERPL-SCNC: 138 MMOL/L (ref 136–145)
WBC # BLD AUTO: 8.8 K/UL (ref 4.8–10.8)

## 2024-06-24 PROCEDURE — 6370000000 HC RX 637 (ALT 250 FOR IP): Performed by: INTERNAL MEDICINE

## 2024-06-24 PROCEDURE — 97535 SELF CARE MNGMENT TRAINING: CPT

## 2024-06-24 PROCEDURE — 82962 GLUCOSE BLOOD TEST: CPT

## 2024-06-24 PROCEDURE — 6370000000 HC RX 637 (ALT 250 FOR IP): Performed by: PSYCHIATRY & NEUROLOGY

## 2024-06-24 PROCEDURE — 36415 COLL VENOUS BLD VENIPUNCTURE: CPT

## 2024-06-24 PROCEDURE — 6370000000 HC RX 637 (ALT 250 FOR IP): Performed by: NURSE PRACTITIONER

## 2024-06-24 PROCEDURE — 97530 THERAPEUTIC ACTIVITIES: CPT

## 2024-06-24 PROCEDURE — 6360000002 HC RX W HCPCS: Performed by: NURSE PRACTITIONER

## 2024-06-24 PROCEDURE — 85027 COMPLETE CBC AUTOMATED: CPT

## 2024-06-24 PROCEDURE — 6360000002 HC RX W HCPCS: Performed by: INTERNAL MEDICINE

## 2024-06-24 PROCEDURE — 94640 AIRWAY INHALATION TREATMENT: CPT

## 2024-06-24 PROCEDURE — 97165 OT EVAL LOW COMPLEX 30 MIN: CPT

## 2024-06-24 PROCEDURE — 80053 COMPREHEN METABOLIC PANEL: CPT

## 2024-06-24 PROCEDURE — 1200000000 HC SEMI PRIVATE

## 2024-06-24 PROCEDURE — 94760 N-INVAS EAR/PLS OXIMETRY 1: CPT

## 2024-06-24 PROCEDURE — 2580000003 HC RX 258: Performed by: NURSE PRACTITIONER

## 2024-06-24 RX ADMIN — PIPERACILLIN AND TAZOBACTAM 3375 MG: 3; .375 INJECTION, POWDER, LYOPHILIZED, FOR SOLUTION INTRAVENOUS at 04:55

## 2024-06-24 RX ADMIN — APIXABAN 5 MG: 5 TABLET, FILM COATED ORAL at 08:30

## 2024-06-24 RX ADMIN — PIPERACILLIN AND TAZOBACTAM 3375 MG: 3; .375 INJECTION, POWDER, LYOPHILIZED, FOR SOLUTION INTRAVENOUS at 12:54

## 2024-06-24 RX ADMIN — METOPROLOL TARTRATE 25 MG: 25 TABLET, FILM COATED ORAL at 08:30

## 2024-06-24 RX ADMIN — IPRATROPIUM BROMIDE AND ALBUTEROL SULFATE 1 DOSE: 2.5; .5 SOLUTION RESPIRATORY (INHALATION) at 10:43

## 2024-06-24 RX ADMIN — GUAIFENESIN 600 MG: 600 TABLET ORAL at 13:48

## 2024-06-24 RX ADMIN — POLYETHYLENE GLYCOL 3350 17 G: 17 POWDER, FOR SOLUTION ORAL at 20:19

## 2024-06-24 RX ADMIN — METOPROLOL TARTRATE 25 MG: 25 TABLET, FILM COATED ORAL at 20:18

## 2024-06-24 RX ADMIN — PREGABALIN 100 MG: 50 CAPSULE ORAL at 08:30

## 2024-06-24 RX ADMIN — MAGNESIUM HYDROXIDE 30 ML: 400 SUSPENSION ORAL at 20:19

## 2024-06-24 RX ADMIN — LEVOFLOXACIN 750 MG: 750 TABLET, FILM COATED ORAL at 17:29

## 2024-06-24 RX ADMIN — PREGABALIN 100 MG: 50 CAPSULE ORAL at 20:18

## 2024-06-24 RX ADMIN — INSULIN LISPRO 2 UNITS: 100 INJECTION, SOLUTION INTRAVENOUS; SUBCUTANEOUS at 12:54

## 2024-06-24 RX ADMIN — PANTOPRAZOLE SODIUM 40 MG: 40 TABLET, DELAYED RELEASE ORAL at 17:29

## 2024-06-24 RX ADMIN — SODIUM CHLORIDE, PRESERVATIVE FREE 10 ML: 5 INJECTION INTRAVENOUS at 08:33

## 2024-06-24 RX ADMIN — APIXABAN 5 MG: 5 TABLET, FILM COATED ORAL at 20:18

## 2024-06-24 RX ADMIN — GUAIFENESIN 600 MG: 600 TABLET ORAL at 08:30

## 2024-06-24 RX ADMIN — INSULIN LISPRO 1 UNITS: 100 INJECTION, SOLUTION INTRAVENOUS; SUBCUTANEOUS at 17:29

## 2024-06-24 RX ADMIN — CALCITRIOL CAPSULES 0.25 MCG 0.25 MCG: 0.25 CAPSULE ORAL at 08:30

## 2024-06-24 RX ADMIN — IPRATROPIUM BROMIDE AND ALBUTEROL SULFATE 1 DOSE: 2.5; .5 SOLUTION RESPIRATORY (INHALATION) at 15:36

## 2024-06-24 RX ADMIN — GUAIFENESIN 600 MG: 600 TABLET ORAL at 20:19

## 2024-06-24 RX ADMIN — IPRATROPIUM BROMIDE AND ALBUTEROL SULFATE 1 DOSE: 2.5; .5 SOLUTION RESPIRATORY (INHALATION) at 19:30

## 2024-06-24 RX ADMIN — IPRATROPIUM BROMIDE AND ALBUTEROL SULFATE 1 DOSE: 2.5; .5 SOLUTION RESPIRATORY (INHALATION) at 06:46

## 2024-06-24 RX ADMIN — PANTOPRAZOLE SODIUM 40 MG: 40 TABLET, DELAYED RELEASE ORAL at 05:02

## 2024-06-24 RX ADMIN — PIPERACILLIN AND TAZOBACTAM 3375 MG: 3; .375 INJECTION, POWDER, LYOPHILIZED, FOR SOLUTION INTRAVENOUS at 20:18

## 2024-06-24 RX ADMIN — BUMETANIDE 1 MG: 0.25 INJECTION INTRAMUSCULAR; INTRAVENOUS at 08:33

## 2024-06-24 RX ADMIN — BUMETANIDE 1 MG: 0.25 INJECTION INTRAMUSCULAR; INTRAVENOUS at 17:33

## 2024-06-24 RX ADMIN — PREGABALIN 100 MG: 50 CAPSULE ORAL at 13:48

## 2024-06-24 RX ADMIN — HYDROCODONE BITARTRATE AND ACETAMINOPHEN 1 TABLET: 10; 325 TABLET ORAL at 05:01

## 2024-06-24 RX ADMIN — POLYETHYLENE GLYCOL 3350 17 G: 17 POWDER, FOR SOLUTION ORAL at 08:32

## 2024-06-24 RX ADMIN — METOLAZONE 2.5 MG: 2.5 TABLET ORAL at 08:30

## 2024-06-24 RX ADMIN — INSULIN LISPRO 1 UNITS: 100 INJECTION, SOLUTION INTRAVENOUS; SUBCUTANEOUS at 08:30

## 2024-06-24 ASSESSMENT — PAIN DESCRIPTION - LOCATION: LOCATION: LEG

## 2024-06-24 ASSESSMENT — PAIN SCALES - GENERAL
PAINLEVEL_OUTOF10: 9
PAINLEVEL_OUTOF10: 2

## 2024-06-24 ASSESSMENT — PAIN DESCRIPTION - DESCRIPTORS: DESCRIPTORS: ACHING

## 2024-06-24 ASSESSMENT — PAIN - FUNCTIONAL ASSESSMENT: PAIN_FUNCTIONAL_ASSESSMENT: ACTIVITIES ARE NOT PREVENTED

## 2024-06-24 ASSESSMENT — PAIN DESCRIPTION - ORIENTATION: ORIENTATION: RIGHT

## 2024-06-24 NOTE — CARE COORDINATION
Nellie, patient's  with Worker's comp, called for an update. She requested clinical update and faxed to her.     Worker's Comp  Nellie -   Ph # [phone]  Fax # [phone]    Electronically signed by Jus Marks, RN, BSN on 6/24/2024 at 3:31 PM

## 2024-06-25 ENCOUNTER — HOSPITAL ENCOUNTER (INPATIENT)
Age: 59
LOS: 5 days | Discharge: HOME | DRG: 559 | End: 2024-06-30
Attending: PSYCHIATRY & NEUROLOGY | Admitting: PSYCHIATRY & NEUROLOGY
Payer: COMMERCIAL

## 2024-06-25 VITALS
OXYGEN SATURATION: 94 % | HEIGHT: 70 IN | SYSTOLIC BLOOD PRESSURE: 145 MMHG | WEIGHT: 207.6 LBS | HEART RATE: 88 BPM | TEMPERATURE: 97.5 F | BODY MASS INDEX: 29.72 KG/M2 | DIASTOLIC BLOOD PRESSURE: 86 MMHG | RESPIRATION RATE: 18 BRPM

## 2024-06-25 LAB
ANION GAP SERPL CALCULATED.3IONS-SCNC: 13 MMOL/L (ref 7–19)
BUN SERPL-MCNC: 49 MG/DL (ref 6–20)
CALCIUM SERPL-MCNC: 7.5 MG/DL (ref 8.6–10)
CHLORIDE SERPL-SCNC: 104 MMOL/L (ref 98–111)
CO2 SERPL-SCNC: 21 MMOL/L (ref 22–29)
CREAT SERPL-MCNC: 2.4 MG/DL (ref 0.5–1.2)
GLUCOSE BLD-MCNC: 187 MG/DL (ref 70–99)
GLUCOSE BLD-MCNC: 212 MG/DL (ref 70–99)
GLUCOSE BLD-MCNC: 235 MG/DL (ref 70–99)
GLUCOSE BLD-MCNC: 247 MG/DL (ref 70–99)
GLUCOSE SERPL-MCNC: 181 MG/DL (ref 74–109)
PERFORMED ON: ABNORMAL
POTASSIUM SERPL-SCNC: 4.2 MMOL/L (ref 3.5–5)
SODIUM SERPL-SCNC: 138 MMOL/L (ref 136–145)

## 2024-06-25 PROCEDURE — 36415 COLL VENOUS BLD VENIPUNCTURE: CPT

## 2024-06-25 PROCEDURE — 1180000000 HC REHAB R&B

## 2024-06-25 PROCEDURE — 6360000002 HC RX W HCPCS: Performed by: INTERNAL MEDICINE

## 2024-06-25 PROCEDURE — 6370000000 HC RX 637 (ALT 250 FOR IP): Performed by: PSYCHIATRY & NEUROLOGY

## 2024-06-25 PROCEDURE — 6370000000 HC RX 637 (ALT 250 FOR IP): Performed by: INTERNAL MEDICINE

## 2024-06-25 PROCEDURE — 80048 BASIC METABOLIC PNL TOTAL CA: CPT

## 2024-06-25 PROCEDURE — 82962 GLUCOSE BLOOD TEST: CPT

## 2024-06-25 PROCEDURE — 6360000002 HC RX W HCPCS: Performed by: NURSE PRACTITIONER

## 2024-06-25 PROCEDURE — 6370000000 HC RX 637 (ALT 250 FOR IP): Performed by: NURSE PRACTITIONER

## 2024-06-25 PROCEDURE — 2580000003 HC RX 258: Performed by: NURSE PRACTITIONER

## 2024-06-25 PROCEDURE — 94760 N-INVAS EAR/PLS OXIMETRY 1: CPT

## 2024-06-25 PROCEDURE — 94640 AIRWAY INHALATION TREATMENT: CPT

## 2024-06-25 RX ORDER — DILTIAZEM HYDROCHLORIDE 120 MG/1
120 CAPSULE, COATED, EXTENDED RELEASE ORAL DAILY
Status: DISCONTINUED | OUTPATIENT
Start: 2024-06-25 | End: 2024-06-25 | Stop reason: HOSPADM

## 2024-06-25 RX ORDER — NALOXONE HYDROCHLORIDE 0.4 MG/ML
0.4 INJECTION, SOLUTION INTRAMUSCULAR; INTRAVENOUS; SUBCUTANEOUS PRN
Status: CANCELLED | OUTPATIENT
Start: 2024-06-25

## 2024-06-25 RX ORDER — INSULIN LISPRO 100 [IU]/ML
0-4 INJECTION, SOLUTION INTRAVENOUS; SUBCUTANEOUS NIGHTLY
Status: DISCONTINUED | OUTPATIENT
Start: 2024-06-25 | End: 2024-06-30 | Stop reason: HOSPADM

## 2024-06-25 RX ORDER — INSULIN LISPRO 100 [IU]/ML
0-4 INJECTION, SOLUTION INTRAVENOUS; SUBCUTANEOUS
Status: DISCONTINUED | OUTPATIENT
Start: 2024-06-25 | End: 2024-06-30 | Stop reason: HOSPADM

## 2024-06-25 RX ORDER — PREGABALIN 50 MG/1
100 CAPSULE ORAL 3 TIMES DAILY
Status: DISCONTINUED | OUTPATIENT
Start: 2024-06-25 | End: 2024-06-30 | Stop reason: HOSPADM

## 2024-06-25 RX ORDER — ONDANSETRON 2 MG/ML
4 INJECTION INTRAMUSCULAR; INTRAVENOUS EVERY 6 HOURS PRN
Status: DISCONTINUED | OUTPATIENT
Start: 2024-06-25 | End: 2024-06-30 | Stop reason: HOSPADM

## 2024-06-25 RX ORDER — BUMETANIDE 0.25 MG/ML
1 INJECTION INTRAMUSCULAR; INTRAVENOUS 2 TIMES DAILY
Status: CANCELLED | OUTPATIENT
Start: 2024-06-25

## 2024-06-25 RX ORDER — HYDROCODONE BITARTRATE AND ACETAMINOPHEN 10; 325 MG/1; MG/1
1 TABLET ORAL EVERY 4 HOURS PRN
Status: CANCELLED | OUTPATIENT
Start: 2024-06-25

## 2024-06-25 RX ORDER — ERGOCALCIFEROL 1.25 MG/1
50000 CAPSULE ORAL WEEKLY
Status: DISCONTINUED | OUTPATIENT
Start: 2024-07-02 | End: 2024-06-30 | Stop reason: HOSPADM

## 2024-06-25 RX ORDER — ERGOCALCIFEROL 1.25 MG/1
50000 CAPSULE ORAL WEEKLY
Status: CANCELLED | OUTPATIENT
Start: 2024-07-02

## 2024-06-25 RX ORDER — FAMOTIDINE 20 MG/1
20 TABLET, FILM COATED ORAL 2 TIMES DAILY
Status: DISCONTINUED | OUTPATIENT
Start: 2024-06-25 | End: 2024-06-25 | Stop reason: HOSPADM

## 2024-06-25 RX ORDER — POLYETHYLENE GLYCOL 3350 17 G/17G
17 POWDER, FOR SOLUTION ORAL DAILY
Status: DISCONTINUED | OUTPATIENT
Start: 2024-06-25 | End: 2024-06-25

## 2024-06-25 RX ORDER — INSULIN LISPRO 100 [IU]/ML
0-4 INJECTION, SOLUTION INTRAVENOUS; SUBCUTANEOUS NIGHTLY
Status: CANCELLED | OUTPATIENT
Start: 2024-06-25

## 2024-06-25 RX ORDER — POLYETHYLENE GLYCOL 3350 17 G/17G
17 POWDER, FOR SOLUTION ORAL 2 TIMES DAILY
Status: DISCONTINUED | OUTPATIENT
Start: 2024-06-25 | End: 2024-06-30 | Stop reason: HOSPADM

## 2024-06-25 RX ORDER — POLYETHYLENE GLYCOL 3350 17 G/17G
17 POWDER, FOR SOLUTION ORAL DAILY PRN
Status: DISCONTINUED | OUTPATIENT
Start: 2024-06-25 | End: 2024-06-30 | Stop reason: HOSPADM

## 2024-06-25 RX ORDER — ONDANSETRON 4 MG/1
4 TABLET, ORALLY DISINTEGRATING ORAL EVERY 8 HOURS PRN
Status: CANCELLED | OUTPATIENT
Start: 2024-06-25

## 2024-06-25 RX ORDER — METOLAZONE 2.5 MG/1
2.5 TABLET ORAL DAILY
Status: CANCELLED | OUTPATIENT
Start: 2024-06-26

## 2024-06-25 RX ORDER — DILTIAZEM HYDROCHLORIDE 120 MG/1
120 CAPSULE, COATED, EXTENDED RELEASE ORAL DAILY
Status: CANCELLED | OUTPATIENT
Start: 2024-06-25

## 2024-06-25 RX ORDER — METOLAZONE 2.5 MG/1
2.5 TABLET ORAL DAILY
Status: DISCONTINUED | OUTPATIENT
Start: 2024-06-26 | End: 2024-06-30

## 2024-06-25 RX ORDER — POLYETHYLENE GLYCOL 3350 17 G/17G
17 POWDER, FOR SOLUTION ORAL 2 TIMES DAILY
Status: CANCELLED | OUTPATIENT
Start: 2024-06-25

## 2024-06-25 RX ORDER — SENNA AND DOCUSATE SODIUM 50; 8.6 MG/1; MG/1
2 TABLET, FILM COATED ORAL 2 TIMES DAILY PRN
Status: CANCELLED | OUTPATIENT
Start: 2024-06-25

## 2024-06-25 RX ORDER — CALCITRIOL 0.25 UG/1
0.25 CAPSULE, LIQUID FILLED ORAL DAILY
Status: CANCELLED | OUTPATIENT
Start: 2024-06-26

## 2024-06-25 RX ORDER — DILTIAZEM HYDROCHLORIDE 120 MG/1
120 CAPSULE, COATED, EXTENDED RELEASE ORAL DAILY
Status: DISCONTINUED | OUTPATIENT
Start: 2024-06-26 | End: 2024-06-30 | Stop reason: HOSPADM

## 2024-06-25 RX ORDER — DEXTROSE MONOHYDRATE 100 MG/ML
INJECTION, SOLUTION INTRAVENOUS CONTINUOUS PRN
Status: DISCONTINUED | OUTPATIENT
Start: 2024-06-25 | End: 2024-06-30 | Stop reason: HOSPADM

## 2024-06-25 RX ORDER — INSULIN LISPRO 100 [IU]/ML
0-4 INJECTION, SOLUTION INTRAVENOUS; SUBCUTANEOUS
Status: CANCELLED | OUTPATIENT
Start: 2024-06-25

## 2024-06-25 RX ORDER — ACETAMINOPHEN 325 MG/1
650 TABLET ORAL EVERY 4 HOURS PRN
Status: DISCONTINUED | OUTPATIENT
Start: 2024-06-25 | End: 2024-06-30 | Stop reason: HOSPADM

## 2024-06-25 RX ORDER — NALOXONE HYDROCHLORIDE 0.4 MG/ML
0.4 INJECTION, SOLUTION INTRAMUSCULAR; INTRAVENOUS; SUBCUTANEOUS PRN
Status: DISCONTINUED | OUTPATIENT
Start: 2024-06-25 | End: 2024-06-30 | Stop reason: HOSPADM

## 2024-06-25 RX ORDER — BUMETANIDE 0.25 MG/ML
1 INJECTION INTRAMUSCULAR; INTRAVENOUS 2 TIMES DAILY
Status: DISCONTINUED | OUTPATIENT
Start: 2024-06-25 | End: 2024-06-27

## 2024-06-25 RX ORDER — HYDROCODONE BITARTRATE AND ACETAMINOPHEN 10; 325 MG/1; MG/1
1 TABLET ORAL EVERY 4 HOURS PRN
Status: DISCONTINUED | OUTPATIENT
Start: 2024-06-25 | End: 2024-06-30 | Stop reason: HOSPADM

## 2024-06-25 RX ORDER — CALCITRIOL 0.25 UG/1
0.25 CAPSULE, LIQUID FILLED ORAL DAILY
Status: DISCONTINUED | OUTPATIENT
Start: 2024-06-26 | End: 2024-06-30 | Stop reason: HOSPADM

## 2024-06-25 RX ORDER — BISACODYL 10 MG
10 SUPPOSITORY, RECTAL RECTAL DAILY PRN
Status: DISCONTINUED | OUTPATIENT
Start: 2024-06-25 | End: 2024-06-30 | Stop reason: HOSPADM

## 2024-06-25 RX ORDER — IPRATROPIUM BROMIDE AND ALBUTEROL SULFATE 2.5; .5 MG/3ML; MG/3ML
1 SOLUTION RESPIRATORY (INHALATION) 4 TIMES DAILY
Status: CANCELLED | OUTPATIENT
Start: 2024-06-25

## 2024-06-25 RX ORDER — ONDANSETRON 2 MG/ML
4 INJECTION INTRAMUSCULAR; INTRAVENOUS EVERY 6 HOURS PRN
Status: CANCELLED | OUTPATIENT
Start: 2024-06-25

## 2024-06-25 RX ORDER — SENNA AND DOCUSATE SODIUM 50; 8.6 MG/1; MG/1
2 TABLET, FILM COATED ORAL 2 TIMES DAILY PRN
Status: DISCONTINUED | OUTPATIENT
Start: 2024-06-25 | End: 2024-06-30 | Stop reason: HOSPADM

## 2024-06-25 RX ORDER — SENNA AND DOCUSATE SODIUM 50; 8.6 MG/1; MG/1
1 TABLET, FILM COATED ORAL 2 TIMES DAILY
Status: DISCONTINUED | OUTPATIENT
Start: 2024-06-25 | End: 2024-06-30 | Stop reason: HOSPADM

## 2024-06-25 RX ORDER — PREGABALIN 50 MG/1
100 CAPSULE ORAL 3 TIMES DAILY
Status: CANCELLED | OUTPATIENT
Start: 2024-06-25

## 2024-06-25 RX ORDER — ONDANSETRON 4 MG/1
4 TABLET, ORALLY DISINTEGRATING ORAL EVERY 8 HOURS PRN
Status: DISCONTINUED | OUTPATIENT
Start: 2024-06-25 | End: 2024-06-30 | Stop reason: HOSPADM

## 2024-06-25 RX ORDER — IPRATROPIUM BROMIDE AND ALBUTEROL SULFATE 2.5; .5 MG/3ML; MG/3ML
1 SOLUTION RESPIRATORY (INHALATION) 4 TIMES DAILY
Status: DISCONTINUED | OUTPATIENT
Start: 2024-06-25 | End: 2024-06-25 | Stop reason: HOSPADM

## 2024-06-25 RX ADMIN — BUMETANIDE 1 MG: 0.25 INJECTION INTRAMUSCULAR; INTRAVENOUS at 18:07

## 2024-06-25 RX ADMIN — PREGABALIN 100 MG: 50 CAPSULE ORAL at 21:41

## 2024-06-25 RX ADMIN — PANTOPRAZOLE SODIUM 40 MG: 40 TABLET, DELAYED RELEASE ORAL at 05:00

## 2024-06-25 RX ADMIN — IPRATROPIUM BROMIDE AND ALBUTEROL SULFATE 1 DOSE: 2.5; .5 SOLUTION RESPIRATORY (INHALATION) at 11:00

## 2024-06-25 RX ADMIN — INSULIN LISPRO 1 UNITS: 100 INJECTION, SOLUTION INTRAVENOUS; SUBCUTANEOUS at 18:06

## 2024-06-25 RX ADMIN — APIXABAN 5 MG: 5 TABLET, FILM COATED ORAL at 08:33

## 2024-06-25 RX ADMIN — PREGABALIN 100 MG: 50 CAPSULE ORAL at 08:33

## 2024-06-25 RX ADMIN — IPRATROPIUM BROMIDE AND ALBUTEROL SULFATE 1 DOSE: 2.5; .5 SOLUTION RESPIRATORY (INHALATION) at 07:45

## 2024-06-25 RX ADMIN — INSULIN LISPRO 1 UNITS: 100 INJECTION, SOLUTION INTRAVENOUS; SUBCUTANEOUS at 12:19

## 2024-06-25 RX ADMIN — METOLAZONE 2.5 MG: 2.5 TABLET ORAL at 08:33

## 2024-06-25 RX ADMIN — PIPERACILLIN AND TAZOBACTAM 3375 MG: 3; .375 INJECTION, POWDER, LYOPHILIZED, FOR SOLUTION INTRAVENOUS at 12:21

## 2024-06-25 RX ADMIN — HYDROCODONE BITARTRATE AND ACETAMINOPHEN 1 TABLET: 10; 325 TABLET ORAL at 21:47

## 2024-06-25 RX ADMIN — DILTIAZEM HYDROCHLORIDE 120 MG: 120 CAPSULE, EXTENDED RELEASE ORAL at 12:19

## 2024-06-25 RX ADMIN — CALCITRIOL CAPSULES 0.25 MCG 0.25 MCG: 0.25 CAPSULE ORAL at 08:33

## 2024-06-25 RX ADMIN — APIXABAN 5 MG: 5 TABLET, FILM COATED ORAL at 21:41

## 2024-06-25 RX ADMIN — METOPROLOL TARTRATE 25 MG: 25 TABLET, FILM COATED ORAL at 08:33

## 2024-06-25 RX ADMIN — PIPERACILLIN AND TAZOBACTAM 3375 MG: 3; .375 INJECTION, POWDER, LYOPHILIZED, FOR SOLUTION INTRAVENOUS at 04:59

## 2024-06-25 RX ADMIN — GUAIFENESIN 600 MG: 600 TABLET ORAL at 08:33

## 2024-06-25 RX ADMIN — METOPROLOL TARTRATE 25 MG: 25 TABLET, FILM COATED ORAL at 21:41

## 2024-06-25 RX ADMIN — BUMETANIDE 1 MG: 0.25 INJECTION INTRAMUSCULAR; INTRAVENOUS at 08:34

## 2024-06-25 RX ADMIN — FAMOTIDINE 20 MG: 20 TABLET ORAL at 12:19

## 2024-06-25 RX ADMIN — ERGOCALCIFEROL 50000 UNITS: 1.25 CAPSULE ORAL at 08:33

## 2024-06-25 ASSESSMENT — LIFESTYLE VARIABLES
HOW MANY STANDARD DRINKS CONTAINING ALCOHOL DO YOU HAVE ON A TYPICAL DAY: PATIENT DOES NOT DRINK
HOW OFTEN DO YOU HAVE A DRINK CONTAINING ALCOHOL: NEVER

## 2024-06-25 ASSESSMENT — PAIN DESCRIPTION - LOCATION: LOCATION: LEG

## 2024-06-25 ASSESSMENT — PAIN SCALES - GENERAL
PAINLEVEL_OUTOF10: 10
PAINLEVEL_OUTOF10: 7

## 2024-06-25 ASSESSMENT — PAIN DESCRIPTION - DESCRIPTORS: DESCRIPTORS: THROBBING

## 2024-06-25 ASSESSMENT — PAIN DESCRIPTION - ORIENTATION: ORIENTATION: RIGHT

## 2024-06-25 NOTE — CONSULTS
Nephrology ([facility]) Consult Note      Patient:  Noe Jimenez  YOB: 1965  Date of Service: 6/25/2024  MRN: [id number]   Acct: [id number]   Primary Care Physician: Willie Kim MD  Advance Directive: Prior  Admit Date: 6/25/2024       Hospital Day: 0  Referring Provider: Colton Gusman MD    Patient independently seen and examined, Chart, Consults, Notes, Operative notes, Labs, Cardiology, and Radiology studies reviewed as available.        Subjective:  Noe Jimenez is a 59 y.o. male for whom we were consulted for evaluation and treatment of acute kidney injury.  Patient denied any history of chronic kidney disease.  However he had one episode of acute kidney injury during last hospitalization at [facility] which was resolved. During that hospitalization he underwent right BKA.  Patient also has a history of atrial fibrillation, CHF, hypertension, hyperlipidemia, type 2 diabetes and chronic cellulitis with osteomyelitis of right foot.  After amputation of his right leg, he was admitted to [facility] rehab floor for rehabilitation.  However, he has suffered with parainfluenza virus leading to left lower lobe pneumonia.  On March 17, patient developed respiratory failure and was transferred to progressive care unit.  Patient is receiving IV antibiotics/oxygen.  He had significant decline in renal function and marked swelling of lower extremities/scrotal and penile swelling.  Patient has responded to intravenous diuretics and had excellent urine output.  Patient moved to the rehab floor on 6/25.    Today, no overnight events.  Seen with family.  Moved to the rehab floor.  Still with peripheral edema and good response to diuretic therapy.  Did note some diarrhea earlier. Denied other complaints on questioning.  Tolerating diet.          Allergies:  Dilaudid [hydromorphone], Morphine, and Percocet [oxycodone-acetaminophen]    Medicines:  No current

## 2024-06-25 NOTE — PLAN OF CARE
Problem: Safety - Adult  Goal: Free from fall injury  6/25/2024 0950 by Cara Jeronimo RN  Outcome: Progressing  6/24/2024 2144 by Jason Magdaleno RN  Outcome: Progressing     Problem: ABCDS Injury Assessment  Goal: Absence of physical injury  6/25/2024 0950 by Cara Jeronimo RN  Outcome: Progressing  6/24/2024 2144 by Jason Magdaleno RN  Outcome: Progressing     Problem: Skin/Tissue Integrity  Goal: Absence of new skin breakdown  Description: 1.  Monitor for areas of redness and/or skin breakdown  2.  Assess vascular access sites hourly  3.  Every 4-6 hours minimum:  Change oxygen saturation probe site  4.  Every 4-6 hours:  If on nasal continuous positive airway pressure, respiratory therapy assess nares and determine need for appliance change or resting period.  6/25/2024 0950 by Cara Jeronimo RN  Outcome: Progressing  6/24/2024 2144 by Jason Magdaleno RN  Outcome: Progressing     Problem: Discharge Planning  Goal: Discharge to home or other facility with appropriate resources  6/25/2024 0950 by Cara Jeronimo RN  Outcome: Progressing  Flowsheets (Taken 6/25/2024 0837)  Discharge to home or other facility with appropriate resources: Identify barriers to discharge with patient and caregiver  6/24/2024 2144 by Jason Magdaleno RN  Outcome: Progressing  Flowsheets (Taken 6/24/2024 0828 by Heather Mckeon, RN)  Discharge to home or other facility with appropriate resources: Identify barriers to discharge with patient and caregiver     Problem: Chronic Conditions and Co-morbidities  Goal: Patient's chronic conditions and co-morbidity symptoms are monitored and maintained or improved  6/25/2024 0950 by Cara Jeronimo RN  Outcome: Progressing  6/24/2024 2144 by Jason Magdaleno RN  Outcome: Progressing  Flowsheets (Taken 6/24/2024 0828 by Heather Mckeon RN)  Care Plan - Patient's Chronic Conditions and Co-Morbidity Symptoms are Monitored and Maintained or Improved: Monitor and assess patient's chronic conditions and comorbid

## 2024-06-25 NOTE — PROGRESS NOTES
Pharmacy Intravenous to Oral Protocol    Medication changed per [facility] IV to PO protocol: levaquin    Patient meets the following inclusion criteria and none of the exclusion criteria:    Inclusion criteria:  - IV therapy > 24 hours (antibiotics only)  - Tolerating diet more advanced than clear liquids  - Tolerating PO medications  - No vasopressor blood pressure support (ie no signs of shock)  - Patient hasn't had a seizure for 72 hrs (antiepileptic medications only)    Exclusion criteria:  - Infections requiring IV therapy (ie meningitis, endocarditis, osteomyelitis, pancreatitis)   - Nausea and/or vomiting or severe diarrhea within past 24 hours   - Has gastrectomy, ileus, gastric outlet or bowel obstruction, or malabsorption syndromes   - Has significant painful oral ulceration   - TPN with an NPO order   - Active GI bleed   - Unable to swallow   - NPO   - Febrile in the last 24 hours (antibiotics only)   - Clinical deteriorating or unstable (antibiotics only)   - Pediatric patients and patients who are not euthyroid (not on oral levothyroxine/not stabilized on oral levothyroxine)- Levothyroxine only     KEV BYRNES, PHARM D, 6/22/2024, 7:58 AM      
                                Pharmacy Intravenous to Oral Protocol    Medication changed per [facility] IV to PO protocol: pantoprazole    Patient meets the following inclusion criteria and none of the exclusion criteria:    Inclusion criteria:  - IV therapy > 24 hours (antibiotics only)  - Tolerating diet more advanced than clear liquids  - Tolerating PO medications  - No vasopressor blood pressure support (ie no signs of shock)  - Patient hasn't had a seizure for 72 hrs (antiepileptic medications only)    Exclusion criteria:  - Infections requiring IV therapy (ie meningitis, endocarditis, osteomyelitis, pancreatitis)   - Nausea and/or vomiting or severe diarrhea within past 24 hours   - Has gastrectomy, ileus, gastric outlet or bowel obstruction, or malabsorption syndromes   - Has significant painful oral ulceration   - TPN with an NPO order   - Active GI bleed   - Unable to swallow   - NPO   - Febrile in the last 24 hours (antibiotics only)   - Clinical deteriorating or unstable (antibiotics only)   - Pediatric patients and patients who are not euthyroid (not on oral levothyroxine/not stabilized on oral levothyroxine)- Levothyroxine only     KEV BYRNES, PHARM D, 6/22/2024, 8:27 AM      
  Date:2024  Patient: Noe Jimenez  : 1965  MRN:[id number]  CODE:                                                                        PCP:Tigre Meeks MD    Admit Date: 2024  1:39 PM   LOS: 6 days     Hospital course : The patient is a 59 y.o. 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  This was done at [facility].  Patient was transferred over to inpatient rehab on 2024.     While being on inpatient rehab he developed low-grade fever initially diagnosed with parainfluenza but later developed pneumonia treated with Zosyn and Levaquin and transferred to hospital inpatient in fourth floor.  He was treated for pneumonia and heart failure developed high oxygen requirement transferred to ICU given high flow oxygen by nasal cannula and finally downgraded on  to PCU and thereafter to medical floor.   Presently having volume overload from heart failure and JANICE nephrology on board getting diuresis      Subjective:   seen and evaluated at bedside still having volume overload over the dependent area of the body, RN and nephrologist at bedside discussed about the present management of volume overload and continue on diuretics.  As his oxygen requirement has gone down so transfer from PCU to fifth floor.  Discussed with RN to give external catheter to have a proper urine output.  Discussed with the patient about the present ongoing management.  And  consulted for back to inpatient rehab     nothing happened overnight, since started on diuretics his swelling has significantly improved along with his creatinine slowly trending down.  He remarked that his penile swelling has gone down remarkably.  He has bowel movement yesterday.  Discussed the plan of discharge to acute inpatient rehab,  on board.        Review of 
  Date:2024  Patient: Noe Jimenez  : 1965  MRN:[id number]  CODE:                                                                        PCP:Tigre Meeks MD    Admit Date: 2024  1:39 PM   LOS: 4 days     Hospital course : The patient is a 59 y.o. 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  This was done at [facility].  Patient was transferred over to inpatient rehab on 2024.       Consult called today for fever , cough, acute kidney injury and hyperkalemia..  Nephrology was also consulted ..  Patient denies dyspnea but he desaturated to 83% was placed on 3 L per nasal cannula..Patient lungs were quite congested.  BNP lactic and Pro-Alvin were drawn emergently BNP was 14,973 lactic was 2.1 Pro-Alvin 0.77 patient did test positive for parainfluenza on nasal swab.  He has run a low-grade fever.  Patient gives a history of acute fluid overload previously and reports a 26 pound weight gain since his surgery..  He was on Lasix prior to admission and that has not been resumed.  He was given a dose of 40 mg IV with not a great urine output.    2024 patient was seen on acute rehab and found to be requiring much more oxygen..  Decision made to move patient to the floor so I can monitor him continuously.  Patient's BNP was more elevated and his lungs sound for bilateral rales and rhonchi.  Chest x-ray showed right greater than left basilar opacities most likely representing the pneumonia.  Zosyn and Levaquin initiated.  Patient moved to medical bed hospital-acquired pneumonia orders . Pts  BNP 38220, PROCAL 4.10, WBC 12.4, HGB7.1, HCT 22.7  D dimer 8.24.  VQ scan low probability of clot.      2024-patient's O2 demand went up to 10 L overnight.  When arriving to room patient was saturating at 87 and seemed to be working harder with breathing.  
  Pharmacy Adjustment per [facility] protocol    Noe Jimenez is a 59 y.o. male. Pharmacy has adjusted medications per [facility] protocol.    Recent Labs     06/17/24  0358 06/18/24  0826   BUN 53* 60*       Recent Labs     06/17/24  0358 06/18/24  0826   CREATININE 1.5* 2.0*       Estimated Creatinine Clearance: 46 mL/min (A) (based on SCr of 2 mg/dL (H)).    Height:   Ht Readings from Last 1 Encounters:   06/17/24 1.778 m (5' 10\")     Weight:  Wt Readings from Last 1 Encounters:   06/18/24 94.5 kg (208 lb 4.8 oz)         Plan: Adjust the following medications based on [facility] protocol:           Change Levaquin 500 mg IV daily to Levaquin 750 mg IV Q 48 hours.    Electronically signed by Omar Singh RPH on 6/18/2024 at 2:37 PM Change  
24 hour orders verified.    Electronically signed by Jason Magdaleno RN on 6/25/2024 at 12:55 AM    
4 Eyes Skin Assessment     NAME:  Noe Jimenez  YOB: 1965  MEDICAL RECORD NUMBER:  [id number]    The patient is being assessed for  Transfer to New Unit    I agree that at least one RN has performed a thorough Head to Toe Skin Assessment on the patient. ALL assessment sites listed below have been assessed.      Areas assessed by both nurses:    Head, Face, Ears, Shoulders, Back, Chest, Arms, Elbows, Hands, Sacrum. Buttock, Coccyx, Ischium, Legs. Feet and Heels, and Under Medical Devices         Does the Patient have a Wound? Yes wound(s) were present on assessment. LDA wound assessment was Initiated and completed by RN       Osmar Prevention initiated by RN: Yes  Wound Care Orders initiated by RN: Yes    Pressure Injury (Stage 3,4, Unstageable, DTI, NWPT, and Complex wounds) if present, place Wound referral order by RN under : No    New Ostomies, if present place, Ostomy referral order under : No     Nurse 1 eSignature: Electronically signed by Brinda Baxter RN on 6/21/24 at 4:26 PM CDT    **SHARE this note so that the co-signing nurse can place an eSignature**    Nurse 2 eSignature: Electronically signed by Nadja Peacock RN on 6/21/24 at 4:26 PM CDT   
New consult placed for support, goc by ICU RN.  Palliative care RN rec report from  JOSH Marie.  Pt arrived a short time ago his wife is present.  Pt is currently undergoing US.  Pt is currently on HHF NC 50%.  Since pt in pain, undergoing US and having some difficulty with SOA palliative will revisit in the morning and talk with pt/family  provide support.    Electronically signed by Margaret Renee RN on 6/19/2024 at 2:21 PM      
Noe JEWELL Barbara received from ICU to room # 715-2 .  Mental Status: Patient is oriented, alert, coherent, logical, thought processes intact, and able to concentrate and follow conversation.   Vitals:    06/21/24 1553   BP: 128/73   Pulse: 80   Resp: 18   Temp: 98.2 °F (36.8 °C)   SpO2: 98%     Placed on cardiac monitor: Yes. Box # [id number] .  Belongings: Glasses, clothes, phone , phone  with patient at bedside .  Family at bedside Yes.  Oriented Patient and Family to room.  Call light within reach. Yes.  Transfer was: Well tolerated by patient..    Electronically signed by Brinda Baxter RN on 6/21/2024 at 3:55 PM     
Noe Jimenez transferred to 715 from [facility] via bed.    Reason for transfer: Lower level of care   Explained reason for transfer to Patient and Family.   Belongings: Glasses, cell phone, clothing, blankets  with patient at bedside .   Soft chart transferred with patient: Yes.  Telemetry box transferred with patient: yes.  Report given to: Brinda MORENO, via telephone.      Electronically signed by Amirah Leon RN on 6/21/2024 at 4:01 PM   
Palliative Care Follow Up Visit:  Patient sitting up in hospital bed, alone in his room.  Patient is alert and awake, able to participate in conversation and answer questions.  Patient reports that he feels better today than he did yesterday.  Patient's breathing/oxygen status is improving.  Patient has gone from HHF at 30% yesterday, to supplemental oxygen at 2 LPM via nasal canula today.  Patient states he is breathing much better today.  States he has fair appetite, denies any nausea or vomiting.   Discussed pain, and patient states that he only has pain to right leg, reports pain is 10/10, but that he does not want pain medication yet.  Patient states he is fearful of pain medication interfering with his breathing and he doesn't want to suppress anything with his lungs and have to go back on HHF oxygen.  Patient states that he will ask for pain medication if pain gets any worse.    Discussed patient goals, patient states he is hopeful to get moved to 7th floor today and out of ICU, then once he is medically stable and feeling better, he plans to return home where he lives with his wife.      Plan: To continue medical treatment and monitoring.  Active listening and support provided.  Palliative Care will continue to follow and support.    Electronically signed by Aggie Hollis RN on 6/21/2024 at 10:09 AM    
Patient provided clean urinal for urine specimen   
Physical Therapy    Name: Noe Jimenez  MRN: [id number]  Date of service: 6/24/2024 06/24/24 1315   Restrictions/Precautions   Restrictions/Precautions Fall Risk;Weight Bearing   Lower Extremity Weight Bearing Restrictions   Right Lower Extremity Weight Bearing Non Weight Bearing   Left Lower Extremity Weight Bearing Weight Bearing As Tolerated   Position Activity Restriction   Other position/activity restrictions [employer]   General   Patient assessed for rehabilitation services? Yes   Diagnosis RIGHT BKA; HAP   General Comment   Comments RN states pt is on BSC   Subjective   Subjective Pt is finished and ready to get back to bed   Observation/Palpation   Observation did not have 02 on this session   Bed mobility   Sit to Supine Stand by assistance   Transfers   Stand Pivot Transfers Stand by assistance   Comment stood to clean after BM, transfer off BSC back to bed   Short Term Goals   Time Frame for Short Term Goals 14 days   Short Term Goal 1 AMB 10' w RW, CGA   Short Term Goal 2 TF SURFACE TO SURFACE, CGA   Assessment   Assessment Pt is doing well. On BSC upon entry. Pt stated he was finsihed. Stood  holding into bed for clean up. Then squat pivot back to bed. Pt left in bed with all needs in reach. Pt has all equipment at home that is needed   Discharge Recommendations Continue to assess pending progress;24 hour supervision or assist;Patient would benefit from continued therapy after discharge   Physical Therapy Plan   General Plan 5-7 times per week   Therapy Duration 2 Weeks   Current Treatment Recommendations Strengthening;Balance training;Gait training;Functional mobility training;Transfer training;Positioning;Safety education & training;Patient/Caregiver education & training;Therapeutic activities   PT Plan of Care   Monday X   Safety Devices   Type of Devices Left in bed;Bed alarm in place;Call light within reach   PT Whiteboard Notes   Therapy Whiteboard RE 7/5; BKA, HAP (plans to return to [facility] 
Physical Therapy  Facility/Department: [facility] ICU  Physical Therapy Initial Assessment    Name: Noe Jimenez  : 1965  MRN: [id number]  Date of Service: 2024    Discharge Recommendations:  Continue to assess pending progress, 24 hour supervision or assist, Patient would benefit from continued therapy after discharge (pt plans to go back to 8th floor rehab)          Patient Diagnosis(es): The primary encounter diagnosis was Persistent atrial fibrillation (HCC). A diagnosis of Shortness of breath was also pertinent to this visit.  Past Medical History:  has a past medical history of Diabetes mellitus (HCC) and Hypertension.  Past Surgical History:  has a past surgical history that includes Cardiac surgery; back surgery; fracture surgery; and Rotator cuff repair (Right).    Assessment   Body Structures, Functions, Activity Limitations Requiring Skilled Therapeutic Intervention: Decreased functional mobility ;Decreased balance  Assessment: Pt would benefit from continued skilled PT in this setting to address post-op BKA and decrease in overall strength.  Therapy Prognosis: Good  Decision Making: Low Complexity  Requires PT Follow-Up: Yes  Activity Tolerance  Activity Tolerance: Patient tolerated evaluation without incident;Patient tolerated treatment well     Plan   Physical Therapy Plan  General Plan: 5-7 times per week  Therapy Duration: 2 Weeks  Current Treatment Recommendations: Strengthening, Balance training, Gait training, Functional mobility training, Transfer training, Positioning, Safety education & training, Patient/Caregiver education & training, Therapeutic activities  Safety Devices  Type of Devices: Gait belt, Call light within reach, Left in chair (RN present)     Restrictions  Restrictions/Precautions  Restrictions/Precautions: Fall Risk, Weight Bearing  Required Braces or Orthoses?: Yes  Lower Extremity Weight Bearing Restrictions  Right Lower Extremity Weight Bearing: Non Weight 
Pt placed on FNC 50LPM 100% .  
RN spoke to Dr. Webb on rounds regarding stat ABG's. MD requested RN to message respiratory therapist and request ABG's are done and to notify  MARCELO PINTO of results. RN stated to Dr. Webb that she would do so, and left nursing unit to check orders.   
RN with MYRON Meade checking new orders placed, orders noted and perfect serve message was sent to Ovidio Looney RRT to please obtain stat ABG's. RRT was in unit headed going to bs now. This RN to BS to access/assist pt. MARCELO PINTO at BS. Per ARNP pt's oxygen levels had dropped to 86 percent on 10L NC. Pt had just voided in urinal per pt and spouse at BS and that's when oxygen levels had dropped. Per ARNP plan to transfer to ICU. Charge RNSAMANTHA aware and house supervisor aware of need for ICU bed. RN remained at BS with pt.   
commode  Bathroom Accessibility: Wheelchair accessible  Home Equipment: Wheelchair - Manual  Has the patient had two or more falls in the past year or any fall with injury in the past year?: No  Receives Help From: Family  ADL Assistance: Independent  Homemaking Assistance: Independent  Homemaking Responsibilities: No  Ambulation Assistance: Independent  Transfer Assistance: Independent  Active : No  Patient's  Info: spouse  Mode of Transportation: Car  Occupation: Workers comp  Type of Occupation: Injury from workman's comp  Additional Comments: Has a wheelchair and a BSC that has been delivered to the home that the patient has not used yet.   Spouse was asked to have the wc checked for appropriateness before use. Spouse states it is a rental and was ordered prior to amputation. May have to check for leg rests.       Objective   Temp: 97.3 °F (36.3 °C)  Pulse: 81  Heart Rate Source: Monitor  Respirations: 16  SpO2: 94 %  O2 Device: None (Room air)  BP: (!) 143/88  MAP (Calculated): 106  BP Location: Right upper arm  Patient Position: Sitting                      AROM: Generally decreased, functional  Strength: Generally decreased, functional  ADL  Feeding: Independent  Grooming: Independent  UE Bathing: Supervision  LE Bathing: Supervision  UE Dressing: Supervision  LE Dressing: Minimal assistance  Toileting: Minimal assistance        Bed mobility  Supine to Sit: Stand by assistance  Transfers  Stand Pivot Transfers: Stand by assistance  Sit to stand: Stand by assistance  Vision  Vision Exceptions: Wears glasses at all times  Hearing  Hearing: Within functional limits  Cognition  Overall Cognitive Status: WNL  Orientation  Overall Orientation Status: Within Normal Limits                                           G-Code     OutComes Score                                                  AM-PAC - ADL       Tinneti Score       Goals  Short Term Goals  Time Frame for Short Term Goals: 1 week  Short Term 
levoFLOXacin (LEVAQUIN) tablet 750 mg  750 mg Oral Q48H Cecile Mancilla, APRN - CNP   750 mg at 06/22/24 1706    pantoprazole (PROTONIX) tablet 40 mg  40 mg Oral BID AC Phan Mcgovern MD   40 mg at 06/23/24 0549    sennosides-docusate sodium (SENOKOT-S) 8.6-50 MG tablet 2 tablet  2 tablet Oral BID PRN Jose A Webb MD   2 tablet at 06/22/24 1336    magnesium hydroxide (MILK OF MAGNESIA) 400 MG/5ML suspension 30 mL  30 mL Oral BID Jose A Webb MD   30 mL at 06/22/24 1336    bumetanide (BUMEX) injection 1 mg  1 mg IntraVENous BID Chip Costa MD   1 mg at 06/23/24 0842    sodium chloride 0.9 % bolus 250 mL  250 mL IntraVENous Once Phan Mcgovern MD   Held at 06/21/24 0119    0.9 % sodium chloride infusion   IntraVENous PRN Phan Mcgovern MD        polyethylene glycol (GLYCOLAX) packet 17 g  17 g Oral BID Jose A Webb MD   17 g at 06/22/24 0912    metoprolol tartrate (LOPRESSOR) tablet 25 mg  25 mg Oral BID Jose A Webb MD   25 mg at 06/23/24 0842    dilTIAZem 125 mg in sodium chloride 0.9 % 125 mL infusion  2.5-15 mg/hr IntraVENous Continuous Cecile Mancilla APRN - CNP   Stopped at 06/20/24 1500    acetaminophen (TYLENOL) tablet 650 mg  650 mg Oral Q4H PRN Eduardo Ryan MD   650 mg at 06/20/24 1447    apixaban (ELIQUIS) tablet 5 mg  5 mg Oral BID Eduardo Ryan MD   5 mg at 06/23/24 0842    dextrose 10 % infusion   IntraVENous Continuous PRN Eduardo Ryan MD        dextrose bolus 10% 125 mL  125 mL IntraVENous PRN Eduardo Ryan MD        Or    dextrose bolus 10% 250 mL  250 mL IntraVENous PRN Eduardo Ryan MD        glucagon injection 1 mg  1 mg SubCUTAneous PRN Eduardo Ryan MD        glucose chewable tablet 16 g  4 tablet Oral PRN Eduardo Ryan MD        HYDROcodone-acetaminophen (NORCO)  MG per tablet 1 tablet  1 tablet Oral Q4H PRN Eduardo Ryan MD   1 tablet at 06/22/24 2052    insulin lispro (HUMALOG,ADMELOG) injection vial 0-4 Units  0-4 Units SubCUTAneous TID WC 
6.10 M/uL    Hemoglobin 8.8 (L) 14.0 - 18.0 g/dL    Hematocrit 28.8 (L) 42.0 - 52.0 %    MCV 93.2 80.0 - 94.0 fL    MCH 28.5 27.0 - 31.0 pg    MCHC 30.6 (L) 33.0 - 37.0 g/dL    RDW 17.1 (H) 11.5 - 14.5 %    Platelets 220 130 - 400 K/uL    MPV 10.4 9.4 - 12.4 fL   POCT Glucose    Collection Time: 06/23/24  7:58 AM   Result Value Ref Range    POC Glucose 183 (H) 70 - 99 mg/dl    Performed on AccuChek    POCT Glucose    Collection Time: 06/23/24 10:50 AM   Result Value Ref Range    POC Glucose 217 (H) 70 - 99 mg/dl    Performed on AccuChek         I/O last 3 completed shifts:  In: 0   Out: 300 [Urine:300]     levoFLOXacin  750 mg Oral Q48H    pantoprazole  40 mg Oral BID AC    magnesium hydroxide  30 mL Oral BID    bumetanide  1 mg IntraVENous BID    sodium chloride  250 mL IntraVENous Once    polyethylene glycol  17 g Oral BID    metoprolol tartrate  25 mg Oral BID    apixaban  5 mg Oral BID    insulin lispro  0-4 Units SubCUTAneous TID WC    insulin lispro  0-4 Units SubCUTAneous Nightly    ipratropium 0.5 mg-albuterol 2.5 mg  1 Dose Inhalation Q4H WA RT    pregabalin  100 mg Oral TID    [START ON 6/25/2024] vitamin D  50,000 Units Oral Weekly    sodium chloride flush  5-40 mL IntraVENous 2 times per day    guaiFENesin  600 mg Oral TID    piperacillin-tazobactam  3,375 mg IntraVENous Q8H           I have reviewed the patient's daily labs, including BMP and CBC with pertinent results discussed below.     Reviewed imaging     Assessment & Plan     Acute hypoxic respiratory failure  Multiple etiology CHF from volume overload treated with diuretics  Parainfluenza infection with superadded Bacterial pneumonia MRSA negative  Follow-up chest x-ray improving  Will continue Levaquin and Zosyn  Initially on high flow oxygen/heated airflow continue wean off to nasal cannula  Will send for sputum culture for further evaluation  Blood culture negative to date  6/21 started on incentive spirometry and Acapella  Will encourage out 
([facility])  Resolved Problems:    * No resolved hospital problems. *    Principal Problem:    HAP (hospital-acquired pneumonia)   Continue current antibiotics   Continuous pulse ox   Spoke with wife about possible need for intubation if his O2   demand continues to increase   Heated high flow   MRSA nasal probe  Active Problems:    S/P BKA (below knee amputation) unilateral, right ([facility])   Drainage culture pending    Type 2 diabetes mellitus with complication, without long-term current use of insulin ([facility])   Low-dose insulin protocol  Accu-Cheks before meals and at bedtime   Hypoglycemic protocol     Essential hypertension   Continue home medications   Monitor for need to adjust    Atrial fibrillation ([facility])   Cardizem bolus and drip   EKG   Echo    Anemia   Type and crossmatch and transfuse 1 unit packed red cells    Renal dysfunction   Daily lab   Nephrology following   Avoid nephrotoxic agents   Avoid hypotension   Lokelma 10 daily    Vitamin D deficiency   Vitamin D 50,000 units weekly    Acute hypoxic respiratory failure ([facility])   Repeat chest x-ray   Echo pending   Heated high flow oxygen 50 L/25 FiO2  Resolved Problems:    * No resolved hospital problems. *      Antibiotic: Zosyn    DVT Prophylaxis: eliquis    GI prophylaxis:  protonix     Discharge planning: TBD       Further Orders per Clinical course/attending.     Electronically signed by CORONA Eli CNP on 6/19/2024 at 1:51 PM       EMR Dragon/Transcription disclaimer:   Much of this encounter note is an electronic transcription/translation of spoken language to printed text. The electronic translation of spoken language may permit erroneous, or at times, nonsensical words or phrases to be inadvertently transcribed; although attempts have made to review the note for such errors, some may still exist.  
improving  Will continue Levaquin and Zosyn  Initially on high flow oxygen/heated airflow continue wean off to nasal cannula  Will send for sputum culture for further evaluation  Blood culture negative to date  6/21 started on incentive spirometry and Acapella  Will encourage out of bed to chair    Developed JANICE creatinine trending up 2.4, 2.9  No history of CKD with GFR more than 60 before admission  JANICE likely prerenal from negative balance 1.7 L over the last 3 days from diuretics used for CHF  Will avoid nephrotoxic drug and hypotension  6/21 started on normal saline 50 cc/h for 10 hours   continue to monitor for renal function    Hyperkalemia  Nephrology consulted in rehab unit  Started on Lokelma  6/21 potassium trending down to 4.1, discontinue Lokelma    Acute on chronic anemia  Blood loss likely from procedure and ongoing infection  Will transfuse if hemoglobin less than 7      Atrial fibrillation with RVR  History of paroxysmal A-fib  Anticoagulation on Eliquis  Echocardiogram reviewed   Cardizem bolus and drip  6/21 rate is better controlled drip has been discontinued  Will start on metoprolol 25 mg p.o. twice daily      Comorbid condition     Type 2 diabetes mellitus with complication, without long-term current use of insulin (HCC)              Low-dose insulin protocol  Accu-Cheks before meals and at bedtime       Hypoglycemic protocol               Essential hypertension              Continue home medications              Monitor for need to adjust     Incidental finding of PFO on echocardiogram  Cardiology evaluated no further intervention     Recent history of BKA right side  With ongoing anemia evaluated by vascular surgeon  No further intervention     Vitamin D deficiency              Vitamin D 50,000 units weekly    Constipation likely due to slow colonic transit  6/21 on MiraLAX as needed changed to twice daily  Started on Colace p.o. twice daily      DVT prophylaxis: Apixaban    POA wife  Full 
              Sangeetha Gramajo MD,  6/21/2024 12:41 PM  
review the note for such errors, some may still exist.  
visualized. Normal sized ascending aorta. Mildly dilated sinus of Valsalva. Ao sinus diameter is 4.2 cm.   Pericardium: No pericardial effusion.   IVC/SVC: IVC is dilated and decreases greater than 50% during inspiration (RAP ~8 mmHg).     XR CHEST PORTABLE    Result Date: 6/19/2024  EXAM:  CHEST RADIOGRAPH (1 VIEW)  TECHNIQUE:  Frontal Chest Radiograph.  HISTORY:  Increased oxygen demand  COMPARISON:  06/18/2024  FINDINGS:  Lines, Tubes, Devices:  Wires are present from median sternotomy.  Lungs and Pleura:  No focal consolidation.  No pleural effusion.  No pneumothorax. No pulmonary edema.  Cardiomediastinum: No enlarged cardiomediastinal silhouette.  No aortic calcifications.  Bones/Soft Tissues: No acute osseous abnormality.  No soft tissue abnormality.  Upper Abdomen: Within normal limits.        Mild cardiomegaly with no acute cardiopulmonary process    ______________________________________ Electronically signed by: LAWANDA MCALLISTER M.D. Date:     06/19/2024 Time:    08:11     NM LUNG SCAN PERFUSION ONLY    Result Date: 6/18/2024  EXAM: PERFUSION LUNG SCAN  HISTORY: Increased oxygen requirement  COMPARISON:  X-ray 06/18/2024.  PROCEDURE:  Ventilation: This portion of the standard examination was not performed.  Perfusion:  The patient was injected with 5.0 mCi of 99 technetium MAA intravenously after which anterior, posterior , lateral and posterior oblique images were obtained.  FINDINGS: The perfusion images demonstrate relatively decreased activity in the posterior lung fields bilaterally which is probably associated with underlying lung disease.  No discrete segmental or subsegmental perfusion defects typically seen with pulmonary embolus are identified.  The cardiac silhouette is enlarged.  The accompanying chest x-ray reports right greater than the left basilar opacities most likely representing pneumonia.       1.  Low probability of pulmonary embolus based upon a limited, perfusion only examination. 2.

## 2024-06-25 NOTE — CARE COORDINATION
[facility]  Notification of Admission Decision      [x] [facility]      Please write discharge orders and summary prior to discharge.    [] Patient acceptance to Rehab pending the following :    [] Eval in progress       [] Patient determined to be ineligible for services at [facility] because :       We recommend you consider        Thank you for your referral, we appreciate you. If you have any questions, please feel   free to contact me at [phone].  Electronically Signed by Nilsa Leo, Admissions Coordinator 6/25/2024 1:25 PM

## 2024-06-25 NOTE — DISCHARGE SUMMARY
Discharge Summary    NAME: Noe Jimenez  :  1965  MRN:  [id number]    Admit date:  2024  Discharge date:     Admitting Physician:  Jose A Webb MD    Advance Directive: Full Code    Consults: nephrology    Primary Care Physician:  Tigre Meeks MD    HOSPITAL COURSE:  The patient is a 59 y.o. 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  This was done at [facility].  Patient was transferred over to inpatient rehab on 2024.      While being on inpatient rehab he developed low-grade fever initially diagnosed with parainfluenza but later developed pneumonia treated with Zosyn and Levaquin and transferred to hospital inpatient in fourth floor.  He was treated for pneumonia and heart failure developed high oxygen requirement transferred to ICU given high flow oxygen by nasal cannula and finally downgraded on  to PCU and thereafter to medical floor.   Presently having volume overload from heart failure and JANICE nephrology on board getting diuresis      seen and evaluated along with RN he is still volume overloaded but having diuresis will continue on Bumex IV and metolazone have nephrology follow-up, got treated for pneumonia encouraged to continue on incentive and Acapella.  Discussed with RN about scrotal support, and left leg wound care with Mepilex dressing.  Presently in room air going to inpatient rehab today as he has been approved by the insurance       DISCHARGE DIAGNOSES WITH COURSE OF MANAGEMENT :       Acute hypoxic respiratory failure  Multiple etiology CHF from volume overload treated with diuretics  Parainfluenza infection with superadded Bacterial pneumonia MRSA negative  Follow-up chest x-ray improving  Will continue Levaquin and Zosyn>> complete the course of antibiotics  Initially on high flow oxygen/heated airflow continue wean off

## 2024-06-25 NOTE — CARE COORDINATION
[facility]  Notification of Admission Decision      [x] [facility]21      Please write discharge orders and summary prior to discharge.    [] Patient acceptance to Rehab pending the following :    [] Eval in progress       [] Patient determined to be ineligible for services at [facility] because :       We recommend you consider        Thank you for your referral, we appreciate you. If you have any questions, please feel   free to contact me at [phone].  Electronically Signed by Nilsa Leo, Admissions Coordinator 6/25/2024 9:34 AM

## 2024-06-26 LAB
ANION GAP SERPL CALCULATED.3IONS-SCNC: 12 MMOL/L (ref 7–19)
BASOPHILS # BLD: 0.1 K/UL (ref 0–0.2)
BASOPHILS NFR BLD: 1.1 % (ref 0–1)
BUN SERPL-MCNC: 46 MG/DL (ref 6–20)
CALCIUM SERPL-MCNC: 8.5 MG/DL (ref 8.6–10)
CHLORIDE SERPL-SCNC: 104 MMOL/L (ref 98–111)
CO2 SERPL-SCNC: 23 MMOL/L (ref 22–29)
CREAT SERPL-MCNC: 2.5 MG/DL (ref 0.5–1.2)
EOSINOPHIL # BLD: 0.3 K/UL (ref 0–0.6)
EOSINOPHIL NFR BLD: 2.7 % (ref 0–5)
ERYTHROCYTE [DISTWIDTH] IN BLOOD BY AUTOMATED COUNT: 17.2 % (ref 11.5–14.5)
GLUCOSE BLD-MCNC: 186 MG/DL (ref 70–99)
GLUCOSE BLD-MCNC: 190 MG/DL (ref 70–99)
GLUCOSE BLD-MCNC: 233 MG/DL (ref 70–99)
GLUCOSE BLD-MCNC: 252 MG/DL (ref 70–99)
GLUCOSE SERPL-MCNC: 187 MG/DL (ref 74–109)
HCT VFR BLD AUTO: 29.9 % (ref 42–52)
HGB BLD-MCNC: 9.4 G/DL (ref 14–18)
IMM GRANULOCYTES # BLD: 0.3 K/UL
LYMPHOCYTES # BLD: 1.7 K/UL (ref 1.1–4.5)
LYMPHOCYTES NFR BLD: 16.5 % (ref 20–40)
MAGNESIUM SERPL-MCNC: 2.3 MG/DL (ref 1.6–2.6)
MCH RBC QN AUTO: 28.7 PG (ref 27–31)
MCHC RBC AUTO-ENTMCNC: 31.4 G/DL (ref 33–37)
MCV RBC AUTO: 91.4 FL (ref 80–94)
MONOCYTES # BLD: 0.5 K/UL (ref 0–0.9)
MONOCYTES NFR BLD: 5.3 % (ref 0–10)
NEUTROPHILS # BLD: 7.2 K/UL (ref 1.5–7.5)
NEUTS SEG NFR BLD: 71.6 % (ref 50–65)
PERFORMED ON: ABNORMAL
PLATELET # BLD AUTO: 223 K/UL (ref 130–400)
PMV BLD AUTO: 10 FL (ref 9.4–12.4)
POTASSIUM SERPL-SCNC: 4.4 MMOL/L (ref 3.5–5)
PREALB SERPL-MCNC: 20 MG/DL (ref 20–40)
RBC # BLD AUTO: 3.27 M/UL (ref 4.7–6.1)
SODIUM SERPL-SCNC: 139 MMOL/L (ref 136–145)
WBC # BLD AUTO: 10.1 K/UL (ref 4.8–10.8)

## 2024-06-26 PROCEDURE — 80048 BASIC METABOLIC PNL TOTAL CA: CPT

## 2024-06-26 PROCEDURE — 97530 THERAPEUTIC ACTIVITIES: CPT

## 2024-06-26 PROCEDURE — 97110 THERAPEUTIC EXERCISES: CPT

## 2024-06-26 PROCEDURE — 1180000000 HC REHAB R&B

## 2024-06-26 PROCEDURE — 83735 ASSAY OF MAGNESIUM: CPT

## 2024-06-26 PROCEDURE — 6360000002 HC RX W HCPCS: Performed by: INTERNAL MEDICINE

## 2024-06-26 PROCEDURE — 97116 GAIT TRAINING THERAPY: CPT

## 2024-06-26 PROCEDURE — 84134 ASSAY OF PREALBUMIN: CPT

## 2024-06-26 PROCEDURE — 85025 COMPLETE CBC W/AUTO DIFF WBC: CPT

## 2024-06-26 PROCEDURE — 94760 N-INVAS EAR/PLS OXIMETRY 1: CPT

## 2024-06-26 PROCEDURE — 6370000000 HC RX 637 (ALT 250 FOR IP): Performed by: PSYCHIATRY & NEUROLOGY

## 2024-06-26 PROCEDURE — 82962 GLUCOSE BLOOD TEST: CPT

## 2024-06-26 PROCEDURE — 97161 PT EVAL LOW COMPLEX 20 MIN: CPT

## 2024-06-26 PROCEDURE — 97535 SELF CARE MNGMENT TRAINING: CPT

## 2024-06-26 PROCEDURE — 97165 OT EVAL LOW COMPLEX 30 MIN: CPT

## 2024-06-26 PROCEDURE — 6370000000 HC RX 637 (ALT 250 FOR IP): Performed by: INTERNAL MEDICINE

## 2024-06-26 PROCEDURE — 36415 COLL VENOUS BLD VENIPUNCTURE: CPT

## 2024-06-26 RX ADMIN — INSULIN LISPRO 1 UNITS: 100 INJECTION, SOLUTION INTRAVENOUS; SUBCUTANEOUS at 12:13

## 2024-06-26 RX ADMIN — METOPROLOL TARTRATE 25 MG: 25 TABLET, FILM COATED ORAL at 07:48

## 2024-06-26 RX ADMIN — PREGABALIN 100 MG: 50 CAPSULE ORAL at 20:39

## 2024-06-26 RX ADMIN — SENNOSIDES AND DOCUSATE SODIUM 1 TABLET: 50; 8.6 TABLET ORAL at 07:48

## 2024-06-26 RX ADMIN — PREGABALIN 100 MG: 50 CAPSULE ORAL at 07:48

## 2024-06-26 RX ADMIN — CALCITRIOL CAPSULES 0.25 MCG 0.25 MCG: 0.25 CAPSULE ORAL at 07:48

## 2024-06-26 RX ADMIN — APIXABAN 5 MG: 5 TABLET, FILM COATED ORAL at 07:48

## 2024-06-26 RX ADMIN — BUMETANIDE 1 MG: 0.25 INJECTION INTRAMUSCULAR; INTRAVENOUS at 07:48

## 2024-06-26 RX ADMIN — METOPROLOL TARTRATE 25 MG: 25 TABLET, FILM COATED ORAL at 20:38

## 2024-06-26 RX ADMIN — DILTIAZEM HYDROCHLORIDE 120 MG: 120 CAPSULE, COATED, EXTENDED RELEASE ORAL at 07:48

## 2024-06-26 RX ADMIN — HYDROCODONE BITARTRATE AND ACETAMINOPHEN 1 TABLET: 10; 325 TABLET ORAL at 08:08

## 2024-06-26 RX ADMIN — PREGABALIN 100 MG: 50 CAPSULE ORAL at 14:03

## 2024-06-26 RX ADMIN — METOLAZONE 2.5 MG: 2.5 TABLET ORAL at 07:48

## 2024-06-26 RX ADMIN — BUMETANIDE 1 MG: 0.25 INJECTION INTRAMUSCULAR; INTRAVENOUS at 17:33

## 2024-06-26 RX ADMIN — HYDROCODONE BITARTRATE AND ACETAMINOPHEN 1 TABLET: 10; 325 TABLET ORAL at 20:39

## 2024-06-26 RX ADMIN — APIXABAN 5 MG: 5 TABLET, FILM COATED ORAL at 20:39

## 2024-06-26 RX ADMIN — INSULIN LISPRO 2 UNITS: 100 INJECTION, SOLUTION INTRAVENOUS; SUBCUTANEOUS at 17:33

## 2024-06-26 ASSESSMENT — PAIN DESCRIPTION - DESCRIPTORS
DESCRIPTORS: THROBBING
DESCRIPTORS: THROBBING

## 2024-06-26 ASSESSMENT — PAIN SCALES - GENERAL
PAINLEVEL_OUTOF10: 3
PAINLEVEL_OUTOF10: 5
PAINLEVEL_OUTOF10: 9
PAINLEVEL_OUTOF10: 9

## 2024-06-26 ASSESSMENT — PAIN DESCRIPTION - ORIENTATION
ORIENTATION: RIGHT
ORIENTATION: RIGHT

## 2024-06-26 ASSESSMENT — PAIN DESCRIPTION - LOCATION
LOCATION: LEG
LOCATION: LEG

## 2024-06-26 NOTE — H&P
interdisciplinary team approach provided through an individualized plan of care. I approve admitting this patient for an intensive inpatient rehabilitation program.      Colton Gusman MD

## 2024-06-26 NOTE — PATIENT CARE CONFERENCE
[facility] ACUTE INPATIENT REHABILITATION  TEAM CONFERENCE NOTE    Date: 2024  Patient Name: Noe Jimenez        MRN: [id number]    : 1965  (59 y.o.)  Gender: male      Diagnosis: RIGHT BKA; HAP      PHYSICAL THERAPY:  Evaluate today      SPEECH THERAPY: N/A      OCCUPATIONAL THERAPY:  Evaluate today      NUTRITION  Current Wt: Weight - Scale: 93 kg (205 lb) / Body mass index is 29.41 kg/m².  Admission Wt: Admission Body Weight: 90.3 kg (199 lb)  Oral Diet Orders:     Oral Nutrition Supplement (ONS) Orders:    Please see nutrition note for details.      NURSING    Past Medical History:        Diagnosis Date    Diabetes mellitus (HCC)     Hypertension        Vitals:    24 0745 24 1749 24 0401 24 0656   BP: (!) 129/90 (!) 154/83 120/68    Pulse: 66 99 50    Resp:  16 18    Temp:  96.8 °F (36 °C) (!) 96.3 °F (35.7 °C)    TempSrc:  Temporal Temporal    SpO2:  91% 93% 92%   Weight:   93 kg (205 lb)    Height:            SpO2: 92 %    On Room Air    Wounds/Incisions/Ulcers: Incision healing well     Osmar Scale Score: 19    Pain: Patient's pain is currently controlled with Norco 10 mg q 4 hrs PRN    Consultations/Labs/X-rays: POC Glucose range 187- 235    Family Education: Need to make contact with family to initiate education    Fall Risk:  Velma Carreon Total Score: 70    Fall in the last week? No    Sleep Concerns: \"No concerns to address    Last BM: Last BM (including prior to admit): 24    Other Nursing Issues: Alert and oriented x4, cont of bowel and bladder, meds consumed whole with water, IID RAC, assist x1 with transfers, NWB LLE, Regular diet        SOCIAL WORK/CASE MANAGEMENT  Assessment:  and Mrs. Jimenez known to the Rehab floor and process- returning after medical complications.  Eager to resume rehabilitative care and to prepare to go home    Discharge Plan   Estimated Length of Stay: to be determined  Destination: discharge home with supervision

## 2024-06-27 LAB
ANION GAP SERPL CALCULATED.3IONS-SCNC: 13 MMOL/L (ref 7–19)
BASOPHILS # BLD: 0.1 K/UL (ref 0–0.2)
BASOPHILS NFR BLD: 1.1 % (ref 0–1)
BUN SERPL-MCNC: 54 MG/DL (ref 6–20)
CALCIUM SERPL-MCNC: 8.4 MG/DL (ref 8.6–10)
CHLORIDE SERPL-SCNC: 102 MMOL/L (ref 98–111)
CO2 SERPL-SCNC: 21 MMOL/L (ref 22–29)
CREAT SERPL-MCNC: 2.5 MG/DL (ref 0.5–1.2)
EOSINOPHIL # BLD: 0.4 K/UL (ref 0–0.6)
EOSINOPHIL NFR BLD: 3.6 % (ref 0–5)
ERYTHROCYTE [DISTWIDTH] IN BLOOD BY AUTOMATED COUNT: 17.1 % (ref 11.5–14.5)
GLUCOSE BLD-MCNC: 193 MG/DL (ref 70–99)
GLUCOSE BLD-MCNC: 211 MG/DL (ref 70–99)
GLUCOSE BLD-MCNC: 255 MG/DL (ref 70–99)
GLUCOSE BLD-MCNC: 301 MG/DL (ref 70–99)
GLUCOSE SERPL-MCNC: 222 MG/DL (ref 74–109)
HCT VFR BLD AUTO: 28.7 % (ref 42–52)
HGB BLD-MCNC: 8.7 G/DL (ref 14–18)
IMM GRANULOCYTES # BLD: 0.5 K/UL
LYMPHOCYTES # BLD: 1.9 K/UL (ref 1.1–4.5)
LYMPHOCYTES NFR BLD: 18.3 % (ref 20–40)
MCH RBC QN AUTO: 27.8 PG (ref 27–31)
MCHC RBC AUTO-ENTMCNC: 30.3 G/DL (ref 33–37)
MCV RBC AUTO: 91.7 FL (ref 80–94)
MONOCYTES # BLD: 0.6 K/UL (ref 0–0.9)
MONOCYTES NFR BLD: 6 % (ref 0–10)
NEUTROPHILS # BLD: 6.9 K/UL (ref 1.5–7.5)
NEUTS SEG NFR BLD: 65.9 % (ref 50–65)
PERFORMED ON: ABNORMAL
PLATELET # BLD AUTO: 224 K/UL (ref 130–400)
PMV BLD AUTO: 10 FL (ref 9.4–12.4)
POTASSIUM SERPL-SCNC: 4.3 MMOL/L (ref 3.5–5)
RBC # BLD AUTO: 3.13 M/UL (ref 4.7–6.1)
SODIUM SERPL-SCNC: 136 MMOL/L (ref 136–145)
WBC # BLD AUTO: 10.5 K/UL (ref 4.8–10.8)

## 2024-06-27 PROCEDURE — 82962 GLUCOSE BLOOD TEST: CPT

## 2024-06-27 PROCEDURE — 36415 COLL VENOUS BLD VENIPUNCTURE: CPT

## 2024-06-27 PROCEDURE — 97530 THERAPEUTIC ACTIVITIES: CPT

## 2024-06-27 PROCEDURE — 85025 COMPLETE CBC W/AUTO DIFF WBC: CPT

## 2024-06-27 PROCEDURE — 6370000000 HC RX 637 (ALT 250 FOR IP): Performed by: INTERNAL MEDICINE

## 2024-06-27 PROCEDURE — 97116 GAIT TRAINING THERAPY: CPT

## 2024-06-27 PROCEDURE — 97110 THERAPEUTIC EXERCISES: CPT

## 2024-06-27 PROCEDURE — 80048 BASIC METABOLIC PNL TOTAL CA: CPT

## 2024-06-27 PROCEDURE — 1180000000 HC REHAB R&B

## 2024-06-27 PROCEDURE — 97535 SELF CARE MNGMENT TRAINING: CPT

## 2024-06-27 PROCEDURE — 6360000002 HC RX W HCPCS: Performed by: INTERNAL MEDICINE

## 2024-06-27 RX ORDER — BUMETANIDE 1 MG/1
1 TABLET ORAL 2 TIMES DAILY
Status: DISCONTINUED | OUTPATIENT
Start: 2024-06-27 | End: 2024-06-30 | Stop reason: HOSPADM

## 2024-06-27 RX ADMIN — DOCUSATE SODIUM 50MG AND SENNOSIDES 8.6MG 2 TABLET: 8.6; 5 TABLET, FILM COATED ORAL at 07:48

## 2024-06-27 RX ADMIN — APIXABAN 5 MG: 5 TABLET, FILM COATED ORAL at 20:43

## 2024-06-27 RX ADMIN — METOPROLOL TARTRATE 25 MG: 25 TABLET, FILM COATED ORAL at 20:43

## 2024-06-27 RX ADMIN — INSULIN LISPRO 1 UNITS: 100 INJECTION, SOLUTION INTRAVENOUS; SUBCUTANEOUS at 12:54

## 2024-06-27 RX ADMIN — BUMETANIDE 1 MG: 1 TABLET ORAL at 16:55

## 2024-06-27 RX ADMIN — PREGABALIN 100 MG: 50 CAPSULE ORAL at 16:52

## 2024-06-27 RX ADMIN — CALCITRIOL CAPSULES 0.25 MCG 0.25 MCG: 0.25 CAPSULE ORAL at 07:48

## 2024-06-27 RX ADMIN — BUMETANIDE 1 MG: 0.25 INJECTION INTRAMUSCULAR; INTRAVENOUS at 07:47

## 2024-06-27 RX ADMIN — PREGABALIN 100 MG: 50 CAPSULE ORAL at 07:48

## 2024-06-27 RX ADMIN — PREGABALIN 100 MG: 50 CAPSULE ORAL at 20:43

## 2024-06-27 RX ADMIN — DILTIAZEM HYDROCHLORIDE 120 MG: 120 CAPSULE, COATED, EXTENDED RELEASE ORAL at 07:48

## 2024-06-27 RX ADMIN — HYDROCODONE BITARTRATE AND ACETAMINOPHEN 1 TABLET: 10; 325 TABLET ORAL at 01:24

## 2024-06-27 RX ADMIN — INSULIN LISPRO 4 UNITS: 100 INJECTION, SOLUTION INTRAVENOUS; SUBCUTANEOUS at 20:49

## 2024-06-27 RX ADMIN — APIXABAN 5 MG: 5 TABLET, FILM COATED ORAL at 07:47

## 2024-06-27 RX ADMIN — METOPROLOL TARTRATE 25 MG: 25 TABLET, FILM COATED ORAL at 07:48

## 2024-06-27 RX ADMIN — INSULIN LISPRO 2 UNITS: 100 INJECTION, SOLUTION INTRAVENOUS; SUBCUTANEOUS at 16:52

## 2024-06-27 RX ADMIN — METOLAZONE 2.5 MG: 2.5 TABLET ORAL at 07:48

## 2024-06-27 ASSESSMENT — PAIN DESCRIPTION - ORIENTATION: ORIENTATION: RIGHT

## 2024-06-27 ASSESSMENT — PAIN DESCRIPTION - LOCATION: LOCATION: LEG

## 2024-06-27 ASSESSMENT — PAIN - FUNCTIONAL ASSESSMENT: PAIN_FUNCTIONAL_ASSESSMENT: ACTIVITIES ARE NOT PREVENTED

## 2024-06-27 ASSESSMENT — PAIN SCALES - GENERAL: PAINLEVEL_OUTOF10: 9

## 2024-06-27 ASSESSMENT — PAIN DESCRIPTION - DESCRIPTORS: DESCRIPTORS: THROBBING

## 2024-06-28 LAB
ANION GAP SERPL CALCULATED.3IONS-SCNC: 8 MMOL/L (ref 7–19)
BUN SERPL-MCNC: 59 MG/DL (ref 6–20)
CALCIUM SERPL-MCNC: 8.8 MG/DL (ref 8.6–10)
CHLORIDE SERPL-SCNC: 105 MMOL/L (ref 98–111)
CO2 SERPL-SCNC: 23 MMOL/L (ref 22–29)
CREAT SERPL-MCNC: 2.2 MG/DL (ref 0.5–1.2)
GLUCOSE BLD-MCNC: 245 MG/DL (ref 70–99)
GLUCOSE BLD-MCNC: 268 MG/DL (ref 70–99)
GLUCOSE BLD-MCNC: 268 MG/DL (ref 70–99)
GLUCOSE BLD-MCNC: 274 MG/DL (ref 70–99)
GLUCOSE SERPL-MCNC: 192 MG/DL (ref 74–109)
PERFORMED ON: ABNORMAL
POTASSIUM SERPL-SCNC: 4.6 MMOL/L (ref 3.5–5)
SODIUM SERPL-SCNC: 136 MMOL/L (ref 136–145)

## 2024-06-28 PROCEDURE — 82962 GLUCOSE BLOOD TEST: CPT

## 2024-06-28 PROCEDURE — 80048 BASIC METABOLIC PNL TOTAL CA: CPT

## 2024-06-28 PROCEDURE — 97110 THERAPEUTIC EXERCISES: CPT

## 2024-06-28 PROCEDURE — 97116 GAIT TRAINING THERAPY: CPT

## 2024-06-28 PROCEDURE — 1180000000 HC REHAB R&B

## 2024-06-28 PROCEDURE — 6370000000 HC RX 637 (ALT 250 FOR IP): Performed by: INTERNAL MEDICINE

## 2024-06-28 PROCEDURE — 97530 THERAPEUTIC ACTIVITIES: CPT

## 2024-06-28 PROCEDURE — 94760 N-INVAS EAR/PLS OXIMETRY 1: CPT

## 2024-06-28 PROCEDURE — 97535 SELF CARE MNGMENT TRAINING: CPT

## 2024-06-28 PROCEDURE — 36415 COLL VENOUS BLD VENIPUNCTURE: CPT

## 2024-06-28 RX ADMIN — METOPROLOL TARTRATE 25 MG: 25 TABLET, FILM COATED ORAL at 19:26

## 2024-06-28 RX ADMIN — METOLAZONE 2.5 MG: 2.5 TABLET ORAL at 09:13

## 2024-06-28 RX ADMIN — INSULIN LISPRO 1 UNITS: 100 INJECTION, SOLUTION INTRAVENOUS; SUBCUTANEOUS at 16:42

## 2024-06-28 RX ADMIN — PREGABALIN 100 MG: 50 CAPSULE ORAL at 19:27

## 2024-06-28 RX ADMIN — APIXABAN 5 MG: 5 TABLET, FILM COATED ORAL at 19:26

## 2024-06-28 RX ADMIN — BUMETANIDE 1 MG: 1 TABLET ORAL at 09:13

## 2024-06-28 RX ADMIN — INSULIN LISPRO 2 UNITS: 100 INJECTION, SOLUTION INTRAVENOUS; SUBCUTANEOUS at 09:14

## 2024-06-28 RX ADMIN — HYDROCODONE BITARTRATE AND ACETAMINOPHEN 1 TABLET: 10; 325 TABLET ORAL at 14:14

## 2024-06-28 RX ADMIN — PREGABALIN 100 MG: 50 CAPSULE ORAL at 14:14

## 2024-06-28 RX ADMIN — APIXABAN 5 MG: 5 TABLET, FILM COATED ORAL at 09:13

## 2024-06-28 RX ADMIN — INSULIN LISPRO 2 UNITS: 100 INJECTION, SOLUTION INTRAVENOUS; SUBCUTANEOUS at 12:17

## 2024-06-28 RX ADMIN — BUMETANIDE 1 MG: 1 TABLET ORAL at 16:42

## 2024-06-28 RX ADMIN — PREGABALIN 100 MG: 50 CAPSULE ORAL at 09:13

## 2024-06-28 RX ADMIN — HYDROCODONE BITARTRATE AND ACETAMINOPHEN 1 TABLET: 10; 325 TABLET ORAL at 09:13

## 2024-06-28 RX ADMIN — CALCITRIOL CAPSULES 0.25 MCG 0.25 MCG: 0.25 CAPSULE ORAL at 09:13

## 2024-06-28 RX ADMIN — HYDROCODONE BITARTRATE AND ACETAMINOPHEN 1 TABLET: 10; 325 TABLET ORAL at 19:26

## 2024-06-28 RX ADMIN — METOPROLOL TARTRATE 25 MG: 25 TABLET, FILM COATED ORAL at 09:13

## 2024-06-28 RX ADMIN — DILTIAZEM HYDROCHLORIDE 120 MG: 120 CAPSULE, COATED, EXTENDED RELEASE ORAL at 09:13

## 2024-06-28 ASSESSMENT — PAIN DESCRIPTION - DESCRIPTORS
DESCRIPTORS: THROBBING
DESCRIPTORS: THROBBING
DESCRIPTORS: ACHING;SHARP

## 2024-06-28 ASSESSMENT — PAIN SCALES - GENERAL
PAINLEVEL_OUTOF10: 4
PAINLEVEL_OUTOF10: 4
PAINLEVEL_OUTOF10: 9
PAINLEVEL_OUTOF10: 9
PAINLEVEL_OUTOF10: 5
PAINLEVEL_OUTOF10: 9

## 2024-06-28 ASSESSMENT — PAIN DESCRIPTION - LOCATION
LOCATION: FOOT
LOCATION: LEG;KNEE;INCISION
LOCATION: LEG;KNEE;INCISION

## 2024-06-28 ASSESSMENT — PAIN DESCRIPTION - ORIENTATION
ORIENTATION: RIGHT

## 2024-06-29 LAB
ANION GAP SERPL CALCULATED.3IONS-SCNC: 10 MMOL/L (ref 7–19)
BUN SERPL-MCNC: 65 MG/DL (ref 6–20)
CALCIUM SERPL-MCNC: 8.2 MG/DL (ref 8.6–10)
CHLORIDE SERPL-SCNC: 106 MMOL/L (ref 98–111)
CO2 SERPL-SCNC: 24 MMOL/L (ref 22–29)
CREAT SERPL-MCNC: 2.3 MG/DL (ref 0.5–1.2)
GLUCOSE BLD-MCNC: 218 MG/DL (ref 70–99)
GLUCOSE BLD-MCNC: 224 MG/DL (ref 70–99)
GLUCOSE BLD-MCNC: 238 MG/DL (ref 70–99)
GLUCOSE BLD-MCNC: 357 MG/DL (ref 70–99)
GLUCOSE SERPL-MCNC: 186 MG/DL (ref 74–109)
PERFORMED ON: ABNORMAL
POTASSIUM SERPL-SCNC: 4.7 MMOL/L (ref 3.5–5)
SODIUM SERPL-SCNC: 140 MMOL/L (ref 136–145)

## 2024-06-29 PROCEDURE — 80048 BASIC METABOLIC PNL TOTAL CA: CPT

## 2024-06-29 PROCEDURE — 1180000000 HC REHAB R&B

## 2024-06-29 PROCEDURE — 97110 THERAPEUTIC EXERCISES: CPT

## 2024-06-29 PROCEDURE — 6370000000 HC RX 637 (ALT 250 FOR IP): Performed by: INTERNAL MEDICINE

## 2024-06-29 PROCEDURE — 97116 GAIT TRAINING THERAPY: CPT

## 2024-06-29 PROCEDURE — 97530 THERAPEUTIC ACTIVITIES: CPT

## 2024-06-29 PROCEDURE — 36415 COLL VENOUS BLD VENIPUNCTURE: CPT

## 2024-06-29 PROCEDURE — 82962 GLUCOSE BLOOD TEST: CPT

## 2024-06-29 PROCEDURE — 6370000000 HC RX 637 (ALT 250 FOR IP): Performed by: PSYCHIATRY & NEUROLOGY

## 2024-06-29 RX ADMIN — HYDROCODONE BITARTRATE AND ACETAMINOPHEN 1 TABLET: 10; 325 TABLET ORAL at 13:39

## 2024-06-29 RX ADMIN — METOPROLOL TARTRATE 25 MG: 25 TABLET, FILM COATED ORAL at 20:37

## 2024-06-29 RX ADMIN — APIXABAN 5 MG: 5 TABLET, FILM COATED ORAL at 07:36

## 2024-06-29 RX ADMIN — HYDROCODONE BITARTRATE AND ACETAMINOPHEN 1 TABLET: 10; 325 TABLET ORAL at 23:31

## 2024-06-29 RX ADMIN — PREGABALIN 100 MG: 50 CAPSULE ORAL at 13:39

## 2024-06-29 RX ADMIN — CALCITRIOL CAPSULES 0.25 MCG 0.25 MCG: 0.25 CAPSULE ORAL at 07:36

## 2024-06-29 RX ADMIN — DILTIAZEM HYDROCHLORIDE 120 MG: 120 CAPSULE, COATED, EXTENDED RELEASE ORAL at 07:36

## 2024-06-29 RX ADMIN — PREGABALIN 100 MG: 50 CAPSULE ORAL at 07:36

## 2024-06-29 RX ADMIN — INSULIN LISPRO 4 UNITS: 100 INJECTION, SOLUTION INTRAVENOUS; SUBCUTANEOUS at 17:44

## 2024-06-29 RX ADMIN — HYDROCODONE BITARTRATE AND ACETAMINOPHEN 1 TABLET: 10; 325 TABLET ORAL at 01:52

## 2024-06-29 RX ADMIN — HYDROCODONE BITARTRATE AND ACETAMINOPHEN 1 TABLET: 10; 325 TABLET ORAL at 05:49

## 2024-06-29 RX ADMIN — METOLAZONE 2.5 MG: 2.5 TABLET ORAL at 07:36

## 2024-06-29 RX ADMIN — PREGABALIN 100 MG: 50 CAPSULE ORAL at 20:37

## 2024-06-29 RX ADMIN — METOPROLOL TARTRATE 25 MG: 25 TABLET, FILM COATED ORAL at 07:36

## 2024-06-29 RX ADMIN — APIXABAN 5 MG: 5 TABLET, FILM COATED ORAL at 20:37

## 2024-06-29 RX ADMIN — BUMETANIDE 1 MG: 1 TABLET ORAL at 07:36

## 2024-06-29 RX ADMIN — BUMETANIDE 1 MG: 1 TABLET ORAL at 17:44

## 2024-06-29 ASSESSMENT — PAIN DESCRIPTION - DESCRIPTORS
DESCRIPTORS: ACHING
DESCRIPTORS: THROBBING
DESCRIPTORS: ACHING;SHARP
DESCRIPTORS: ACHING;THROBBING

## 2024-06-29 ASSESSMENT — PAIN SCALES - GENERAL
PAINLEVEL_OUTOF10: 9
PAINLEVEL_OUTOF10: 8
PAINLEVEL_OUTOF10: 5
PAINLEVEL_OUTOF10: 9
PAINLEVEL_OUTOF10: 4
PAINLEVEL_OUTOF10: 8
PAINLEVEL_OUTOF10: 3

## 2024-06-29 ASSESSMENT — PAIN DESCRIPTION - LOCATION
LOCATION: LEG

## 2024-06-29 ASSESSMENT — PAIN DESCRIPTION - ORIENTATION: ORIENTATION: RIGHT

## 2024-06-30 ENCOUNTER — HOSPITAL ENCOUNTER (INPATIENT)
Age: 59
LOS: 7 days | Discharge: HOME HEALTH | DRG: 321 | End: 2024-07-07
Attending: STUDENT IN AN ORGANIZED HEALTH CARE EDUCATION/TRAINING PROGRAM | Admitting: STUDENT IN AN ORGANIZED HEALTH CARE EDUCATION/TRAINING PROGRAM
Payer: COMMERCIAL

## 2024-06-30 ENCOUNTER — APPOINTMENT (OUTPATIENT)
Dept: GENERAL RADIOLOGY | Age: 59
DRG: 321 | End: 2024-06-30
Attending: STUDENT IN AN ORGANIZED HEALTH CARE EDUCATION/TRAINING PROGRAM
Payer: COMMERCIAL

## 2024-06-30 VITALS
DIASTOLIC BLOOD PRESSURE: 77 MMHG | TEMPERATURE: 98.2 F | HEART RATE: 65 BPM | RESPIRATION RATE: 16 BRPM | BODY MASS INDEX: 28.69 KG/M2 | OXYGEN SATURATION: 97 % | WEIGHT: 200.38 LBS | HEIGHT: 70 IN | SYSTOLIC BLOOD PRESSURE: 124 MMHG

## 2024-06-30 DIAGNOSIS — Q21.10 ASD (ATRIAL SEPTAL DEFECT): ICD-10-CM

## 2024-06-30 DIAGNOSIS — R07.9 CHEST PAIN: ICD-10-CM

## 2024-06-30 DIAGNOSIS — I50.33 ACUTE ON CHRONIC DIASTOLIC (CONGESTIVE) HEART FAILURE (HCC): ICD-10-CM

## 2024-06-30 DIAGNOSIS — N17.9 AKI (ACUTE KIDNEY INJURY) (HCC): Primary | ICD-10-CM

## 2024-06-30 PROBLEM — R09.02 HYPOXIA: Status: ACTIVE | Noted: 2024-06-30

## 2024-06-30 LAB
ALBUMIN SERPL-MCNC: 3.5 G/DL (ref 3.5–5.2)
ALLENS TEST: ABNORMAL
ALP SERPL-CCNC: 208 U/L (ref 40–130)
ALT SERPL-CCNC: 21 U/L (ref 5–41)
ANION GAP SERPL CALCULATED.3IONS-SCNC: 13 MMOL/L (ref 7–19)
ANION GAP SERPL CALCULATED.3IONS-SCNC: 13 MMOL/L (ref 7–19)
ANTI-XA UNFRAC HEPARIN: >1.1 IU/ML (ref 0.3–0.7)
APTT PPP: 35.1 SEC (ref 26–36.2)
AST SERPL-CCNC: 19 U/L (ref 5–40)
BASE EXCESS ARTERIAL: -3.3 MMOL/L (ref -2–2)
BASOPHILS # BLD: 0.2 K/UL (ref 0–0.2)
BASOPHILS NFR BLD: 0.8 % (ref 0–1)
BILIRUB SERPL-MCNC: 0.5 MG/DL (ref 0.2–1.2)
BNP BLD-MCNC: ABNORMAL PG/ML (ref 0–124)
BUN SERPL-MCNC: 79 MG/DL (ref 6–20)
BUN SERPL-MCNC: 89 MG/DL (ref 6–20)
CALCIUM SERPL-MCNC: 8.2 MG/DL (ref 8.6–10)
CALCIUM SERPL-MCNC: 8.9 MG/DL (ref 8.6–10)
CARBOXYHEMOGLOBIN ARTERIAL: 1.3 % (ref 0–5)
CHLORIDE SERPL-SCNC: 101 MMOL/L (ref 98–111)
CHLORIDE SERPL-SCNC: 104 MMOL/L (ref 98–111)
CO2 SERPL-SCNC: 21 MMOL/L (ref 22–29)
CO2 SERPL-SCNC: 22 MMOL/L (ref 22–29)
CREAT SERPL-MCNC: 2.2 MG/DL (ref 0.5–1.2)
CREAT SERPL-MCNC: 2.8 MG/DL (ref 0.5–1.2)
EOSINOPHIL # BLD: 0.2 K/UL (ref 0–0.6)
EOSINOPHIL NFR BLD: 0.8 % (ref 0–5)
ERYTHROCYTE [DISTWIDTH] IN BLOOD BY AUTOMATED COUNT: 18.6 % (ref 11.5–14.5)
GLUCOSE BLD-MCNC: 169 MG/DL (ref 70–99)
GLUCOSE BLD-MCNC: 200 MG/DL (ref 70–99)
GLUCOSE BLD-MCNC: 241 MG/DL (ref 70–99)
GLUCOSE BLD-MCNC: 295 MG/DL (ref 70–99)
GLUCOSE SERPL-MCNC: 169 MG/DL (ref 74–109)
GLUCOSE SERPL-MCNC: 227 MG/DL (ref 74–109)
HCO3 ARTERIAL: 21.2 MMOL/L (ref 22–26)
HCT VFR BLD AUTO: 34.9 % (ref 42–52)
HEMOGLOBIN, ART, EXTENDED: 10.2 G/DL (ref 14–18)
HGB BLD-MCNC: 10.3 G/DL (ref 14–18)
IMM GRANULOCYTES # BLD: 0.5 K/UL
INR PPP: 1.56 (ref 0.88–1.18)
LACTATE BLDV-SCNC: 0.8 MMOL/L (ref 0.5–1.9)
LACTATE BLDV-SCNC: 2.5 MMOL/L (ref 0.5–1.9)
LYMPHOCYTES # BLD: 2.8 K/UL (ref 1.1–4.5)
LYMPHOCYTES NFR BLD: 11 % (ref 20–40)
MCH RBC QN AUTO: 28.1 PG (ref 27–31)
MCHC RBC AUTO-ENTMCNC: 29.5 G/DL (ref 33–37)
MCV RBC AUTO: 95.1 FL (ref 80–94)
METHEMOGLOBIN ARTERIAL: 0.6 %
MONOCYTES # BLD: 1.1 K/UL (ref 0–0.9)
MONOCYTES NFR BLD: 4.2 % (ref 0–10)
NEUTROPHILS # BLD: 20.9 K/UL (ref 1.5–7.5)
NEUTS SEG NFR BLD: 81.4 % (ref 50–65)
O2 CONTENT ARTERIAL: 11.9 ML/DL
O2 DELIVERY DEVICE: ABNORMAL
O2 SAT, ARTERIAL: 82.8 %
O2 THERAPY: ABNORMAL
OXYGEN FLOW: 7
PCO2 ARTERIAL: 35 MMHG (ref 35–45)
PERFORMED ON: ABNORMAL
PH ARTERIAL: 7.39 (ref 7.35–7.45)
PLATELET # BLD AUTO: 354 K/UL (ref 130–400)
PMV BLD AUTO: 10.1 FL (ref 9.4–12.4)
PO2 ARTERIAL: 50 MMHG (ref 80–100)
POTASSIUM BLD-SCNC: 5.2 MMOL/L
POTASSIUM SERPL-SCNC: 4.5 MMOL/L (ref 3.5–5)
POTASSIUM SERPL-SCNC: 5.2 MMOL/L (ref 3.5–5)
PROT SERPL-MCNC: 8.1 G/DL (ref 6.6–8.7)
PROTHROMBIN TIME: 18.3 SEC (ref 12–14.6)
RBC # BLD AUTO: 3.67 M/UL (ref 4.7–6.1)
SAMPLE SOURCE: ABNORMAL
SODIUM SERPL-SCNC: 136 MMOL/L (ref 136–145)
SODIUM SERPL-SCNC: 138 MMOL/L (ref 136–145)
WBC # BLD AUTO: 25.7 K/UL (ref 4.8–10.8)

## 2024-06-30 PROCEDURE — 36415 COLL VENOUS BLD VENIPUNCTURE: CPT

## 2024-06-30 PROCEDURE — 2140000000 HC CCU INTERMEDIATE R&B

## 2024-06-30 PROCEDURE — 71045 X-RAY EXAM CHEST 1 VIEW: CPT

## 2024-06-30 PROCEDURE — 6370000000 HC RX 637 (ALT 250 FOR IP): Performed by: INTERNAL MEDICINE

## 2024-06-30 PROCEDURE — 94760 N-INVAS EAR/PLS OXIMETRY 1: CPT

## 2024-06-30 PROCEDURE — 36600 WITHDRAWAL OF ARTERIAL BLOOD: CPT

## 2024-06-30 PROCEDURE — 82803 BLOOD GASES ANY COMBINATION: CPT

## 2024-06-30 PROCEDURE — 83605 ASSAY OF LACTIC ACID: CPT

## 2024-06-30 PROCEDURE — 85610 PROTHROMBIN TIME: CPT

## 2024-06-30 PROCEDURE — 6360000002 HC RX W HCPCS: Performed by: STUDENT IN AN ORGANIZED HEALTH CARE EDUCATION/TRAINING PROGRAM

## 2024-06-30 PROCEDURE — 2700000000 HC OXYGEN THERAPY PER DAY

## 2024-06-30 PROCEDURE — 94761 N-INVAS EAR/PLS OXIMETRY MLT: CPT

## 2024-06-30 PROCEDURE — 85520 HEPARIN ASSAY: CPT

## 2024-06-30 PROCEDURE — 84145 PROCALCITONIN (PCT): CPT

## 2024-06-30 PROCEDURE — 83880 ASSAY OF NATRIURETIC PEPTIDE: CPT

## 2024-06-30 PROCEDURE — 6370000000 HC RX 637 (ALT 250 FOR IP): Performed by: STUDENT IN AN ORGANIZED HEALTH CARE EDUCATION/TRAINING PROGRAM

## 2024-06-30 PROCEDURE — 85730 THROMBOPLASTIN TIME PARTIAL: CPT

## 2024-06-30 PROCEDURE — 85025 COMPLETE CBC W/AUTO DIFF WBC: CPT

## 2024-06-30 PROCEDURE — 80053 COMPREHEN METABOLIC PANEL: CPT

## 2024-06-30 PROCEDURE — 2580000003 HC RX 258: Performed by: STUDENT IN AN ORGANIZED HEALTH CARE EDUCATION/TRAINING PROGRAM

## 2024-06-30 PROCEDURE — 82962 GLUCOSE BLOOD TEST: CPT

## 2024-06-30 PROCEDURE — 87040 BLOOD CULTURE FOR BACTERIA: CPT

## 2024-06-30 RX ORDER — HYDROCODONE BITARTRATE AND ACETAMINOPHEN 10; 325 MG/1; MG/1
1 TABLET ORAL EVERY 4 HOURS PRN
Status: DISCONTINUED | OUTPATIENT
Start: 2024-06-30 | End: 2024-07-07 | Stop reason: HOSPADM

## 2024-06-30 RX ORDER — ONDANSETRON 4 MG/1
4 TABLET, ORALLY DISINTEGRATING ORAL EVERY 8 HOURS PRN
Status: DISCONTINUED | OUTPATIENT
Start: 2024-06-30 | End: 2024-07-07 | Stop reason: HOSPADM

## 2024-06-30 RX ORDER — SODIUM CHLORIDE 0.9 % (FLUSH) 0.9 %
5-40 SYRINGE (ML) INJECTION PRN
Status: DISCONTINUED | OUTPATIENT
Start: 2024-06-30 | End: 2024-07-02 | Stop reason: SDUPTHER

## 2024-06-30 RX ORDER — POLYETHYLENE GLYCOL 3350 17 G/17G
17 POWDER, FOR SOLUTION ORAL DAILY PRN
Status: DISCONTINUED | OUTPATIENT
Start: 2024-06-30 | End: 2024-07-07 | Stop reason: HOSPADM

## 2024-06-30 RX ORDER — INSULIN LISPRO 100 [IU]/ML
0-4 INJECTION, SOLUTION INTRAVENOUS; SUBCUTANEOUS NIGHTLY
Status: DISCONTINUED | OUTPATIENT
Start: 2024-06-30 | End: 2024-07-01

## 2024-06-30 RX ORDER — MAGNESIUM SULFATE IN WATER 40 MG/ML
2000 INJECTION, SOLUTION INTRAVENOUS PRN
Status: DISCONTINUED | OUTPATIENT
Start: 2024-06-30 | End: 2024-07-07 | Stop reason: HOSPADM

## 2024-06-30 RX ORDER — HEPARIN SODIUM 10000 [USP'U]/100ML
5-30 INJECTION, SOLUTION INTRAVENOUS CONTINUOUS
Status: DISCONTINUED | OUTPATIENT
Start: 2024-06-30 | End: 2024-07-03

## 2024-06-30 RX ORDER — HEPARIN SODIUM 1000 [USP'U]/ML
4000 INJECTION, SOLUTION INTRAVENOUS; SUBCUTANEOUS PRN
Status: DISCONTINUED | OUTPATIENT
Start: 2024-06-30 | End: 2024-07-03

## 2024-06-30 RX ORDER — INSULIN GLARGINE 100 [IU]/ML
5 INJECTION, SOLUTION SUBCUTANEOUS NIGHTLY
Status: DISCONTINUED | OUTPATIENT
Start: 2024-06-30 | End: 2024-07-07 | Stop reason: HOSPADM

## 2024-06-30 RX ORDER — ACETAMINOPHEN 325 MG/1
650 TABLET ORAL EVERY 6 HOURS PRN
Status: DISCONTINUED | OUTPATIENT
Start: 2024-06-30 | End: 2024-07-07 | Stop reason: HOSPADM

## 2024-06-30 RX ORDER — DEXTROSE MONOHYDRATE 100 MG/ML
INJECTION, SOLUTION INTRAVENOUS CONTINUOUS PRN
Status: DISCONTINUED | OUTPATIENT
Start: 2024-06-30 | End: 2024-07-07 | Stop reason: HOSPADM

## 2024-06-30 RX ORDER — HEPARIN SODIUM 1000 [USP'U]/ML
2000 INJECTION, SOLUTION INTRAVENOUS; SUBCUTANEOUS PRN
Status: DISCONTINUED | OUTPATIENT
Start: 2024-06-30 | End: 2024-07-03

## 2024-06-30 RX ORDER — ONDANSETRON 2 MG/ML
4 INJECTION INTRAMUSCULAR; INTRAVENOUS EVERY 6 HOURS PRN
Status: DISCONTINUED | OUTPATIENT
Start: 2024-06-30 | End: 2024-07-07 | Stop reason: HOSPADM

## 2024-06-30 RX ORDER — CALCITRIOL 0.25 UG/1
0.25 CAPSULE, LIQUID FILLED ORAL DAILY
Status: DISCONTINUED | OUTPATIENT
Start: 2024-07-01 | End: 2024-07-07 | Stop reason: HOSPADM

## 2024-06-30 RX ORDER — DILTIAZEM HYDROCHLORIDE 120 MG/1
120 CAPSULE, COATED, EXTENDED RELEASE ORAL DAILY
Status: DISCONTINUED | OUTPATIENT
Start: 2024-07-01 | End: 2024-07-07 | Stop reason: HOSPADM

## 2024-06-30 RX ORDER — INSULIN LISPRO 100 [IU]/ML
0-4 INJECTION, SOLUTION INTRAVENOUS; SUBCUTANEOUS
Status: DISCONTINUED | OUTPATIENT
Start: 2024-07-01 | End: 2024-07-01

## 2024-06-30 RX ORDER — BUMETANIDE 0.25 MG/ML
1 INJECTION INTRAMUSCULAR; INTRAVENOUS 2 TIMES DAILY
Status: DISCONTINUED | OUTPATIENT
Start: 2024-06-30 | End: 2024-07-04

## 2024-06-30 RX ORDER — INSULIN GLARGINE 100 [IU]/ML
10 INJECTION, SOLUTION SUBCUTANEOUS NIGHTLY
Status: DISCONTINUED | OUTPATIENT
Start: 2024-06-30 | End: 2024-06-30

## 2024-06-30 RX ORDER — POTASSIUM CHLORIDE 20 MEQ/1
40 TABLET, EXTENDED RELEASE ORAL PRN
Status: DISCONTINUED | OUTPATIENT
Start: 2024-06-30 | End: 2024-07-07 | Stop reason: HOSPADM

## 2024-06-30 RX ORDER — HEPARIN SODIUM 1000 [USP'U]/ML
4000 INJECTION, SOLUTION INTRAVENOUS; SUBCUTANEOUS ONCE
Status: COMPLETED | OUTPATIENT
Start: 2024-06-30 | End: 2024-06-30

## 2024-06-30 RX ORDER — SODIUM CHLORIDE 9 MG/ML
INJECTION, SOLUTION INTRAVENOUS PRN
Status: DISCONTINUED | OUTPATIENT
Start: 2024-06-30 | End: 2024-07-02 | Stop reason: SDUPTHER

## 2024-06-30 RX ORDER — ACETAMINOPHEN 650 MG/1
650 SUPPOSITORY RECTAL EVERY 6 HOURS PRN
Status: DISCONTINUED | OUTPATIENT
Start: 2024-06-30 | End: 2024-07-07 | Stop reason: HOSPADM

## 2024-06-30 RX ORDER — SODIUM CHLORIDE 0.9 % (FLUSH) 0.9 %
5-40 SYRINGE (ML) INJECTION EVERY 12 HOURS SCHEDULED
Status: DISCONTINUED | OUTPATIENT
Start: 2024-06-30 | End: 2024-07-02 | Stop reason: SDUPTHER

## 2024-06-30 RX ORDER — POTASSIUM CHLORIDE 7.45 MG/ML
10 INJECTION INTRAVENOUS PRN
Status: DISCONTINUED | OUTPATIENT
Start: 2024-06-30 | End: 2024-07-07 | Stop reason: HOSPADM

## 2024-06-30 RX ORDER — ENOXAPARIN SODIUM 100 MG/ML
40 INJECTION SUBCUTANEOUS DAILY
Status: DISCONTINUED | OUTPATIENT
Start: 2024-06-30 | End: 2024-06-30

## 2024-06-30 RX ADMIN — DILTIAZEM HYDROCHLORIDE 120 MG: 120 CAPSULE, COATED, EXTENDED RELEASE ORAL at 08:57

## 2024-06-30 RX ADMIN — HEPARIN SODIUM 4000 UNITS: 1000 INJECTION INTRAVENOUS; SUBCUTANEOUS at 23:21

## 2024-06-30 RX ADMIN — PREGABALIN 100 MG: 50 CAPSULE ORAL at 08:57

## 2024-06-30 RX ADMIN — CALCITRIOL CAPSULES 0.25 MCG 0.25 MCG: 0.25 CAPSULE ORAL at 08:57

## 2024-06-30 RX ADMIN — HYDROCODONE BITARTRATE AND ACETAMINOPHEN 1 TABLET: 10; 325 TABLET ORAL at 08:57

## 2024-06-30 RX ADMIN — BUMETANIDE 1 MG: 1 TABLET ORAL at 08:57

## 2024-06-30 RX ADMIN — HYDROCODONE BITARTRATE AND ACETAMINOPHEN 1 TABLET: 10; 325 TABLET ORAL at 04:58

## 2024-06-30 RX ADMIN — METOLAZONE 2.5 MG: 2.5 TABLET ORAL at 08:57

## 2024-06-30 RX ADMIN — SODIUM CHLORIDE, PRESERVATIVE FREE 10 ML: 5 INJECTION INTRAVENOUS at 23:38

## 2024-06-30 RX ADMIN — METOPROLOL TARTRATE 25 MG: 25 TABLET, FILM COATED ORAL at 08:56

## 2024-06-30 RX ADMIN — APIXABAN 5 MG: 5 TABLET, FILM COATED ORAL at 08:57

## 2024-06-30 RX ADMIN — HYDROCODONE BITARTRATE AND ACETAMINOPHEN 1 TABLET: 10; 325 TABLET ORAL at 14:55

## 2024-06-30 RX ADMIN — METOPROLOL TARTRATE 25 MG: 25 TABLET, FILM COATED ORAL at 21:00

## 2024-06-30 RX ADMIN — BUMETANIDE 1 MG: 0.25 INJECTION INTRAMUSCULAR; INTRAVENOUS at 23:37

## 2024-06-30 RX ADMIN — INSULIN GLARGINE 5 UNITS: 100 INJECTION, SOLUTION SUBCUTANEOUS at 21:00

## 2024-06-30 RX ADMIN — PREGABALIN 100 MG: 50 CAPSULE ORAL at 14:55

## 2024-06-30 RX ADMIN — HEPARIN SODIUM 12 UNITS/KG/HR: 10000 INJECTION, SOLUTION INTRAVENOUS at 23:32

## 2024-06-30 RX ADMIN — INSULIN LISPRO 2 UNITS: 100 INJECTION, SOLUTION INTRAVENOUS; SUBCUTANEOUS at 11:31

## 2024-06-30 ASSESSMENT — PAIN SCALES - GENERAL
PAINLEVEL_OUTOF10: 0
PAINLEVEL_OUTOF10: 5
PAINLEVEL_OUTOF10: 9
PAINLEVEL_OUTOF10: 7
PAINLEVEL_OUTOF10: 0
PAINLEVEL_OUTOF10: 9
PAINLEVEL_OUTOF10: 0

## 2024-06-30 ASSESSMENT — PAIN DESCRIPTION - DESCRIPTORS
DESCRIPTORS: ACHING
DESCRIPTORS: ACHING;THROBBING
DESCRIPTORS: ACHING;THROBBING

## 2024-06-30 ASSESSMENT — PAIN DESCRIPTION - LOCATION
LOCATION: LEG

## 2024-06-30 ASSESSMENT — PAIN SCALES - WONG BAKER
WONGBAKER_NUMERICALRESPONSE: NO HURT

## 2024-06-30 ASSESSMENT — PAIN DESCRIPTION - ORIENTATION
ORIENTATION: RIGHT
ORIENTATION: RIGHT

## 2024-06-30 NOTE — PROGRESS NOTES
Noe Saucedanolds  [id number]       06/27/24 1356 06/27/24 1414 06/27/24 1416   Restrictions/Precautions   Restrictions/Precautions Fall Risk;Weight Bearing  --   --    Lower Extremity Weight Bearing Restrictions   Right Lower Extremity Weight Bearing Non Weight Bearing  --   --    Left Lower Extremity Weight Bearing Weight Bearing As Tolerated  --   --    Position Activity Restriction   Other position/activity restrictions Flotech  --   --    General   Additional Pertinent Hx AFIB, CHF, HTN, HLD, DM  --   --    Diagnosis RIGHT BKA; HAP  --   --    Subjective   Subjective Pt. agreeable to therapy.  --   --    Subjective   Pain 9/10 R residual limb  --   --    Transfers   Sit to Stand  --   --  Minimal Assistance;Contact guard assistance   Stand to Sit  --   --  Contact guard assistance   PT Exercises   Exercise Treatment  --  Sit to stands x4.  Stood twice with walker to increase standing tolerance.  --    Activity Tolerance   Activity Tolerance  --   --   --    Assessment   Assessment  --   --   --    PT Individual Minutes   Time In  --   --   --    Time Out  --   --   --    Minutes  --   --   --       06/27/24 1421 06/27/24 1422   Restrictions/Precautions   Restrictions/Precautions  --   --    Lower Extremity Weight Bearing Restrictions   Right Lower Extremity Weight Bearing  --   --    Left Lower Extremity Weight Bearing  --   --    Position Activity Restriction   Other position/activity restrictions  --   --    General   Additional Pertinent Hx  --   --    Diagnosis  --   --    Subjective   Subjective  --   --    Subjective   Pain  --   --    Transfers   Sit to Stand  --   --    Stand to Sit  --   --    PT Exercises   Exercise Treatment  --   --    Activity Tolerance   Activity Tolerance Patient tolerated treatment well;Patient limited by endurance  --    Assessment   Assessment Tolerated session with rests.  --    PT Individual Minutes   Time In  --  1350   Time Out  --  1430   Minutes  --  40     Electronically 
  Physical Therapy Evaluation Note  DATE:  2024  NAME:  Noe Jimenez  :  1965  (59 y.o.,male)  MRN:  [id number]    HEIGHT:  Height: 177.8 cm (5' 10\")  WEIGHT:  Weight - Scale: 90.3 kg (199 lb)    PATIENT DIAGNOSIS(ES):    Diagnosis: RIGHT BKA; HAP    Additional Pertinent Hx: AFIB, CHF, HTN, HLD, DM  RESTRICTIONS/PRECAUTIONS:    Restrictions/Precautions  Restrictions/Precautions: Fall Risk, Weight Bearing  Required Braces or Orthoses?: Yes  Implants present? :  (RLE AK orthosses)    PAIN:  Pain Level: 5       Pain Location: Leg     Pain Orientation: Right        ACTIVITY TOLERANCE  Activity Tolerance  Activity Tolerance: Patient tolerated evaluation without incident      BED MOBILITY  Bed mobility  Rolling to Left: Stand by assistance  Rolling to Right: Stand by assistance  Supine to Sit: Stand by assistance  Sit to Supine: Stand by assistance  Bed Mobility Comments: pt. able to move in bed with SBA using hand rails        TRANSFERS  Transfers  Sit to Stand: Minimal Assistance, Contact guard assistance  Stand to Sit: Contact guard assistance  Bed to Chair: Contact guard assistance  Stand Pivot Transfers: Contact guard assistance  Comment: pt. transfers from the bed to the wheelchair to the L using AD and CGA. Pt. struggles to come to standing but is able to pivot using walker easily.       AMBULATION 1  Ambulation  Surface: Level tile  Device: Parallel Bars  Other Apparatus: Wheelchair follow  Assistance: Contact guard assistance  Quality of Gait: pt. exhibits good scapular depression and good foot clearance at the beginning of gait. As pt. fatigued rollover and foot clearance decreased.  Gait Deviations: Slow Linda, Decreased step length, Decreased step height  Distance: 12'  Comments: pt. exhibits good rollover when properly depressing scapulas. Tendancy to overextend hands before unloading LLE.  More Ambulation?: Yes  AMBULATION 2  Ambulation 2  Surface - 2: level tile  Device 2: Parallel 
4 Eyes Skin Assessment     NAME:  Noe Jimenez  YOB: 1965  MEDICAL RECORD NUMBER:  [id number]    The patient is being assessed for  Admission    I agree that at least one RN has performed a thorough Head to Toe Skin Assessment on the patient. ALL assessment sites listed below have been assessed.      Areas assessed by both nurses:    Head, Face, Ears, Shoulders, Back, Chest, Arms, Elbows, Hands, Sacrum. Buttock, Coccyx, Ischium, and Legs. Feet and Heels        Does the Patient have a Wound? No noted wound(s)       Osmar Prevention initiated by RN: No  Wound Care Orders initiated by RN: No    Pressure Injury (Stage 3,4, Unstageable, DTI, NWPT, and Complex wounds) if present, place Wound referral order by RN under : No    New Ostomies, if present place, Ostomy referral order under : No     Nurse 1 eSignature: Electronically signed by Mini Harley RN on 6/25/24 at 4:13 PM CDT    **SHARE this note so that the co-signing nurse can place an eSignature**    Nurse 2 eSignature: Electronically signed by Zaida Beck RN on 6/25/24 at 4:14 PM CDT   
Comprehensive Nutrition Assessment    Type and Reason for Visit:  Initial    Nutrition Recommendations/Plan:   Remove Low Potassium restriction from diet order.      Malnutrition Assessment:  Malnutrition Status:  No malnutrition (06/26/24 0915)    Context:  Acute Illness       Nutrition Assessment:    Following for readmit to inpatient rehab. Pt presents nutritionally stable AEB PO intake avg >50%. Wt fluctuations noted over the last 2 weeks, will monitor. Pt has hx DM with FyjS7u=2.0%. Carb Control diet in place. Will remove potassium restriction as level has been WNL for several days. Will monitor nutrition progression and modify intervention as needed.    Nutrition Related Findings:    IofA8w=2.0% (4/23/24); -293mg/dL Wound Type: Surgical Incision (R BKA)       Current Nutrition Intake & Therapies:    Average Meal Intake: 51-75%, %  Average Supplements Intake: None Ordered  ADULT DIET; Regular; 4 carb choices (60 gm/meal); Low Sodium (2 gm); Double Protein Portions; Send lemon josse with all trays.  Cottage cheese with L & D.    Anthropometric Measures:  Height: 177.8 cm (5' 10\")  Ideal Body Weight (IBW): 166 lbs (75 kg)    Admission Body Weight: 90.3 kg (199 lb)  Current Body Weight: 90.3 kg (199 lb), 119.9 % IBW.    Current BMI (kg/m2): 28.6  Usual Body Weight: 93.4 kg (206 lb) (6/19/24)  % Weight Change (Calculated): -3.4  Weight Adjustment For: Amputation  Total Adjusted Percentage (Calculated): 5.9  Adjusted Ideal Body Weight (lbs) (Calculated): 156.2 lbs  Adjusted Ideal Body Weight (kg) (Calculated): 71 kg  Adjusted % Ideal Body Weight (Calculated): 127.4  Adjusted BMI (kg/m2) (Calculated): 30.3  BMI Categories: Obese Class 1 (BMI 30.0-34.9)    Estimated Daily Nutrient Needs:  Energy Requirements Based On: Kcal/kg     Energy (kcal/day): 3489-6034 (15-18kcal/kg)  Weight Used for Protein Requirements: Ideal  Protein (g/day):  (1.2-2.0g/kg)  Method Used for Fluid Requirements: 1 ml/kcal  Fluid 
Durable Medical Equipment   Physician Order      Patient Name Noe Jimenez       Address  [address] [facility] [address] Phone  [phone] (Home)      Patient Height Height: 177.8 cm (5' 10\")        Patient Weight 93.3 kg (205 lb 11.2 oz)           1965      Patient Contacts  Name Relation Home Work Mobile   Kristin Jimenez Spouse [phone]          Coverage Information        F/O Payor/Plan Precert #   GENERIC MCO/GENERIC [employer]      Subscriber Subscriber #   [employer] [id number]   Address Phone   [address]  [place] [address]           DME NEEDS:  **WALKER WITH WHEELS  **TUB TRANSFER BENCH  **PARTIAL BED RAIL        DIAGNOSIS:    ICD-10-CM Priority Class Noted POA          S/P BKA (below knee amputation) unilateral, right (HCC) Z89.511     2024 Yes   Lumbar post-laminectomy syndrome M96.1     2016 Yes   Peripheral neuropathic pain M79.2     2016 Yes   Type 2 diabetes mellitus with complication, without long-term current use of insulin ([facility]) E11.8     2024 Yes   Essential hypertension I10     2024 Yes   Atrial fibrillation ([facility]) I48.91     2024 Yes   Anemia D64.9     2024 Yes   CHF (congestive heart failure) ([facility]) I50.9     2024 Yes   Renal dysfunction N28.9     2024 Yes     [facility]   History and Physical           Patient:                                    Noe Jimenez  MR#:                                        [id number]  Account Number:                   [id number]              Room:                                      [facility]/821-02      YOB: 1965  Date of Progress Note:           2024  Time of Note                           6:57 AM  Attending Physician:               Colton Gusman MD           Admitting diagnosis: Right below the knee amputation     Secondary diagnoses: A-Fib, CHF, HTN, HLD, DM, acute blood loss anemia requiring transfusion, JANICE     CHIEF COMPLAINT: Right leg pain   
Facility/Department: [facility] 8 REHAB UNIT  Occupational Therapy     Name: Noe Jimenez  : 1965  MRN: [id number]  Date of Service: 2024    Discharge Recommendations:  Home with Home health OT    Patient Diagnosis(es): There were no encounter diagnoses.  Past Medical History:  has a past medical history of Diabetes mellitus (HCC) and Hypertension.  Past Surgical History:  has a past surgical history that includes Cardiac surgery; back surgery; fracture surgery; and Rotator cuff repair (Right).    Treatment Diagnosis: s/p R BKA    Assessment   Performance deficits / Impairments: Decreased functional mobility ;Decreased ADL status;Decreased strength;Decreased endurance;Decreased high-level IADLs;Decreased balance  Treatment Diagnosis: s/p R BKA  Prognosis: Good  History: JANICE, ABL anemia, A fib.  Right foot injury at work 2 years ago requiring 5 surgeries.  Hx of right rotator cuff repair  Activity Tolerance  Activity Tolerance: Patient Tolerated treatment well     Plan   Occupational Therapy Plan  Current Treatment Recommendations: Strengthening, Balance training, Functional mobility training, Endurance training, Wheelchair mobility training, Patient/Caregiver education & training, Equipment evaluation, education, & procurement, Safety education & training, Self-Care / ADL, Home management training     Restrictions  Restrictions/Precautions  Restrictions/Precautions: Fall Risk, Weight Bearing  Required Braces or Orthoses?: Yes  Implants present? :  (RLE AK orthosses)  Lower Extremity Weight Bearing Restrictions  Right Lower Extremity Weight Bearing: Non Weight Bearing  Left Lower Extremity Weight Bearing: Weight Bearing As Tolerated  Position Activity Restriction  Other position/activity restrictions: [employer]    Subjective   General  Chart Reviewed: Yes, Orders  Patient assessed for rehabilitation services?: Yes  Additional Pertinent Hx: acute kidney failure, hypertension, type 2 diabetes mellitus, congestive 
Facility/Department: [facility] 8 REHAB UNIT  Occupational Therapy     Name: Noe Jimenez  : 1965  MRN: [id number]  Date of Service: 2024    Discharge Recommendations:  Home with Home health OT    Patient Diagnosis(es): There were no encounter diagnoses.  Past Medical History:  has a past medical history of Diabetes mellitus (HCC) and Hypertension.  Past Surgical History:  has a past surgical history that includes Cardiac surgery; back surgery; fracture surgery; and Rotator cuff repair (Right).    Treatment Diagnosis: s/p R BKA      Assessment   Performance deficits / Impairments: Decreased functional mobility ;Decreased ADL status;Decreased strength;Decreased endurance;Decreased high-level IADLs;Decreased balance  Treatment Diagnosis: s/p R BKA  Prognosis: Good  History: JANICE, ABL anemia, A fib.  Right foot injury at work 2 years ago requiring 5 surgeries.  Hx of right rotator cuff repair  Activity Tolerance  Activity Tolerance: Patient Tolerated treatment well       Plan   Occupational Therapy Plan  Current Treatment Recommendations: Strengthening, Balance training, Functional mobility training, Endurance training, Wheelchair mobility training, Patient/Caregiver education & training, Equipment evaluation, education, & procurement, Safety education & training, Self-Care / ADL, Home management training     Restrictions  Restrictions/Precautions  Restrictions/Precautions: Fall Risk, Weight Bearing  Required Braces or Orthoses?: Yes  Implants present? :  (RLE AK orthosses)  Lower Extremity Weight Bearing Restrictions  Right Lower Extremity Weight Bearing: Non Weight Bearing  Left Lower Extremity Weight Bearing: Weight Bearing As Tolerated  Position Activity Restriction  Other position/activity restrictions: Flotech    Subjective   General  Chart Reviewed: Yes, Orders  Patient assessed for rehabilitation services?: Yes  Additional Pertinent Hx: acute kidney failure, hypertension, type 2 diabetes mellitus, 
Facility/Department: [facility] 8 REHAB UNIT  Occupational Therapy     Name: Noe Jimenez  : 1965  MRN: [id number]  Date of Service: 2024    Discharge Recommendations:  Home with Home health OT    Patient Diagnosis(es): There were no encounter diagnoses.  Past Medical History:  has a past medical history of Diabetes mellitus (HCC) and Hypertension.  Past Surgical History:  has a past surgical history that includes Cardiac surgery; back surgery; fracture surgery; and Rotator cuff repair (Right).    Treatment Diagnosis: s/p R BKA      Assessment   Performance deficits / Impairments: Decreased functional mobility ;Decreased ADL status;Decreased strength;Decreased endurance;Decreased high-level IADLs;Decreased balance  Treatment Diagnosis: s/p R BKA  Prognosis: Good  History: JANICE, ABL anemia, A fib.  Right foot injury at work 2 years ago requiring 5 surgeries.  Hx of right rotator cuff repair  Activity Tolerance  Activity Tolerance: Patient Tolerated treatment well       Plan   Occupational Therapy Plan  Current Treatment Recommendations: Strengthening, Balance training, Functional mobility training, Endurance training, Wheelchair mobility training, Patient/Caregiver education & training, Equipment evaluation, education, & procurement, Safety education & training, Self-Care / ADL, Home management training     Restrictions  Restrictions/Precautions  Restrictions/Precautions: Fall Risk, Weight Bearing  Required Braces or Orthoses?: Yes  Implants present? :  (RLE AK orthosses)  Lower Extremity Weight Bearing Restrictions  Right Lower Extremity Weight Bearing: Non Weight Bearing  Left Lower Extremity Weight Bearing: Weight Bearing As Tolerated  Position Activity Restriction  Other position/activity restrictions: Flotech    Subjective   General  Chart Reviewed: Yes, Orders  Patient assessed for rehabilitation services?: Yes  Additional Pertinent Hx: acute kidney failure, hypertension, type 2 diabetes mellitus, 
Facility/Department: [facility] 8 REHAB UNIT  Occupational Therapy     Name: Noe Jimenez  : 1965  MRN: [id number]  Date of Service: 2024    Discharge Recommendations:  Home with Home health OT    Patient Diagnosis(es): There were no encounter diagnoses.  Past Medical History:  has a past medical history of Diabetes mellitus (HCC) and Hypertension.  Past Surgical History:  has a past surgical history that includes Cardiac surgery; back surgery; fracture surgery; and Rotator cuff repair (Right).    Treatment Diagnosis: s/p R BKA      Assessment   Performance deficits / Impairments: Decreased functional mobility ;Decreased ADL status;Decreased strength;Decreased endurance;Decreased high-level IADLs;Decreased balance  Treatment Diagnosis: s/p R BKA  Prognosis: Good  History: JANICE, ABL anemia, A fib.  Right foot injury at work 2 years ago requiring 5 surgeries.  Hx of right rotator cuff repair  Activity Tolerance  Activity Tolerance: Patient Tolerated treatment well      Plan   Occupational Therapy Plan  Current Treatment Recommendations: Strengthening, Balance training, Functional mobility training, Endurance training, Wheelchair mobility training, Patient/Caregiver education & training, Equipment evaluation, education, & procurement, Safety education & training, Self-Care / ADL, Home management training     Restrictions  Restrictions/Precautions  Restrictions/Precautions: Fall Risk, Weight Bearing  Required Braces or Orthoses?: Yes  Implants present? :  (RLE AK orthosses)  Lower Extremity Weight Bearing Restrictions  Right Lower Extremity Weight Bearing: Non Weight Bearing  Left Lower Extremity Weight Bearing: Weight Bearing As Tolerated  Position Activity Restriction  Other position/activity restrictions: Flotech    Subjective   General  Chart Reviewed: Yes, Orders  Patient assessed for rehabilitation services?: Yes  Additional Pertinent Hx: acute kidney failure, hypertension, type 2 diabetes mellitus, 
Nephrology ([facility]) Consult Note      Patient:  Noe Jimenez  YOB: 1965  Date of Service: 6/29/2024  MRN: [id number]   Acct: [id number]   Primary Care Physician: Willie Kim MD  Advance Directive: Full Code  Admit Date: 6/25/2024       Hospital Day: 4  Referring Provider: Colton Gusman MD    Patient independently seen and examined, Chart, Consults, Notes, Operative notes, Labs, Cardiology, and Radiology studies reviewed as available.        Subjective:  Noe Jimenez is a 59 y.o. male for whom we were consulted for evaluation and treatment of acute kidney injury.  Patient denied any history of chronic kidney disease.  However he had one episode of acute kidney injury during last hospitalization at [facility] which was resolved. During that hospitalization he underwent right BKA.  Patient also has a history of atrial fibrillation, CHF, hypertension, hyperlipidemia, type 2 diabetes and chronic cellulitis with osteomyelitis of right foot.  After amputation of his right leg, he was admitted to [facility] rehab floor for rehabilitation.  However, he has suffered with parainfluenza virus leading to left lower lobe pneumonia.  On March 17, patient developed respiratory failure and was transferred to progressive care unit.  Patient is receiving IV antibiotics/oxygen.  He had significant decline in renal function and marked swelling of lower extremities/scrotal and penile swelling.  Patient has responded to intravenous diuretics and had excellent urine output.  Patient moved to the rehab floor on 6/25.    Today, no overnight events.  Seen with family.  Up in wheelchair with feet elevated.  Still with peripheral edema and noted continued good response to diuretic therapy. Tolerating diet.  No new complaints today.        Allergies:  Dilaudid [hydromorphone], Morphine, and Percocet [oxycodone-acetaminophen]    Medicines:  Current Facility-Administered Medications 
Nephrology ([facility]) Consult Note      Patient:  Noe Jimenez  YOB: 1965  Date of Service: 6/26/2024  MRN: [id number]   Acct: [id number]   Primary Care Physician: Willie Kim MD  Advance Directive: Full Code  Admit Date: 6/25/2024       Hospital Day: 1  Referring Provider: Colton Gusman MD    Patient independently seen and examined, Chart, Consults, Notes, Operative notes, Labs, Cardiology, and Radiology studies reviewed as available.        Subjective:  Noe Jimenez is a 59 y.o. male for whom we were consulted for evaluation and treatment of acute kidney injury.  Patient denied any history of chronic kidney disease.  However he had one episode of acute kidney injury during last hospitalization at [facility] which was resolved. During that hospitalization he underwent right BKA.  Patient also has a history of atrial fibrillation, CHF, hypertension, hyperlipidemia, type 2 diabetes and chronic cellulitis with osteomyelitis of right foot.  After amputation of his right leg, he was admitted to [facility] rehab floor for rehabilitation.  However, he has suffered with parainfluenza virus leading to left lower lobe pneumonia.  On March 17, patient developed respiratory failure and was transferred to progressive care unit.  Patient is receiving IV antibiotics/oxygen.  He had significant decline in renal function and marked swelling of lower extremities/scrotal and penile swelling.  Patient has responded to intravenous diuretics and had excellent urine output.  Patient moved to the rehab floor on 6/25.    Today, no overnight events.  Seen with family.  Up in chair with feet elevated.  Still with peripheral edema and noted continued good response to diuretic therapy.  Denied other complaints on questioning.  Tolerating diet.          Allergies:  Dilaudid [hydromorphone], Morphine, and Percocet [oxycodone-acetaminophen]    Medicines:  Current 
[facility] Family Training Certificate    Kristin Jimenez has been cleared to assist Noe Jimenez with the following activities:    Bed Transfers:  Yes     Toilet Transfers:  Yes     W/C Transfers:  Yes     Walking:  No     Car Transfers:  No     Stairs:  No     Allowed to take patient off floor:  Yes, in wheelchair only.     Electronically signed by Carlie Banuelos RN on 6/29/2024 at 11:41 AM    
Noe Jimenez arrived to room # 621.   Presented with: right BKA  Mental Status: Patient is alert and oriented times 4 .   Vitals:    06/25/24 1418   BP: (!) 144/108   Pulse: 90   Resp: 18   Temp: 98.5 °F (36.9 °C)   SpO2: 97%     Patient safety contract and falls prevention contract reviewed with patient Yes.  Oriented Patient to room.  Call light within reach. Yes.  Needs, issues or concerns expressed at this time: yes, .      Electronically signed by Mini Harley RN on 6/25/2024 at 3:19 PM  
Nutrition Assessment     Type and Reason for Visit: Reassess    Malnutrition Assessment:  Malnutrition Status: No malnutrition    Nutrition Assessment:  Pt continues to have adequate PO intake >50% of meals. Wt fluctuations continue. Pt noted to have some snacks and outside meals at bedside during my visit. K+ remains WNL. Continue current POC. Will continue to follow for addiional needs.    Estimated Daily Nutrient Needs:  Energy (kcal):  2032-8124 (15-18kcal/kg)       Protein (g):   (1.2-2.0g/kg) Weight Used for Protein Requirements: Ideal        Fluid (ml/day):  4731-0384 Method Used for Fluid Requirements: 1 ml/kcal    Nutrition Related Findings:   ZihG2i=6.0% (4/23/24); -301mg/dL Wound Type: Surgical Incision (R BKA)    Current Nutrition Therapies:    ADULT DIET; Regular; 4 carb choices (60 gm/meal); Low Sodium (2 gm); Double Protein Portions; Send lemon josse with all trays.  Cottage cheese with L & D.    Anthropometric Measures:  Height: 177.8 cm (5' 10\")  Current Body Wt: 93 kg (205 lb)   BMI: 29.4    Nutrition Diagnosis:   Altered nutrition-related lab values related to endocrine dysfuntion as evidenced by lab values    Nutrition Interventions:   Food and/or Nutrient Delivery: Continue Current Diet  Nutrition Education/Counseling: No recommendation at this time  Coordination of Nutrition Care: Continue to monitor while inpatient       Goals:  Previous Goal Met: Progressing toward Goal(s)  Goals: PO intake 50% or greater       Nutrition Monitoring and Evaluation:   Behavioral-Environmental Outcomes: None Identified  Food/Nutrient Intake Outcomes: Food and Nutrient Intake  Physical Signs/Symptoms Outcomes: Biochemical Data, Nutrition Focused Physical Findings, Weight, Skin    Discharge Planning:    Too soon to determine     Polly Lozada, MS, RD, LD, CDCES  Contact: [phone]    
Occupational Therapy  Facility/Department: [facility] 8 REHAB UNIT  Rehabilitation Occupational Therapy Daily Treatment Note    Date: 24  Patient Name: Noe Jimenez       Room: 0821/821-02  MRN: [id number]  Account: [id number]   : 1965  (59 y.o.) Gender: male        Diagnosis: S/P R below knee ampuatation  Additional Pertinent Hx: acute kidney failure, hypertension, type 2 diabetes mellitus, congestive heart failure, hyperparathyroidism, hyperkalemia, paroxysmal atrial fibrillation    Treatment Diagnosis: s/p R BKA   Past Medical History:  has a past medical history of Diabetes mellitus (HCC) and Hypertension.  Past Surgical History:   has a past surgical history that includes Cardiac surgery; back surgery; fracture surgery; and Rotator cuff repair (Right).    Restrictions  Restrictions/Precautions: Fall Risk, Weight Bearing  Implants present? :  (RLE AK orthosses)  Right Lower Extremity Weight Bearing: Non Weight Bearing  Left Lower Extremity Weight Bearing: Weight Bearing As Tolerated  Required Braces or Orthoses?: Yes  Equipment Used: Bed, Wheelchair    Subjective     24 1330   General   Family / Caregiver Present No   Diagnosis S/P R below knee ampuatation   Pain Screening   Pain at present 8   Scale Used Numeric Score   Intervention List Patient able to continue with treatment   Comments / Details RLE.     Objective     24 1330   Orientation   Overall Orientation Status WNL   Cognition   Overall Cognitive Status WNL   Cognition Comment Awake, alert, follows simple commands      24 1330   Functional Mobility   Functional - Mobility Device Wheelchair   Activity To/From therapy gym   Assist Level Supervision      24 1330   Left Hand AROM (degrees)   Left Hand AROM WNL   RUE AROM (degrees)   RUE AROM  WNL      24 1330   LUE Strength   Gross LUE Strength WNL   RUE Strength   Gross RUE Strength WNL      24 1330   Health Literacy   How often do you need to have someone help 
Occupational Therapy  Facility/Department: [facility] 8 REHAB UNIT  Occupational Therapy Initial Assessment    Name: Noe Jimenez  : 1965  MRN: [id number]  Date of Service: 2024    Discharge Recommendations:  Home with Home health OT          Patient Diagnosis(es): There were no encounter diagnoses.  Past Medical History:  has a past medical history of Diabetes mellitus (HCC) and Hypertension.  Past Surgical History:  has a past surgical history that includes Cardiac surgery; back surgery; fracture surgery; and Rotator cuff repair (Right).    Treatment Diagnosis: s/p R BKA      Assessment   Performance deficits / Impairments: Decreased functional mobility ;Decreased ADL status;Decreased strength;Decreased endurance;Decreased high-level IADLs;Decreased balance  Assessment: Pt would benefit from continued skilled therapy to improve endurance, balance, and strength needed for participation in ADLs, IADLs, and functional mobility. Pt has good rehab potential.  Treatment Diagnosis: s/p R BKA  Prognosis: Good  Decision Making: Low Complexity  Activity Tolerance  Activity Tolerance: Patient Tolerated treatment well        Plan   Occupational Therapy Plan  Current Treatment Recommendations: Strengthening, Balance training, Functional mobility training, Endurance training, Wheelchair mobility training, Patient/Caregiver education & training, Equipment evaluation, education, & procurement, Safety education & training, Self-Care / ADL, Home management training     Restrictions  Restrictions/Precautions  Restrictions/Precautions: Fall Risk, Weight Bearing  Lower Extremity Weight Bearing Restrictions  Right Lower Extremity Weight Bearing: Non Weight Bearing  Left Lower Extremity Weight Bearing: Weight Bearing As Tolerated    Subjective   General  Chart Reviewed: Yes, Orders  Patient assessed for rehabilitation services?: Yes  Additional Pertinent Hx: acute kidney failure, hypertension, type 2 diabetes mellitus, congestive 
Occupational Therapy  Facility/Department: [facility] 8 REHAB UNIT  Rehabilitation Occupational Therapy Daily Treatment Note    Date: 24  Patient Name: Noe Jimenez       Room: 0821/821-02  MRN: [id number]  Account: [id number]   : 1965  (59 y.o.) Gender: male                Treatment Diagnosis: (P) s/p R BKA   Past Medical History:  has a past medical history of Diabetes mellitus (HCC) and Hypertension.  Past Surgical History:   has a past surgical history that includes Cardiac surgery; back surgery; fracture surgery; and Rotator cuff repair (Right).    Restrictions  Restrictions/Precautions: Fall Risk, Weight Bearing  Implants present? 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 Yes   Implants present?    (jerry-anant)   General   Chart Reviewed Yes   Subjective   Subjective R LE   Pain 6/10   Orientation   Overall Orientation Status WNL   Cognition   Overall Cognitive Status WNL   Balance   Standing Balance Contact guard assistance  (pt stood x 5 minutes with B UE support on therapy mat)   OT Exercises   Exercise Treatment B UE AROM ex with 2# for R UE and 3# for L UE; evaristoshaw with 25#, performed in sit to stand position rather than sitting back in wheelchair with upright posture   Activity Tolerance   Activity Tolerance Patient Tolerated treatment well   Assessment   Performance deficits / 
Occupational Therapy  Facility/Department: [facility] 8 REHAB UNIT  Rehabilitation Occupational Therapy Daily Treatment Note    Date: 24  Patient Name: Noe Jimenez       Room: 0821/821-02  MRN: [id number]  Account: [id number]   : 1965  (59 y.o.) Gender: male        Treatment Diagnosis: s/p R BKA   Past Medical History:  has a past medical history of Diabetes mellitus (HCC) and Hypertension.  Past Surgical History:   has a past surgical history that includes Cardiac surgery; back surgery; fracture surgery; and Rotator cuff repair (Right).    Restrictions  Restrictions/Precautions: Fall Risk, Weight Bearing  Implants present? 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  Right Lower Extremity Weight Bearing: Non Weight Bearing  Left Lower Extremity Weight Bearing: Weight Bearing As Tolerated  Required Braces or Orthoses?: Yes  Equipment Used: Bed, Wheelchair     Cognition  Overall Cognitive Status: WNL  Orientation  Overall Orientation Status: Within Normal Limits           OT Exercises  Exercise Treatment: B UE AROM ex with 2# for R UE and 3# for L UE        24 1330   Lower Extremity Weight Bearing Restrictions   Right Lower Extremity Weight Bearing Non Weight Bearing   Left Lower Extremity Weight Bearing Weight Bearing As Tolerated   Subjective   Subjective R LE   Pain 8/10; SN administered pain meds   Orientation   Overall Orientation Status WNL   Cognition   Overall Cognitive Status WNL   Transfers   Stand Pivot Transfers Minimal assistance  (stand pivot back to bed; pt fatigued)   Sit to stand Minimal assistance   Stand to sit Contact guard assistance   Transfer Comments Pt performs wheelchair pushups in sets of 5 with min assist to achieve 3/4 to full elbow extension   OT Exercises   Exercise Treatment B UE AROM ex with 2# for R UE and 3# for L UE   Activity Tolerance   Activity Tolerance Patient limited by fatigue;Patient limited by pain;Patient Tolerated treatment well   Activity 
Palliative care f/u note.  Pt is in therapy.  Spouse is in pt room.  She reports pt feels as though he is improving, however, still feels \"winded\" d/t PNA.  She voiced no needs ot=r concerns at this time.      Electronically signed by Margaret Renee RN on 6/27/2024 at 2:31 PM    
Patient placed on 9 lpm HFNC. TS RRT  
Patient:   Noe Jimenez  MR#:    [id number]   Room:    0821/821-02   YOB: 1965  Date of Progress Note: 6/29/2024  Time of Note                           10:19 AM  Consulting Physician:   Colton Gusman M.D.  Attending Physician:  Colton Gusman MD       CHIEF COMPLAINT: Right leg pain     Subjective:  This 59 y.o. male  with history of A-Fib, CHF, HTN, HLD, DM and chronic right foot pain d/t work injury about 2 years ago requiring 5 surgeries. He had a non-healing wound to his right heel. He was seen as outpatient by orthopedic surgeon Dr. Coronado who had performed the past surgeries to his right ankle. Unfortunately, the fusion to his right ankle had failed and Dr. Coronado recommended a right below-the-knee amputation. He was referred to vascular surgeon Dr. Galvan. After evaluation, Dr. Galvan agreed with performing a right BKA. Patient was in agreement and was admitted [facility] on 6/3/24 for surgery. He tolerated the procedure well. He continues to a have significant pain rating at 8/10, which is being controlled with PO medications. During his hospital stay his has had JANICE and acute blood loss anemia requiring transfusion. Hemoglobin  at 7.6 and creatinine at 1.27. when he was admitted to Rehab on 6/7/2024. He was participating in both PT/OT and progressing well. On 6/17/24 patient had c/o nasal congestion. Respiratory panel done was positive for parainfluenza virus 3. Imaging done revealed left lower lobe pneumonia. He had worsening O2 saturations requiring O2 up to 5 l/m nc. He was placed on nebulizer treatments and IV Zosyn. He also had worsening renal function with creatine up to 2.9, marked swelling of lower extremities/scrotal and penile swelling,  hyperkalemia and elevated latic acid. Nephrology and Hospitalist were consulted. on 6/18/24 he was admitted back to acute care under the Hospitalist service for hospital-acquired pneumonia and JANICE. He was placed on strict I&O and IV Bumex. 
Patient:   Noe Jimenez  MR#:    [id number]   Room:    0821/821-02   YOB: 1965  Date of Progress Note: 6/27/2024  Time of Note                           7:40 AM  Consulting Physician:   Colton Gusman M.D.  Attending Physician:  Colton Gusman MD       CHIEF COMPLAINT: Right leg pain     Subjective:  This 59 y.o. male  with history of A-Fib, CHF, HTN, HLD, DM and chronic right foot pain d/t work injury about 2 years ago requiring 5 surgeries. He had a non-healing wound to his right heel. He was seen as outpatient by orthopedic surgeon Dr. Coronado who had performed the past surgeries to his right ankle. Unfortunately, the fusion to his right ankle had failed and Dr. Coronado recommended a right below-the-knee amputation. He was referred to vascular surgeon Dr. Galvan. After evaluation, Dr. Galvan agreed with performing a right BKA. Patient was in agreement and was admitted [facility] on 6/3/24 for surgery. He tolerated the procedure well. He continues to a have significant pain rating at 8/10, which is being controlled with PO medications. During his hospital stay his has had JANICE and acute blood loss anemia requiring transfusion. Hemoglobin  at 7.6 and creatinine at 1.27. when he was admitted to Rehab on 6/7/2024. He was participating in both PT/OT and progressing well. On 6/17/24 patient had c/o nasal congestion. Respiratory panel done was positive for parainfluenza virus 3. Imaging done revealed left lower lobe pneumonia. He had worsening O2 saturations requiring O2 up to 5 l/m nc. He was placed on nebulizer treatments and IV Zosyn. He also had worsening renal function with creatine up to 2.9, marked swelling of lower extremities/scrotal and penile swelling,  hyperkalemia and elevated latic acid. Nephrology and Hospitalist were consulted. on 6/18/24 he was admitted back to acute care under the Hospitalist service for hospital-acquired pneumonia and JANICE. He was placed on strict I&O and IV Bumex. 
Physical Therapy     06/26/24 1300   Transfers   Sit to Stand Minimal Assistance;Contact guard assistance   Stand to Sit Contact guard assistance   Bed to Chair Contact guard assistance   Stand Pivot Transfers Contact guard assistance   Comment pt. transfers from the bed to the wheelchair to the L using AD and CGA. Pt. struggles to come to standing but is able to pivot using walker easily.   Propulsion 1   Propulsion Manual   Level Level Tile   Method RUE;LUE;LLE   Level of Assistance Independent   Description/ Details pt. able to operate WC with no steering assistance   Distance 200'   Propulsion 2   Propulsion 2 Manual   Level 2 Level Tile   Method 2 RUE;LUE;LLE   Level of Assistance 2 Independent   Description/ Details 2 pt. able to operate WC with no steering assistance   Distance 2 75'   PT Exercises   Exercise Treatment sit to stand x8 in // bars  (pt. is really fatigued at the start of session and needs long breaks in between sets. 4x2 to get to 8 reps.)   Assessment   Assessment pt. fatigued at the start of session. Complains of SOB with minimal activity. Needs long rest breaks between each activity. Pt. has slight posterior lean when coming to standing. Requires VC to get chin over my right shoulder in order to bend at the waist and shift center of mass over LLE. Continue to work on body mechanics when coming to standing in the future.     Electronically signed by GAMALIEL Louis on 6/26/2024 at 1:39 PM   
Physical Therapy     06/28/24 1100   Transfers   Sit to Stand Minimal Assistance;Contact guard assistance   Stand to Sit Contact guard assistance   Bed to Chair Contact guard assistance   Stand Pivot Transfers Contact guard assistance   Comment pt. performs stand pivot transfer to the left with Ad and Min. Assist from bed to W/C   Propulsion 1   Propulsion Manual   Level Level Tile   Method RUE;LUE;LLE   Level of Assistance Independent   Description/ Details pt. able to operate WC with no steering assistance   Distance 250'   Propulsion 2   Propulsion 2 Manual   Level 2 Level Tile   Method 2 RUE;LUE;LLE   Level of Assistance 2 Independent   Description/ Details 2 pt. able to operate WC with no steering assistance   Distance 2 250'   PT Exercises   Exercise Treatment Sit to stands x5, retropropulsion of W/C for 100', seated bilateral hip flx/ext, abd/add, knee flx/ext, DF/PF  (VC to get pt. chin over therapist right shoulder.provided verbal and tactile cues. Tactile cue on left glute for neuromuscular facilitaion to get to standing)   Assessment   Assessment pt. states they are fatigued from their shower with OT. Assessed for UP AD NEHEMIAS but not recommended at this date. Pt. is tired from self propeling W/C from room to // bars. Requires longer breaks during morning therapy session due to fatigue. decreased strength and endurance noted during this session.     Electronically signed by GAMALIEL Louis on 6/28/2024 at 11:49 AM   
Physical Therapy     06/28/24 1300   Transfers   Sit to Stand Minimal Assistance  (VC to scoot out in the chair.)   Stand to Sit Contact guard assistance   Bed to Chair Contact guard assistance   Stand Pivot Transfers Contact guard assistance   Comment pt. able to transfer well, struggles to get to standing.   Propulsion 1   Propulsion Manual   Level Level Tile   Method RUE;LUE;LLE   Level of Assistance Independent   Description/ Details pt. able to operate WC with no steering assistance   Distance 250'   Propulsion 2   Propulsion 2 Manual   Level 2 Level Tile   Method 2 RUE;LUE;LLE   Level of Assistance 2 Independent   Description/ Details 2 pt. able to operate WC with no steering assistance   Distance 2 250'   Propulsion 3   Propulsion 3 Manual   Level 3 Level Tile   Method 3 RUE;LUE   Level of Assistance 3 Independent   Description/ Details 3 pt. able to operate WC with no steering assistance   Distance 3 150'   PT Exercises   Exercise Treatment supine RLE hip flx/ext, sbd/add, knee ext/flx 2x10 with no resistance. Hip flexor stretch [id number]     Electronically signed by GAMALIEL Louis on 6/28/2024 at 2:00 PM   
Physical Therapy  Name: Noe Jimenez  MRN:  [id number]  Date of service:  6/29/2024 06/29/24 1300   Restrictions/Precautions   Restrictions/Precautions Fall Risk;Weight Bearing   Lower Extremity Weight Bearing Restrictions   Right Lower Extremity Weight Bearing Non Weight Bearing   Left Lower Extremity Weight Bearing Weight Bearing As Tolerated   Position Activity Restriction   Other position/activity restrictions Flotech   General   Additional Pertinent Hx AFIB, CHF, HTN, HLD, DM   Diagnosis RIGHT BKA; HAP   General Comment   Comments in WC   Subjective   Subjective Pt ready but states he usually does better with amb in am.   Subjective   Subjective R LE   Pain 6-7/10   Transfers   Sit to Stand Minimal Assistance   Comment STS performed multiple times in // bars   Ambulation   Surface Level tile   Device Parallel Bars   Assistance Minimal assistance;Contact guard assistance   Quality of Gait difficulty offloading foot to make step with audible scuffing; L knee progressively more flexed and excessively advancing UEs making less efficient for WBing   Gait Deviations Slow Linda;Decreased step length;Decreased step height   Distance 6'   Comments made approx 4' fwd and then out of energy so pivoted and started back before chair had to be brought to him; vc's to address UE advancement and blocking L knee   PT Exercises   Standing Open/Closed Kinetic Chain Exercises standing in // bars: L heel raises x 10, partial squats over WC x 10, R hip abd x 20, R hip ext x 20, press ups on // bars x 10 (unable to clear L foot)  (multiple seated rests)   Assessment   Assessment pt having difficulty coming to stand and offloading L LE for step to negotiate amb; relied more on static standing and ex to strengthen L LE and UEs   Safety Devices   Type of Devices Left in chair  (to OT via WC)   PT Individual Minutes   Time In 1300   Time Out 1338   Minutes 38         Electronically signed by Griselda Siegel PTA on 
Physical Therapy  Name: Noe Jimenez  MRN:  [id number]  Date of service:  6/29/2024 06/29/24 0815   Lower Extremity Weight Bearing Restrictions   Right Lower Extremity Weight Bearing Non Weight Bearing   Left Lower Extremity Weight Bearing Weight Bearing As Tolerated   General   Diagnosis RIGHT BKA; HAP   General Comment   Comments in WC   Subjective   Subjective Pt states he recently had pn meds and pain is starting to decline.   Subjective   Subjective R LE mostly phantom pain   Pain 8/10   Bed mobility   Rolling to Left Independent   Rolling to Right Independent   Supine to Sit Independent   Sit to Supine Independent   Bed Mobility Comments bed mob on mat table   Transfers   Sit to Stand Minimal Assistance   Bed to Chair Minimal assistance;Contact guard assistance  (stand pivot WC<>mat towards L side)   PT Exercises   A/AROM Exercises 3 x 20: B QS, B ADD sets, R SAQ   Resistive Exercises x 20 with man resist: SAQ, HS curls   Assessment   Assessment pt needs min assist to get to standing and complete pivot transfer; rubia LE ex given manual resist B with good tolerance   Safety Devices   Type of Devices Call light within reach;Left in chair   PT Individual Minutes   Time In 0815   Time Out 0920   Minutes 65         Electronically signed by Griselda Siegel PTA on 6/29/2024 at 12:04 PM    
Rapid response called at 1615, patients oxygen saturation on room air 70-75%, up to 82% on 2 liters/minute via nasal cannula, up to 88% on 6 liters/minute via nasal cannula. ABGs obtained by RT, patient on 9 liter/minute high flow nasal cannula per RT. Patient transferring to room 720 family notified.   
[facility]  Durable Medical Equipment  Medical Necessity Note  Wheeled Walker        Date: 2024  Patient Name: Noe Jimenez        MRN:   [id number]    Account Number: [id number]  : 1965  (59 y.o.)  Gender: male         Noe Jimenez requires a wheeled walker to ambulate to and from the bathroom to complete bathing, toileting, dressing and grooming tasks. He needs a device for safer movement within the home. These tasks cannot be completed by utilizing a cane secondary to a right lower extremity amputation, a rolling walker is required to  decrease the risk of falling. The patient is unable to bring a wheelchair into the bathroom due to the size. The patients weight of Weight - Scale: 93.4 kg (206 lb) / Body mass index is 29.56 kg/m². indicates that a Bariatric device is not needed.      [facility]  Durable Medical Equipment  Medical Necessity Note  Tub Transfer Bench        Date: 2024  Patient Name: Noe Jimenez        MRN:   [id number]    Account Number: [id number]  : 1965  (59 y.o.)  Gender: male         Noe Jimenez requires a tub transfer bench to safely transfer in and out of a tub to complete bathing tasks. Tub transfers cannot be completed using a shower chair due to a right lower extremity amputation. Without a tub transfer bench, he is at an increased risk for falls. The patients weight of Weight - Scale: 93.4 kg (206 lb) / Body mass index is 29.56 kg/m². indicates that a Bariatric device is not needed.          [facility]  Durable Medical Equipment  Medical Necessity Note  Bedrail        Date: 2024  Patient Name: Noe Jimenez        MRN:   [id number]   Account: [id number]   : 1965  (59 y.o.)  Gender: male         Noe Jimenez requires a bedrail to complete bed mobility. He requires a bedrail due to upper extremity weakness and right lower extremity amputation, and is unable to complete bed mobility without assistance.  
  Other position/activity restrictions  --   --   --    General   Additional Pertinent Hx  --   --   --    Diagnosis  --   --   --    Subjective   Subjective  --   --   --    Subjective   Pain  --   --   --    Bed mobility   Rolling to Left  --   --   --    Supine to Sit  --   --   --    Transfers   Sit to Stand Contact guard assistance  --   --    Stand to Sit Contact guard assistance  --   --    Bed to Chair Contact guard assistance  (Stand/ squat pivot to left)  --   --    Ambulation   Surface Level tile  --   --    Device Parallel Bars  --   --    Assistance Contact guard assistance  --   --    Quality of Gait pt. exhibits good scapular depression and good foot clearance at the beginning of gait. As pt. fatigued rollover and foot clearance decreased.  --   --    Gait Deviations Slow Linda;Decreased step length;Decreased step height  --   --    Distance 10' x2  --   --    Comments [employer] turns.  Amb chair to chair.  --   --    Wheelchair Activities   Wheelchair Parts Management Yes  --   --    Left Brakes Level of Assistance Independent  --   --    Right Brakes Level of Assistance Independent  --   --    Propulsion Yes  --   --    Propulsion 1   Propulsion Manual  --   --    Level Level Tile  --   --    Method RUE;LUE;LLE  --   --    Level of Assistance Independent  --   --    Distance 100', 75'  --   --    PT Exercises   Exercise Treatment  --  Sitting BLE exercises  x20 reps with 1-1/2 lb weight on LLE.  --    Activity Tolerance   Activity Tolerance  --   --   --    PT Individual Minutes   Time In  --   --  1000   Time Out  --   --  1055   Minutes  --   --  55      06/27/24 1100   Restrictions/Precautions   Restrictions/Precautions  --    Lower Extremity Weight Bearing Restrictions   Right Lower Extremity Weight Bearing  --    Left Lower Extremity Weight Bearing  --    Position Activity Restriction   Other position/activity restrictions  --    General   Additional Pertinent Hx  --    Diagnosis  --  
intact   CN IX and X- Palate elevates in midline  CN XI-good shoulder shrug  CN XII-Tongue midline with no fasciculations or fibrillations     Motor Exam  Mild weakness in the hand intrinsics and triceps and otherwise relatively normal.  Right BKA with appliance attached           Tremors- no tremors in hands or head noted     Gait  Not tested     Coordination  Finger to nose-unremarkable           Nursing/pcp notes, imaging,labs and vitals reviewed.         Lab Results   Component Value Date    WBC 10.5 06/27/2024    HGB 8.7 (L) 06/27/2024    HCT 28.7 (L) 06/27/2024    MCV 91.7 06/27/2024     06/27/2024     Lab Results   Component Value Date     06/28/2024    K 4.6 06/28/2024     06/28/2024    CO2 23 06/28/2024    BUN 59 (H) 06/28/2024    CREATININE 2.2 (H) 06/28/2024    GLUCOSE 192 (H) 06/28/2024    CALCIUM 8.8 06/28/2024    BILITOT 0.5 06/24/2024    ALKPHOS 218 (H) 06/24/2024    AST 15 06/24/2024    ALT 21 06/24/2024    LABGLOM 34 (A) 06/28/2024   No results found for: \"INR\"No results found for: \"PHENYTOIN\", \"ESR\", \"CRP\"    PT,OT and/or speech notes reviewed:      Physical Therapy       06/28/24 1100   Transfers   Sit to Stand Minimal Assistance;Contact guard assistance   Stand to Sit Contact guard assistance   Bed to Chair Contact guard assistance   Stand Pivot Transfers Contact guard assistance   Comment pt. performs stand pivot transfer to the left with Ad and Min. Assist from bed to W/C   Propulsion 1   Propulsion Manual   Level Level Tile   Method RUE;LUE;LLE   Level of Assistance Independent   Description/ Details pt. able to operate WC with no steering assistance   Distance 250'   Propulsion 2   Propulsion 2 Manual   Level 2 Level Tile   Method 2 RUE;LUE;LLE   Level of Assistance 2 Independent   Description/ Details 2 pt. able to operate WC with no steering assistance   Distance 2 250'   PT Exercises   Exercise Treatment Sit to stands x5, retropropulsion of W/C for 100', seated bilateral 
results for input(s): \"AST\", \"ALT\", \"LIPASE\", \"AMYLASE\", \"BILIDIR\", \"BILITOT\", \"ALKPHOS\" in the last 72 hours.    Invalid input(s): \"ALB\"    PT/INR: No results for input(s): \"PROTIME\", \"INR\" in the last 72 hours.  APTT: No results for input(s): \"APTT\" in the last 72 hours.  BNP:  No results for input(s): \"BNP\" in the last 72 hours.  Ionized Calcium:Invalid input(s): \"IONCA\"  Magnesium:  Recent Labs     06/26/24  0749   MG 2.3     Phosphorus:No results for input(s): \"PHOS\" in the last 72 hours.  HgbA1C: No results for input(s): \"LABA1C\" in the last 72 hours.  Hepatic:   No results for input(s): \"ALKPHOS\", \"ALT\", \"AST\", \"PROT\", \"BILITOT\", \"BILIDIR\", \"LABALBU\" in the last 72 hours.    Lactic Acid: No results for input(s): \"LACTA\" in the last 72 hours.  Troponin: No results for input(s): \"CKTOTAL\", \"CKMB\", \"TROPONINT\" in the last 72 hours.  ABGs: No results for input(s): \"PH\", \"PCO2\", \"PO2\", \"HCO3\", \"O2SAT\" in the last 72 hours.  CRP:  No results for input(s): \"CRP\" in the last 72 hours.  Sed Rate:  No results for input(s): \"SEDRATE\" in the last 72 hours.      Cultures:   No results for input(s): \"CULTURE\" in the last 72 hours.  No results for input(s): \"BC\", \"BLOODCULT2\" in the last 72 hours.  No results for input(s): \"CXSURG\" in the last 72 hours.    Radiology reports as per the Radiologist  Radiology: No results found.     Assessment   Acute kidney injury/ATN  Hyperkalemia  Metabolic acidosis  Anemia with recent operative blood losses  Hypertension  Pneumonia  Secondary hyperparathyroidism        Plan:  Discussed with patient, nursing, family  Workup reviewed today  Monitor labs  Calcitriol  IV diuretics with metolazone as needed, now transitioned to oral agents  Monitor clinical and volume status closely along with labs, output not recorded      Thank you for the consult, we appreciate the opportunity to provide care to your patients.  Feel free to contact me if I can be of any further assistance.      Molina JOE 
if I can be of any further assistance.      Molina Greer MD  06/27/24  2:22 PM

## 2024-07-01 DIAGNOSIS — D64.9 ANEMIA, UNSPECIFIED TYPE: Primary | ICD-10-CM

## 2024-07-01 PROBLEM — N17.9 AKI (ACUTE KIDNEY INJURY) (HCC): Status: ACTIVE | Noted: 2024-07-01

## 2024-07-01 PROBLEM — I50.33 ACUTE ON CHRONIC DIASTOLIC (CONGESTIVE) HEART FAILURE (HCC): Status: ACTIVE | Noted: 2024-07-01

## 2024-07-01 PROBLEM — Q24.8 INTERATRIAL CARDIAC SHUNT: Status: ACTIVE | Noted: 2024-06-30

## 2024-07-01 PROBLEM — R09.02 HYPOXIA: Status: ACTIVE | Noted: 2024-07-01

## 2024-07-01 LAB
ALBUMIN SERPL-MCNC: 2.9 G/DL (ref 3.5–5.2)
ALP SERPL-CCNC: 174 U/L (ref 40–130)
ALT SERPL-CCNC: 17 U/L (ref 5–41)
ANION GAP SERPL CALCULATED.3IONS-SCNC: 11 MMOL/L (ref 7–19)
APTT PPP: 55.8 SEC (ref 26–36.2)
APTT PPP: 58.4 SEC (ref 26–36.2)
APTT PPP: 63.6 SEC (ref 26–36.2)
APTT PPP: 79.8 SEC (ref 26–36.2)
AST SERPL-CCNC: 14 U/L (ref 5–40)
BASOPHILS # BLD: 0.1 K/UL (ref 0–0.2)
BASOPHILS NFR BLD: 0.5 % (ref 0–1)
BILIRUB SERPL-MCNC: 0.3 MG/DL (ref 0.2–1.2)
BUN SERPL-MCNC: 93 MG/DL (ref 6–20)
CALCIUM SERPL-MCNC: 8.3 MG/DL (ref 8.6–10)
CHLORIDE SERPL-SCNC: 103 MMOL/L (ref 98–111)
CO2 SERPL-SCNC: 21 MMOL/L (ref 22–29)
CREAT SERPL-MCNC: 2.7 MG/DL (ref 0.5–1.2)
EKG P AXIS: NORMAL DEGREES
EKG P-R INTERVAL: NORMAL MS
EKG Q-T INTERVAL: 480 MS
EKG QRS DURATION: 162 MS
EKG QTC CALCULATION (BAZETT): 480 MS
EKG T AXIS: 76 DEGREES
EOSINOPHIL # BLD: 0.1 K/UL (ref 0–0.6)
EOSINOPHIL NFR BLD: 0.4 % (ref 0–5)
ERYTHROCYTE [DISTWIDTH] IN BLOOD BY AUTOMATED COUNT: 18.6 % (ref 11.5–14.5)
GLUCOSE BLD-MCNC: 114 MG/DL (ref 70–99)
GLUCOSE BLD-MCNC: 137 MG/DL (ref 70–99)
GLUCOSE BLD-MCNC: 253 MG/DL (ref 70–99)
GLUCOSE BLD-MCNC: 378 MG/DL (ref 70–99)
GLUCOSE SERPL-MCNC: 458 MG/DL (ref 74–109)
HCT VFR BLD AUTO: 25.8 % (ref 42–52)
HGB BLD-MCNC: 8.4 G/DL (ref 14–18)
IMM GRANULOCYTES # BLD: 0.3 K/UL
LYMPHOCYTES # BLD: 1.9 K/UL (ref 1.1–4.5)
LYMPHOCYTES NFR BLD: 10.4 % (ref 20–40)
MCH RBC QN AUTO: 29.5 PG (ref 27–31)
MCHC RBC AUTO-ENTMCNC: 32.6 G/DL (ref 33–37)
MCV RBC AUTO: 90.5 FL (ref 80–94)
MONOCYTES # BLD: 0.8 K/UL (ref 0–0.9)
MONOCYTES NFR BLD: 4.5 % (ref 0–10)
NEUTROPHILS # BLD: 14.7 K/UL (ref 1.5–7.5)
NEUTS SEG NFR BLD: 82.6 % (ref 50–65)
PERFORMED ON: ABNORMAL
PLATELET # BLD AUTO: 193 K/UL (ref 130–400)
PMV BLD AUTO: 11.2 FL (ref 9.4–12.4)
POTASSIUM SERPL-SCNC: 5.3 MMOL/L (ref 3.5–5)
PROCALCITONIN: 0.28 NG/ML (ref 0–0.09)
PROT SERPL-MCNC: 6.7 G/DL (ref 6.6–8.7)
RBC # BLD AUTO: 2.85 M/UL (ref 4.7–6.1)
SODIUM SERPL-SCNC: 135 MMOL/L (ref 136–145)
WBC # BLD AUTO: 17.8 K/UL (ref 4.8–10.8)

## 2024-07-01 PROCEDURE — 82962 GLUCOSE BLOOD TEST: CPT

## 2024-07-01 PROCEDURE — 99254 IP/OBS CNSLTJ NEW/EST MOD 60: CPT | Performed by: INTERNAL MEDICINE

## 2024-07-01 PROCEDURE — 94761 N-INVAS EAR/PLS OXIMETRY MLT: CPT

## 2024-07-01 PROCEDURE — 2700000000 HC OXYGEN THERAPY PER DAY

## 2024-07-01 PROCEDURE — 6370000000 HC RX 637 (ALT 250 FOR IP): Performed by: INTERNAL MEDICINE

## 2024-07-01 PROCEDURE — 2580000003 HC RX 258: Performed by: STUDENT IN AN ORGANIZED HEALTH CARE EDUCATION/TRAINING PROGRAM

## 2024-07-01 PROCEDURE — 85025 COMPLETE CBC W/AUTO DIFF WBC: CPT

## 2024-07-01 PROCEDURE — 80053 COMPREHEN METABOLIC PANEL: CPT

## 2024-07-01 PROCEDURE — 99254 IP/OBS CNSLTJ NEW/EST MOD 60: CPT | Performed by: SURGERY

## 2024-07-01 PROCEDURE — 36415 COLL VENOUS BLD VENIPUNCTURE: CPT

## 2024-07-01 PROCEDURE — 93005 ELECTROCARDIOGRAM TRACING: CPT

## 2024-07-01 PROCEDURE — 6370000000 HC RX 637 (ALT 250 FOR IP): Performed by: STUDENT IN AN ORGANIZED HEALTH CARE EDUCATION/TRAINING PROGRAM

## 2024-07-01 PROCEDURE — 6360000002 HC RX W HCPCS: Performed by: STUDENT IN AN ORGANIZED HEALTH CARE EDUCATION/TRAINING PROGRAM

## 2024-07-01 PROCEDURE — 2140000000 HC CCU INTERMEDIATE R&B

## 2024-07-01 PROCEDURE — 85730 THROMBOPLASTIN TIME PARTIAL: CPT

## 2024-07-01 RX ORDER — INSULIN LISPRO 100 [IU]/ML
0-8 INJECTION, SOLUTION INTRAVENOUS; SUBCUTANEOUS EVERY 4 HOURS
Status: DISCONTINUED | OUTPATIENT
Start: 2024-07-01 | End: 2024-07-05

## 2024-07-01 RX ORDER — METOLAZONE 2.5 MG/1
5 TABLET ORAL DAILY
Status: DISCONTINUED | OUTPATIENT
Start: 2024-07-01 | End: 2024-07-02

## 2024-07-01 RX ADMIN — METOPROLOL TARTRATE 25 MG: 25 TABLET, FILM COATED ORAL at 08:14

## 2024-07-01 RX ADMIN — INSULIN LISPRO 2 UNITS: 100 INJECTION, SOLUTION INTRAVENOUS; SUBCUTANEOUS at 21:48

## 2024-07-01 RX ADMIN — HEPARIN SODIUM 2000 UNITS: 1000 INJECTION INTRAVENOUS; SUBCUTANEOUS at 11:33

## 2024-07-01 RX ADMIN — BUMETANIDE 1 MG: 0.25 INJECTION INTRAMUSCULAR; INTRAVENOUS at 08:14

## 2024-07-01 RX ADMIN — METOPROLOL TARTRATE 25 MG: 25 TABLET, FILM COATED ORAL at 22:03

## 2024-07-01 RX ADMIN — HYDROCODONE BITARTRATE AND ACETAMINOPHEN 1 TABLET: 10; 325 TABLET ORAL at 22:39

## 2024-07-01 RX ADMIN — CALCITRIOL CAPSULES 0.25 MCG 0.25 MCG: 0.25 CAPSULE ORAL at 08:14

## 2024-07-01 RX ADMIN — INSULIN GLARGINE 5 UNITS: 100 INJECTION, SOLUTION SUBCUTANEOUS at 22:03

## 2024-07-01 RX ADMIN — METOLAZONE 5 MG: 2.5 TABLET ORAL at 13:49

## 2024-07-01 RX ADMIN — INSULIN LISPRO 4 UNITS: 100 INJECTION, SOLUTION INTRAVENOUS; SUBCUTANEOUS at 08:24

## 2024-07-01 RX ADMIN — SODIUM CHLORIDE, PRESERVATIVE FREE 10 ML: 5 INJECTION INTRAVENOUS at 08:15

## 2024-07-01 RX ADMIN — BUMETANIDE 1 MG: 0.25 INJECTION INTRAMUSCULAR; INTRAVENOUS at 18:07

## 2024-07-01 RX ADMIN — HEPARIN SODIUM 2000 UNITS: 1000 INJECTION INTRAVENOUS; SUBCUTANEOUS at 06:36

## 2024-07-01 RX ADMIN — DILTIAZEM HYDROCHLORIDE 120 MG: 120 CAPSULE, COATED, EXTENDED RELEASE ORAL at 08:14

## 2024-07-01 ASSESSMENT — PAIN DESCRIPTION - DESCRIPTORS: DESCRIPTORS: ACHING;DISCOMFORT;THROBBING

## 2024-07-01 ASSESSMENT — PAIN SCALES - GENERAL
PAINLEVEL_OUTOF10: 9
PAINLEVEL_OUTOF10: 0

## 2024-07-01 ASSESSMENT — PAIN DESCRIPTION - LOCATION: LOCATION: LEG

## 2024-07-01 ASSESSMENT — PAIN - FUNCTIONAL ASSESSMENT: PAIN_FUNCTIONAL_ASSESSMENT: ACTIVITIES ARE NOT PREVENTED

## 2024-07-01 ASSESSMENT — PAIN DESCRIPTION - ORIENTATION: ORIENTATION: RIGHT

## 2024-07-01 NOTE — DISCHARGE SUMMARY
Occupational Therapy Discharge Summary         Date: 2024  Patient Name: Noe Jimenez        MRN: [id number]    : 1965  (59 y.o.)  Gender: male      Diagnosis: RIGHT BKA; HAP  Restrictions/Precautions  Restrictions/Precautions: Fall Risk, Weight Bearing  Required Braces or Orthoses?: Yes  Implants present? :  (jerry-anant)      Discharge Date:24      UE Functioning:  WNLs    Home Management:  Functional Mobility  Functional - Mobility Device: Wheelchair  Activity: To/From therapy gym  Assist Level: Supervision  Functional Mobility Comments: Completed laundry task in laundry room, cues for safe tech.    Adaptive Equipment/DME Status:  Bathroom Equipment: Grab bars around toilet (Planning to get handheld shower installed as well as grab bars)  Home Equipment: Grab bars, Wheelchair - Manual, Walker - Standard  To be determined    Pain Assessment:  9/10   R LE--phantom pain  Remaining Problems:  Decreased functional mobility ; Decreased ADL status; Decreased strength; Decreased endurance; Decreased high-level IADLs     STGs:  Short Term Goals  Time Frame for Short Term Goals: 1 week  Short Term Goal 1: Pt will complete all bathing with set up using AE prn  Short Term Goal 2: Pt will complete all toileting with set up  Short Term Goal 3: Pt will complete LB dressing with set up using AE prn  Short Term Goal 4: Pt will don/doff footwear with set up using AE prn  Short Term Goal 5: Pt will complete w/c level homemaking task with supervision    LTGs:  Long Term Goals  Time Frame for Long Term Goals : 2 weeks  Long Term Goal 1: Pt will complete all bathing with modified independence using AE prn  Long Term Goal 2: Pt will complete all toileting with modified independence  Long Term Goal 3: Pt will complete all dressing with modified independence  Long Term Goal 4: Pt will don/doff footwear with modfiied independence  Long Term Goal 5: Pt will complete w/c level homemaking task with modified

## 2024-07-01 NOTE — CARE COORDINATION
07/01/24 0850   Readmission Assessment   Number of Days since last admission? 1-7 days   Previous Disposition Other (comment)  (8th floor rehab)   Who is being Interviewed Patient   What was the patient's/caregiver's perception as to why they think they needed to return back to the hospital? Other (Comment)  (oxygen dropped)   Did you visit your Primary Care Physician after you left the hospital, before you returned this time? Yes   Did you see a specialist, such as Cardiac, Pulmonary, Orthopedic Physician, etc. after you left the hospital? No   Who advised the patient to return to the hospital? Other (Comment)  (8th floor rehab)   Does the patient report anything that got in the way of taking their medications? No   In our efforts to provide the best possible care to you and others like you, can you think of anything that we could have done to help you after you left the hospital the first time, so that you might not have needed to return so soon? Other (Comment)  (nothing)     Electronically signed by Janet Multani on 7/1/2024 at 8:51 AM

## 2024-07-01 NOTE — CARE COORDINATION
Pt will most likely not be able to return to [facility]. Pt was under work comp and they would be responsible for the bill. At this time hypoxia would be a diagnosis but, pt would have to use his [employer]. Pending PT/OT patient might attempt other options like home health, or skilled if needed.  Electronically signed by AMERICA LOPEZ on 7/1/2024 at 4:15 PM

## 2024-07-01 NOTE — DISCHARGE SUMMARY
Neurology Discharge Summary     Patient Identification:  Noe Jimenez is a 59 y.o. male.  :  1965  Admit Date:  2024  Discharge date :  2024  Attending Provider: No att. providers found     Account Number: [id number]                                   Admission Diagnoses:   S/P BKA (below knee amputation) unilateral, right (HCC) [Z89.511]    Discharge Diagnoses:  Principal Problem:    S/P BKA (below knee amputation) unilateral, right (HCC)  Resolved Problems:    * No resolved hospital problems. *      Discharge Medications:    Discharge Medication List as of 2024  7:41 PM             Details   furosemide (LASIX) 40 MG tablet Take 1 tablet by mouth daily as neededHistorical Med      apixaban (ELIQUIS) 5 MG TABS tablet Take 1 tablet by mouth 2 times dailyHistorical Med      ascorbic acid (VITAMIN C) 500 MG tablet Take 1 tablet by mouth dailyHistorical Med      naloxone 4 MG/0.1ML LIQD nasal spray 1 spray by Nasal route as needed for Opioid ReversalHistorical Med      zinc sulfate (ZINCATE) 220 (50 Zn)  mg capsule - elemental zinc Take 220 mg by mouth dailyHistorical Med      metFORMIN (GLUCOPHAGE) 1000 MG tablet Take 1 tablet by mouth 2 times daily (with meals)Historical Med      pregabalin (LYRICA) 100 MG capsule Take 1 capsule by mouth 3 times daily. Max Daily Amount: 300 mgHistorical Med      HYDROcodone-acetaminophen (NORCO)  MG per tablet Take 1 tablet by mouth every 8 hours as needed for Pain. Max Daily Amount: 3 tabletsHistorical Med      lisinopril (PRINIVIL;ZESTRIL) 10 MG tablet Take 1 tablet by mouth dailyHistorical Med           Discharge Medication List as of 2024  7:41 PM            Consults: Hospitalist    Hospital Course: The patient initially had done well during his stay in the rehab unit.  Unfortunately, on  developed significant hypoxia with saturations in the 70s.  Hospitalist was consulted.  Nephrology has been following for CKD/JANICE.  At this time

## 2024-07-01 NOTE — CARE COORDINATION
Case Management Assessment  Initial Evaluation    Date/Time of Evaluation: 7/1/2024 8:48 AM  Assessment Completed by: Janet Multani    If patient is discharged prior to next notation, then this note serves as note for discharge by case management.    Patient Name: Noe Jimenez                   YOB: 1965  Diagnosis: Hypoxia [R09.02]                   Date / Time: 6/30/2024  7:33 PM    Patient Admission Status: Inpatient   Readmission Risk (Low < 19, Mod (19-27), High > 27): Readmission Risk Score: 23.8    Current PCP: Willie Kim MD  PCP verified by CM? (P) Yes    Chart Reviewed: Yes      History Provided by: (P) Patient, Spouse  Patient Orientation: (P) Alert and Oriented    Patient Cognition: (P) Alert    Hospitalization in the last 30 days (Readmission):  Yes    If yes, Readmission Assessment in  Navigator will be completed.    Advance Directives:      Code Status: Full Code   Patient's Primary Decision Maker is: (P) Legal Next of Kin    Primary Decision Maker (Active): Barbara,Tammie - Spouse - [phone]    Discharge Planning:    Patient lives with: (P) Spouse/Significant Other Type of Home: (P) House  Primary Care Giver: (P) Spouse  Patient Support Systems include: (P) Spouse/Significant Other   Current Financial resources: (P) None  Current community resources: (P) None  Current services prior to admission: (P) Durable Medical Equipment            Current DME: (P) Shower Chair, Walker, Cane            Type of Home Care services:  (P) None    ADLS  Prior functional level: (P) Assistance with the following:, Mobility  Current functional level: (P) Assistance with the following:, Mobility    PT AM-PAC:   /24  OT AM-PAC:   /24    Family can provide assistance at DC: (P) Yes  Would you like Case Management to discuss the discharge plan with any other family members/significant others, and if so, who? (P) Yes  Plans to Return to Present Housing: (P) Unknown at present    Potential

## 2024-07-01 NOTE — H&P
left arm     ROTATOR CUFF REPAIR Right     x2     Family History   Problem Relation Age of Onset    Diabetes Father      Social History     Socioeconomic History    Marital status:      Spouse name: Not on file    Number of children: Not on file    Years of education: Not on file    Highest education level: Not on file   Occupational History    Not on file   Tobacco Use    Smoking status: Never    Smokeless tobacco: Not on file   Vaping Use    Vaping Use: Never used   Substance and Sexual Activity    Alcohol use: No    Drug use: No    Sexual activity: Not on file   Other Topics Concern    Not on file   Social History Narrative    Not on file     Social Determinants of Health     Financial Resource Strain: Not on file   Food Insecurity: No Food Insecurity (6/25/2024)    Hunger Vital Sign     Worried About Running Out of Food in the Last Year: Never true     Ran Out of Food in the Last Year: Never true   Transportation Needs: No Transportation Needs (6/25/2024)    PRAPARE - Transportation     Lack of Transportation (Medical): No     Lack of Transportation (Non-Medical): No   Physical Activity: Not on file   Stress: Not on file   Social Connections: Not on file   Intimate Partner Violence: Not on file   Housing Stability: Low Risk  (6/25/2024)    Housing Stability Vital Sign     Unable to Pay for Housing in the Last Year: No     Number of Places Lived in the Last Year: 1     Unstable Housing in the Last Year: No     Allergies   Allergen Reactions    Dilaudid [Hydromorphone] Anaphylaxis    Morphine Nausea And Vomiting and Dizziness or Vertigo    Percocet [Oxycodone-Acetaminophen] Nausea And Vomiting and Dizziness or Vertigo     Prior to Admission medications    Medication Sig Start Date End Date Taking? Authorizing Provider   furosemide (LASIX) 40 MG tablet Take 1 tablet by mouth daily as needed 5/29/24   Provider, MD Barbi   apixaban (ELIQUIS) 5 MG TABS tablet Take 1 tablet by mouth 2 times daily

## 2024-07-01 NOTE — DISCHARGE SUMMARY
Physical Therapy Discharge Note  DATE:  2024  NAME:  Noe Jimenez  :  1965  (59 y.o.,male)  MRN:  [id number]    HEIGHT:  Height: 177.8 cm (5' 10\")  WEIGHT:  Weight - Scale: 90.9 kg (200 lb 6 oz)    PATIENT DIAGNOSIS(ES):    Diagnosis: RIGHT BKA; HAP    Additional Pertinent Hx: AFIB, CHF, HTN, HLD, DM  RESTRICTIONS/PRECAUTIONS:    Restrictions/Precautions  Restrictions/Precautions: Fall Risk, Weight Bearing  Required Braces or Orthoses?: Yes  Implants present? :  (jerry-anant)  Position Activity Restriction  Other position/activity restrictions: [employer]  OVERALL  ORIENTATION STATUS:  Overall Orientation Status: Within Normal Limits  PAIN:  Pain Level: 0       Pain Location: Leg     Pain Orientation: Right      GROSS ASSESSMENT        POSTURE/BALANCE          ACTIVITY TOLERANCE  Activity Tolerance  Activity Tolerance: Patient tolerated treatment well, Patient limited by endurance      BED MOBILITY  Bed mobility  Rolling to Left: Independent  Rolling to Right: Independent  Supine to Sit: Independent  Sit to Supine: Independent  Bed Mobility Comments: bed mob on mat table        TRANSFERS  Transfers  Sit to Stand: Minimal Assistance  Stand to Sit: Contact guard assistance  Bed to Chair: Minimal assistance, Contact guard assistance (stand pivot WC<>mat towards L side)  Stand Pivot Transfers: Contact guard assistance  Comment: STS performed multiple times in // bars       AMBULATION 1  Ambulation  Surface: Level tile  Device: Parallel Bars  Other Apparatus: Wheelchair follow  Assistance: Minimal assistance, Contact guard assistance  Quality of Gait: difficulty offloading foot to make step with audible scuffing; L knee progressively more flexed and excessively advancing UEs making less efficient for WBing  Gait Deviations: Slow Linda, Decreased step length, Decreased step height  Distance: 6'  Comments: made approx 4' fwd and then out of energy so pivoted and started back before chair had to be brought to him;

## 2024-07-02 ENCOUNTER — ANESTHESIA (OUTPATIENT)
Age: 59
End: 2024-07-02
Payer: COMMERCIAL

## 2024-07-02 ENCOUNTER — HOSPITAL ENCOUNTER (INPATIENT)
Age: 59
Discharge: HOME | DRG: 321 | End: 2024-07-04
Attending: INTERNAL MEDICINE
Payer: COMMERCIAL

## 2024-07-02 ENCOUNTER — ANESTHESIA EVENT (OUTPATIENT)
Age: 59
End: 2024-07-02
Payer: COMMERCIAL

## 2024-07-02 VITALS — OXYGEN SATURATION: 98 % | HEIGHT: 70 IN | BODY MASS INDEX: 29.2 KG/M2 | WEIGHT: 204 LBS

## 2024-07-02 PROBLEM — R07.9 CHEST PAIN: Status: ACTIVE | Noted: 2024-06-30

## 2024-07-02 LAB
ALBUMIN SERPL-MCNC: 2.9 G/DL (ref 3.5–5.2)
ALP SERPL-CCNC: 155 U/L (ref 40–130)
ALT SERPL-CCNC: 15 U/L (ref 5–41)
ANION GAP SERPL CALCULATED.3IONS-SCNC: 12 MMOL/L (ref 7–19)
APTT PPP: 101.2 SEC (ref 26–36.2)
APTT PPP: 59.3 SEC (ref 26–36.2)
APTT PPP: 83.7 SEC (ref 26–36.2)
AST SERPL-CCNC: 14 U/L (ref 5–40)
BACTERIA URNS QL MICRO: NEGATIVE /HPF
BASOPHILS # BLD: 0.1 K/UL (ref 0–0.2)
BASOPHILS NFR BLD: 1 % (ref 0–1)
BILIRUB SERPL-MCNC: 0.4 MG/DL (ref 0.2–1.2)
BILIRUB UR QL STRIP: NEGATIVE
BUN SERPL-MCNC: 95 MG/DL (ref 6–20)
CALCIUM SERPL-MCNC: 8.8 MG/DL (ref 8.6–10)
CHLORIDE SERPL-SCNC: 106 MMOL/L (ref 98–111)
CLARITY UR: CLEAR
CO2 SERPL-SCNC: 24 MMOL/L (ref 22–29)
COLOR UR: YELLOW
CREAT SERPL-MCNC: 2.6 MG/DL (ref 0.5–1.2)
CRYSTALS URNS MICRO: ABNORMAL /HPF
ECHO BSA: 2.14 M2
ECHO MV MAX VELOCITY: 0.9 M/S
ECHO MV MEAN GRADIENT: 1 MMHG
ECHO MV MEAN VELOCITY: 0.5 M/S
ECHO MV PEAK GRADIENT: 3 MMHG
ECHO MV VTI: 19 CM
EOSINOPHIL # BLD: 0.3 K/UL (ref 0–0.6)
EOSINOPHIL NFR BLD: 3.2 % (ref 0–5)
EPI CELLS #/AREA URNS AUTO: 1 /HPF (ref 0–5)
ERYTHROCYTE [DISTWIDTH] IN BLOOD BY AUTOMATED COUNT: 18.9 % (ref 11.5–14.5)
GLUCOSE BLD-MCNC: 151 MG/DL (ref 70–99)
GLUCOSE BLD-MCNC: 158 MG/DL (ref 70–99)
GLUCOSE BLD-MCNC: 271 MG/DL (ref 70–99)
GLUCOSE BLD-MCNC: 310 MG/DL (ref 70–99)
GLUCOSE BLD-MCNC: 336 MG/DL (ref 70–99)
GLUCOSE BLD-MCNC: 359 MG/DL (ref 70–99)
GLUCOSE SERPL-MCNC: 142 MG/DL (ref 74–109)
GLUCOSE UR STRIP.AUTO-MCNC: NEGATIVE MG/DL
HCT VFR BLD AUTO: 27.3 % (ref 42–52)
HGB BLD-MCNC: 8.1 G/DL (ref 14–18)
HGB UR STRIP.AUTO-MCNC: ABNORMAL MG/L
HYALINE CASTS #/AREA URNS AUTO: 2 /HPF (ref 0–8)
IMM GRANULOCYTES # BLD: 0.2 K/UL
KETONES UR STRIP.AUTO-MCNC: NEGATIVE MG/DL
LEUKOCYTE ESTERASE UR QL STRIP.AUTO: NEGATIVE
LYMPHOCYTES # BLD: 2.1 K/UL (ref 1.1–4.5)
LYMPHOCYTES NFR BLD: 21.7 % (ref 20–40)
MCH RBC QN AUTO: 28.1 PG (ref 27–31)
MCHC RBC AUTO-ENTMCNC: 29.7 G/DL (ref 33–37)
MCV RBC AUTO: 94.8 FL (ref 80–94)
MONOCYTES # BLD: 0.7 K/UL (ref 0–0.9)
MONOCYTES NFR BLD: 7.4 % (ref 0–10)
NEUTROPHILS # BLD: 6.3 K/UL (ref 1.5–7.5)
NEUTS SEG NFR BLD: 64.5 % (ref 50–65)
NITRITE UR QL STRIP.AUTO: NEGATIVE
PERFORMED ON: ABNORMAL
PH UR STRIP.AUTO: 5 [PH] (ref 5–8)
PLATELET # BLD AUTO: 194 K/UL (ref 130–400)
PMV BLD AUTO: 10.5 FL (ref 9.4–12.4)
POTASSIUM SERPL-SCNC: 4.9 MMOL/L (ref 3.5–5)
PROT SERPL-MCNC: 6.6 G/DL (ref 6.6–8.7)
PROT UR STRIP.AUTO-MCNC: 100 MG/DL
RBC # BLD AUTO: 2.88 M/UL (ref 4.7–6.1)
RBC #/AREA URNS AUTO: 7 /HPF (ref 0–4)
SODIUM SERPL-SCNC: 142 MMOL/L (ref 136–145)
SP GR UR STRIP.AUTO: 1.01 (ref 1–1.03)
UROBILINOGEN UR STRIP.AUTO-MCNC: 0.2 E.U./DL
WBC # BLD AUTO: 9.9 K/UL (ref 4.8–10.8)
WBC #/AREA URNS AUTO: 3 /HPF (ref 0–5)

## 2024-07-02 PROCEDURE — 6370000000 HC RX 637 (ALT 250 FOR IP): Performed by: STUDENT IN AN ORGANIZED HEALTH CARE EDUCATION/TRAINING PROGRAM

## 2024-07-02 PROCEDURE — 94761 N-INVAS EAR/PLS OXIMETRY MLT: CPT

## 2024-07-02 PROCEDURE — 85730 THROMBOPLASTIN TIME PARTIAL: CPT

## 2024-07-02 PROCEDURE — 81001 URINALYSIS AUTO W/SCOPE: CPT

## 2024-07-02 PROCEDURE — 99152 MOD SED SAME PHYS/QHP 5/>YRS: CPT | Performed by: INTERNAL MEDICINE

## 2024-07-02 PROCEDURE — 93325 DOPPLER ECHO COLOR FLOW MAPG: CPT | Performed by: INTERNAL MEDICINE

## 2024-07-02 PROCEDURE — 99153 MOD SED SAME PHYS/QHP EA: CPT | Performed by: INTERNAL MEDICINE

## 2024-07-02 PROCEDURE — 36415 COLL VENOUS BLD VENIPUNCTURE: CPT

## 2024-07-02 PROCEDURE — 93312 ECHO TRANSESOPHAGEAL: CPT | Performed by: INTERNAL MEDICINE

## 2024-07-02 PROCEDURE — 2700000000 HC OXYGEN THERAPY PER DAY

## 2024-07-02 PROCEDURE — 2140000000 HC CCU INTERMEDIATE R&B

## 2024-07-02 PROCEDURE — 80053 COMPREHEN METABOLIC PANEL: CPT

## 2024-07-02 PROCEDURE — 93320 DOPPLER ECHO COMPLETE: CPT | Performed by: INTERNAL MEDICINE

## 2024-07-02 PROCEDURE — 6370000000 HC RX 637 (ALT 250 FOR IP): Performed by: INTERNAL MEDICINE

## 2024-07-02 PROCEDURE — 3700000001 HC ADD 15 MINUTES (ANESTHESIA): Performed by: INTERNAL MEDICINE

## 2024-07-02 PROCEDURE — 2580000003 HC RX 258

## 2024-07-02 PROCEDURE — 6360000002 HC RX W HCPCS

## 2024-07-02 PROCEDURE — 6360000002 HC RX W HCPCS: Performed by: STUDENT IN AN ORGANIZED HEALTH CARE EDUCATION/TRAINING PROGRAM

## 2024-07-02 PROCEDURE — 3700000000 HC ANESTHESIA ATTENDED CARE: Performed by: INTERNAL MEDICINE

## 2024-07-02 PROCEDURE — 2500000003 HC RX 250 WO HCPCS

## 2024-07-02 PROCEDURE — B246ZZ4 ULTRASONOGRAPHY OF RIGHT AND LEFT HEART, TRANSESOPHAGEAL: ICD-10-PCS | Performed by: INTERNAL MEDICINE

## 2024-07-02 PROCEDURE — 82962 GLUCOSE BLOOD TEST: CPT

## 2024-07-02 PROCEDURE — 85025 COMPLETE CBC W/AUTO DIFF WBC: CPT

## 2024-07-02 PROCEDURE — 93312 ECHO TRANSESOPHAGEAL: CPT

## 2024-07-02 RX ORDER — PROPOFOL 10 MG/ML
INJECTION, EMULSION INTRAVENOUS PRN
Status: DISCONTINUED | OUTPATIENT
Start: 2024-07-02 | End: 2024-07-02 | Stop reason: SDUPTHER

## 2024-07-02 RX ORDER — METOLAZONE 2.5 MG/1
5 TABLET ORAL
Status: DISCONTINUED | OUTPATIENT
Start: 2024-07-03 | End: 2024-07-04

## 2024-07-02 RX ORDER — NITROGLYCERIN 0.4 MG/1
0.4 TABLET SUBLINGUAL EVERY 5 MIN PRN
Status: DISCONTINUED | OUTPATIENT
Start: 2024-07-05 | End: 2024-07-05 | Stop reason: HOSPADM

## 2024-07-02 RX ORDER — SODIUM CHLORIDE 0.9 % (FLUSH) 0.9 %
5-40 SYRINGE (ML) INJECTION EVERY 12 HOURS SCHEDULED
Status: DISCONTINUED | OUTPATIENT
Start: 2024-07-02 | End: 2024-07-05 | Stop reason: HOSPADM

## 2024-07-02 RX ORDER — SODIUM CHLORIDE 0.9 % (FLUSH) 0.9 %
5-40 SYRINGE (ML) INJECTION PRN
Status: DISCONTINUED | OUTPATIENT
Start: 2024-07-02 | End: 2024-07-05 | Stop reason: HOSPADM

## 2024-07-02 RX ORDER — ASPIRIN 325 MG
325 TABLET ORAL ONCE
Status: COMPLETED | OUTPATIENT
Start: 2024-07-05 | End: 2024-07-05

## 2024-07-02 RX ORDER — SODIUM CHLORIDE, SODIUM LACTATE, POTASSIUM CHLORIDE, CALCIUM CHLORIDE 600; 310; 30; 20 MG/100ML; MG/100ML; MG/100ML; MG/100ML
INJECTION, SOLUTION INTRAVENOUS CONTINUOUS PRN
Status: DISCONTINUED | OUTPATIENT
Start: 2024-07-02 | End: 2024-07-02 | Stop reason: SDUPTHER

## 2024-07-02 RX ORDER — SODIUM CHLORIDE 9 MG/ML
INJECTION, SOLUTION INTRAVENOUS PRN
Status: DISCONTINUED | OUTPATIENT
Start: 2024-07-02 | End: 2024-07-05 | Stop reason: HOSPADM

## 2024-07-02 RX ORDER — LIDOCAINE HYDROCHLORIDE 10 MG/ML
INJECTION, SOLUTION INFILTRATION; PERINEURAL PRN
Status: DISCONTINUED | OUTPATIENT
Start: 2024-07-02 | End: 2024-07-02 | Stop reason: SDUPTHER

## 2024-07-02 RX ADMIN — INSULIN LISPRO 4 UNITS: 100 INJECTION, SOLUTION INTRAVENOUS; SUBCUTANEOUS at 02:33

## 2024-07-02 RX ADMIN — INSULIN LISPRO 8 UNITS: 100 INJECTION, SOLUTION INTRAVENOUS; SUBCUTANEOUS at 21:09

## 2024-07-02 RX ADMIN — LIDOCAINE HYDROCHLORIDE 100 MG: 10 INJECTION, SOLUTION INFILTRATION; PERINEURAL at 08:33

## 2024-07-02 RX ADMIN — INSULIN GLARGINE 5 UNITS: 100 INJECTION, SOLUTION SUBCUTANEOUS at 21:09

## 2024-07-02 RX ADMIN — DILTIAZEM HYDROCHLORIDE 120 MG: 120 CAPSULE, COATED, EXTENDED RELEASE ORAL at 13:24

## 2024-07-02 RX ADMIN — METOPROLOL TARTRATE 25 MG: 25 TABLET, FILM COATED ORAL at 21:09

## 2024-07-02 RX ADMIN — INSULIN LISPRO 8 UNITS: 100 INJECTION, SOLUTION INTRAVENOUS; SUBCUTANEOUS at 18:06

## 2024-07-02 RX ADMIN — PROPOFOL 200 MG: 10 INJECTION, EMULSION INTRAVENOUS at 08:36

## 2024-07-02 RX ADMIN — CALCITRIOL CAPSULES 0.25 MCG 0.25 MCG: 0.25 CAPSULE ORAL at 07:59

## 2024-07-02 RX ADMIN — METOLAZONE 5 MG: 2.5 TABLET ORAL at 07:59

## 2024-07-02 RX ADMIN — HEPARIN SODIUM 17 UNITS/KG/HR: 10000 INJECTION, SOLUTION INTRAVENOUS at 13:08

## 2024-07-02 RX ADMIN — PROPOFOL 100 MG: 10 INJECTION, EMULSION INTRAVENOUS at 09:06

## 2024-07-02 RX ADMIN — INSULIN LISPRO 6 UNITS: 100 INJECTION, SOLUTION INTRAVENOUS; SUBCUTANEOUS at 12:05

## 2024-07-02 RX ADMIN — HEPARIN SODIUM 2000 UNITS: 1000 INJECTION INTRAVENOUS; SUBCUTANEOUS at 02:11

## 2024-07-02 RX ADMIN — HEPARIN SODIUM 2000 UNITS: 1000 INJECTION INTRAVENOUS; SUBCUTANEOUS at 16:39

## 2024-07-02 RX ADMIN — BUMETANIDE 1 MG: 0.25 INJECTION INTRAMUSCULAR; INTRAVENOUS at 07:59

## 2024-07-02 RX ADMIN — SODIUM CHLORIDE, SODIUM LACTATE, POTASSIUM CHLORIDE, AND CALCIUM CHLORIDE: 600; 310; 30; 20 INJECTION, SOLUTION INTRAVENOUS at 08:32

## 2024-07-02 RX ADMIN — METOPROLOL TARTRATE 25 MG: 25 TABLET, FILM COATED ORAL at 14:35

## 2024-07-02 RX ADMIN — BUMETANIDE 1 MG: 0.25 INJECTION INTRAMUSCULAR; INTRAVENOUS at 18:06

## 2024-07-02 ASSESSMENT — ENCOUNTER SYMPTOMS: DYSPNEA ACTIVITY LEVEL: AFTER AMBULATING 2 FLIGHTS OF STAIRS

## 2024-07-02 NOTE — PLAN OF CARE

## 2024-07-02 NOTE — ANESTHESIA PRE PROCEDURE
I48.91    Anemia D64.9    CHF (congestive heart failure) ([facility]) I50.9    Renal dysfunction N28.9    Hospital-acquired pneumonia J18.9, Y95    HAP (hospital-acquired pneumonia) J18.9, Y95    Vitamin D deficiency E55.9    Acute hypoxic respiratory failure ([facility]) J96.01    Palliative care patient Z51.5    Interatrial cardiac shunt Q24.8    Hypoxia R09.02    Acute on chronic diastolic (congestive) heart failure ([facility]) I50.33    JANICE (acute kidney injury) ([facility]) N17.9       Past Medical History:        Diagnosis Date    Diabetes mellitus (HCC)     Hypertension        Past Surgical History:        Procedure Laterality Date    BACK SURGERY      x2    CARDIAC SURGERY      hole in heart as a child    FRACTURE SURGERY      left arm     ROTATOR CUFF REPAIR Right     x2       Social History:    Social History     Tobacco Use    Smoking status: Never    Smokeless tobacco: Not on file   Substance Use Topics    Alcohol use: No                                Counseling given: Not Answered      Vital Signs (Current): There were no vitals filed for this visit.                                           BP Readings from Last 3 Encounters:   07/02/24 123/78   06/30/24 124/77   06/25/24 (!) 145/86       NPO Status:                                                                                 BMI:   Wt Readings from Last 3 Encounters:   07/02/24 93.7 kg (206 lb 9.6 oz)   06/29/24 90.9 kg (200 lb 6 oz)   06/23/24 94.2 kg (207 lb 9.6 oz)     There is no height or weight on file to calculate BMI.    CBC:   Lab Results   Component Value Date/Time    WBC 17.8 07/01/2024 05:22 AM    RBC 2.85 07/01/2024 05:22 AM    HGB 8.4 07/01/2024 05:22 AM    HCT 25.8 07/01/2024 05:22 AM    MCV 90.5 07/01/2024 05:22 AM    RDW 18.6 07/01/2024 05:22 AM     07/01/2024 05:22 AM       CMP:   Lab Results   Component Value Date/Time     07/01/2024 05:22 AM    K 5.3 07/01/2024 05:22 AM     07/01/2024 05:22 AM    CO2 21 07/01/2024 05:22 AM    BUN 93

## 2024-07-02 NOTE — PLAN OF CARE
Problem: Discharge Planning  Goal: Discharge to home or other facility with appropriate resources  7/2/2024 1650 by Chanelle Castro RN  Outcome: Progressing  7/2/2024 0733 by Nimco Rodriguez RN  Outcome: Progressing     Problem: Safety - Adult  Goal: Free from fall injury  7/2/2024 1650 by Chanelle Castro, RN  Outcome: Progressing  7/2/2024 0733 by Nimco Rodriguez RN  Outcome: Progressing     Problem: Pain  Goal: Verbalizes/displays adequate comfort level or baseline comfort level  7/2/2024 1650 by Chanelle Castro RN  Outcome: Progressing  7/2/2024 0733 by Nimco Rodriguez RN  Outcome: Progressing     Problem: Chronic Conditions and Co-morbidities  Goal: Patient's chronic conditions and co-morbidity symptoms are monitored and maintained or improved  7/2/2024 1650 by Chanelle Castro RN  Outcome: Progressing  7/2/2024 0733 by Nimco Rodriguez RN  Outcome: Progressing     Problem: ABCDS Injury Assessment  Goal: Absence of physical injury  7/2/2024 1650 by Chanelle Castro RN  Outcome: Progressing  7/2/2024 0733 by Nimco Rodriguez RN  Outcome: Progressing

## 2024-07-02 NOTE — CONSULTS
[facility]  Cardiology Consult      Requesting MD:  Tereso Rivera MD   Admit Status:         History obtained from:   [] Patient  [] Other (specify):     PROBLEM LIST:    Patient Active Problem List    Diagnosis Date Noted    Hypoxia 07/01/2024     Priority: Low    Acute on chronic diastolic (congestive) heart failure ([facility]) 07/01/2024     Priority: Low    JANICE (acute kidney injury) ([facility]) 07/01/2024     Priority: Low    Interatrial cardiac shunt 06/30/2024     Priority: Low    Palliative care patient 06/20/2024     Priority: Low    Acute hypoxic respiratory failure ([facility]) 06/19/2024     Priority: Low    Hospital-acquired pneumonia 06/18/2024     Priority: Low    HAP (hospital-acquired pneumonia) 06/18/2024     Priority: Low    Vitamin D deficiency 06/18/2024     Priority: Low    Atrial fibrillation ([facility]) 06/08/2024     Priority: Low    Anemia 06/08/2024     Priority: Low    CHF (congestive heart failure) ([facility]) 06/08/2024     Priority: Low    Renal dysfunction 06/08/2024     Priority: Low    S/P BKA (below knee amputation) unilateral, right ([facility]) 06/07/2024     Priority: Low    Type 2 diabetes mellitus with complication, without long-term current use of insulin ([facility]) 06/07/2024     Priority: Low    Essential hypertension 06/07/2024     Priority: Low    Lumbar post-laminectomy syndrome 08/02/2016     Priority: Low    Lumbar radiculopathy 08/02/2016     Priority: Low    Muscle spasm of back 08/02/2016     Priority: Low    Sacroiliac joint dysfunction of both sides 08/02/2016     Priority: Low    Trochanteric bursitis of both hips 08/02/2016     Priority: Low    Peripheral neuropathic pain 08/02/2016     Priority: Low     1.  Diastolic heart failure with preserved EF, moderate right ventricular enlargement with moderate left atrial enlargement and strongly positive bubble study with history of possible surgical ASD repair in childhood.  2.  Diabetes mellitus type 2.  3.  Hypertension.  4.  
    58 yo diabetic with recent BKA who now has evidence of orthodeoxia, in other words desaturation when sitting up compared to lying flat. When this finding is noted in association with an ASD as in this patient, it suggests that there is a redirection of right sided blood into the left atrium that is provoked by the patient's position. The right to left shunt is likely contributing to the patient's desaturation, in addition to other issues such as recent pneumonia and CHF. Further assessment by cardiology is indicated including right heart cath and EJ in order to clarify the underlying anatomy and physiology.      Robin Hitchcock MD

## 2024-07-02 NOTE — PROCEDURES
PROCEDURE NOTE  Date: 7/2/2024   Name: Noe Jimenez  YOB: 1965    Procedures      Transesophageal echocardiogram preliminary note:    Larger slitlike PFO with predominant left to right shunting with some right to left shunting.  Right atrium not significantly dilated.  Left atrium moderately dilated.  RV appears dilated with hypokinesis.  No significant valvular disease with trace MR and TR.  EF is preserved.    Findings would not explain level of symptoms or heart failure presentation.  Will need right and left heart catheterization.  Will plan for catheterization Friday with monitoring of renal functions.

## 2024-07-02 NOTE — ANESTHESIA POSTPROCEDURE EVALUATION
Department of Anesthesiology  Postprocedure Note    Patient: Noe Jimenez  MRN: [id number]  YOB: 1965  Date of evaluation: 7/2/2024    Procedure Summary       Date: 07/02/24 Room / Location: [facility] Cardiac Cath Lab    Anesthesia Start: 0832 Anesthesia Stop:     Procedure: EJ (PRN CONTRAST/BUBBLE/3D) Diagnosis:       JANICE (acute kidney injury) (HCC)      Acute on chronic diastolic (congestive) heart failure (HCC)      ASD (atrial septal defect)    Scheduled Providers: Charles Ramirez MD Responsible Provider: Tenzin Ford APRN - CRNA    Anesthesia Type: MAC ASA Status: 3            Anesthesia Type: MAC    Rosie Phase I: Rosie Score: 10    Rosie Phase II:      Anesthesia Post Evaluation    Patient location during evaluation: bedside  Patient participation: waiting for patient participation  Level of consciousness: responsive to verbal stimuli  Pain score: 0  Airway patency: patent  Nausea & Vomiting: no nausea and no vomiting  Cardiovascular status: blood pressure returned to baseline and hemodynamically stable  Respiratory status: acceptable, spontaneous ventilation and nonlabored ventilation  Hydration status: euvolemic  Pain management: adequate    No notable events documented.

## 2024-07-03 LAB
ALBUMIN SERPL-MCNC: 3 G/DL (ref 3.5–5.2)
ALP SERPL-CCNC: 170 U/L (ref 40–130)
ALT SERPL-CCNC: 18 U/L (ref 5–41)
ANION GAP SERPL CALCULATED.3IONS-SCNC: 11 MMOL/L (ref 7–19)
APTT PPP: 83 SEC (ref 26–36.2)
AST SERPL-CCNC: 15 U/L (ref 5–40)
BASOPHILS # BLD: 0.1 K/UL (ref 0–0.2)
BASOPHILS NFR BLD: 0.8 % (ref 0–1)
BILIRUB SERPL-MCNC: 0.4 MG/DL (ref 0.2–1.2)
BUN SERPL-MCNC: 96 MG/DL (ref 6–20)
CALCIUM SERPL-MCNC: 9.1 MG/DL (ref 8.6–10)
CHLORIDE SERPL-SCNC: 105 MMOL/L (ref 98–111)
CO2 SERPL-SCNC: 26 MMOL/L (ref 22–29)
CREAT SERPL-MCNC: 2.5 MG/DL (ref 0.5–1.2)
EOSINOPHIL # BLD: 0.5 K/UL (ref 0–0.6)
EOSINOPHIL NFR BLD: 3.3 % (ref 0–5)
ERYTHROCYTE [DISTWIDTH] IN BLOOD BY AUTOMATED COUNT: 19.1 % (ref 11.5–14.5)
GLUCOSE BLD-MCNC: 111 MG/DL (ref 70–99)
GLUCOSE BLD-MCNC: 227 MG/DL (ref 70–99)
GLUCOSE BLD-MCNC: 230 MG/DL (ref 70–99)
GLUCOSE BLD-MCNC: 242 MG/DL (ref 70–99)
GLUCOSE BLD-MCNC: 301 MG/DL (ref 70–99)
GLUCOSE BLD-MCNC: 94 MG/DL (ref 70–99)
GLUCOSE SERPL-MCNC: 154 MG/DL (ref 74–109)
HCT VFR BLD AUTO: 28.6 % (ref 42–52)
HGB BLD-MCNC: 8.7 G/DL (ref 14–18)
IMM GRANULOCYTES # BLD: 0.2 K/UL
LYMPHOCYTES # BLD: 2.1 K/UL (ref 1.1–4.5)
LYMPHOCYTES NFR BLD: 15.2 % (ref 20–40)
MCH RBC QN AUTO: 28.5 PG (ref 27–31)
MCHC RBC AUTO-ENTMCNC: 30.4 G/DL (ref 33–37)
MCV RBC AUTO: 93.8 FL (ref 80–94)
MONOCYTES # BLD: 0.9 K/UL (ref 0–0.9)
MONOCYTES NFR BLD: 6.2 % (ref 0–10)
NEUTROPHILS # BLD: 10 K/UL (ref 1.5–7.5)
NEUTS SEG NFR BLD: 73 % (ref 50–65)
PERFORMED ON: ABNORMAL
PERFORMED ON: NORMAL
PLATELET # BLD AUTO: 205 K/UL (ref 130–400)
PMV BLD AUTO: 10.9 FL (ref 9.4–12.4)
POTASSIUM SERPL-SCNC: 4.7 MMOL/L (ref 3.5–5)
POTASSIUM SERPL-SCNC: 4.7 MMOL/L (ref 3.5–5)
PROT SERPL-MCNC: 6.8 G/DL (ref 6.6–8.7)
RBC # BLD AUTO: 3.05 M/UL (ref 4.7–6.1)
SODIUM SERPL-SCNC: 142 MMOL/L (ref 136–145)
WBC # BLD AUTO: 13.7 K/UL (ref 4.8–10.8)

## 2024-07-03 PROCEDURE — 6360000002 HC RX W HCPCS: Performed by: STUDENT IN AN ORGANIZED HEALTH CARE EDUCATION/TRAINING PROGRAM

## 2024-07-03 PROCEDURE — 85025 COMPLETE CBC W/AUTO DIFF WBC: CPT

## 2024-07-03 PROCEDURE — 82962 GLUCOSE BLOOD TEST: CPT

## 2024-07-03 PROCEDURE — 80053 COMPREHEN METABOLIC PANEL: CPT

## 2024-07-03 PROCEDURE — 36415 COLL VENOUS BLD VENIPUNCTURE: CPT

## 2024-07-03 PROCEDURE — 2700000000 HC OXYGEN THERAPY PER DAY

## 2024-07-03 PROCEDURE — 85730 THROMBOPLASTIN TIME PARTIAL: CPT

## 2024-07-03 PROCEDURE — 94761 N-INVAS EAR/PLS OXIMETRY MLT: CPT

## 2024-07-03 PROCEDURE — 2580000003 HC RX 258: Performed by: INTERNAL MEDICINE

## 2024-07-03 PROCEDURE — 99232 SBSQ HOSP IP/OBS MODERATE 35: CPT | Performed by: INTERNAL MEDICINE

## 2024-07-03 PROCEDURE — 2140000000 HC CCU INTERMEDIATE R&B

## 2024-07-03 PROCEDURE — 6370000000 HC RX 637 (ALT 250 FOR IP): Performed by: STUDENT IN AN ORGANIZED HEALTH CARE EDUCATION/TRAINING PROGRAM

## 2024-07-03 PROCEDURE — 6370000000 HC RX 637 (ALT 250 FOR IP): Performed by: INTERNAL MEDICINE

## 2024-07-03 RX ORDER — HEPARIN SODIUM 1000 [USP'U]/ML
4000 INJECTION, SOLUTION INTRAVENOUS; SUBCUTANEOUS PRN
Status: DISCONTINUED | OUTPATIENT
Start: 2024-07-03 | End: 2024-07-05

## 2024-07-03 RX ORDER — HEPARIN SODIUM 10000 [USP'U]/100ML
5-30 INJECTION, SOLUTION INTRAVENOUS CONTINUOUS
Status: DISCONTINUED | OUTPATIENT
Start: 2024-07-03 | End: 2024-07-05

## 2024-07-03 RX ORDER — HEPARIN SODIUM 1000 [USP'U]/ML
2000 INJECTION, SOLUTION INTRAVENOUS; SUBCUTANEOUS PRN
Status: DISCONTINUED | OUTPATIENT
Start: 2024-07-03 | End: 2024-07-05

## 2024-07-03 RX ADMIN — SODIUM CHLORIDE, PRESERVATIVE FREE 10 ML: 5 INJECTION INTRAVENOUS at 09:27

## 2024-07-03 RX ADMIN — DILTIAZEM HYDROCHLORIDE 120 MG: 120 CAPSULE, COATED, EXTENDED RELEASE ORAL at 09:26

## 2024-07-03 RX ADMIN — METOLAZONE 5 MG: 2.5 TABLET ORAL at 14:39

## 2024-07-03 RX ADMIN — BUMETANIDE 1 MG: 0.25 INJECTION INTRAMUSCULAR; INTRAVENOUS at 17:12

## 2024-07-03 RX ADMIN — INSULIN LISPRO 2 UNITS: 100 INJECTION, SOLUTION INTRAVENOUS; SUBCUTANEOUS at 17:12

## 2024-07-03 RX ADMIN — BUMETANIDE 1 MG: 0.25 INJECTION INTRAMUSCULAR; INTRAVENOUS at 09:27

## 2024-07-03 RX ADMIN — METOPROLOL TARTRATE 25 MG: 25 TABLET, FILM COATED ORAL at 09:26

## 2024-07-03 RX ADMIN — INSULIN LISPRO 6 UNITS: 100 INJECTION, SOLUTION INTRAVENOUS; SUBCUTANEOUS at 14:40

## 2024-07-03 RX ADMIN — METOPROLOL TARTRATE 25 MG: 25 TABLET, FILM COATED ORAL at 20:15

## 2024-07-03 RX ADMIN — INSULIN LISPRO 2 UNITS: 100 INJECTION, SOLUTION INTRAVENOUS; SUBCUTANEOUS at 20:15

## 2024-07-03 RX ADMIN — INSULIN LISPRO 2 UNITS: 100 INJECTION, SOLUTION INTRAVENOUS; SUBCUTANEOUS at 00:58

## 2024-07-03 RX ADMIN — CALCITRIOL CAPSULES 0.25 MCG 0.25 MCG: 0.25 CAPSULE ORAL at 09:26

## 2024-07-03 RX ADMIN — INSULIN GLARGINE 5 UNITS: 100 INJECTION, SOLUTION SUBCUTANEOUS at 20:15

## 2024-07-03 NOTE — PLAN OF CARE
Problem: Discharge Planning  Goal: Discharge to home or other facility with appropriate resources  7/2/2024 2233 by Vasquez Duffy RN  Outcome: Progressing  7/2/2024 1650 by Chanelle Castro RN  Outcome: Progressing     Problem: Safety - Adult  Goal: Free from fall injury  7/2/2024 2233 by Vasquez Duffy RN  Outcome: Progressing  7/2/2024 1650 by Chanelle Castro RN  Outcome: Progressing     Problem: Pain  Goal: Verbalizes/displays adequate comfort level or baseline comfort level  7/2/2024 1650 by Chanelle Castro RN  Outcome: Progressing     Problem: Chronic Conditions and Co-morbidities  Goal: Patient's chronic conditions and co-morbidity symptoms are monitored and maintained or improved  7/2/2024 1650 by Chanelle Castro RN  Outcome: Progressing

## 2024-07-04 LAB
ALBUMIN SERPL-MCNC: 2.9 G/DL (ref 3.5–5.2)
ALP SERPL-CCNC: 165 U/L (ref 40–130)
ALT SERPL-CCNC: 17 U/L (ref 5–41)
ANION GAP SERPL CALCULATED.3IONS-SCNC: 10 MMOL/L (ref 7–19)
ANTI-XA UNFRAC HEPARIN: 0.68 IU/ML (ref 0.3–0.7)
ANTI-XA UNFRAC HEPARIN: 0.69 IU/ML (ref 0.3–0.7)
AST SERPL-CCNC: 17 U/L (ref 5–40)
BASOPHILS # BLD: 0.1 K/UL (ref 0–0.2)
BASOPHILS NFR BLD: 1 % (ref 0–1)
BILIRUB SERPL-MCNC: 0.4 MG/DL (ref 0.2–1.2)
BUN SERPL-MCNC: 90 MG/DL (ref 6–20)
CALCIUM SERPL-MCNC: 8.3 MG/DL (ref 8.6–10)
CHLORIDE SERPL-SCNC: 103 MMOL/L (ref 98–111)
CO2 SERPL-SCNC: 25 MMOL/L (ref 22–29)
CREAT SERPL-MCNC: 2.2 MG/DL (ref 0.5–1.2)
EOSINOPHIL # BLD: 0.4 K/UL (ref 0–0.6)
EOSINOPHIL NFR BLD: 4.2 % (ref 0–5)
ERYTHROCYTE [DISTWIDTH] IN BLOOD BY AUTOMATED COUNT: 19.9 % (ref 11.5–14.5)
GLUCOSE BLD-MCNC: 170 MG/DL (ref 70–99)
GLUCOSE BLD-MCNC: 186 MG/DL (ref 70–99)
GLUCOSE BLD-MCNC: 198 MG/DL (ref 70–99)
GLUCOSE BLD-MCNC: 275 MG/DL (ref 70–99)
GLUCOSE BLD-MCNC: 283 MG/DL (ref 70–99)
GLUCOSE BLD-MCNC: 296 MG/DL (ref 70–99)
GLUCOSE BLD-MCNC: 387 MG/DL (ref 70–99)
GLUCOSE SERPL-MCNC: 168 MG/DL (ref 74–109)
HCT VFR BLD AUTO: 29.7 % (ref 42–52)
HGB BLD-MCNC: 9 G/DL (ref 14–18)
IMM GRANULOCYTES # BLD: 0.2 K/UL
LYMPHOCYTES # BLD: 2.1 K/UL (ref 1.1–4.5)
LYMPHOCYTES NFR BLD: 21.8 % (ref 20–40)
MCH RBC QN AUTO: 29.6 PG (ref 27–31)
MCHC RBC AUTO-ENTMCNC: 30.3 G/DL (ref 33–37)
MCV RBC AUTO: 97.7 FL (ref 80–94)
MONOCYTES # BLD: 0.6 K/UL (ref 0–0.9)
MONOCYTES NFR BLD: 6.4 % (ref 0–10)
NEUTROPHILS # BLD: 6.3 K/UL (ref 1.5–7.5)
NEUTS SEG NFR BLD: 65 % (ref 50–65)
PERFORMED ON: ABNORMAL
PLATELET # BLD AUTO: 189 K/UL (ref 130–400)
PMV BLD AUTO: 10.8 FL (ref 9.4–12.4)
POTASSIUM SERPL-SCNC: 4.9 MMOL/L (ref 3.5–5)
PROT SERPL-MCNC: 6.1 G/DL (ref 6.6–8.7)
RBC # BLD AUTO: 3.04 M/UL (ref 4.7–6.1)
SODIUM SERPL-SCNC: 138 MMOL/L (ref 136–145)
WBC # BLD AUTO: 9.7 K/UL (ref 4.8–10.8)

## 2024-07-04 PROCEDURE — 94761 N-INVAS EAR/PLS OXIMETRY MLT: CPT

## 2024-07-04 PROCEDURE — 80053 COMPREHEN METABOLIC PANEL: CPT

## 2024-07-04 PROCEDURE — 85520 HEPARIN ASSAY: CPT

## 2024-07-04 PROCEDURE — 6370000000 HC RX 637 (ALT 250 FOR IP): Performed by: STUDENT IN AN ORGANIZED HEALTH CARE EDUCATION/TRAINING PROGRAM

## 2024-07-04 PROCEDURE — 6360000002 HC RX W HCPCS: Performed by: INTERNAL MEDICINE

## 2024-07-04 PROCEDURE — 36415 COLL VENOUS BLD VENIPUNCTURE: CPT

## 2024-07-04 PROCEDURE — 99232 SBSQ HOSP IP/OBS MODERATE 35: CPT | Performed by: INTERNAL MEDICINE

## 2024-07-04 PROCEDURE — 82962 GLUCOSE BLOOD TEST: CPT

## 2024-07-04 PROCEDURE — 85025 COMPLETE CBC W/AUTO DIFF WBC: CPT

## 2024-07-04 PROCEDURE — 2700000000 HC OXYGEN THERAPY PER DAY

## 2024-07-04 PROCEDURE — 2140000000 HC CCU INTERMEDIATE R&B

## 2024-07-04 PROCEDURE — 2580000003 HC RX 258: Performed by: INTERNAL MEDICINE

## 2024-07-04 PROCEDURE — 6360000002 HC RX W HCPCS: Performed by: STUDENT IN AN ORGANIZED HEALTH CARE EDUCATION/TRAINING PROGRAM

## 2024-07-04 RX ORDER — SODIUM CHLORIDE 9 MG/ML
INJECTION, SOLUTION INTRAVENOUS CONTINUOUS
Status: ACTIVE | OUTPATIENT
Start: 2024-07-04 | End: 2024-07-05

## 2024-07-04 RX ORDER — BUMETANIDE 0.25 MG/ML
0.5 INJECTION INTRAMUSCULAR; INTRAVENOUS 2 TIMES DAILY
Status: DISCONTINUED | OUTPATIENT
Start: 2024-07-04 | End: 2024-07-06

## 2024-07-04 RX ADMIN — HEPARIN SODIUM AND DEXTROSE 19 UNITS/KG/HR: 10000; 5 INJECTION INTRAVENOUS at 11:53

## 2024-07-04 RX ADMIN — SODIUM CHLORIDE: 9 INJECTION, SOLUTION INTRAVENOUS at 17:21

## 2024-07-04 RX ADMIN — CALCITRIOL CAPSULES 0.25 MCG 0.25 MCG: 0.25 CAPSULE ORAL at 09:25

## 2024-07-04 RX ADMIN — BUMETANIDE 0.5 MG: 0.25 INJECTION INTRAMUSCULAR; INTRAVENOUS at 17:14

## 2024-07-04 RX ADMIN — METOPROLOL TARTRATE 25 MG: 25 TABLET, FILM COATED ORAL at 21:39

## 2024-07-04 RX ADMIN — INSULIN LISPRO 8 UNITS: 100 INJECTION, SOLUTION INTRAVENOUS; SUBCUTANEOUS at 21:38

## 2024-07-04 RX ADMIN — INSULIN LISPRO 4 UNITS: 100 INJECTION, SOLUTION INTRAVENOUS; SUBCUTANEOUS at 12:55

## 2024-07-04 RX ADMIN — INSULIN GLARGINE 5 UNITS: 100 INJECTION, SOLUTION SUBCUTANEOUS at 21:39

## 2024-07-04 RX ADMIN — INSULIN LISPRO 4 UNITS: 100 INJECTION, SOLUTION INTRAVENOUS; SUBCUTANEOUS at 09:25

## 2024-07-04 RX ADMIN — HYDROCODONE BITARTRATE AND ACETAMINOPHEN 1 TABLET: 10; 325 TABLET ORAL at 21:39

## 2024-07-04 RX ADMIN — BUMETANIDE 1 MG: 0.25 INJECTION INTRAMUSCULAR; INTRAVENOUS at 09:25

## 2024-07-04 RX ADMIN — SODIUM CHLORIDE, PRESERVATIVE FREE 10 ML: 5 INJECTION INTRAVENOUS at 09:26

## 2024-07-04 RX ADMIN — METOPROLOL TARTRATE 25 MG: 25 TABLET, FILM COATED ORAL at 09:25

## 2024-07-04 RX ADMIN — DILTIAZEM HYDROCHLORIDE 120 MG: 120 CAPSULE, COATED, EXTENDED RELEASE ORAL at 09:25

## 2024-07-04 ASSESSMENT — PAIN SCALES - GENERAL
PAINLEVEL_OUTOF10: 0
PAINLEVEL_OUTOF10: 7

## 2024-07-05 ENCOUNTER — TELEPHONE (OUTPATIENT)
Dept: WOUND CARE | Facility: HOSPITAL | Age: 59
End: 2024-07-05
Payer: COMMERCIAL

## 2024-07-05 LAB
ALBUMIN SERPL-MCNC: 2.8 G/DL (ref 3.5–5.2)
ALP SERPL-CCNC: 143 U/L (ref 40–130)
ALT SERPL-CCNC: 13 U/L (ref 5–41)
ANION GAP SERPL CALCULATED.3IONS-SCNC: 11 MMOL/L (ref 7–19)
ANTI-XA UNFRAC HEPARIN: 0.39 IU/ML (ref 0.3–0.7)
AST SERPL-CCNC: 12 U/L (ref 5–40)
BACTERIA BLD CULT ORG #2: NORMAL
BACTERIA BLD CULT: NORMAL
BASOPHILS # BLD: 0.1 K/UL (ref 0–0.2)
BASOPHILS NFR BLD: 0.9 % (ref 0–1)
BILIRUB SERPL-MCNC: 0.4 MG/DL (ref 0.2–1.2)
BUN SERPL-MCNC: 81 MG/DL (ref 6–20)
CALCIUM SERPL-MCNC: 8.3 MG/DL (ref 8.6–10)
CHLORIDE SERPL-SCNC: 101 MMOL/L (ref 98–111)
CO2 SERPL-SCNC: 27 MMOL/L (ref 22–29)
CREAT SERPL-MCNC: 1.9 MG/DL (ref 0.5–1.2)
ECHO BSA: 2.14 M2
EKG P AXIS: NORMAL DEGREES
EKG P-R INTERVAL: NORMAL MS
EKG Q-T INTERVAL: 458 MS
EKG QRS DURATION: 158 MS
EKG QTC CALCULATION (BAZETT): 459 MS
EKG T AXIS: 61 DEGREES
EOSINOPHIL # BLD: 0.4 K/UL (ref 0–0.6)
EOSINOPHIL NFR BLD: 4.3 % (ref 0–5)
ERYTHROCYTE [DISTWIDTH] IN BLOOD BY AUTOMATED COUNT: 19.2 % (ref 11.5–14.5)
GLUCOSE BLD-MCNC: 134 MG/DL (ref 70–99)
GLUCOSE BLD-MCNC: 172 MG/DL (ref 70–99)
GLUCOSE BLD-MCNC: 207 MG/DL (ref 70–99)
GLUCOSE BLD-MCNC: 286 MG/DL (ref 70–99)
GLUCOSE BLD-MCNC: 382 MG/DL (ref 70–99)
GLUCOSE SERPL-MCNC: 172 MG/DL (ref 74–109)
HCT VFR BLD AUTO: 27 % (ref 42–52)
HGB BLD-MCNC: 8.2 G/DL (ref 14–18)
IMM GRANULOCYTES # BLD: 0.1 K/UL
LYMPHOCYTES # BLD: 1.9 K/UL (ref 1.1–4.5)
LYMPHOCYTES NFR BLD: 23.5 % (ref 20–40)
MCH RBC QN AUTO: 28 PG (ref 27–31)
MCHC RBC AUTO-ENTMCNC: 30.4 G/DL (ref 33–37)
MCV RBC AUTO: 92.2 FL (ref 80–94)
MONOCYTES # BLD: 0.6 K/UL (ref 0–0.9)
MONOCYTES NFR BLD: 7.8 % (ref 0–10)
NEUTROPHILS # BLD: 5.1 K/UL (ref 1.5–7.5)
NEUTS SEG NFR BLD: 62.3 % (ref 50–65)
PERFORMED ON: ABNORMAL
PLATELET # BLD AUTO: 180 K/UL (ref 130–400)
PMV BLD AUTO: 10.8 FL (ref 9.4–12.4)
POTASSIUM SERPL-SCNC: 4.4 MMOL/L (ref 3.5–5)
POTASSIUM SERPL-SCNC: 4.4 MMOL/L (ref 3.5–5)
PROT SERPL-MCNC: 6.3 G/DL (ref 6.6–8.7)
RBC # BLD AUTO: 2.93 M/UL (ref 4.7–6.1)
SODIUM SERPL-SCNC: 139 MMOL/L (ref 136–145)
WBC # BLD AUTO: 8.2 K/UL (ref 4.8–10.8)

## 2024-07-05 PROCEDURE — C1760 CLOSURE DEV, VASC: HCPCS | Performed by: INTERNAL MEDICINE

## 2024-07-05 PROCEDURE — 6360000002 HC RX W HCPCS: Performed by: INTERNAL MEDICINE

## 2024-07-05 PROCEDURE — 2500000003 HC RX 250 WO HCPCS: Performed by: INTERNAL MEDICINE

## 2024-07-05 PROCEDURE — 82962 GLUCOSE BLOOD TEST: CPT

## 2024-07-05 PROCEDURE — C1887 CATHETER, GUIDING: HCPCS | Performed by: INTERNAL MEDICINE

## 2024-07-05 PROCEDURE — 2580000003 HC RX 258: Performed by: INTERNAL MEDICINE

## 2024-07-05 PROCEDURE — C1874 STENT, COATED/COV W/DEL SYS: HCPCS | Performed by: INTERNAL MEDICINE

## 2024-07-05 PROCEDURE — 94761 N-INVAS EAR/PLS OXIMETRY MLT: CPT

## 2024-07-05 PROCEDURE — 2140000000 HC CCU INTERMEDIATE R&B

## 2024-07-05 PROCEDURE — 36415 COLL VENOUS BLD VENIPUNCTURE: CPT

## 2024-07-05 PROCEDURE — 93451 RIGHT HEART CATH: CPT | Performed by: INTERNAL MEDICINE

## 2024-07-05 PROCEDURE — 93571 IV DOP VEL&/PRESS C FLO 1ST: CPT | Performed by: INTERNAL MEDICINE

## 2024-07-05 PROCEDURE — 85520 HEPARIN ASSAY: CPT

## 2024-07-05 PROCEDURE — 93460 R&L HRT ART/VENTRICLE ANGIO: CPT | Performed by: INTERNAL MEDICINE

## 2024-07-05 PROCEDURE — 99152 MOD SED SAME PHYS/QHP 5/>YRS: CPT | Performed by: INTERNAL MEDICINE

## 2024-07-05 PROCEDURE — 4A033BC MEASUREMENT OF ARTERIAL PRESSURE, CORONARY, PERCUTANEOUS APPROACH: ICD-10-PCS | Performed by: INTERNAL MEDICINE

## 2024-07-05 PROCEDURE — C1725 CATH, TRANSLUMIN NON-LASER: HCPCS | Performed by: INTERNAL MEDICINE

## 2024-07-05 PROCEDURE — B2161ZZ FLUOROSCOPY OF RIGHT AND LEFT HEART USING LOW OSMOLAR CONTRAST: ICD-10-PCS | Performed by: INTERNAL MEDICINE

## 2024-07-05 PROCEDURE — 6370000000 HC RX 637 (ALT 250 FOR IP): Performed by: INTERNAL MEDICINE

## 2024-07-05 PROCEDURE — B2111ZZ FLUOROSCOPY OF MULTIPLE CORONARY ARTERIES USING LOW OSMOLAR CONTRAST: ICD-10-PCS | Performed by: INTERNAL MEDICINE

## 2024-07-05 PROCEDURE — 85025 COMPLETE CBC W/AUTO DIFF WBC: CPT

## 2024-07-05 PROCEDURE — C1894 INTRO/SHEATH, NON-LASER: HCPCS | Performed by: INTERNAL MEDICINE

## 2024-07-05 PROCEDURE — 93005 ELECTROCARDIOGRAM TRACING: CPT

## 2024-07-05 PROCEDURE — 97161 PT EVAL LOW COMPLEX 20 MIN: CPT

## 2024-07-05 PROCEDURE — 99153 MOD SED SAME PHYS/QHP EA: CPT | Performed by: INTERNAL MEDICINE

## 2024-07-05 PROCEDURE — 93458 L HRT ARTERY/VENTRICLE ANGIO: CPT | Performed by: INTERNAL MEDICINE

## 2024-07-05 PROCEDURE — 92928 PRQ TCAT PLMT NTRAC ST 1 LES: CPT | Performed by: INTERNAL MEDICINE

## 2024-07-05 PROCEDURE — 97530 THERAPEUTIC ACTIVITIES: CPT

## 2024-07-05 PROCEDURE — C9600 PERC DRUG-EL COR STENT SING: HCPCS | Performed by: INTERNAL MEDICINE

## 2024-07-05 PROCEDURE — 6370000000 HC RX 637 (ALT 250 FOR IP): Performed by: STUDENT IN AN ORGANIZED HEALTH CARE EDUCATION/TRAINING PROGRAM

## 2024-07-05 PROCEDURE — 027034Z DILATION OF CORONARY ARTERY, ONE ARTERY WITH DRUG-ELUTING INTRALUMINAL DEVICE, PERCUTANEOUS APPROACH: ICD-10-PCS | Performed by: INTERNAL MEDICINE

## 2024-07-05 PROCEDURE — C1769 GUIDE WIRE: HCPCS | Performed by: INTERNAL MEDICINE

## 2024-07-05 PROCEDURE — 80053 COMPREHEN METABOLIC PANEL: CPT

## 2024-07-05 PROCEDURE — 2700000000 HC OXYGEN THERAPY PER DAY

## 2024-07-05 PROCEDURE — 4A023N8 MEASUREMENT OF CARDIAC SAMPLING AND PRESSURE, BILATERAL, PERCUTANEOUS APPROACH: ICD-10-PCS | Performed by: INTERNAL MEDICINE

## 2024-07-05 PROCEDURE — 2709999900 HC NON-CHARGEABLE SUPPLY: Performed by: INTERNAL MEDICINE

## 2024-07-05 DEVICE — STENT ONYXNG30038UX ONYX 3.00X38RX
Type: IMPLANTABLE DEVICE | Status: FUNCTIONAL
Brand: ONYX FRONTIER™

## 2024-07-05 RX ORDER — HEPARIN SODIUM 1000 [USP'U]/ML
INJECTION, SOLUTION INTRAVENOUS; SUBCUTANEOUS PRN
Status: DISCONTINUED | OUTPATIENT
Start: 2024-07-05 | End: 2024-07-05 | Stop reason: HOSPADM

## 2024-07-05 RX ORDER — NITROGLYCERIN 20 MG/100ML
INJECTION INTRAVENOUS PRN
Status: DISCONTINUED | OUTPATIENT
Start: 2024-07-05 | End: 2024-07-05 | Stop reason: HOSPADM

## 2024-07-05 RX ORDER — CLOPIDOGREL BISULFATE 75 MG/1
TABLET ORAL PRN
Status: DISCONTINUED | OUTPATIENT
Start: 2024-07-05 | End: 2024-07-05 | Stop reason: HOSPADM

## 2024-07-05 RX ORDER — MIDAZOLAM HYDROCHLORIDE 1 MG/ML
INJECTION INTRAMUSCULAR; INTRAVENOUS PRN
Status: DISCONTINUED | OUTPATIENT
Start: 2024-07-05 | End: 2024-07-05 | Stop reason: HOSPADM

## 2024-07-05 RX ORDER — CLOPIDOGREL BISULFATE 75 MG/1
75 TABLET ORAL DAILY
Status: DISCONTINUED | OUTPATIENT
Start: 2024-07-06 | End: 2024-07-07 | Stop reason: HOSPADM

## 2024-07-05 RX ORDER — IODIXANOL 270 MG/ML
INJECTION, SOLUTION INTRAVASCULAR PRN
Status: DISCONTINUED | OUTPATIENT
Start: 2024-07-05 | End: 2024-07-05 | Stop reason: HOSPADM

## 2024-07-05 RX ORDER — ATORVASTATIN CALCIUM 20 MG/1
20 TABLET, FILM COATED ORAL NIGHTLY
Status: DISCONTINUED | OUTPATIENT
Start: 2024-07-05 | End: 2024-07-07 | Stop reason: HOSPADM

## 2024-07-05 RX ORDER — INSULIN LISPRO 100 [IU]/ML
0-8 INJECTION, SOLUTION INTRAVENOUS; SUBCUTANEOUS
Status: DISCONTINUED | OUTPATIENT
Start: 2024-07-05 | End: 2024-07-07 | Stop reason: HOSPADM

## 2024-07-05 RX ORDER — BIVALIRUDIN 250 MG/5ML
INJECTION, POWDER, LYOPHILIZED, FOR SOLUTION INTRAVENOUS PRN
Status: DISCONTINUED | OUTPATIENT
Start: 2024-07-05 | End: 2024-07-05 | Stop reason: HOSPADM

## 2024-07-05 RX ADMIN — BUMETANIDE 0.5 MG: 0.25 INJECTION INTRAMUSCULAR; INTRAVENOUS at 18:26

## 2024-07-05 RX ADMIN — DILTIAZEM HYDROCHLORIDE 120 MG: 120 CAPSULE, COATED, EXTENDED RELEASE ORAL at 11:49

## 2024-07-05 RX ADMIN — ATORVASTATIN CALCIUM 20 MG: 20 TABLET, FILM COATED ORAL at 21:31

## 2024-07-05 RX ADMIN — ASPIRIN 325 MG: 325 TABLET ORAL at 00:36

## 2024-07-05 RX ADMIN — BUMETANIDE 0.5 MG: 0.25 INJECTION INTRAMUSCULAR; INTRAVENOUS at 11:49

## 2024-07-05 RX ADMIN — INSULIN GLARGINE 5 UNITS: 100 INJECTION, SOLUTION SUBCUTANEOUS at 21:31

## 2024-07-05 RX ADMIN — HYDROCODONE BITARTRATE AND ACETAMINOPHEN 1 TABLET: 10; 325 TABLET ORAL at 13:47

## 2024-07-05 RX ADMIN — METOPROLOL TARTRATE 25 MG: 25 TABLET, FILM COATED ORAL at 21:31

## 2024-07-05 RX ADMIN — INSULIN LISPRO 8 UNITS: 100 INJECTION, SOLUTION INTRAVENOUS; SUBCUTANEOUS at 18:26

## 2024-07-05 RX ADMIN — CALCITRIOL CAPSULES 0.25 MCG 0.25 MCG: 0.25 CAPSULE ORAL at 11:49

## 2024-07-05 RX ADMIN — HEPARIN SODIUM AND DEXTROSE 19 UNITS/KG/HR: 10000; 5 INJECTION INTRAVENOUS at 02:06

## 2024-07-05 RX ADMIN — SODIUM BICARBONATE: 84 INJECTION, SOLUTION INTRAVENOUS at 05:18

## 2024-07-05 RX ADMIN — APIXABAN 5 MG: 5 TABLET, FILM COATED ORAL at 21:31

## 2024-07-05 RX ADMIN — HYDROCODONE BITARTRATE AND ACETAMINOPHEN 1 TABLET: 10; 325 TABLET ORAL at 01:46

## 2024-07-05 RX ADMIN — HYDROCODONE BITARTRATE AND ACETAMINOPHEN 1 TABLET: 10; 325 TABLET ORAL at 21:37

## 2024-07-05 ASSESSMENT — PAIN - FUNCTIONAL ASSESSMENT: PAIN_FUNCTIONAL_ASSESSMENT: ACTIVITIES ARE NOT PREVENTED

## 2024-07-05 ASSESSMENT — PAIN SCALES - GENERAL
PAINLEVEL_OUTOF10: 9
PAINLEVEL_OUTOF10: 7
PAINLEVEL_OUTOF10: 9

## 2024-07-05 ASSESSMENT — PAIN DESCRIPTION - LOCATION
LOCATION: LEG

## 2024-07-05 ASSESSMENT — PAIN DESCRIPTION - DESCRIPTORS: DESCRIPTORS: ACHING

## 2024-07-05 NOTE — TELEPHONE ENCOUNTER
Patient wife called wanting to know why Dr. Hawk did not want the stapes removed. Patient has been admitted to [facility] since his discharge from surgery. I explained to patient wife that before the staples are removed we need to see the patient. She verbalized understanding and will call with any questions.

## 2024-07-06 LAB
ALBUMIN SERPL-MCNC: 2.7 G/DL (ref 3.5–5.2)
ALP SERPL-CCNC: 138 U/L (ref 40–130)
ALT SERPL-CCNC: 13 U/L (ref 5–41)
ANION GAP SERPL CALCULATED.3IONS-SCNC: 9 MMOL/L (ref 7–19)
AST SERPL-CCNC: 13 U/L (ref 5–40)
BASOPHILS # BLD: 0.1 K/UL (ref 0–0.2)
BASOPHILS NFR BLD: 1.1 % (ref 0–1)
BILIRUB SERPL-MCNC: 0.5 MG/DL (ref 0.2–1.2)
BUN SERPL-MCNC: 73 MG/DL (ref 6–20)
CALCIUM SERPL-MCNC: 8.3 MG/DL (ref 8.6–10)
CHLORIDE SERPL-SCNC: 100 MMOL/L (ref 98–111)
CO2 SERPL-SCNC: 31 MMOL/L (ref 22–29)
CREAT SERPL-MCNC: 1.8 MG/DL (ref 0.5–1.2)
EOSINOPHIL # BLD: 0.2 K/UL (ref 0–0.6)
EOSINOPHIL NFR BLD: 3.2 % (ref 0–5)
ERYTHROCYTE [DISTWIDTH] IN BLOOD BY AUTOMATED COUNT: 19 % (ref 11.5–14.5)
GLUCOSE BLD-MCNC: 109 MG/DL (ref 70–99)
GLUCOSE BLD-MCNC: 139 MG/DL (ref 70–99)
GLUCOSE BLD-MCNC: 185 MG/DL (ref 70–99)
GLUCOSE BLD-MCNC: 200 MG/DL (ref 70–99)
GLUCOSE SERPL-MCNC: 101 MG/DL (ref 74–109)
HCT VFR BLD AUTO: 27.9 % (ref 42–52)
HGB BLD-MCNC: 8.6 G/DL (ref 14–18)
IMM GRANULOCYTES # BLD: 0 K/UL
LYMPHOCYTES # BLD: 1.3 K/UL (ref 1.1–4.5)
LYMPHOCYTES NFR BLD: 22.8 % (ref 20–40)
MCH RBC QN AUTO: 29.1 PG (ref 27–31)
MCHC RBC AUTO-ENTMCNC: 30.8 G/DL (ref 33–37)
MCV RBC AUTO: 94.3 FL (ref 80–94)
MONOCYTES # BLD: 0.5 K/UL (ref 0–0.9)
MONOCYTES NFR BLD: 9.2 % (ref 0–10)
NEUTROPHILS # BLD: 3.6 K/UL (ref 1.5–7.5)
NEUTS SEG NFR BLD: 63 % (ref 50–65)
PERFORMED ON: ABNORMAL
PLATELET # BLD AUTO: 167 K/UL (ref 130–400)
PMV BLD AUTO: 10.5 FL (ref 9.4–12.4)
POTASSIUM SERPL-SCNC: 4.3 MMOL/L (ref 3.5–5)
POTASSIUM SERPL-SCNC: 4.3 MMOL/L (ref 3.5–5)
PROT SERPL-MCNC: 6.1 G/DL (ref 6.6–8.7)
RBC # BLD AUTO: 2.96 M/UL (ref 4.7–6.1)
SODIUM SERPL-SCNC: 140 MMOL/L (ref 136–145)
WBC # BLD AUTO: 5.7 K/UL (ref 4.8–10.8)

## 2024-07-06 PROCEDURE — 6370000000 HC RX 637 (ALT 250 FOR IP): Performed by: INTERNAL MEDICINE

## 2024-07-06 PROCEDURE — 6370000000 HC RX 637 (ALT 250 FOR IP): Performed by: STUDENT IN AN ORGANIZED HEALTH CARE EDUCATION/TRAINING PROGRAM

## 2024-07-06 PROCEDURE — 85025 COMPLETE CBC W/AUTO DIFF WBC: CPT

## 2024-07-06 PROCEDURE — 94760 N-INVAS EAR/PLS OXIMETRY 1: CPT

## 2024-07-06 PROCEDURE — 99232 SBSQ HOSP IP/OBS MODERATE 35: CPT | Performed by: INTERNAL MEDICINE

## 2024-07-06 PROCEDURE — 82962 GLUCOSE BLOOD TEST: CPT

## 2024-07-06 PROCEDURE — 97110 THERAPEUTIC EXERCISES: CPT

## 2024-07-06 PROCEDURE — 36415 COLL VENOUS BLD VENIPUNCTURE: CPT

## 2024-07-06 PROCEDURE — 80053 COMPREHEN METABOLIC PANEL: CPT

## 2024-07-06 PROCEDURE — 2140000000 HC CCU INTERMEDIATE R&B

## 2024-07-06 RX ORDER — BUMETANIDE 1 MG/1
1 TABLET ORAL 2 TIMES DAILY
Status: DISCONTINUED | OUTPATIENT
Start: 2024-07-06 | End: 2024-07-07 | Stop reason: HOSPADM

## 2024-07-06 RX ORDER — BUMETANIDE 1 MG/1
1 TABLET ORAL 2 TIMES DAILY
Status: DISCONTINUED | OUTPATIENT
Start: 2024-07-06 | End: 2024-07-06

## 2024-07-06 RX ADMIN — DILTIAZEM HYDROCHLORIDE 120 MG: 120 CAPSULE, COATED, EXTENDED RELEASE ORAL at 11:38

## 2024-07-06 RX ADMIN — ONDANSETRON 4 MG: 4 TABLET, ORALLY DISINTEGRATING ORAL at 11:44

## 2024-07-06 RX ADMIN — ATORVASTATIN CALCIUM 20 MG: 20 TABLET, FILM COATED ORAL at 20:27

## 2024-07-06 RX ADMIN — ONDANSETRON 4 MG: 4 TABLET, ORALLY DISINTEGRATING ORAL at 20:27

## 2024-07-06 RX ADMIN — METOPROLOL TARTRATE 25 MG: 25 TABLET, FILM COATED ORAL at 11:38

## 2024-07-06 RX ADMIN — INSULIN GLARGINE 5 UNITS: 100 INJECTION, SOLUTION SUBCUTANEOUS at 20:27

## 2024-07-06 RX ADMIN — BUMETANIDE 1 MG: 1 TABLET ORAL at 11:52

## 2024-07-06 RX ADMIN — APIXABAN 5 MG: 5 TABLET, FILM COATED ORAL at 11:38

## 2024-07-06 RX ADMIN — CALCITRIOL CAPSULES 0.25 MCG 0.25 MCG: 0.25 CAPSULE ORAL at 11:38

## 2024-07-06 RX ADMIN — BUMETANIDE 1 MG: 1 TABLET ORAL at 18:41

## 2024-07-06 RX ADMIN — APIXABAN 5 MG: 5 TABLET, FILM COATED ORAL at 20:27

## 2024-07-06 RX ADMIN — CLOPIDOGREL BISULFATE 75 MG: 75 TABLET ORAL at 11:38

## 2024-07-06 RX ADMIN — METOPROLOL TARTRATE 25 MG: 25 TABLET, FILM COATED ORAL at 20:27

## 2024-07-07 VITALS
RESPIRATION RATE: 18 BRPM | DIASTOLIC BLOOD PRESSURE: 84 MMHG | OXYGEN SATURATION: 97 % | HEART RATE: 63 BPM | WEIGHT: 208.1 LBS | TEMPERATURE: 97.2 F | BODY MASS INDEX: 29.86 KG/M2 | SYSTOLIC BLOOD PRESSURE: 152 MMHG

## 2024-07-07 LAB
ALBUMIN SERPL-MCNC: 2.9 G/DL (ref 3.5–5.2)
ALP SERPL-CCNC: 151 U/L (ref 40–130)
ALT SERPL-CCNC: 14 U/L (ref 5–41)
ANION GAP SERPL CALCULATED.3IONS-SCNC: 14 MMOL/L (ref 7–19)
AST SERPL-CCNC: 14 U/L (ref 5–40)
BASOPHILS # BLD: 0.1 K/UL (ref 0–0.2)
BASOPHILS NFR BLD: 0.6 % (ref 0–1)
BILIRUB SERPL-MCNC: 0.7 MG/DL (ref 0.2–1.2)
BUN SERPL-MCNC: 63 MG/DL (ref 6–20)
CALCIUM SERPL-MCNC: 8.5 MG/DL (ref 8.6–10)
CHLORIDE SERPL-SCNC: 98 MMOL/L (ref 98–111)
CO2 SERPL-SCNC: 29 MMOL/L (ref 22–29)
CREAT SERPL-MCNC: 1.8 MG/DL (ref 0.5–1.2)
EOSINOPHIL # BLD: 0.1 K/UL (ref 0–0.6)
EOSINOPHIL NFR BLD: 1.7 % (ref 0–5)
ERYTHROCYTE [DISTWIDTH] IN BLOOD BY AUTOMATED COUNT: 18.3 % (ref 11.5–14.5)
GLUCOSE BLD-MCNC: 151 MG/DL (ref 70–99)
GLUCOSE BLD-MCNC: 216 MG/DL (ref 70–99)
GLUCOSE SERPL-MCNC: 158 MG/DL (ref 74–109)
HCT VFR BLD AUTO: 30.4 % (ref 42–52)
HGB BLD-MCNC: 9.7 G/DL (ref 14–18)
IMM GRANULOCYTES # BLD: 0 K/UL
LYMPHOCYTES # BLD: 1.6 K/UL (ref 1.1–4.5)
LYMPHOCYTES NFR BLD: 19.8 % (ref 20–40)
MCH RBC QN AUTO: 29.4 PG (ref 27–31)
MCHC RBC AUTO-ENTMCNC: 31.9 G/DL (ref 33–37)
MCV RBC AUTO: 92.1 FL (ref 80–94)
MONOCYTES # BLD: 0.6 K/UL (ref 0–0.9)
MONOCYTES NFR BLD: 7.9 % (ref 0–10)
NEUTROPHILS # BLD: 5.6 K/UL (ref 1.5–7.5)
NEUTS SEG NFR BLD: 69.5 % (ref 50–65)
PERFORMED ON: ABNORMAL
PERFORMED ON: ABNORMAL
PLATELET # BLD AUTO: 176 K/UL (ref 130–400)
PMV BLD AUTO: 10.9 FL (ref 9.4–12.4)
POTASSIUM SERPL-SCNC: 4 MMOL/L (ref 3.5–5)
POTASSIUM SERPL-SCNC: 4 MMOL/L (ref 3.5–5)
PROT SERPL-MCNC: 6.7 G/DL (ref 6.6–8.7)
RBC # BLD AUTO: 3.3 M/UL (ref 4.7–6.1)
SODIUM SERPL-SCNC: 141 MMOL/L (ref 136–145)
WBC # BLD AUTO: 8 K/UL (ref 4.8–10.8)

## 2024-07-07 PROCEDURE — 99232 SBSQ HOSP IP/OBS MODERATE 35: CPT | Performed by: INTERNAL MEDICINE

## 2024-07-07 PROCEDURE — 6370000000 HC RX 637 (ALT 250 FOR IP): Performed by: INTERNAL MEDICINE

## 2024-07-07 PROCEDURE — 85025 COMPLETE CBC W/AUTO DIFF WBC: CPT

## 2024-07-07 PROCEDURE — 97530 THERAPEUTIC ACTIVITIES: CPT

## 2024-07-07 PROCEDURE — 94760 N-INVAS EAR/PLS OXIMETRY 1: CPT

## 2024-07-07 PROCEDURE — 80053 COMPREHEN METABOLIC PANEL: CPT

## 2024-07-07 PROCEDURE — 6370000000 HC RX 637 (ALT 250 FOR IP): Performed by: STUDENT IN AN ORGANIZED HEALTH CARE EDUCATION/TRAINING PROGRAM

## 2024-07-07 PROCEDURE — 82962 GLUCOSE BLOOD TEST: CPT

## 2024-07-07 PROCEDURE — 36415 COLL VENOUS BLD VENIPUNCTURE: CPT

## 2024-07-07 RX ORDER — METOPROLOL TARTRATE 50 MG/1
50 TABLET, FILM COATED ORAL 2 TIMES DAILY
Qty: 60 TABLET | Refills: 3 | Status: SHIPPED | OUTPATIENT
Start: 2024-07-07

## 2024-07-07 RX ORDER — ATORVASTATIN CALCIUM 20 MG/1
20 TABLET, FILM COATED ORAL NIGHTLY
Qty: 30 TABLET | Refills: 3 | Status: SHIPPED | OUTPATIENT
Start: 2024-07-07

## 2024-07-07 RX ORDER — BUMETANIDE 1 MG/1
1 TABLET ORAL 2 TIMES DAILY
Qty: 30 TABLET | Refills: 3 | Status: SHIPPED | OUTPATIENT
Start: 2024-07-07

## 2024-07-07 RX ORDER — METOPROLOL TARTRATE 50 MG/1
50 TABLET, FILM COATED ORAL 2 TIMES DAILY
Status: DISCONTINUED | OUTPATIENT
Start: 2024-07-07 | End: 2024-07-07 | Stop reason: HOSPADM

## 2024-07-07 RX ORDER — CALCITRIOL 0.25 UG/1
0.25 CAPSULE, LIQUID FILLED ORAL DAILY
Qty: 30 CAPSULE | Refills: 3 | Status: SHIPPED | OUTPATIENT
Start: 2024-07-08

## 2024-07-07 RX ORDER — POLYETHYLENE GLYCOL 3350 17 G/17G
17 POWDER, FOR SOLUTION ORAL DAILY PRN
Qty: 30 PACKET | Refills: 0 | Status: SHIPPED | OUTPATIENT
Start: 2024-07-07 | End: 2024-08-06

## 2024-07-07 RX ORDER — DILTIAZEM HYDROCHLORIDE 120 MG/1
120 CAPSULE, COATED, EXTENDED RELEASE ORAL DAILY
Qty: 30 CAPSULE | Refills: 3 | Status: SHIPPED | OUTPATIENT
Start: 2024-07-08

## 2024-07-07 RX ORDER — CLOPIDOGREL BISULFATE 75 MG/1
75 TABLET ORAL DAILY
Qty: 30 TABLET | Refills: 3 | Status: SHIPPED | OUTPATIENT
Start: 2024-07-08

## 2024-07-07 RX ORDER — INSULIN LISPRO 100 [IU]/ML
0-8 INJECTION, SOLUTION INTRAVENOUS; SUBCUTANEOUS
Qty: 3 ML | Refills: 0 | Status: SHIPPED | OUTPATIENT
Start: 2024-07-07

## 2024-07-07 RX ORDER — INSULIN GLARGINE 100 [IU]/ML
7 INJECTION, SOLUTION SUBCUTANEOUS DAILY
Qty: 10 ML | Refills: 3 | Status: SHIPPED | OUTPATIENT
Start: 2024-07-07

## 2024-07-07 RX ADMIN — INSULIN LISPRO 2 UNITS: 100 INJECTION, SOLUTION INTRAVENOUS; SUBCUTANEOUS at 12:08

## 2024-07-07 RX ADMIN — METOPROLOL TARTRATE 50 MG: 25 TABLET, FILM COATED ORAL at 09:32

## 2024-07-07 RX ADMIN — DILTIAZEM HYDROCHLORIDE 120 MG: 120 CAPSULE, COATED, EXTENDED RELEASE ORAL at 09:32

## 2024-07-07 RX ADMIN — CLOPIDOGREL BISULFATE 75 MG: 75 TABLET ORAL at 09:32

## 2024-07-07 RX ADMIN — BUMETANIDE 1 MG: 1 TABLET ORAL at 09:32

## 2024-07-07 RX ADMIN — APIXABAN 5 MG: 5 TABLET, FILM COATED ORAL at 09:32

## 2024-07-07 RX ADMIN — CALCITRIOL CAPSULES 0.25 MCG 0.25 MCG: 0.25 CAPSULE ORAL at 09:32

## 2024-07-07 NOTE — PROGRESS NOTES
7/1/24 1101 7/1/24 0521 6/30/24 2030   aPTT  26.0 - 36.2 sec 79.8       No change no bolus heparin therapeutic   
  Date:2024  Patient: Noe Jimenez  : 1965  MRN:[id number]  CODE:                                                                        PCP:Willie Kim MD    Admit Date: 2024  7:33 PM   LOS: 4 days     Hospital course : 60 yo M with paroxysmal Afib, HFpEF, HTN, T2DM, and chronic RLE pain and foot wound s/p BKA who initially presented to [facility] inpatient rehab on 24 after admission at [facility] for right BKA.  He developed low grade fever and hypoxia and required transfer to the medical floor on . He was found to have Parainfluenza virus infection and was treated for superimposed bacterial pneumonia with Zosyn and Levaquin. He also developed heart failure with high oxygen requirement necessetating transfer to the ICU on high flow NC.  Oxygenation and symptoms improved with diuresis and he was transferred back to inpatient rehab on .     Rapid response was called this afternoon due to significant hypoxia with O2 sats in the 70s. Pt was pale and tremulous, but otherwise asymptomatic without dyspnea. Denies fevers, chills, cough, chest pain, abdominal pain, nausea/vomiting, and diarrhea.  Hypoxia was confirmed with AB.39/35/50 on 6 L NC. He was placed on 9 L HFNC and transferred to the PCU for further management. In the PCU, O2 sats improved when supine and O2 was weaned to 3L NC.   Pt reports >20 lb weight gain during this admission. He has been receiving Bumex 1 mg BID. Nephrology has been following for JANICE.     Of note, Echo from 24 showed a significant (>20 bubbles) right to left shunt at the interatrial septum, EF 55-60%, dilated LV, LA, and RA.  Pt reports history of a \"hole in his heart\" as a child requiring surgical repair.      Cardiology and CT surgery consulted.  EJ on  showed slitlike PFO with left to right shunting and some right to left shunting, RV hypokinetic, preserved EF.  Planning for right and left heart cath later this week.     Subjective: 7/3 seen 
  Date:2024  Patient: Noe Jimenez  : 1965  MRN:[id number]  CODE:                                                                        PCP:Willie Kim MD    Admit Date: 2024  7:33 PM   LOS: 5 days     Hospital course : 60 yo M with paroxysmal Afib, HFpEF, HTN, T2DM, and chronic RLE pain and foot wound s/p BKA who initially presented to [facility] inpatient rehab on 24 after admission at [facility] for right BKA.  He developed low grade fever and hypoxia and required transfer to the medical floor on . He was found to have Parainfluenza virus infection and was treated for superimposed bacterial pneumonia with Zosyn and Levaquin. He also developed heart failure with high oxygen requirement necessetating transfer to the ICU on high flow NC.  Oxygenation and symptoms improved with diuresis and he was transferred back to inpatient rehab on .     Rapid response was called this afternoon due to significant hypoxia with O2 sats in the 70s. Pt was pale and tremulous, but otherwise asymptomatic without dyspnea. Denies fevers, chills, cough, chest pain, abdominal pain, nausea/vomiting, and diarrhea.  Hypoxia was confirmed with AB.39/35/50 on 6 L NC. He was placed on 9 L HFNC and transferred to the PCU for further management. In the PCU, O2 sats improved when supine and O2 was weaned to 3L NC.   Pt reports >20 lb weight gain during this admission. He has been receiving Bumex 1 mg BID. Nephrology has been following for JANICE.     Of note, Echo from 24 showed a significant (>20 bubbles) right to left shunt at the interatrial septum, EF 55-60%, dilated LV, LA, and RA.  Pt reports history of a \"hole in his heart\" as a child requiring surgical repair.      Cardiology and CT surgery consulted.  EJ on  showed slitlike PFO with left to right shunting and some right to left shunting, RV hypokinetic, preserved EF.  Planning for right and left heart cath later this week.    status post cardiac 
  Date:7/3/2024  Patient: Noe Jimenez  : 1965  MRN:[id number]  CODE:                                                                        PCP:Willie Kim MD    Admit Date: 2024  7:33 PM   LOS: 3 days     Hospital course : 60 yo M with paroxysmal Afib, HFpEF, HTN, T2DM, and chronic RLE pain and foot wound s/p BKA who initially presented to [facility] inpatient rehab on 24 after admission at [facility] for right BKA.  He developed low grade fever and hypoxia and required transfer to the medical floor on . He was found to have Parainfluenza virus infection and was treated for superimposed bacterial pneumonia with Zosyn and Levaquin. He also developed heart failure with high oxygen requirement necessetating transfer to the ICU on high flow NC.  Oxygenation and symptoms improved with diuresis and he was transferred back to inpatient rehab on .     Rapid response was called this afternoon due to significant hypoxia with O2 sats in the 70s. Pt was pale and tremulous, but otherwise asymptomatic without dyspnea. Denies fevers, chills, cough, chest pain, abdominal pain, nausea/vomiting, and diarrhea.  Hypoxia was confirmed with AB.39/35/50 on 6 L NC. He was placed on 9 L HFNC and transferred to the PCU for further management. In the PCU, O2 sats improved when supine and O2 was weaned to 3L NC.   Pt reports >20 lb weight gain during this admission. He has been receiving Bumex 1 mg BID. Nephrology has been following for JANICE.     Of note, Echo from 24 showed a significant (>20 bubbles) right to left shunt at the interatrial septum, EF 55-60%, dilated LV, LA, and RA.  Pt reports history of a \"hole in his heart\" as a child requiring surgical repair.      Cardiology and CT surgery consulted.  EJ on  showed slitlike PFO with left to right shunting and some right to left shunting, RV hypokinetic, preserved EF.  Planning for right and left heart cath later this week.     Subjective: 7/3 seen 
  Physician Progress Note      PATIENT:               CHRISTOS PENN  CSN #:                  [id number]  :                       1965  ADMIT DATE:       2024 7:33 PM  DISCH DATE:  RESPONDING  PROVIDER #:        Tereso Coughlin MD          QUERY TEXT:    Pt admitted with Hypoxia and acute on chronic Diastolic heart failure and has   Hypoxic respiratory failure documented. If possible, please document in   progress notes and discharge summary further specificity regarding the type   and acuity of respiratory failure:    The medical record reflects the following:  Risk Factors: Reported significant hypoxia  Clinical Indicators: Per documentation throughout the account:  developed   low grade fever and hypoxia requiring transfer...Parainfluenza +...found to   have Parainfluenza virus infection and was treated for superimposed bacterial   pneumonia with Zosyn and Levaquin...Oxygenation and symptoms improved with   diuresis and he was transferred back to inpatient rehab on ... RRT   called...due to significant hypoxia with O2 sats in the 70s Hypoxia was   confirmed with AB  Treatment: ABG's, O2, Continuous patient monitoring    Thank you,  Meliza BUNDYN, RN, CRCR  Clinical Documentation Improvement  [email or web address] I Like  Options provided:  -- Acute on chronic respiratory failure with hypoxia  -- Acute respiratory failure with hypoxia  -- Chronic respiratory failure with hypoxia  -- Other - I will add my own diagnosis  -- Disagree - Not applicable / Not valid  -- Disagree - Clinically unable to determine / Unknown  -- Refer to Clinical Documentation Reviewer    PROVIDER RESPONSE TEXT:    This patient is in acute on chronic respiratory failure with hypoxia.    Query created by: Meliza Dwyer on 7/3/2024 9:11 AM      Electronically signed by:  Tereso Coughlin MD 2024 11:32 PM          
 Daily Progress Note    Date:2024  Patient: Noe Jimenez  : 1965  MRN:[id number]  CODE:Full Code No additional code details  PCP:Willie Kim MD    Admit Date: 2024  7:33 PM   LOS: 1 day     No chief complaint on file.        Subjective: NAEON. Currently requiring 3 L NC. No dyspnea. LE edema somewhat improved from yesterday. Wife at bedside, discussed plan. Had bowel movement overnight.         Hospital Summary:60 yo M with paroxysmal Afib, HFpEF, HTN, T2DM, and chronic RLE pain and foot wound s/p BKA who initially presented to [facility] inpatient rehab on 24 after admission at [facility] for right BKA.  He developed low grade fever and hypoxia and required transfer to the medical floor on . He was found to have Parainfluenza virus infection and was treated for superimposed bacterial pneumonia with Zosyn and Levaquin. He also developed heart failure with high oxygen requirement necessetating transfer to the ICU on high flow NC.  Oxygenation and symptoms improved with diuresis and he was transferred back to inpatient rehab on .     Rapid response was called this afternoon due to significant hypoxia with O2 sats in the 70s. Pt was pale and tremulous, but otherwise asymptomatic without dyspnea. Denies fevers, chills, cough, chest pain, abdominal pain, nausea/vomiting, and diarrhea.  Hypoxia was confirmed with AB.39/35/50 on 6 L NC. He was placed on 9 L HFNC and transferred to the PCU for further management. In the PCU, O2 sats improved when supine and O2 was weaned to 3L NC.   Pt reports >20 lb weight gain during this admission. He has been receiving Bumex 1 mg BID. Nephrology has been following for JANICE.     Of note, Echo from 24 showed a significant (>20 bubbles) right to left shunt at the interatrial septum, EF 55-60%, dilated LV, LA, and RA.  Pt reports history of a \"hole in his heart\" as a child requiring surgical repair.     Cardiology and CT surgery 
 MD notified, 8 units given per sliding scale,  with recheck MD notified order 5 units IV regular insulin,  before push Insulin held MD notified WCTM, PT NPT with procedure  
Cardiology Progress Note Charles Ramirez MD      Patient:  Noe Jiemnez  [id number]    Patient Active Problem List    Diagnosis Date Noted    Hypoxia 07/01/2024     Priority: Low    Acute on chronic diastolic (congestive) heart failure ([facility]) 07/01/2024     Priority: Low    JANICE (acute kidney injury) ([facility]) 07/01/2024     Priority: Low    Interatrial cardiac shunt 06/30/2024     Priority: Low    Chest pain 06/30/2024     Priority: Low    Palliative care patient 06/20/2024     Priority: Low    Acute hypoxic respiratory failure ([facility]) 06/19/2024     Priority: Low    Hospital-acquired pneumonia 06/18/2024     Priority: Low    HAP (hospital-acquired pneumonia) 06/18/2024     Priority: Low    Vitamin D deficiency 06/18/2024     Priority: Low    Atrial fibrillation ([facility]) 06/08/2024     Priority: Low    Anemia 06/08/2024     Priority: Low    CHF (congestive heart failure) ([facility]) 06/08/2024     Priority: Low    Renal dysfunction 06/08/2024     Priority: Low    S/P BKA (below knee amputation) unilateral, right ([facility]) 06/07/2024     Priority: Low    Type 2 diabetes mellitus with complication, without long-term current use of insulin ([facility]) 06/07/2024     Priority: Low    Essential hypertension 06/07/2024     Priority: Low    Lumbar post-laminectomy syndrome 08/02/2016     Priority: Low    Lumbar radiculopathy 08/02/2016     Priority: Low    Muscle spasm of back 08/02/2016     Priority: Low    Sacroiliac joint dysfunction of both sides 08/02/2016     Priority: Low    Trochanteric bursitis of both hips 08/02/2016     Priority: Low    Peripheral neuropathic pain 08/02/2016     Priority: Low       Admit Date:  6/30/2024    Admission Problem List: Present on Admission:   Interatrial cardiac shunt   Hypoxia   Acute on chronic diastolic (congestive) heart failure ([facility])   JANICE (acute kidney injury) ([facility])      Cardiac Specific Data:  Specialty Problems          Cardiology Problems    Essential hypertension        Atrial fibrillation ([facility])        
Cardiology Progress Note Charles Ramirez MD      Patient:  Noe Jimenez  [id number]    Patient Active Problem List    Diagnosis Date Noted    Hypoxia 07/01/2024     Priority: Low    Acute on chronic diastolic (congestive) heart failure ([facility]) 07/01/2024     Priority: Low    JANICE (acute kidney injury) ([facility]) 07/01/2024     Priority: Low    Interatrial cardiac shunt 06/30/2024     Priority: Low    Chest pain 06/30/2024     Priority: Low    Palliative care patient 06/20/2024     Priority: Low    Acute hypoxic respiratory failure ([facility]) 06/19/2024     Priority: Low    Hospital-acquired pneumonia 06/18/2024     Priority: Low    HAP (hospital-acquired pneumonia) 06/18/2024     Priority: Low    Vitamin D deficiency 06/18/2024     Priority: Low    Atrial fibrillation ([facility]) 06/08/2024     Priority: Low    Anemia 06/08/2024     Priority: Low    CHF (congestive heart failure) ([facility]) 06/08/2024     Priority: Low    Renal dysfunction 06/08/2024     Priority: Low    S/P BKA (below knee amputation) unilateral, right ([facility]) 06/07/2024     Priority: Low    Type 2 diabetes mellitus with complication, without long-term current use of insulin ([facility]) 06/07/2024     Priority: Low    Essential hypertension 06/07/2024     Priority: Low    Lumbar post-laminectomy syndrome 08/02/2016     Priority: Low    Lumbar radiculopathy 08/02/2016     Priority: Low    Muscle spasm of back 08/02/2016     Priority: Low    Sacroiliac joint dysfunction of both sides 08/02/2016     Priority: Low    Trochanteric bursitis of both hips 08/02/2016     Priority: Low    Peripheral neuropathic pain 08/02/2016     Priority: Low       Admit Date:  6/30/2024    Admission Problem List: Present on Admission:   Interatrial cardiac shunt   Hypoxia   Acute on chronic diastolic (congestive) heart failure ([facility])   JANICE (acute kidney injury) ([facility])      Cardiac Specific Data:  Specialty Problems          Cardiology Problems    Essential hypertension        Atrial fibrillation ([facility])        
Cardiology Progress Note Charles Ramirez MD      Patient:  Noe Jimenez  [id number]    Patient Active Problem List    Diagnosis Date Noted    Hypoxia 07/01/2024     Priority: Low    Acute on chronic diastolic (congestive) heart failure ([facility]) 07/01/2024     Priority: Low    JANICE (acute kidney injury) ([facility]) 07/01/2024     Priority: Low    Interatrial cardiac shunt 06/30/2024     Priority: Low    Chest pain 06/30/2024     Priority: Low    Palliative care patient 06/20/2024     Priority: Low    Acute hypoxic respiratory failure ([facility]) 06/19/2024     Priority: Low    Hospital-acquired pneumonia 06/18/2024     Priority: Low    HAP (hospital-acquired pneumonia) 06/18/2024     Priority: Low    Vitamin D deficiency 06/18/2024     Priority: Low    Atrial fibrillation ([facility]) 06/08/2024     Priority: Low    Anemia 06/08/2024     Priority: Low    CHF (congestive heart failure) ([facility]) 06/08/2024     Priority: Low    Renal dysfunction 06/08/2024     Priority: Low    S/P BKA (below knee amputation) unilateral, right ([facility]) 06/07/2024     Priority: Low    Type 2 diabetes mellitus with complication, without long-term current use of insulin ([facility]) 06/07/2024     Priority: Low    Essential hypertension 06/07/2024     Priority: Low    Lumbar post-laminectomy syndrome 08/02/2016     Priority: Low    Lumbar radiculopathy 08/02/2016     Priority: Low    Muscle spasm of back 08/02/2016     Priority: Low    Sacroiliac joint dysfunction of both sides 08/02/2016     Priority: Low    Trochanteric bursitis of both hips 08/02/2016     Priority: Low    Peripheral neuropathic pain 08/02/2016     Priority: Low       Admit Date:  6/30/2024    Admission Problem List: Present on Admission:   Interatrial cardiac shunt   Hypoxia   Acute on chronic diastolic (congestive) heart failure ([facility])   JANICE (acute kidney injury) ([facility])      Cardiac Specific Data:  Specialty Problems          Cardiology Problems    Essential hypertension        Atrial fibrillation ([facility])        
Contacted Dr Galvan's office at the patient's request (pt was due for one month post op appt and staple removal), they stated they did not want staples removed here and to contact the office and schedule an appt with Mandy when he's ready to DC and follow up there for staple removal.    Electronically signed by Angy Rg RN on 7/3/2024 at 2:48 PM    
Direct oral anticoagulant (DOAC) - Pharmacy Review    Patient chart reviewed for indication and appropriateness of dose and therapy of Apixaban.:    Body mass index is 29.72 kg/m².  Wt Readings from Last 1 Encounters:   07/04/24 94 kg (207 lb 2 oz)     Some sources recommend that DOACs be avoided in weight < 50 or > 120 kg, or BMI > 40 whenever possible; however. some smaller studies suggest that DOACs may be safe and efficacious in this population.    Recent Labs     07/03/24  0217 07/04/24  0157 07/05/24  0142   CREATININE 2.5* 2.2* 1.9*     Estimated Creatinine Clearance: 49 mL/min (A) (based on SCr of 1.9 mg/dL (H)).  Recent Labs     07/03/24 0217 07/04/24  0157 07/05/24  0142   HGB 8.7* 9.0* 8.2*   HCT 28.6* 29.7* 27.0*    189 180     No results for input(s): \"INR\" in the last 72 hours.      ASSESSMENT     Indication: AFib.    Dose is appropriate for indication, age, and renal function: yes.    Taper dosing is ordered appropriately (if necessary): not applicable.    Start date/time is appropriate: yes.    Drug interactions (since admission) reviewed: No interactions/no new drug interactions identified requiring action.    Other anticoagulants on MAR: No concurrent anticoagulants ordered.      PLAN     No obvious intervention needed..      Electronically signed by Zachary Colón RPH on 7/5/2024 at 10:46 AM   
Heparin Infusion Monitoring Note:  Due to recent anticoagulant use, the heparin infusion will be monitoring using an aPTT-based algorithm.  Please refer to the MAR for adjustment details of the heparin infusion.    Details of prior anticoagulant use:    Name of anticoagulant: Eliquis  Last dose: 6/30    At 72 hours following the last anticoagulant administration, an anti-Xa based nomogram will be ordered to replace the aPTT nomogram.  This will occur on 7/3.    Electronically signed by Geni Rodriguez RPH on 6/30/2024 at 8:37 PM       
Nephrology ([facility]) Progress Note      Patient:  Noe Jimenez  YOB: 1965  Date of Service: 7/4/2024  MRN: [id number]   Acct: [id number]   Primary Care Physician: Willie Kim MD  Advance Directive: Full Code  Admit Date: 6/30/2024       Hospital Day: 4  Referring Provider: Jose A Webb MD    Patient independently seen and examined, Chart, Consults, Notes, Operative notes, Labs, Cardiology, and Radiology studies reviewed as available.      Subjective:  Noe Jimenez is a 59 y.o. male for whom we were consulted for evaluation and treatment of acute kidney injury.  Patient denied any history of chronic kidney disease.  However he had one episode of acute kidney injury during last hospitalization at [facility] which was resolved. During that hospitalization he underwent right BKA.  Patient also has a history of atrial fibrillation, CHF, hypertension, hyperlipidemia, type 2 diabetes and chronic cellulitis with osteomyelitis of right foot.  After amputation of his right leg, he was admitted to [facility] rehab floor for rehabilitation.  However, he has suffered with parainfluenza virus leading to left lower lobe pneumonia.  On March 17, patient developed respiratory failure and was transferred to progressive care unit.  Patient is receiving IV antibiotics/oxygen.  He had significant decline in renal function and marked swelling of lower extremities/scrotal and penile swelling.  Patient has responded to intravenous diuretics and had excellent urine output.   Today, no overnight events. Still has some peripheral edema. Tolerating diet.  No new complaints today. He is now scheduled for heart cath on 7/5.    Allergies:  Dilaudid [hydromorphone], Morphine, and Percocet [oxycodone-acetaminophen]    Medicines:  Current Facility-Administered Medications   Medication Dose Route Frequency Provider Last Rate Last Admin    heparin (porcine) injection 4,000 Units  4,000 
Nephrology ([facility]) Progress Note      Patient:  Noe Jimenez  YOB: 1965  Date of Service: 7/6/2024  MRN: [id number]   Acct: [id number]   Primary Care Physician: Willie Kim MD  Advance Directive: Full Code  Admit Date: 6/30/2024       Hospital Day: 6  Referring Provider: Jose A Webb MD    Patient independently seen and examined, Chart, Consults, Notes, Operative notes, Labs, Cardiology, and Radiology studies reviewed as available.      Subjective:  Noe Jimenez is a 59 y.o. male for whom we were consulted for evaluation and treatment of acute kidney injury.  Patient denied any history of chronic kidney disease.  However he had one episode of acute kidney injury during last hospitalization at [facility] which was resolved. During that hospitalization he underwent right BKA.  Patient also has a history of atrial fibrillation, CHF, hypertension, hyperlipidemia, type 2 diabetes and chronic cellulitis with osteomyelitis of right foot.  After amputation of his right leg, he was admitted to [facility] rehab floor for rehabilitation.  However, he has suffered with parainfluenza virus leading to left lower lobe pneumonia.  On March 17, patient developed respiratory failure and was transferred to progressive care unit.  Patient is receiving IV antibiotics/oxygen.  He had significant decline in renal function and marked swelling of lower extremities/scrotal and penile swelling.  Patient has responded to intravenous diuretics and had excellent urine output.   Patient is s/p heart cath with successful PCI of proximal LAD utilizing drug-eluting stent. He still has some peripheral edema. Has no new complaints today. Has no major leg edema and urine output adequate.     Allergies:  Dilaudid [hydromorphone], Morphine, and Percocet [oxycodone-acetaminophen]    Medicines:  Current Facility-Administered Medications   Medication Dose Route Frequency Provider Last Rate 
Notified cath lab patient did not receive metoprolol or cardizem due to HR of 48 this AM.   
Patients O2 sat 90% sitting on edge of bed. Patient O2 sat vince to 93 upon lying flat on bed and stayed at this sat for several minutes.   
Physical Therapy     07/07/24 1400   Restrictions/Precautions   Restrictions/Precautions Fall Risk   Required Braces or Orthoses? Yes   Lower Extremity Weight Bearing Restrictions   Right Lower Extremity Weight Bearing Non Weight Bearing   Left Lower Extremity Weight Bearing Weight Bearing As Tolerated   Subjective   Subjective pt wanting to go home   Subjective   Pain no c/o pain during tx   Bed mobility   Bed Mobility Comments pt already up to chair   Transfers   Sit to Stand Contact guard assistance   Stand to Sit Contact guard assistance   Squat Pivot Transfers Contact guard assistance   Comment squat pivot recliner<>BSC   Activity Tolerance   Activity Tolerance Patient tolerated treatment well   Assessment   Assessment pt able to safely perform squat pivot transfer from recliner<>BSC CGA with good form and no safety concerns. encoured going towards strong side when able at home with spouse for safety and demonstrated application of gait belt for wife to use at home. left in chair with all needs in reach.   PT Plan of Care   Sunday X   Safety Devices   Type of Devices Gait belt;Nurse notified     .kaya    
Physical Therapy    Noe Jimenez  [id number]       07/06/24 9013   Restrictions/Precautions   Restrictions/Precautions Fall Risk   Required Braces or Orthoses? Yes   Lower Extremity Weight Bearing Restrictions   Right Lower Extremity Weight Bearing Non Weight Bearing   Left Lower Extremity Weight Bearing Weight Bearing As Tolerated   General   Chart Reviewed Yes   Additional Pertinent Hx AFIB, CHF, HTN, HLD, DM   Family / Caregiver Present No   Subjective   Subjective Pt notes just in chair ~ 1 hr and would like to stay up a little longer   General Comment   Comments RN, Roula mccollum PT treatment   Pain Assessment   Pain Assessment None - Denies Pain   Oxygen Therapy   O2 Device None (Room air)   Exercises   Quad Sets 2/20 LLE   Heelslides 2/15 LLE   Gluteal Sets 2/20   Hip Abduction 2/15   Ankle Pumps 2/20 LLE   Comments seated BLE   Short Term Goals   Short Term Goal 1 SIT TO STAND AT RW CGA   Short Term Goal 2 BED TO CHAIR SBA-CGA   Short Term Goal 3 Car transfer with min. assist using AD   Short Term Goal 4 independent with bed mobility without bed rails   Short Term Goal 5 sit to stand with CGA   Conditions Requiring Skilled Therapeutic Intervention   Body Structures, Functions, Activity Limitations Requiring Skilled Therapeutic Intervention Decreased functional mobility ;Decreased strength;Decreased posture;Decreased balance   Assessment Pt already up in chair  and declined back to bed at this time. Seated BLE exercise completed to increase strength and stability for improved perfomance with transfers. Will continue to progress as pt able.   Discharge Recommendations Continue to assess pending progress   Activity Tolerance   Activity Tolerance Patient tolerated treatment well   PT Plan of Care   Saturday X   Safety Devices   Type of Devices Call light within reach;Left in chair     Electronically signed by Paula Gallegos PTA on 7/6/2024 at 2:00 PM          
Physical Therapy  Facility/Department: [facility] PROGRESSIVE CARE  Physical Therapy Initial Assessment    Name: Noe Jimenez  : 1965  MRN: [id number]  Date of Service: 2024    Discharge Recommendations:  Continue to assess pending progress, 24 hour supervision or assist, Patient would benefit from continued therapy after discharge          Patient Diagnosis(es): The primary encounter diagnosis was JANICE (acute kidney injury) ([facility]). Diagnoses of Acute on chronic diastolic (congestive) heart failure (HCC), ASD (atrial septal defect), and Chest pain were also pertinent to this visit.  Past Medical History:  has a past medical history of Diabetes mellitus (HCC) and Hypertension.  Past Surgical History:  has a past surgical history that includes Cardiac surgery; back surgery; fracture surgery; and Rotator cuff repair (Right).    Assessment   Body Structures, Functions, Activity Limitations Requiring Skilled Therapeutic Intervention: Decreased functional mobility ;Decreased ADL status;Decreased balance;Decreased strength;Decreased endurance  Assessment: Pt ABLE TO TRANSFER BED TO CHAIR WITH ASSIST. WILL CONT TO PROGRESS. LONG DISCUSSION WITH Pt/SPOUSE REGARDING PROGRESS SO FAR AND POSSIBLE D/C PLANS.  Requires PT Follow-Up: Yes  Activity Tolerance  Activity Tolerance: Patient tolerated treatment well     Plan   Physical Therapy Plan  General Plan: 5-7 times per week  Current Treatment Recommendations: Strengthening, Balance training, Functional mobility training, Transfer training, Safety education & training, Patient/Caregiver education & training  Safety Devices  Type of Devices: Call light within reach, Nurse notified (SPOUSE PRESENT)     Restrictions  Restrictions/Precautions  Restrictions/Precautions: Fall Risk  Required Braces or Orthoses?: Yes  Required Braces or Orthoses  Right Lower Extremity Brace:  (RINA-NIEVES)  Position Activity Restriction  Other position/activity restrictions: NURSE REPORTS BEDREST FROM 
heparin (porcine) injection 4,000 Units  4,000 Units IntraVENous PRN Jose A Webb MD        heparin (porcine) injection 2,000 Units  2,000 Units IntraVENous PRN Jose A Webb MD        heparin 25,000 units in dextrose 5% 250 mL (premix) infusion  5-30 Units/kg/hr IntraVENous Continuous Jose A Webb MD        sodium chloride flush 0.9 % injection 5-40 mL  5-40 mL IntraVENous 2 times per day Charles Ramirez MD        sodium chloride flush 0.9 % injection 5-40 mL  5-40 mL IntraVENous PRN Charles Ramirez MD        0.9 % sodium chloride infusion   IntraVENous PRN Charles Ramirez MD        [START ON 7/5/2024] aspirin tablet 325 mg  325 mg Oral Once Charles Ramirez MD        [START ON 7/5/2024] nitroGLYCERIN (NITROSTAT) SL tablet 0.4 mg  0.4 mg SubLINGual Q5 Min PRN Charles Ramirez MD        [START ON 7/5/2024] sodium bicarbonate 150 mEq in dextrose 5 % 1,000 mL infusion   IntraVENous Continuous Charles Ramirez MD        metOLazone (ZAROXOLYN) tablet 5 mg  5 mg Oral Q MWF Anthony Rogers MD        insulin lispro (HUMALOG,ADMELOG) injection vial 0-8 Units  0-8 Units SubCUTAneous Q4H Tereso Rivera MD   2 Units at 07/03/24 0058    potassium chloride (KLOR-CON M) extended release tablet 40 mEq  40 mEq Oral PRN Tereso Rivera MD        Or    potassium bicarb-citric acid (EFFER-K) effervescent tablet 40 mEq  40 mEq Oral PRN Tereso Rivera MD        Or    potassium chloride 10 mEq/100 mL IVPB (Peripheral Line)  10 mEq IntraVENous PRN Tereso Rivera MD        magnesium sulfate 2000 mg in 50 mL IVPB premix  2,000 mg IntraVENous PRN Tereso Rivera MD        ondansetron (ZOFRAN-ODT) disintegrating tablet 4 mg  4 mg Oral Q8H PRN Tereso Rivera MD        Or    ondansetron (ZOFRAN) injection 4 mg  4 mg IntraVENous Q6H PRN Tereso Rivera MD        polyethylene glycol (GLYCOLAX) packet 17 g  17 g Oral Daily PRN Tereso Rivera MD        acetaminophen (TYLENOL) tablet 650 mg  650 mg Oral Q6H PRN Tereso Rivera MD        Or    acetaminophen (TYLENOL) 
 Tereso Rivera MD        insulin lispro (HUMALOG,ADMELOG) injection vial 0-4 Units  0-4 Units SubCUTAneous Nightly Tereso Rivera MD        [START ON 7/1/2024] calcitRIOL (ROCALTROL) capsule 0.25 mcg  0.25 mcg Oral Daily Tereso Rivera MD        HYDROcodone-acetaminophen (NORCO)  MG per tablet 1 tablet  1 tablet Oral Q4H PRN Tereso Rivera MD        heparin (porcine) injection 4,000 Units  4,000 Units IntraVENous Once Tereso Rivera MD        heparin (porcine) injection 4,000 Units  4,000 Units IntraVENous PRN Tereso Rivera MD        heparin (porcine) injection 2,000 Units  2,000 Units IntraVENous PRN Tereso Rivera MD        heparin 25,000 units in dextrose 5% 250 mL (premix) infusion  5-30 Units/kg/hr IntraVENous Continuous Tereso Rivera MD        [Held by provider] apixaban (ELIQUIS) tablet 5 mg  5 mg Oral BID Tereso Rivera MD        insulin glargine (LANTUS) injection vial 5 Units  5 Units SubCUTAneous Nightly Tereso Rivera MD        metoprolol tartrate (LOPRESSOR) tablet 25 mg  25 mg Oral BID Tereso Rivera MD           Past Medical History:  Past Medical History:   Diagnosis Date    Diabetes mellitus (HCC)     Hypertension        Past Surgical History:  Past Surgical History:   Procedure Laterality Date    BACK SURGERY      x2    CARDIAC SURGERY      hole in heart as a child    FRACTURE SURGERY      left arm     ROTATOR CUFF REPAIR Right     x2       Family History  Family History   Problem Relation Age of Onset    Diabetes Father        Social History  Social History     Socioeconomic History    Marital status:      Spouse name: Not on file    Number of children: Not on file    Years of education: Not on file    Highest education level: Not on file   Occupational History    Not on file   Tobacco Use    Smoking status: Never    Smokeless tobacco: Not on file   Vaping Use    Vaping Use: Never used   Substance and Sexual Activity    Alcohol use: No    Drug use: No    Sexual 
0.25 mcg  0.25 mcg Oral Daily Tereso Rivera MD   0.25 mcg at 07/05/24 1149    HYDROcodone-acetaminophen (NORCO)  MG per tablet 1 tablet  1 tablet Oral Q4H PRN Tereso Rivera MD   1 tablet at 07/05/24 1347    insulin glargine (LANTUS) injection vial 5 Units  5 Units SubCUTAneous Nightly Tereso Rivera MD   5 Units at 07/04/24 2139    metoprolol tartrate (LOPRESSOR) tablet 25 mg  25 mg Oral BID Tereso Rivera MD   25 mg at 07/04/24 2139       Past Medical History:  Past Medical History:   Diagnosis Date    Diabetes mellitus (HCC)     Hypertension        Past Surgical History:  Past Surgical History:   Procedure Laterality Date    BACK SURGERY      x2    CARDIAC SURGERY      hole in heart as a child    FRACTURE SURGERY      left arm     ROTATOR CUFF REPAIR Right     x2       Family History  Family History   Problem Relation Age of Onset    Diabetes Father        Social History  Social History     Socioeconomic History    Marital status:      Spouse name: Not on file    Number of children: Not on file    Years of education: Not on file    Highest education level: Not on file   Occupational History    Not on file   Tobacco Use    Smoking status: Never    Smokeless tobacco: Not on file   Vaping Use    Vaping Use: Never used   Substance and Sexual Activity    Alcohol use: No    Drug use: No    Sexual activity: Not on file   Other Topics Concern    Not on file   Social History Narrative    Not on file     Social Determinants of Health     Financial Resource Strain: Not on file   Food Insecurity: No Food Insecurity (6/25/2024)    Hunger Vital Sign     Worried About Running Out of Food in the Last Year: Never true     Ran Out of Food in the Last Year: Never true   Transportation Needs: No Transportation Needs (6/25/2024)    PRAPARE - Transportation     Lack of Transportation (Medical): No     Lack of Transportation (Non-Medical): No   Physical Activity: Not on file   Stress: Not on file   Social Connections: 
mcg Oral Daily Tereso Rivera MD   0.25 mcg at 07/01/24 0814    HYDROcodone-acetaminophen (NORCO)  MG per tablet 1 tablet  1 tablet Oral Q4H PRN Tereso Rivera MD        heparin (porcine) injection 4,000 Units  4,000 Units IntraVENous PRN Tereso Rivera MD        heparin (porcine) injection 2,000 Units  2,000 Units IntraVENous PRN Tereso Rivera MD   2,000 Units at 07/01/24 1133    heparin 25,000 units in dextrose 5% 250 mL (premix) infusion  5-30 Units/kg/hr IntraVENous Continuous Tereso Rivera MD 14.5 mL/hr at 07/01/24 1137 16 Units/kg/hr at 07/01/24 1137    [Held by provider] apixaban (ELIQUIS) tablet 5 mg  5 mg Oral BID Tereso Rivera MD        insulin glargine (LANTUS) injection vial 5 Units  5 Units SubCUTAneous Nightly Tereso Rivera MD   5 Units at 06/30/24 2100    metoprolol tartrate (LOPRESSOR) tablet 25 mg  25 mg Oral BID Tereso Rivera MD   25 mg at 07/01/24 0814       Past Medical History:  Past Medical History:   Diagnosis Date    Diabetes mellitus (HCC)     Hypertension        Past Surgical History:  Past Surgical History:   Procedure Laterality Date    BACK SURGERY      x2    CARDIAC SURGERY      hole in heart as a child    FRACTURE SURGERY      left arm     ROTATOR CUFF REPAIR Right     x2       Family History  Family History   Problem Relation Age of Onset    Diabetes Father        Social History  Social History     Socioeconomic History    Marital status:      Spouse name: Not on file    Number of children: Not on file    Years of education: Not on file    Highest education level: Not on file   Occupational History    Not on file   Tobacco Use    Smoking status: Never    Smokeless tobacco: Not on file   Vaping Use    Vaping Use: Never used   Substance and Sexual Activity    Alcohol use: No    Drug use: No    Sexual activity: Not on file   Other Topics Concern    Not on file   Social History Narrative    Not on file     Social Determinants of Health     Financial Resource 
dysfunction.     Impression: Pulmonary pressures as well as shunt fraction not consistent with PFO/ASD as the etiology of symptoms.  Successful PCI to proximal LAD with NAVARRO x 1.     bumetanide  1 mg Oral BID    clopidogrel  75 mg Oral Daily    atorvastatin  20 mg Oral Nightly    apixaban  5 mg Oral BID    insulin lispro  0-8 Units SubCUTAneous TID WC    insulin regular  5 Units IntraVENous Once    dilTIAZem  120 mg Oral Daily    calcitRIOL  0.25 mcg Oral Daily    insulin glargine  5 Units SubCUTAneous Nightly    metoprolol tartrate  25 mg Oral BID           I have reviewed the patient's daily labs, including BMP and CBC with pertinent results discussed below.     Reviewed imaging     Assessment & Plan     Hypoxia  Intracardiac shunt  -- Possibly due to intracardiac shunt; orthodeoxia present but no platypnea  -- CXR reviewed, possible right basilar atelectasis and pulmonary vascular congestion but no jarret edema  -- ABG on 6 L NC while sitting upright: 7.390/35/50  -- Echo 6/19/24 showed a significant (>20 bubbles) right to left shunt at the interatrial septum, EF 55-60%, dilated LV, LA, and RA.  -- Consult cardiology and CT surgery for EJ and consideration of shunt repair  -- EJ results from 7/2 reviewed  -- Cardiac cath on 7/5 suggestive of proximal LAD stenosis with successful PCI today  Need follow-up for the next 24-hour before discharge plan  Continue on dual antiplatelet for next 6-month and follow-up with cardiology as outpatient     Acute on chronic diastolic heart failure  -- BNP 15,749  -- Bumex 1 mg IV BID, Metolazone 5 mg MWF  -- Elevate lower extremities  -- Strict Is/Os, daily weights     JANICE likely cardiorenal etiology  -- Cr 2.8, baseline ~1.2  -- Nephrology following  -- Renal US unremarkable  -- Repeat UA  -- Diuretics as above  7/6 will put on 1 L fluid restriction to have negative balance with Bumex and treatment of volume overload in terms of severe peripheral edema       Paroxysmal Afib  -- 
obtain.    Objective:  Patient Vitals for the past 24 hrs:   BP Temp Temp src Pulse Resp SpO2 Weight   07/02/24 1115 128/88 97 °F (36.1 °C) Temporal 54 16 93 % --   07/02/24 1025 (!) 141/83 97.3 °F (36.3 °C) Temporal 55 18 94 % --   07/02/24 1009 130/73 96.8 °F (36 °C) Temporal 52 18 95 % --   07/02/24 0759 -- -- -- (!) 48 -- -- --   07/02/24 0613 -- -- -- -- -- 96 % --   07/02/24 0600 -- -- -- -- -- -- 93.7 kg (206 lb 9.6 oz)   07/02/24 0545 -- -- -- -- -- -- 93.7 kg (206 lb 9.6 oz)   07/02/24 0444 123/78 97 °F (36.1 °C) Temporal 63 16 94 % --   07/01/24 2344 133/65 97.5 °F (36.4 °C) Temporal 64 16 97 % --   07/01/24 2100 137/65 98.2 °F (36.8 °C) Temporal 58 18 97 % --   07/01/24 1854 -- -- -- -- -- 93 % --   07/01/24 1257 121/75 97.7 °F (36.5 °C) Temporal (!) 47 18 99 % --         Intake/Output Summary (Last 24 hours) at 7/2/2024 1155  Last data filed at 7/1/2024 2100  Gross per 24 hour   Intake --   Output 250 ml   Net -250 ml       General: awake/alert   Chest:  clear to auscultation bilaterally  CVS: regular rate and rhythm  Abdominal: soft, nontender, normal bowel sounds  Extremities: no cyanosis, ble edema, right BKA  Skin: warm and dry without rash      Labs:  BMP:   Recent Labs     06/30/24  1638 06/30/24  1644 07/01/24  0522 07/02/24  0751     --  135* 142   K 5.2* 5.2 5.3* 4.9     --  103 106   CO2 22  --  21* 24   BUN 89*  --  93* 95*   CREATININE 2.8*  --  2.7* 2.6*   CALCIUM 8.9  --  8.3* 8.8       CBC:   Recent Labs     06/30/24  1638 07/01/24  0522 07/02/24  0751   WBC 25.7* 17.8* 9.9   HGB 10.3* 8.4* 8.1*   HCT 34.9* 25.8* 27.3*   MCV 95.1* 90.5 94.8*    193 194       LIVER PROFILE:   Recent Labs     06/30/24  1638 07/01/24  0522 07/02/24  0751   AST 19 14 14   ALT 21 17 15   BILITOT 0.5 0.3 0.4   ALKPHOS 208* 174* 155*         PT/INR:   Recent Labs     06/30/24  2030   PROTIME 18.3*   INR 1.56*       APTT:   Recent Labs     07/01/24  1752 07/01/24  2308 07/02/24  0751   APTT 
250 mL, 250 mL, IntraVENous, PRN, Tereso Rivera MD    glucagon injection 1 mg, 1 mg, SubCUTAneous, PRN, Tereso Rivera MD    dextrose 10 % infusion, , IntraVENous, Continuous PRN, Tereso Rivera MD    calcitRIOL (ROCALTROL) capsule 0.25 mcg, 0.25 mcg, Oral, Daily, Tereso Rivera MD, 0.25 mcg at 07/02/24 0759    HYDROcodone-acetaminophen (NORCO)  MG per tablet 1 tablet, 1 tablet, Oral, Q4H PRN, Tereso Rivera MD, 1 tablet at 07/01/24 2239    heparin (porcine) injection 4,000 Units, 4,000 Units, IntraVENous, PRN, Tereso Rivera MD    heparin (porcine) injection 2,000 Units, 2,000 Units, IntraVENous, PRN, Tereso Rivera MD, 2,000 Units at 07/02/24 1639    heparin 25,000 units in dextrose 5% 250 mL (premix) infusion, 5-30 Units/kg/hr, IntraVENous, Continuous, Tereso Rivera MD, Last Rate: 17.3 mL/hr at 07/02/24 1638, 19 Units/kg/hr at 07/02/24 1638    [Held by provider] apixaban (ELIQUIS) tablet 5 mg, 5 mg, Oral, BID, Tereso Rivera MD    insulin glargine (LANTUS) injection vial 5 Units, 5 Units, SubCUTAneous, Nightly, Tereos Rivera MD, 5 Units at 07/01/24 2203    metoprolol tartrate (LOPRESSOR) tablet 25 mg, 25 mg, Oral, BID, Tereso Rivera MD, 25 mg at 07/02/24 1435      Imaging:  XR CHEST PORTABLE    Result Date: 6/30/2024  Cardiomegaly, sternotomy wires and atherosclerotic disease are again noted.  Mild pulmonary vascular congestion.  Mild bronchovascular crowding in the medial bases may represent atelectasis or conceivably bronchitis.  It would be difficult to  exclude mild pneumonia.  No pleural fluid or pneumothorax.     ______________________________________ Electronically signed by: JOANA HARKINS M.D. Date:     06/30/2024 Time:    21:17          Assessment/Plan  Principal Problem:    Interatrial cardiac shunt  Active Problems:    Hypoxia    Acute on chronic diastolic (congestive) heart failure (HCC)    JANICE (acute kidney injury) (HCC)  Resolved Problems:    * No resolved hospital problems. *   
hyperparathyroidism        Plan:  Discussed with patient   Workup reviewed today  Monitor labs  Continue calcitriol  Hydrating to decrease risk of contrast induced nephropathy.  Ok with diuretics.   Previous renal artery doppler showed no WALKER (renal artery stenosis)  Okay to discharge from renal standpoint.  Follow-up at the renal clinic within 10 to 14 days.       Anthony Rogers MD  07/07/24  11:27 AM  
Eliquis 5 mg twice daily.  Remains in atrial fibrillation.  Consideration for possible DCCV in 4 to 6 weeks.  Plavix to continue uninterrupted for 6 months  2.  Slitlike PFO which is large and likely represents a surgical dehiscence of prior repair.  Shunt fraction 1.28.  At this time can continue to monitor.  No evidence of orthodeoxia post diuresis and oxygenating well and off oxygen currently.  Will have him follow-up with [facility] as an outpatient.        Charles Ramirez MD, MD 7/6/2024 11:41 AM    Thisdictation was generated by voice recognition computer software.  Although all attempts are made to edit the dictation for accuracy, there may be errors in the transcription that are not intended.

## 2024-07-07 NOTE — DISCHARGE SUMMARY
100 98   CO2 27 31* 29   BUN 81* 73* 63*   CREATININE 1.9* 1.8* 1.8*   GLUCOSE 172* 101 158*       Procedures: Cardiac cath and stent placement    Discharge Medications:         Medication List        START taking these medications      atorvastatin 20 MG tablet  Commonly known as: LIPITOR  Take 1 tablet by mouth nightly     bumetanide 1 MG tablet  Commonly known as: BUMEX  Take 1 tablet by mouth in the morning and 1 tablet in the evening.     calcitRIOL 0.25 MCG capsule  Commonly known as: ROCALTROL  Take 1 capsule by mouth daily  Start taking on: July 8, 2024     clopidogrel 75 MG tablet  Commonly known as: PLAVIX  Take 1 tablet by mouth daily  Start taking on: July 8, 2024     dilTIAZem 120 MG extended release capsule  Commonly known as: CARDIZEM CD  Take 1 capsule by mouth daily  Start taking on: July 8, 2024     insulin glargine 100 UNIT/ML injection vial  Commonly known as: LANTUS  Inject 7 Units into the skin daily     insulin lispro 100 UNIT/ML Soln injection vial  Commonly known as: HUMALOG,ADMELOG  Inject 0-8 Units into the skin 3 times daily (with meals) Glucose: Dose:   No Insulin  200-249 2 Units  250-299 4 Units  300-349 6 Units  Over 349 8 Units and notify physician     metoprolol tartrate 50 MG tablet  Commonly known as: LOPRESSOR  Take 1 tablet by mouth 2 times daily     polyethylene glycol 17 g packet  Commonly known as: GLYCOLAX  Take 1 packet by mouth daily as needed for Constipation            CONTINUE taking these medications      apixaban 5 MG Tabs tablet  Commonly known as: ELIQUIS     HYDROcodone-acetaminophen  MG per tablet  Commonly known as: NORCO     metFORMIN 1000 MG tablet  Commonly known as: GLUCOPHAGE     naloxone 4 MG/0.1ML Liqd nasal spray            STOP taking these medications      ascorbic acid 500 MG tablet  Commonly known as: VITAMIN C     furosemide 40 MG tablet  Commonly known as: LASIX     lisinopril 10 MG tablet  Commonly known as: PRINIVIL;ZESTRIL

## 2024-07-07 NOTE — CARE COORDINATION
Spoke with patient regarding MD orders for HH services.  Patient agreeable and has chosen [facility].  Referral Faxed.  [phone].  [phone].  Please notify - when patient discharges and fax DC Summary,  DC med list and any new HH orders.    The Patient and/or patient representative  was provided with a choice of provider and agrees   with the discharge plan. [x] Yes [] No    Freedom of choice list was provided with basic dialogue that supports the patient's individualized plan of care/goals, treatment preferences and shares the quality data associated with the providers. [x] Yes [] No  Electronically signed by Igor Vallejo on 7/7/2024 at 3:34 PM

## 2024-07-08 ENCOUNTER — TELEPHONE (OUTPATIENT)
Dept: CARDIOLOGY CLINIC | Age: 59
End: 2024-07-08

## 2024-07-08 DIAGNOSIS — I50.33 ACUTE ON CHRONIC DIASTOLIC (CONGESTIVE) HEART FAILURE (HCC): ICD-10-CM

## 2024-07-08 DIAGNOSIS — I48.0 PAROXYSMAL ATRIAL FIBRILLATION (HCC): Primary | ICD-10-CM

## 2024-07-08 NOTE — CARDIO/PULMONARY
Cardiac Rehab MI/PTCA/Stent education packet was sent to the patient's address on record.  Handouts titled; \"Home Instructions Following a Cardiac Event\", \"A Healthy Heart: Care Instructions\",  \"Cardiac Diet/Low Cholesterol\", \"Cardiac Rehabilitation: An Individualized Supervised Program For You\" and a [facility] Cardiac & Pulmonary Rehabilitation brochure were included.  Patient was instructed to contact [facility] or the hospital nearest their residence for the opportunity to enroll in Phase II Outpatient Cardiac Rehab.

## 2024-07-08 NOTE — TELEPHONE ENCOUNTER
Spoke with Noe Jimenez, have DCCV scheduled for 7/13/24 , advised patient will receive a phone call from cath lab day prior to procedure with arrival time.  .  Advised to be NPO after midnight, may take all morning medications with a sip of water.  Advised to check in at [facility] registration, come into the main entrance of the hospital, make immediate left, check in at end of hallway.  Advised patient will need someone to drive them home.     Cardioversion     Definition   Cardioversion is the delivery of an electric shock to the chest through electrodes or paddles. The shock is given to correct a dangerous heart rhythm or hearbeat.   Cardioversion can be done as an elective (scheduled) procedure or may be done urgently if an abnormal heartbeat is immediately life-threatening.     External Cardioversion        2011 [employer].   Reasons for Procedure   If the heart is not beating regularly, it may prevent the normal circulation of blood through the body. This may deprive various organs, including the brain and heart, of oxygen. Without oxygen, the organs cannot properly function and will eventually die.   Nonemergency cardioversion may be used to treat the following conditions:   Atrial fibrillation very rapid, irregular twitching in the atrium, when the ventricular heart rate is not too fast   Atrial flutter rapid but regular contractions in the atrium, when the ventricular heart rate is not too fast   Emergency cardioversion may be used to treat the following types of irregular heartbeats, which can lead to death if they are not immediately converted to a more normal rhythm:   Atrial tachycardia rapid beating of the heart, originating in the atrium with rapid ventricular heart rate   Ventricular tachycardiarapid beating of the heart, originating in the ventricle   Ventricular fibrillation rapid movement of the ventricular muscle without effective pumping   Possible Complications   If you are

## 2024-07-11 ENCOUNTER — TELEPHONE (OUTPATIENT)
Dept: VASCULAR SURGERY | Facility: CLINIC | Age: 59
End: 2024-07-11
Payer: COMMERCIAL

## 2024-07-12 ENCOUNTER — OFFICE VISIT (OUTPATIENT)
Dept: VASCULAR SURGERY | Facility: CLINIC | Age: 59
End: 2024-07-12
Payer: OTHER MISCELLANEOUS

## 2024-07-12 ENCOUNTER — TELEPHONE (OUTPATIENT)
Dept: CARDIOLOGY CLINIC | Age: 59
End: 2024-07-12

## 2024-07-12 VITALS
BODY MASS INDEX: 26.51 KG/M2 | OXYGEN SATURATION: 97 % | HEIGHT: 69 IN | SYSTOLIC BLOOD PRESSURE: 158 MMHG | WEIGHT: 179 LBS | HEART RATE: 74 BPM | DIASTOLIC BLOOD PRESSURE: 78 MMHG

## 2024-07-12 DIAGNOSIS — E11.65 TYPE 2 DIABETES MELLITUS WITH HYPERGLYCEMIA, WITHOUT LONG-TERM CURRENT USE OF INSULIN: ICD-10-CM

## 2024-07-12 DIAGNOSIS — Z89.511 S/P BKA (BELOW KNEE AMPUTATION), RIGHT: Primary | ICD-10-CM

## 2024-07-12 DIAGNOSIS — I10 PRIMARY HYPERTENSION: ICD-10-CM

## 2024-07-12 RX ORDER — DILTIAZEM HYDROCHLORIDE 120 MG/1
120 CAPSULE, COATED, EXTENDED RELEASE ORAL DAILY
COMMUNITY

## 2024-07-12 RX ORDER — POLYETHYLENE GLYCOL 3350 17 G/17G
17 POWDER, FOR SOLUTION ORAL DAILY
COMMUNITY
Start: 2024-07-07 | End: 2024-08-07

## 2024-07-12 RX ORDER — METOPROLOL TARTRATE 50 MG/1
1 TABLET, FILM COATED ORAL 2 TIMES DAILY
COMMUNITY
Start: 2024-07-07

## 2024-07-12 RX ORDER — CLOPIDOGREL BISULFATE 75 MG/1
75 TABLET ORAL DAILY
COMMUNITY

## 2024-07-12 RX ORDER — BUMETANIDE 1 MG/1
1 TABLET ORAL DAILY
COMMUNITY

## 2024-07-12 RX ORDER — ATORVASTATIN CALCIUM 20 MG/1
20 TABLET, FILM COATED ORAL
COMMUNITY

## 2024-07-12 NOTE — PROGRESS NOTES
"7/12/2024        Mehul Andersen MD  [address]  [facility] [address]      Garrett Rodriguez  1965    Chief Complaint   Patient presents with    Post-op     1 month post op. Right BKA done 6/3/24. Patient was hospitalized at [facility] for weeks after surgery for unrelated issues. Patient unsure of all med changes.        Dear Dr. Mehul Andersen:    HPI  I had the pleasure of seeing your patient Garrett Rodriguez in the office today.  As you recall, Garrett Rodriguez is a 59 y.o.  male who you are currently following for routine health maintenance.  He is here today for postop after undergoing right BKA on 6/3/2024.  He has a history of multiple right ankle surgeries, he had a small healing wound to his right heel, Dr. Reddy sent him to us recommending right BKA.  He had a injury at work necessitating multiple surgical interventions.  He experienced chronic right leg swelling.  He is maintained on Eliquis, Lipitor, and Plavix.    Review of Systems   Constitutional: Negative.  Negative for diaphoresis and fever.   HENT: Negative.     Eyes: Negative.    Respiratory: Negative.  Negative for shortness of breath and wheezing.    Cardiovascular: Negative.  Negative for chest pain and leg swelling.   Gastrointestinal: Negative.  Negative for abdominal pain.   Endocrine: Negative.    Genitourinary: Negative.    Musculoskeletal: Negative.    Skin: Negative.    Allergic/Immunologic: Negative.    Neurological: Negative.  Negative for dizziness and weakness.   Hematological: Negative.    Psychiatric/Behavioral: Negative.       /78   Pulse 74   Ht 175.3 cm (69\")   Wt 81.2 kg (179 lb)   SpO2 97%   BMI 26.43 kg/m²     Physical Exam  Vitals and nursing note reviewed.   Constitutional:       General: He is not in acute distress.     Appearance: Normal appearance. He is not diaphoretic.   HENT:      Head: Normocephalic. No right periorbital erythema or left periorbital erythema.      Nose: Nose " normal.   Eyes:      General: No scleral icterus.     Pupils: Pupils are equal.   Cardiovascular:      Rate and Rhythm: Normal rate and regular rhythm.      Pulses: Normal pulses.      Heart sounds: Normal heart sounds. No murmur heard.  Pulmonary:      Effort: Pulmonary effort is normal. No respiratory distress.      Breath sounds: Normal breath sounds.   Abdominal:      General: Bowel sounds are normal. There is no distension.      Palpations: Abdomen is soft.      Tenderness: There is no abdominal tenderness. There is no guarding.   Musculoskeletal:         General: No swelling or tenderness. Normal range of motion.      Cervical back: Normal range of motion and neck supple.      Right lower leg: No edema.      Left lower leg: No edema.      Right Lower Extremity: Right leg is amputated below knee.   Feet:      Right foot:      Skin integrity: Skin integrity normal.      Left foot:      Skin integrity: Skin integrity normal.      Comments: Staples removed from right BKA  Skin:     General: Skin is warm and dry.      Findings: No erythema or rash.   Neurological:      General: No focal deficit present.      Mental Status: He is alert and oriented to person, place, and time. Mental status is at baseline.      Cranial Nerves: No cranial nerve deficit.      Gait: Gait normal.   Psychiatric:         Attention and Perception: Attention normal.         Mood and Affect: Mood normal.         Behavior: Behavior normal.         Thought Content: Thought content normal.         Judgment: Judgment normal.       Diagnostic Data:      Patient Active Problem List   Diagnosis    Type 2 diabetes mellitus with hyperglycemia, without long-term current use of insulin    HTN (hypertension)    Retained orthopedic hardware    Anasarca    Iron deficiency anemia    Paroxysmal atrial fibrillation    Scrotal edema    Gait instability    Pulmonary HTN    Surgical wound, non healing    TARSHA (acute kidney injury)    Bleeding from wound     Diarrhea of presumed infectious origin    Preop testing    Nonhealing surgical wound, initial encounter    Chronic diastolic congestive heart failure    Long term (current) use of anticoagulants    LVH (left ventricular hypertrophy)    Hyperlipidemia LDL goal <100        Diagnosis Plan   1. S/P BKA (below knee amputation), right        2. Primary hypertension        3. Type 2 diabetes mellitus with hyperglycemia, without long-term current use of insulin              Plan: After thoroughly evaluating Garrett Rodriguez, I believe the best course of action is to remain conservative from vascular surgery standpoint.  After long stay at [facility] due to illness, he is doing much better.  His stump looks great, incision is approximated, his staples were removed.  They are heading to  to be fitted for the compression and move forward toward prosthetic.  We will see him back in 6 months for recheck.  I did discuss vascular risk factors as they pertain to the progression of vascular disease including controlling hypertension and diabetes.  His blood pressure is elevated today in office at 158/78, if this continues he will need to follow-up with his PCP for further recommendations.  His last hemoglobin A1c 2 months ago was uncontrolled at 7.0%.  He is maintained on Lipitor 20 mg nightly, Plavix 75 mg daily, and Eliquis 5 mg twice daily.  The patient is to continue taking their medications as previously discussed.   This was all discussed in full with complete understanding.    Thank you for allowing me to participate in the care of your patient.  Please do not hesitate to call with any questions or concerns.  We will keep you aware of any further encounters with Garrett Rodriguez.        Sincerely yours,         MEGHANN Wisdom David R, MD

## 2024-07-12 NOTE — TELEPHONE ENCOUNTER
Left message for patient that cardiology does not order zofran and he would need to reach out to his PCP for medication.

## 2024-07-12 NOTE — TELEPHONE ENCOUNTER
Patient asking get some Zofran called to [facility] pharmacy  Patient nausea from the Bumex medication     Thank you

## 2024-07-25 PROBLEM — Z95.5 PRESENCE OF DRUG COATED STENT IN LAD CORONARY ARTERY: Status: ACTIVE | Noted: 2024-07-25

## 2024-07-25 PROBLEM — I25.10 CORONARY ARTERY DISEASE INVOLVING NATIVE CORONARY ARTERY OF NATIVE HEART WITHOUT ANGINA PECTORIS: Status: ACTIVE | Noted: 2024-07-25

## 2024-07-25 PROBLEM — S88.119A UNILATERAL COMPLETE BKA: Status: ACTIVE | Noted: 2024-07-25

## 2024-07-25 PROBLEM — Q21.12 PFO (PATENT FORAMEN OVALE): Status: ACTIVE | Noted: 2024-07-25

## 2024-08-06 ENCOUNTER — TELEPHONE (OUTPATIENT)
Dept: CARDIOLOGY CLINIC | Age: 59
End: 2024-08-06

## 2024-08-06 RX ORDER — METOLAZONE 2.5 MG/1
2.5 TABLET ORAL DAILY
OUTPATIENT
Start: 2024-08-06

## 2024-08-06 NOTE — TELEPHONE ENCOUNTER
Called PT in regards to rescheduling appt for tomorrow since proider will be out of office, no answer lvm

## 2024-08-09 ENCOUNTER — TELEPHONE (OUTPATIENT)
Dept: WOUND CARE | Facility: HOSPITAL | Age: 59
End: 2024-08-09
Payer: COMMERCIAL

## 2024-08-09 DIAGNOSIS — S88.111A: Primary | ICD-10-CM

## 2024-08-11 NOTE — H&P
Patient:  Noe Jimenez                  1965  MRN: [id number]    PROBLEM LIST:    Patient Active Problem List    Diagnosis Date Noted    Hypoxia 07/01/2024     Priority: Low    Acute on chronic diastolic (congestive) heart failure ([facility]) 07/01/2024     Priority: Low    JANICE (acute kidney injury) ([facility]) 07/01/2024     Priority: Low    Interatrial cardiac shunt 06/30/2024     Priority: Low    Chest pain 06/30/2024     Priority: Low    Palliative care patient 06/20/2024     Priority: Low    Acute hypoxic respiratory failure ([facility]) 06/19/2024     Priority: Low    Hospital-acquired pneumonia 06/18/2024     Priority: Low    HAP (hospital-acquired pneumonia) 06/18/2024     Priority: Low    Vitamin D deficiency 06/18/2024     Priority: Low    Atrial fibrillation ([facility]) 06/08/2024     Priority: Low    Anemia 06/08/2024     Priority: Low    CHF (congestive heart failure) ([facility]) 06/08/2024     Priority: Low    Renal dysfunction 06/08/2024     Priority: Low    S/P BKA (below knee amputation) unilateral, right ([facility]) 06/07/2024     Priority: Low    Type 2 diabetes mellitus with complication, without long-term current use of insulin ([facility]) 06/07/2024     Priority: Low    Essential hypertension 06/07/2024     Priority: Low    Lumbar post-laminectomy syndrome 08/02/2016     Priority: Low    Lumbar radiculopathy 08/02/2016     Priority: Low    Muscle spasm of back 08/02/2016     Priority: Low    Sacroiliac joint dysfunction of both sides 08/02/2016     Priority: Low    Trochanteric bursitis of both hips 08/02/2016     Priority: Low    Peripheral neuropathic pain 08/02/2016     Priority: Low     1.  Coronary artery disease, catheterization 7/5/2024 with obstructive proximal LAD (PCI 3.0 x 38 mm Maxx), diastolic heart failure with preserved EF, moderate right ventricular enlargement with moderate left atrial enlargement with prior surgical ASD repair in childhood, EJ 7/2/2024 with large slitlike PFO with predominant left-to-right

## 2024-08-13 ENCOUNTER — HOSPITAL ENCOUNTER (OUTPATIENT)
Age: 59
Discharge: HOME | End: 2024-08-15
Attending: INTERNAL MEDICINE
Payer: COMMERCIAL

## 2024-08-13 VITALS — HEART RATE: 66 BPM | RESPIRATION RATE: 16 BRPM | TEMPERATURE: 96.9 F

## 2024-08-13 DIAGNOSIS — I48.0 PAROXYSMAL ATRIAL FIBRILLATION (HCC): ICD-10-CM

## 2024-08-13 LAB
EKG P AXIS: 69 DEGREES
EKG P-R INTERVAL: 140 MS
EKG Q-T INTERVAL: 480 MS
EKG QRS DURATION: 162 MS
EKG QTC CALCULATION (BAZETT): 487 MS
EKG T AXIS: 67 DEGREES

## 2024-08-13 PROCEDURE — 92960 CARDIOVERSION ELECTRIC EXT: CPT

## 2024-08-13 PROCEDURE — 93005 ELECTROCARDIOGRAM TRACING: CPT

## 2024-08-13 PROCEDURE — 99214 OFFICE O/P EST MOD 30 MIN: CPT | Performed by: INTERNAL MEDICINE

## 2024-08-13 RX ORDER — SODIUM CHLORIDE 0.9 % (FLUSH) 0.9 %
5-40 SYRINGE (ML) INJECTION PRN
Status: DISCONTINUED | OUTPATIENT
Start: 2024-08-13 | End: 2024-08-17 | Stop reason: HOSPADM

## 2024-08-13 RX ORDER — SODIUM CHLORIDE 0.9 % (FLUSH) 0.9 %
5-40 SYRINGE (ML) INJECTION EVERY 12 HOURS SCHEDULED
Status: DISCONTINUED | OUTPATIENT
Start: 2024-08-13 | End: 2024-08-17 | Stop reason: HOSPADM

## 2024-08-13 RX ORDER — SODIUM CHLORIDE 9 MG/ML
INJECTION, SOLUTION INTRAVENOUS PRN
Status: DISCONTINUED | OUTPATIENT
Start: 2024-08-13 | End: 2024-08-17 | Stop reason: HOSPADM

## 2024-08-13 RX ORDER — SODIUM CHLORIDE 9 MG/ML
INJECTION, SOLUTION INTRAVENOUS CONTINUOUS
Status: DISCONTINUED | OUTPATIENT
Start: 2024-08-13 | End: 2024-08-17 | Stop reason: HOSPADM

## 2024-08-13 NOTE — PROGRESS NOTES
Dr. Ramirez here to see pt. NSR confirmed. Procedure canceled for today. Pt to be discharged home of meds prior to arival. Follow up as previously scheduled.

## 2024-08-13 NOTE — PROGRESS NOTES
Pt received to cath holding via own W/C with wife for an elective DCCV. Pt connected to [facility]. NSR noted per [facility]. Will obtain EKG. Call light given to pt. Wife present.

## 2024-08-28 ENCOUNTER — TELEPHONE (OUTPATIENT)
Dept: CARDIOLOGY CLINIC | Age: 59
End: 2024-08-28

## 2024-08-28 NOTE — TELEPHONE ENCOUNTER
Patients wife called to report that patient is having trouble with bleeding. He has been having nosebleeds the past 4 days. He is not using any nasal sprays to keep nasal passages moist, advised to try that especially now with the summer heat and humidity. He is also having issus with hitting his hand and shin of his leg due to being in a wheelchair that causes him to bleed. Patient had home health nurse visit and she told him he was on a lot of blood thinners. Reviewed med list and he takes eliquis 5 mg bid and plavix, he is also prescribed meloxicam but does not take this and hasn't taken it. Also verified that patient isn't taking any ibuprofen, vitamin e, or fish oil. She wanted to know if patient can come off eliquis since he has only had AFIB when he was in the hospital with fluid overload. Follow up with APRN is 9/3/2024.

## 2024-08-28 NOTE — TELEPHONE ENCOUNTER
Patients wife notified to stop eliquis 5 mg bid for 4 days and then restart at lower does of 2.5 mg BID. Advised to continue plavix due to recent stent. Med order placed.

## 2024-09-03 ENCOUNTER — OFFICE VISIT (OUTPATIENT)
Dept: CARDIOLOGY CLINIC | Age: 59
End: 2024-09-03
Payer: COMMERCIAL

## 2024-09-03 VITALS
BODY MASS INDEX: 25.77 KG/M2 | SYSTOLIC BLOOD PRESSURE: 98 MMHG | OXYGEN SATURATION: 96 % | DIASTOLIC BLOOD PRESSURE: 62 MMHG | WEIGHT: 180 LBS | HEIGHT: 70 IN | HEART RATE: 54 BPM

## 2024-09-03 DIAGNOSIS — I25.10 CORONARY ARTERY DISEASE INVOLVING NATIVE CORONARY ARTERY OF NATIVE HEART WITHOUT ANGINA PECTORIS: ICD-10-CM

## 2024-09-03 DIAGNOSIS — I48.0 PAROXYSMAL ATRIAL FIBRILLATION (HCC): Primary | ICD-10-CM

## 2024-09-03 DIAGNOSIS — I10 ESSENTIAL HYPERTENSION: ICD-10-CM

## 2024-09-03 DIAGNOSIS — Z79.01 CHRONIC ANTICOAGULATION: ICD-10-CM

## 2024-09-03 DIAGNOSIS — E78.5 HYPERLIPIDEMIA, UNSPECIFIED HYPERLIPIDEMIA TYPE: ICD-10-CM

## 2024-09-03 LAB
25(OH)D3 SERPL-MCNC: 21.3 NG/ML
ALBUMIN SERPL-MCNC: 4 G/DL (ref 3.5–5.2)
ALP SERPL-CCNC: 119 U/L (ref 40–129)
ALT SERPL-CCNC: 30 U/L (ref 5–41)
ANION GAP SERPL CALCULATED.3IONS-SCNC: 11 MMOL/L (ref 7–19)
AST SERPL-CCNC: 28 U/L (ref 5–40)
BACTERIA URNS QL MICRO: NEGATIVE /HPF
BASOPHILS # BLD: 0.1 K/UL (ref 0–0.2)
BASOPHILS NFR BLD: 0.7 % (ref 0–1)
BILIRUB SERPL-MCNC: 0.3 MG/DL (ref 0.2–1.2)
BILIRUB UR QL STRIP: NEGATIVE
BUN SERPL-MCNC: 86 MG/DL (ref 6–20)
CALCIUM SERPL-MCNC: 8.9 MG/DL (ref 8.6–10)
CHLORIDE SERPL-SCNC: 99 MMOL/L (ref 98–111)
CLARITY UR: CLEAR
CO2 SERPL-SCNC: 25 MMOL/L (ref 22–29)
COLOR UR: YELLOW
CREAT SERPL-MCNC: 1.9 MG/DL (ref 0.7–1.2)
CREAT UR-MCNC: 39.9 MG/DL (ref 39–259)
CRYSTALS URNS MICRO: NORMAL /HPF
EOSINOPHIL # BLD: 0.2 K/UL (ref 0–0.6)
EOSINOPHIL NFR BLD: 2.4 % (ref 0–5)
EPI CELLS #/AREA URNS AUTO: 0 /HPF (ref 0–5)
ERYTHROCYTE [DISTWIDTH] IN BLOOD BY AUTOMATED COUNT: 12.9 % (ref 11.5–14.5)
GLUCOSE SERPL-MCNC: 584 MG/DL (ref 70–99)
GLUCOSE UR STRIP.AUTO-MCNC: =>1000 MG/DL
HCT VFR BLD AUTO: 29.1 % (ref 42–52)
HGB BLD-MCNC: 9.6 G/DL (ref 14–18)
HGB UR STRIP.AUTO-MCNC: ABNORMAL MG/L
HYALINE CASTS #/AREA URNS AUTO: 2 /HPF (ref 0–8)
IMM GRANULOCYTES # BLD: 0 K/UL
KETONES UR STRIP.AUTO-MCNC: NEGATIVE MG/DL
LEUKOCYTE ESTERASE UR QL STRIP.AUTO: NEGATIVE
LYMPHOCYTES # BLD: 1.6 K/UL (ref 1.1–4.5)
LYMPHOCYTES NFR BLD: 23.9 % (ref 20–40)
MAGNESIUM SERPL-MCNC: 2.3 MG/DL (ref 1.6–2.6)
MCH RBC QN AUTO: 29.5 PG (ref 27–31)
MCHC RBC AUTO-ENTMCNC: 33 G/DL (ref 33–37)
MCV RBC AUTO: 89.5 FL (ref 80–94)
MONOCYTES # BLD: 0.4 K/UL (ref 0–0.9)
MONOCYTES NFR BLD: 5.2 % (ref 0–10)
NEUTROPHILS # BLD: 4.5 K/UL (ref 1.5–7.5)
NEUTS SEG NFR BLD: 67.4 % (ref 50–65)
NITRITE UR QL STRIP.AUTO: NEGATIVE
PH UR STRIP.AUTO: 5.5 [PH] (ref 5–8)
PHOSPHATE SERPL-MCNC: 5.2 MG/DL (ref 2.5–4.5)
PLATELET # BLD AUTO: 150 K/UL (ref 130–400)
PMV BLD AUTO: 11.1 FL (ref 9.4–12.4)
POTASSIUM SERPL-SCNC: 6.7 MMOL/L (ref 3.5–5)
PROT SERPL-MCNC: 7.2 G/DL (ref 6.4–8.3)
PROT UR STRIP.AUTO-MCNC: 100 MG/DL
PROT UR-MCNC: 102 MG/DL (ref 0–12)
PTH-INTACT SERPL-MCNC: 137.5 PG/ML (ref 15–65)
RBC # BLD AUTO: 3.25 M/UL (ref 4.7–6.1)
RBC #/AREA URNS AUTO: 3 /HPF (ref 0–4)
SODIUM SERPL-SCNC: 135 MMOL/L (ref 136–145)
SP GR UR STRIP.AUTO: 1.02 (ref 1–1.03)
URATE SERPL-MCNC: 8 MG/DL (ref 3.4–7)
UROBILINOGEN UR STRIP.AUTO-MCNC: 0.2 E.U./DL
WBC # BLD AUTO: 6.7 K/UL (ref 4.8–10.8)
WBC #/AREA URNS AUTO: 1 /HPF (ref 0–5)

## 2024-09-03 PROCEDURE — 3078F DIAST BP <80 MM HG: CPT | Performed by: NURSE PRACTITIONER

## 2024-09-03 PROCEDURE — 3074F SYST BP LT 130 MM HG: CPT | Performed by: NURSE PRACTITIONER

## 2024-09-03 PROCEDURE — 99214 OFFICE O/P EST MOD 30 MIN: CPT | Performed by: NURSE PRACTITIONER

## 2024-09-03 RX ORDER — GLIMEPIRIDE 2 MG/1
1 TABLET ORAL 2 TIMES DAILY
Status: ON HOLD | COMMUNITY
End: 2024-09-05 | Stop reason: HOSPADM

## 2024-09-03 RX ORDER — DAPAGLIFLOZIN 10 MG/1
1 TABLET, FILM COATED ORAL DAILY
COMMUNITY
End: 2024-09-03 | Stop reason: ALTCHOICE

## 2024-09-03 NOTE — PROGRESS NOTES
- RTC in 3 months and 1 year with Dr. Ramirez or sooner if needed      Please do not hesitate to contact me for any questions or concerns.    Sincerely yours,    CORONA Sanchez

## 2024-09-04 ENCOUNTER — HOSPITAL ENCOUNTER (OUTPATIENT)
Age: 59
Setting detail: OBSERVATION
Discharge: HOME | End: 2024-09-05
Attending: EMERGENCY MEDICINE | Admitting: STUDENT IN AN ORGANIZED HEALTH CARE EDUCATION/TRAINING PROGRAM
Payer: COMMERCIAL

## 2024-09-04 DIAGNOSIS — N18.9 CHRONIC KIDNEY DISEASE, UNSPECIFIED CKD STAGE: ICD-10-CM

## 2024-09-04 DIAGNOSIS — E87.5 HYPERKALEMIA: Primary | ICD-10-CM

## 2024-09-04 PROBLEM — N18.32 CKD STAGE 3B, GFR 30-44 ML/MIN (HCC): Status: ACTIVE | Noted: 2024-09-04

## 2024-09-04 LAB
ALBUMIN SERPL-MCNC: 3.9 G/DL (ref 3.5–5.2)
ALP SERPL-CCNC: 112 U/L (ref 40–129)
ALT SERPL-CCNC: 32 U/L (ref 5–41)
ANION GAP SERPL CALCULATED.3IONS-SCNC: 10 MMOL/L (ref 7–19)
ANION GAP SERPL CALCULATED.3IONS-SCNC: 12 MMOL/L (ref 7–19)
AST SERPL-CCNC: 39 U/L (ref 5–40)
BASOPHILS # BLD: 0.1 K/UL (ref 0–0.2)
BASOPHILS NFR BLD: 0.9 % (ref 0–1)
BILIRUB SERPL-MCNC: 0.3 MG/DL (ref 0.2–1.2)
BUN SERPL-MCNC: 78 MG/DL (ref 6–20)
BUN SERPL-MCNC: 82 MG/DL (ref 6–20)
CALCIUM SERPL-MCNC: 9 MG/DL (ref 8.6–10)
CALCIUM SERPL-MCNC: 9.5 MG/DL (ref 8.6–10)
CHLORIDE SERPL-SCNC: 101 MMOL/L (ref 98–111)
CHLORIDE SERPL-SCNC: 98 MMOL/L (ref 98–111)
CO2 SERPL-SCNC: 25 MMOL/L (ref 22–29)
CO2 SERPL-SCNC: 26 MMOL/L (ref 22–29)
CREAT SERPL-MCNC: 1.7 MG/DL (ref 0.7–1.2)
CREAT SERPL-MCNC: 1.8 MG/DL (ref 0.7–1.2)
EOSINOPHIL # BLD: 0.2 K/UL (ref 0–0.6)
EOSINOPHIL NFR BLD: 2.3 % (ref 0–5)
ERYTHROCYTE [DISTWIDTH] IN BLOOD BY AUTOMATED COUNT: 13.2 % (ref 11.5–14.5)
GLUCOSE BLD-MCNC: 193 MG/DL (ref 70–99)
GLUCOSE BLD-MCNC: 289 MG/DL (ref 70–99)
GLUCOSE BLD-MCNC: 324 MG/DL (ref 70–99)
GLUCOSE BLD-MCNC: 374 MG/DL (ref 70–99)
GLUCOSE SERPL-MCNC: 290 MG/DL (ref 70–99)
GLUCOSE SERPL-MCNC: 496 MG/DL (ref 70–99)
HCT VFR BLD AUTO: 31.6 % (ref 42–52)
HGB BLD-MCNC: 10.1 G/DL (ref 14–18)
IMM GRANULOCYTES # BLD: 0 K/UL
LYMPHOCYTES # BLD: 1.6 K/UL (ref 1.1–4.5)
LYMPHOCYTES NFR BLD: 21.1 % (ref 20–40)
MCH RBC QN AUTO: 29.8 PG (ref 27–31)
MCHC RBC AUTO-ENTMCNC: 32 G/DL (ref 33–37)
MCV RBC AUTO: 93.2 FL (ref 80–94)
MONOCYTES # BLD: 0.4 K/UL (ref 0–0.9)
MONOCYTES NFR BLD: 5.4 % (ref 0–10)
NEUTROPHILS # BLD: 5.2 K/UL (ref 1.5–7.5)
NEUTS SEG NFR BLD: 69.8 % (ref 50–65)
PERFORMED ON: ABNORMAL
PLATELET # BLD AUTO: 209 K/UL (ref 130–400)
PMV BLD AUTO: 12.1 FL (ref 9.4–12.4)
POTASSIUM SERPL-SCNC: 4.9 MMOL/L (ref 3.5–5)
POTASSIUM SERPL-SCNC: 6.1 MMOL/L (ref 3.5–5)
PROT SERPL-MCNC: 7.5 G/DL (ref 6.4–8.3)
RBC # BLD AUTO: 3.39 M/UL (ref 4.7–6.1)
SODIUM SERPL-SCNC: 133 MMOL/L (ref 136–145)
SODIUM SERPL-SCNC: 139 MMOL/L (ref 136–145)
WBC # BLD AUTO: 7.4 K/UL (ref 4.8–10.8)

## 2024-09-04 PROCEDURE — G0378 HOSPITAL OBSERVATION PER HR: HCPCS

## 2024-09-04 PROCEDURE — 6360000002 HC RX W HCPCS: Performed by: EMERGENCY MEDICINE

## 2024-09-04 PROCEDURE — 82962 GLUCOSE BLOOD TEST: CPT

## 2024-09-04 PROCEDURE — 99285 EMERGENCY DEPT VISIT HI MDM: CPT

## 2024-09-04 PROCEDURE — 96365 THER/PROPH/DIAG IV INF INIT: CPT

## 2024-09-04 PROCEDURE — 94644 CONT INHLJ TX 1ST HOUR: CPT

## 2024-09-04 PROCEDURE — 93005 ELECTROCARDIOGRAM TRACING: CPT | Performed by: EMERGENCY MEDICINE

## 2024-09-04 PROCEDURE — 36415 COLL VENOUS BLD VENIPUNCTURE: CPT

## 2024-09-04 PROCEDURE — 6370000000 HC RX 637 (ALT 250 FOR IP): Performed by: NURSE PRACTITIONER

## 2024-09-04 PROCEDURE — 85025 COMPLETE CBC W/AUTO DIFF WBC: CPT

## 2024-09-04 PROCEDURE — 6370000000 HC RX 637 (ALT 250 FOR IP): Performed by: EMERGENCY MEDICINE

## 2024-09-04 PROCEDURE — 94760 N-INVAS EAR/PLS OXIMETRY 1: CPT

## 2024-09-04 PROCEDURE — 80053 COMPREHEN METABOLIC PANEL: CPT

## 2024-09-04 PROCEDURE — 96375 TX/PRO/DX INJ NEW DRUG ADDON: CPT

## 2024-09-04 PROCEDURE — 2580000003 HC RX 258: Performed by: NURSE PRACTITIONER

## 2024-09-04 RX ORDER — DEXTROSE MONOHYDRATE 100 MG/ML
INJECTION, SOLUTION INTRAVENOUS CONTINUOUS PRN
Status: DISCONTINUED | OUTPATIENT
Start: 2024-09-04 | End: 2024-09-05 | Stop reason: HOSPADM

## 2024-09-04 RX ORDER — CALCIUM GLUCONATE 20 MG/ML
1000 INJECTION, SOLUTION INTRAVENOUS ONCE
Status: COMPLETED | OUTPATIENT
Start: 2024-09-04 | End: 2024-09-04

## 2024-09-04 RX ORDER — ACETAMINOPHEN 650 MG/1
650 SUPPOSITORY RECTAL EVERY 6 HOURS PRN
Status: DISCONTINUED | OUTPATIENT
Start: 2024-09-04 | End: 2024-09-05 | Stop reason: HOSPADM

## 2024-09-04 RX ORDER — METOPROLOL TARTRATE 50 MG
50 TABLET ORAL 2 TIMES DAILY
Status: DISCONTINUED | OUTPATIENT
Start: 2024-09-04 | End: 2024-09-05 | Stop reason: HOSPADM

## 2024-09-04 RX ORDER — DILTIAZEM HYDROCHLORIDE 120 MG/1
120 CAPSULE, COATED, EXTENDED RELEASE ORAL DAILY
Status: DISCONTINUED | OUTPATIENT
Start: 2024-09-04 | End: 2024-09-05 | Stop reason: HOSPADM

## 2024-09-04 RX ORDER — MAGNESIUM SULFATE IN WATER 40 MG/ML
2000 INJECTION, SOLUTION INTRAVENOUS PRN
Status: DISCONTINUED | OUTPATIENT
Start: 2024-09-04 | End: 2024-09-05 | Stop reason: HOSPADM

## 2024-09-04 RX ORDER — ACETAMINOPHEN 325 MG/1
650 TABLET ORAL EVERY 6 HOURS PRN
Status: DISCONTINUED | OUTPATIENT
Start: 2024-09-04 | End: 2024-09-05 | Stop reason: HOSPADM

## 2024-09-04 RX ORDER — SODIUM CHLORIDE 9 MG/ML
INJECTION, SOLUTION INTRAVENOUS PRN
Status: DISCONTINUED | OUTPATIENT
Start: 2024-09-04 | End: 2024-09-05 | Stop reason: HOSPADM

## 2024-09-04 RX ORDER — INSULIN GLARGINE 100 [IU]/ML
20 INJECTION, SOLUTION SUBCUTANEOUS NIGHTLY
COMMUNITY

## 2024-09-04 RX ORDER — POLYETHYLENE GLYCOL 3350 17 G/17G
17 POWDER, FOR SOLUTION ORAL DAILY PRN
Status: DISCONTINUED | OUTPATIENT
Start: 2024-09-04 | End: 2024-09-05 | Stop reason: HOSPADM

## 2024-09-04 RX ORDER — ATORVASTATIN CALCIUM 20 MG/1
20 TABLET, FILM COATED ORAL NIGHTLY
Status: DISCONTINUED | OUTPATIENT
Start: 2024-09-04 | End: 2024-09-05 | Stop reason: HOSPADM

## 2024-09-04 RX ORDER — HYDROCODONE BITARTRATE AND ACETAMINOPHEN 10; 325 MG/1; MG/1
1 TABLET ORAL EVERY 8 HOURS PRN
Status: DISCONTINUED | OUTPATIENT
Start: 2024-09-04 | End: 2024-09-05 | Stop reason: HOSPADM

## 2024-09-04 RX ORDER — CALCITRIOL 0.25 UG/1
0.25 CAPSULE, LIQUID FILLED ORAL DAILY
Status: DISCONTINUED | OUTPATIENT
Start: 2024-09-04 | End: 2024-09-05 | Stop reason: HOSPADM

## 2024-09-04 RX ORDER — SODIUM CHLORIDE 0.9 % (FLUSH) 0.9 %
5-40 SYRINGE (ML) INJECTION EVERY 12 HOURS SCHEDULED
Status: DISCONTINUED | OUTPATIENT
Start: 2024-09-04 | End: 2024-09-05 | Stop reason: HOSPADM

## 2024-09-04 RX ORDER — BUMETANIDE 1 MG/1
1 TABLET ORAL 2 TIMES DAILY
Status: DISCONTINUED | OUTPATIENT
Start: 2024-09-04 | End: 2024-09-05 | Stop reason: HOSPADM

## 2024-09-04 RX ORDER — ONDANSETRON 4 MG/1
4 TABLET, ORALLY DISINTEGRATING ORAL EVERY 8 HOURS PRN
Status: DISCONTINUED | OUTPATIENT
Start: 2024-09-04 | End: 2024-09-05 | Stop reason: HOSPADM

## 2024-09-04 RX ORDER — INSULIN LISPRO 100 [IU]/ML
0-8 INJECTION, SOLUTION INTRAVENOUS; SUBCUTANEOUS
Status: DISCONTINUED | OUTPATIENT
Start: 2024-09-04 | End: 2024-09-05 | Stop reason: HOSPADM

## 2024-09-04 RX ORDER — SODIUM CHLORIDE 0.9 % (FLUSH) 0.9 %
5-40 SYRINGE (ML) INJECTION PRN
Status: DISCONTINUED | OUTPATIENT
Start: 2024-09-04 | End: 2024-09-05 | Stop reason: HOSPADM

## 2024-09-04 RX ORDER — ONDANSETRON 2 MG/ML
4 INJECTION INTRAMUSCULAR; INTRAVENOUS EVERY 6 HOURS PRN
Status: DISCONTINUED | OUTPATIENT
Start: 2024-09-04 | End: 2024-09-05 | Stop reason: HOSPADM

## 2024-09-04 RX ORDER — INSULIN GLARGINE 100 [IU]/ML
15 INJECTION, SOLUTION SUBCUTANEOUS NIGHTLY
Status: DISCONTINUED | OUTPATIENT
Start: 2024-09-04 | End: 2024-09-05 | Stop reason: HOSPADM

## 2024-09-04 RX ORDER — CLOPIDOGREL BISULFATE 75 MG/1
75 TABLET ORAL DAILY
Status: DISCONTINUED | OUTPATIENT
Start: 2024-09-04 | End: 2024-09-05 | Stop reason: HOSPADM

## 2024-09-04 RX ORDER — ALBUTEROL SULFATE 0.83 MG/ML
10 SOLUTION RESPIRATORY (INHALATION) ONCE
Status: COMPLETED | OUTPATIENT
Start: 2024-09-04 | End: 2024-09-04

## 2024-09-04 RX ORDER — INSULIN LISPRO 100 [IU]/ML
0-4 INJECTION, SOLUTION INTRAVENOUS; SUBCUTANEOUS NIGHTLY
Status: DISCONTINUED | OUTPATIENT
Start: 2024-09-04 | End: 2024-09-05 | Stop reason: HOSPADM

## 2024-09-04 RX ORDER — INSULIN LISPRO 100 [IU]/ML
5 INJECTION, SOLUTION INTRAVENOUS; SUBCUTANEOUS
Status: DISCONTINUED | OUTPATIENT
Start: 2024-09-04 | End: 2024-09-05 | Stop reason: HOSPADM

## 2024-09-04 RX ADMIN — HYDROCODONE BITARTRATE AND ACETAMINOPHEN 1 TABLET: 10; 325 TABLET ORAL at 21:27

## 2024-09-04 RX ADMIN — SODIUM CHLORIDE, PRESERVATIVE FREE 10 ML: 5 INJECTION INTRAVENOUS at 21:27

## 2024-09-04 RX ADMIN — ATORVASTATIN CALCIUM 20 MG: 20 TABLET, FILM COATED ORAL at 21:27

## 2024-09-04 RX ADMIN — BUMETANIDE 1 MG: 1 TABLET ORAL at 18:12

## 2024-09-04 RX ADMIN — APIXABAN 2.5 MG: 2.5 TABLET, FILM COATED ORAL at 21:27

## 2024-09-04 RX ADMIN — INSULIN LISPRO 6 UNITS: 100 INJECTION, SOLUTION INTRAVENOUS; SUBCUTANEOUS at 18:14

## 2024-09-04 RX ADMIN — INSULIN LISPRO 5 UNITS: 100 INJECTION, SOLUTION INTRAVENOUS; SUBCUTANEOUS at 18:13

## 2024-09-04 RX ADMIN — ALBUTEROL SULFATE 10 MG: 2.5 SOLUTION RESPIRATORY (INHALATION) at 11:14

## 2024-09-04 RX ADMIN — INSULIN GLARGINE 15 UNITS: 100 INJECTION, SOLUTION SUBCUTANEOUS at 21:28

## 2024-09-04 RX ADMIN — SODIUM ZIRCONIUM CYCLOSILICATE 10 G: 10 POWDER, FOR SUSPENSION ORAL at 12:29

## 2024-09-04 RX ADMIN — METOPROLOL TARTRATE 50 MG: 50 TABLET, FILM COATED ORAL at 21:27

## 2024-09-04 RX ADMIN — CALCIUM GLUCONATE 1000 MG: 20 INJECTION, SOLUTION INTRAVENOUS at 11:14

## 2024-09-04 RX ADMIN — INSULIN HUMAN 10 UNITS: 100 INJECTION, SOLUTION PARENTERAL at 11:41

## 2024-09-04 ASSESSMENT — PAIN DESCRIPTION - LOCATION: LOCATION: LEG

## 2024-09-04 ASSESSMENT — ENCOUNTER SYMPTOMS
DIARRHEA: 0
VOMITING: 0
SORE THROAT: 0
ABDOMINAL PAIN: 0
NAUSEA: 0
COUGH: 0
BACK PAIN: 0
RHINORRHEA: 0
SHORTNESS OF BREATH: 0

## 2024-09-04 ASSESSMENT — PAIN DESCRIPTION - DESCRIPTORS: DESCRIPTORS: THROBBING

## 2024-09-04 ASSESSMENT — PAIN DESCRIPTION - ORIENTATION: ORIENTATION: RIGHT

## 2024-09-04 ASSESSMENT — PAIN SCALES - GENERAL
PAINLEVEL_OUTOF10: 9
PAINLEVEL_OUTOF10: 0
PAINLEVEL_OUTOF10: 4

## 2024-09-04 NOTE — H&P
Home Medications:  Prior to Admission medications    Medication Sig Start Date End Date Taking? Authorizing Provider   glimepiride (AMARYL) 2 MG tablet Take 1 tablet by mouth 2 times daily    ProviderBarbi MD   apixaban (ELIQUIS) 2.5 MG TABS tablet Take 1 tablet by mouth 2 times daily 8/28/24   Charles Ramirez MD   atorvastatin (LIPITOR) 20 MG tablet Take 1 tablet by mouth nightly 7/7/24   Jose A Webb MD   metoprolol tartrate (LOPRESSOR) 50 MG tablet Take 1 tablet by mouth 2 times daily 7/7/24   Jose A Webb MD   dilTIAZem (CARDIZEM CD) 120 MG extended release capsule Take 1 capsule by mouth daily 7/8/24   Jose A Webb MD   bumetanide (BUMEX) 1 MG tablet Take 1 tablet by mouth in the morning and 1 tablet in the evening. 7/7/24   Jose A Webb MD   calcitRIOL (ROCALTROL) 0.25 MCG capsule Take 1 capsule by mouth daily 7/8/24   Jose A Webb MD   clopidogrel (PLAVIX) 75 MG tablet Take 1 tablet by mouth daily 7/8/24   Jose A Webb MD   naloxone 4 MG/0.1ML LIQD nasal spray 1 spray by Nasal route as needed for Opioid Reversal    ProviderBarbi MD   HYDROcodone-acetaminophen (NORCO)  MG per tablet Take 1 tablet by mouth every 8 hours as needed for Pain.    ProviderBarbi MD       Allergies:    Dilaudid [hydromorphone], Morphine, and Percocet [oxycodone-acetaminophen]    Social History:    The patient currently lives with spouse  Tobacco:   reports that he has never smoked. He does not have any smokeless tobacco history on file.  Alcohol:   reports no history of alcohol use.  Illicit Drugs: denies    Family History:      Problem Relation Age of Onset    Diabetes Father        Review of Systems:   Review of Systems   All other systems reviewed and are negative.       14 point review of systems is negative except as specifically addressed above.    Physical Examination:  BP (!) 143/61   Pulse 57   Temp 97.7 °F (36.5 °C) (Oral) Comment: Full set of vital signs taken when this RN assumed care.

## 2024-09-04 NOTE — ED NOTES
Patient placed on cardiac monitor, continuous pulse oximeter, and NIBP monitor. Monitor alarms on. Family at bedside. Call light within reach.

## 2024-09-04 NOTE — ED PROVIDER NOTES
note were completed with a voice recognition program.  Efforts were made to edit thedictations but occasionally words are mis-transcribed.)    DARIO MADERA MD (electronically signed)Emergency Physician        Dario Madera MD  09/04/24 [id number]

## 2024-09-04 NOTE — ED NOTES
ED TO INPATIENT SBAR HANDOFF    Patient Name: Noe Jimenez   : 1965  59 y.o.   Family/Caregiver Present: Yes (Wife)   Code Status Order: Prior    C-SSRS: Risk of Suicide: No Risk  Sitter No  Restraints: No      Situation  Chief Complaint:   Chief Complaint   Patient presents with    Abnormal Lab     K+ 6.2      Patient Diagnosis: Hyperkalemia [E87.5]     Brief Description of Patient's Condition: Noe Jimenez is a 59 year-old male who presents to the emergency department for hyperkalemia. Patient had routine blood work drawn yesterday and was found to have hyperkalemia at 6.7. He denies any medication changes. Patient does have known chronic kidney disease. No GI symptoms. Overall seems to be asymptomatic and does not have any complaints. Patient's K+. Patient's potassium 6.1 here. Sodium 133, Creatinine 1.8, and GFR 43. Patient's glucose 496. Per MD, do not give Dextrose 10% at this time due to glucose. Patient given Calcium Gluconate 1000mg, Insulin 10 units, and Lokelma. Patient's sugar from 496 to 374 in 30 minutes.   Mental Status: A&O X4.   Arrived from: Home    Imaging:   No orders to display     COVID-19 Results:   Internal Administration   First Dose      Second Dose           Last COVID Lab SARS-CoV-2, PCR (no units)   Date Value   2024 Not Detected           Abnormal labs:   Abnormal Labs Reviewed   CBC WITH AUTO DIFFERENTIAL - Abnormal; Notable for the following components:       Result Value    RBC 3.39 (*)     Hemoglobin 10.1 (*)     Hematocrit 31.6 (*)     MCHC 32.0 (*)     Neutrophils % 69.8 (*)     All other components within normal limits   COMPREHENSIVE METABOLIC PANEL W/ REFLEX TO MG FOR LOW K - Abnormal; Notable for the following components:    Sodium 133 (*)     Potassium reflex Magnesium 6.1 (*)     Glucose 496 (*)     BUN 82 (*)     Creatinine 1.8 (*)     Est, Glom Filt Rate 43 (*)     All other components within normal limits    Narrative:     CALL  English  KLE tel.

## 2024-09-04 NOTE — ED NOTES
Attempted to call report for Bed 717, RN stated that bed was outside of room and dirty. RN on 7th floor was not able to get in touch with housekeeping. Called clinical myra and Kamari stated she would get in touch with house keeping.

## 2024-09-05 VITALS
TEMPERATURE: 97.3 F | WEIGHT: 180.38 LBS | HEART RATE: 66 BPM | RESPIRATION RATE: 16 BRPM | OXYGEN SATURATION: 96 % | BODY MASS INDEX: 25.82 KG/M2 | SYSTOLIC BLOOD PRESSURE: 146 MMHG | HEIGHT: 70 IN | DIASTOLIC BLOOD PRESSURE: 80 MMHG

## 2024-09-05 LAB
ANION GAP SERPL CALCULATED.3IONS-SCNC: 10 MMOL/L (ref 7–19)
ANION GAP SERPL CALCULATED.3IONS-SCNC: 9 MMOL/L (ref 7–19)
BASOPHILS # BLD: 0 K/UL (ref 0–0.2)
BASOPHILS NFR BLD: 0.4 % (ref 0–1)
BUN SERPL-MCNC: 74 MG/DL (ref 6–20)
BUN SERPL-MCNC: 75 MG/DL (ref 6–20)
CALCIUM SERPL-MCNC: 8.8 MG/DL (ref 8.6–10)
CALCIUM SERPL-MCNC: 8.9 MG/DL (ref 8.6–10)
CHLORIDE SERPL-SCNC: 103 MMOL/L (ref 98–111)
CHLORIDE SERPL-SCNC: 105 MMOL/L (ref 98–111)
CO2 SERPL-SCNC: 26 MMOL/L (ref 22–29)
CO2 SERPL-SCNC: 26 MMOL/L (ref 22–29)
CREAT SERPL-MCNC: 1.7 MG/DL (ref 0.7–1.2)
CREAT SERPL-MCNC: 1.8 MG/DL (ref 0.7–1.2)
EKG P AXIS: 67 DEGREES
EKG P AXIS: 76 DEGREES
EKG P-R INTERVAL: 168 MS
EKG P-R INTERVAL: 204 MS
EKG Q-T INTERVAL: 466 MS
EKG Q-T INTERVAL: 482 MS
EKG QRS DURATION: 152 MS
EKG QRS DURATION: 154 MS
EKG QTC CALCULATION (BAZETT): 474 MS
EKG QTC CALCULATION (BAZETT): 481 MS
EKG T AXIS: 57 DEGREES
EKG T AXIS: 61 DEGREES
EOSINOPHIL # BLD: 0.2 K/UL (ref 0–0.6)
EOSINOPHIL NFR BLD: 2.7 % (ref 0–5)
ERYTHROCYTE [DISTWIDTH] IN BLOOD BY AUTOMATED COUNT: 13.1 % (ref 11.5–14.5)
GLUCOSE BLD-MCNC: 146 MG/DL (ref 70–99)
GLUCOSE BLD-MCNC: 206 MG/DL (ref 70–99)
GLUCOSE SERPL-MCNC: 103 MG/DL (ref 70–99)
GLUCOSE SERPL-MCNC: 131 MG/DL (ref 70–99)
HBA1C MFR BLD: 12.8 % (ref 4–5.6)
HCT VFR BLD AUTO: 26.6 % (ref 42–52)
HGB BLD-MCNC: 8.8 G/DL (ref 14–18)
IMM GRANULOCYTES # BLD: 0 K/UL
LYMPHOCYTES # BLD: 2.3 K/UL (ref 1.1–4.5)
LYMPHOCYTES NFR BLD: 27.4 % (ref 20–40)
MCH RBC QN AUTO: 30.1 PG (ref 27–31)
MCHC RBC AUTO-ENTMCNC: 33.1 G/DL (ref 33–37)
MCV RBC AUTO: 91.1 FL (ref 80–94)
MONOCYTES # BLD: 0.6 K/UL (ref 0–0.9)
MONOCYTES NFR BLD: 7.4 % (ref 0–10)
NEUTROPHILS # BLD: 5.1 K/UL (ref 1.5–7.5)
NEUTS SEG NFR BLD: 61.9 % (ref 50–65)
PERFORMED ON: ABNORMAL
PERFORMED ON: ABNORMAL
PLATELET # BLD AUTO: 140 K/UL (ref 130–400)
PMV BLD AUTO: 11.5 FL (ref 9.4–12.4)
POTASSIUM SERPL-SCNC: 4.5 MMOL/L (ref 3.5–5)
POTASSIUM SERPL-SCNC: 4.6 MMOL/L (ref 3.5–5)
RBC # BLD AUTO: 2.92 M/UL (ref 4.7–6.1)
SODIUM SERPL-SCNC: 139 MMOL/L (ref 136–145)
SODIUM SERPL-SCNC: 140 MMOL/L (ref 136–145)
WBC # BLD AUTO: 8.3 K/UL (ref 4.8–10.8)

## 2024-09-05 PROCEDURE — 93005 ELECTROCARDIOGRAM TRACING: CPT | Performed by: NURSE PRACTITIONER

## 2024-09-05 PROCEDURE — 85025 COMPLETE CBC W/AUTO DIFF WBC: CPT

## 2024-09-05 PROCEDURE — 83036 HEMOGLOBIN GLYCOSYLATED A1C: CPT

## 2024-09-05 PROCEDURE — G0378 HOSPITAL OBSERVATION PER HR: HCPCS

## 2024-09-05 PROCEDURE — 36415 COLL VENOUS BLD VENIPUNCTURE: CPT

## 2024-09-05 PROCEDURE — 82962 GLUCOSE BLOOD TEST: CPT

## 2024-09-05 PROCEDURE — 93010 ELECTROCARDIOGRAM REPORT: CPT | Performed by: INTERNAL MEDICINE

## 2024-09-05 PROCEDURE — 80048 BASIC METABOLIC PNL TOTAL CA: CPT

## 2024-09-05 PROCEDURE — 6370000000 HC RX 637 (ALT 250 FOR IP): Performed by: NURSE PRACTITIONER

## 2024-09-05 PROCEDURE — 2580000003 HC RX 258: Performed by: NURSE PRACTITIONER

## 2024-09-05 RX ADMIN — INSULIN LISPRO 5 UNITS: 100 INJECTION, SOLUTION INTRAVENOUS; SUBCUTANEOUS at 09:24

## 2024-09-05 RX ADMIN — CLOPIDOGREL BISULFATE 75 MG: 75 TABLET ORAL at 07:53

## 2024-09-05 RX ADMIN — DILTIAZEM HYDROCHLORIDE 120 MG: 120 CAPSULE, COATED, EXTENDED RELEASE ORAL at 07:53

## 2024-09-05 RX ADMIN — SODIUM CHLORIDE, PRESERVATIVE FREE 10 ML: 5 INJECTION INTRAVENOUS at 07:54

## 2024-09-05 RX ADMIN — CALCITRIOL CAPSULES 0.25 MCG 0.25 MCG: 0.25 CAPSULE ORAL at 07:53

## 2024-09-05 RX ADMIN — BUMETANIDE 1 MG: 1 TABLET ORAL at 07:53

## 2024-09-05 RX ADMIN — HYDROCODONE BITARTRATE AND ACETAMINOPHEN 1 TABLET: 10; 325 TABLET ORAL at 09:30

## 2024-09-05 RX ADMIN — METOPROLOL TARTRATE 50 MG: 50 TABLET, FILM COATED ORAL at 07:52

## 2024-09-05 RX ADMIN — APIXABAN 2.5 MG: 2.5 TABLET, FILM COATED ORAL at 07:53

## 2024-09-05 ASSESSMENT — PAIN DESCRIPTION - LOCATION: LOCATION: LEG

## 2024-09-05 ASSESSMENT — PAIN DESCRIPTION - ORIENTATION: ORIENTATION: RIGHT

## 2024-09-05 ASSESSMENT — PAIN SCALES - GENERAL: PAINLEVEL_OUTOF10: 9

## 2024-09-05 NOTE — ACP (ADVANCE CARE PLANNING)
Advance Care Planning     Advance Care Planning Inpatient Note  [facility]    Today's Date: 9/5/2024  Unit: [facility] PROGRESSIVE CARE    Received request from Other: Palliative care .  Upon review of chart and communication with care team, patient's decision making abilities are not in question.. Patient and his wife Kristin was/were present in the room during visit.    Goals of ACP Conversation:  Discuss advance care planning documents    Health Care Decision Makers:       Primary Decision Maker (Active): Kristin Jimenez - Spouse - [phone]  Summary:  Verified Documents    Advance Care Planning Documents (Patient Wishes):  Living Will/Advance Directive     Assessment:  Pt is a 59 year old male with a below the knee right leg amputation. Pt currently is hospitalized for high potassium. Pt lives at home with his wife and connected to a aroldo community. Pt and his wife expressed their aroldo and appreciated the visit. They spoke about getting a new shower for the pt to access and his goal is to return home and do what he can for himself.     Interventions:  Confirmed pt's LW, decision makers and code status.    Care Preferences Communicated:     Hospitalization:  If the patient's health worsens and it becomes clear that the chance of recovery is unlikely,     the patient wants hospitalization.    Ventilation:   If the patient, in their present state of health, suddenly became very ill and unable to breathe on their own,     the patient would desire the use of a ventilator (breathing machine).    If their health worsens and it becomes clear that the change of recovery is unlikely,     the patient would desire the use of a ventilator (breathing machine).    Resuscitation:  In the event the patient's heart stopped as a result of an underlying serious health condition, the patient communicates a preference for      resuscitative attempts (CPR).    Outcomes/Plan:  ACP Discussion: Completed    Electronically

## 2024-09-05 NOTE — PROGRESS NOTES
09/05/24 1024   Encounter Summary   Encounter Overview/Reason Initial Encounter;Spiritual/Emotional Needs   Service Provided For Patient;Family   Referral/Consult From Palliative Care   Support System Spouse;Family members   Complexity of Encounter Moderate   Begin Time 0900   End Time  0930   Total Time Calculated 30 min   Spiritual/Emotional needs   Type Spiritual Support   Palliative Care   Type Palliative Care, Initial/Spiritual Assessment   Advance Care Planning   Type Care Preferences Addressed   Assessment/Intervention/Outcome   Assessment Coping;Hopeful   Intervention Active listening;Nurtured Hope;Prayer (assurance of)/[place];Sustaining Presence/Ministry of presence   Outcome Acceptance;Expressed Gratitude;Receptive   Plan and Referrals   Plan/Referrals Continue to visit, (comment);Continue Support (comment)   Does the patient have a [facility] PCP? No         Palliative Care/Spiritual Care: Met with pt and pt's wife Kristin to initiate palliative care. Pt had a BKA and his potassium was high, per he and his wife. Pt has other diagnoses in past medical history, and is known to palliative care.         Advance Directives: Pt has a LW and his wife Kristin Jimenez is his primary decision maker. Vanesa Del Rio is his secondary decision maker. Pt is full code and wants CPR and Ventilator. SEE ACP NOTE.         Pain/other symptoms: Pt is having some pain and knows he needs to notify his nurse for pain medication.        Social/Spiritual: Pt and his wife attend [facility] [place].        Pt/family discussion r/t goals: Pt lives at home with his wife. He says he ambulates with a wheel chair and performs most daily living skills to care for himself. Pt's wife says they are in the process of making his prosthetic, and in the process of working on their shower at home. Pt's goal is to be at home with his wife and continue to do what he can for himself with devices to assist him.    [facility]

## 2024-09-05 NOTE — DISCHARGE SUMMARY
//Noe Jimenez  :  1965  MRN:  [id number]    Admit date:  2024  Discharge date:  2024    Discharging Physician:  Dr. Tereso Rivera    Advance Directive: Full Code    Consults: PALLIATIVE CARE EVAL     Primary Care Physician:  Willie Kim MD    Discharge Diagnoses:  Principal Problem:    Hyperkalemia  Active Problems:    Type 2 diabetes mellitus with complication, without long-term current use of insulin ([facility])    Essential hypertension    CKD stage 3b, GFR 30-44 ml/min ([facility])  Resolved Problems:    * No resolved hospital problems. *      Portions of this note have been copied forward, however, changed to reflect the most current clinical status of this patient.    Hospital Course:   The patient is a 59 y.o. male  with a PMH of type 2 DM, PA on EliquisF, s/p right BKA, HFpEF, large PFO,and HTN who presented to [facility] ED on 2024 complaining of abnormal lab. He had labs drawn that were ordered by his nephrology provider on the day prior to admission. His K on 6.7 on these labs and he was notified to come to he ED for further evaluation. He reported that he has not been on any medication for his diabetes for ~2 weeks, as he was trying to get lantus approved by his insurance, otherwise, he has not had any recent medication changes. He reported normal po intake with no recent change in appetite or dietary changes. He denied N/V/D. Denied fever or chills. Denied palpitations.     Further ED work-up ohkqvjjo-Q-3.1, Na-133, BUN-82 and creatinine-1.8. BG-496. EKG- rate 60, atrial fibrillation with no obvious ST changes (per ED provider) WBC-7.4, H&H-10.1/31.6 and plt-209. UA negative for infection. He was given insulin, calcium gluconate, lokelma, and albuterol in the ED.  The patient was admitted to hospital medicine for hyperkalemia.    He was placed on tele and given a diabetic diet with prandial insulin, sliding scale and long acting insulin. His BG level has significantly improved since his admission

## 2024-09-05 NOTE — CARE COORDINATION
Pt is current with [facility].  Will need CHAYA orders and fax at discharge with d/c summary and AVS.  [facility]  P:  [phone]  F:  [phone]  Electronically signed by Taty Cee RN on 9/5/2024 at 9:04 AM

## 2024-09-13 ENCOUNTER — HOSPITAL ENCOUNTER (EMERGENCY)
Age: 59
Discharge: HOME | End: 2024-09-13
Attending: EMERGENCY MEDICINE
Payer: COMMERCIAL

## 2024-09-13 ENCOUNTER — APPOINTMENT (OUTPATIENT)
Dept: GENERAL RADIOLOGY | Age: 59
End: 2024-09-13
Payer: COMMERCIAL

## 2024-09-13 VITALS
OXYGEN SATURATION: 97 % | DIASTOLIC BLOOD PRESSURE: 86 MMHG | BODY MASS INDEX: 25.77 KG/M2 | RESPIRATION RATE: 15 BRPM | HEART RATE: 72 BPM | HEIGHT: 70 IN | TEMPERATURE: 97.8 F | WEIGHT: 180 LBS | SYSTOLIC BLOOD PRESSURE: 162 MMHG

## 2024-09-13 DIAGNOSIS — E11.649 TYPE 2 DIABETES MELLITUS WITH HYPOGLYCEMIA WITHOUT COMA, WITH LONG-TERM CURRENT USE OF INSULIN (HCC): Primary | ICD-10-CM

## 2024-09-13 DIAGNOSIS — Z79.4 TYPE 2 DIABETES MELLITUS WITH HYPOGLYCEMIA WITHOUT COMA, WITH LONG-TERM CURRENT USE OF INSULIN (HCC): Primary | ICD-10-CM

## 2024-09-13 DIAGNOSIS — B34.8 RHINOVIRUS INFECTION: ICD-10-CM

## 2024-09-13 LAB
ALBUMIN SERPL-MCNC: 3.5 G/DL (ref 3.5–5.2)
ALP SERPL-CCNC: 178 U/L (ref 40–129)
ALT SERPL-CCNC: 132 U/L (ref 5–41)
ANION GAP SERPL CALCULATED.3IONS-SCNC: 15 MMOL/L (ref 7–19)
AST SERPL-CCNC: 112 U/L (ref 5–40)
B PARAP IS1001 DNA NPH QL NAA+NON-PROBE: NOT DETECTED
B PERT.PT PRMT NPH QL NAA+NON-PROBE: NOT DETECTED
BACTERIA URNS QL MICRO: NEGATIVE /HPF
BASOPHILS # BLD: 0 K/UL (ref 0–0.2)
BASOPHILS NFR BLD: 0.4 % (ref 0–1)
BILIRUB SERPL-MCNC: 0.5 MG/DL (ref 0.2–1.2)
BILIRUB UR STRIP.AUTO-MCNC: NEGATIVE MG/DL
BUN SERPL-MCNC: 75 MG/DL (ref 6–20)
C PNEUM DNA NPH QL NAA+NON-PROBE: NOT DETECTED
CALCIUM SERPL-MCNC: 8.6 MG/DL (ref 8.6–10)
CHLORIDE SERPL-SCNC: 100 MMOL/L (ref 98–111)
CLARITY UR: CLEAR
CO2 SERPL-SCNC: 23 MMOL/L (ref 22–29)
COLOR UR: YELLOW
CREAT SERPL-MCNC: 1.8 MG/DL (ref 0.7–1.2)
CRYSTALS URNS MICRO: ABNORMAL /HPF
EOSINOPHIL # BLD: 0 K/UL (ref 0–0.6)
EOSINOPHIL NFR BLD: 0.4 % (ref 0–5)
ERYTHROCYTE [DISTWIDTH] IN BLOOD BY AUTOMATED COUNT: 13.2 % (ref 11.5–14.5)
FLUAV RNA NPH QL NAA+NON-PROBE: NOT DETECTED
FLUBV RNA NPH QL NAA+NON-PROBE: NOT DETECTED
GLUCOSE BLD-MCNC: 275 MG/DL (ref 70–99)
GLUCOSE BLD-MCNC: 303 MG/DL (ref 70–99)
GLUCOSE SERPL-MCNC: 289 MG/DL (ref 70–99)
GLUCOSE UR STRIP.AUTO-MCNC: 500 MG/DL
HADV DNA NPH QL NAA+NON-PROBE: NOT DETECTED
HCOV 229E RNA NPH QL NAA+NON-PROBE: NOT DETECTED
HCOV HKU1 RNA NPH QL NAA+NON-PROBE: NOT DETECTED
HCOV NL63 RNA NPH QL NAA+NON-PROBE: NOT DETECTED
HCOV OC43 RNA NPH QL NAA+NON-PROBE: NOT DETECTED
HCT VFR BLD AUTO: 30.4 % (ref 42–52)
HGB BLD-MCNC: 9.6 G/DL (ref 14–18)
HGB UR STRIP.AUTO-MCNC: ABNORMAL MG/L
HMPV RNA NPH QL NAA+NON-PROBE: NOT DETECTED
HPIV1 RNA NPH QL NAA+NON-PROBE: NOT DETECTED
HPIV2 RNA NPH QL NAA+NON-PROBE: NOT DETECTED
HPIV3 RNA NPH QL NAA+NON-PROBE: NOT DETECTED
HPIV4 RNA NPH QL NAA+NON-PROBE: NOT DETECTED
HYALINE CASTS #/AREA URNS LPF: ABNORMAL /LPF (ref 0–5)
IMM GRANULOCYTES # BLD: 0.1 K/UL
KETONES UR STRIP.AUTO-MCNC: NEGATIVE MG/DL
LEUKOCYTE ESTERASE UR QL STRIP.AUTO: NEGATIVE
LYMPHOCYTES # BLD: 0.6 K/UL (ref 1.1–4.5)
LYMPHOCYTES NFR BLD: 6.3 % (ref 20–40)
M PNEUMO DNA NPH QL NAA+NON-PROBE: NOT DETECTED
MAGNESIUM SERPL-MCNC: 2 MG/DL (ref 1.6–2.6)
MCH RBC QN AUTO: 30.1 PG (ref 27–31)
MCHC RBC AUTO-ENTMCNC: 31.6 G/DL (ref 33–37)
MCV RBC AUTO: 95.3 FL (ref 80–94)
MONOCYTES # BLD: 1 K/UL (ref 0–0.9)
MONOCYTES NFR BLD: 10.5 % (ref 0–10)
NEUTROPHILS # BLD: 8 K/UL (ref 1.5–7.5)
NEUTS SEG NFR BLD: 81.3 % (ref 50–65)
NITRITE UR QL STRIP.AUTO: NEGATIVE
PERFORMED ON: ABNORMAL
PERFORMED ON: ABNORMAL
PH UR STRIP.AUTO: 6 [PH] (ref 5–8)
PLATELET # BLD AUTO: 121 K/UL (ref 130–400)
PMV BLD AUTO: 11 FL (ref 9.4–12.4)
POTASSIUM SERPL-SCNC: 4.1 MMOL/L (ref 3.5–5)
PROT SERPL-MCNC: 7 G/DL (ref 6.4–8.3)
PROT UR STRIP.AUTO-MCNC: 100 MG/DL
RBC # BLD AUTO: 3.19 M/UL (ref 4.7–6.1)
RBC #/AREA URNS HPF: ABNORMAL /HPF (ref 0–2)
RSV RNA NPH QL NAA+NON-PROBE: NOT DETECTED
RV+EV RNA NPH QL NAA+NON-PROBE: DETECTED
SARS-COV-2 RNA NPH QL NAA+NON-PROBE: NOT DETECTED
SODIUM SERPL-SCNC: 138 MMOL/L (ref 136–145)
SP GR UR STRIP.AUTO: 1.01 (ref 1–1.03)
UROBILINOGEN UR STRIP.AUTO-MCNC: 0.2 E.U./DL
WBC # BLD AUTO: 9.9 K/UL (ref 4.8–10.8)
YEAST #/AREA URNS HPF: PRESENT /HPF

## 2024-09-13 PROCEDURE — 80053 COMPREHEN METABOLIC PANEL: CPT

## 2024-09-13 PROCEDURE — 36415 COLL VENOUS BLD VENIPUNCTURE: CPT

## 2024-09-13 PROCEDURE — 85025 COMPLETE CBC W/AUTO DIFF WBC: CPT

## 2024-09-13 PROCEDURE — 82962 GLUCOSE BLOOD TEST: CPT

## 2024-09-13 PROCEDURE — 83735 ASSAY OF MAGNESIUM: CPT

## 2024-09-13 PROCEDURE — 81001 URINALYSIS AUTO W/SCOPE: CPT

## 2024-09-13 PROCEDURE — 0202U NFCT DS 22 TRGT SARS-COV-2: CPT

## 2024-09-13 PROCEDURE — 71045 X-RAY EXAM CHEST 1 VIEW: CPT

## 2024-09-13 PROCEDURE — 99284 EMERGENCY DEPT VISIT MOD MDM: CPT

## 2024-09-13 ASSESSMENT — ENCOUNTER SYMPTOMS
RESPIRATORY NEGATIVE: 1
GASTROINTESTINAL NEGATIVE: 1
EYES NEGATIVE: 1

## 2024-09-13 ASSESSMENT — PAIN - FUNCTIONAL ASSESSMENT: PAIN_FUNCTIONAL_ASSESSMENT: NONE - DENIES PAIN

## 2024-09-18 ENCOUNTER — TELEPHONE (OUTPATIENT)
Dept: CARDIOLOGY CLINIC | Age: 59
End: 2024-09-18

## 2024-10-09 RX ORDER — DILTIAZEM HYDROCHLORIDE 120 MG/1
120 CAPSULE, COATED, EXTENDED RELEASE ORAL DAILY
Qty: 90 CAPSULE | Refills: 3 | Status: SHIPPED | OUTPATIENT
Start: 2024-10-09

## 2024-10-29 ENCOUNTER — HOSPITAL ENCOUNTER (OUTPATIENT)
Dept: PHYSICAL THERAPY | Age: 59
Setting detail: THERAPIES SERIES
Discharge: HOME | End: 2024-10-29
Payer: COMMERCIAL

## 2024-10-29 PROCEDURE — 97162 PT EVAL MOD COMPLEX 30 MIN: CPT

## 2024-11-01 ASSESSMENT — PAIN DESCRIPTION - PAIN TYPE: TYPE: CHRONIC PAIN

## 2024-11-01 ASSESSMENT — PAIN DESCRIPTION - LOCATION: LOCATION: LEG

## 2024-11-01 ASSESSMENT — PAIN DESCRIPTION - ORIENTATION: ORIENTATION: RIGHT

## 2024-11-01 NOTE — PROGRESS NOTES
potential include: Impaired previous level of function, Chronicity of problem        GOALS     Patient Goal(s): improve balance and stamina  Short Term Goals Completed by 3-4 weeks Goal Status   Independent with HEP New   Improve RLE strength to 5/5 New   Improve R knee ROM to at least 0-110 degrees New   Improve hip extension in standing to lacking 10 degrees from neutral or better New                                           Long Term Goals Completed by 4-6 weeks Goal Status   Report ability to complete ADLs independently and without rest break New   Ambulate at least 500' in 6MWT with Rollator New   If appropriate, ambulate at least 75' with LBQC New                                                  TREATMENT PLAN       Requires PT Follow-Up: Yes    Pt. actively involved in establishing Plan of Care and Goals: Yes  Patient/ Caregiver education and instruction:Goals, PT Role, Plan of Care, Evaluative findings, Insurance             Treatment may include any combination of the following: Current Treatment Recommendations: Strengthening, ROM, Balance training, Functional mobility training, Neuromuscular re-education, Positioning, Equipment evaluation, education, & procurement, Patient/Caregiver education & training, Safety education & training, Home exercise program, Therapeutic activities, Endurance training, Stair training, Gait training     Frequency / Duration:  Patient to be seen 2x for 6-8 weeks weeks       Eval Complexity: Overall Evaluation : Medium  Decision Making: Medium Complexity     Therapist Signature: Chanelle Chin, PT    Date: 11/1/2024     I certify that the above Therapy Services are being furnished while the patient is under my care. I agree with the treatment plan and certify that this therapy is necessary.      Physician's Signature:  ___________________________   Date:_______                                                                   Julius Ingram APRN -*        Physician Comments:

## 2024-11-05 ENCOUNTER — HOSPITAL ENCOUNTER (OUTPATIENT)
Dept: PHYSICAL THERAPY | Age: 59
Setting detail: THERAPIES SERIES
Discharge: HOME | End: 2024-11-05
Payer: COMMERCIAL

## 2024-11-05 PROCEDURE — 97110 THERAPEUTIC EXERCISES: CPT

## 2024-11-05 ASSESSMENT — PAIN DESCRIPTION - PAIN TYPE: TYPE: CHRONIC PAIN

## 2024-11-05 ASSESSMENT — PAIN DESCRIPTION - LOCATION: LOCATION: HIP;LEG;BACK

## 2024-11-05 ASSESSMENT — PAIN DESCRIPTION - ORIENTATION: ORIENTATION: RIGHT;LEFT

## 2024-11-05 ASSESSMENT — PAIN DESCRIPTION - DESCRIPTORS: DESCRIPTORS: TIGHTNESS

## 2024-11-05 NOTE — PROGRESS NOTES
Physical Therapy: Daily Note   Patient: Noe Jimenez (59 y.o. male)   Examination Date: 2024  Plan of Care/Certification Expiration Date: 25    No data recorded   :  1965 # of Visits since SOC:   2   MRN: [id number]  CSN: [id number] Start of Care Date:   10/29/2024   Insurance: Payor: GENERIC SELF-INSURED / Plan: GENERIC SELF-INSURED WC / Product Type: *No Product type* /   Insurance ID: [id number] - (Worker's Comp) Secondary Insurance (if applicable):    Referring Physician: Julius Ingram APRN - CNP CORONA Cheng   PCP: Willie Kim MD Visits to Date/Visits Approved:     No Show/Cancelled Appts:   /       Medical Diagnosis: Complex regional pain syndrome i of unspecified lower limb [G90.529]    Treatment Diagnosis: s/p R BKA        SUBJECTIVE EXAMINATION   Pain Level: Pain Screening  Patient Currently in Pain: Yes  Pain Assessment: 0-10  Pain Type: Chronic pain  Pain Location: Hip, Leg, Back  Pain Orientation: Right, Left  Pain Descriptors: Tightness    Patient Comments: Subjective: States he is wearing prosthesis approx half the day.  Notes that he is being vigilant about checking residual limb and denies any sores or redness.  No pain with wearing prosthesis.  States he is just generally stiff, sore, and \"out of shape.\"    OBJECTIVE EXAMINATION   Restrictions:  Restrictions/Precautions: Fall Risk   Required Braces or Orthoses?: Yes   Implants present? : -- (jerry-anant)      TREATMENT     Exercises:      Treatment Reasoning    Exercise 1: Bilat hip flexor stretch on wedge x 5'  Exercise 2: Prone (on elbows as tolerated for hip flexor stretch) x 3'  Exercise 3: Quad sets R 3 x 15\" (on towel roll)  Exercise 4: SLR R x 10 (min A)  Exercise 5: Heel slide R x 10 (min A for full flexion)  Exercise 6: SAQ R x 10  Exercise 7: Bridging x 10 (assist for blocking RLE)  Exercise 8: R Knee extension stretch on towel x 2'  Exercise 9: LAQ bilat, 2# on L/HS Curls (green bilat) x 10  Exercise 10:

## 2024-11-07 ENCOUNTER — HOSPITAL ENCOUNTER (OUTPATIENT)
Dept: PHYSICAL THERAPY | Age: 59
Setting detail: THERAPIES SERIES
Discharge: HOME | End: 2024-11-07
Payer: COMMERCIAL

## 2024-11-07 PROCEDURE — 97110 THERAPEUTIC EXERCISES: CPT

## 2024-11-07 ASSESSMENT — PAIN DESCRIPTION - LOCATION: LOCATION: LEG

## 2024-11-07 ASSESSMENT — PAIN SCALES - GENERAL: PAINLEVEL_OUTOF10: 5

## 2024-11-07 ASSESSMENT — PAIN DESCRIPTION - ORIENTATION: ORIENTATION: RIGHT

## 2024-11-07 NOTE — PROGRESS NOTES
with HEP   New   Improve RLE strength to 5/5   New   Improve R knee ROM to at least 0-110 degrees   New   Improve hip extension in standing to lacking 10 degrees from neutral or better   New                                                       Long Term Goals Completed by 4-6 weeks Current Status Goal Status   Report ability to complete ADLs independently and without rest break   New   Ambulate at least 500' in 6MWT with Rollator   New   If appropriate, ambulate at least 75' with LBQC   New                                                                TREATMENT PLAN   Plan Frequency: 2x  Plan weeks: 6-8 weeks  Current Treatment Recommendations: Strengthening, ROM, Balance training, Functional mobility training, Neuromuscular re-education, Positioning, Equipment evaluation, education, & procurement, Patient/Caregiver education & training, Safety education & training, Home exercise program, Therapeutic activities, Endurance training, Stair training, Gait training   Requires PT Follow-Up: Yes       Therapy Time  Individual Time In: 0957       Individual Time Out: 1055  Minutes: 58  Timed Code Treatment Minutes: 55 Minutes     Electronically signed by Tom Coronado, PT  on 11/7/2024 at 12:09 PM   POC NOTE

## 2024-11-12 ENCOUNTER — HOSPITAL ENCOUNTER (OUTPATIENT)
Dept: PHYSICAL THERAPY | Age: 59
Setting detail: THERAPIES SERIES
Discharge: HOME | End: 2024-11-12
Payer: COMMERCIAL

## 2024-11-12 PROCEDURE — 97110 THERAPEUTIC EXERCISES: CPT

## 2024-11-12 ASSESSMENT — PAIN DESCRIPTION - ORIENTATION: ORIENTATION: RIGHT

## 2024-11-12 ASSESSMENT — PAIN SCALES - GENERAL: PAINLEVEL_OUTOF10: 7

## 2024-11-12 ASSESSMENT — PAIN DESCRIPTION - LOCATION: LOCATION: LEG

## 2024-11-12 NOTE — PROGRESS NOTES
Physical Therapy Plan  Plan weeks: 6-8 weeks  Current Treatment Recommendations: Strengthening, ROM, Balance training, Functional mobility training, Neuromuscular re-education, Positioning, Equipment evaluation, education, & procurement, Patient/Caregiver education & training, Safety education & training, Home exercise program, Therapeutic activities, Endurance training, Stair training, Gait training        Therapy Time:   Individual Concurrent Group Co-treatment   Time In 1007         Time Out 1054         Minutes 47                 Gabriel Ferguson PTA     Electronically signed by Gabriel Ferguson PTA on 11/12/2024 at 10:56 AM

## 2024-11-14 ENCOUNTER — HOSPITAL ENCOUNTER (OUTPATIENT)
Dept: PHYSICAL THERAPY | Age: 59
Setting detail: THERAPIES SERIES
Discharge: HOME | End: 2024-11-14
Payer: COMMERCIAL

## 2024-11-14 PROCEDURE — 97110 THERAPEUTIC EXERCISES: CPT

## 2024-11-14 ASSESSMENT — PAIN SCALES - GENERAL: PAINLEVEL_OUTOF10: 7

## 2024-11-14 ASSESSMENT — PAIN DESCRIPTION - DESCRIPTORS: DESCRIPTORS: SORE;TIGHTNESS

## 2024-11-14 ASSESSMENT — PAIN DESCRIPTION - LOCATION: LOCATION: BACK;HIP;LEG;ARM

## 2024-11-14 ASSESSMENT — PAIN DESCRIPTION - ORIENTATION: ORIENTATION: RIGHT;LEFT

## 2024-11-14 ASSESSMENT — PAIN DESCRIPTION - PAIN TYPE: TYPE: CHRONIC PAIN

## 2024-11-14 NOTE — PROGRESS NOTES
Daily Treatment Note  Date: 2024  Patient Name: Noe Jimenez  MRN: [id number]     :   1965    Referring Physician: Julius Ingram APRN -* CORONA Cheng   PCP: Willie Kim MD    Medical Diagnosis: Complex regional pain syndrome i of unspecified lower limb [G90.529]    Treatment Diagnosis: s/p R BKA      Insurance: Payor: GENERIC SELF-INSURED / Plan: GENERIC SELF-INSURED WC / Product Type: *No Product type* /   Insurance ID: [id number] - (Worker's Comp)    Subjective:  General  Referring Provider (secondary): CORONA Cheng  PT Visit Information  Onset Date: 24  PT Insurance Information: Worker's Comp  Total # of Visits Approved: 12  Total # of Visits to Date: 5  Plan of Care/Certification Expiration Date: 25  Progress Note Due Date: 24  Referring Provider (secondary): CORONA Cheng  Subjective  Subjective: Pt reports continued soreness following his PT sessions but says that it is \"manageable\" and that he expects soreness after ~3 years of sitting. States that he will need to leave ~20 minutes early today to make it to another apt.  Pain Screening  Patient Currently in Pain: Yes  Pain Assessment: 0-10  Pain Level: 7  Pain Type: Chronic pain  Pain Location: Back, Hip, Leg, Arm  Pain Orientation: Right, Left  Pain Descriptors: Sore, Tightness    Treatment Activities:  Exercises:      Treatment Reasoning    Exercise 1: Bilat hip flexor stretch on wedge x 5'  Exercise 2: prone on elbows with 7.5# weight over sacrum--2'  Exercise 3: Quad sets R 3 x 15\" (on towel roll)  Exercise 4: SLR R x 10 (min A)  Exercise 5: Heel slide R x 10  Exercise 6: SAQ R 2 x 10  Exercise 7: Bridging 2 x 10 (did not need assist for blocking RLE)  Exercise 8: R Knee extension stretch on towel x 2'  Exercise 9: LAQ bilat, 2# on L/HS Curls (green bilat) x 10  Exercise 10: Hip abd (green) x 10/ball squeeze x 10  Exercise 11: Sitting march 2# on R, x 10  Exercise 12: Repeated sit to stand (start

## 2024-11-19 ENCOUNTER — HOSPITAL ENCOUNTER (OUTPATIENT)
Dept: PHYSICAL THERAPY | Age: 59
Setting detail: THERAPIES SERIES
Discharge: HOME | End: 2024-11-19
Payer: COMMERCIAL

## 2024-11-19 PROCEDURE — 97110 THERAPEUTIC EXERCISES: CPT

## 2024-11-19 ASSESSMENT — PAIN SCALES - GENERAL: PAINLEVEL_OUTOF10: 7

## 2024-11-19 ASSESSMENT — PAIN DESCRIPTION - ORIENTATION: ORIENTATION: RIGHT;LEFT

## 2024-11-19 ASSESSMENT — PAIN DESCRIPTION - LOCATION: LOCATION: BACK;HIP;LEG;ARM

## 2024-11-19 ASSESSMENT — PAIN DESCRIPTION - PAIN TYPE: TYPE: CHRONIC PAIN

## 2024-11-19 NOTE — PROGRESS NOTES
Physical Therapy  Daily Treatment Note  Date: 2024  Patient Name: Noe Jimenez  MRN: [id number]     :   1965    Subjective:   General  Additional Pertinent Hx: 58 y/o M presents s/p R BKA.  PMH includes HTN, DM, back sx, rotator cuff repair  PT Visit Information  Onset Date: 24  PT Insurance Information: Worker's Comp  Total # of Visits Approved: 12  Total # of Visits to Date: 6  Plan of Care/Certification Expiration Date: 25  Progress Note Due Date: 24  Referring Provider (secondary): CORONA Cheng  Subjective: it's about the same as it has been.  Wife got to massaging it and she found the spot.    Pain Screening  Patient Currently in Pain: Yes  Pain Assessment: 0-10  Pain Level: 7  Pain Type: Chronic pain  Pain Location: Back;Hip;Leg;Arm  Pain Orientation: Right;Left  Pain Descriptors: Sore;Tightness;Burning;Discomfort;Dull     Treatment Activities:    Exercises  Exercise 1: Bilat hip flexor stretch on wedge x 5'  Exercise 2: prone on elbows with 7.5# weight over sacrum--2'  Exercise 3: Quad sets R 3\" x 15 (on towel roll)  Exercise 4: SLR R x 10 (min A)  Exercise 5: Heel slide R x 10  Exercise 6: SAQ R 2 x 10  Exercise 7: Bridging 2 x 10 (did not need assist for blocking RLE)  Exercise 8: R Knee extension stretch on towel x 2'  Exercise 9: LAQ bilat, 2# on L/HS Curls (green bilat) x 10  Exercise 10: Hip abd (green) x 10/ball squeeze x 10  Exercise 11: Sitting march 2# on R, x 10  Exercise 12: Repeated sit to stand (start from higher surface) x 5 x2 sets  Exercise 20: Progress to ambulation with HW and LBQC as appropriate-  Ambualtion trial with LBQC. 215 feet      Assessment:   Conditions Requiring Skilled Therapeutic Intervention  Body Structures, Functions, Activity Limitations Requiring Skilled Therapeutic Intervention: Decreased functional mobility ;Decreased ROM;Decreased ADL status;Decreased strength;Decreased high-level IADLs;Decreased balance;Decreased

## 2024-11-20 RX ORDER — METOPROLOL TARTRATE 50 MG
50 TABLET ORAL 2 TIMES DAILY
Qty: 60 TABLET | Refills: 3 | Status: SHIPPED | OUTPATIENT
Start: 2024-11-20

## 2024-11-21 ENCOUNTER — HOSPITAL ENCOUNTER (OUTPATIENT)
Dept: PHYSICAL THERAPY | Age: 59
Setting detail: THERAPIES SERIES
Discharge: HOME | End: 2024-11-21
Payer: COMMERCIAL

## 2024-11-21 PROCEDURE — 97110 THERAPEUTIC EXERCISES: CPT

## 2024-11-21 ASSESSMENT — PAIN DESCRIPTION - ORIENTATION: ORIENTATION: RIGHT;LEFT

## 2024-11-21 ASSESSMENT — PAIN DESCRIPTION - PAIN TYPE: TYPE: CHRONIC PAIN

## 2024-11-21 ASSESSMENT — PAIN SCALES - GENERAL: PAINLEVEL_OUTOF10: 7

## 2024-11-21 ASSESSMENT — PAIN DESCRIPTION - LOCATION: LOCATION: BACK;HIP;LEG

## 2024-11-21 ASSESSMENT — PAIN DESCRIPTION - DESCRIPTORS: DESCRIPTORS: ACHING;SORE;DULL;DISCOMFORT

## 2024-11-21 NOTE — PROGRESS NOTES
Physical Therapy  Daily Treatment Note  Date: 2024  Patient Name: Noe Jimenez  MRN: [id number]     :   1965    Subjective:      PT Visit Information  Onset Date: 24  PT Insurance Information: Worker's Comp  Total # of Visits Approved: 12  Total # of Visits to Date: 7  Plan of Care/Certification Expiration Date: 25  Progress Note Due Date: 24  Referring Provider (secondary): CORONA Cheng  Subjective: Patient reports continued pain and soreness this date that remains about the same as his previous session. Reports he hasnt seen much change since SOC.    Pain Screening  Patient Currently in Pain: Yes  Pain Assessment: 0-10  Pain Level: 7  Pain Type: Chronic pain  Pain Location: Back;Hip;Leg  Pain Orientation: Right;Left  Pain Descriptors: Aching;Sore;Dull;Discomfort       Treatment Activities:   Exercises  Exercise 1: Bilat hip flexor stretch on wedge x 5'  Exercise 2: prone on elbows with 7.5# weight over sacrum--2'  Exercise 3: Quad sets R 3\" x 15 (on towel roll)  Exercise 4: SLR R x 10 (min A)  Exercise 5: Heel slide R x 10  Exercise 6: SAQ R 2 x 10  Exercise 7: Bridging 2 x 10 (did not need assist for blocking RLE)  Exercise 8: R Knee extension stretch on towel x 2'  Exercise 9: LAQ bilat, 2# on L/HS Curls (green bilat) x 15  Exercise 10: Hip abd (green) x 15/ball squeeze x 15  Exercise 11: Sitting march 2# on R, x 15  Exercise 12: Repeated sit to stand (start from higher surface) x 5 x2 sets  Exercise 13: Ambulation (up to 6MWT) with Rollator 684'  Exercise 14: Fwd/back in  bars x 5  Exercise 15: Sidestepping in  bars x 5  Exercise 16: Stepping over curbs in  bars not today  Exercise 17: Stance EO/EC (not today due to time)  Exercise 18: Stance with reaching activities (progress to balloon tap)- 2 min  Exercise 19: Step ups fwd/lateral x 10 each  Exercise 20: Progress to ambulation with HW and LBQC as appropriate-  Ambualtion trial with LBQC. 278 feet     Assessment:

## 2024-11-26 ENCOUNTER — HOSPITAL ENCOUNTER (OUTPATIENT)
Dept: PHYSICAL THERAPY | Age: 59
Setting detail: THERAPIES SERIES
Discharge: HOME | End: 2024-11-26
Payer: COMMERCIAL

## 2024-11-26 PROCEDURE — 97110 THERAPEUTIC EXERCISES: CPT

## 2024-11-26 ASSESSMENT — PAIN SCALES - GENERAL: PAINLEVEL_OUTOF10: 6

## 2024-11-26 ASSESSMENT — PAIN DESCRIPTION - DESCRIPTORS: DESCRIPTORS: ACHING;TIGHTNESS

## 2024-11-26 ASSESSMENT — PAIN DESCRIPTION - LOCATION: LOCATION: BACK;HIP;LEG

## 2024-11-26 ASSESSMENT — PAIN DESCRIPTION - PAIN TYPE: TYPE: CHRONIC PAIN

## 2024-11-26 ASSESSMENT — PAIN DESCRIPTION - ORIENTATION: ORIENTATION: RIGHT;LEFT

## 2024-11-26 NOTE — PROGRESS NOTES
11/26: 758' in 6MWT with Rollator     If appropriate, ambulate at least 75' with LBQC 11/26: Amb 196' with LBQC today. Met   Ambulate 500' with SBQC   New                                                        TREATMENT PLAN   Plan Frequency: 2x  Plan weeks: 6-8 weeks  Current Treatment Recommendations: Strengthening, ROM, Balance training, Functional mobility training, Neuromuscular re-education, Positioning, Equipment evaluation, education, & procurement, Patient/Caregiver education & training, Safety education & training, Home exercise program, Therapeutic activities, Endurance training, Stair training, Gait training   Requires PT Follow-Up: Yes     Therapy Time  Individual Time In: 1110       Individual Time Out: 1146  Minutes: 36  Timed Code Treatment Minutes: 36 Minutes     Electronically signed by Chanelle Chin, PT  on 11/26/2024 at 12:01 PM

## 2024-11-30 RX ORDER — METOPROLOL TARTRATE 50 MG
50 TABLET ORAL 2 TIMES DAILY
Qty: 180 TABLET | Refills: 1 | OUTPATIENT
Start: 2024-11-30

## 2024-12-02 RX ORDER — METOPROLOL TARTRATE 50 MG
50 TABLET ORAL 2 TIMES DAILY
Qty: 180 TABLET | Refills: 1 | OUTPATIENT
Start: 2024-12-02

## 2024-12-03 ENCOUNTER — APPOINTMENT (OUTPATIENT)
Dept: PHYSICAL THERAPY | Age: 59
End: 2024-12-03
Payer: COMMERCIAL

## 2024-12-03 ENCOUNTER — OFFICE VISIT (OUTPATIENT)
Dept: CARDIOLOGY CLINIC | Age: 59
End: 2024-12-03

## 2024-12-03 ENCOUNTER — HOSPITAL ENCOUNTER (OUTPATIENT)
Dept: PHYSICAL THERAPY | Age: 59
Setting detail: THERAPIES SERIES
Discharge: HOME | End: 2024-12-03
Payer: COMMERCIAL

## 2024-12-03 VITALS
DIASTOLIC BLOOD PRESSURE: 84 MMHG | SYSTOLIC BLOOD PRESSURE: 122 MMHG | HEIGHT: 70 IN | WEIGHT: 212 LBS | OXYGEN SATURATION: 95 % | HEART RATE: 67 BPM | BODY MASS INDEX: 30.35 KG/M2

## 2024-12-03 DIAGNOSIS — Z79.01 CHRONIC ANTICOAGULATION: ICD-10-CM

## 2024-12-03 DIAGNOSIS — I48.0 PAROXYSMAL ATRIAL FIBRILLATION (HCC): ICD-10-CM

## 2024-12-03 DIAGNOSIS — I25.10 CORONARY ARTERY DISEASE INVOLVING NATIVE CORONARY ARTERY OF NATIVE HEART WITHOUT ANGINA PECTORIS: Primary | ICD-10-CM

## 2024-12-03 DIAGNOSIS — I10 ESSENTIAL HYPERTENSION: ICD-10-CM

## 2024-12-03 DIAGNOSIS — E78.5 HYPERLIPIDEMIA, UNSPECIFIED HYPERLIPIDEMIA TYPE: ICD-10-CM

## 2024-12-03 PROCEDURE — 3074F SYST BP LT 130 MM HG: CPT | Performed by: NURSE PRACTITIONER

## 2024-12-03 PROCEDURE — 99214 OFFICE O/P EST MOD 30 MIN: CPT | Performed by: NURSE PRACTITIONER

## 2024-12-03 PROCEDURE — 3079F DIAST BP 80-89 MM HG: CPT | Performed by: NURSE PRACTITIONER

## 2024-12-03 PROCEDURE — 97110 THERAPEUTIC EXERCISES: CPT

## 2024-12-03 ASSESSMENT — PAIN DESCRIPTION - LOCATION: LOCATION: LEG

## 2024-12-03 ASSESSMENT — PAIN DESCRIPTION - DESCRIPTORS: DESCRIPTORS: BURNING

## 2024-12-03 ASSESSMENT — PAIN SCALES - GENERAL: PAINLEVEL_OUTOF10: 7

## 2024-12-03 ASSESSMENT — PAIN DESCRIPTION - ORIENTATION: ORIENTATION: RIGHT

## 2024-12-03 NOTE — PROGRESS NOTES
progress   Ambulate at least 500' in 6MWT with Rollator 11/26: 758' in 6MWT with Rollator     If appropriate, ambulate at least 75' with LBQC 11/26: Amb 196' with LBQC today. Met   Ambulate 500' with SBQC   New                                                        TREATMENT PLAN   Plan Frequency: 2x  Plan weeks: 6-8 weeks  Current Treatment Recommendations: Strengthening, ROM, Balance training, Functional mobility training, Neuromuscular re-education, Positioning, Equipment evaluation, education, & procurement, Patient/Caregiver education & training, Safety education & training, Home exercise program, Therapeutic activities, Endurance training, Stair training, Gait training   Requires PT Follow-Up: Yes       Therapy Time  Individual Time In: 1000       Individual Time Out: 1100  Minutes: 60  Timed Code Treatment Minutes: 60 Minutes     Electronically signed by Harley Beaver PT  on 12/3/2024 at 11:07 AM   POC NOTE

## 2024-12-03 NOTE — PROGRESS NOTES
Dear Willie Nathan MD,    Thank you for allowing me to participate in the care of Mr. Noe Jimenez. He presents today at the [facility]. As you know, Mr. Jimenez is a 59 y.o. male with history of hypertension, hyperlipidemia, diabetes, s/p right BKA (6/3/2024), PAF, and CAD who presents with the chief complaint of follow-up for chronic cardiac conditions.  He is a patient of Dr. Ramirez.  He continues with shortness of breath and is stable. He denies any chest pain. He denies any fast or slow heart rhythms. He denies any bleeding in his stool or urine. he is sleeping on 2 pillows at night. he is not waking gasping for air. he denies any swelling. he has been compliant with his medications. his BP has been controlled. PCP follows labs and lipids.        He otherwise denies chest pain, MEDINA, PND, orthopnea, syncope, or near syncope. He has no other complaints.    Review of Systems   Constitutional: Positive for fatigue. Negative for fever, chills, diaphoresis, activity change, appetite change, fatigue and unexpected weight change.   Eyes: Negative for photophobia, pain, redness and visual disturbance.   Respiratory: Positive for shortness of breath (chronic, stable). Negative for apnea, cough, chest tightness, wheezing and stridor.    Cardiovascular: Negative for chest pain, palpitations and leg swelling.   Gastrointestinal: Negative for abdominal distention.   Genitourinary: Negative for dysuria, urgency and frequency.   Musculoskeletal: Negative for myalgias, arthralgias and gait problem.   Skin: Negative for color change, pallor, rash and wound.   Neurological: Negative for dizziness, tremors, speech difficulty, weakness and numbness.   Hematological: Does bruise/bleed easily.   Psychiatric/Behavioral: Negative.        Past Medical History:   Diagnosis Date    Diabetes mellitus (HCC)     Hypertension        Past Surgical History:   Procedure Laterality Date    BACK SURGERY      x2

## 2024-12-05 ENCOUNTER — HOSPITAL ENCOUNTER (OUTPATIENT)
Dept: PHYSICAL THERAPY | Age: 59
Setting detail: THERAPIES SERIES
Discharge: HOME | End: 2024-12-05
Payer: COMMERCIAL

## 2024-12-05 PROCEDURE — 97110 THERAPEUTIC EXERCISES: CPT

## 2024-12-05 ASSESSMENT — PAIN DESCRIPTION - PAIN TYPE: TYPE: CHRONIC PAIN

## 2024-12-05 ASSESSMENT — PAIN SCALES - GENERAL: PAINLEVEL_OUTOF10: 7

## 2024-12-05 ASSESSMENT — PAIN DESCRIPTION - LOCATION: LOCATION: LEG

## 2024-12-05 ASSESSMENT — PAIN DESCRIPTION - ORIENTATION: ORIENTATION: RIGHT

## 2024-12-05 NOTE — PROGRESS NOTES
Daily Treatment Note  Date: 2024  Patient Name: Noe Jimenez  MRN: [id number]     :   1965    Referring Physician: Julius Ingram APRN -* CORONA Cheng   PCP: Willie Kim MD    Medical Diagnosis: Complex regional pain syndrome i of unspecified lower limb [G90.529] s/p R BKA  2024  Treatment Diagnosis: s/p R BKA      Insurance: Payor: GENERIC SELF-INSURED / Plan: GENERIC SELF-INSURED WC / Product Type: *No Product type* /   Insurance ID: [id number] - (Worker's Comp)    Subjective:  General  Diagnosis: s/p R BKA  2024  Referring Provider (secondary): CORONA Cheng  Onset Date: 24  PT Insurance Information: Worker's Comp  Total # of Visits Approved: 12  Total # of Visits to Date: 10  Plan of Care/Certification Expiration Date: 25  Progress Note Due Date: 24  Referring Provider (secondary): CORONA Cheng  Subjective: pain in residual limb at a 7 right now but generally it;s worse at night  Patient Currently in Pain: Yes  Pain Level: 7  Pain Type: Chronic pain  Pain Location: Leg  Pain Orientation: Right       Treatment Activities:  Exercises:      Treatment Reasoning    Exercise 1: Bilat hip flexor stretch on wedge x 5'  Exercise 2: prone on elbows with 7.5# weight over sacrum--2'  Exercise 3: Quad sets R 3\" x 15 (on towel roll)  Exercise 4: SLR R x 15  Exercise 5: Heel slide R x 15  Exercise 6: SAQ R 2 x 15  Exercise 7: Bridging 2 x 15 (did not need assist for blocking RLE)  Exercise 8: R Knee extension stretch on towel x 2'  Exercise 9: LAQ bilat, 4# on L/HS Curls (green bilat) x 15  Exercise 10: Hip abd (green) x 15/ball squeeze x 15  Exercise 11: Sitting march 4#   x 15  Exercise 12: Repeated sit to stand (start from higher surface) x 5 x2 sets  Exercise 13: Fwd/back in  bars x 5  Exercise 14: Sidestepping in  bars x 5  Exercise 16: Stance EO x 1'/EC  x 30\"  Exercise 18: Stance with reaching activities (progress to balloon tap)- 2 min  12/5: x

## 2024-12-10 ENCOUNTER — HOSPITAL ENCOUNTER (OUTPATIENT)
Dept: PHYSICAL THERAPY | Age: 59
Setting detail: THERAPIES SERIES
Discharge: HOME | End: 2024-12-10
Payer: COMMERCIAL

## 2024-12-10 PROCEDURE — 97110 THERAPEUTIC EXERCISES: CPT

## 2024-12-10 ASSESSMENT — PAIN DESCRIPTION - PAIN TYPE: TYPE: CHRONIC PAIN

## 2024-12-10 ASSESSMENT — PAIN DESCRIPTION - LOCATION: LOCATION: LEG

## 2024-12-10 ASSESSMENT — PAIN SCALES - GENERAL: PAINLEVEL_OUTOF10: 7

## 2024-12-10 ASSESSMENT — PAIN DESCRIPTION - DESCRIPTORS: DESCRIPTORS: BURNING

## 2024-12-10 ASSESSMENT — PAIN DESCRIPTION - ORIENTATION: ORIENTATION: RIGHT

## 2024-12-10 NOTE — PROGRESS NOTES
Stance with reaching activities (progress to balloon tap)- 2 min  12/5: x 1'-NOT TODAY  Exercise 19: Step ups fwd/lateral x 10 each  Exercise 20: HEP ISSUED 12/10/2024                                 ASSESSMENT     Assessment: Assessment: Patient did well in session.  HEP issued today.  Patient demonstrated all and verbalized understanding.  He was able to work through program without complaint.  Did not note any LOB with activities today.   He reported pain at 7/10 pre and post session.  Body Structures, Functions, Activity Limitations Requiring Skilled Therapeutic Intervention: Decreased functional mobility , Decreased ROM, Decreased ADL status, Decreased strength, Decreased high-level IADLs, Decreased balance, Decreased endurance, Decreased posture    Post-Treatment Pain Level:      Activity Tolerance: Patient tolerated evaluation without incident    Therapy Prognosis: Good       GOALS   Patient Goals : improve balance and stamina  Short Term Goals Completed by 3-4 weeks Current Status Goal Status   Independent with HEP 11/26: Formal HEP not yet issued In progress   Improve RLE strength to 5/5 11/26: See strength section Partially met, In progress   Improve R knee ROM to at least 0-110 degrees 11/26: Flexes to 92 degrees and further flexion causes prosthesis to dig in the back of his knee.  Extends residual limb to 0, though position of current prosthesis remains lacking 8-10 degrees from neutral. Partially met, In progress   Improve hip extension in standing to lacking 10 degrees from neutral or better 11/26: In standing, bilat hip extension lacking 15 degrees from neutral (was lacking 40 degrees from neutral at evaluation.) In progress                                                       Long Term Goals Completed by 4-6 weeks Current Status Goal Status   Report ability to complete ADLs independently and without rest break 11/26: Still requiring rest breaks, but fewer than at initial evaluation.  \"I'm starting

## 2024-12-12 ENCOUNTER — HOSPITAL ENCOUNTER (OUTPATIENT)
Dept: PHYSICAL THERAPY | Age: 59
Setting detail: THERAPIES SERIES
Discharge: HOME | End: 2024-12-12
Payer: COMMERCIAL

## 2024-12-12 PROCEDURE — 97110 THERAPEUTIC EXERCISES: CPT

## 2024-12-12 ASSESSMENT — PAIN DESCRIPTION - DESCRIPTORS: DESCRIPTORS: SORE

## 2024-12-12 ASSESSMENT — PAIN DESCRIPTION - ORIENTATION: ORIENTATION: RIGHT

## 2024-12-12 ASSESSMENT — PAIN DESCRIPTION - LOCATION: LOCATION: LEG

## 2024-12-12 ASSESSMENT — PAIN DESCRIPTION - PAIN TYPE: TYPE: CHRONIC PAIN

## 2024-12-12 ASSESSMENT — PAIN SCALES - GENERAL: PAINLEVEL_OUTOF10: 7

## 2024-12-12 NOTE — PROGRESS NOTES
Physical Therapy: Daily Note   Patient: Noe Jimenez (59 y.o. male)   Examination Date: 2024  Plan of Care/Certification Expiration Date: 25    No data recorded   :  1965 # of Visits since SOC:   12   MRN: [id number]  CSN: [id number] Start of Care Date:   10/29/2024   Insurance: Payor: GENERIC SELF-INSURED / Plan: GENERIC SELF-INSURED WC / Product Type: *No Product type* /   Insurance ID: [id number] - (Worker's Comp) Secondary Insurance (if applicable):    Referring Physician: Julius Ingram APRN - CORONA Cordon   PCP: Willie Kim MD Visits to Date/Visits Approved:     No Show/Cancelled Appts:   /       Medical Diagnosis: Complex regional pain syndrome i of unspecified lower limb [G90.529] s/p R BKA  2024  Treatment Diagnosis: s/p R BKA        SUBJECTIVE EXAMINATION   Pain Level: Pain Screening  Patient Currently in Pain: Yes  Pain Assessment: 0-10  Pain Level: 7  Pain Type: Chronic pain  Pain Location: Leg  Pain Orientation: Right  Pain Descriptors: Sore    Patient Comments: Subjective: I am doing okay.  I sure hope that the insurance gives me more therapy.        TREATMENT     Exercises:      Treatment Reasoning    Exercise 1: Bilat hip flexor stretch on wedge x 5'  Exercise 2: prone on elbows with 7.5# weight over sacrum--2'-NOT TODAY  Exercise 3: Quad sets R 3\" x 15 (on towel roll)  Exercise 4: SLR R x 15  Exercise 5: Heel slide R x 15  Exercise 6: SAQ R 2 x 15  Exercise 7: Bridging 2 x 15 (did not need assist for blocking RLE)  Exercise 8: R Knee extension stretch on towel x 2'  Exercise 9: LAQ bilat, 4# on L/HS Curls (green bilat) x 15  Exercise 10: Hip abd (green) x 15/ball squeeze x 15  Exercise 11: Sitting march 4#   x 15-NOT TODAY  Exercise 12: Repeated sit to stand (start from higher surface) x 5 x2 sets-NOT TODAY  Exercise 13: Fwd/back in  bars x 5-NOT TODAY  Exercise 14: Sidestepping in  bars x 5-NOT TODAY  Exercise 16: Stance EO x 1'/EC  x 30\"-NOT

## 2025-01-16 ENCOUNTER — OFFICE VISIT (OUTPATIENT)
Dept: VASCULAR SURGERY | Facility: CLINIC | Age: 60
End: 2025-01-16
Payer: OTHER MISCELLANEOUS

## 2025-01-16 VITALS
OXYGEN SATURATION: 97 % | HEART RATE: 61 BPM | HEIGHT: 70 IN | BODY MASS INDEX: 30.06 KG/M2 | DIASTOLIC BLOOD PRESSURE: 72 MMHG | WEIGHT: 210 LBS | SYSTOLIC BLOOD PRESSURE: 126 MMHG

## 2025-01-16 DIAGNOSIS — S88.111D BELOW-KNEE AMPUTATION OF RIGHT LOWER EXTREMITY, SUBSEQUENT ENCOUNTER: Primary | ICD-10-CM

## 2025-01-16 DIAGNOSIS — E11.65 TYPE 2 DIABETES MELLITUS WITH HYPERGLYCEMIA, WITHOUT LONG-TERM CURRENT USE OF INSULIN: ICD-10-CM

## 2025-01-16 DIAGNOSIS — I10 PRIMARY HYPERTENSION: ICD-10-CM

## 2025-01-16 DIAGNOSIS — E78.5 HYPERLIPIDEMIA LDL GOAL <70: ICD-10-CM

## 2025-01-16 PROBLEM — Z01.818 PREOP TESTING: Status: RESOLVED | Noted: 2024-05-22 | Resolved: 2025-01-16

## 2025-01-16 RX ORDER — ALLOPURINOL 100 MG/1
1 TABLET ORAL DAILY
COMMUNITY
Start: 2024-12-31

## 2025-01-16 RX ORDER — DAPAGLIFLOZIN 10 MG/1
1 TABLET, FILM COATED ORAL DAILY
COMMUNITY
Start: 2025-01-10

## 2025-01-16 RX ORDER — INSULIN GLARGINE 100 [IU]/ML
40 INJECTION, SOLUTION SUBCUTANEOUS NIGHTLY
COMMUNITY
Start: 2024-12-09

## 2025-01-16 NOTE — PROGRESS NOTES
"1/16/2025        Mehul Andersen MD  [address]  [facility] [address]      Garrett Rodriguez  1965    Chief Complaint   Patient presents with    Follow-up     6 month follow up w/ testing. Last seen 7/12/24. Patient denies any stroke type symptoms.        Dear Dr. Mehul Andersen:    HPI  I had the pleasure of seeing your patient Garrett Rodriguez in the office today.  As you recall, Garrett Rodriguez is a 59 y.o.  male who you are currently following for routine health maintenance.  He did have an injury at work necessitating multiple surgical interventions and ultimately had nonhealing wound to his right necessitating a right below-knee amputation on 6/3/2024.  He has done well and is walking with a cane in his temporary prosthetic.  He is maintained on Plavix, Eliquis, and Lipitor.      Review of Systems   Constitutional: Negative.    HENT: Negative.     Eyes: Negative.    Respiratory: Negative.     Cardiovascular: Negative.    Gastrointestinal: Negative.    Endocrine: Negative.    Genitourinary: Negative.    Musculoskeletal: Negative.    Skin: Negative.    Allergic/Immunologic: Negative.    Neurological: Negative.    Hematological: Negative.    Psychiatric/Behavioral: Negative.     All other systems reviewed and are negative.    /72   Pulse 61   Ht 177.8 cm (70\")   Wt 95.3 kg (210 lb)   SpO2 97%   BMI 30.13 kg/m²     Physical Exam  Vitals and nursing note reviewed.   Constitutional:       Appearance: He is well-developed.   HENT:      Head: Normocephalic and atraumatic.   Eyes:      General: No scleral icterus.     Pupils: Pupils are equal, round, and reactive to light.   Neck:      Thyroid: No thyromegaly.   Cardiovascular:      Rate and Rhythm: Normal rate and regular rhythm.      Heart sounds: Normal heart sounds.   Pulmonary:      Effort: Pulmonary effort is normal.      Breath sounds: Normal breath sounds.   Abdominal:      General: Bowel sounds are normal.      " Palpations: Abdomen is soft.   Musculoskeletal:      Cervical back: Normal range of motion and neck supple.      Comments: Right BKA, using prosthetic and cane      Right Lower Extremity: Right leg is amputated below knee.   Skin:     General: Skin is warm and dry.   Neurological:      Mental Status: He is alert and oriented to person, place, and time.   Psychiatric:         Behavior: Behavior normal.         Thought Content: Thought content normal.         Judgment: Judgment normal.             Patient Active Problem List   Diagnosis    Type 2 diabetes mellitus with hyperglycemia, without long-term current use of insulin    HTN (hypertension)    Retained orthopedic hardware    Anasarca    Iron deficiency anemia    Paroxysmal atrial fibrillation    Scrotal edema    Gait instability    Pulmonary HTN    Surgical wound, non healing    TARSHA (acute kidney injury)    Bleeding from wound    Diarrhea of presumed infectious origin    Nonhealing surgical wound, initial encounter    Chronic diastolic congestive heart failure    Long term (current) use of anticoagulants    LVH (left ventricular hypertrophy)    Hyperlipidemia LDL goal <70    Coronary artery disease involving native coronary artery of native heart without angina pectoris    Presence of drug coated stent in LAD coronary artery    PFO (patent foramen ovale)    Unilateral complete BKA        Diagnosis Plan   1. Type 2 diabetes mellitus with hyperglycemia, without long-term current use of insulin        2. Hyperlipidemia LDL goal <70        3. Primary hypertension        4. Below-knee amputation of right lower extremity, subsequent encounter                Plan: After thoroughly evaluating Garrett Shirley Jennifer, I believe the best course of action is to remain conservative from vascular surgery standpoint.  Currently he is doing well and has a temporary prosthetic.  His stump is well-healed and he has had no problems.  I would recommend annual follow-up going  forward to assess his stump and any future prosthetic difficulties.   I did discuss vascular risk factors as they pertain to the progression of vascular disease including controlling hypertension and diabetes.  His blood pressure is stable on his current medications.  Unfortunately his most recent hemoglobin A1c from 4 months ago was uncontrolled at 12.8%.  He should continue his Lipitor 20 mg daily in addition to his other medications.   This was all discussed in full with complete understanding.    Thank you for allowing me to participate in the care of your patient.  Please do not hesitate to call with any questions or concerns.  We will keep you aware of any further encounters with Garrett Rodriguez.        Sincerely yours,         MEGHANN Baltazar David R, MD

## 2025-01-16 NOTE — LETTER
"January 16, 2025     Mehul Andersen MD  [address]  [facility] [address]    Patient: Garrett Rodriguez   YOB: 1965   Date of Visit: 1/16/2025     Dear Mehul Andersen MD:       Thank you for referring Garrett Rodriguez to me for evaluation. Below are the relevant portions of my assessment and plan of care.    If you have questions, please do not hesitate to call me. I look forward to following Garrett along with you.         Sincerely,        Deborah MEGHANN Monk        CC: No Recipients    Preetsantos MEGHANN Cabrera  01/16/25 1229  Sign when Signing Visit  1/16/2025        Mehul Andersen MD  [address]  [facility] [address]      Garrett Rodriguez  1965    Chief Complaint   Patient presents with   • Follow-up     6 month follow up w/ testing. Last seen 7/12/24. Patient denies any stroke type symptoms.        Dear Dr. Mehul Andersen:    HPI  I had the pleasure of seeing your patient Garrett Rodriguez in the office today.  As you recall, Garrett Rodriguez is a 59 y.o.  male who you are currently following for routine health maintenance.  He did have an injury at work necessitating multiple surgical interventions and ultimately had nonhealing wound to his right necessitating a right below-knee amputation on 6/3/2024.  He has done well and is walking with a cane in his temporary prosthetic.  He is maintained on Plavix, Eliquis, and Lipitor.      Review of Systems   Constitutional: Negative.    HENT: Negative.     Eyes: Negative.    Respiratory: Negative.     Cardiovascular: Negative.    Gastrointestinal: Negative.    Endocrine: Negative.    Genitourinary: Negative.    Musculoskeletal: Negative.    Skin: Negative.    Allergic/Immunologic: Negative.    Neurological: Negative.    Hematological: Negative.    Psychiatric/Behavioral: Negative.     All other systems reviewed and are negative.    /72   Pulse 61   Ht 177.8 cm (70\")   Wt 95.3 kg (210 lb)   SpO2 " 97%   BMI 30.13 kg/m²     Physical Exam  Vitals and nursing note reviewed.   Constitutional:       Appearance: He is well-developed.   HENT:      Head: Normocephalic and atraumatic.   Eyes:      General: No scleral icterus.     Pupils: Pupils are equal, round, and reactive to light.   Neck:      Thyroid: No thyromegaly.   Cardiovascular:      Rate and Rhythm: Normal rate and regular rhythm.      Heart sounds: Normal heart sounds.   Pulmonary:      Effort: Pulmonary effort is normal.      Breath sounds: Normal breath sounds.   Abdominal:      General: Bowel sounds are normal.      Palpations: Abdomen is soft.   Musculoskeletal:      Cervical back: Normal range of motion and neck supple.      Comments: Right BKA, using prosthetic and cane      Right Lower Extremity: Right leg is amputated below knee.   Skin:     General: Skin is warm and dry.   Neurological:      Mental Status: He is alert and oriented to person, place, and time.   Psychiatric:         Behavior: Behavior normal.         Thought Content: Thought content normal.         Judgment: Judgment normal.             Patient Active Problem List   Diagnosis   • Type 2 diabetes mellitus with hyperglycemia, without long-term current use of insulin   • HTN (hypertension)   • Retained orthopedic hardware   • Anasarca   • Iron deficiency anemia   • Paroxysmal atrial fibrillation   • Scrotal edema   • Gait instability   • Pulmonary HTN   • Surgical wound, non healing   • TARSHA (acute kidney injury)   • Bleeding from wound   • Diarrhea of presumed infectious origin   • Nonhealing surgical wound, initial encounter   • Chronic diastolic congestive heart failure   • Long term (current) use of anticoagulants   • LVH (left ventricular hypertrophy)   • Hyperlipidemia LDL goal <70   • Coronary artery disease involving native coronary artery of native heart without angina pectoris   • Presence of drug coated stent in LAD coronary artery   • PFO (patent foramen ovale)   •  Unilateral complete BKA        Diagnosis Plan   1. Type 2 diabetes mellitus with hyperglycemia, without long-term current use of insulin        2. Hyperlipidemia LDL goal <70        3. Primary hypertension        4. Below-knee amputation of right lower extremity, subsequent encounter                Plan: After thoroughly evaluating Garrett Rodriguez, I believe the best course of action is to remain conservative from vascular surgery standpoint.  Currently he is doing well and has a temporary prosthetic.  His stump is well-healed and he has had no problems.  I would recommend annual follow-up going forward to assess his stump and any future prosthetic difficulties.   I did discuss vascular risk factors as they pertain to the progression of vascular disease including controlling hypertension and diabetes.  His blood pressure is stable on his current medications.  Unfortunately his most recent hemoglobin A1c from 4 months ago was uncontrolled at 12.8%.  He should continue his Lipitor 20 mg daily in addition to his other medications.   This was all discussed in full with complete understanding.    Thank you for allowing me to participate in the care of your patient.  Please do not hesitate to call with any questions or concerns.  We will keep you aware of any further encounters with Garrett Rodriguez.        Sincerely yours,         MEGHANN Baltazar David R, MD

## 2025-01-21 ENCOUNTER — HOSPITAL ENCOUNTER (OUTPATIENT)
Dept: PHYSICAL THERAPY | Age: 60
Setting detail: THERAPIES SERIES
Discharge: HOME | End: 2025-01-21
Payer: COMMERCIAL

## 2025-01-21 PROCEDURE — 97110 THERAPEUTIC EXERCISES: CPT

## 2025-01-21 ASSESSMENT — PAIN DESCRIPTION - LOCATION: LOCATION: LEG

## 2025-01-21 ASSESSMENT — PAIN SCALES - GENERAL: PAINLEVEL_OUTOF10: 6

## 2025-01-21 ASSESSMENT — PAIN DESCRIPTION - PAIN TYPE: TYPE: CHRONIC PAIN

## 2025-01-21 ASSESSMENT — PAIN DESCRIPTION - ORIENTATION: ORIENTATION: RIGHT

## 2025-01-21 NOTE — PROGRESS NOTES
Physical Therapy: Daily Note/Reassessment   Patient: Noe Jimenez (59 y.o. male)   Examination Date: 2025  Plan of Care/Certification Expiration Date: 25    No data recorded   :  1965 # of Visits since SOC:   13   MRN: [id number]  CSN: [id number] Start of Care Date:   10/29/2024   Insurance: Payor: [employer] [place] / Plan: [employer] [place] / Product Type: *No Product type* /   Insurance ID: [id number] - (Worker's Comp) Secondary Insurance (if applicable):    Referring Physician: No ref. provider found CORONA Cheng   PCP: Willie Kim MD Visits to Date/Visits Approved:     No Show/Cancelled Appts:   /       Medical Diagnosis: No admission diagnoses are documented for this encounter.    Treatment Diagnosis: s/p R BKA        SUBJECTIVE EXAMINATION   Pain Level: Pain Screening  Patient Currently in Pain: Yes  Pain Assessment: 0-10  Pain Level: 6  Pain Type: Chronic pain  Pain Location: Leg  Pain Orientation: Right  Pain Descriptors: Other (Comment), Throbbing, Tingling, Burning (\"Phantom pains\")    Patient Comments: Subjective: While waiting to hear about if further PT visits had been approved, he has tried to continue to increase activity and is now ambulating solely with SBQC.  Had f/u and was released by vascular with plan to f/u 1x/year.  States he is to return to prosthetist in about 6 weeks- still waiting for progress with  so he can transition to new prosthesis.  States he is using a #2 .  Using a #3 sock with complaints of his residual limb moving and shifting in the socket, but states if he uses a #5 he is unable to get prosthesis on.  Complains most of space in anterior knee area.  He has discussed with Virgil and states he has been told that basically this is about as well as current prosthesis will ever fit.  He reports general decline in strength and stamina since gap in PT attendance and feels this is more limited because he can't get outside and walk

## 2025-01-23 ENCOUNTER — HOSPITAL ENCOUNTER (OUTPATIENT)
Dept: PHYSICAL THERAPY | Age: 60
Setting detail: THERAPIES SERIES
Discharge: HOME | End: 2025-01-23
Payer: COMMERCIAL

## 2025-01-23 PROCEDURE — 97110 THERAPEUTIC EXERCISES: CPT

## 2025-01-23 NOTE — PROGRESS NOTES
Physical Therapy: Daily Note   Patient: Noe Jimenez (59 y.o. male)   Examination Date: 2025  Plan of Care/Certification Expiration Date: 25    No data recorded   :  1965 # of Visits since SOC:   14   MRN: [id number]  CSN: [id number] Start of Care Date:   10/29/2024   Insurance: Payor: [employer] [facility] / Plan: [employer] [place] / Product Type: *No Product type* /   Insurance ID: [id number] - (Worker's Comp) Secondary Insurance (if applicable):    Referring Physician: No ref. provider found CORONA Cheng   PCP: Willie Kim MD Visits to Date/Visits Approved:     No Show/Cancelled Appts:   /       Medical Diagnosis: No admission diagnoses are documented for this encounter.    Treatment Diagnosis: s/p R BKA        SUBJECTIVE EXAMINATION   Pain Level:      Patient Comments: Subjective: Patient reports no new changes since his last session. He is walking exclusively with SBQC, no reported falls. He states he is wearing a 3-ply sock today, He continues to wear  at night. He is having some gapping in his prosthesis, wearing prothesis about 3/4 of the day.    HEP Compliance:           OBJECTIVE EXAMINATION   Restrictions:  Restrictions/Precautions: Fall Risk   Required Braces or Orthoses?: Yes   Implants Present? : -- (jerry-anant)          TREATMENT     Exercises:      Treatment Reasoning    Exercise 1: Bilat hip flexor stretch on wedge x 5', blue wedge 1 white pillow  Exercise 2: prone on elbows with 7.5# weight over sacrum--2'-NOT TODAY  Exercise 3: Quad sets R 3\" x 15 (on towel roll)  Exercise 4: SLR R x 15  Exercise 5: Heel slide R x 15  Exercise 6: SAQ R 2 x 15  Exercise 7: Bridging 2 x 15 (did not need assist for blocking RLE)  Exercise 8: R Knee extension stretch on towel x 2'  Exercise 9: LAQ bilat, 4# on L, 1# right /HS Curls (green bilat) x 15  Exercise 10: Hip abd (green) x 15/ball squeeze x 15  Exercise 11: Sitting #   x 20 reps, no weight 25  Exercise 12:

## 2025-01-28 ENCOUNTER — HOSPITAL ENCOUNTER (OUTPATIENT)
Dept: PHYSICAL THERAPY | Age: 60
Setting detail: THERAPIES SERIES
Discharge: HOME | End: 2025-01-28
Payer: COMMERCIAL

## 2025-01-28 PROCEDURE — 97110 THERAPEUTIC EXERCISES: CPT

## 2025-01-28 ASSESSMENT — PAIN DESCRIPTION - ORIENTATION: ORIENTATION: RIGHT

## 2025-01-28 ASSESSMENT — PAIN SCALES - GENERAL: PAINLEVEL_OUTOF10: 5

## 2025-01-28 ASSESSMENT — PAIN DESCRIPTION - DESCRIPTORS: DESCRIPTORS: NUMBNESS;TINGLING;HYPERSENSITIVITY

## 2025-01-28 ASSESSMENT — PAIN DESCRIPTION - LOCATION: LOCATION: LEG

## 2025-01-28 ASSESSMENT — PAIN DESCRIPTION - PAIN TYPE: TYPE: CHRONIC PAIN

## 2025-01-28 NOTE — PROGRESS NOTES
Physical Therapy: Daily Note   Patient: Noe Jimenez (59 y.o. male)   Examination Date: 2025  Plan of Care/Certification Expiration Date: 25    No data recorded   :  1965 # of Visits since SOC:   15   MRN: [id number]  CSN: [id number] Start of Care Date:   10/29/2024   Insurance: Payor: [employer] [facility] / Plan: [employer] [place] / Product Type: *No Product type* /   Insurance ID: [id number] - (Worker's Comp) Secondary Insurance (if applicable):    Referring Physician: No ref. provider found CORONA Cheng   PCP: Willie Kim MD Visits to Date/Visits Approved: 15 / 24    No Show/Cancelled Appts:   /       Medical Diagnosis: No admission diagnoses are documented for this encounter.    Treatment Diagnosis: s/p R BKA        SUBJECTIVE EXAMINATION   Pain Level: Pain Screening  Patient Currently in Pain: Yes  Pain Assessment: 0-10  Pain Level: 5  Pain Type: Chronic pain  Pain Location: Leg  Pain Orientation: Right  Pain Descriptors: Numbness, Tingling, Hypersensitivity    Patient Comments: Subjective: Doing well today.  He has no complaints.  Wearing 3 ply sock today.    OBJECTIVE EXAMINATION   Restrictions:  Restrictions/Precautions: Fall Risk   Required Braces or Orthoses?: Yes        TREATMENT     Exercises:      Treatment Reasoning    Exercise 1: Bilat hip flexor stretch on wedge x 5', blue wedge 1 white pillow  Exercise 2: prone on elbows with 7.5# weight over sacrum--2'-NOT TODAY  Exercise 3: Quad sets R 3\" x 15 (on towel roll)  Exercise 4: SLR R x 15---1.5# on R, 4# on L  Exercise 5: Heel slide R x 15- not today  Exercise 6: SAQ R 2 x 15 1.5# on R, 4# on L  Exercise 7: Bridging 2 x 15 (did not need assist for blocking RLE)  Exercise 8: R Knee extension stretch on towel x 2'  Exercise 9: LAQ bilat, 4# on L, 1# right /HS Curls (green bilat) x 15  Exercise 10: Hip abd (green) x 20/ball squeeze x 15- no ball squeeze   Exercise 11: Sitting #   x 20 reps, no weight

## 2025-01-30 ENCOUNTER — HOSPITAL ENCOUNTER (OUTPATIENT)
Dept: PHYSICAL THERAPY | Age: 60
Setting detail: THERAPIES SERIES
Discharge: HOME | End: 2025-01-30
Payer: COMMERCIAL

## 2025-01-30 PROCEDURE — 97110 THERAPEUTIC EXERCISES: CPT

## 2025-01-30 ASSESSMENT — PAIN DESCRIPTION - LOCATION: LOCATION: BACK;LEG

## 2025-01-30 ASSESSMENT — PAIN SCALES - GENERAL: PAINLEVEL_OUTOF10: 7

## 2025-01-30 ASSESSMENT — PAIN DESCRIPTION - ORIENTATION: ORIENTATION: RIGHT

## 2025-01-30 ASSESSMENT — PAIN DESCRIPTION - PAIN TYPE: TYPE: CHRONIC PAIN

## 2025-01-30 NOTE — PROGRESS NOTES
Daily Treatment Note  Date: 2025  Patient Name: Noe Jimenez  MRN: [id number]     :   1965    Referring Physician: No ref. provider found CORONA Cheng   PCP: Willie Kim MD    Medical Diagnosis: No admission diagnoses are documented for this encounter. s/p R BKA  2024  Treatment Diagnosis: s/p R BKA      Insurance: Payor: [employer] [facility] / Plan: [employer] [place] / Product Type: *No Product type* /   Insurance ID: [id number] - (Worker's Comp)    Subjective:  General  Diagnosis: s/p R BKA  2024  Referring Provider (secondary): CORONA Cheng  Onset Date: 24  PT Insurance Information: Worker's Comp  Total # of Visits Approved: 24  Total # of Visits to Date: 16  Plan of Care/Certification Expiration Date: 25  Progress Note Due Date: 25  Referring Provider (secondary): CORONA Cheng  Subjective: the pain in my back and leg is about a 7, rain not helping any  Patient Currently in Pain: Yes  Pain Level: 7  Pain Type: Chronic pain  Pain Location: Back, Leg  Pain Orientation: Right       Treatment Activities:  Exercises:      Treatment Reasoning    Exercise 1: Bilat hip flexor stretch on wedge x 5', blue wedge 1 white pillow  Exercise 2: prone on elbows with 7.5# weight over sacrum--2'-NOT TODAY  Exercise 3: Quad sets R 3\" x 20 (on towel roll)  Exercise 4: SLR R x 15---1.5# on R, 4# on L  Exercise 5: Heel slide R x 15  1.5#  Exercise 6: SAQ R 2 x 15 1.5# on R, 4# on L  Exercise 7: Bridging 2 x 15 (did not need assist for blocking RLE)  Exercise 8: R Knee extension stretch on towel x 3'  Exercise 9: LAQ bilat, 4# on L, 1.5# right 2/15  /HS Curls (green bilat) x 15  Exercise 10: Hip abd (green) x 20/ball squeeze x 15  Exercise 11: Sitting march 1.5#R & 4#L   x 20 reps  Exercise 12: Repeated sit to stand (start from higher surface) x 10 (cuing to not use LEs to brace)  Exercise 13: Fwd/back in  bars x 4  Exercise 14: Sidestepping in  bars x 4  Exercise 15:

## 2025-02-04 ENCOUNTER — HOSPITAL ENCOUNTER (OUTPATIENT)
Dept: PHYSICAL THERAPY | Age: 60
Setting detail: THERAPIES SERIES
Discharge: HOME | End: 2025-02-04
Payer: COMMERCIAL

## 2025-02-04 PROCEDURE — 97110 THERAPEUTIC EXERCISES: CPT

## 2025-02-04 ASSESSMENT — PAIN DESCRIPTION - PAIN TYPE: TYPE: CHRONIC PAIN

## 2025-02-04 ASSESSMENT — PAIN DESCRIPTION - LOCATION: LOCATION: BACK;LEG

## 2025-02-04 ASSESSMENT — PAIN DESCRIPTION - ORIENTATION: ORIENTATION: RIGHT

## 2025-02-04 ASSESSMENT — PAIN SCALES - GENERAL: PAINLEVEL_OUTOF10: 7

## 2025-02-04 ASSESSMENT — PAIN DESCRIPTION - DESCRIPTORS: DESCRIPTORS: ACHING;SORE

## 2025-02-04 NOTE — PROGRESS NOTES
Physical Therapy  Daily Treatment Note  Date: 2025  Patient Name: Noe Jimenez  MRN: [id number]     :   1965    Subjective:      PT Visit Information  Onset Date: 24  PT Insurance Information: Worker's Comp  Total # of Visits Approved: 24  Total # of Visits to Date: 17  Plan of Care/Certification Expiration Date: 25  Progress Note Due Date: 25  Referring Provider (secondary): CORONA Cheng  Subjective: Patient reports that his leg is bothering him as usual this morning.    Pain Screening  Patient Currently in Pain: Yes  Pain Assessment: 0-10  Pain Level: 7  Pain Type: Chronic pain  Pain Location: Back;Leg  Pain Orientation: Right  Pain Descriptors: Aching;Sore       Treatment Activities:   Exercises  Exercise 1: Bilat hip flexor stretch on wedge x 5', blue wedge 1 white pillow  Exercise 2: prone on elbows with 7.5# weight over sacrum--2'-NOT TODAY  Exercise 3: Quad sets R 3\" x 20 (on towel roll)  Exercise 4: SLR R x 15---1.5# on R, 4# on L  Exercise 5: Heel slide R x 15  1.5#  Exercise 6: SAQ R 2 x 15 1.5# on R, 4# on L  Exercise 7: Bridging 2 x 15 (did not need assist for blocking RLE)  Exercise 8: R Knee extension stretch on towel x 3'  Exercise 9: LAQ bilat, 4# on L, 1.5# right 2/15  /HS Curls (green bilat) x 15  Exercise 10: Hip abd (green) x 20/ball squeeze x 15  Exercise 11: Sitting march 1.5#R & 4#L   x 20 reps  Exercise 12: Repeated sit to stand (start from higher surface) x 10 (cuing to not use LEs to brace)  Exercise 13: Fwd/back in  bars x 4  Exercise 14: Sidestepping in  bars x 4  Exercise 15: Stepping over curbs in  bars x 3 ea (fwd/side)  Exercise 16: Stance EO x 1'/EC  x 30\"-Narrow KINGSLEY  Exercise 17: Pegboard (add foam pads as appropriate) 37in x 1  Exercise 18: Stance with reaching activities (progress to balloon tap)- x 1 min balloon tap in //  Exercise 19: Step ups fwd/lateral x 10 each  Exercise 20: HEP ISSUED 12/10/2024     Assessment:   Conditions

## 2025-02-06 ENCOUNTER — APPOINTMENT (OUTPATIENT)
Dept: PHYSICAL THERAPY | Age: 60
End: 2025-02-06
Payer: COMMERCIAL

## 2025-02-11 ENCOUNTER — HOSPITAL ENCOUNTER (OUTPATIENT)
Dept: PHYSICAL THERAPY | Age: 60
Setting detail: THERAPIES SERIES
Discharge: HOME | End: 2025-02-11
Payer: COMMERCIAL

## 2025-02-11 PROCEDURE — 97110 THERAPEUTIC EXERCISES: CPT

## 2025-02-11 ASSESSMENT — PAIN DESCRIPTION - ORIENTATION: ORIENTATION: RIGHT

## 2025-02-11 ASSESSMENT — PAIN SCALES - GENERAL: PAINLEVEL_OUTOF10: 7

## 2025-02-11 ASSESSMENT — PAIN DESCRIPTION - LOCATION: LOCATION: LEG

## 2025-02-11 ASSESSMENT — PAIN DESCRIPTION - DESCRIPTORS: DESCRIPTORS: SORE

## 2025-02-11 ASSESSMENT — PAIN DESCRIPTION - PAIN TYPE: TYPE: CHRONIC PAIN

## 2025-02-11 NOTE — PROGRESS NOTES
Physical Therapy  Daily Treatment Note  Date: 2025  Patient Name: Noe Jimenez  MRN: [id number]     :   1965    Subjective:   General  Additional Pertinent Hx: 60 y/o M presents s/p R BKA.  PMH includes HTN, DM, back sx, rotator cuff repair  PT Visit Information  Onset Date: 24  PT Insurance Information: Worker's Comp  Total # of Visits Approved: 24  Total # of Visits to Date: 18  Plan of Care/Certification Expiration Date: 25  Progress Note Due Date: 25  Referring Provider (secondary): CORONA Cheng  Subjective: Little sore, little pain, this has just been my life for the last three years.    Pain Screening  Patient Currently in Pain: Yes  Pain Assessment: 0-10  Pain Level: 7  Pain Type: Chronic pain  Pain Location: Leg  Pain Orientation: Right  Pain Descriptors: Sore     Treatment Activities:    Exercises  Exercise 1: Bilat hip flexor stretch on wedge x 5', blue wedge 1 white pillow  Exercise 2: prone on elbows with 7.5# weight over sacrum--2'-NOT TODAY  Exercise 3: Quad sets R 3\" x 20 (on towel roll)  Exercise 4: SLR R x 15---1.5# on R, 4# on L  Exercise 5: Heel slide R x 15  1.5#  Exercise 6: SAQ R 2 x 15 1.5# on R, 4# on L  Exercise 7: Bridging 2 x 15 (did not need assist for blocking RLE)  Exercise 8: R Knee extension stretch on towel x 3'  Exercise 9: LAQ bilat, 4# on L, 1.5# right 2/15  /HS Curls (green bilat) x 15  Exercise 10: Hip abd (green) x 20/ball squeeze x 20  Exercise 11: Sitting march 1.5#R & 4#L   x 20 reps  Exercise 12: Repeated sit to stand (start from higher surface) x 10 (cuing to not use LEs to brace)  Exercise 13: Fwd/back in  bars x 4  Exercise 14: Sidestepping in  bars x 4  Exercise 16: Stance EO x 1'/EC  x 30\"-Narrow KINGSLEY  Exercise 18: Stance with reaching activities (progress to balloon tap)- x 1 min balloon tap in //  Exercise 19: Step ups fwd/lateral x 10 each  Exercise 20: HEP ISSUED 12/10/2024    Assessment:   Conditions Requiring Skilled

## 2025-02-13 ENCOUNTER — HOSPITAL ENCOUNTER (OUTPATIENT)
Dept: PHYSICAL THERAPY | Age: 60
Setting detail: THERAPIES SERIES
Discharge: HOME | End: 2025-02-13
Payer: COMMERCIAL

## 2025-02-13 PROCEDURE — 97110 THERAPEUTIC EXERCISES: CPT

## 2025-02-13 RX ORDER — CLOPIDOGREL BISULFATE 75 MG/1
75 TABLET ORAL DAILY
Qty: 90 TABLET | Refills: 3 | Status: SHIPPED | OUTPATIENT
Start: 2025-02-13

## 2025-02-13 ASSESSMENT — PAIN DESCRIPTION - PAIN TYPE: TYPE: CHRONIC PAIN

## 2025-02-13 ASSESSMENT — PAIN DESCRIPTION - DESCRIPTORS: DESCRIPTORS: ACHING;SORE

## 2025-02-13 ASSESSMENT — PAIN DESCRIPTION - LOCATION: LOCATION: LEG

## 2025-02-13 ASSESSMENT — PAIN DESCRIPTION - ORIENTATION: ORIENTATION: RIGHT

## 2025-02-13 ASSESSMENT — PAIN SCALES - GENERAL: PAINLEVEL_OUTOF10: 6

## 2025-02-13 NOTE — PROGRESS NOTES
Physical Therapy  Daily Treatment Note  Date: 2025  Patient Name: Noe Jimenez  MRN: [id number]     :   1965    Subjective:      PT Visit Information  Onset Date: 24  PT Insurance Information: Worker's Comp  Total # of Visits Approved: 24  Total # of Visits to Date:   Plan of Care/Certification Expiration Date: 25  Progress Note Due Date: 25  Referring Provider (secondary): CORONA Cheng  Subjective: Patient reports that he is about the same as he was the other day. Pain jackie in his leg. He has already been do another appoinment this morning.    Pain Screening  Patient Currently in Pain: Yes  Pain Assessment: 0-10  Pain Level: 6  Pain Type: Chronic pain  Pain Location: Leg  Pain Orientation: Right  Pain Descriptors: Aching;Sore       Treatment Activities:   Exercises  Exercise 1: Bilat hip flexor stretch on wedge x 5', blue wedge 1 white pillow  Exercise 2: prone on elbows with 7.5# weight over sacrum--2'-NOT TODAY  Exercise 3: Quad sets R 3\" x 20 (on towel roll)  Exercise 4: SLR R x 15---1.5# on R, 4# on L  Exercise 5: Heel slide R 2 x 15  1.5#  Exercise 6: SAQ R 2 x 15 1.5# on R, 4# on L  Exercise 7: Bridging 2 x 15 (did not need assist for blocking RLE)  Exercise 8: R Knee extension stretch on towel x 3'  Exercise 9: LAQ bilat, 4# on L, 1.5# right 2/15  /HS Curls (green bilat) 2 x 15  Exercise 10: Hip abd (green) x 20/ball squeeze x 20  Exercise 11: Sitting march 1.5#R & 4#L   x 20 reps  Exercise 12: Repeated sit to stand (start from higher surface) 2 x 10 (cuing to not use LEs to brace)  Exercise 13: Fwd/back in  bars x 4  Exercise 14: Sidestepping in  bars x 4  Exercise 15: Stepping over curbs in  bars x 3 ea (fwd/side)  Exercise 16: Stance EO x 1'/EC  x 30\"-Narrow KINGSLEY  Exercise 17: Pegboard (add foam pads as appropriate) 37in x 2  Exercise 18: Stance with reaching activities (progress to balloon tap)- x 2 min balloon tap in //  Exercise 19: Step ups fwd/lateral x

## 2025-02-18 ENCOUNTER — HOSPITAL ENCOUNTER (OUTPATIENT)
Dept: PHYSICAL THERAPY | Age: 60
Setting detail: THERAPIES SERIES
Discharge: HOME | End: 2025-02-18
Payer: COMMERCIAL

## 2025-02-18 PROCEDURE — 97110 THERAPEUTIC EXERCISES: CPT

## 2025-02-18 ASSESSMENT — PAIN SCALES - GENERAL: PAINLEVEL_OUTOF10: 7

## 2025-02-18 ASSESSMENT — PAIN DESCRIPTION - LOCATION: LOCATION: GENERALIZED;LEG

## 2025-02-18 ASSESSMENT — PAIN DESCRIPTION - PAIN TYPE: TYPE: CHRONIC PAIN

## 2025-02-18 NOTE — PROGRESS NOTES
Physical Therapy: Daily Note/Reassessment   Patient: Noe Jimenez (60 y.o. male)   Examination Date: 2025  Plan of Care/Certification Expiration Date: 25    No data recorded   :  1965 # of Visits since SOC:   20   MRN: [id number]  CSN: [id number] Start of Care Date:   10/29/2024   Insurance: Payor: GENERIC SELF-INSURED / Plan: GENERIC SELF-INSURED WC / Product Type: *No Product type* /   Insurance ID: [id number] - (Worker's Comp) Secondary Insurance (if applicable):    Referring Physician: No ref. provider found CORONA Cheng   PCP: Willie Kim MD Visits to Date/Visits Approved:     No Show/Cancelled Appts:   /       Medical Diagnosis: No admission diagnoses are documented for this encounter.    Treatment Diagnosis: s/p R BKA        SUBJECTIVE EXAMINATION   Pain Level: Pain Screening  Patient Currently in Pain: Yes  Pain Assessment: 0-10  Pain Level: 7  Pain Type: Chronic pain  Pain Location: Generalized, Leg  Pain Descriptors: Aching, Sore, Hypersensitivity    Patient Comments: Subjective: \"I think I'm doing better, it's just slow.  I hurt all over all the time.\"  Reports he is now using #2  and is wearing 5 ply sock today.  Trying to increase activity but he continues to complain of poor stamina.    HEP Compliance: Good        OBJECTIVE EXAMINATION   Restrictions:  Restrictions/Precautions: Fall Risk   Required Braces or Orthoses?: Yes      ASSESSMENT     Assessment: Assessment: Reassessment today.  Pt displays improved LE strength and improved hip ROM.  Knee ROM in prosthesis is limited by the fit of prostheiss (and it digging into his leg with flexion.)  His gait speed and TUG time have improved, though he is most limited on turns, with slow and methodical movements, wide KINGSLEY, and arms held abducted from sides.  Burkett balance score is improved, though he continues as a high risk to fall with score of 32/56.  Balance in part appears due to instability of residual limb in

## 2025-02-20 ENCOUNTER — HOSPITAL ENCOUNTER (OUTPATIENT)
Dept: PHYSICAL THERAPY | Age: 60
Setting detail: THERAPIES SERIES
End: 2025-02-20
Payer: COMMERCIAL

## 2025-02-21 RX ORDER — APIXABAN 2.5 MG/1
2.5 TABLET, FILM COATED ORAL 2 TIMES DAILY
Qty: 180 TABLET | Refills: 1 | Status: SHIPPED | OUTPATIENT
Start: 2025-02-21

## 2025-02-25 ENCOUNTER — HOSPITAL ENCOUNTER (OUTPATIENT)
Dept: PHYSICAL THERAPY | Age: 60
Setting detail: THERAPIES SERIES
Discharge: HOME | End: 2025-02-25
Payer: COMMERCIAL

## 2025-02-25 PROCEDURE — 97110 THERAPEUTIC EXERCISES: CPT

## 2025-02-25 ASSESSMENT — PAIN DESCRIPTION - PAIN TYPE: TYPE: CHRONIC PAIN

## 2025-02-25 ASSESSMENT — PAIN DESCRIPTION - DESCRIPTORS: DESCRIPTORS: ACHING;SORE

## 2025-02-25 ASSESSMENT — PAIN SCALES - GENERAL: PAINLEVEL_OUTOF10: 7

## 2025-02-25 ASSESSMENT — PAIN DESCRIPTION - LOCATION: LOCATION: GENERALIZED

## 2025-02-25 ASSESSMENT — PAIN DESCRIPTION - ORIENTATION: ORIENTATION: RIGHT

## 2025-02-25 NOTE — PROGRESS NOTES
Physical Therapy  Daily Treatment Note  Date: 2025  Patient Name: Noe Jimenez  MRN: [id number]     :   1965    Subjective:      PT Visit Information  Onset Date: 24  PT Insurance Information: Worker's Comp  Total # of Visits Approved: 24  Total # of Visits to Date: 21  Plan of Care/Certification Expiration Date: 25  Progress Note Due Date: 25  Referring Provider (secondary): CORONA Cheng  Subjective: Patient reports that he is doing good today. States he still has his pain as usual.    Pain Screening  Patient Currently in Pain: Yes  Pain Assessment: 0-10  Pain Level: 7  Pain Type: Chronic pain  Pain Location: Generalized  Pain Orientation: Right  Pain Descriptors: Aching;Sore       Treatment Activities:   Exercises  Exercise 1: Bilat hip flexor stretch on wedge x 5', blue wedge 1 white pillow  Exercise 2: prone on elbows with 7.5# weight over sacrum--2'-NOT TODAY  Exercise 3: Quad sets R 3\" x 20 (on towel roll)  Exercise 4: SLR R 2 x 15---1.5# on R, 4# on L  Exercise 5: Heel slide R 2 x 15  1.5#  Exercise 6: SAQ R 2 x 15 1.5# on R, 4# on L  Exercise 7: Bridging 2 x 15 (did not need assist for blocking RLE)  Exercise 8: R Knee extension stretch on towel x 3'  Exercise 9: LAQ bilat, 4# on L, 1.5# right 2/15  /HS Curls (green bilat) 2 x 15  Exercise 10: Hip abd (green) x 20/ball squeeze x 20  Exercise 11: Sitting march 1.5#R & 4#L   x 20 reps  Exercise 12: Repeated sit to stand (start from higher surface) 2 x 10 (cuing to not use LEs to brace)  Exercise 13: Fwd/back in  bars x 4  Exercise 14: Sidestepping in  bars x 4  Exercise 15: Stepping over curbs in  bars x 3 ea (fwd/side); only 2 times today due to fatigue  Exercise 16: Stance EO x 1'/EC  x 30\"-Narrow KINGSLEY not today  Exercise 17: Pegboard (add foam pads as appropriate) 37in x 2 not today  Exercise 18: Stance with reaching activities (progress to balloon tap)- x 2 min balloon tap in // not today  Exercise 19: Step ups

## 2025-02-27 ENCOUNTER — HOSPITAL ENCOUNTER (OUTPATIENT)
Dept: PHYSICAL THERAPY | Age: 60
Setting detail: THERAPIES SERIES
Discharge: HOME | End: 2025-02-27
Payer: COMMERCIAL

## 2025-02-27 PROCEDURE — 97110 THERAPEUTIC EXERCISES: CPT

## 2025-02-27 ASSESSMENT — PAIN DESCRIPTION - PAIN TYPE: TYPE: CHRONIC PAIN

## 2025-02-27 ASSESSMENT — PAIN SCALES - GENERAL: PAINLEVEL_OUTOF10: 7

## 2025-02-27 ASSESSMENT — PAIN DESCRIPTION - LOCATION: LOCATION: LEG

## 2025-02-27 ASSESSMENT — PAIN DESCRIPTION - ORIENTATION: ORIENTATION: RIGHT

## 2025-02-27 NOTE — PROGRESS NOTES
Daily Treatment Note  Date: 2025  Patient Name: Noe Jimenez  MRN: [id number]     :   1965    Referring Physician: Julius Ingram APRN -* CORONA Cheng   PCP: Willie Kim MD    Medical Diagnosis: Complex regional pain syndrome i of unspecified lower limb [G90.529] s/p R BKA  2024  Treatment Diagnosis: s/p R BKA      Insurance: Payor: GENERIC SELF-INSURED / Plan: GENERIC SELF-INSURED WC / Product Type: *No Product type* /   Insurance ID: [id number] - (Worker's Comp)    Subjective:  General  Diagnosis: s/p R BKA  2024  Referring Provider (secondary): CORONA Cheng  Onset Date: 24  PT Insurance Information: Worker's Comp  Total # of Visits Approved: 24  Total # of Visits to Date:   Plan of Care/Certification Expiration Date: 25  Progress Note Due Date: 25  Referring Provider (secondary): CORONA Cheng  Subjective: i go to  this afternoon, i'm wearing 13 ply and have never had to wear that much and it still feels like it's moving around  Patient Currently in Pain: Yes  Pain Level: 7  Pain Type: Chronic pain  Pain Location: Leg  Pain Orientation: Right       Treatment Activities:  Exercises:      Treatment Reasoning    Exercise 1: Bilat hip flexor stretch on wedge x 5', blue wedge 1 white pillow  Exercise 2: prone on elbows with 7.5# weight over sacrum--2'-NOT TODAY  Exercise 3: Quad sets R 3\" x 20 (on towel roll)  Exercise 4: SLR R 2 x 15---1.5# on R, 4# on L  Exercise 5: SAQ R 2 x 15 1.5# on R, 4# on L  Exercise 6: Heel slide R 2 x 15 1.5#  Exercise 7: Bridging 2 x 15 (did not need assist for blocking RLE)  Exercise 8: R Knee extension stretch on towel x 3'  Exercise 9: Sitting march 1.5#R & 4#L x 20 reps  Exercise 10: LAQ bilat, 4# on L, 1.5# right 2/15 /HS Curls (green bilat) 2 x 15  Exercise 11: Hip abd (green) x 20/ball squeeze x 20  Exercise 12: Repeated sit to stand (start from higher surface) 2 x 10 (cuing to not use LEs to brace)  Exercise

## 2025-03-04 ENCOUNTER — APPOINTMENT (OUTPATIENT)
Dept: PHYSICAL THERAPY | Age: 60
End: 2025-03-04
Payer: COMMERCIAL

## 2025-03-06 ENCOUNTER — HOSPITAL ENCOUNTER (OUTPATIENT)
Dept: PHYSICAL THERAPY | Age: 60
Setting detail: THERAPIES SERIES
Discharge: HOME | End: 2025-03-06
Payer: COMMERCIAL

## 2025-03-06 PROCEDURE — 97110 THERAPEUTIC EXERCISES: CPT

## 2025-03-06 ASSESSMENT — PAIN DESCRIPTION - DESCRIPTORS: DESCRIPTORS: ACHING;SORE

## 2025-03-06 ASSESSMENT — PAIN DESCRIPTION - LOCATION: LOCATION: LEG

## 2025-03-06 ASSESSMENT — PAIN DESCRIPTION - PAIN TYPE: TYPE: CHRONIC PAIN

## 2025-03-06 ASSESSMENT — PAIN DESCRIPTION - ORIENTATION: ORIENTATION: RIGHT

## 2025-03-06 ASSESSMENT — PAIN SCALES - GENERAL: PAINLEVEL_OUTOF10: 7

## 2025-03-06 NOTE — PROGRESS NOTES
degrees from neutral (was lacking 40 degrees from neutral at evaluation.)   12/12/2024-in standing, bilateral hip extension lacking 10 degrees from neutral----- 1/21: In standing, RLE lacking 15 degrees from neutral.  LLE lacking 10 degrees from neutral------ 2/18: In standing, lacking 6 degrees from neutral on L and lacking 7 degrees from neutral on R  STG Goal 4 Status:: Met, Updated  Long Term Goals  Time Frame for Long Term Goals : 4-6 weeks  Long Term Goal 1: Report ability to complete ADLs independently and without rest break  LTG 1 Current Status:: 11/26: Still requiring rest breaks, but fewer than at initial evaluation.  \"I'm starting to do a lot more on my own but there are still things my wife helps me do.\"          12/12/2024 \"I can get dressed with only a slight, minimal rest break needed\"---- 1/21: Still \"worn out pretty quickly\" but doing better.------- 2/18: Supervision from wife during bath/shower but otherwise independent.  He is still quick to fatigue.  LTG Goal 1 Status:: Partially met, In progress  Long Term Goal 2: Ambulate at least 500' in 6MWT with Rollator  LTG 2 Current Status:: 11/26: 758' in 6MWT with Rollator   12/12/2024-696' in 6MWT  LTG Goal 2 Status:: Met  Long Term Goal 3: If appropriate, ambulate at least 75' with LBQC  LTG 3 Current Status:: 11/26: Amb 196' with LBQC today.                 12/12/2024- 84' with LBQC  LTG Goal 3 Status:: Met  Long Term Goal 4: Ambulate 500' with SBQC  LTG 4 Current Status:: 12/12/2024-have not attempted sbqc---------- 1/21: Amb 382' with SBQC with 1:57 left in 6MWT------- 2/18: Amb 747' in 6MWT with SBQC  LTG Goal 4 Status:: Met  Long Term Goal 5: Improve Burkett Balance score to at least 38/56  LTG 5 Current Status:: 2/18: 32/56 (was 30/56 at last RA, first time performed)  LTG Goal 5 Status:: In progress  Additional Goals?: Yes  Long term goal 6: Improved Timed Up and Go time to 16 sec or better  LTG 6 Current Status:: 2/18:21.91 sec.  Was 32.17 sec

## 2025-03-11 ENCOUNTER — HOSPITAL ENCOUNTER (OUTPATIENT)
Dept: PHYSICAL THERAPY | Age: 60
Setting detail: THERAPIES SERIES
Discharge: HOME | End: 2025-03-11

## 2025-03-11 PROCEDURE — 97110 THERAPEUTIC EXERCISES: CPT

## 2025-03-11 ASSESSMENT — PAIN DESCRIPTION - PAIN TYPE: TYPE: CHRONIC PAIN

## 2025-03-11 ASSESSMENT — PAIN DESCRIPTION - ORIENTATION: ORIENTATION: RIGHT

## 2025-03-11 ASSESSMENT — PAIN DESCRIPTION - LOCATION: LOCATION: LEG

## 2025-03-11 ASSESSMENT — PAIN SCALES - GENERAL: PAINLEVEL_OUTOF10: 7

## 2025-03-11 NOTE — PROGRESS NOTES
Daily Treatment Note  Date: 3/11/2025  Patient Name: Noe Jimenez  MRN: [id number]     :   1965    Referring Physician: No ref. provider found CORONA Cheng   PCP: Willie Kim MD    Medical Diagnosis: No admission diagnoses are documented for this encounter. s/p R BKA  2024  Treatment Diagnosis: s/p R BKA      Insurance: Payor: /   Insurance ID: [id number] - (Worker's Comp)    Subjective:  General  Diagnosis: s/p R BKA  2024  Referring Provider (secondary): CORONA Cheng  Onset Date: 24  PT Insurance Information: Worker's Comp  Total # of Visits Approved: 24  Total # of Visits to Date:   Plan of Care/Certification Expiration Date: 25  Progress Note Due Date: 25  Referring Provider (secondary): CORONA Cheng  Subjective: Patient reports continued pain at about the same as it always is.  Patient Currently in Pain: Yes  Pain Level: 7  Pain Type: Chronic pain  Pain Location: Leg  Pain Orientation: Right       Treatment Activities:  Exercises:      Treatment Reasoning    Exercise 1: Bilat hip flexor stretch on wedge x 5', blue wedge 1 white pillow  Exercise 2: prone on elbows with 7.5# weight over sacrum--2'-NOT TODAY  Exercise 3: Quad sets R 3\" x 20 (on towel roll)  Exercise 4: SLR R 2 x 15---1.5# on R, 4# on L  Exercise 5: SAQ R 2 x 15 1.5# on R, 4# on L  Exercise 6: Heel slide R 2 x 15 1.5#  Exercise 7: Bridging 2 x 15 (did not need assist for blocking RLE)  Exercise 8: R Knee extension stretch on towel x 3'  Exercise 9: Sitting march 1.5#R & 4#L x 20 reps  Exercise 10: LAQ bilat, 4# on L, 1.5# right 2/15 /HS Curls (green bilat) 2 x 15  Exercise 11: Hip abd (green) x 20/ball squeeze x 20  Exercise 12: Repeated sit to stand (start from higher surface) 2 x 10 (cuing to not use LEs to brace)  Exercise 13: Fwd/back in  bars x 4  Exercise 14: Sidestepping in  bars x 4  Exercise 15: Stepping over curbs in  bars x 4 ea (fwd/side)  Exercise 16: Stance EO x 1'/EC

## 2025-03-17 ENCOUNTER — TELEPHONE (OUTPATIENT)
Age: 60
End: 2025-03-17

## 2025-03-17 NOTE — TELEPHONE ENCOUNTER
Nellie from [employer] is calling to speak with Amy she states Amy BARILLAS has been working on something for her and she would like to speak about that

## 2025-06-25 ENCOUNTER — TELEPHONE (OUTPATIENT)
Dept: CARDIOLOGY CLINIC | Age: 60
End: 2025-06-25

## 2025-06-25 NOTE — TELEPHONE ENCOUNTER
Called 1x and LVM to r/s appt. Dr. Ramirez will be out of the office. Need to r/s w/Ernesto Alexander for the first available or after November 2025 w/Dr. Ramirez. Pt may also choose to see Dr. Sandhu. kdw 6.25.2025

## 2025-08-05 ENCOUNTER — OFFICE VISIT (OUTPATIENT)
Dept: CARDIOLOGY CLINIC | Age: 60
End: 2025-08-05
Payer: MEDICARE

## 2025-08-05 VITALS
DIASTOLIC BLOOD PRESSURE: 76 MMHG | SYSTOLIC BLOOD PRESSURE: 136 MMHG | BODY MASS INDEX: 34.07 KG/M2 | WEIGHT: 238 LBS | HEIGHT: 70 IN | HEART RATE: 61 BPM | OXYGEN SATURATION: 98 %

## 2025-08-05 DIAGNOSIS — Z79.01 CHRONIC ANTICOAGULATION: ICD-10-CM

## 2025-08-05 DIAGNOSIS — I48.0 PAROXYSMAL ATRIAL FIBRILLATION (HCC): Primary | ICD-10-CM

## 2025-08-05 DIAGNOSIS — I25.10 CORONARY ARTERY DISEASE INVOLVING NATIVE CORONARY ARTERY OF NATIVE HEART WITHOUT ANGINA PECTORIS: ICD-10-CM

## 2025-08-05 DIAGNOSIS — I10 ESSENTIAL HYPERTENSION: ICD-10-CM

## 2025-08-05 DIAGNOSIS — E78.5 HYPERLIPIDEMIA, UNSPECIFIED HYPERLIPIDEMIA TYPE: ICD-10-CM

## 2025-08-05 PROCEDURE — 3075F SYST BP GE 130 - 139MM HG: CPT | Performed by: NURSE PRACTITIONER

## 2025-08-05 PROCEDURE — 99214 OFFICE O/P EST MOD 30 MIN: CPT | Performed by: NURSE PRACTITIONER

## 2025-08-05 PROCEDURE — G8427 DOCREV CUR MEDS BY ELIG CLIN: HCPCS | Performed by: NURSE PRACTITIONER

## 2025-08-05 PROCEDURE — 3017F COLORECTAL CA SCREEN DOC REV: CPT | Performed by: NURSE PRACTITIONER

## 2025-08-05 PROCEDURE — 1036F TOBACCO NON-USER: CPT | Performed by: NURSE PRACTITIONER

## 2025-08-05 PROCEDURE — 93000 ELECTROCARDIOGRAM COMPLETE: CPT | Performed by: NURSE PRACTITIONER

## 2025-08-05 PROCEDURE — G8417 CALC BMI ABV UP PARAM F/U: HCPCS | Performed by: NURSE PRACTITIONER

## 2025-08-05 PROCEDURE — 3078F DIAST BP <80 MM HG: CPT | Performed by: NURSE PRACTITIONER

## 2025-08-05 RX ORDER — INSULIN LISPRO 100 [IU]/ML
INJECTION, SOLUTION INTRAVENOUS; SUBCUTANEOUS PRN
COMMUNITY

## 2025-08-05 RX ORDER — ACYCLOVIR 400 MG/1
TABLET ORAL
COMMUNITY
Start: 2025-08-02

## 2025-08-05 RX ORDER — BUMETANIDE 1 MG/1
1 TABLET ORAL 2 TIMES DAILY
Qty: 90 TABLET | Refills: 3 | Status: SHIPPED | OUTPATIENT
Start: 2025-08-05

## 2025-08-05 RX ORDER — DILTIAZEM HYDROCHLORIDE 120 MG/1
120 CAPSULE, COATED, EXTENDED RELEASE ORAL DAILY
Qty: 90 CAPSULE | Refills: 3 | Status: SHIPPED | OUTPATIENT
Start: 2025-08-05

## 2025-08-05 RX ORDER — METOPROLOL TARTRATE 50 MG
50 TABLET ORAL 2 TIMES DAILY
Qty: 180 TABLET | Refills: 3 | Status: SHIPPED | OUTPATIENT
Start: 2025-08-05

## 2025-08-05 RX ORDER — ATORVASTATIN CALCIUM 20 MG/1
20 TABLET, FILM COATED ORAL NIGHTLY
Qty: 90 TABLET | Refills: 3 | Status: SHIPPED | OUTPATIENT
Start: 2025-08-05

## 2025-08-05 RX ORDER — CLOPIDOGREL BISULFATE 75 MG/1
75 TABLET ORAL DAILY
Qty: 90 TABLET | Refills: 3 | Status: SHIPPED | OUTPATIENT
Start: 2025-08-05

## 2025-08-05 RX ORDER — ALLOPURINOL 100 MG/1
100 TABLET ORAL DAILY
COMMUNITY

## 2025-08-05 RX ORDER — EMPAGLIFLOZIN 10 MG/1
10 TABLET, FILM COATED ORAL DAILY
COMMUNITY
Start: 2025-07-15 | End: 2025-10-13

## 2025-08-05 RX ORDER — PREGABALIN 100 MG/1
100 CAPSULE ORAL 3 TIMES DAILY
COMMUNITY

## 2025-08-20 ENCOUNTER — HOSPITAL ENCOUNTER (OUTPATIENT)
Dept: PHYSICAL THERAPY | Age: 60
Setting detail: THERAPIES SERIES
Discharge: HOME | End: 2025-08-20
Payer: COMMERCIAL

## 2025-08-20 PROCEDURE — 97161 PT EVAL LOW COMPLEX 20 MIN: CPT

## 2025-08-20 ASSESSMENT — PAIN DESCRIPTION - ORIENTATION: ORIENTATION: RIGHT

## 2025-08-20 ASSESSMENT — PAIN DESCRIPTION - DESCRIPTORS: DESCRIPTORS: BURNING;THROBBING;SORE

## 2025-08-20 ASSESSMENT — PAIN DESCRIPTION - PAIN TYPE: TYPE: CHRONIC PAIN

## 2025-08-20 ASSESSMENT — PAIN DESCRIPTION - LOCATION: LOCATION: LEG

## (undated) DEVICE — GAUZE,SPONGE,FLUFF,6"X6.75",STRL,10/TRAY: Brand: MEDLINE

## (undated) DEVICE — CATHETER GUID 6FR L100CM DIA0.071IN NYL SHFT EBU3.5

## (undated) DEVICE — ARROW 6 FR BALLOON

## (undated) DEVICE — PROXIMATE RH ROTATING HEAD SKIN STAPLERS (35 WIDE) CONTAINS 35 STAINLESS STEEL STAPLES: Brand: PROXIMATE

## (undated) DEVICE — SHEET,DRAPE,53X77,STERILE: Brand: MEDLINE

## (undated) DEVICE — Device: Brand: OMNIWIRE PRESSURE GUIDE WIRE

## (undated) DEVICE — PERCUTANEOUS ENTRY THINWALL NEEDLE  ONE-PART: Brand: COOK

## (undated) DEVICE — DRESSING,GAUZE,XEROFORM,CURAD,5"X9",ST: Brand: CURAD

## (undated) DEVICE — SURGICAL PROCEDURE PACK CRD CATH LOURDES HOSP LF

## (undated) DEVICE — ANTIBACTERIAL UNDYED BRAIDED (POLYGLACTIN 910), SYNTHETIC ABSORBABLE SUTURE: Brand: COATED VICRYL

## (undated) DEVICE — CATH BLLN ANGIO 3X15MM NC EUPHORIA RX

## (undated) DEVICE — TRAP FLD MINIVAC MEGADYNE 100ML

## (undated) DEVICE — KIT ANGIO W/ AT P65 PREM HND CTRL FOR CNTRST DEL ANGIOTOUCH

## (undated) DEVICE — STOCKINETTE,IMPERVIOUS,12X48,STERILE: Brand: MEDLINE

## (undated) DEVICE — IMMOB KN 3PNL DLX CANVS 22IN BLU

## (undated) DEVICE — KIT COR DIAG CATH ANGIO CURVES 6FR JL 4.0 100CM JR 4.0

## (undated) DEVICE — TOWL OR PREWSH 36X36IN XL BLU DISP STRL

## (undated) DEVICE — KIT MFLD ISOLATN NACL CNTRST PRT TBNG SPIK W/ PRSS TRNSDUC

## (undated) DEVICE — GUIDEWIRE VASC L260CM DIA0038IN TIP L3MM PTFE J TIP FIX COR

## (undated) DEVICE — TORQUE DEVICE: Brand: TORQUE DEVICE

## (undated) DEVICE — BNDG ELAS ECONO 4IN 5YD LF TN

## (undated) DEVICE — VALVE HEMSTAT 8FR INNR LUMN CRSS SLT SEAL DSGN WATCHDOG

## (undated) DEVICE — SUT SILK 2/0 SUTUPAK TIES 24IN SA75H

## (undated) DEVICE — RADIFOCUS OPTITORQUE ANGIOGRAPHIC CATHETER: Brand: OPTITORQUE

## (undated) DEVICE — SUT SILK 3/0 SH 30IN K832H

## (undated) DEVICE — BAND COMPR L24CM REG CLR PLAS HEMSTAT EXT HK AND LOOP RETEN

## (undated) DEVICE — HI-TORQUE BALANCE MIDDLEWEIGHT UNIVERSAL GUIDE WIRE .014 STRAIGHT TIP 3.0 CM X 190 CM: Brand: HI-TORQUE BALANCE MIDDLEWEIGHT UNIVERSAL

## (undated) DEVICE — CATH BLLN ANGIO 2X12MM SC EUPHORA RX

## (undated) DEVICE — SYSTEM GWIRE L260CM DIA0035IN STD STEER HYDROPHOBIC STR TIP

## (undated) DEVICE — INTENDED FOR TISSUE SEPARATION, AND OTHER PROCEDURES THAT REQUIRE A SHARP SURGICAL BLADE TO PUNCTURE OR CUT.: Brand: BARD-PARKER ®  SAFETY SCALPED

## (undated) DEVICE — INFLATION DEVICE KIT: Brand: ENCORE™ 26 ADVANTAGE KIT

## (undated) DEVICE — SUT SILK 3/0 SUTUPAK TIES 24IN SA74H

## (undated) DEVICE — BNDG ELAS CO-FLEX SLF ADHR 4IN5YD LF STRL

## (undated) DEVICE — Device

## (undated) DEVICE — BANDAGE,GAUZE,BULKEE II,4.5"X4.1YD,STRL: Brand: MEDLINE

## (undated) DEVICE — CATHETER DIAG 5FR L110CM LUMN ID0.047IN PGTL 6 SIDE H HUB

## (undated) DEVICE — PAD,PREPPING,CUFFED,24X48,7",NONSTERILE: Brand: MEDLINE

## (undated) DEVICE — FIXED CORE WIRE GUIDE SAFE-T-J, CURVED: Brand: COOK

## (undated) DEVICE — SUT SILK 2/0 SH 30IN K833H

## (undated) DEVICE — 2108 SERIES SAGITTAL BLADE FLARED (48.1 X 0.64 X 61.7MM)

## (undated) DEVICE — PAD MAJOR: Brand: MEDLINE INDUSTRIES, INC.

## (undated) DEVICE — GLV SURG SENSICARE W/ALOE PF LF 7.5 STRL

## (undated) DEVICE — GLIDESHEATH SS KIT HYDROPHILIC COATED INTRODUCER SHEATH: Brand: GLIDESHEATH

## (undated) DEVICE — PINNACLE INTRODUCER SHEATH: Brand: PINNACLE

## (undated) DEVICE — Device: Brand: NOMOLINE™ LH ADULT NASAL CO2 CANNULA WITH O2 4M

## (undated) DEVICE — APPL CHLORAPREP HI/LITE 26ML ORNG

## (undated) DEVICE — SPNG LAP PREWSH SFTPK 18X18IN STRL PK/5

## (undated) DEVICE — GUIDEWIRE VASC L150CM DIA0.035IN TIP DIA3MM L7MM INTVASC S